# Patient Record
Sex: FEMALE | Race: WHITE | URBAN - METROPOLITAN AREA
[De-identification: names, ages, dates, MRNs, and addresses within clinical notes are randomized per-mention and may not be internally consistent; named-entity substitution may affect disease eponyms.]

---

## 2017-03-06 ENCOUNTER — OUTPATIENT (OUTPATIENT)
Dept: OUTPATIENT SERVICES | Facility: HOSPITAL | Age: 77
LOS: 1 days | Discharge: HOME | End: 2017-03-06

## 2017-03-08 PROBLEM — Z00.00 ENCOUNTER FOR PREVENTIVE HEALTH EXAMINATION: Status: ACTIVE | Noted: 2017-03-08

## 2017-03-15 ENCOUNTER — APPOINTMENT (OUTPATIENT)
Dept: VASCULAR SURGERY | Facility: CLINIC | Age: 77
End: 2017-03-15

## 2017-06-27 DIAGNOSIS — Z47.1 AFTERCARE FOLLOWING JOINT REPLACEMENT SURGERY: ICD-10-CM

## 2017-12-19 ENCOUNTER — OUTPATIENT (OUTPATIENT)
Dept: OUTPATIENT SERVICES | Facility: HOSPITAL | Age: 77
LOS: 1 days | Discharge: HOME | End: 2017-12-19

## 2017-12-19 ENCOUNTER — APPOINTMENT (OUTPATIENT)
Age: 77
End: 2017-12-19

## 2017-12-19 DIAGNOSIS — C50.919 MALIGNANT NEOPLASM OF UNSPECIFIED SITE OF UNSPECIFIED FEMALE BREAST: ICD-10-CM

## 2017-12-19 DIAGNOSIS — T20.27XA BURN OF SECOND DEGREE OF NECK, INITIAL ENCOUNTER: ICD-10-CM

## 2017-12-19 DIAGNOSIS — T20.22XA: ICD-10-CM

## 2017-12-19 DIAGNOSIS — M17.10 UNILATERAL PRIMARY OSTEOARTHRITIS, UNSPECIFIED KNEE: ICD-10-CM

## 2017-12-19 DIAGNOSIS — E66.9 OBESITY, UNSPECIFIED: ICD-10-CM

## 2017-12-19 DIAGNOSIS — E11.9 TYPE 2 DIABETES MELLITUS WITHOUT COMPLICATIONS: ICD-10-CM

## 2017-12-19 DIAGNOSIS — T20.13XA: ICD-10-CM

## 2017-12-19 DIAGNOSIS — I10 ESSENTIAL (PRIMARY) HYPERTENSION: ICD-10-CM

## 2017-12-26 ENCOUNTER — APPOINTMENT (OUTPATIENT)
Age: 77
End: 2017-12-26

## 2017-12-26 ENCOUNTER — OUTPATIENT (OUTPATIENT)
Dept: OUTPATIENT SERVICES | Facility: HOSPITAL | Age: 77
LOS: 1 days | Discharge: HOME | End: 2017-12-26

## 2017-12-26 DIAGNOSIS — M17.10 UNILATERAL PRIMARY OSTEOARTHRITIS, UNSPECIFIED KNEE: ICD-10-CM

## 2017-12-26 DIAGNOSIS — C50.919 MALIGNANT NEOPLASM OF UNSPECIFIED SITE OF UNSPECIFIED FEMALE BREAST: ICD-10-CM

## 2017-12-26 DIAGNOSIS — I10 ESSENTIAL (PRIMARY) HYPERTENSION: ICD-10-CM

## 2017-12-26 DIAGNOSIS — E11.9 TYPE 2 DIABETES MELLITUS WITHOUT COMPLICATIONS: ICD-10-CM

## 2017-12-26 DIAGNOSIS — E66.9 OBESITY, UNSPECIFIED: ICD-10-CM
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
## 2017-12-28 DIAGNOSIS — T20.22XA BURN OF SECOND DEGREE OF LIP(S), INITIAL ENCOUNTER: ICD-10-CM
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
## 2018-01-03 DIAGNOSIS — T20.22XD: ICD-10-CM

## 2018-01-03 DIAGNOSIS — T20.27XD BURN OF SECOND DEGREE OF NECK, SUBSEQUENT ENCOUNTER: ICD-10-CM

## 2018-01-03 DIAGNOSIS — T20.13XD: ICD-10-CM

## 2018-06-25 ENCOUNTER — INPATIENT (INPATIENT)
Facility: HOSPITAL | Age: 78
LOS: 6 days | Discharge: NURSING, REHAB OR LONG-TERM CARE | End: 2018-07-02
Attending: INTERNAL MEDICINE | Admitting: INTERNAL MEDICINE

## 2018-06-25 VITALS
OXYGEN SATURATION: 100 % | SYSTOLIC BLOOD PRESSURE: 168 MMHG | TEMPERATURE: 97 F | DIASTOLIC BLOOD PRESSURE: 88 MMHG | HEART RATE: 84 BPM | RESPIRATION RATE: 20 BRPM

## 2018-06-25 RX ORDER — ONDANSETRON 8 MG/1
4 TABLET, FILM COATED ORAL ONCE
Refills: 0 | Status: COMPLETED | OUTPATIENT
Start: 2018-06-25 | End: 2018-06-25

## 2018-06-25 NOTE — CONSULT NOTE ADULT - SUBJECTIVE AND OBJECTIVE BOX
***STROKE ALERT CONSULT NOTE    Chief Complaint: stroke alert    *Last known normal time: 9:05pm  *Stroke Team Contact Time: 11:35pm    HPI: 79 yo woman with PMhx below presenting with acute onset abdominal pain and nausea and vomitting.  Initially complaint was only diffuse weakness however after attending assesment it was found that she had left sided weakness.  After discussion with son she was normal earlier in evening and he was called by patient at 9:15pm and told him something was wrong at it started around 5 minutes earlier so at 9:10.  She went for CTH and no hemorrhage or stroke was seen and CTA disection study was done due to abdominal pain and vomiting.  Discussed with patient and son about TPA including risks, benefits and alternatives.  They agree with TPA understanding that there is a risk or mortality and increased morbidity.  She is not on any antiplatelet medications or anticoagulation.      PAST MEDICAL & SURGICAL HISTORY:  DM (diabetes mellitus)  Breast cancer?        Social History: (-) x 3    Allergies    latex (Unknown)  penicillins (Unknown)  shellfish (Unknown)    Intolerances        MEDICATIONS  (STANDING):  ondansetron Injectable 4 milliGRAM(s) IV Push once    MEDICATIONS  (PRN):      Vital Signs Last 24 Hrs  T(C): --  T(F): --  HR: --  BP: --176/74  BP(mean): --  RR: --  SpO2: --    Neurologic Examination:  A+Ox3 able to name and repeat; no dysarthria, right facial asymmetry, decreased sensation right V1-V3  Left arm drift  Left leg drift  decreased sensation right extremities  dysmetria left finger to nose  No neglect    *NIH Stroke Scale (required): 8   LOC (0-3); LOC Ques (0-2); LOC Comm (0-2); Gaze (0-2); Vision (0-3); Face (0-3); LUE (0-4); RUE (0-4); LLE (0-4); RLE (0-4); Ataxia (0-2); Sensory (0-2);  Lang (0-3); Dys (0-2); Neglect (0-2)    *Modified [place] Score (required): 4  0 - No symptoms.  1 - No significant disability. Able to carry out all usual activities, despite some symptoms.  2 - Slight disability. Able to look after own affairs without assistance, but unable to carry out all previous activities.  3 - Moderate disability. Requires some help, but able to walk unassisted.  4 - Moderately severe disability. Unable to attend to own bodily needs without assistance, and unable to walk unassisted.  5 - Severe disability. Requires constant nursing care and attention, bedridden, incontinent.  6 - Dead.      Labs: pending        Neuroimaging:  NCHCT: CT Head No Cont:   ******PRELIMINARY REPORT******    ******PRELIMINARY REPORT******          EXAM:  CT BRAIN            PROCEDURE DATE:  06/25/2018          ******PRELIMINARY REPORT******    ******PRELIMINARY REPORT******          INTERPRETATION:  Clinical History / Reason for exam: Stroke code.   Left-sided weakness. History of breast cancer.    TECHNIQUE: Contiguous axial CT images of the head were obtained from the   base of the skull to the vertex without IV contrast. Sagittal and coronal   reformats were obtained.     COMPARISON: None.    FINDINGS:    Motion artifact limits evaluation of fine detail.     The cortical sulci and ventricular system demonstrate parenchymal volume   loss.     There is periventricular and hemispheric white matter hypoattenuation   compatible with chronic microvascular ischemic changes.    No acute intracranial hemorrhage, mass effect, midline shift, or   extra-axial fluid collection.    Gray-white differentiation is maintained. Basal ganglia calcifications.     Beam hardening artifact is noted overlying the brain stem and posterior   fossa which is inherent to CT in this location.    The paranasal sinuses and mastoid air cells are well aerated.   Hyperostosis frontalis.      IMPRESSION:    No CT evidence of acute intracranial pathology.    Chronic microvascular ischemic changes.        Dr. Kimberly Aguila discussed preliminary findings with BARBARA CASTREJON PA on   6/25/2018 11:35 PM with readback.            ******PRELIMINARY REPORT******    ******PRELIMINARY REPORT******         KIMBERLY AGUILA M.D., RESIDENT RADIOLOGIST                   (06-25-18 @ 23:28)        Assessment:  This is a 78y Female with left sided weakness and clumsiness suspicious for stroke.  Her Last known normal time is 9:10pm      Risks and benefits including alternatives to treatment with thrombolysis with IV TPA (alteplase) discussed with patient/family at length including significant morbidity/mortality associated with treatment and acute stroke.  All questions and concerns regarding acute stroke therapy answered and addressed.  Patient/family understands and consents to treatment with thrombolysis with IV TPA within 4.5 hour window.    Stroke alert notification:  Weight (kg): 73.5 (06-25-18 @ 22:38)  IV TPA bolus time: pending Dr. Cervantes  TPA total dose: pending Dr. Cervantes  TPA bolus dose: pending Dr. Cervantes    Plan:   - TPA bolus 10% followed by 90% infusion over one hour  - serial neurochecks Q 15 minutes x 2 hours then Q30 minutes x 6 hours then Q 1 hour 24 hours  - no anticoagulation or antiplatelets  - mechanical DVT prophylaxis  - keep blood pressure <185/105 and > 110/60  - repeat head CT in 12 to 24 hours or if clinical deterioration  - admit to ICU/CCU  - MRI head Nc/MRA head NC/MRA Neck w/ gado   - Echocardiogram  - Dysphagia Screen (No PO Meds until performed)  - PT/OT/speech/swallow  - Call for any changes in neuroexam    06-25-18 @ 23:58

## 2018-06-25 NOTE — ED ADULT NURSE NOTE - PMH
DM (diabetes mellitus) BP (high blood pressure)    Breast CA    Depression    DM (diabetes mellitus)    High cholesterol

## 2018-06-26 DIAGNOSIS — Z96.651 PRESENCE OF RIGHT ARTIFICIAL KNEE JOINT: Chronic | ICD-10-CM

## 2018-06-26 DIAGNOSIS — Z90.49 ACQUIRED ABSENCE OF OTHER SPECIFIED PARTS OF DIGESTIVE TRACT: Chronic | ICD-10-CM

## 2018-06-26 DIAGNOSIS — Z98.890 OTHER SPECIFIED POSTPROCEDURAL STATES: Chronic | ICD-10-CM

## 2018-06-26 LAB
ALBUMIN SERPL ELPH-MCNC: 4.3 G/DL — SIGNIFICANT CHANGE UP (ref 3.5–5.2)
ALP SERPL-CCNC: 125 U/L — HIGH (ref 30–115)
ALT FLD-CCNC: 13 U/L — SIGNIFICANT CHANGE UP (ref 0–41)
ANION GAP SERPL CALC-SCNC: 17 MMOL/L — HIGH (ref 7–14)
APPEARANCE UR: (no result)
APTT BLD: 26.4 SEC — LOW (ref 27–39.2)
AST SERPL-CCNC: 24 U/L — SIGNIFICANT CHANGE UP (ref 0–41)
BACTERIA # UR AUTO: (no result)
BASOPHILS # BLD AUTO: 0.03 K/UL — SIGNIFICANT CHANGE UP (ref 0–0.2)
BASOPHILS NFR BLD AUTO: 0.4 % — SIGNIFICANT CHANGE UP (ref 0–1)
BILIRUB DIRECT SERPL-MCNC: <0.2 MG/DL — SIGNIFICANT CHANGE UP (ref 0–0.2)
BILIRUB INDIRECT FLD-MCNC: >0.2 MG/DL — SIGNIFICANT CHANGE UP (ref 0.2–1.2)
BILIRUB SERPL-MCNC: 0.4 MG/DL — SIGNIFICANT CHANGE UP (ref 0.2–1.2)
BILIRUB UR-MCNC: NEGATIVE — SIGNIFICANT CHANGE UP
BUN SERPL-MCNC: 16 MG/DL — SIGNIFICANT CHANGE UP (ref 10–20)
CALCIUM SERPL-MCNC: 9.6 MG/DL — SIGNIFICANT CHANGE UP (ref 8.5–10.1)
CHLORIDE SERPL-SCNC: 102 MMOL/L — SIGNIFICANT CHANGE UP (ref 98–110)
CHOLEST SERPL-MCNC: 235 MG/DL — HIGH (ref 100–200)
CK MB CFR SERPL CALC: 2.8 NG/ML — SIGNIFICANT CHANGE UP (ref 0.6–6.3)
CK SERPL-CCNC: 78 U/L — SIGNIFICANT CHANGE UP (ref 0–225)
CO2 SERPL-SCNC: 25 MMOL/L — SIGNIFICANT CHANGE UP (ref 17–32)
COD CRY URNS QL: NEGATIVE — SIGNIFICANT CHANGE UP
COLOR SPEC: YELLOW — SIGNIFICANT CHANGE UP
COMMENT - URINE: SIGNIFICANT CHANGE UP
CREAT SERPL-MCNC: 0.9 MG/DL — SIGNIFICANT CHANGE UP (ref 0.7–1.5)
DIFF PNL FLD: NEGATIVE — SIGNIFICANT CHANGE UP
EOSINOPHIL # BLD AUTO: 0.09 K/UL — SIGNIFICANT CHANGE UP (ref 0–0.7)
EOSINOPHIL NFR BLD AUTO: 1.1 % — SIGNIFICANT CHANGE UP (ref 0–8)
EPI CELLS # UR: (no result) /HPF
GLUCOSE SERPL-MCNC: 143 MG/DL — HIGH (ref 70–99)
GLUCOSE UR QL: NEGATIVE — SIGNIFICANT CHANGE UP
GRAN CASTS # UR COMP ASSIST: NEGATIVE — SIGNIFICANT CHANGE UP
HCT VFR BLD CALC: 39.8 % — SIGNIFICANT CHANGE UP (ref 37–47)
HDLC SERPL-MCNC: 62 MG/DL — SIGNIFICANT CHANGE UP (ref 40–125)
HGB BLD-MCNC: 13 G/DL — SIGNIFICANT CHANGE UP (ref 12–16)
HYALINE CASTS # UR AUTO: NEGATIVE — SIGNIFICANT CHANGE UP
IMM GRANULOCYTES NFR BLD AUTO: 0.7 % — HIGH (ref 0.1–0.3)
INR BLD: 1.22 RATIO — SIGNIFICANT CHANGE UP (ref 0.65–1.3)
KETONES UR-MCNC: 15 — SIGNIFICANT CHANGE UP
LEUKOCYTE ESTERASE UR-ACNC: NEGATIVE — SIGNIFICANT CHANGE UP
LIDOCAIN IGE QN: 16 U/L — SIGNIFICANT CHANGE UP (ref 7–60)
LIPID PNL WITH DIRECT LDL SERPL: 167 MG/DL — HIGH (ref 4–129)
LYMPHOCYTES # BLD AUTO: 1 K/UL — LOW (ref 1.2–3.4)
LYMPHOCYTES # BLD AUTO: 12.1 % — LOW (ref 20.5–51.1)
MCHC RBC-ENTMCNC: 29.3 PG — SIGNIFICANT CHANGE UP (ref 27–31)
MCHC RBC-ENTMCNC: 32.7 G/DL — SIGNIFICANT CHANGE UP (ref 32–37)
MCV RBC AUTO: 89.6 FL — SIGNIFICANT CHANGE UP (ref 81–99)
MONOCYTES # BLD AUTO: 0.74 K/UL — HIGH (ref 0.1–0.6)
MONOCYTES NFR BLD AUTO: 8.9 % — SIGNIFICANT CHANGE UP (ref 1.7–9.3)
NEUTROPHILS # BLD AUTO: 6.35 K/UL — SIGNIFICANT CHANGE UP (ref 1.4–6.5)
NEUTROPHILS NFR BLD AUTO: 76.8 % — HIGH (ref 42.2–75.2)
NITRITE UR-MCNC: NEGATIVE — SIGNIFICANT CHANGE UP
NRBC # BLD: 0 /100 WBCS — SIGNIFICANT CHANGE UP (ref 0–0)
PH UR: 7 — SIGNIFICANT CHANGE UP (ref 5–8)
PLATELET # BLD AUTO: 267 K/UL — SIGNIFICANT CHANGE UP (ref 130–400)
POTASSIUM SERPL-MCNC: 4 MMOL/L — SIGNIFICANT CHANGE UP (ref 3.5–5)
POTASSIUM SERPL-SCNC: 4 MMOL/L — SIGNIFICANT CHANGE UP (ref 3.5–5)
PROT SERPL-MCNC: 7.3 G/DL — SIGNIFICANT CHANGE UP (ref 6–8)
PROT UR-MCNC: 30
PROTHROM AB SERPL-ACNC: 13.2 SEC — HIGH (ref 9.95–12.87)
RBC # BLD: 4.44 M/UL — SIGNIFICANT CHANGE UP (ref 4.2–5.4)
RBC # FLD: 13.1 % — SIGNIFICANT CHANGE UP (ref 11.5–14.5)
RBC CASTS # UR COMP ASSIST: (no result) /HPF
SODIUM SERPL-SCNC: 144 MMOL/L — SIGNIFICANT CHANGE UP (ref 135–146)
SP GR SPEC: 1.01 — SIGNIFICANT CHANGE UP (ref 1.01–1.03)
TOTAL CHOLESTEROL/HDL RATIO MEASUREMENT: 3.8 RATIO — LOW (ref 4–5.5)
TRI-PHOS CRY UR QL COMP ASSIST: NEGATIVE — SIGNIFICANT CHANGE UP
TRIGL SERPL-MCNC: 118 MG/DL — SIGNIFICANT CHANGE UP (ref 10–149)
TROPONIN T SERPL-MCNC: <0.01 NG/ML — SIGNIFICANT CHANGE UP
URATE CRY FLD QL MICRO: NEGATIVE — SIGNIFICANT CHANGE UP
UROBILINOGEN FLD QL: 1 (ref 0.2–0.2)
WBC # BLD: 8.27 K/UL — SIGNIFICANT CHANGE UP (ref 4.8–10.8)
WBC # FLD AUTO: 8.27 K/UL — SIGNIFICANT CHANGE UP (ref 4.8–10.8)
WBC UR QL: NEGATIVE — SIGNIFICANT CHANGE UP

## 2018-06-26 RX ORDER — INSULIN GLARGINE 100 [IU]/ML
20 INJECTION, SOLUTION SUBCUTANEOUS AT BEDTIME
Refills: 0 | Status: DISCONTINUED | OUTPATIENT
Start: 2018-06-26 | End: 2018-06-26

## 2018-06-26 RX ORDER — DIPHENHYDRAMINE HCL 50 MG
25 CAPSULE ORAL ONCE
Refills: 0 | Status: COMPLETED | OUTPATIENT
Start: 2018-06-26 | End: 2018-06-26

## 2018-06-26 RX ORDER — GLUCAGON INJECTION, SOLUTION 0.5 MG/.1ML
1 INJECTION, SOLUTION SUBCUTANEOUS ONCE
Refills: 0 | Status: DISCONTINUED | OUTPATIENT
Start: 2018-06-26 | End: 2018-07-02

## 2018-06-26 RX ORDER — SODIUM CHLORIDE 9 MG/ML
1000 INJECTION INTRAMUSCULAR; INTRAVENOUS; SUBCUTANEOUS
Refills: 0 | Status: DISCONTINUED | OUTPATIENT
Start: 2018-06-26 | End: 2018-07-02

## 2018-06-26 RX ORDER — ALTEPLASE 100 MG
60 KIT INTRAVENOUS ONCE
Refills: 0 | Status: COMPLETED | OUTPATIENT
Start: 2018-06-26 | End: 2018-06-26

## 2018-06-26 RX ORDER — DOCUSATE SODIUM 100 MG
100 CAPSULE ORAL THREE TIMES A DAY
Refills: 0 | Status: DISCONTINUED | OUTPATIENT
Start: 2018-06-26 | End: 2018-07-02

## 2018-06-26 RX ORDER — DEXTROSE 50 % IN WATER 50 %
25 SYRINGE (ML) INTRAVENOUS ONCE
Refills: 0 | Status: DISCONTINUED | OUTPATIENT
Start: 2018-06-26 | End: 2018-07-02

## 2018-06-26 RX ORDER — DEXTROSE 50 % IN WATER 50 %
15 SYRINGE (ML) INTRAVENOUS ONCE
Refills: 0 | Status: DISCONTINUED | OUTPATIENT
Start: 2018-06-26 | End: 2018-07-02

## 2018-06-26 RX ORDER — ALTEPLASE 100 MG
7 KIT INTRAVENOUS ONCE
Refills: 0 | Status: COMPLETED | OUTPATIENT
Start: 2018-06-26 | End: 2018-06-26

## 2018-06-26 RX ORDER — NORTRIPTYLINE HYDROCHLORIDE 10 MG/1
25 CAPSULE ORAL AT BEDTIME
Refills: 0 | Status: DISCONTINUED | OUTPATIENT
Start: 2018-06-26 | End: 2018-07-02

## 2018-06-26 RX ORDER — SERTRALINE 25 MG/1
25 TABLET, FILM COATED ORAL DAILY
Refills: 0 | Status: DISCONTINUED | OUTPATIENT
Start: 2018-06-26 | End: 2018-07-02

## 2018-06-26 RX ORDER — SODIUM CHLORIDE 9 MG/ML
1000 INJECTION, SOLUTION INTRAVENOUS
Refills: 0 | Status: DISCONTINUED | OUTPATIENT
Start: 2018-06-26 | End: 2018-07-02

## 2018-06-26 RX ORDER — LISINOPRIL 2.5 MG/1
10 TABLET ORAL DAILY
Refills: 0 | Status: DISCONTINUED | OUTPATIENT
Start: 2018-06-26 | End: 2018-06-26

## 2018-06-26 RX ORDER — DEXTROSE 50 % IN WATER 50 %
12.5 SYRINGE (ML) INTRAVENOUS ONCE
Refills: 0 | Status: DISCONTINUED | OUTPATIENT
Start: 2018-06-26 | End: 2018-07-02

## 2018-06-26 RX ORDER — SODIUM CHLORIDE 0.65 %
1 AEROSOL, SPRAY (ML) NASAL
Refills: 0 | Status: DISCONTINUED | OUTPATIENT
Start: 2018-06-26 | End: 2018-07-02

## 2018-06-26 RX ORDER — SENNA PLUS 8.6 MG/1
2 TABLET ORAL AT BEDTIME
Refills: 0 | Status: DISCONTINUED | OUTPATIENT
Start: 2018-06-26 | End: 2018-07-02

## 2018-06-26 RX ORDER — LISINOPRIL 2.5 MG/1
10 TABLET ORAL DAILY
Refills: 0 | Status: DISCONTINUED | OUTPATIENT
Start: 2018-06-26 | End: 2018-07-02

## 2018-06-26 RX ORDER — PANTOPRAZOLE SODIUM 20 MG/1
40 TABLET, DELAYED RELEASE ORAL AT BEDTIME
Refills: 0 | Status: DISCONTINUED | OUTPATIENT
Start: 2018-06-26 | End: 2018-07-02

## 2018-06-26 RX ORDER — ATORVASTATIN CALCIUM 80 MG/1
80 TABLET, FILM COATED ORAL AT BEDTIME
Refills: 0 | Status: DISCONTINUED | OUTPATIENT
Start: 2018-06-26 | End: 2018-07-02

## 2018-06-26 RX ORDER — ATORVASTATIN CALCIUM 80 MG/1
20 TABLET, FILM COATED ORAL AT BEDTIME
Refills: 0 | Status: DISCONTINUED | OUTPATIENT
Start: 2018-06-26 | End: 2018-06-26

## 2018-06-26 RX ORDER — INSULIN LISPRO 100/ML
VIAL (ML) SUBCUTANEOUS
Refills: 0 | Status: DISCONTINUED | OUTPATIENT
Start: 2018-06-26 | End: 2018-06-26

## 2018-06-26 RX ADMIN — SODIUM CHLORIDE 75 MILLILITER(S): 9 INJECTION INTRAMUSCULAR; INTRAVENOUS; SUBCUTANEOUS at 03:42

## 2018-06-26 RX ADMIN — ATORVASTATIN CALCIUM 80 MILLIGRAM(S): 80 TABLET, FILM COATED ORAL at 22:02

## 2018-06-26 RX ADMIN — SENNA PLUS 2 TABLET(S): 8.6 TABLET ORAL at 22:03

## 2018-06-26 RX ADMIN — SERTRALINE 25 MILLIGRAM(S): 25 TABLET, FILM COATED ORAL at 22:04

## 2018-06-26 RX ADMIN — Medication 100 MILLIGRAM(S): at 22:02

## 2018-06-26 RX ADMIN — PANTOPRAZOLE SODIUM 40 MILLIGRAM(S): 20 TABLET, DELAYED RELEASE ORAL at 22:02

## 2018-06-26 RX ADMIN — ALTEPLASE 60 MILLIGRAM(S): KIT at 00:29

## 2018-06-26 RX ADMIN — Medication 25 MILLIGRAM(S): at 23:28

## 2018-06-26 RX ADMIN — NORTRIPTYLINE HYDROCHLORIDE 25 MILLIGRAM(S): 10 CAPSULE ORAL at 22:02

## 2018-06-26 RX ADMIN — ONDANSETRON 4 MILLIGRAM(S): 8 TABLET, FILM COATED ORAL at 00:03

## 2018-06-26 RX ADMIN — ALTEPLASE 420 MILLIGRAM(S): KIT at 00:28

## 2018-06-26 NOTE — ED PROVIDER NOTE - PHYSICAL EXAMINATION
VITAL SIGNS: I have reviewed nursing notes and confirm.  CONSTITUTIONAL: Well-developed; well-nourished; in no acute distress.  SKIN: Skin exam is warm and dry, no acute rash.  HEAD: Normocephalic; atraumatic.  EYES: PERRL, EOM intact; conjunctiva and sclera clear.  ENT: No nasal discharge; airway clear.   NECK: Supple; non tender.  CARD: S1, S2 normal; no murmurs, gallops, or rubs. Regular rate and rhythm.  RESP: No wheezes, rales or rhonchi. Speaking in full sentences.   ABD: Normal bowel sounds; soft; non-distended; (+) mild lower abdominal TTP.   EXT: No clubbing, cyanosis or edema.  NEURO: Alert, oriented. CN II-XII intact. (+) left UE/LE pronator drift, strength 3/5, sensation intact. Right UE/LE strength 5/5, no drift, sensation intact. Speech clear. No facial asymmetry.

## 2018-06-26 NOTE — H&P ADULT - ASSESSMENT
77 y/o female with PMHx of HTN, DLD, DM II, depression, and breast CA s/p lumpectomy p/w left sided weakness and numbness x 1 day. patient was admitted to ICU for acute CVA s/p TPA administration.    #Acute CVA s/p TPA  neuro check  monitor VS,  BP <180  monitor H & H  Lipid profile and HBa1c  2d ECHO  repeat CTH in 24 tonight  MRI of head, MRA of head and neck  start lipitor 80   start ASA tmw    f/u neuro eval  speech and swallow eval  PT and rehab    #HTN  controlled  c/w lisinpril    #DM   monitor FS  insulin protocol    #others   NPO for now  DVT and GI ppx  from home  full code

## 2018-06-26 NOTE — CONSULT NOTE ADULT - ASSESSMENT
IMPRESSION: Rehab of 79 y/o f rehab  for  r/o  cva  lt side  weakness     PRECAUTIONS: [  ] Cardiac  [  ] Respiratory  [  ] Seizures [  ] Contact Isolation  [  ] Droplet Isolation  [ x Other    Weight Bearing Status:     RECOMMENDATION:    Out of Bed to Chair     DVT/Decubiti Prophylaxis    REHAB PLAN:     [ x  ] Bedside P/T 3-5 times a week   [  x ]   Bedside O/T  2-3 times a week             [   ] No Rehab Therapy Indicated                   [   ]  Speech Therapy   Conditioning/ROM                                    ADL  Bed Mobility                                               Conditioning/ROM  Transfers                                                     Bed Mobility  Sitting /Standing Balance                         Transfers                                        Gait Training                                               Sitting/Standing Balance  Stair Training [   ]Applicable                    Home equipment Eval                                                                        Splinting  [   ] Only      GOALS:   ADL   [  x ]   Independent                    Transfers  [ x  ] Independent                          Ambulation  [  x Independent     [ x  ] With device                            [   ]  CG                                                         [   ]  CG                                                                  [   ] CG                            [    ] Min A                                                   [   ] Min A                                                              [   ] Min  A          DISCHARGE PLAN:   [xx   ]  Good candidate for Intensive Rehabilitation/Hospital based-[facility]A [facility]                                             Will tolerate 3hrs Intensive Rehab Daily                                       [   x ]  Short Term Rehab in Skilled Nursing Facility                                       [    ]  Home with Outpatient or VN services                                         [    ]  Possible Candidate for Intensive Hospital based Rehab
CVA s/p TPA slight improvement      Plan:   Neuro Plan:  1.  No antiplatelets or heparinoids for 24 hours from TPA administration.  2.  Keep SBP < 180.  3.  Head CT at 24 hours out from TPA completion or sooner if acute neuro changes.  4.  Rehab evaluation, PT, OT, and speech evals.  5.  After 24 hours begin aspirin 81 mg/day.  6.  TTE.  7.  MRI brain and MRA head w/o contrast and MRA neck with contrast after patient out of CCU.  8.  Lipid panel and treat hyperlipidemia with statin.    Monitor in the ICU

## 2018-06-26 NOTE — PROVIDER CONTACT NOTE (OTHER) - SITUATION
neuro checks done ap protocol, pt c/o worsening left sided numbness & decreased movement of left arm & left leg. Left leg worse than left arm
on neuro check pt c/o worsening numbness & tingling

## 2018-06-26 NOTE — PATIENT PROFILE ADULT. - ABILITY TO HEAR (WITH HEARING AID OR HEARING APPLIANCE IF NORMALLY USED):
Mildly to Moderately Impaired: difficulty hearing in some environments or speaker may need to increase volume or speak distinctly/hearing aid left at home

## 2018-06-26 NOTE — PATIENT PROFILE ADULT. - PMH
Bilateral hearing loss, unspecified hearing loss type    BP (high blood pressure)    Breast CA    Depression    DM (diabetes mellitus)    High cholesterol

## 2018-06-26 NOTE — PHYSICAL THERAPY INITIAL EVALUATION ADULT - SPECIFY REASON(S)
Chart reviewed. Patient received TPA <24 hours ago.  Hold PT at this time.  Will follow up and complete evaluation as appropriate.

## 2018-06-26 NOTE — H&P ADULT - HISTORY OF PRESENT ILLNESS
79 y/o female with PMHx of HTN, DLD, DM II, depression, and breast CA s/p lumpectomy p/w left sided weakness and numbness x 1 day. patient started to feel left side weakness and numbness in the evening 1 day prior to admission. patient came to the ED where she had nausea and vomiting. CTH was done with no evidence of acute intracranial pathology. TPA was offered to the patient and she agreed. patient was admitted to ICU for acute CVA. patient denies LOC, seizure and headache. patient denies fever, COB, CP, cough, n/v/d, bowel and urinary symptoms.

## 2018-06-26 NOTE — PATIENT PROFILE ADULT. - PSH
H/O total knee replacement, right    History of cholecystectomy  1992  History of lumpectomy of right breast

## 2018-06-26 NOTE — CONSULT NOTE ADULT - SUBJECTIVE AND OBJECTIVE BOX
HPI:77 y/o  f  ptn  admitted to  [facility]  s  b.o  lt  side  weakness  to  r/o  cva  lt  hp ptn  is  stable  and  referred to acute  rehab  for  eval  and tx   PTN  REFERRED TO ACUTE  REHAB  FOR  EVAL AND  TX   PAST MEDICAL & SURGICAL HISTORY:  Bilateral hearing loss, unspecified hearing loss type  High cholesterol  Depression  BP (high blood pressure)  Breast CA  DM (diabetes mellitus)  History of cholecystectomy: 1992  History of lumpectomy of right breast  H/O total knee replacement, right      Hospital Course:    TODAY'S SUBJECTIVE & REVIEW OF SYMPTOMS:     Constitutional WNL   Cardio WNL   Resp WNL   GI WNL  Heme WNL  Endo WNL  Skin WNL  MSK WNL  Neuro WNL  Cognitive WNL  Psych WNL      MEDICATIONS  (STANDING):  atorvastatin 20 milliGRAM(s) Oral at bedtime  pantoprazole    Tablet 40 milliGRAM(s) Oral at bedtime  sodium chloride 0.9%. 1000 milliLiter(s) (75 mL/Hr) IV Continuous <Continuous>    MEDICATIONS  (PRN):      FAMILY HISTORY:      Allergies    latex (Unknown)  penicillins (Unknown)  shellfish (Unknown)    Intolerances        SOCIAL HISTORY:    [  ] Etoh  [  ] Smoking  [  ] Substance abuse     Home Environment:  [ x] Home Alone  [  ] Lives with Family  [  ] Home Health Aid    Dwelling:  [  ] Apartment  [  x] Private House  [  ] Adult Home  [  ] Skilled Nursing Facility      [  ] Short Term  [  ] Long Term  [ x ] Stairs       Elevator [  ]    FUNCTIONAL STATUS PTA: (Check all that apply)  Ambulation: [x   ]Independent    [  ] Dependent     [  ] Non-Ambulatory  Assistive Device: [  x] SA Cane  [  ]  Q Cane  [  x] Walker  [  ]  Wheelchair  ADL : [ x ] Independent  [  ]  Dependent       Vital Signs Last 24 Hrs  T(C): 36.4 (26 Jun 2018 07:06), Max: 36.6 (26 Jun 2018 02:28)  T(F): 97.5 (26 Jun 2018 07:06), Max: 97.9 (26 Jun 2018 02:28)  HR: 91 (26 Jun 2018 08:03) (73 - 95)  BP: 160/77 (26 Jun 2018 08:03) (112/55 - 194/80)  BP(mean): 111 (26 Jun 2018 08:03) (79 - 122)  RR: 22 (26 Jun 2018 07:06) (16 - 30)  SpO2: 99% (26 Jun 2018 08:03) (84% - 100%)      PHYSICAL EXAM: Alert & Oriented X3  GENERAL: NAD, well-groomed, well-developed  HEAD:  Atraumatic, Normocephalic  EYES: EOMI, PERRLA, conjunctiva and sclera clear  NECK: Supple, No JVD, Normal thyroid  CHEST/LUNG: Clear to percussion bilaterally; No rales, rhonchi, wheezing, or rubs  HEART: Regular rate and rhythm; No murmurs, rubs, or gallops  ABDOMEN: Soft, Nontender, Nondistended; Bowel sounds present  EXTREMITIES:  2+ Peripheral Pulses, No clubbing, cyanosis, or edema    NERVOUS SYSTEM:  Cranial Nerves 2-12 intact [ x ] Abnormal  [  ]  ROM: WFL all extremities [  x]  Abnormal [  ]  Motor Strength: WFL all extremities  [  ]  Abnormal [ lt side  3/5   ]  Sensation: intact to light touch [  ] Abnormal [  x]  Reflexes: Symmetric [ x  Abnormal [  ]    FUNCTIONAL STATUS:  Bed Mobility: Independent [  ]  Supervision [  ]  Needs Assistance [x  ]  N/A [  ]  Transfers: Independent [  ]  Supervision [  ]  Needs Assistance [x  ]  N/A [  ]   Ambulation: Independent [  ]  Supervision [  ]  Needs Assistance [ x ]  N/A [  ]  ADL: Independent [  x] Requires Assistance [  ] N/A [  ]      LABS:                        13.0   8.27  )-----------( 267      ( 26 Jun 2018 00:15 )             39.8     06-26    144  |  102  |  16  ----------------------------<  143<H>  4.0   |  25  |  0.9    Ca    9.6      26 Jun 2018 00:15    TPro  7.3  /  Alb  4.3  /  TBili  0.4  /  DBili  <0.2  /  AST  24  /  ALT  13  /  AlkPhos  125<H>  06-26    PT/INR - ( 26 Jun 2018 00:15 )   PT: 13.20 sec;   INR: 1.22 ratio         PTT - ( 26 Jun 2018 00:15 )  PTT:26.4 sec      RADIOLOGY & ADDITIONAL STUDIES:    Assesment:

## 2018-06-26 NOTE — CONSULT NOTE ADULT - SUBJECTIVE AND OBJECTIVE BOX
Patient is a 78y old  Female who presents with a chief complaint of abdominal pain and left sided weakness    HPI:  elderly white female w/ PMH as noted below. pt c/o- abdominal pain , nausea , vomiting. Pt also c/o- left facial and left side weakness    PAST MEDICAL & SURGICAL HISTORY:  High cholesterol  Depression  BP (high blood pressure)  Breast CA  DM (diabetes mellitus)  History of lumpectomy of right breast  H/O total knee replacement, right    Allergies    latex (Unknown)  penicillins (Unknown)  shellfish (Unknown)    Intolerances      FAMILY HISTORY:    Home Medications:  atorvastatin: 20 milligram(s) orally once a day (at bedtime) (26 Jun 2018 01:24)  Ecotrin:  (26 Jun 2018 01:24)  exemestane: 25 milligram(s) orally once a day (26 Jun 2018 01:24)  HumaLOG 100 units/mL subcutaneous solution: 10 unit(s) subcutaneous 3 times a day (before meals) (26 Jun 2018 01:24)  Levemir 100 units/mL subcutaneous solution: 20 unit(s) subcutaneous once a day (at bedtime) (26 Jun 2018 01:24)  nortriptyline: 25 milligram(s) orally once a day (at bedtime) (26 Jun 2018 01:24)  quinapril: 10 milligram(s) orally once a day (26 Jun 2018 01:24)  sertraline: 25 milligram(s) orally once a day (26 Jun 2018 01:24)  Vitamin B6 25 mg oral tablet: 1 tab(s) orally once a day (26 Jun 2018 01:24)  Vitamin C 500 mg oral tablet: 1 tab(s) orally once a day (26 Jun 2018 01:24)    Occupation:  Alochol: Denied  Smoking: Denied  Drug Use: Denied  Marital Status:         ROS: as in HPI; All other systems reviewed are negative    ICU Vital Signs Last 24 Hrs  T(C): 36.3 (25 Jun 2018 23:21), Max: 36.3 (25 Jun 2018 11:30)  T(F): 97.4 (25 Jun 2018 23:21), Max: 97.4 (25 Jun 2018 11:30)  HR: 84 (26 Jun 2018 01:30) (80 - 95)  BP: 164/73 (26 Jun 2018 01:30) (159/76 - 194/80)  BP(mean): --  ABP: --  ABP(mean): --  RR: 20 (26 Jun 2018 01:30) (18 - 20)  SpO2: 95% (26 Jun 2018 01:30) (84% - 100%)        Physical Examination:    General: No acute distress.  Alert, oriented, interactive, nonfocal    HEENT: Pupils equal, reactive to light.  Symmetric.    PULM: Clear to auscultation bilaterally, no significant sputum production    CVS: Regular rate and rhythm, no murmurs, rubs, or gallops    ABD: Soft, nondistended, nontender, normoactive bowel sounds, no masses    EXT: No edema, nontender    SKIN: Warm and well perfused, no rashes noted.              I&O's Detail        LABS:                        13.0   8.27  )-----------( 267      ( 26 Jun 2018 00:15 )             39.8                 CAPILLARY BLOOD GLUCOSE        PT/INR - ( 26 Jun 2018 00:15 )   PT: 13.20 sec;   INR: 1.22 ratio         PTT - ( 26 Jun 2018 00:15 )  PTT:26.4 sec    Culture        MEDICATIONS  (STANDING):  atorvastatin 20 milliGRAM(s) Oral at bedtime  pantoprazole    Tablet 40 milliGRAM(s) Oral at bedtime  sodium chloride 0.9%. 1000 milliLiter(s) (75 mL/Hr) IV Continuous <Continuous>    MEDICATIONS  (PRN):        RADIOLOGY: ***     CXR:  TLC:  OG:  ET tube:          ECHO:      IMPRESSION:        PLAN:    CNS:    HEENT:    PULMONARY:    CARDIOVASCULAR:    GI: GI prophylaxis.  Feeding     RENAL:    INFECTIOUS DISEASE:    HEMATOLOGICAL:  DVT prophylaxis.    ENDOCRINE:  Follow up FS.  Insulin protocol if needed.    MUSCULOSKELETAL:        CRITICAL CARE TIME SPENT: *** Patient is a 78y old  Female who presents with a chief complaint of abdominal pain and left sided weakness    HPI:  elderly white female w/ PMH as noted below. pt c/o- abdominal pain , nausea , vomiting. Pt also c/o- left facial and left side weakness. Pt was evaluated by neurology and was determined that she was a candidate for TpA. pt was explained the risks and benefits of TpA. Pt then agreed for TpA administration    PAST MEDICAL & SURGICAL HISTORY:  High cholesterol  Depression  BP (high blood pressure)  Breast CA  DM (diabetes mellitus)  History of lumpectomy of right breast  H/O total knee replacement, right    Allergies    latex (Unknown)  penicillins (Unknown)  shellfish (Unknown)    Intolerances      FAMILY HISTORY:    Home Medications:  atorvastatin: 20 milligram(s) orally once a day (at bedtime) (26 Jun 2018 01:24)  Ecotrin:  (26 Jun 2018 01:24)  exemestane: 25 milligram(s) orally once a day (26 Jun 2018 01:24)  HumaLOG 100 units/mL subcutaneous solution: 10 unit(s) subcutaneous 3 times a day (before meals) (26 Jun 2018 01:24)  Levemir 100 units/mL subcutaneous solution: 20 unit(s) subcutaneous once a day (at bedtime) (26 Jun 2018 01:24)  nortriptyline: 25 milligram(s) orally once a day (at bedtime) (26 Jun 2018 01:24)  quinapril: 10 milligram(s) orally once a day (26 Jun 2018 01:24)  sertraline: 25 milligram(s) orally once a day (26 Jun 2018 01:24)  Vitamin B6 25 mg oral tablet: 1 tab(s) orally once a day (26 Jun 2018 01:24)  Vitamin C 500 mg oral tablet: 1 tab(s) orally once a day (26 Jun 2018 01:24)    Occupation:  Alochol: Denied  Smoking: Denied  Drug Use: Denied  Marital Status:         ROS: as in HPI; All other systems reviewed are negative    ICU Vital Signs Last 24 Hrs  T(C): 36.3 (25 Jun 2018 23:21), Max: 36.3 (25 Jun 2018 11:30)  T(F): 97.4 (25 Jun 2018 23:21), Max: 97.4 (25 Jun 2018 11:30)  HR: 84 (26 Jun 2018 01:30) (80 - 95)  BP: 164/73 (26 Jun 2018 01:30) (159/76 - 194/80)  BP(mean): --  ABP: --  ABP(mean): --  RR: 20 (26 Jun 2018 01:30) (18 - 20)  SpO2: 95% (26 Jun 2018 01:30) (84% - 100%)        Physical Examination:    General: No acute distress.  Alert, oriented, interactive, nonfocal    HEENT: Pupils equal, reactive to light.  Symmetric.    PULM: Clear to auscultation bilaterally, no significant sputum production    CVS: Regular rate and rhythm, no murmurs, rubs, or gallops    ABD: Soft, nondistended, nontender, normoactive bowel sounds, no masses    EXT: No edema, nontender    SKIN: Warm and well perfused, no rashes noted.  neuro- alert oriented,x 3  mild dysaarthria  left sided muscle  weakness  cr n 1-12 grossly intact              I&O's Detail        LABS:                        13.0   8.27  )-----------( 267      ( 26 Jun 2018 00:15 )             39.8                 CAPILLARY BLOOD GLUCOSE        PT/INR - ( 26 Jun 2018 00:15 )   PT: 13.20 sec;   INR: 1.22 ratio         PTT - ( 26 Jun 2018 00:15 )  PTT:26.4 sec    Culture        MEDICATIONS  (STANDING):  atorvastatin 20 milliGRAM(s) Oral at bedtime  pantoprazole    Tablet 40 milliGRAM(s) Oral at bedtime  sodium chloride 0.9%. 1000 milliLiter(s) (75 mL/Hr) IV Continuous <Continuous>    MEDICATIONS  (PRN):        RADIOLOGY: ***     CXR: cardiomegaly, no acute cardiopulmonary abnormalities  CT head- no acute intracranial pathology  CT chest- no aortic disection           IMPRESSION:  acute CVA        PLAN:  discussed w/ intensivistAnand    neuro checks q 1hr  no anticoag.  mechanical dvt prophilaxis  keep syst bp <185  repeat CT head in 24 hr  MRI/MRA head and neck  pt/ot/speech swallow          CRITICAL CARE TIME SPENT: *** Patient is a 78y old  Female who presents with a chief complaint of abdominal pain and left sided weakness    HPI:  elderly white female w/ PMH as noted below. pt c/o- abdominal pain , nausea , vomiting. Pt also c/o- left facial and left side weakness. Pt was evaluated by neurology and was determined that she was a candidate for TpA. pt was explained the risks and benefits of TpA. Pt then agreed for TpA administration    PAST MEDICAL & SURGICAL HISTORY:  High cholesterol  Depression  BP (high blood pressure)  Breast CA  DM (diabetes mellitus)  History of lumpectomy of right breast  H/O total knee replacement, right    Allergies    latex (Unknown)  penicillins (Unknown)  shellfish (Unknown)    Intolerances      FAMILY HISTORY:    Home Medications:  atorvastatin: 20 milligram(s) orally once a day (at bedtime) (26 Jun 2018 01:24)  Ecotrin:  (26 Jun 2018 01:24)  exemestane: 25 milligram(s) orally once a day (26 Jun 2018 01:24)  HumaLOG 100 units/mL subcutaneous solution: 10 unit(s) subcutaneous 3 times a day (before meals) (26 Jun 2018 01:24)  Levemir 100 units/mL subcutaneous solution: 20 unit(s) subcutaneous once a day (at bedtime) (26 Jun 2018 01:24)  nortriptyline: 25 milligram(s) orally once a day (at bedtime) (26 Jun 2018 01:24)  quinapril: 10 milligram(s) orally once a day (26 Jun 2018 01:24)  sertraline: 25 milligram(s) orally once a day (26 Jun 2018 01:24)  Vitamin B6 25 mg oral tablet: 1 tab(s) orally once a day (26 Jun 2018 01:24)  Vitamin C 500 mg oral tablet: 1 tab(s) orally once a day (26 Jun 2018 01:24)    Occupation:  Alochol: Denied  Smoking: Denied  Drug Use: Denied  Marital Status:         ROS: as in HPI; All other systems reviewed are negative    ICU Vital Signs Last 24 Hrs  T(C): 36.3 (25 Jun 2018 23:21), Max: 36.3 (25 Jun 2018 11:30)  T(F): 97.4 (25 Jun 2018 23:21), Max: 97.4 (25 Jun 2018 11:30)  HR: 84 (26 Jun 2018 01:30) (80 - 95)  BP: 164/73 (26 Jun 2018 01:30) (159/76 - 194/80)  BP(mean): --  ABP: --  ABP(mean): --  RR: 20 (26 Jun 2018 01:30) (18 - 20)  SpO2: 95% (26 Jun 2018 01:30) (84% - 100%)        Physical Examination:    General: No acute distress.  Alert, oriented, interactive, nonfocal    HEENT: Pupils equal, reactive to light.  Symmetric.    PULM: Clear to auscultation bilaterally, no significant sputum production    CVS: Regular rate and rhythm, no murmurs, rubs, or gallops    ABD: Soft, nondistended, nontender, normoactive bowel sounds, no masses    EXT: No edema, nontender    SKIN: Warm and well perfused, no rashes noted.  neuro- alert oriented,x 3  mild dysaarthria  left sided muscle  weakness, post TPA- same as on right side.  cr n 1-12 grossly intact              I&O's Detail        LABS:                        13.0   8.27  )-----------( 267      ( 26 Jun 2018 00:15 )             39.8                 CAPILLARY BLOOD GLUCOSE        PT/INR - ( 26 Jun 2018 00:15 )   PT: 13.20 sec;   INR: 1.22 ratio         PTT - ( 26 Jun 2018 00:15 )  PTT:26.4 sec    Culture        MEDICATIONS  (STANDING):  atorvastatin 20 milliGRAM(s) Oral at bedtime  pantoprazole    Tablet 40 milliGRAM(s) Oral at bedtime  sodium chloride 0.9%. 1000 milliLiter(s) (75 mL/Hr) IV Continuous <Continuous>    MEDICATIONS  (PRN):        RADIOLOGY: ***     CXR: cardiomegaly, no acute cardiopulmonary abnormalities  CT head- no acute intracranial pathology  CT chest- no aortic disection           IMPRESSION:  1. Acute CVA with left extremities weakness on arrival.  2. Post TPA , in ER.  3. DM.  4. HTN , controlled.  5. H/O Right breast Ca with lumpectomy.        PLAN:  discussed w/ intensivistAnand    neuro checks q 1hr  no anticoag.  mechanical dvt prophilaxis  keep syst bp <185  repeat CT head in 24 hr  MRI/MRA head and neck  pt/ot/speech swallow          CRITICAL CARE TIME SPENT: ***

## 2018-06-26 NOTE — H&P ADULT - NSHPLABSRESULTS_GEN_ALL_CORE
13.0   8.27  )-----------( 267      ( 26 Jun 2018 00:15 )             39.8       06-26    144  |  102  |  16  ----------------------------<  143<H>  4.0   |  25  |  0.9    Ca    9.6      26 Jun 2018 00:15    TPro  7.3  /  Alb  4.3  /  TBili  0.4  /  DBili  <0.2  /  AST  24  /  ALT  13  /  AlkPhos  125<H>  06-26            PT/INR - ( 26 Jun 2018 00:15 )   PT: 13.20 sec;   INR: 1.22 ratio         PTT - ( 26 Jun 2018 00:15 )  PTT:26.4 sec        CARDIAC MARKERS ( 26 Jun 2018 00:15 )  x     / <0.01 ng/mL / 78 U/L / x     / 2.8 ng/mL

## 2018-06-26 NOTE — PROGRESS NOTE ADULT - SUBJECTIVE AND OBJECTIVE BOX
Neurology Progress Note  Patient is s/p acute stroke and TPA administered at 00:28 this morning.    Interval History:    Patient did well overnight.  Denies headaches, is moving left side more.  She c/o mild sensory change on left side.    T(F): 97.5 (06-26-18 @ 07:06), Max: 97.9 (06-26-18 @ 02:28)  HR: 92 (06-26-18 @ 08:55) (73 - 96)  BP: 157/73 (06-26-18 @ 08:55) (112/55 - 194/80)  RR: 20 (06-26-18 @ 08:55) (16 - 30)  SpO2: 98% (06-26-18 @ 08:55) (84% - 100%)  Neurological Exam:   Mental status: Awake, alert, oriented x 3, speech is fluent.  Patient follows commands.  Cranial nerves: Pupils equally round and reactive to light.  No visual field loss detected. No nystagmus.  Facial muscles symmetric.  Motor:  5/5 strength proximal and distal right arm and leg.  Left arm strength 4 to 4-/5.  Left lower extremity 4-/5 proximally.  Distally has foot drop.  ataxia is present left arm and leg.  Sensation:  seems to indicate increased sensitivity left to LT and PP left arm and leg.  Endorses a sensory change where left side feels abnormal but doesn't agree that the sensation is less.  Reflexes: 2+ upper and lower extremities, right great toe is downgoing, left great toe is upgoing.    NIH stroke score is 3/42:  1 pt for mild sensory change on left, 1 point for left arm and 1 point for left leg, 1 point for leg drift.    Labs:  CBC Full  -  ( 26 Jun 2018 00:15 )  WBC Count : 8.27 K/uL  Hemoglobin : 13.0 g/dL  Hematocrit : 39.8 %  Platelet Count - Automated : 267 K/uL  Mean Cell Volume : 89.6 fL  Mean Cell Hemoglobin : 29.3 pg  Mean Cell Hemoglobin Concentration : 32.7 g/dL  Auto Neutrophil # : 6.35 K/uL  Auto Lymphocyte # : 1.00 K/uL  Auto Monocyte # : 0.74 K/uL  Auto Eosinophil # : 0.09 K/uL  Auto Basophil # : 0.03 K/uL  Auto Neutrophil % : 76.8 %  Auto Lymphocyte % : 12.1 %  Auto Monocyte % : 8.9 %  Auto Eosinophil % : 1.1 %  Auto Basophil % : 0.4 %    06-26    144  |  102  |  16  ----------------------------<  143<H>  4.0   |  25  |  0.9    Ca    9.6      26 Jun 2018 00:15    TPro  7.3  /  Alb  4.3  /  TBili  0.4  /  DBili  <0.2  /  AST  24  /  ALT  13  /  AlkPhos  125<H>  06-26    LIVER FUNCTIONS - ( 26 Jun 2018 00:15 )  Alb: 4.3 g/dL / Pro: 7.3 g/dL / ALK PHOS: 125 U/L / ALT: 13 U/L / AST: 24 U/L / GGT: x           PT/INR - ( 26 Jun 2018 00:15 )   PT: 13.20 sec;   INR: 1.22 ratio         PTT - ( 26 Jun 2018 00:15 )  PTT:26.4 sec          Assessment:  This is a 78y Female w/ h/o acute stroke last night and s/p TPA administration at 00:28 am.  She has shown some improvement in left sided  strength.  NIH score went from 8 -> 3.    Plan:  1.  No antiplatelets or heparinoids for 24 hours from TPA administration.  2.  Keep SBP < 180.  3.  Head CT at 24 hours out from TPA completion or sooner if acute neuro changes.  4.  Rehab evaluation, PT, OT, and speech evals.  5.  After 24 hours begin aspirin 81 mg/day.  6.  TTE.  7.  MRI brain and MRA head and neck after patient out of CCU.  8.  Lipid panel and treat hyperlipidemia with statin. Neurology Progress Note  Patient is s/p acute stroke and TPA administered at 00:28 this morning.    Interval History:    Patient did well overnight.  Denies headaches, is moving left side more.  She c/o mild sensory change on left side.    T(F): 97.5 (06-26-18 @ 07:06), Max: 97.9 (06-26-18 @ 02:28)  HR: 92 (06-26-18 @ 08:55) (73 - 96)  BP: 157/73 (06-26-18 @ 08:55) (112/55 - 194/80)  RR: 20 (06-26-18 @ 08:55) (16 - 30)  SpO2: 98% (06-26-18 @ 08:55) (84% - 100%)  Neurological Exam:   Mental status: Awake, alert, oriented x 3, speech is fluent.  Patient follows commands.  Cranial nerves: Pupils equally round and reactive to light.  No visual field loss detected. No nystagmus.  Facial muscles symmetric.  Motor:  5/5 strength proximal and distal right arm and leg.  Left arm strength 4 to 4-/5.  Left lower extremity 4-/5 proximally.  Distally has foot drop.  ataxia is present left arm and leg.  Sensation:  seems to indicate increased sensitivity left to LT and PP left arm and leg.  Endorses a sensory change where left side feels abnormal but doesn't agree that the sensation is less.  Reflexes: 2+ upper and lower extremities, right great toe is downgoing, left great toe is upgoing.    NIH stroke score is 3/42:  1 pt for mild sensory change on left, 1 point for left arm and 1 point for left leg, 1 point for leg drift.    Labs:  CBC Full  -  ( 26 Jun 2018 00:15 )  WBC Count : 8.27 K/uL  Hemoglobin : 13.0 g/dL  Hematocrit : 39.8 %  Platelet Count - Automated : 267 K/uL  Mean Cell Volume : 89.6 fL  Mean Cell Hemoglobin : 29.3 pg  Mean Cell Hemoglobin Concentration : 32.7 g/dL  Auto Neutrophil # : 6.35 K/uL  Auto Lymphocyte # : 1.00 K/uL  Auto Monocyte # : 0.74 K/uL  Auto Eosinophil # : 0.09 K/uL  Auto Basophil # : 0.03 K/uL  Auto Neutrophil % : 76.8 %  Auto Lymphocyte % : 12.1 %  Auto Monocyte % : 8.9 %  Auto Eosinophil % : 1.1 %  Auto Basophil % : 0.4 %    06-26    144  |  102  |  16  ----------------------------<  143<H>  4.0   |  25  |  0.9    Ca    9.6      26 Jun 2018 00:15    TPro  7.3  /  Alb  4.3  /  TBili  0.4  /  DBili  <0.2  /  AST  24  /  ALT  13  /  AlkPhos  125<H>  06-26    LIVER FUNCTIONS - ( 26 Jun 2018 00:15 )  Alb: 4.3 g/dL / Pro: 7.3 g/dL / ALK PHOS: 125 U/L / ALT: 13 U/L / AST: 24 U/L / GGT: x           PT/INR - ( 26 Jun 2018 00:15 )   PT: 13.20 sec;   INR: 1.22 ratio         PTT - ( 26 Jun 2018 00:15 )  PTT:26.4 sec          Assessment:  This is a 78y Female w/ h/o acute stroke last night and s/p TPA administration at 00:28 am.  She has shown some improvement in left sided  strength.  NIH score went from 8 -> 3.    Plan:  1.  No antiplatelets or heparinoids for 24 hours from TPA administration.  2.  Keep SBP < 180.  3.  Head CT at 24 hours out from TPA completion or sooner if acute neuro changes.  4.  Rehab evaluation, PT, OT, and speech evals.  5.  After 24 hours begin aspirin 81 mg/day.  6.  TTE.  7.  MRI brain and MRA head w/o contrast and MRA neck with contrast after patient out of CCU.  8.  Lipid panel and treat hyperlipidemia with statin.

## 2018-06-26 NOTE — ED PROVIDER NOTE - OBJECTIVE STATEMENT
77 yo F with PMHx of HTN, breast CA, DM, hypercholesterolemia and depression presents to the ED c/o nausea, NBNB vomiting and abdominal pain. Daughter called EMS for evaluation but states that her son had found pt laying down on the floor. Pt also complains of numbness to her left leg. Pt denies fever, chills, diarrhea, headache, dizziness, weakness, chest pain, SOB, back pain, LOC, trauma, urinary symptoms, cough, calf pain/swelling, recent travel, recent surgery.

## 2018-06-26 NOTE — H&P ADULT - NSHPPHYSICALEXAM_GEN_ALL_CORE
T(C): 36.4 (06-26-18 @ 07:06), Max: 36.6 (06-26-18 @ 02:28)  HR: 92 (06-26-18 @ 10:00) (73 - 96)  BP: 154/85 (06-26-18 @ 10:00) (112/55 - 194/80)  RR: 20 (06-26-18 @ 10:00) (16 - 30)  SpO2: 99% (06-26-18 @ 10:00) (84% - 100%)    PHYSICAL EXAM:  GENERAL: NAD, well-developed  HEAD:  Atraumatic, Normocephalic  EYES: EOMI, PERRLA, conjunctiva and sclera clear  ENT:No nasal obstruction or discharge. No tonsillar exudate, swelling or erythema.  NECK: Supple, No JVD  CHEST/LUNG: Clear to auscultation bilaterally; No wheeze  HEART: Regular rate and rhythm; No murmurs, rubs, or gallops  ABDOMEN: Soft, Nontender, Nondistended; Bowel sounds present  EXTREMITIES:  2+ Peripheral Pulses, No clubbing, cyanosis, or edema  PSYCH: AAOx3  NEUROLOGY: left sided weakness, LLE and LUE 2/5   SKIN: No rashes or lesions

## 2018-06-26 NOTE — CONSULT NOTE ADULT - SUBJECTIVE AND OBJECTIVE BOX
JAYASHREE FAGAN    Female    Patient is a 78y old  Female who presents with a chief complaint of left side weakness, N/V (2018 03:50) and abd pain.  As per pt’s daughter, pt developed nausea and vomiting tonight, was c/o abd pain so called emergency services to bring pt for eval.  Pt’s son adds that pt had fall this evening, found pt laying on ground. In ED pt found with L sided weakness. Pt was given TPA after neuro evaluation.    The has been some improvement in her status.          Allergies    latex (Unknown)  penicillins (Unknown)  shellfish (Unknown)        Daily Height in cm: 152.4 (2018 03:20)    Daily Weight in k.2 (2018 03:20)    I&O's Summary    2018 07:  -  2018 07:00  --------------------------------------------------------  IN: 300 mL / OUT: 0 mL / NET: 300 mL    2018 07:01  -  2018 11:03  --------------------------------------------------------  IN: 150 mL / OUT: 0 mL / NET: 150 mL        FAMILY HISTORY:      HEALTH ISSUES - PROBLEM Dx:          Vital Signs Last 24 Hrs  T(C): 36.4 (2018 07:06), Max: 36.6 (2018 02:28)  T(F): 97.5 (2018 07:06), Max: 97.9 (2018 02:28)  HR: 92 (2018 10:00) (73 - 96)  BP: 154/85 (2018 10:00) (112/55 - 194/80)  BP(mean): 113 (2018 10:00) (79 - 122)  RR: 20 (2018 10:00) (16 - 30)  SpO2: 99% (2018 10:00) (84% - 100%)    REVIEW OF SYSTEMS:  CONSTITUTIONAL: No fever, weight loss, or fatigue  EYES: No eye pain, visual disturbances, or discharge  NECK: No pain or stiffness  RESPIRATORY: No cough, wheezing, chills or hemoptysis; No shortness of breath  CARDIOVASCULAR: No chest pain, palpitations, dizziness, or leg swelling  GASTROINTESTINAL: No abdominal or epigastric pain. No nausea, vomiting, or hematemesis; No diarrhea or constipation. No melena or hematochezia.  GENITOURINARY: No dysuria, frequency, hematuria, or incontinence  NEUROLOGICAL: No headaches, memory loss, loss of strength, numbness, or tremors  MUSCULOSKELETAL: No joint pain or swelling; No muscle, back, or extremity pain        PT/INR - ( 2018 00:15 )   PT: 13.20 sec;   INR: 1.22 ratio         PTT - ( 2018 00:15 )  PTT:26.4 sec                          13.0   8.27  )-----------( 267      ( 2018 00:15 )             39.8           144  |  102  |  16  ----------------------------<  143<H>  4.0   |  25  |  0.9    Ca    9.6      2018 00:15    TPro  7.3  /  Alb  4.3  /  TBili  0.4  /  DBili  <0.2  /  AST  24  /  ALT  13  /  AlkPhos  125<H>        CARDIAC MARKERS ( 2018 00:15 )  x     / <0.01 ng/mL / 78 U/L / x     / 2.8 ng/mL        LIVER FUNCTIONS - ( 2018 00:15 )  Alb: 4.3 g/dL / Pro: 7.3 g/dL / ALK PHOS: 125 U/L / ALT: 13 U/L / AST: 24 U/L / GGT: x                   Radiology: microvascular disease    MEDICATIONS  (STANDING):  atorvastatin 80 milliGRAM(s) Oral at bedtime  lisinopril 10 milliGRAM(s) Oral daily  nortriptyline 25 milliGRAM(s) Oral at bedtime  pantoprazole    Tablet 40 milliGRAM(s) Oral at bedtime  sertraline 25 milliGRAM(s) Oral daily  sodium chloride 0.9%. 1000 milliLiter(s) (75 mL/Hr) IV Continuous <Continuous>        PHYSICAL EXAM:  GENERAL: NAD, well-groomed, well-developed  HEAD:  Atraumatic, Normocephalic  EYES: EOMI, PERRLA, conjunctiva and sclera clear  NERVOUS SYSTEM:  Alert & Oriented X 4, Good concentration; Motor Strength 5/5 R upper and lower extremities 2/5 left.  DTRs 2+ intact and symmetric  CHEST/LUNG: Clear to percussion bilaterally; No rales, rhonchi, wheezing, or rubs  HEART: Regular rate and rhythm; No murmurs, rubs, or gallops  ABDOMEN: Soft, Nontender, Nondistended; Bowel sounds present  EXTREMITIES:  2+ Peripheral Pulses, No clubbing, cyanosis, or edema  SKIN: No rashes or lesions

## 2018-06-26 NOTE — H&P ADULT - ATTENDING COMMENTS
77 yo female with PMH that includes DM, breast ca 2013 in remission per family, HTN, DLD, who presented with left sided weakness. she was diagnosed with suspected acute CVA and treated with tPA, subsequently admitted to the ICU for monitoring. Rounded this am with pulm CCM team and was improved at that time per patient and family. this afternoon RN reported to HS / pulm that she is feeling more numbness on theleft side     Of note she also c/o abd pain while in the ER associated with vomtiing. a CT was done in the ER as a dissection protocol which did not reveal any acute intrabodominal pathology. LFTs nonspec in the ER    PMH / PSX/ meds reviewed / adjusted prn above      on exam now: she is aao. appears nervous but not in distress. her biggest concern is constipation and also reported that left sided weaness and numbness seems increased. NAD AAO. S1 S@ RRR. CTA BL lungs. abd soft NT ND +BS. LE with no calf pain tenrress BLE.   neuro: pupils equal and reactive BL. tongue midline. she is barely able to raise her left leg above the bed. her left arm is weak able to raise about 90 degreees. she has numbness of the left side      PLAN ADDEDNED AS BELOW:    # Suspected acute CVA: s/p TPA. now with change in neuro exam, delineated as weorsening weakness of the left leg, c/o lower neck pain and increased numbness of the left side.   - notified intensivist stat of the situation  - neuro paged will discuss asap  - check stat CT head now in liue of the one that was scheduled for tonight  - cont q1 hr neuro checks  - MRI head / MRA head and neck when able to travel  - f/u TTE  - NPO pendinging SLP for Dysphagia Screen (ordered for the am)  - to d/w neuro, when can exemestane be resumed?  - no anticoagulation  - possible ASA tomorrow if neuro agrees and depending on CT results  - check lipid panel  - cont statin    # will need elective followup for CT scan incidentals    # Abd discomfort: left sided. UA no nitriotes or LE. CT nondiagnositc. exam nontender  - monitor LFTs  - likely contipation by hx and by benign exam. add colace    # cont mechanical dvt prophilaxis

## 2018-06-26 NOTE — ED PROVIDER NOTE - PROGRESS NOTE DETAILS
stroke code called at 23:11 - this ws the time I  first evaluated patient  - pt brought in my ems  for vomiting abdominal pain.   Pt complaining to me  of abdominal pain when I evaluated her. Pt also with  emesis basin with  food colored vomit.  Pt  endorses to me that  while at home  she felt numbness to her left side of face and neck, arm and leg -   in  collaboration with her  son Jamir ,  pt says she called son 5 minutes after the numbness started.  Son checked his phone  at said she called at 21:12 . SOn praveena tto patient house and he tells us that he found her on the floor.  During my exam and hx  stroke traci was armstrong at 23:11 tpa given at 00:28,  /77 stroke code called at 23:11 - this ws the time I  first evaluated patient  - pt brought in my ems  for vomiting abdominal pain.   Pt complaining to me  of abdominal pain when I evaluated her. Pt also with  emesis basin with  food colored vomit.  Pt  endorses to me that  while at home  she felt numbness to her left side of face and neck, arm and leg -   in  collaboration with her  son Jamir ,  pt says she called son 5 minutes after the numbness started.  Son checked his phone  at said she called at 21:12 . SOn praveena tto patient house and he tells us that he found her on the floor.  During my exam and hx  stroke ode was armstrong at 23:11      GIven patient with abdominal pain and neuro findings - dissection study ordered I personally evaluated the patient. I reviewed the Resident’s or Physician Assistant’s note (as assigned above), and agree with the findings and plan except as documented in my note.  78yF here for nausea and vomiting and abd pain.  AS per pt’s daughter, pt developed nausea and vomiting tonight, was c/o abd pain so called emergency services to bring pt for eval.  Pt’s son adds that pt had fall this evening, found pt laying on ground.  Triage note read by me, upon intervieweing pt she c/o numbness to her left leg as well.  No headache.  No LOC.  No visual changes.  ON EXAM:  Pt is awake and alert.  PERRRL.  3/5 strength to LLE.  Sensation intact.    Stroke code activated.

## 2018-06-27 DIAGNOSIS — E11.9 TYPE 2 DIABETES MELLITUS WITHOUT COMPLICATIONS: ICD-10-CM

## 2018-06-27 DIAGNOSIS — F32.9 MAJOR DEPRESSIVE DISORDER, SINGLE EPISODE, UNSPECIFIED: ICD-10-CM

## 2018-06-27 DIAGNOSIS — I10 ESSENTIAL (PRIMARY) HYPERTENSION: ICD-10-CM

## 2018-06-27 DIAGNOSIS — I63.9 CEREBRAL INFARCTION, UNSPECIFIED: ICD-10-CM

## 2018-06-27 LAB
ANION GAP SERPL CALC-SCNC: 14 MMOL/L — SIGNIFICANT CHANGE UP (ref 7–14)
BASOPHILS # BLD AUTO: 0.03 K/UL — SIGNIFICANT CHANGE UP (ref 0–0.2)
BASOPHILS NFR BLD AUTO: 0.4 % — SIGNIFICANT CHANGE UP (ref 0–1)
BUN SERPL-MCNC: 15 MG/DL — SIGNIFICANT CHANGE UP (ref 10–20)
CALCIUM SERPL-MCNC: 8.9 MG/DL — SIGNIFICANT CHANGE UP (ref 8.5–10.1)
CHLORIDE SERPL-SCNC: 101 MMOL/L — SIGNIFICANT CHANGE UP (ref 98–110)
CO2 SERPL-SCNC: 25 MMOL/L — SIGNIFICANT CHANGE UP (ref 17–32)
CREAT SERPL-MCNC: 0.8 MG/DL — SIGNIFICANT CHANGE UP (ref 0.7–1.5)
EOSINOPHIL # BLD AUTO: 0.15 K/UL — SIGNIFICANT CHANGE UP (ref 0–0.7)
EOSINOPHIL NFR BLD AUTO: 2.1 % — SIGNIFICANT CHANGE UP (ref 0–8)
ESTIMATED AVERAGE GLUCOSE: 194 MG/DL — HIGH (ref 68–114)
ESTIMATED AVERAGE GLUCOSE: 197 MG/DL — HIGH (ref 68–114)
GLUCOSE SERPL-MCNC: 276 MG/DL — HIGH (ref 70–99)
HBA1C BLD-MCNC: 8.4 % — HIGH (ref 4–5.6)
HBA1C BLD-MCNC: 8.5 % — HIGH (ref 4–5.6)
HCT VFR BLD CALC: 38.8 % — SIGNIFICANT CHANGE UP (ref 37–47)
HGB BLD-MCNC: 12.8 G/DL — SIGNIFICANT CHANGE UP (ref 12–16)
IMM GRANULOCYTES NFR BLD AUTO: 0.4 % — HIGH (ref 0.1–0.3)
LYMPHOCYTES # BLD AUTO: 0.92 K/UL — LOW (ref 1.2–3.4)
LYMPHOCYTES # BLD AUTO: 13 % — LOW (ref 20.5–51.1)
MAGNESIUM SERPL-MCNC: 1.9 MG/DL — SIGNIFICANT CHANGE UP (ref 1.8–2.4)
MCHC RBC-ENTMCNC: 29.6 PG — SIGNIFICANT CHANGE UP (ref 27–31)
MCHC RBC-ENTMCNC: 33 G/DL — SIGNIFICANT CHANGE UP (ref 32–37)
MCV RBC AUTO: 89.6 FL — SIGNIFICANT CHANGE UP (ref 81–99)
MONOCYTES # BLD AUTO: 0.58 K/UL — SIGNIFICANT CHANGE UP (ref 0.1–0.6)
MONOCYTES NFR BLD AUTO: 8.2 % — SIGNIFICANT CHANGE UP (ref 1.7–9.3)
NEUTROPHILS # BLD AUTO: 5.39 K/UL — SIGNIFICANT CHANGE UP (ref 1.4–6.5)
NEUTROPHILS NFR BLD AUTO: 75.9 % — HIGH (ref 42.2–75.2)
NRBC # BLD: 0 /100 WBCS — SIGNIFICANT CHANGE UP (ref 0–0)
PLATELET # BLD AUTO: 254 K/UL — SIGNIFICANT CHANGE UP (ref 130–400)
POTASSIUM SERPL-MCNC: 4.1 MMOL/L — SIGNIFICANT CHANGE UP (ref 3.5–5)
POTASSIUM SERPL-SCNC: 4.1 MMOL/L — SIGNIFICANT CHANGE UP (ref 3.5–5)
RBC # BLD: 4.33 M/UL — SIGNIFICANT CHANGE UP (ref 4.2–5.4)
RBC # FLD: 13.3 % — SIGNIFICANT CHANGE UP (ref 11.5–14.5)
SODIUM SERPL-SCNC: 140 MMOL/L — SIGNIFICANT CHANGE UP (ref 135–146)
WBC # BLD: 7.1 K/UL — SIGNIFICANT CHANGE UP (ref 4.8–10.8)
WBC # FLD AUTO: 7.1 K/UL — SIGNIFICANT CHANGE UP (ref 4.8–10.8)

## 2018-06-27 RX ORDER — INSULIN LISPRO 100/ML
VIAL (ML) SUBCUTANEOUS
Refills: 0 | Status: DISCONTINUED | OUTPATIENT
Start: 2018-06-27 | End: 2018-07-02

## 2018-06-27 RX ORDER — ONDANSETRON 8 MG/1
4 TABLET, FILM COATED ORAL ONCE
Refills: 0 | Status: COMPLETED | OUTPATIENT
Start: 2018-06-27 | End: 2018-06-27

## 2018-06-27 RX ORDER — ENOXAPARIN SODIUM 100 MG/ML
40 INJECTION SUBCUTANEOUS DAILY
Refills: 0 | Status: DISCONTINUED | OUTPATIENT
Start: 2018-06-27 | End: 2018-07-02

## 2018-06-27 RX ORDER — INSULIN GLARGINE 100 [IU]/ML
21 INJECTION, SOLUTION SUBCUTANEOUS AT BEDTIME
Refills: 0 | Status: DISCONTINUED | OUTPATIENT
Start: 2018-06-27 | End: 2018-07-02

## 2018-06-27 RX ORDER — INSULIN LISPRO 100/ML
7 VIAL (ML) SUBCUTANEOUS
Refills: 0 | Status: DISCONTINUED | OUTPATIENT
Start: 2018-06-27 | End: 2018-07-02

## 2018-06-27 RX ORDER — ASPIRIN/CALCIUM CARB/MAGNESIUM 324 MG
81 TABLET ORAL DAILY
Refills: 0 | Status: DISCONTINUED | OUTPATIENT
Start: 2018-06-27 | End: 2018-06-29

## 2018-06-27 RX ADMIN — INSULIN GLARGINE 21 UNIT(S): 100 INJECTION, SOLUTION SUBCUTANEOUS at 21:44

## 2018-06-27 RX ADMIN — Medication 100 MILLIGRAM(S): at 14:33

## 2018-06-27 RX ADMIN — ENOXAPARIN SODIUM 40 MILLIGRAM(S): 100 INJECTION SUBCUTANEOUS at 12:14

## 2018-06-27 RX ADMIN — Medication 81 MILLIGRAM(S): at 12:16

## 2018-06-27 RX ADMIN — LISINOPRIL 10 MILLIGRAM(S): 2.5 TABLET ORAL at 06:25

## 2018-06-27 RX ADMIN — PANTOPRAZOLE SODIUM 40 MILLIGRAM(S): 20 TABLET, DELAYED RELEASE ORAL at 21:46

## 2018-06-27 RX ADMIN — ATORVASTATIN CALCIUM 80 MILLIGRAM(S): 80 TABLET, FILM COATED ORAL at 21:44

## 2018-06-27 RX ADMIN — Medication 100 MILLIGRAM(S): at 21:44

## 2018-06-27 RX ADMIN — NORTRIPTYLINE HYDROCHLORIDE 25 MILLIGRAM(S): 10 CAPSULE ORAL at 21:46

## 2018-06-27 RX ADMIN — Medication 100 MILLIGRAM(S): at 06:25

## 2018-06-27 RX ADMIN — ONDANSETRON 4 MILLIGRAM(S): 8 TABLET, FILM COATED ORAL at 01:00

## 2018-06-27 RX ADMIN — SERTRALINE 25 MILLIGRAM(S): 25 TABLET, FILM COATED ORAL at 12:16

## 2018-06-27 RX ADMIN — Medication 6: at 12:15

## 2018-06-27 RX ADMIN — Medication 7 UNIT(S): at 12:14

## 2018-06-27 RX ADMIN — SENNA PLUS 2 TABLET(S): 8.6 TABLET ORAL at 21:46

## 2018-06-27 NOTE — PROGRESS NOTE ADULT - SUBJECTIVE AND OBJECTIVE BOX
SUBJECTIVE:    Patient is a 78y old Female who presents with a chief complaint of left sided weakness and numbness x 1 day (2018 10:44)    Currently admitted to medicine with the primary diagnosis of CVA(CEREBRAL VASCULAR ACCIDENT)    Today is hospital day 1d.This morning she  is resting comfortably in bed and reports no new issues or overnight events.     HPI:  77 y/o female with PMHx of HTN, DLD, DM II, depression, and breast CA s/p lumpectomy p/w left sided weakness and numbness x 1 day. patient started to feel left side weakness and numbness in the evening 1 day prior to admission. patient came to the ED where she had nausea and vomiting. CTH was done with no evidence of acute intracranial pathology. TPA was offered to the patient and she agreed. patient was admitted to ICU for acute CVA. patient denies LOC, seizure and headache. patient denies fever, COB, CP, cough, n/v/d, bowel and urinary symptoms. (2018 10:44)      PAST MEDICAL & SURGICAL HISTORY  Bilateral hearing loss, unspecified hearing loss type  High cholesterol  Depression  BP (high blood pressure)  Breast CA  DM (diabetes mellitus)  History of cholecystectomy:   History of lumpectomy of right breast  H/O total knee replacement, right    SOCIAL HISTORY:  Negative for smoking/alcohol/drug use.     ALLERGIES:  latex (Unknown)  penicillins (Unknown)  shellfish (Unknown)    MEDICATIONS:  STANDING MEDICATIONS  aspirin  chewable 81 milliGRAM(s) Oral daily  atorvastatin 80 milliGRAM(s) Oral at bedtime  dextrose 5%. 1000 milliLiter(s) IV Continuous <Continuous>  dextrose 50% Injectable 12.5 Gram(s) IV Push once  dextrose 50% Injectable 25 Gram(s) IV Push once  dextrose 50% Injectable 25 Gram(s) IV Push once  docusate sodium 100 milliGRAM(s) Oral three times a day  enoxaparin Injectable 40 milliGRAM(s) SubCutaneous daily  insulin glargine Injectable (LANTUS) 21 Unit(s) SubCutaneous at bedtime  insulin lispro (HumaLOG) corrective regimen sliding scale   SubCutaneous three times a day before meals  insulin lispro Injectable (HumaLOG) 7 Unit(s) SubCutaneous three times a day before meals  lisinopril 10 milliGRAM(s) Oral daily  nortriptyline 25 milliGRAM(s) Oral at bedtime  pantoprazole    Tablet 40 milliGRAM(s) Oral at bedtime  senna 2 Tablet(s) Oral at bedtime  sertraline 25 milliGRAM(s) Oral daily  sodium chloride 0.9%. 1000 milliLiter(s) IV Continuous <Continuous>    PRN MEDICATIONS  dextrose 40% Gel 15 Gram(s) Oral once PRN  glucagon  Injectable 1 milliGRAM(s) IntraMuscular once PRN  sodium chloride 0.65% Nasal 1 Spray(s) Both Nostrils four times a day PRN    VITALS:   T(F): 97  HR: 66  BP: 142/74  RR: 18  SpO2: 100%      PHYSICAL EXAM:  GEN: NAD, comfortable  LUNGS: CTAB, no w/r/r  HEART: RRR, no m/r/g  ABD: soft, NT/ND, +BS  EXT: no edema, PP b/l  NEURO: AAOx3. LUE and LLE 3/5     LABS:                        13.0   8.27  )-----------( 267      ( 2018 00:15 )             39.8         144  |  102  |  16  ----------------------------<  143<H>  4.0   |  25  |  0.9    Ca    9.6      2018 00:15    TPro  7.3  /  Alb  4.3  /  TBili  0.4  /  DBili  <0.2  /  AST  24  /  ALT  13  /  AlkPhos  125<H>      PT/INR - ( 2018 00:15 )   PT: 13.20 sec;   INR: 1.22 ratio         PTT - ( 2018 00:15 )  PTT:26.4 sec  Urinalysis Basic - ( 2018 15:47 )    Color: Yellow / Appearance: Cloudy / S.015 / pH: x  Gluc: x / Ketone: 15  / Bili: Negative / Urobili: 1.0   Blood: x / Protein: 30 / Nitrite: Negative   Leuk Esterase: Negative / RBC: 2-5 /HPF / WBC Negative   Sq Epi: x / Non Sq Epi: Occasional /HPF / Bacteria: Moderate            CARDIAC MARKERS ( 2018 00:15 )  x     / <0.01 ng/mL / 78 U/L / x     / 2.8 ng/mL          18 @ 07:01  -  18 @ 07:00  --------------------------------------------------------  IN: 1945 mL / OUT: 0 mL / NET: 1945 mL    18 @ 07:01  -  18 @ 10:57  --------------------------------------------------------  IN: 0 mL / OUT: 150 mL / NET: -150 mL        < from: CT Angio Neck w/ IV Cont (18 @ 00:43) >  IMPRESSION:     1.  Short 1.3 cm segmentof severe stenosis at the origin of the left   internal carotid artery, approximately 68-72% stenosis.    2.  Patent bilateral distal internal carotid arteries with medial   deviation in course through the retropharynx at the level of C2-3   "kissingcarotids".    3.  No evidence of aneurysm, major vascular stenosis or occlusion.      < from: CT Head No Cont (18 @ 19:38) >    IMPRESSION:    No CT evidence for acute intracranial pathology.      No significant change since 2018.

## 2018-06-27 NOTE — SWALLOW BEDSIDE ASSESSMENT ADULT - SLP PERTINENT HISTORY OF CURRENT PROBLEM
Pt adm with CVA. TPA given PMhx : Bilateral haring loss, high cholestorol , Depression, BP, Breat CA, DM II Pt adm with CVA. c/o of nausea, vomiting, L numbness, weakness. TPA given PMhx : Bilateral haring loss, high cholestorol , Depression, BP, Breat CA, DM II

## 2018-06-27 NOTE — PHYSICAL THERAPY INITIAL EVALUATION ADULT - LEVEL OF INDEPENDENCE: GAIT, REHAB EVAL
secondary to patient unable to maintain nor balance in supported standing to safely take steps at this time/unable to perform

## 2018-06-27 NOTE — SWALLOW BEDSIDE ASSESSMENT ADULT - PHARYNGEAL PHASE
Throat clear post oral intake/Multiple swallows Delayed pharyngeal swallow/Throat clear post oral intake/Multiple swallows Throat clear post oral intake

## 2018-06-27 NOTE — SWALLOW BEDSIDE ASSESSMENT ADULT - SWALLOW EVAL: RECOMMENDED FEEDING/EATING TECHNIQUES
allow for swallow between intakes/maintain upright posture during/after eating for 30 mins/no straws/small sips/bites

## 2018-06-27 NOTE — PROGRESS NOTE ADULT - SUBJECTIVE AND OBJECTIVE BOX
Neurology Progress Note  Pt s/p right hemisphere stroke and TPA administration.    Interval History:    Pt w/o significant events overnight.  Pt had right hemisphere stroke and received  TPA ~ 36 hours ago.  Her 24 hour CT was negative for acute findings and she has   not yet had MRI.  CTA head and neck showed proximal left ICA stenosis of ~70%.    T(F): 97 (18 @ 07:16), Max: 97.8 (18 @ 11:05)  HR: 66 (18 @ 07:03) (61 - 93)  BP: 142/74 (18 @ 07:03) (115/62 - 172/73)  RR: 18 (18 @ 09:06) (14 - 43)  SpO2: 100% (18 @ 07:03) (97% - 100%)    Neurological Exam:   Mental status: awake, alert, speech is fluent.  Patient is tearful.  Cranial nerves: Facial muscles symmetric and ocular movements full.   Speech is fluent, no significant dysarthria.  Motor:  Full strength right arm and leg.  Left upper extremity 4-/5 worse in hand.  Also Left lower extremity seems  weaker today 2/5.  Sensation:states sides feel equal to LT.        Labs:  CBC Full  -  ( 2018 00:15 )  WBC Count : 8.27 K/uL  Hemoglobin : 13.0 g/dL  Hematocrit : 39.8 %  Platelet Count - Automated : 267 K/uL  Mean Cell Volume : 89.6 fL  Mean Cell Hemoglobin : 29.3 pg  Mean Cell Hemoglobin Concentration : 32.7 g/dL  Auto Neutrophil # : 6.35 K/uL  Auto Lymphocyte # : 1.00 K/uL  Auto Monocyte # : 0.74 K/uL  Auto Eosinophil # : 0.09 K/uL  Auto Basophil # : 0.03 K/uL  Auto Neutrophil % : 76.8 %  Auto Lymphocyte % : 12.1 %  Auto Monocyte % : 8.9 %  Auto Eosinophil % : 1.1 %  Auto Basophil % : 0.4 %        144  |  102  |  16  ----------------------------<  143<H>  4.0   |  25  |  0.9    Ca    9.6      2018 00:15    TPro  7.3  /  Alb  4.3  /  TBili  0.4  /  DBili  <0.2  /  AST  24  /  ALT  13  /  AlkPhos  125<H>  06-26    LIVER FUNCTIONS - ( 2018 00:15 )  Alb: 4.3 g/dL / Pro: 7.3 g/dL / ALK PHOS: 125 U/L / ALT: 13 U/L / AST: 24 U/L / GGT: x           PT/INR - ( 2018 00:15 )   PT: 13.20 sec;   INR: 1.22 ratio       PTT - ( 2018 00:15 )  PTT:26.4 sec  Urinalysis Basic - ( 2018 15:47 )    Color: Yellow / Appearance: Cloudy / S.015 / pH: x  Gluc: x / Ketone: 15  / Bili: Negative / Urobili: 1.0   Blood: x / Protein: 30 / Nitrite: Negative   Leuk Esterase: Negative / RBC: 2-5 /HPF / WBC Negative   Sq Epi: x / Non Sq Epi: Occasional /HPF / Bacteria: Moderate        Assessment:  This is a 78y Female w/ h/o right hemiphere stroke.  Not visible on f/u head CT though left sided weakness seems worse today.  May be secondary to post stroke edema, though not seen on CT.  Alternatively patient tearful and may be secondary to effort.  Left ICA stenosis is on wrong side to explain her stroke and would be considered asymptomatic stenosis.  Surgery is superior to medication for asymptomatic  stenosis > 70%, however would not pursue endarterectomy or stent for asymptomatic disease in acute stroke recovery stage.  Vascular follow up is recommended as  outpatient.    Plan:  1.  MRI brain w/o contrast to assess location of stroke and r/o multifocal infarcts that would implicate cardioembolism.  2.  MRA head and neck no longer needed since CTA results available.  3.  Begin antiplatelet therapy now that she is outside 24 hour of TPA.  4.  Statin treatment for plaque stabilization.  5.  She may be moved to tele floor today.  6.  Rehab as planned.    Plan: Neurology Progress Note  Pt s/p right hemisphere stroke and TPA administration.    Interval History:    Pt w/o significant events overnight.  Pt had right hemisphere stroke and received  TPA ~ 36 hours ago.  Her 24 hour CT was negative for acute findings and she has   not yet had MRI.  CTA head and neck showed proximal left ICA stenosis of ~70%.    T(F): 97 (18 @ 07:16), Max: 97.8 (18 @ 11:05)  HR: 66 (18 @ 07:03) (61 - 93)  BP: 142/74 (18 @ 07:03) (115/62 - 172/73)  RR: 18 (18 @ 09:06) (14 - 43)  SpO2: 100% (18 @ 07:03) (97% - 100%)    Neurological Exam:   Mental status: awake, alert, speech is fluent.  Patient is tearful.  Cranial nerves: Facial muscles symmetric and ocular movements full.   Speech is fluent, no significant dysarthria.  Motor:  Full strength right arm and leg.  Left upper extremity 4-/5 worse in hand.  Also Left lower extremity seems  weaker today 2/5.  Sensation:states sides feel equal to LT.        Labs:  CBC Full  -  ( 2018 00:15 )  WBC Count : 8.27 K/uL  Hemoglobin : 13.0 g/dL  Hematocrit : 39.8 %  Platelet Count - Automated : 267 K/uL  Mean Cell Volume : 89.6 fL  Mean Cell Hemoglobin : 29.3 pg  Mean Cell Hemoglobin Concentration : 32.7 g/dL  Auto Neutrophil # : 6.35 K/uL  Auto Lymphocyte # : 1.00 K/uL  Auto Monocyte # : 0.74 K/uL  Auto Eosinophil # : 0.09 K/uL  Auto Basophil # : 0.03 K/uL  Auto Neutrophil % : 76.8 %  Auto Lymphocyte % : 12.1 %  Auto Monocyte % : 8.9 %  Auto Eosinophil % : 1.1 %  Auto Basophil % : 0.4 %        144  |  102  |  16  ----------------------------<  143<H>  4.0   |  25  |  0.9    Ca    9.6      2018 00:15    TPro  7.3  /  Alb  4.3  /  TBili  0.4  /  DBili  <0.2  /  AST  24  /  ALT  13  /  AlkPhos  125<H>  06-26    LIVER FUNCTIONS - ( 2018 00:15 )  Alb: 4.3 g/dL / Pro: 7.3 g/dL / ALK PHOS: 125 U/L / ALT: 13 U/L / AST: 24 U/L / GGT: x           PT/INR - ( 2018 00:15 )   PT: 13.20 sec;   INR: 1.22 ratio       PTT - ( 2018 00:15 )  PTT:26.4 sec  Urinalysis Basic - ( 2018 15:47 )    Color: Yellow / Appearance: Cloudy / S.015 / pH: x  Gluc: x / Ketone: 15  / Bili: Negative / Urobili: 1.0   Blood: x / Protein: 30 / Nitrite: Negative   Leuk Esterase: Negative / RBC: 2-5 /HPF / WBC Negative   Sq Epi: x / Non Sq Epi: Occasional /HPF / Bacteria: Moderate        Assessment:  This is a 78y Female w/ h/o right hemiphere stroke.  Not visible on f/u head CT though left sided weakness seems worse today.  May be secondary to post stroke edema, though not seen on CT.  Alternatively patient tearful and may be secondary to effort.  Left ICA stenosis is on wrong side to explain her stroke and would be considered asymptomatic stenosis.  Surgery is superior to medication for asymptomatic  stenosis > 70%, however would not pursue endarterectomy or stent for asymptomatic disease in acute stroke recovery stage.  Vascular follow up is recommended as  outpatient.    Plan:  1.  MRI brain w/o contrast to assess location of stroke and r/o multifocal infarcts that would implicate cardioembolism.  2.  MRA head and neck no longer needed since CTA results available.  3.  Begin antiplatelet therapy now that she is outside 24 hour of TPA.  4.  Statin treatment for plaque stabilization.  5.  She may be moved to tele floor today.  6.  Rehab as planned.  7.  Modified Barium swallow given frequent throat clearing.  Pureed and mechanical soft challenges led to frequent swallowing and throat clearing noted by speech.    Plan:

## 2018-06-27 NOTE — SWALLOW BEDSIDE ASSESSMENT ADULT - SWALLOW EVAL: DIAGNOSIS
Pt presents with mild- mod oral dysphagia for regular textures. Pt had timely swallow initiation for thin liquids via cup sip, throat clear x 2.  Pt p/w suspected pharyngeal dysphagia for thin, puree, and mech soft ground textures (+) throat clearing x 3, (+) multiple swallows observed. Rec mech soft ground and thin liquids. Rec VFSS to objectively view swallow function and determine safest diet/ potential swallow strategies to improve function. Pt presents with mild- mod oral dysphagia for regular textures. Pt had timely swallow initiation for thin liquids via cup sip, throat clear x 2.  Pt p/w suspected pharyngeal dysphagia for thin, puree, and mech soft ground textures (+) throat clearing x 3, (+) multiple swallows observed. Rec 1:1  mech soft ground and thin liquids- multiple swallows, upright positioning . Rec VFSS to objectively view swallow function and determine safest diet/ potential swallow strategies to improve function.

## 2018-06-27 NOTE — SWALLOW BEDSIDE ASSESSMENT ADULT - ASR SWALLOW ASPIRATION MONITOR
change of breathing pattern/oral hygiene/cough/gurgly voice/fever/pneumonia/throat clearing/upper respiratory infection

## 2018-06-27 NOTE — OCCUPATIONAL THERAPY INITIAL EVALUATION ADULT - RANGE OF MOTION EXAMINATION, LOWER EXTREMITY
Left LE Passive ROM was WFL (w/i functional limits)/Right LE Active ROM was WNL(within normal limits)/Right LE Passive ROM was WNL (within normal limits)

## 2018-06-27 NOTE — PROGRESS NOTE ADULT - ASSESSMENT
79 y/o female with PMHx of HTN, DLD, DM II, depression, and breast CA s/p lumpectomy p/w left sided weakness and numbness x 1 day. patient was admitted to ICU for acute CVA s/p TPA administration.    #Acute CVA s/p TPA  neuro check  monitor VS,  BP <180  monitor H & H  Lipid profile and HBa1c  2d ECHO  repeat CTH > no changes as above  CT angio >  Short 1.3 cm segmentof severe stenosis at the origin of the left   internal carotid artery, approximately 68-72% stenosis.  MRI of head, MRA of head and neck  start lipitor 80   start ASA 81  f/u neuro eval  PT and rehab    #HTN  controlled  c/w lisinpril    #DM   monitor FS  insulin protocol    #others   Mechanical soft diet  DVT and GI ppx  from home  full code 79 y/o female with PMHx of HTN, DLD, DM II, depression, and breast CA s/p lumpectomy p/w left sided weakness and numbness x 1 day. patient was admitted to ICU for acute CVA s/p TPA administration.    #Acute CVA s/p TPA  patient is downgraded to telemetry from ICU   neuro check  monitor VS,  BP <180  monitor H & H  Lipid profile and HBa1c  2d ECHO  repeat CTH > no changes as above  CT angio >  Short 1.3 cm segmentof severe stenosis at the origin of the left   internal carotid artery, approximately 68-72% stenosis.  MRI of head   start lipitor 80   start ASA 81  f/u neuro eval  PT and rehab    #HTN  controlled  c/w lisinpril    #DM   monitor FS  insulin protocol    #others   Mechanical soft diet  DVT and GI ppx  from home  full code

## 2018-06-27 NOTE — PHYSICAL THERAPY INITIAL EVALUATION ADULT - GENERAL OBSERVATIONS, REHAB EVAL
09:25-09:48 Chart reviewed. Pt encountered sitting in chair,  may be seen by Physical Therapist as confirmed with Nurse. Patient denied pain at rest but "afraid to stand"; +tele; +O2  via NC

## 2018-06-27 NOTE — OCCUPATIONAL THERAPY INITIAL EVALUATION ADULT - GENERAL OBSERVATIONS, REHAB EVAL
7:30am to 8:00am Chart reviewed ok to be seen by OT as per Nurse. Patient received supine in bed +monitor +IV in NAD

## 2018-06-27 NOTE — PHYSICAL THERAPY INITIAL EVALUATION ADULT - IMPAIRED TRANSFERS: SIT/STAND, REHAB EVAL
decreased endurance/impaired balance/impaired motor control/narrow base of support/impaired postural control/decreased strength

## 2018-06-27 NOTE — PROGRESS NOTE ADULT - SUBJECTIVE AND OBJECTIVE BOX
Patient is sitting in chair still has left sided weakness but it is improving, some coughing       T(F): 97 (06-27-18 @ 11:00), Max: 97.8 (06-26-18 @ 23:07)  HR: 66 (06-27-18 @ 07:03)  BP: 142/74 (06-27-18 @ 07:03)  RR: 22 (06-27-18 @ 11:00)  SpO2: 100% (06-27-18 @ 07:03) (97% - 100%)    PHYSICAL EXAM:  GENERAL: NAD  HEAD:  Atraumatic, Normocephalic  NERVOUS SYSTEM:  Alert & Oriented X3, motor KAMINI ext 4/5 LL ext 3/5 right side 5/5 upper and lower  CHEST/LUNG: Clear to percussion bilaterally; No rales, rhonchi, wheezing, or rubs  HEART: Regular rate and rhythm; No murmurs, rubs, or gallops  ABDOMEN: Soft, Nontender, Nondistended; Bowel sounds present  EXTREMITIES:  2+ Peripheral Pulses, No clubbing, cyanosis, or edema  LYMPH: No lymphadenopathy noted  SKIN: No rashes or lesions    LABS  06-27    140  |  101  |  15  ----------------------------<  276<H>  4.1   |  25  |  0.8    Ca    8.9      27 Jun 2018 11:47  Mg     1.9     06-27    TPro  7.3  /  Alb  4.3  /  TBili  0.4  /  DBili  <0.2  /  AST  24  /  ALT  13  /  AlkPhos  125<H>  06-26                          12.8   7.10  )-----------( 254      ( 27 Jun 2018 11:47 )             38.8     PT/INR - ( 26 Jun 2018 00:15 )   PT: 13.20 sec;   INR: 1.22 ratio         PTT - ( 26 Jun 2018 00:15 )  PTT:26.4 sec    CARDIAC ENZYMES  Creatine Kinase, Serum: 78 (06-26 @ 00:15)    CKMB Units: 2.8 (06-26 @ 00:15)    Troponin T, Serum: <0.01 ng/mL (06-26-18 @ 00:15)        RADIOLOGY  < from: CT Angio Neck w/ IV Cont (06.27.18 @ 00:43) >  IMPRESSION:     1.  Short 1.3 cm segmentof severe stenosis at the origin of the left   internal carotid artery, approximately 68-72% stenosis.    2.  Patent bilateral distal internal carotid arteries with medial   deviation in course through the retropharynx at the level of C2-3   "kissingcarotids".    3.  No evidence of aneurysm, major vascular stenosis or occlusion.    < end of copied text >    MEDICATIONS  (STANDING):  aspirin  chewable 81 milliGRAM(s) Oral daily  atorvastatin 80 milliGRAM(s) Oral at bedtime  docusate sodium 100 milliGRAM(s) Oral three times a day  enoxaparin Injectable 40 milliGRAM(s) SubCutaneous daily  insulin glargine Injectable (LANTUS) 21 Unit(s) SubCutaneous at bedtime  insulin lispro (HumaLOG) corrective regimen sliding scale   SubCutaneous three times a day before meals  insulin lispro Injectable (HumaLOG) 7 Unit(s) SubCutaneous three times a day before meals  lisinopril 10 milliGRAM(s) Oral daily  nortriptyline 25 milliGRAM(s) Oral at bedtime  pantoprazole    Tablet 40 milliGRAM(s) Oral at bedtime  senna 2 Tablet(s) Oral at bedtime  sertraline 25 milliGRAM(s) Oral daily    MEDICATIONS  (PRN):  dextrose 40% Gel 15 Gram(s) Oral once PRN Blood Glucose LESS THAN 70 milliGRAM(s)/deciliter  glucagon  Injectable 1 milliGRAM(s) IntraMuscular once PRN Glucose LESS THAN 70 milligrams/deciliter  sodium chloride 0.65% Nasal 1 Spray(s) Both Nostrils four times a day PRN Nasal Congestion

## 2018-06-27 NOTE — CHART NOTE - NSCHARTNOTEFT_GEN_A_CORE
called by radiologist on call . CT head and CT angio of both head and neck did not show any acute occlusion except for left internal carotid stenosis of 70%

## 2018-06-27 NOTE — PROGRESS NOTE ADULT - SUBJECTIVE AND OBJECTIVE BOX
INTERVAL HISTORY:  No overnight events      MEDICATIONS  (STANDING):  aspirin  chewable 81 milliGRAM(s) Oral daily  atorvastatin 80 milliGRAM(s) Oral at bedtime  dextrose 5%. 1000 milliLiter(s) (50 mL/Hr) IV Continuous <Continuous>  dextrose 50% Injectable 12.5 Gram(s) IV Push once  dextrose 50% Injectable 25 Gram(s) IV Push once  dextrose 50% Injectable 25 Gram(s) IV Push once  docusate sodium 100 milliGRAM(s) Oral three times a day  enoxaparin Injectable 40 milliGRAM(s) SubCutaneous daily  insulin glargine Injectable (LANTUS) 21 Unit(s) SubCutaneous at bedtime  insulin lispro (HumaLOG) corrective regimen sliding scale   SubCutaneous three times a day before meals  insulin lispro Injectable (HumaLOG) 7 Unit(s) SubCutaneous three times a day before meals  lisinopril 10 milliGRAM(s) Oral daily  nortriptyline 25 milliGRAM(s) Oral at bedtime  pantoprazole    Tablet 40 milliGRAM(s) Oral at bedtime  senna 2 Tablet(s) Oral at bedtime  sertraline 25 milliGRAM(s) Oral daily  sodium chloride 0.9%. 1000 milliLiter(s) (75 mL/Hr) IV Continuous <Continuous>    MEDICATIONS  (PRN):  dextrose 40% Gel 15 Gram(s) Oral once PRN Blood Glucose LESS THAN 70 milliGRAM(s)/deciliter  glucagon  Injectable 1 milliGRAM(s) IntraMuscular once PRN Glucose LESS THAN 70 milligrams/deciliter  sodium chloride 0.65% Nasal 1 Spray(s) Both Nostrils four times a day PRN Nasal Congestion    Allergies    latex (Unknown)  penicillins (Unknown)  shellfish (Unknown)    Intolerances    ICU Vital Signs Last 24 Hrs  T(C): 36.1 (2018 07:16), Max: 36.6 (2018 11:05)  T(F): 97 (2018 07:16), Max: 97.8 (2018 11:05)  HR: 66 (2018 07:03) (61 - 93)  BP: 142/74 (2018 07:03) (115/62 - 172/73)  BP(mean): 102 (2018 07:03) (74 - 121)    RR: 18 (2018 09:06) (14 - 43)  SpO2: 100% (2018 07:03) (97% - 100%)      Daily     Daily     I&O's Summary    2018 07:01  -  2018 07:00  --------------------------------------------------------  IN: 1945 mL / OUT: 0 mL / NET: 1945 mL    2018 07:01  -  2018 10:44  --------------------------------------------------------  IN: 0 mL / OUT: 150 mL / NET: -150 mL                Lab Results:                        13.0   8.27  )-----------( 267      ( 2018 00:15 )             39.8         144  |  102  |  16  ----------------------------<  143<H>  4.0   |  25  |  0.9    Ca    9.6      2018 00:15    TPro  7.3  /  Alb  4.3  /  TBili  0.4  /  DBili  <0.2  /  AST  24  /  ALT  13  /  AlkPhos  125<H>  -    PT/INR - ( 2018 00:15 )   PT: 13.20 sec;   INR: 1.22 ratio         PTT - ( 2018 00:15 )  PTT:26.4 sec  Urinalysis Basic - ( 2018 15:47 )    Color: Yellow / Appearance: Cloudy / S.015 / pH: x  Gluc: x / Ketone: 15  / Bili: Negative / Urobili: 1.0   Blood: x / Protein: 30 / Nitrite: Negative   Leuk Esterase: Negative / RBC: 2-5 /HPF / WBC Negative   Sq Epi: x / Non Sq Epi: Occasional /HPF / Bacteria: Moderate      LIVER FUNCTIONS - ( 2018 00:15 )  Alb: 4.3 g/dL / Pro: 7.3 g/dL / ALK PHOS: 125 U/L / ALT: 13 U/L / AST: 24 U/L / GGT: x             CARDIAC MARKERS ( 2018 00:15 )  x     / <0.01 ng/mL / 78 U/L / x     / 2.8 ng/mL        IMAGING STUDIES:    PHYSICAL EXAM:   CONSTITUTIONAL: - - -  · Appearance: stable  · Development: well developed  · Distress: NONE  · Manner: appropriate for situation  · Mentation: sedated  · Mood: appropriate  · Nourishment: well  · ENMT: Airway patent, Nasal mucosa clear. Mouth with normal mucosa. Throat has no vesicles, no oropharyngeal exudates and uvula is midline.  · EYES: Clear bilaterally, pupils equal, round and reactive to light.  · CARDIAC: - - -  · Cardiac Rhythm: REGULAR  · Cardiac Rate: NOT TACHYCARDIC  · Heart Sounds: S1-S2  · Murmur: no murmur  · Pedal Edema: NON-PITTING  · Capillary Refill: less than or equal to 2 seconds  · Jugular Vein Distention: non-distended bilaterally  · Cardiac Pulses: normal bilaterally  · RESPIRATORY: - - -  · Respiratory Distress: no  · Breath Sounds: RHONCHI  · Rhonchi: DIFFUSE  · GASTROINTESTINAL: Abdomen soft, non-tender, no guarding.  · MUSCULOSKELETAL: Spine appears normal, range of motion is not limited, no muscle or joint tenderness  · NEUROLOGICAL: Alert and oriented, still weak  +L motor deficits.  · SKIN: Skin normal color for race, warm, dry and intact. No evidence of rash.                  Case discussed with the ICU Team

## 2018-06-27 NOTE — PHYSICAL THERAPY INITIAL EVALUATION ADULT - BED MOBILITY LIMITATIONS, REHAB EVAL
Patient encountered already out of bed in chair, No apparent distress. Per nursing, required assistx2.

## 2018-06-28 LAB
ANION GAP SERPL CALC-SCNC: 14 MMOL/L — SIGNIFICANT CHANGE UP (ref 7–14)
BASOPHILS # BLD AUTO: 0.04 K/UL — SIGNIFICANT CHANGE UP (ref 0–0.2)
BASOPHILS NFR BLD AUTO: 0.7 % — SIGNIFICANT CHANGE UP (ref 0–1)
BUN SERPL-MCNC: 16 MG/DL — SIGNIFICANT CHANGE UP (ref 10–20)
CALCIUM SERPL-MCNC: 9.2 MG/DL — SIGNIFICANT CHANGE UP (ref 8.5–10.1)
CHLORIDE SERPL-SCNC: 105 MMOL/L — SIGNIFICANT CHANGE UP (ref 98–110)
CO2 SERPL-SCNC: 27 MMOL/L — SIGNIFICANT CHANGE UP (ref 17–32)
CREAT SERPL-MCNC: 0.8 MG/DL — SIGNIFICANT CHANGE UP (ref 0.7–1.5)
EOSINOPHIL # BLD AUTO: 0.2 K/UL — SIGNIFICANT CHANGE UP (ref 0–0.7)
EOSINOPHIL NFR BLD AUTO: 3.3 % — SIGNIFICANT CHANGE UP (ref 0–8)
GLUCOSE SERPL-MCNC: 171 MG/DL — HIGH (ref 70–99)
HCT VFR BLD CALC: 38.4 % — SIGNIFICANT CHANGE UP (ref 37–47)
HGB BLD-MCNC: 12.6 G/DL — SIGNIFICANT CHANGE UP (ref 12–16)
IMM GRANULOCYTES NFR BLD AUTO: 0.2 % — SIGNIFICANT CHANGE UP (ref 0.1–0.3)
LYMPHOCYTES # BLD AUTO: 1.25 K/UL — SIGNIFICANT CHANGE UP (ref 1.2–3.4)
LYMPHOCYTES # BLD AUTO: 20.8 % — SIGNIFICANT CHANGE UP (ref 20.5–51.1)
MAGNESIUM SERPL-MCNC: 2 MG/DL — SIGNIFICANT CHANGE UP (ref 1.8–2.4)
MCHC RBC-ENTMCNC: 29.5 PG — SIGNIFICANT CHANGE UP (ref 27–31)
MCHC RBC-ENTMCNC: 32.8 G/DL — SIGNIFICANT CHANGE UP (ref 32–37)
MCV RBC AUTO: 89.9 FL — SIGNIFICANT CHANGE UP (ref 81–99)
MONOCYTES # BLD AUTO: 0.67 K/UL — HIGH (ref 0.1–0.6)
MONOCYTES NFR BLD AUTO: 11.1 % — HIGH (ref 1.7–9.3)
NEUTROPHILS # BLD AUTO: 3.85 K/UL — SIGNIFICANT CHANGE UP (ref 1.4–6.5)
NEUTROPHILS NFR BLD AUTO: 63.9 % — SIGNIFICANT CHANGE UP (ref 42.2–75.2)
NRBC # BLD: 0 /100 WBCS — SIGNIFICANT CHANGE UP (ref 0–0)
PHOSPHATE SERPL-MCNC: 3 MG/DL — SIGNIFICANT CHANGE UP (ref 2.1–4.9)
PLATELET # BLD AUTO: 253 K/UL — SIGNIFICANT CHANGE UP (ref 130–400)
POTASSIUM SERPL-MCNC: 4 MMOL/L — SIGNIFICANT CHANGE UP (ref 3.5–5)
POTASSIUM SERPL-SCNC: 4 MMOL/L — SIGNIFICANT CHANGE UP (ref 3.5–5)
RBC # BLD: 4.27 M/UL — SIGNIFICANT CHANGE UP (ref 4.2–5.4)
RBC # FLD: 13.4 % — SIGNIFICANT CHANGE UP (ref 11.5–14.5)
SODIUM SERPL-SCNC: 146 MMOL/L — SIGNIFICANT CHANGE UP (ref 135–146)
WBC # BLD: 6.02 K/UL — SIGNIFICANT CHANGE UP (ref 4.8–10.8)
WBC # FLD AUTO: 6.02 K/UL — SIGNIFICANT CHANGE UP (ref 4.8–10.8)

## 2018-06-28 RX ORDER — ACETAMINOPHEN 500 MG
650 TABLET ORAL EVERY 6 HOURS
Refills: 0 | Status: DISCONTINUED | OUTPATIENT
Start: 2018-06-28 | End: 2018-07-02

## 2018-06-28 RX ORDER — EXEMESTANE 25 MG/1
25 TABLET, SUGAR COATED ORAL DAILY
Refills: 0 | Status: DISCONTINUED | OUTPATIENT
Start: 2018-06-28 | End: 2018-07-02

## 2018-06-28 RX ORDER — HYDROXYZINE HCL 10 MG
50 TABLET ORAL ONCE
Refills: 0 | Status: COMPLETED | OUTPATIENT
Start: 2018-06-28 | End: 2018-06-28

## 2018-06-28 RX ADMIN — NORTRIPTYLINE HYDROCHLORIDE 25 MILLIGRAM(S): 10 CAPSULE ORAL at 21:15

## 2018-06-28 RX ADMIN — Medication 650 MILLIGRAM(S): at 18:15

## 2018-06-28 RX ADMIN — INSULIN GLARGINE 21 UNIT(S): 100 INJECTION, SOLUTION SUBCUTANEOUS at 21:38

## 2018-06-28 RX ADMIN — Medication 2: at 11:55

## 2018-06-28 RX ADMIN — ENOXAPARIN SODIUM 40 MILLIGRAM(S): 100 INJECTION SUBCUTANEOUS at 11:15

## 2018-06-28 RX ADMIN — PANTOPRAZOLE SODIUM 40 MILLIGRAM(S): 20 TABLET, DELAYED RELEASE ORAL at 21:15

## 2018-06-28 RX ADMIN — Medication 650 MILLIGRAM(S): at 10:04

## 2018-06-28 RX ADMIN — Medication 650 MILLIGRAM(S): at 18:45

## 2018-06-28 RX ADMIN — Medication 81 MILLIGRAM(S): at 11:15

## 2018-06-28 RX ADMIN — LISINOPRIL 10 MILLIGRAM(S): 2.5 TABLET ORAL at 06:14

## 2018-06-28 RX ADMIN — ATORVASTATIN CALCIUM 80 MILLIGRAM(S): 80 TABLET, FILM COATED ORAL at 21:16

## 2018-06-28 RX ADMIN — SENNA PLUS 2 TABLET(S): 8.6 TABLET ORAL at 21:15

## 2018-06-28 RX ADMIN — SERTRALINE 25 MILLIGRAM(S): 25 TABLET, FILM COATED ORAL at 11:15

## 2018-06-28 RX ADMIN — Medication 7 UNIT(S): at 08:09

## 2018-06-28 RX ADMIN — Medication 100 MILLIGRAM(S): at 06:14

## 2018-06-28 RX ADMIN — Medication 7 UNIT(S): at 11:56

## 2018-06-28 RX ADMIN — Medication 50 MILLIGRAM(S): at 00:44

## 2018-06-28 RX ADMIN — Medication 100 MILLIGRAM(S): at 21:15

## 2018-06-28 RX ADMIN — Medication 650 MILLIGRAM(S): at 10:08

## 2018-06-28 NOTE — PROGRESS NOTE ADULT - SUBJECTIVE AND OBJECTIVE BOX
JAYASHREE FAGAN    Chief Complaint: Acute onset left sided weakness.    Right Handed    HPI:  78 year old woman with a past medical history of HTN, DLD, DM II, depression, and breast CA, presented with left sided weakness and numbness. She had associated nausea and vomiting upon arrival. She recieved IV TPA and admitted to ICU. She was sent to the [facility] for MRI brain which shows acute ischemic change in the lower left brainstem preliminarily. Also found to have left ICA stenosis approximately 70%.    Relevant PMH:  [] Prior ischemic stroke/TIA  [] Afib  []CAD  [x]HTN  [x]DLD  [x]DM []PVD [x]Obesity [] Sedintary lifestyle []CHF  []WENDY  []Cancer Hx     Social History: [] Smoking []  Drug Use: []   Alcohol Use:   [] Other:      Possible Location of Stroke:  [place] for official report but suspect left lower brainstem stroke.   Possible Cause of Stroke:  Unknown cause of ischemic stroke at this time.  Relevant Cervicocerebral Imaging:  CT Angio Neck w/ IV Cont:   EXAM:  CT ANGIO NECK (W)AW IC        EXAM:  CT ANGIO BRAIN (W)AW IC          IMPRESSION:     1.  Short 1.3 cm segmentof severe stenosis at the origin of the left   internal carotid artery, approximately 68-72% stenosis.    2.  Patent bilateral distal internal carotid arteries with medial   deviation in course through the retropharynx at the level of C2-3   "kissingcarotids".    3.  No evidence of aneurysm, major vascular stenosis or occlusion.          Relevant blood tests:  Direct LDL: 167 mg/dL <H> [4 - 129] (06-26-18 @ 17:20)  Hemoglobin A1C, Whole Blood: 8.5 % (06-27-18 @ 11:48)    Relevant cardiac rhythm monitoring:  on telemetry monitoring for >24 hours and no reported events.  Relevant Cardiac Structure:(TTE/CAMI +/-):No intracardiac thrombus/vegetation/akynesia/EF:  TTE pending    Home Medications:  atorvastatin: 20 milligram(s) orally once a day (at bedtime) (26 Jun 2018 01:24)  Ecotrin:  (26 Jun 2018 01:24)  exemestane: 25 milligram(s) orally once a day (26 Jun 2018 01:24)  HumaLOG 100 units/mL subcutaneous solution: 10 unit(s) subcutaneous 3 times a day (before meals) (26 Jun 2018 01:24)  Levemir 100 units/mL subcutaneous solution: 20 unit(s) subcutaneous once a day (at bedtime) (26 Jun 2018 01:24)  nortriptyline: 25 milligram(s) orally once a day (at bedtime) (26 Jun 2018 01:24)  quinapril: 10 milligram(s) orally once a day (26 Jun 2018 01:24)  sertraline: 25 milligram(s) orally once a day (26 Jun 2018 01:24)  Vitamin B6 25 mg oral tablet: 1 tab(s) orally once a day (26 Jun 2018 01:24)  Vitamin C 500 mg oral tablet: 1 tab(s) orally once a day (26 Jun 2018 01:24)      MEDICATIONS  (STANDING):  aspirin  chewable 81 milliGRAM(s) Oral daily  atorvastatin 80 milliGRAM(s) Oral at bedtime  dextrose 5%. 1000 milliLiter(s) (50 mL/Hr) IV Continuous <Continuous>  dextrose 50% Injectable 12.5 Gram(s) IV Push once  dextrose 50% Injectable 25 Gram(s) IV Push once  dextrose 50% Injectable 25 Gram(s) IV Push once  docusate sodium 100 milliGRAM(s) Oral three times a day  enoxaparin Injectable 40 milliGRAM(s) SubCutaneous daily  exemestane 25 milliGRAM(s) Oral daily  insulin glargine Injectable (LANTUS) 21 Unit(s) SubCutaneous at bedtime  insulin lispro (HumaLOG) corrective regimen sliding scale   SubCutaneous three times a day before meals  insulin lispro Injectable (HumaLOG) 7 Unit(s) SubCutaneous three times a day before meals  lisinopril 10 milliGRAM(s) Oral daily  nortriptyline 25 milliGRAM(s) Oral at bedtime  pantoprazole    Tablet 40 milliGRAM(s) Oral at bedtime  senna 2 Tablet(s) Oral at bedtime  sertraline 25 milliGRAM(s) Oral daily  sodium chloride 0.9%. 1000 milliLiter(s) (75 mL/Hr) IV Continuous <Continuous>      PT/OT/Speech/Rehab/S&Swr:pending    Exam:    Vital Signs Last 24 Hrs  T(C): 36.2 (28 Jun 2018 07:27), Max: 36.6 (27 Jun 2018 23:07)  T(F): 97.2 (28 Jun 2018 07:27), Max: 97.8 (27 Jun 2018 23:07)  HR: 73 (28 Jun 2018 07:04) (71 - 73)  BP: 153/76 (28 Jun 2018 07:04) (123/67 - 153/76)  BP(mean): 105 (28 Jun 2018 07:04) (89 - 105)  RR: 18 (28 Jun 2018 07:27) (18 - 40)  SpO2: 100% (28 Jun 2018 07:04) (100% - 100%)    NIHSS      LOC:       1a: 0    1b(Questions): 0          1c(Instructions):0             Best Gaze:0  Visual:0  Motor:                 RUE: 0    RLE:  0   LUE: 1   LLE:2     FACE:0     Limb Ataxia:0  Sensory:     1  Language:    0   Dysarthria:     0     Extinction and Inattention:0    NIHSS on admission:        ?        NIHSS today:    4         m-RS:2    Impression:  78 year old woman with a past medical history of HTN, DLD, DM II, depression, and breast CA, presented with left sided weakness and numbness. She had associated nausea and vomiting upon arrival. She received IV TPA and admitted to ICU. She was sent to the [facility] for MRI brain which shows acute ischemic change in the lower left brainstem preliminarily. Also found to have left ICA stenosis approximately 70%. Etiology of suspected stroke unknown at this time, will have a better understanding when stroke workup is complete.    Suggestion:  Routine stroke workup including: TTE with contrast to rule out right to left shunt.  Continue ASA and please make it 162 mg daily.  Continue Lipitor  PT OT Rehab.  Continue GI and DVT prophalaxis.  Normal Saline 75 ml per hour.    Disposition:  Stroke unit(discussed with medical admitting resident).  We spent more than 60 minutes to review the chart, gather necessary information, evaluate the patient and discuss the case with primary team and counseling the family to their satisfaction.  Adelso Goddard NP Neurovascular/Stroke  [phone] JAYASHREE FAGAN    Chief Complaint: Acute onset left sided weakness.    Right Handed    HPI:  78 year old woman with a past medical history of HTN, DLD, DM II, depression, and breast CA, presented with left sided weakness and numbness. She had associated nausea and vomiting upon arrival. She recieved IV TPA and admitted to ICU. She was sent to the [facility] for MRI brain which shows acute ischemic change in the lower left brainstem preliminarily. Also found to have left ICA stenosis approximately 70%.    Relevant PMH:  [] Prior ischemic stroke/TIA  [] Afib  []CAD  [x]HTN  [x]DLD  [x]DM []PVD [x]Obesity [] Sedintary lifestyle []CHF  []WENDY  []Cancer Hx     Social History: [] Smoking []  Drug Use: []   Alcohol Use:   [] Other:      Possible Location of Stroke:  [place] for official report but suspect left lower brainstem stroke.   Possible Cause of Stroke:  Unknown cause of ischemic stroke at this time.  Relevant Cervicocerebral Imaging:  CT Angio Neck w/ IV Cont:   EXAM:  CT ANGIO NECK (W)AW IC        EXAM:  CT ANGIO BRAIN (W)AW IC          IMPRESSION:     1.  Short 1.3 cm segmentof severe stenosis at the origin of the left   internal carotid artery, approximately 68-72% stenosis.    2.  Patent bilateral distal internal carotid arteries with medial   deviation in course through the retropharynx at the level of C2-3   "kissingcarotids".    3.  No evidence of aneurysm, major vascular stenosis or occlusion.          Relevant blood tests:  Direct LDL: 167 mg/dL <H> [4 - 129] (06-26-18 @ 17:20)  Hemoglobin A1C, Whole Blood: 8.5 % (06-27-18 @ 11:48)    Relevant cardiac rhythm monitoring:  on telemetry monitoring for >24 hours and no reported events.  Relevant Cardiac Structure:(TTE/CAMI +/-):No intracardiac thrombus/vegetation/akynesia/EF:  TTE pending    Home Medications:  atorvastatin: 20 milligram(s) orally once a day (at bedtime) (26 Jun 2018 01:24)  Ecotrin:  (26 Jun 2018 01:24)  exemestane: 25 milligram(s) orally once a day (26 Jun 2018 01:24)  HumaLOG 100 units/mL subcutaneous solution: 10 unit(s) subcutaneous 3 times a day (before meals) (26 Jun 2018 01:24)  Levemir 100 units/mL subcutaneous solution: 20 unit(s) subcutaneous once a day (at bedtime) (26 Jun 2018 01:24)  nortriptyline: 25 milligram(s) orally once a day (at bedtime) (26 Jun 2018 01:24)  quinapril: 10 milligram(s) orally once a day (26 Jun 2018 01:24)  sertraline: 25 milligram(s) orally once a day (26 Jun 2018 01:24)  Vitamin B6 25 mg oral tablet: 1 tab(s) orally once a day (26 Jun 2018 01:24)  Vitamin C 500 mg oral tablet: 1 tab(s) orally once a day (26 Jun 2018 01:24)      MEDICATIONS  (STANDING):  aspirin  chewable 81 milliGRAM(s) Oral daily  atorvastatin 80 milliGRAM(s) Oral at bedtime  dextrose 5%. 1000 milliLiter(s) (50 mL/Hr) IV Continuous <Continuous>  dextrose 50% Injectable 12.5 Gram(s) IV Push once  dextrose 50% Injectable 25 Gram(s) IV Push once  dextrose 50% Injectable 25 Gram(s) IV Push once  docusate sodium 100 milliGRAM(s) Oral three times a day  enoxaparin Injectable 40 milliGRAM(s) SubCutaneous daily  exemestane 25 milliGRAM(s) Oral daily  insulin glargine Injectable (LANTUS) 21 Unit(s) SubCutaneous at bedtime  insulin lispro (HumaLOG) corrective regimen sliding scale   SubCutaneous three times a day before meals  insulin lispro Injectable (HumaLOG) 7 Unit(s) SubCutaneous three times a day before meals  lisinopril 10 milliGRAM(s) Oral daily  nortriptyline 25 milliGRAM(s) Oral at bedtime  pantoprazole    Tablet 40 milliGRAM(s) Oral at bedtime  senna 2 Tablet(s) Oral at bedtime  sertraline 25 milliGRAM(s) Oral daily  sodium chloride 0.9%. 1000 milliLiter(s) (75 mL/Hr) IV Continuous <Continuous>      PT/OT/Speech/Rehab/S&Swr:pending    Exam:    Vital Signs Last 24 Hrs  T(C): 36.2 (28 Jun 2018 07:27), Max: 36.6 (27 Jun 2018 23:07)  T(F): 97.2 (28 Jun 2018 07:27), Max: 97.8 (27 Jun 2018 23:07)  HR: 73 (28 Jun 2018 07:04) (71 - 73)  BP: 153/76 (28 Jun 2018 07:04) (123/67 - 153/76)  BP(mean): 105 (28 Jun 2018 07:04) (89 - 105)  RR: 18 (28 Jun 2018 07:27) (18 - 40)  SpO2: 100% (28 Jun 2018 07:04) (100% - 100%)    NIHSS      LOC:       1a: 0    1b(Questions): 0          1c(Instructions):0             Best Gaze:0  Visual:0  Motor:                 RUE: 0    RLE:  0   LUE: 1   LLE:2     FACE:0     Limb Ataxia:0  Sensory:     1  Language:    0   Dysarthria:     0     Extinction and Inattention:0    NIHSS on admission:        ?        NIHSS today:    4         m-RS:2    Impression:  78 year old woman with a past medical history of HTN, DLD, DM II, depression, and breast CA, presented with left sided weakness and numbness. She had associated nausea and vomiting upon arrival. She received IV TPA and admitted to ICU. She was sent to the [facility] for MRI brain which shows acute ischemic change in the lower left brainstem preliminarily. Also found to have left ICA stenosis approximately 70%. Etiology of suspected stroke unknown at this time, will have a better understanding when stroke workup is complete.    Suggestion:  Routine stroke workup including: TTE with contrast to rule out right to left shunt.  Continue ASA and please make it 162 mg daily.  Continue Lipitor  PT OT Rehab.  Continue GI and DVT prophalaxis.  Normal Saline 75 ml per hour.    Disposition:  Stroke unit(discussed with medical admitting resident).      We spent more than 60 minutes to review the chart, gather necessary information, evaluate the patient and discuss the case with primary team and counseling the family to their satisfaction.  Adelso Goddard NP Neurovascular/Stroke  [phone]  I, Izaiah Asher, examined the patient and discussed this case, including plan of management with the Stroke team.

## 2018-06-28 NOTE — PROGRESS NOTE ADULT - PROBLEM SELECTOR PLAN 1
check MR brain  transfer to [facility] as per neurology recommendations   aspirin, Lipitor  rehab / pt...DC planning  speech and swallow  DVT prophylaxis
check MR brain  aspirin, Lipitor  rehab / pt  speech and swallow  DVT prophylaxis

## 2018-06-28 NOTE — PROGRESS NOTE ADULT - SUBJECTIVE AND OBJECTIVE BOX
Patient still with left sided motor weakness, no complaints      T(F): 97.2 (06-28-18 @ 07:27), Max: 97.8 (06-27-18 @ 23:07)  HR: 73 (06-28-18 @ 07:04)  BP: 153/76 (06-28-18 @ 07:04)  RR: 18 (06-28-18 @ 07:27)  SpO2: 100% (06-28-18 @ 07:04) (100% - 100%)    PHYSICAL EXAM:  GENERAL: NAD  HEAD:  Atraumatic, Normocephalic  NERVOUS SYSTEM:  Alert & Oriented X3, motor on right 5/5 upper and lower ext on left 4/5 upper and lower ext   CHEST/LUNG: Clear to percussion bilaterally; No rales, rhonchi, wheezing, or rubs  HEART: Regular rate and rhythm; No murmurs, rubs, or gallops  ABDOMEN: Soft, Nontender, Nondistended; Bowel sounds present  EXTREMITIES:  2+ Peripheral Pulses, No clubbing, cyanosis, or edema      LABS  06-28    146  |  105  |  16  ----------------------------<  171<H>  4.0   |  27  |  0.8    Ca    9.2      28 Jun 2018 05:27  Phos  3.0     06-28  Mg     2.0     06-28                            12.6   6.02  )-----------( 253      ( 28 Jun 2018 05:27 )             38.4     Lipid Profile (06.26.18 @ 17:20)    Total Cholesterol/HDL Ratio Measurement: 3.8 Ratio    Cholesterol, Serum: 235 mg/dL    Triglycerides, Serum: 118 mg/dL    HDL Cholesterol, Serum: 62 mg/dL    Direct LDL: 167: LDL Cholesterol --- Interpretive Comment (for adults 18 and over)  Optimal LDL Level may vary based on clinical situation  Below 70                  Ideal for people at very high risk of heart  disease  Below 100                Ideal for people at risk of heart disease  100 - 129                   [place]  130 - 159                   Borderline high  160 - 189                   High  190 and Above          Very high mg/dL        CARDIAC ENZYMES  Creatine Kinase, Serum: 78 (06-26 @ 00:15)    CKMB Units: 2.8 (06-26 @ 00:15)    Troponin T, Serum: <0.01 ng/mL (06-26-18 @ 00:15)        RADIOLOGY  < from: CT Angio Neck w/ IV Cont (06.27.18 @ 00:43) >  IMPRESSION:     1.  Short 1.3 cm segmentof severe stenosis at the origin of the left   internal carotid artery, approximately 68-72% stenosis.    2.  Patent bilateral distal internal carotid arteries with medial   deviation in course through the retropharynx at the level of C2-3   "kissingcarotids".    3.  No evidence of aneurysm, major vascular stenosis or occlusion.    < end of copied text >    MEDICATIONS  (STANDING):  aspirin  chewable 81 milliGRAM(s) Oral daily  atorvastatin 80 milliGRAM(s) Oral at bedtime  docusate sodium 100 milliGRAM(s) Oral three times a day  enoxaparin Injectable 40 milliGRAM(s) SubCutaneous daily  exemestane 25 milliGRAM(s) Oral daily  insulin glargine Injectable (LANTUS) 21 Unit(s) SubCutaneous at bedtime  insulin lispro (HumaLOG) corrective regimen sliding scale   SubCutaneous three times a day before meals  insulin lispro Injectable (HumaLOG) 7 Unit(s) SubCutaneous three times a day before meals  lisinopril 10 milliGRAM(s) Oral daily  nortriptyline 25 milliGRAM(s) Oral at bedtime  pantoprazole    Tablet 40 milliGRAM(s) Oral at bedtime  senna 2 Tablet(s) Oral at bedtime  sertraline 25 milliGRAM(s) Oral daily      MEDICATIONS  (PRN):  acetaminophen   Tablet. 650 milliGRAM(s) Oral every 6 hours PRN Moderate Pain (4 - 6)  dextrose 40% Gel 15 Gram(s) Oral once PRN Blood Glucose LESS THAN 70 milliGRAM(s)/deciliter  glucagon  Injectable 1 milliGRAM(s) IntraMuscular once PRN Glucose LESS THAN 70 milligrams/deciliter  LORazepam   Injectable 1 milliGRAM(s) IntraMuscular once PRN Agitation  sodium chloride 0.65% Nasal 1 Spray(s) Both Nostrils four times a day PRN Nasal Congestion

## 2018-06-28 NOTE — PROGRESS NOTE ADULT - SUBJECTIVE AND OBJECTIVE BOX
Interval Events:  No overnight events.      REVIEW OF SYSTEMS:  Constitutional: No fevers or chills. No weight loss. No fatigue or generalized malaise.  Eyes: No itching or discharge from the eyes  ENT: No ear pain. No ear discharge. No nasal congestion. No post nasal drip. No epistaxis. No throat pain. No sore throat. No difficulty swallowing.   CV: No chest pain. No palpitations. No lightheadedness or dizziness.   Resp: No dyspnea at rest. No dyspnea on exertion. No orthopnea. No wheezing. No cough. No stridor. No sputum production. No chest pain with respiration.  GI: No nausea. No vomiting. No diarrhea.  MSK: No joint pain or pain in any extremities  Integumentary: No skin lesions. No pedal edema.  Neurological: No gross motor weakness. No sensory changes.      OBJECTIVE:  ICU Vital Signs Last 24 Hrs  T(C): 36.2 (2018 07:27), Max: 36.6 (2018 23:07)  T(F): 97.2 (2018 07:27), Max: 97.8 (2018 23:07)  HR: 73 (2018 07:04) (71 - 73)  BP: 153/76 (2018 07:04) (123/67 - 153/76)  BP(mean): 105 (2018 07:04) (89 - 105)  ABP: --  ABP(mean): --  RR: 18 (2018 07:27) (18 - 40)  SpO2: 100% (2018 07:04) (100% - 100%)         @ 07:01  -   @ 07:00  --------------------------------------------------------  IN: 180 mL / OUT: 150 mL / NET: 30 mL      CAPILLARY BLOOD GLUCOSE  146 (2018 07:44)          PHYSICAL EXAM:  General: Awake, alert, oriented X 3.   HEENT: Atraumatic, normocephalic.                 Mallampatti Grade                 No nasal congestion.                No tonsillar or pharyngeal exudates.  Lymph Nodes: No palpable lymphadenopathy  Neck: No JVD. No carotid bruit.   Respiratory: Normal chest expansion                         Normal percussion                         Normal and equal air entry                         No wheeze, rhonchi or rales.  Cardiovascular: S1 S2 normal. No murmurs, rubs or gallops.   Abdomen: Soft, non-tender, non-distended. No organomegaly.  Extremities: Warm to touch. Peripheral pulse palpable. No pedal edema.   Skin: No rashes or skin lesions, warm dry  Neurological: Motor and sensory examination equal and normal in all four extremities.  Psychiatry: Appropriate mood and affect.    HOSPITAL MEDICATIONS:  MEDICATIONS  (STANDING):  aspirin  chewable 81 milliGRAM(s) Oral daily  atorvastatin 80 milliGRAM(s) Oral at bedtime  dextrose 5%. 1000 milliLiter(s) (50 mL/Hr) IV Continuous <Continuous>  dextrose 50% Injectable 12.5 Gram(s) IV Push once  dextrose 50% Injectable 25 Gram(s) IV Push once  dextrose 50% Injectable 25 Gram(s) IV Push once  docusate sodium 100 milliGRAM(s) Oral three times a day  enoxaparin Injectable 40 milliGRAM(s) SubCutaneous daily  insulin glargine Injectable (LANTUS) 21 Unit(s) SubCutaneous at bedtime  insulin lispro (HumaLOG) corrective regimen sliding scale   SubCutaneous three times a day before meals  insulin lispro Injectable (HumaLOG) 7 Unit(s) SubCutaneous three times a day before meals  lisinopril 10 milliGRAM(s) Oral daily  nortriptyline 25 milliGRAM(s) Oral at bedtime  pantoprazole    Tablet 40 milliGRAM(s) Oral at bedtime  senna 2 Tablet(s) Oral at bedtime  sertraline 25 milliGRAM(s) Oral daily  sodium chloride 0.9%. 1000 milliLiter(s) (75 mL/Hr) IV Continuous <Continuous>    MEDICATIONS  (PRN):  acetaminophen   Tablet. 650 milliGRAM(s) Oral every 6 hours PRN Moderate Pain (4 - 6)  dextrose 40% Gel 15 Gram(s) Oral once PRN Blood Glucose LESS THAN 70 milliGRAM(s)/deciliter  glucagon  Injectable 1 milliGRAM(s) IntraMuscular once PRN Glucose LESS THAN 70 milligrams/deciliter  LORazepam   Injectable 1 milliGRAM(s) IntraMuscular once PRN Agitation  sodium chloride 0.65% Nasal 1 Spray(s) Both Nostrils four times a day PRN Nasal Congestion      LABS:                        12.6   6.02  )-----------( 253      ( 2018 05:27 )             38.4     -    146  |  105  |  16  ----------------------------<  171<H>  4.0   |  27  |  0.8    Ca    9.2      2018 05:27  Phos  3.0       Mg     2.0             Urinalysis Basic - ( 2018 15:47 )    Color: Yellow / Appearance: Cloudy / S.015 / pH: x  Gluc: x / Ketone: 15  / Bili: Negative / Urobili: 1.0   Blood: x / Protein: 30 / Nitrite: Negative   Leuk Esterase: Negative / RBC: 2-5 /HPF / WBC Negative   Sq Epi: x / Non Sq Epi: Occasional /HPF / Bacteria: Moderate

## 2018-06-28 NOTE — PROGRESS NOTE ADULT - SUBJECTIVE AND OBJECTIVE BOX
SUBJECTIVE:    Patient is a 78y old Female who presents with a chief complaint of left sided weakness and numbness x 1 day (2018 10:44)    Currently admitted to medicine with the primary diagnosis of CVA(CEREBRAL VASCULAR ACCIDENT)    Today is hospital day 2d.This morning she  is resting comfortably in bed and reports no new issues. patient left sided weakness got worse overnight.    HPI:  79 y/o female with PMHx of HTN, DLD, DM II, depression, and breast CA s/p lumpectomy p/w left sided weakness and numbness x 1 day. patient started to feel left side weakness and numbness in the evening 1 day prior to admission. patient came to the ED where she had nausea and vomiting. CTH was done with no evidence of acute intracranial pathology. TPA was offered to the patient and she agreed. patient was admitted to ICU for acute CVA. patient denies LOC, seizure and headache. patient denies fever, COB, CP, cough, n/v/d, bowel and urinary symptoms. (2018 10:44)      PAST MEDICAL & SURGICAL HISTORY  Bilateral hearing loss, unspecified hearing loss type  High cholesterol  Depression  BP (high blood pressure)  Breast CA  DM (diabetes mellitus)  History of cholecystectomy:   History of lumpectomy of right breast  H/O total knee replacement, right    SOCIAL HISTORY:  Negative for smoking/alcohol/drug use.     ALLERGIES:  latex (Unknown)  penicillins (Unknown)  shellfish (Unknown)    MEDICATIONS:  STANDING MEDICATIONS  aspirin  chewable 81 milliGRAM(s) Oral daily  atorvastatin 80 milliGRAM(s) Oral at bedtime  dextrose 5%. 1000 milliLiter(s) IV Continuous <Continuous>  dextrose 50% Injectable 12.5 Gram(s) IV Push once  dextrose 50% Injectable 25 Gram(s) IV Push once  dextrose 50% Injectable 25 Gram(s) IV Push once  docusate sodium 100 milliGRAM(s) Oral three times a day  enoxaparin Injectable 40 milliGRAM(s) SubCutaneous daily  insulin glargine Injectable (LANTUS) 21 Unit(s) SubCutaneous at bedtime  insulin lispro (HumaLOG) corrective regimen sliding scale   SubCutaneous three times a day before meals  insulin lispro Injectable (HumaLOG) 7 Unit(s) SubCutaneous three times a day before meals  lisinopril 10 milliGRAM(s) Oral daily  nortriptyline 25 milliGRAM(s) Oral at bedtime  pantoprazole    Tablet 40 milliGRAM(s) Oral at bedtime  senna 2 Tablet(s) Oral at bedtime  sertraline 25 milliGRAM(s) Oral daily  sodium chloride 0.9%. 1000 milliLiter(s) IV Continuous <Continuous>    PRN MEDICATIONS  acetaminophen   Tablet. 650 milliGRAM(s) Oral every 6 hours PRN  dextrose 40% Gel 15 Gram(s) Oral once PRN  glucagon  Injectable 1 milliGRAM(s) IntraMuscular once PRN  LORazepam   Injectable 1 milliGRAM(s) IntraMuscular once PRN  sodium chloride 0.65% Nasal 1 Spray(s) Both Nostrils four times a day PRN    VITALS:   T(F): 97.2  HR: 73  BP: 153/76  RR: 18  SpO2: 100%      PHYSICAL EXAM:  GEN: NAD, comfortable  LUNGS: CTAB, no w/r/r  HEART: RRR, no m/r/g  ABD: soft, NT/ND, +BS  EXT: no edema, PP b/l  NEURO: AAOx3. LUE and LLE 2/5       LABS:                        12.6   6.02  )-----------( 253      ( 2018 05:27 )             38.4         146  |  105  |  16  ----------------------------<  171<H>  4.0   |  27  |  0.8    Ca    9.2      2018 05:27  Phos  3.0       Mg     2.0             Urinalysis Basic - ( 2018 15:47 )    Color: Yellow / Appearance: Cloudy / S.015 / pH: x  Gluc: x / Ketone: 15  / Bili: Negative / Urobili: 1.0   Blood: x / Protein: 30 / Nitrite: Negative   Leuk Esterase: Negative / RBC: 2-5 /HPF / WBC Negative   Sq Epi: x / Non Sq Epi: Occasional /HPF / Bacteria: Moderate                    18 @ 07:01  -  - @ 07:00  --------------------------------------------------------  IN: 180 mL / OUT: 150 mL / NET: 30 mL    18 @ 07:01  -  18 @ 12:35  --------------------------------------------------------  IN: 120 mL / OUT: 0 mL / NET: 120 mL

## 2018-06-28 NOTE — PROGRESS NOTE ADULT - ASSESSMENT
77 y/o female with PMHx of HTN, DLD, DM II, depression, and breast CA s/p lumpectomy p/w left sided weakness and numbness x 1 day. patient was admitted to ICU for acute CVA s/p TPA administration.    #Acute CVA s/p TPA  patient is downgraded to telemetry from ICU   neuro check  monitor VS,  BP <180  monitor H & H  Lipid profile  and HBa1c 8.4  2d ECHO  repeat CTH > no changes as above  CT angio >  Short 1.3 cm segmentof severe stenosis at the origin of the left   internal carotid artery, approximately 68-72% stenosis.  MRI of head pending today   start lipitor 80   start ASA 81, patient was on ASA before, replace with plavix ?  f/u neuro eval  Neuro vascular eval in the [place] by Dr. Sol for the carotid stenosis  PT and rehab    #HTN  controlled  c/w lisinpril    #DM   monitor FS  insulin protocol    #others   Mechanical soft diet  DVT and GI ppx  from home  full code  Transfer to the [place] site for Neuro vascular eval in the [place] by Dr. Sol for the carotid stenosis

## 2018-06-28 NOTE — PROGRESS NOTE ADULT - ASSESSMENT
Impression:  CVA with residual deficit      to go to Rehab today if OK with neuro  to go for MRI head neck

## 2018-06-29 ENCOUNTER — TRANSCRIPTION ENCOUNTER (OUTPATIENT)
Age: 78
End: 2018-06-29

## 2018-06-29 RX ORDER — ATORVASTATIN CALCIUM 80 MG/1
1 TABLET, FILM COATED ORAL
Qty: 0 | Refills: 0 | DISCHARGE
Start: 2018-06-29

## 2018-06-29 RX ORDER — DOCUSATE SODIUM 100 MG
1 CAPSULE ORAL
Qty: 0 | Refills: 0 | DISCHARGE
Start: 2018-06-29

## 2018-06-29 RX ORDER — ACETAMINOPHEN 500 MG
2 TABLET ORAL
Qty: 0 | Refills: 0 | DISCHARGE
Start: 2018-06-29

## 2018-06-29 RX ORDER — POLYETHYLENE GLYCOL 3350 17 G/17G
17 POWDER, FOR SOLUTION ORAL
Qty: 0 | Refills: 0 | DISCHARGE
Start: 2018-06-29

## 2018-06-29 RX ORDER — SENNA PLUS 8.6 MG/1
2 TABLET ORAL
Qty: 0 | Refills: 0 | DISCHARGE
Start: 2018-06-29

## 2018-06-29 RX ORDER — CLOPIDOGREL BISULFATE 75 MG/1
75 TABLET, FILM COATED ORAL DAILY
Refills: 0 | Status: DISCONTINUED | OUTPATIENT
Start: 2018-06-29 | End: 2018-07-02

## 2018-06-29 RX ORDER — POLYETHYLENE GLYCOL 3350 17 G/17G
17 POWDER, FOR SOLUTION ORAL DAILY
Refills: 0 | Status: DISCONTINUED | OUTPATIENT
Start: 2018-06-29 | End: 2018-07-02

## 2018-06-29 RX ORDER — NYSTATIN CREAM 100000 [USP'U]/G
1 CREAM TOPICAL
Refills: 0 | Status: DISCONTINUED | OUTPATIENT
Start: 2018-06-29 | End: 2018-06-29

## 2018-06-29 RX ORDER — ASPIRIN/CALCIUM CARB/MAGNESIUM 324 MG
2 TABLET ORAL
Qty: 0 | Refills: 0 | DISCHARGE
Start: 2018-06-29

## 2018-06-29 RX ORDER — LISINOPRIL 2.5 MG/1
1 TABLET ORAL
Qty: 0 | Refills: 0 | DISCHARGE
Start: 2018-06-29

## 2018-06-29 RX ORDER — INSULIN GLARGINE 100 [IU]/ML
21 INJECTION, SOLUTION SUBCUTANEOUS
Qty: 0 | Refills: 0 | DISCHARGE
Start: 2018-06-29

## 2018-06-29 RX ORDER — SODIUM CHLORIDE 0.65 %
1 AEROSOL, SPRAY (ML) NASAL
Qty: 0 | Refills: 0 | DISCHARGE
Start: 2018-06-29

## 2018-06-29 RX ORDER — ASPIRIN/CALCIUM CARB/MAGNESIUM 324 MG
162 TABLET ORAL DAILY
Refills: 0 | Status: DISCONTINUED | OUTPATIENT
Start: 2018-06-29 | End: 2018-06-29

## 2018-06-29 RX ORDER — ASPIRIN/CALCIUM CARB/MAGNESIUM 324 MG
162 TABLET ORAL DAILY
Refills: 0 | Status: DISCONTINUED | OUTPATIENT
Start: 2018-06-29 | End: 2018-07-02

## 2018-06-29 RX ORDER — PANTOPRAZOLE SODIUM 20 MG/1
1 TABLET, DELAYED RELEASE ORAL
Qty: 0 | Refills: 0 | DISCHARGE
Start: 2018-06-29

## 2018-06-29 RX ORDER — ENOXAPARIN SODIUM 100 MG/ML
40 INJECTION SUBCUTANEOUS
Qty: 0 | Refills: 0 | DISCHARGE
Start: 2018-06-29

## 2018-06-29 RX ORDER — CLOPIDOGREL BISULFATE 75 MG/1
1 TABLET, FILM COATED ORAL
Qty: 0 | Refills: 0 | DISCHARGE
Start: 2018-06-29

## 2018-06-29 RX ADMIN — Medication 650 MILLIGRAM(S): at 17:30

## 2018-06-29 RX ADMIN — Medication 650 MILLIGRAM(S): at 00:07

## 2018-06-29 RX ADMIN — Medication 162 MILLIGRAM(S): at 14:27

## 2018-06-29 RX ADMIN — Medication 81 MILLIGRAM(S): at 11:27

## 2018-06-29 RX ADMIN — Medication 650 MILLIGRAM(S): at 08:00

## 2018-06-29 RX ADMIN — SENNA PLUS 2 TABLET(S): 8.6 TABLET ORAL at 21:13

## 2018-06-29 RX ADMIN — SERTRALINE 25 MILLIGRAM(S): 25 TABLET, FILM COATED ORAL at 11:20

## 2018-06-29 RX ADMIN — CLOPIDOGREL BISULFATE 75 MILLIGRAM(S): 75 TABLET, FILM COATED ORAL at 14:28

## 2018-06-29 RX ADMIN — ENOXAPARIN SODIUM 40 MILLIGRAM(S): 100 INJECTION SUBCUTANEOUS at 11:28

## 2018-06-29 RX ADMIN — Medication 650 MILLIGRAM(S): at 05:42

## 2018-06-29 RX ADMIN — Medication 100 MILLIGRAM(S): at 05:42

## 2018-06-29 RX ADMIN — Medication 650 MILLIGRAM(S): at 07:33

## 2018-06-29 RX ADMIN — ATORVASTATIN CALCIUM 80 MILLIGRAM(S): 80 TABLET, FILM COATED ORAL at 21:13

## 2018-06-29 RX ADMIN — SODIUM CHLORIDE 75 MILLILITER(S): 9 INJECTION INTRAMUSCULAR; INTRAVENOUS; SUBCUTANEOUS at 05:42

## 2018-06-29 RX ADMIN — Medication 100 MILLIGRAM(S): at 21:13

## 2018-06-29 RX ADMIN — Medication 7 UNIT(S): at 17:11

## 2018-06-29 RX ADMIN — SODIUM CHLORIDE 75 MILLILITER(S): 9 INJECTION INTRAMUSCULAR; INTRAVENOUS; SUBCUTANEOUS at 20:00

## 2018-06-29 RX ADMIN — Medication 2: at 12:24

## 2018-06-29 RX ADMIN — POLYETHYLENE GLYCOL 3350 17 GRAM(S): 17 POWDER, FOR SOLUTION ORAL at 14:28

## 2018-06-29 RX ADMIN — Medication 7 UNIT(S): at 07:29

## 2018-06-29 RX ADMIN — PANTOPRAZOLE SODIUM 40 MILLIGRAM(S): 20 TABLET, DELAYED RELEASE ORAL at 21:13

## 2018-06-29 RX ADMIN — Medication 7 UNIT(S): at 12:24

## 2018-06-29 RX ADMIN — INSULIN GLARGINE 21 UNIT(S): 100 INJECTION, SOLUTION SUBCUTANEOUS at 21:34

## 2018-06-29 RX ADMIN — LISINOPRIL 10 MILLIGRAM(S): 2.5 TABLET ORAL at 05:42

## 2018-06-29 RX ADMIN — Medication 100 MILLIGRAM(S): at 14:28

## 2018-06-29 RX ADMIN — NORTRIPTYLINE HYDROCHLORIDE 25 MILLIGRAM(S): 10 CAPSULE ORAL at 21:13

## 2018-06-29 RX ADMIN — Medication 650 MILLIGRAM(S): at 23:48

## 2018-06-29 NOTE — DISCHARGE NOTE ADULT - ABILITY TO HEAR (WITH HEARING AID OR HEARING APPLIANCE IF NORMALLY USED):
hearing aid left at home/Mildly to Moderately Impaired: difficulty hearing in some environments or speaker may need to increase volume or speak distinctly

## 2018-06-29 NOTE — PROGRESS NOTE ADULT - SUBJECTIVE AND OBJECTIVE BOX
JAYASHREE FAGAN    Chief Complaint: Acute onset left sided weakness.    Right Handed    HPI:  78 year old woman with a past medical history of HTN, DLD, DM II, depression, and breast CA, presented with left sided weakness and numbness. She had associated nausea and vomiting upon arrival. She received IV TPA and admitted to ICU. She was sent to the [facility] for MRI brain which shows acute ischemic change in the lower left medulla region preliminarily. Also found to have left ICA stenosis approximately 70%. She is now cared for in the stroke unit and has persistent left sided weakness.     Relevant PMH:  [] Prior ischemic stroke/TIA  [] Afib  []CAD  [x]HTN  [x]DLD  [x]DM []PVD [x]Obesity [] Sedintary lifestyle []CHF  []WENDY  []Cancer Hx     Social History: [] Smoking []  Drug Use: []   Alcohol Use:   [] Other:      Possible Location of Stroke:  [place] for official report but suspect left lower medulla ischemic stroke.   Possible Cause of Stroke:  Unknown cause of ischemic stroke at this time. Likely due to small vessel disease because of vessel territory and elevated A1C and LDL.  Relevant Cervicocerebral Imaging:  CT Angio Neck w/ IV Cont:   EXAM:  CT ANGIO NECK (W)AW IC        EXAM:  CT ANGIO BRAIN (W)AW IC          IMPRESSION:     1.  Short 1.3 cm segmentof severe stenosis at the origin of the left   internal carotid artery, approximately 68-72% stenosis.    2.  Patent bilateral distal internal carotid arteries with medial   deviation in course through the retropharynx at the level of C2-3   "kissingcarotids".    3.  No evidence of aneurysm, major vascular stenosis or occlusion.    Relevant blood tests:  Direct LDL: 167 mg/dL <H> [4 - 129] (06-26-18 @ 17:20)  Hemoglobin A1C, Whole Blood: 8.5 % (06-27-18 @ 11:48)    Relevant cardiac rhythm monitoring:  on telemetry monitoring for >48 hours and no reported events.  Relevant Cardiac Structure:(TTE/CAMI +/-):No intracardiac thrombus/vegetation/akynesia/EF:  TTE pending  Home Medications:  atorvastatin: 20 milligram(s) orally once a day (at bedtime) (26 Jun 2018 01:24)  Ecotrin:  (26 Jun 2018 01:24)  exemestane: 25 milligram(s) orally once a day (26 Jun 2018 01:24)  HumaLOG 100 units/mL subcutaneous solution: 10 unit(s) subcutaneous 3 times a day (before meals) (26 Jun 2018 01:24)  Levemir 100 units/mL subcutaneous solution: 20 unit(s) subcutaneous once a day (at bedtime) (26 Jun 2018 01:24)  nortriptyline: 25 milligram(s) orally once a day (at bedtime) (26 Jun 2018 01:24)  quinapril: 10 milligram(s) orally once a day (26 Jun 2018 01:24)  sertraline: 25 milligram(s) orally once a day (26 Jun 2018 01:24)  Vitamin B6 25 mg oral tablet: 1 tab(s) orally once a day (26 Jun 2018 01:24)  Vitamin C 500 mg oral tablet: 1 tab(s) orally once a day (26 Jun 2018 01:24)      MEDICATIONS  (STANDING):  aspirin  chewable 81 milliGRAM(s) Oral daily  atorvastatin 80 milliGRAM(s) Oral at bedtime  dextrose 5%. 1000 milliLiter(s) (50 mL/Hr) IV Continuous <Continuous>  dextrose 50% Injectable 12.5 Gram(s) IV Push once  dextrose 50% Injectable 25 Gram(s) IV Push once  dextrose 50% Injectable 25 Gram(s) IV Push once  docusate sodium 100 milliGRAM(s) Oral three times a day  enoxaparin Injectable 40 milliGRAM(s) SubCutaneous daily  exemestane 25 milliGRAM(s) Oral daily  insulin glargine Injectable (LANTUS) 21 Unit(s) SubCutaneous at bedtime  insulin lispro (HumaLOG) corrective regimen sliding scale   SubCutaneous three times a day before meals  insulin lispro Injectable (HumaLOG) 7 Unit(s) SubCutaneous three times a day before meals  lisinopril 10 milliGRAM(s) Oral daily  nortriptyline 25 milliGRAM(s) Oral at bedtime  pantoprazole    Tablet 40 milliGRAM(s) Oral at bedtime  senna 2 Tablet(s) Oral at bedtime  sertraline 25 milliGRAM(s) Oral daily  sodium chloride 0.9%. 1000 milliLiter(s) (75 mL/Hr) IV Continuous <Continuous>      PT/OT/Speech/Rehab/S&Swr: pending    Exam:    Vital Signs Last 24 Hrs  T(C): 36.1 (29 Jun 2018 07:04), Max: 36.8 (29 Jun 2018 02:44)  T(F): 97 (29 Jun 2018 07:04), Max: 98.2 (29 Jun 2018 02:44)  HR: 69 (29 Jun 2018 07:04) (64 - 81)  BP: 132/63 (29 Jun 2018 07:04) (132/63 - 149/72)  BP(mean): --  RR: 18 (29 Jun 2018 07:04) (17 - 18)  SpO2: 100% (29 Jun 2018 07:04) (97% - 100%)    NIHSS      LOC:       1a: 0    1b(Questions): 0          1c(Instructions):0             Best Gaze:0  Visual:0  Motor:                 RUE: 0    RLE:  0   LUE: 1   LLE:2    FACE:0     Limb Ataxia:0  Sensory:     1  Language:    0   Dysarthria:     0     Extinction and Inattention:0    NIHSS on admission:        ?        NIHSS today:    4         m-RS:3    Impression:  78 year old woman with a past medical history of HTN, DLD, DM II, depression, and breast CA, presented with left sided weakness and numbness. MRI brain which shows acute ischemic change in the lower left medulla preliminarily. Also found to have left ICA stenosis approximately 70%. Etiology of suspected stroke is likely due to small vessel disease evidenced by small vessel territory and elevated HGBA1C and LDL.  Suggestion:  Routine stroke workup including:   Continue ASA and please make it 162 mg daily.  May add plavix once daily for three months.  Continue Lipitor  PT OT Rehab.  Follow up TTE.  Continue GI and DVT prophalaxis.  Follow up with her oncologist.  Glycemic control  Mediterranean diet.  Maintain hydration, avoid dehydration and hypotension  Disposition:  May go to rehab and follow up with the neurovascular clinic in 2-4 weeks.     Stroke unit(discussed with medical admitting resident).  We spent more than 60 minutes to review the chart, gather necessary information, evaluate the patient and discuss the case with primary team and counseling the family to their satisfaction.  Adelso Goddard NP Neurovascular/Stroke  [phone] JAYASHREE FAGAN    Chief Complaint: Acute onset left sided weakness.    Right Handed    HPI:  78 year old woman with a past medical history of HTN, DLD, DM II, depression, and breast CA, presented with left sided weakness and numbness. She had associated nausea and vomiting upon arrival. She received IV TPA and admitted to ICU. She was sent to the [facility] for MRI brain which shows acute ischemic change in the lower left medulla region preliminarily. Also found to have left ICA stenosis approximately 70%. She is now cared for in the stroke unit and has persistent left sided weakness.     Relevant PMH:  [] Prior ischemic stroke/TIA  [] Afib  []CAD  [x]HTN  [x]DLD  [x]DM []PVD [x]Obesity [] Sedintary lifestyle []CHF  []WENDY  []Cancer Hx     Social History: [] Smoking []  Drug Use: []   Alcohol Use:   [] Other:      Possible Location of Stroke:  [place] for official report but suspect left lower medulla ischemic stroke.   Possible Cause of Stroke:  Unknown cause of ischemic stroke at this time. Likely due to small vessel disease because of vessel territory and elevated A1C and LDL.  Relevant Cervicocerebral Imaging:  CT Angio Neck w/ IV Cont:   EXAM:  CT ANGIO NECK (W)AW IC        EXAM:  CT ANGIO BRAIN (W)AW IC          IMPRESSION:     1.  Short 1.3 cm segmentof severe stenosis at the origin of the left   internal carotid artery, approximately 68-72% stenosis.    2.  Patent bilateral distal internal carotid arteries with medial   deviation in course through the retropharynx at the level of C2-3   "kissingcarotids".    3.  No evidence of aneurysm, major vascular stenosis or occlusion.    Relevant blood tests:  Direct LDL: 167 mg/dL <H> [4 - 129] (06-26-18 @ 17:20)  Hemoglobin A1C, Whole Blood: 8.5 % (06-27-18 @ 11:48)    Relevant cardiac rhythm monitoring:  on telemetry monitoring for >48 hours and no reported events.  Relevant Cardiac Structure:(TTE/CAMI +/-):No intracardiac thrombus/vegetation/akynesia/EF:  TTE pending  Home Medications:  atorvastatin: 20 milligram(s) orally once a day (at bedtime) (26 Jun 2018 01:24)  Ecotrin:  (26 Jun 2018 01:24)  exemestane: 25 milligram(s) orally once a day (26 Jun 2018 01:24)  HumaLOG 100 units/mL subcutaneous solution: 10 unit(s) subcutaneous 3 times a day (before meals) (26 Jun 2018 01:24)  Levemir 100 units/mL subcutaneous solution: 20 unit(s) subcutaneous once a day (at bedtime) (26 Jun 2018 01:24)  nortriptyline: 25 milligram(s) orally once a day (at bedtime) (26 Jun 2018 01:24)  quinapril: 10 milligram(s) orally once a day (26 Jun 2018 01:24)  sertraline: 25 milligram(s) orally once a day (26 Jun 2018 01:24)  Vitamin B6 25 mg oral tablet: 1 tab(s) orally once a day (26 Jun 2018 01:24)  Vitamin C 500 mg oral tablet: 1 tab(s) orally once a day (26 Jun 2018 01:24)      MEDICATIONS  (STANDING):  aspirin  chewable 81 milliGRAM(s) Oral daily  atorvastatin 80 milliGRAM(s) Oral at bedtime  dextrose 5%. 1000 milliLiter(s) (50 mL/Hr) IV Continuous <Continuous>  dextrose 50% Injectable 12.5 Gram(s) IV Push once  dextrose 50% Injectable 25 Gram(s) IV Push once  dextrose 50% Injectable 25 Gram(s) IV Push once  docusate sodium 100 milliGRAM(s) Oral three times a day  enoxaparin Injectable 40 milliGRAM(s) SubCutaneous daily  exemestane 25 milliGRAM(s) Oral daily  insulin glargine Injectable (LANTUS) 21 Unit(s) SubCutaneous at bedtime  insulin lispro (HumaLOG) corrective regimen sliding scale   SubCutaneous three times a day before meals  insulin lispro Injectable (HumaLOG) 7 Unit(s) SubCutaneous three times a day before meals  lisinopril 10 milliGRAM(s) Oral daily  nortriptyline 25 milliGRAM(s) Oral at bedtime  pantoprazole    Tablet 40 milliGRAM(s) Oral at bedtime  senna 2 Tablet(s) Oral at bedtime  sertraline 25 milliGRAM(s) Oral daily  sodium chloride 0.9%. 1000 milliLiter(s) (75 mL/Hr) IV Continuous <Continuous>      PT/OT/Speech/Rehab/S&Swr: pending    Exam:    Vital Signs Last 24 Hrs  T(C): 36.1 (29 Jun 2018 07:04), Max: 36.8 (29 Jun 2018 02:44)  T(F): 97 (29 Jun 2018 07:04), Max: 98.2 (29 Jun 2018 02:44)  HR: 69 (29 Jun 2018 07:04) (64 - 81)  BP: 132/63 (29 Jun 2018 07:04) (132/63 - 149/72)  BP(mean): --  RR: 18 (29 Jun 2018 07:04) (17 - 18)  SpO2: 100% (29 Jun 2018 07:04) (97% - 100%)    NIHSS      LOC:       1a: 0    1b(Questions): 0          1c(Instructions):0             Best Gaze:0  Visual:0  Motor:                 RUE: 0    RLE:  0   LUE: 1   LLE:2    FACE:0     Limb Ataxia:0  Sensory:     1  Language:    0   Dysarthria:     0     Extinction and Inattention:0    NIHSS on admission:        ?        NIHSS today:    4         m-RS:3    Impression:  78 year old woman with a past medical history of HTN, DLD, DM II, depression, and breast CA, presented with left sided weakness and numbness. MRI brain which shows acute ischemic change in the lower left medulla preliminarily. Also found to have left ICA stenosis approximately 70%. Etiology of suspected stroke is likely due to small vessel disease evidenced by small vessel territory and elevated HGBA1C and LDL.    Suggestion:  Routine stroke workup including:   Continue ASA and please make it 162 mg daily.  May add plavix once daily for less than three months.  Continue Lipitor  PT OT Rehab.  Follow up TTE.  Continue GI and DVT prophalaxis.  Follow up with her oncologist.  Glycemic control  Mediterranean diet.  Maintain hydration, avoid dehydration and hypotension  Disposition:  May go to rehab and follow up with the neurovascular clinic in 2-4 weeks.       We spent more than 45 minutes to review the chart, gather necessary information, evaluate the patient and discuss the case with primary team and counseling the family to their satisfaction.  Adelso Goddard NP Neurovascular/Stroke  [phone]  I, Izaiah Asher, examined the patient and discussed this case, including plan of management with the Stroke team.

## 2018-06-29 NOTE — PROGRESS NOTE ADULT - SUBJECTIVE AND OBJECTIVE BOX
Patient seen/ chart reviewed             afebrile              vss        lung: cta        neuro : 2/5 left side       function : standing 10 sec

## 2018-06-29 NOTE — DISCHARGE NOTE ADULT - HOSPITAL COURSE
78 year old woman with a past medical history of HTN, DLD, DM II, depression, and breast CA, presented with left sided weakness and numbness. MRI brain which shows acute ischemic change in the lower left medulla preliminarily. Also found to have left ICA stenosis approximately 70%. Etiology of suspected stroke is likely due to small vessel disease evidenced by small vessel territory and elevated HGBA1C 8.5 and LDL of 167. Pt had NIHSS of 8 on admission and 4 on discharge.  Pt was started on aspirin 162 mg and plavix 75 once daily for 3 months.  Pt on high dose lipitor 80 mg.  Pt had decreased Left upper and lower extremity weakness with decreased sensation.  Pt has been seen by physical therapy and will be discharged to 4A, rehab.

## 2018-06-29 NOTE — DISCHARGE NOTE ADULT - CARE PROVIDERS DIRECT ADDRESSES
[email or web address]

## 2018-06-29 NOTE — PROGRESS NOTE ADULT - ASSESSMENT
78 year old woman with a past medical history of HTN, DLD, DM II, depression, and breast CA, presented with left sided weakness and numbness. Initially presented to [facility], transferred to [place] for Neuro vascular eval in the [place] by Dr. Sol for the carotid stenosis. MRI brain which shows acute ischemic change in the lower left medulla preliminarily. Also found to have left ICA stenosis approximately 70%. Etiology of suspected stroke is likely due to small vessel disease evidenced by small vessel territory and elevated HGBA1C and LDL.     #Acute ischemic stroke s/p TPA  - Started on , plavix 75 (will continue for 3 months), statin 80.   - PT/OT rehab  - c/w gi and dvt ppx  - pt will f/u with her oncologist as outpatient.  - Maintain hydration, avoid dehydration and hypotension  - Mediterranean diet  - Per neuro, may go to rehab and follow up with the neurovascular clinic in 2-4 weeks.     #HTN  - c/w lisinopril    #DM   - monitor FS  - insulin protocol    from home  full code 78 year old woman with a past medical history of HTN, DLD, DM II, depression, and breast CA, presented with left sided weakness and numbness. Initially presented to [facility], transferred to [place] for Neuro vascular eval in the [place] by Dr. Sol for the carotid stenosis. MRI brain which shows acute ischemic change in the lower left medulla preliminarily. Also found to have left ICA stenosis approximately 70%. Etiology of suspected stroke is likely due to small vessel disease evidenced by small vessel territory and elevated HGBA1C and LDL.     #Acute ischemic stroke s/p TPA  - Started on , plavix 75 (will continue for 3 months), statin 80.   - PT/OT rehab  - c/w gi and dvt ppx  - pt will f/u with her oncologist as outpatient.  - Maintain hydration, avoid dehydration and hypotension  - Mediterranean diet  - Per neuro, may go to rehab and follow up with the neurovascular clinic in 2-4 weeks.     Severe Lt carotid stenosis  -f/u with vascular or neurointerventional    #HTN  - c/w lisinopril    #DM   - monitor FS  - insulin protocol    from home  full code

## 2018-06-29 NOTE — DISCHARGE NOTE ADULT - PLAN OF CARE
You had a stroke in one of the small arteries in your brain. It is important that we control your risk factors such as hypertension, diabetes, and dyslipidemia. If you haven't done so already, please quite smoking. Because of stroke, you have to start taking the following medications daily- aspirin 162mg, plavix 75mg, atorvastatin 80mg. Your diabetes is not well controlled- Hemoglobin A1C was 8.5. It is important to have a stricter control of your diabetes to prevent future episode of stroke. Please make sure you check your fingerstick glucose, and take your insulin as prescribed.  Please follow up with neurovascular clinic in 2-4 weeks. Please follow Mediterranean diet, and maintain good hydration and drink plenty of fluids. maintenance/Rehab resolution You had a stroke in one of the small arteries in your brain. It is important that we control your risk factors such as hypertension, diabetes, and dyslipidemia. If you haven't done so already, please quite smoking. Because of stroke, you have to start taking the following medications daily- aspirin 162mg, plavix 75mg, atorvastatin 80mg. Your diabetes is not well controlled- Hemoglobin A1C was 8.5. It is important to have a stricter control of your diabetes to prevent future episode of stroke. Please make sure you check your fingerstick glucose, and take your insulin as prescribed.  Please follow up with neurovascular clinic in 2-4 weeks. Please follow Mediterranean diet, and maintain good hydration and drink plenty of fluids.    Please also follow up with your oncologist and let them know about your stroke.  We recommend your oncologist to do a hypercoagulable workup. You had a stroke in one of the small arteries in your brain. It is important that we control your risk factors such as hypertension, diabetes, and dyslipidemia. If you haven't done so already, please quite smoking. Because of stroke, you have to start taking the following medications daily- aspirin 162mg, plavix 75mg, atorvastatin 80mg. Your diabetes is not well controlled- Hemoglobin A1C was 8.5. It is important to have a stricter control of your diabetes to prevent future episode of stroke. Please make sure you check your fingerstick glucose, and take your insulin as prescribed.  Please follow up with neurovascular clinic in 2-4 weeks. Please follow Mediterranean diet, and maintain good hydration and drink plenty of fluids.    Please also follow up with your oncologist to ensure your cancer is in remission. follow up with Dr. Asher or vascular surgeon for CEA or stenting due to severe stenosis likely hemangioma, D/w PMD need for liver MRI. likely hemangioma, discuss with PMD need for liver MRI.

## 2018-06-29 NOTE — DISCHARGE NOTE ADULT - MEDICATION SUMMARY - MEDICATIONS TO STOP TAKING
I will STOP taking the medications listed below when I get home from the hospital:  None I will STOP taking the medications listed below when I get home from the hospital:    quinapril  -- 10 milligram(s) by mouth once a day

## 2018-06-29 NOTE — DISCHARGE NOTE ADULT - MEDICATION SUMMARY - MEDICATIONS TO TAKE
I will START or STAY ON the medications listed below when I get home from the hospital:    acetaminophen 325 mg oral tablet  -- 2 tab(s) by mouth every 6 hours, As needed, Moderate Pain (4 - 6)  -- Indication: For pain    aspirin 81 mg oral tablet  -- 2 tab(s) by mouth once a day  -- Indication: For Stroke    lisinopril 10 mg oral tablet  -- 1 tab(s) by mouth once a day  -- Indication: For Hypertension    enoxaparin  -- 40 unit(s) subcutaneous once a day (at bedtime)  -- Indication: For DVT ppx during rehab.  Discontinue on discharge to home please    nortriptyline  -- 25 milligram(s) by mouth once a day (at bedtime)  -- Indication: For antidepressant    sertraline  -- 25 milligram(s) by mouth once a day  -- Indication: For antidepressant    HumaLOG 100 units/mL subcutaneous solution  -- 7 unit(s) subcutaneous 3 times a day (before meals)  -- Indication: For Diabetes    insulin glargine  -- 21 unit(s) subcutaneous once a day (at bedtime)  -- Indication: For Diabetes    atorvastatin 80 mg oral tablet  -- 1 tab(s) by mouth once a day (at bedtime)  -- Indication: For Stroke    exemestane  -- 25 milligram(s) by mouth once a day  -- Indication: For Breast CA    clopidogrel 75 mg oral tablet  -- 1 tab(s) by mouth once a day  -- Indication: For Stroke    senna oral tablet  -- 2 tab(s) by mouth once a day (at bedtime)  -- Indication: For Constipation    polyethylene glycol 3350 oral powder for reconstitution  -- 17 gram(s) by mouth once a day  -- Indication: For Constipation    docusate sodium 100 mg oral capsule  -- 1 cap(s) by mouth 3 times a day  -- Indication: For Constipation    sodium chloride 0.65% nasal spray  -- 1 spray(s) into nose 2 times a day, As Needed  -- Indication: For Nasal spray    pantoprazole 40 mg oral delayed release tablet  -- 1 tab(s) by mouth once a day (at bedtime)  -- Indication: For GERD

## 2018-06-29 NOTE — DISCHARGE NOTE ADULT - CARE PROVIDER_API CALL
Urbano Mejias), Neurology; Neuromuscular Medicine  [address] [place] [id number]  Phone: [phone]  Fax: [phone] Osmar Jorgensen), Internal Medicine  [address] [place] [address]  Phone: [phone]  Fax: [phone]    Izaiah Asher), Neurology; Vascular Neurology  [address] [place] [address] [address]  [place], [place] [id number]  Phone: [phone]  Fax: [phone]

## 2018-06-29 NOTE — DISCHARGE NOTE ADULT - MEDICATION SUMMARY - MEDICATIONS TO CHANGE
I will SWITCH the dose or number of times a day I take the medications listed below when I get home from the hospital:    Ecotrin    atorvastatin  -- 20 milligram(s) by mouth once a day (at bedtime) I will SWITCH the dose or number of times a day I take the medications listed below when I get home from the hospital:    Ecotrin    atorvastatin  -- 20 milligram(s) by mouth once a day (at bedtime)    Levemir 100 units/mL subcutaneous solution  -- 20 unit(s) subcutaneous once a day (at bedtime)

## 2018-06-29 NOTE — PROGRESS NOTE ADULT - ATTENDING COMMENTS
Attending Statement: I have personally performed a face to face diagnostic evaluation on this patient. I have written the above note pertaining to the history, exam and plan of care.
Attending Statement: I have personally performed a face to face diagnostic evaluation on this patient. I have written the above note pertaining to the history, exam and plan of care.
Patient seen and examined independently. Case discussed with housestaff, nursing, social work, patient, neuro. I agree with the resident's note, physical exam, and plan except as below. Patient feels better. No new complaints. On my exam: LUE 4/5, LLE 1/5. Started on , Plavix 75. Will need vascular or neurointerventional f/u as outpt for severe Lt carotid stenosis.

## 2018-06-29 NOTE — DISCHARGE NOTE ADULT - CARE PLAN
Principal Discharge DX:	CVA (cerebral vascular accident)  Goal:	maintenance/Rehab resolution  Assessment and plan of treatment:	You had a stroke in one of the small arteries in your brain. It is important that we control your risk factors such as hypertension, diabetes, and dyslipidemia. If you haven't done so already, please quite smoking. Because of stroke, you have to start taking the following medications daily- aspirin 162mg, plavix 75mg, atorvastatin 80mg. Your diabetes is not well controlled- Hemoglobin A1C was 8.5. It is important to have a stricter control of your diabetes to prevent future episode of stroke. Please make sure you check your fingerstick glucose, and take your insulin as prescribed.  Please follow up with neurovascular clinic in 2-4 weeks. Please follow Mediterranean diet, and maintain good hydration and drink plenty of fluids. Principal Discharge DX:	CVA (cerebral vascular accident)  Goal:	maintenance/Rehab resolution  Assessment and plan of treatment:	You had a stroke in one of the small arteries in your brain. It is important that we control your risk factors such as hypertension, diabetes, and dyslipidemia. If you haven't done so already, please quite smoking. Because of stroke, you have to start taking the following medications daily- aspirin 162mg, plavix 75mg, atorvastatin 80mg. Your diabetes is not well controlled- Hemoglobin A1C was 8.5. It is important to have a stricter control of your diabetes to prevent future episode of stroke. Please make sure you check your fingerstick glucose, and take your insulin as prescribed.  Please follow up with neurovascular clinic in 2-4 weeks. Please follow Mediterranean diet, and maintain good hydration and drink plenty of fluids.    Please also follow up with your oncologist and let them know about your stroke.  We recommend your oncologist to do a hypercoagulable workup. Principal Discharge DX:	CVA (cerebral vascular accident)  Goal:	maintenance/Rehab resolution  Assessment and plan of treatment:	You had a stroke in one of the small arteries in your brain. It is important that we control your risk factors such as hypertension, diabetes, and dyslipidemia. If you haven't done so already, please quite smoking. Because of stroke, you have to start taking the following medications daily- aspirin 162mg, plavix 75mg, atorvastatin 80mg. Your diabetes is not well controlled- Hemoglobin A1C was 8.5. It is important to have a stricter control of your diabetes to prevent future episode of stroke. Please make sure you check your fingerstick glucose, and take your insulin as prescribed.  Please follow up with neurovascular clinic in 2-4 weeks. Please follow Mediterranean diet, and maintain good hydration and drink plenty of fluids.    Please also follow up with your oncologist to ensure your cancer is in remission.  Secondary Diagnosis:	Left carotid stenosis  Assessment and plan of treatment:	follow up with Dr. Asher or vascular surgeon for CEA or stenting due to severe stenosis Principal Discharge DX:	CVA (cerebral vascular accident)  Goal:	maintenance/Rehab resolution  Assessment and plan of treatment:	You had a stroke in one of the small arteries in your brain. It is important that we control your risk factors such as hypertension, diabetes, and dyslipidemia. If you haven't done so already, please quite smoking. Because of stroke, you have to start taking the following medications daily- aspirin 162mg, plavix 75mg, atorvastatin 80mg. Your diabetes is not well controlled- Hemoglobin A1C was 8.5. It is important to have a stricter control of your diabetes to prevent future episode of stroke. Please make sure you check your fingerstick glucose, and take your insulin as prescribed.  Please follow up with neurovascular clinic in 2-4 weeks. Please follow Mediterranean diet, and maintain good hydration and drink plenty of fluids.    Please also follow up with your oncologist to ensure your cancer is in remission.  Secondary Diagnosis:	Left carotid stenosis  Assessment and plan of treatment:	follow up with Dr. Asher or vascular surgeon for CEA or stenting due to severe stenosis  Secondary Diagnosis:	Liver lesion  Assessment and plan of treatment:	likely hemangioma, D/w PMD need for liver MRI. Principal Discharge DX:	CVA (cerebral vascular accident)  Goal:	maintenance/Rehab resolution  Assessment and plan of treatment:	You had a stroke in one of the small arteries in your brain. It is important that we control your risk factors such as hypertension, diabetes, and dyslipidemia. If you haven't done so already, please quite smoking. Because of stroke, you have to start taking the following medications daily- aspirin 162mg, plavix 75mg, atorvastatin 80mg. Your diabetes is not well controlled- Hemoglobin A1C was 8.5. It is important to have a stricter control of your diabetes to prevent future episode of stroke. Please make sure you check your fingerstick glucose, and take your insulin as prescribed.  Please follow up with neurovascular clinic in 2-4 weeks. Please follow Mediterranean diet, and maintain good hydration and drink plenty of fluids.    Please also follow up with your oncologist to ensure your cancer is in remission.  Secondary Diagnosis:	Left carotid stenosis  Assessment and plan of treatment:	follow up with Dr. Asher or vascular surgeon for CEA or stenting due to severe stenosis  Secondary Diagnosis:	Liver lesion  Assessment and plan of treatment:	likely hemangioma, discuss with PMD need for liver MRI.

## 2018-06-29 NOTE — DISCHARGE NOTE ADULT - PATIENT PORTAL LINK FT
You can access the [facility] Patient Portal, offered by [facility], by registering with the following website: [email or web address]

## 2018-06-30 LAB
APPEARANCE UR: (no result)
BACTERIA # UR AUTO: (no result) /HPF
BILIRUB UR-MCNC: NEGATIVE — SIGNIFICANT CHANGE UP
COLOR SPEC: YELLOW — SIGNIFICANT CHANGE UP
DIFF PNL FLD: (no result)
GLUCOSE UR QL: NEGATIVE MG/DL — SIGNIFICANT CHANGE UP
KETONES UR-MCNC: NEGATIVE — SIGNIFICANT CHANGE UP
LEUKOCYTE ESTERASE UR-ACNC: NEGATIVE — SIGNIFICANT CHANGE UP
NITRITE UR-MCNC: NEGATIVE — SIGNIFICANT CHANGE UP
PH UR: 8.5 — SIGNIFICANT CHANGE UP (ref 5–8)
PROT UR-MCNC: NEGATIVE MG/DL — SIGNIFICANT CHANGE UP
RBC CASTS # UR COMP ASSIST: SIGNIFICANT CHANGE UP /HPF
SP GR SPEC: 1.01 — SIGNIFICANT CHANGE UP (ref 1.01–1.03)
UROBILINOGEN FLD QL: 1 MG/DL (ref 0.2–0.2)

## 2018-06-30 RX ADMIN — SODIUM CHLORIDE 75 MILLILITER(S): 9 INJECTION INTRAMUSCULAR; INTRAVENOUS; SUBCUTANEOUS at 21:57

## 2018-06-30 RX ADMIN — ENOXAPARIN SODIUM 40 MILLIGRAM(S): 100 INJECTION SUBCUTANEOUS at 11:29

## 2018-06-30 RX ADMIN — POLYETHYLENE GLYCOL 3350 17 GRAM(S): 17 POWDER, FOR SOLUTION ORAL at 11:30

## 2018-06-30 RX ADMIN — Medication 7 UNIT(S): at 17:42

## 2018-06-30 RX ADMIN — Medication 7 UNIT(S): at 12:29

## 2018-06-30 RX ADMIN — SODIUM CHLORIDE 75 MILLILITER(S): 9 INJECTION INTRAMUSCULAR; INTRAVENOUS; SUBCUTANEOUS at 06:55

## 2018-06-30 RX ADMIN — Medication 650 MILLIGRAM(S): at 21:56

## 2018-06-30 RX ADMIN — Medication 100 MILLIGRAM(S): at 15:23

## 2018-06-30 RX ADMIN — Medication 162 MILLIGRAM(S): at 11:27

## 2018-06-30 RX ADMIN — Medication 7 UNIT(S): at 08:19

## 2018-06-30 RX ADMIN — ATORVASTATIN CALCIUM 80 MILLIGRAM(S): 80 TABLET, FILM COATED ORAL at 21:54

## 2018-06-30 RX ADMIN — SENNA PLUS 2 TABLET(S): 8.6 TABLET ORAL at 21:53

## 2018-06-30 RX ADMIN — LISINOPRIL 10 MILLIGRAM(S): 2.5 TABLET ORAL at 05:08

## 2018-06-30 RX ADMIN — Medication 100 MILLIGRAM(S): at 21:54

## 2018-06-30 RX ADMIN — Medication 100 MILLIGRAM(S): at 05:08

## 2018-06-30 RX ADMIN — PANTOPRAZOLE SODIUM 40 MILLIGRAM(S): 20 TABLET, DELAYED RELEASE ORAL at 21:54

## 2018-06-30 RX ADMIN — INSULIN GLARGINE 21 UNIT(S): 100 INJECTION, SOLUTION SUBCUTANEOUS at 21:54

## 2018-06-30 RX ADMIN — NORTRIPTYLINE HYDROCHLORIDE 25 MILLIGRAM(S): 10 CAPSULE ORAL at 21:54

## 2018-06-30 RX ADMIN — SERTRALINE 25 MILLIGRAM(S): 25 TABLET, FILM COATED ORAL at 11:27

## 2018-06-30 RX ADMIN — CLOPIDOGREL BISULFATE 75 MILLIGRAM(S): 75 TABLET, FILM COATED ORAL at 11:27

## 2018-06-30 NOTE — PROGRESS NOTE ADULT - SUBJECTIVE AND OBJECTIVE BOX
SUBJECTIVE:    Patient is a 78y old Female who presents with a chief complaint of left sided weakness and numbness x 1 day (29 Jun 2018 13:35)    Currently admitted to medicine with the primary diagnosis of CVA (cerebral vascular accident)     Today is hospital day 4d.   OVERNIGHT EVENTS  Today, patient is    PAST MEDICAL & SURGICAL HISTORY  Bilateral hearing loss, unspecified hearing loss type  High cholesterol  Depression  BP (high blood pressure)  Breast CA  DM (diabetes mellitus)  History of cholecystectomy: 1992  History of lumpectomy of right breast  H/O total knee replacement, right          ALLERGIES:  latex (Unknown)  penicillins (Unknown)  shellfish (Unknown)    MEDICATIONS:  STANDING MEDICATIONS  aspirin enteric coated 162 milliGRAM(s) Oral daily  atorvastatin 80 milliGRAM(s) Oral at bedtime  clopidogrel Tablet 75 milliGRAM(s) Oral daily  dextrose 5%. 1000 milliLiter(s) IV Continuous <Continuous>  dextrose 50% Injectable 12.5 Gram(s) IV Push once  dextrose 50% Injectable 25 Gram(s) IV Push once  dextrose 50% Injectable 25 Gram(s) IV Push once  docusate sodium 100 milliGRAM(s) Oral three times a day  enoxaparin Injectable 40 milliGRAM(s) SubCutaneous daily  exemestane 25 milliGRAM(s) Oral daily  insulin glargine Injectable (LANTUS) 21 Unit(s) SubCutaneous at bedtime  insulin lispro (HumaLOG) corrective regimen sliding scale   SubCutaneous three times a day before meals  insulin lispro Injectable (HumaLOG) 7 Unit(s) SubCutaneous three times a day before meals  lisinopril 10 milliGRAM(s) Oral daily  nortriptyline 25 milliGRAM(s) Oral at bedtime  pantoprazole    Tablet 40 milliGRAM(s) Oral at bedtime  polyethylene glycol 3350 17 Gram(s) Oral daily  senna 2 Tablet(s) Oral at bedtime  sertraline 25 milliGRAM(s) Oral daily  sodium chloride 0.9%. 1000 milliLiter(s) IV Continuous <Continuous>    PRN MEDICATIONS  acetaminophen   Tablet. 650 milliGRAM(s) Oral every 6 hours PRN  dextrose 40% Gel 15 Gram(s) Oral once PRN  glucagon  Injectable 1 milliGRAM(s) IntraMuscular once PRN  LORazepam   Injectable 1 milliGRAM(s) IntraMuscular once PRN  sodium chloride 0.65% Nasal 1 Spray(s) Both Nostrils four times a day PRN    VITALS:   T(F): 97.8  HR: 75  BP: 121/63  RR: 19  SpO2: 100%          LABS:                        RADIOLOGY:    PHYSICAL EXAM:  GEN:   LUNGS:   HEART:   ABD:   EXT:   NEURO:

## 2018-06-30 NOTE — PROGRESS NOTE ADULT - ASSESSMENT
#Acute ischemic stroke s/p TPA  - Started on , plavix 75 (will continue for 3 months), statin 80.  for 4a today    Severe Lt carotid stenosis  -f/u with vascular or neurointerventional    #HTN  - c/w lisinopril    #DM   - monitor FS  - insulin protocol

## 2018-06-30 NOTE — PROGRESS NOTE ADULT - SUBJECTIVE AND OBJECTIVE BOX
Patient is a 78y old  Female who presents with a chief complaint of left sided weakness and numbness x 1 day     HPI:  77 y/o female with PMHx of HTN, DLD, DM II, depression, and breast CA s/p lumpectomy p/w left sided weakness and numbness x 1 day. patient started to feel left side weakness and numbness in the evening 1 day prior to admission. patient came to the ED where she had nausea and vomiting. CTH was done with no evidence of acute intracranial pathology. TPA was offered to the patient and she agreed. patient was admitted to ICU for acute CVA. patient denies LOC, seizure and headache. patient denies fever, COB, CP, cough, n/v/d, bowel and urinary symptoms. (26 Jun 2018 10:44)    today she is feeling well. no pain, weakness has improved, no new cc.     Vital Signs Last 24 Hrs  T(C): 36.3 (30 Jun 2018 08:07), Max: 36.6 (29 Jun 2018 23:57)  T(F): 97.3 (30 Jun 2018 08:07), Max: 97.8 (29 Jun 2018 23:57)  HR: 82 (30 Jun 2018 08:07) (75 - 82)  BP: 153/81 (30 Jun 2018 08:07) (119/59 - 153/81)  BP(mean): --  RR: 18 (30 Jun 2018 08:07) (18 - 19)  SpO2: 100% (29 Jun 2018 14:42) (100% - 100%)    physical exam is significant of left side weakness.     labs and imaging reviewed.     a/p

## 2018-06-30 NOTE — PROGRESS NOTE ADULT - SUBJECTIVE AND OBJECTIVE BOX
SUBJECTIVE:    Patient is a 78y old Female who presents with a chief complaint of left sided weakness and numbness x 1 day (29 Jun 2018 13:35)    Currently admitted to medicine with the primary diagnosis of CVA (cerebral vascular accident)     Today is hospital day 4d.   OVERNIGHT EVENTS  Today, patient is lying comfortably in bed. Denies CP, SOB, n/v. Complains of constipation.     PAST MEDICAL & SURGICAL HISTORY  Bilateral hearing loss, unspecified hearing loss type  High cholesterol  Depression  BP (high blood pressure)  Breast CA  DM (diabetes mellitus)  History of cholecystectomy: 1992  History of lumpectomy of right breast  H/O total knee replacement, right          ALLERGIES:  latex (Unknown)  penicillins (Unknown)  shellfish (Unknown)    MEDICATIONS:  STANDING MEDICATIONS  aspirin enteric coated 162 milliGRAM(s) Oral daily  atorvastatin 80 milliGRAM(s) Oral at bedtime  clopidogrel Tablet 75 milliGRAM(s) Oral daily  dextrose 5%. 1000 milliLiter(s) IV Continuous <Continuous>  dextrose 50% Injectable 12.5 Gram(s) IV Push once  dextrose 50% Injectable 25 Gram(s) IV Push once  dextrose 50% Injectable 25 Gram(s) IV Push once  docusate sodium 100 milliGRAM(s) Oral three times a day  enoxaparin Injectable 40 milliGRAM(s) SubCutaneous daily  exemestane 25 milliGRAM(s) Oral daily  insulin glargine Injectable (LANTUS) 21 Unit(s) SubCutaneous at bedtime  insulin lispro (HumaLOG) corrective regimen sliding scale   SubCutaneous three times a day before meals  insulin lispro Injectable (HumaLOG) 7 Unit(s) SubCutaneous three times a day before meals  lisinopril 10 milliGRAM(s) Oral daily  nortriptyline 25 milliGRAM(s) Oral at bedtime  pantoprazole    Tablet 40 milliGRAM(s) Oral at bedtime  polyethylene glycol 3350 17 Gram(s) Oral daily  senna 2 Tablet(s) Oral at bedtime  sertraline 25 milliGRAM(s) Oral daily  sodium chloride 0.9%. 1000 milliLiter(s) IV Continuous <Continuous>    PRN MEDICATIONS  acetaminophen   Tablet. 650 milliGRAM(s) Oral every 6 hours PRN  bisacodyl Suppository 10 milliGRAM(s) Rectal once PRN  dextrose 40% Gel 15 Gram(s) Oral once PRN  glucagon  Injectable 1 milliGRAM(s) IntraMuscular once PRN  LORazepam   Injectable 1 milliGRAM(s) IntraMuscular once PRN  sodium chloride 0.65% Nasal 1 Spray(s) Both Nostrils four times a day PRN    VITALS:   T(F): 97.3  HR: 82  BP: 153/81  RR: 18  SpO2: 100%          PHYSICAL EXAM:  GEN: NAD  LUNGS: CTAB  HEART: RRR s1s2 present  ABD: soft nontender nondistended  EXT: no edema   NEURO: aao x3

## 2018-06-30 NOTE — PROGRESS NOTE ADULT - ASSESSMENT
78 year old woman with a past medical history of HTN, DLD, DM II, depression, and breast CA, presented with left sided weakness and numbness. Initially presented to [facility], transferred to [place] for Neuro vascular eval in the [place] by Dr. Sol for the carotid stenosis. MRI brain which shows acute ischemic change in the lower left medulla preliminarily. Also found to have left ICA stenosis approximately 70%. Etiology of suspected stroke is likely due to small vessel disease evidenced by small vessel territory and elevated HGBA1C and LDL.     #Acute ischemic stroke s/p TPA  - c/w , plavix 75 (will continue for 3 months), statin 80.   - PT/OT rehab  - c/w gi and dvt ppx  - pt will f/u with her oncologist as outpatient.  - Maintain hydration, avoid dehydration and hypotension  - Mediterranean diet  - Rehab in 4A and follow up with the neurovascular clinic in 2-4 weeks.     #Severe Lt carotid stenosis  -f/u with vascular or neurointerventional    #HTN  - c/w lisinopril    #DM   - monitor FS  - insulin protocol    #constipation  - dulcolax 10mg supp PRN     from home  full code

## 2018-07-01 RX ADMIN — SENNA PLUS 2 TABLET(S): 8.6 TABLET ORAL at 21:03

## 2018-07-01 RX ADMIN — Medication 100 MILLIGRAM(S): at 06:18

## 2018-07-01 RX ADMIN — ATORVASTATIN CALCIUM 80 MILLIGRAM(S): 80 TABLET, FILM COATED ORAL at 21:04

## 2018-07-01 RX ADMIN — Medication 650 MILLIGRAM(S): at 23:37

## 2018-07-01 RX ADMIN — Medication 100 MILLIGRAM(S): at 21:04

## 2018-07-01 RX ADMIN — Medication 100 MILLIGRAM(S): at 14:49

## 2018-07-01 RX ADMIN — PANTOPRAZOLE SODIUM 40 MILLIGRAM(S): 20 TABLET, DELAYED RELEASE ORAL at 21:04

## 2018-07-01 RX ADMIN — Medication 7 UNIT(S): at 12:29

## 2018-07-01 RX ADMIN — INSULIN GLARGINE 21 UNIT(S): 100 INJECTION, SOLUTION SUBCUTANEOUS at 21:04

## 2018-07-01 RX ADMIN — Medication 7 UNIT(S): at 09:05

## 2018-07-01 RX ADMIN — Medication 7 UNIT(S): at 12:31

## 2018-07-01 RX ADMIN — Medication 650 MILLIGRAM(S): at 21:03

## 2018-07-01 RX ADMIN — NORTRIPTYLINE HYDROCHLORIDE 25 MILLIGRAM(S): 10 CAPSULE ORAL at 21:04

## 2018-07-01 RX ADMIN — SERTRALINE 25 MILLIGRAM(S): 25 TABLET, FILM COATED ORAL at 12:04

## 2018-07-01 RX ADMIN — ENOXAPARIN SODIUM 40 MILLIGRAM(S): 100 INJECTION SUBCUTANEOUS at 12:15

## 2018-07-01 RX ADMIN — CLOPIDOGREL BISULFATE 75 MILLIGRAM(S): 75 TABLET, FILM COATED ORAL at 12:05

## 2018-07-01 RX ADMIN — LISINOPRIL 10 MILLIGRAM(S): 2.5 TABLET ORAL at 06:18

## 2018-07-01 RX ADMIN — Medication 650 MILLIGRAM(S): at 21:33

## 2018-07-01 RX ADMIN — Medication 162 MILLIGRAM(S): at 12:04

## 2018-07-01 RX ADMIN — POLYETHYLENE GLYCOL 3350 17 GRAM(S): 17 POWDER, FOR SOLUTION ORAL at 14:49

## 2018-07-01 NOTE — PROGRESS NOTE ADULT - ASSESSMENT
78 year old woman with a past medical history of HTN, DLD, DM II, depression, and breast CA, presented with left sided weakness and numbness. Initially presented to [facility], transferred to [place] for Neuro vascular eval in the [place] by Dr. Sol for the carotid stenosis. MRI brain which shows acute ischemic change in the lower left medulla preliminarily. Also found to have left ICA stenosis approximately 70%.     1-Acute ischemic stroke s/p TPA  - c/w , plavix 75 (will continue for 3 months), statin 80.   - PT/OT rehab    2-Severe Lt carotid stenosis  -f/u with vascular or neurointerventional    3-HTN- c/w lisinopril    4-DM - monitor FS, insulin protocol    5-constipation:  dulcolax 10mg supp PRN  6-DVT, GI Prophyalxis  Dipsosition: STR Vs 4A    Pager No. [phone]

## 2018-07-01 NOTE — PROGRESS NOTE ADULT - SUBJECTIVE AND OBJECTIVE BOX
Chief Complaint:  Patient is a 78y old  Female who presents with a chief complaint of left sided weakness and numbness x 1 day (2018 13:35)      Interval Events:     Allergies:  latex (Unknown)  penicillins (Unknown)  shellfish (Unknown)      Home Medications:    Hospital Medications:  acetaminophen   Tablet. 650 milliGRAM(s) Oral every 6 hours PRN  aspirin enteric coated 162 milliGRAM(s) Oral daily  atorvastatin 80 milliGRAM(s) Oral at bedtime  bisacodyl Suppository 10 milliGRAM(s) Rectal once PRN  clopidogrel Tablet 75 milliGRAM(s) Oral daily  dextrose 40% Gel 15 Gram(s) Oral once PRN  dextrose 5%. 1000 milliLiter(s) IV Continuous <Continuous>  dextrose 50% Injectable 12.5 Gram(s) IV Push once  dextrose 50% Injectable 25 Gram(s) IV Push once  dextrose 50% Injectable 25 Gram(s) IV Push once  docusate sodium 100 milliGRAM(s) Oral three times a day  enoxaparin Injectable 40 milliGRAM(s) SubCutaneous daily  exemestane 25 milliGRAM(s) Oral daily  glucagon  Injectable 1 milliGRAM(s) IntraMuscular once PRN  insulin glargine Injectable (LANTUS) 21 Unit(s) SubCutaneous at bedtime  insulin lispro (HumaLOG) corrective regimen sliding scale   SubCutaneous three times a day before meals  insulin lispro Injectable (HumaLOG) 7 Unit(s) SubCutaneous three times a day before meals  lisinopril 10 milliGRAM(s) Oral daily  LORazepam   Injectable 1 milliGRAM(s) IntraMuscular once PRN  nortriptyline 25 milliGRAM(s) Oral at bedtime  pantoprazole    Tablet 40 milliGRAM(s) Oral at bedtime  polyethylene glycol 3350 17 Gram(s) Oral daily  senna 2 Tablet(s) Oral at bedtime  sertraline 25 milliGRAM(s) Oral daily  sodium chloride 0.65% Nasal 1 Spray(s) Both Nostrils four times a day PRN  sodium chloride 0.9%. 1000 milliLiter(s) IV Continuous <Continuous>      PMHX/PSHX:  Bilateral hearing loss, unspecified hearing loss type  High cholesterol  Depression  BP (high blood pressure)  Breast CA  DM (diabetes mellitus)  History of cholecystectomy  History of lumpectomy of right breast  H/O total knee replacement, right      Family history:      ROS:   As mentioned below      PHYSICAL EXAM:   Vital Signs:  Vital Signs Last 24 Hrs  T(C): 36.8 (2018 16:28), Max: 36.8 (2018 23:58)  T(F): 98.2 (2018 16:28), Max: 98.2 (2018 23:58)  HR: 90 (2018 16:28) (63 - 93)  BP: 151/74 (2018 16:28) (89/51 - 168/77)  BP(mean): --  RR: 20 (2018 16:28) (18 - 20)  SpO2: 94% (2018 16:28) (94% - 94%)  Daily     Daily     GENERAL:  NAD  HEENT:  NC/AT,  No Thyromegaly  CHEST:  CTA B/L  HEART:  S1, S2- No M, R, G  ABDOMEN:  Soft, NT, ND  EXTEREMITIES:  no cyanosis,clubbing or edema  SKIN:  NAD  NEURO:  Grossly Nr.    LABS:              Urinalysis Basic - ( 2018 20:50 )    Color: Yellow / Appearance: Cloudy / S.010 / pH: x  Gluc: x / Ketone: Negative  / Bili: Negative / Urobili: 1.0 mg/dL   Blood: x / Protein: Negative mg/dL / Nitrite: Negative   Leuk Esterase: Negative / RBC: 1-2 /HPF / WBC x   Sq Epi: x / Non Sq Epi: x / Bacteria: Many /HPF          Imaging:

## 2018-07-02 ENCOUNTER — INPATIENT (INPATIENT)
Facility: HOSPITAL | Age: 78
LOS: 23 days | Discharge: NURSING, REHAB OR LONG-TERM CARE | End: 2018-07-26
Attending: PHYSICAL MEDICINE & REHABILITATION | Admitting: PHYSICAL MEDICINE & REHABILITATION
Payer: MEDICARE

## 2018-07-02 VITALS
RESPIRATION RATE: 17 BRPM | HEART RATE: 84 BPM | DIASTOLIC BLOOD PRESSURE: 83 MMHG | SYSTOLIC BLOOD PRESSURE: 147 MMHG | TEMPERATURE: 98 F

## 2018-07-02 DIAGNOSIS — Z90.49 ACQUIRED ABSENCE OF OTHER SPECIFIED PARTS OF DIGESTIVE TRACT: Chronic | ICD-10-CM

## 2018-07-02 DIAGNOSIS — Z96.651 PRESENCE OF RIGHT ARTIFICIAL KNEE JOINT: Chronic | ICD-10-CM

## 2018-07-02 DIAGNOSIS — Z98.890 OTHER SPECIFIED POSTPROCEDURAL STATES: Chronic | ICD-10-CM

## 2018-07-02 LAB
CULTURE RESULTS: SIGNIFICANT CHANGE UP
SPECIMEN SOURCE: SIGNIFICANT CHANGE UP

## 2018-07-02 RX ORDER — SERTRALINE 25 MG/1
25 TABLET, FILM COATED ORAL DAILY
Refills: 0 | Status: DISCONTINUED | OUTPATIENT
Start: 2018-07-02 | End: 2018-07-06

## 2018-07-02 RX ORDER — INSULIN GLARGINE 100 [IU]/ML
21 INJECTION, SOLUTION SUBCUTANEOUS AT BEDTIME
Refills: 0 | Status: DISCONTINUED | OUTPATIENT
Start: 2018-07-02 | End: 2018-07-14

## 2018-07-02 RX ORDER — DEXTROSE 50 % IN WATER 50 %
25 SYRINGE (ML) INTRAVENOUS ONCE
Refills: 0 | Status: DISCONTINUED | OUTPATIENT
Start: 2018-07-02 | End: 2018-07-26

## 2018-07-02 RX ORDER — INSULIN LISPRO 100/ML
7 VIAL (ML) SUBCUTANEOUS
Refills: 0 | Status: DISCONTINUED | OUTPATIENT
Start: 2018-07-02 | End: 2018-07-26

## 2018-07-02 RX ORDER — SODIUM CHLORIDE 0.65 %
1 AEROSOL, SPRAY (ML) NASAL
Refills: 0 | Status: DISCONTINUED | OUTPATIENT
Start: 2018-07-02 | End: 2018-07-26

## 2018-07-02 RX ORDER — SENNA PLUS 8.6 MG/1
2 TABLET ORAL AT BEDTIME
Refills: 0 | Status: DISCONTINUED | OUTPATIENT
Start: 2018-07-02 | End: 2018-07-26

## 2018-07-02 RX ORDER — INSULIN LISPRO 100/ML
VIAL (ML) SUBCUTANEOUS
Refills: 0 | Status: DISCONTINUED | OUTPATIENT
Start: 2018-07-02 | End: 2018-07-26

## 2018-07-02 RX ORDER — PANTOPRAZOLE SODIUM 20 MG/1
40 TABLET, DELAYED RELEASE ORAL
Refills: 0 | Status: DISCONTINUED | OUTPATIENT
Start: 2018-07-02 | End: 2018-07-26

## 2018-07-02 RX ORDER — SODIUM CHLORIDE 9 MG/ML
1000 INJECTION, SOLUTION INTRAVENOUS
Refills: 0 | Status: DISCONTINUED | OUTPATIENT
Start: 2018-07-02 | End: 2018-07-15

## 2018-07-02 RX ORDER — ASPIRIN/CALCIUM CARB/MAGNESIUM 324 MG
162 TABLET ORAL DAILY
Refills: 0 | Status: DISCONTINUED | OUTPATIENT
Start: 2018-07-02 | End: 2018-07-26

## 2018-07-02 RX ORDER — POLYETHYLENE GLYCOL 3350 17 G/17G
17 POWDER, FOR SOLUTION ORAL DAILY
Refills: 0 | Status: DISCONTINUED | OUTPATIENT
Start: 2018-07-02 | End: 2018-07-26

## 2018-07-02 RX ORDER — ACETAMINOPHEN 500 MG
650 TABLET ORAL EVERY 4 HOURS
Refills: 0 | Status: DISCONTINUED | OUTPATIENT
Start: 2018-07-02 | End: 2018-07-05

## 2018-07-02 RX ORDER — MAGNESIUM HYDROXIDE 400 MG/1
30 TABLET, CHEWABLE ORAL DAILY
Refills: 0 | Status: DISCONTINUED | OUTPATIENT
Start: 2018-07-02 | End: 2018-07-26

## 2018-07-02 RX ORDER — ENOXAPARIN SODIUM 100 MG/ML
40 INJECTION SUBCUTANEOUS DAILY
Refills: 0 | Status: DISCONTINUED | OUTPATIENT
Start: 2018-07-02 | End: 2018-07-26

## 2018-07-02 RX ORDER — NORTRIPTYLINE HYDROCHLORIDE 10 MG/1
25 CAPSULE ORAL AT BEDTIME
Refills: 0 | Status: DISCONTINUED | OUTPATIENT
Start: 2018-07-02 | End: 2018-07-26

## 2018-07-02 RX ORDER — DEXTROSE 50 % IN WATER 50 %
15 SYRINGE (ML) INTRAVENOUS ONCE
Refills: 0 | Status: DISCONTINUED | OUTPATIENT
Start: 2018-07-02 | End: 2018-07-26

## 2018-07-02 RX ORDER — CLOPIDOGREL BISULFATE 75 MG/1
75 TABLET, FILM COATED ORAL DAILY
Refills: 0 | Status: DISCONTINUED | OUTPATIENT
Start: 2018-07-02 | End: 2018-07-26

## 2018-07-02 RX ORDER — EXEMESTANE 25 MG/1
25 TABLET, SUGAR COATED ORAL DAILY
Refills: 0 | Status: DISCONTINUED | OUTPATIENT
Start: 2018-07-02 | End: 2018-07-26

## 2018-07-02 RX ORDER — ATORVASTATIN CALCIUM 80 MG/1
80 TABLET, FILM COATED ORAL AT BEDTIME
Refills: 0 | Status: DISCONTINUED | OUTPATIENT
Start: 2018-07-02 | End: 2018-07-26

## 2018-07-02 RX ORDER — DEXTROSE 50 % IN WATER 50 %
12.5 SYRINGE (ML) INTRAVENOUS ONCE
Refills: 0 | Status: DISCONTINUED | OUTPATIENT
Start: 2018-07-02 | End: 2018-07-26

## 2018-07-02 RX ORDER — GLUCAGON INJECTION, SOLUTION 0.5 MG/.1ML
1 INJECTION, SOLUTION SUBCUTANEOUS ONCE
Refills: 0 | Status: DISCONTINUED | OUTPATIENT
Start: 2018-07-02 | End: 2018-07-26

## 2018-07-02 RX ORDER — DOCUSATE SODIUM 100 MG
100 CAPSULE ORAL THREE TIMES A DAY
Refills: 0 | Status: DISCONTINUED | OUTPATIENT
Start: 2018-07-02 | End: 2018-07-10

## 2018-07-02 RX ORDER — LISINOPRIL 2.5 MG/1
10 TABLET ORAL DAILY
Refills: 0 | Status: DISCONTINUED | OUTPATIENT
Start: 2018-07-02 | End: 2018-07-26

## 2018-07-02 RX ADMIN — POLYETHYLENE GLYCOL 3350 17 GRAM(S): 17 POWDER, FOR SOLUTION ORAL at 12:40

## 2018-07-02 RX ADMIN — SENNA PLUS 2 TABLET(S): 8.6 TABLET ORAL at 21:10

## 2018-07-02 RX ADMIN — CLOPIDOGREL BISULFATE 75 MILLIGRAM(S): 75 TABLET, FILM COATED ORAL at 12:39

## 2018-07-02 RX ADMIN — Medication 650 MILLIGRAM(S): at 22:41

## 2018-07-02 RX ADMIN — Medication 100 MILLIGRAM(S): at 12:41

## 2018-07-02 RX ADMIN — SERTRALINE 25 MILLIGRAM(S): 25 TABLET, FILM COATED ORAL at 12:39

## 2018-07-02 RX ADMIN — ATORVASTATIN CALCIUM 80 MILLIGRAM(S): 80 TABLET, FILM COATED ORAL at 21:10

## 2018-07-02 RX ADMIN — LISINOPRIL 10 MILLIGRAM(S): 2.5 TABLET ORAL at 05:40

## 2018-07-02 RX ADMIN — Medication 1 SPRAY(S): at 18:42

## 2018-07-02 RX ADMIN — ENOXAPARIN SODIUM 40 MILLIGRAM(S): 100 INJECTION SUBCUTANEOUS at 12:39

## 2018-07-02 RX ADMIN — Medication 2: at 12:38

## 2018-07-02 RX ADMIN — Medication 7 UNIT(S): at 12:38

## 2018-07-02 RX ADMIN — Medication 162 MILLIGRAM(S): at 17:59

## 2018-07-02 RX ADMIN — INSULIN GLARGINE 21 UNIT(S): 100 INJECTION, SOLUTION SUBCUTANEOUS at 22:27

## 2018-07-02 RX ADMIN — Medication 100 MILLIGRAM(S): at 05:40

## 2018-07-02 RX ADMIN — Medication 100 MILLIGRAM(S): at 21:10

## 2018-07-02 RX ADMIN — NORTRIPTYLINE HYDROCHLORIDE 25 MILLIGRAM(S): 10 CAPSULE ORAL at 21:10

## 2018-07-02 NOTE — PROGRESS NOTE ADULT - PROVIDER SPECIALTY LIST ADULT
Internal Medicine
Internal Medicine
Neurology
Pulmonology
Hospitalist
Internal Medicine
Neurology
Physiatry
Hospitalist
Internal Medicine
Pulmonology
Hospitalist
Hospitalist

## 2018-07-02 NOTE — PROGRESS NOTE ADULT - SUBJECTIVE AND OBJECTIVE BOX
Pt seen and examined independently. No new complaints. Feeling better.    Pt verbalized understanding of the instructions.    Time spent in completing discharge process and coordinating care 35 minutes.

## 2018-07-03 LAB
ALBUMIN SERPL ELPH-MCNC: 3.8 G/DL — SIGNIFICANT CHANGE UP (ref 3.5–5.2)
ALP SERPL-CCNC: 111 U/L — SIGNIFICANT CHANGE UP (ref 30–115)
ALT FLD-CCNC: 35 U/L — SIGNIFICANT CHANGE UP (ref 0–41)
ANION GAP SERPL CALC-SCNC: 18 MMOL/L — HIGH (ref 7–14)
AST SERPL-CCNC: 48 U/L — HIGH (ref 0–41)
BASOPHILS # BLD AUTO: 0.04 K/UL — SIGNIFICANT CHANGE UP (ref 0–0.2)
BASOPHILS NFR BLD AUTO: 0.6 % — SIGNIFICANT CHANGE UP (ref 0–1)
BILIRUB SERPL-MCNC: 0.8 MG/DL — SIGNIFICANT CHANGE UP (ref 0.2–1.2)
BUN SERPL-MCNC: 14 MG/DL — SIGNIFICANT CHANGE UP (ref 10–20)
CALCIUM SERPL-MCNC: 8.8 MG/DL — SIGNIFICANT CHANGE UP (ref 8.5–10.1)
CHLORIDE SERPL-SCNC: 103 MMOL/L — SIGNIFICANT CHANGE UP (ref 98–110)
CO2 SERPL-SCNC: 19 MMOL/L — SIGNIFICANT CHANGE UP (ref 17–32)
CREAT SERPL-MCNC: 0.7 MG/DL — SIGNIFICANT CHANGE UP (ref 0.7–1.5)
EOSINOPHIL # BLD AUTO: 0.15 K/UL — SIGNIFICANT CHANGE UP (ref 0–0.7)
EOSINOPHIL NFR BLD AUTO: 2.4 % — SIGNIFICANT CHANGE UP (ref 0–8)
GLUCOSE SERPL-MCNC: 153 MG/DL — HIGH (ref 70–99)
HCT VFR BLD CALC: 36.1 % — LOW (ref 37–47)
HGB BLD-MCNC: 11.8 G/DL — LOW (ref 12–16)
IMM GRANULOCYTES NFR BLD AUTO: 0.5 % — HIGH (ref 0.1–0.3)
LYMPHOCYTES # BLD AUTO: 0.91 K/UL — LOW (ref 1.2–3.4)
LYMPHOCYTES # BLD AUTO: 14.4 % — LOW (ref 20.5–51.1)
MAGNESIUM SERPL-MCNC: 1.9 MG/DL — SIGNIFICANT CHANGE UP (ref 1.8–2.4)
MCHC RBC-ENTMCNC: 28.8 PG — SIGNIFICANT CHANGE UP (ref 27–31)
MCHC RBC-ENTMCNC: 32.7 G/DL — SIGNIFICANT CHANGE UP (ref 32–37)
MCV RBC AUTO: 88 FL — SIGNIFICANT CHANGE UP (ref 81–99)
MONOCYTES # BLD AUTO: 0.68 K/UL — HIGH (ref 0.1–0.6)
MONOCYTES NFR BLD AUTO: 10.8 % — HIGH (ref 1.7–9.3)
NEUTROPHILS # BLD AUTO: 4.49 K/UL — SIGNIFICANT CHANGE UP (ref 1.4–6.5)
NEUTROPHILS NFR BLD AUTO: 71.3 % — SIGNIFICANT CHANGE UP (ref 42.2–75.2)
NRBC # BLD: 0 /100 WBCS — SIGNIFICANT CHANGE UP (ref 0–0)
PLATELET # BLD AUTO: 240 K/UL — SIGNIFICANT CHANGE UP (ref 130–400)
POTASSIUM SERPL-MCNC: 4.6 MMOL/L — SIGNIFICANT CHANGE UP (ref 3.5–5)
POTASSIUM SERPL-SCNC: 4.6 MMOL/L — SIGNIFICANT CHANGE UP (ref 3.5–5)
PROT SERPL-MCNC: 6.2 G/DL — SIGNIFICANT CHANGE UP (ref 6–8)
RBC # BLD: 4.1 M/UL — LOW (ref 4.2–5.4)
RBC # FLD: 13.4 % — SIGNIFICANT CHANGE UP (ref 11.5–14.5)
SODIUM SERPL-SCNC: 140 MMOL/L — SIGNIFICANT CHANGE UP (ref 135–146)
WBC # BLD: 6.3 K/UL — SIGNIFICANT CHANGE UP (ref 4.8–10.8)
WBC # FLD AUTO: 6.3 K/UL — SIGNIFICANT CHANGE UP (ref 4.8–10.8)

## 2018-07-03 RX ADMIN — Medication 100 MILLIGRAM(S): at 05:06

## 2018-07-03 RX ADMIN — Medication 100 MILLIGRAM(S): at 17:15

## 2018-07-03 RX ADMIN — CLOPIDOGREL BISULFATE 75 MILLIGRAM(S): 75 TABLET, FILM COATED ORAL at 12:12

## 2018-07-03 RX ADMIN — EXEMESTANE 25 MILLIGRAM(S): 25 TABLET, SUGAR COATED ORAL at 12:14

## 2018-07-03 RX ADMIN — POLYETHYLENE GLYCOL 3350 17 GRAM(S): 17 POWDER, FOR SOLUTION ORAL at 12:13

## 2018-07-03 RX ADMIN — NORTRIPTYLINE HYDROCHLORIDE 25 MILLIGRAM(S): 10 CAPSULE ORAL at 21:17

## 2018-07-03 RX ADMIN — Medication 650 MILLIGRAM(S): at 04:21

## 2018-07-03 RX ADMIN — Medication 650 MILLIGRAM(S): at 03:28

## 2018-07-03 RX ADMIN — Medication 650 MILLIGRAM(S): at 00:42

## 2018-07-03 RX ADMIN — LISINOPRIL 10 MILLIGRAM(S): 2.5 TABLET ORAL at 05:07

## 2018-07-03 RX ADMIN — Medication 7 UNIT(S): at 17:16

## 2018-07-03 RX ADMIN — SENNA PLUS 2 TABLET(S): 8.6 TABLET ORAL at 21:16

## 2018-07-03 RX ADMIN — Medication 1 SPRAY(S): at 05:07

## 2018-07-03 RX ADMIN — ATORVASTATIN CALCIUM 80 MILLIGRAM(S): 80 TABLET, FILM COATED ORAL at 21:16

## 2018-07-03 RX ADMIN — Medication 1 SPRAY(S): at 17:17

## 2018-07-03 RX ADMIN — ENOXAPARIN SODIUM 40 MILLIGRAM(S): 100 INJECTION SUBCUTANEOUS at 12:12

## 2018-07-03 RX ADMIN — Medication 7 UNIT(S): at 12:21

## 2018-07-03 RX ADMIN — Medication 100 MILLIGRAM(S): at 21:16

## 2018-07-03 RX ADMIN — PANTOPRAZOLE SODIUM 40 MILLIGRAM(S): 20 TABLET, DELAYED RELEASE ORAL at 05:07

## 2018-07-03 RX ADMIN — Medication 1: at 12:22

## 2018-07-03 RX ADMIN — Medication 162 MILLIGRAM(S): at 12:12

## 2018-07-03 RX ADMIN — Medication 30 MILLILITER(S): at 21:20

## 2018-07-03 RX ADMIN — SERTRALINE 25 MILLIGRAM(S): 25 TABLET, FILM COATED ORAL at 12:12

## 2018-07-03 RX ADMIN — INSULIN GLARGINE 21 UNIT(S): 100 INJECTION, SOLUTION SUBCUTANEOUS at 23:28

## 2018-07-04 RX ORDER — HALOPERIDOL DECANOATE 100 MG/ML
2 INJECTION INTRAMUSCULAR ONCE
Refills: 0 | Status: COMPLETED | OUTPATIENT
Start: 2018-07-04 | End: 2018-07-04

## 2018-07-04 RX ADMIN — Medication 1: at 11:58

## 2018-07-04 RX ADMIN — Medication 1 SPRAY(S): at 06:49

## 2018-07-04 RX ADMIN — ENOXAPARIN SODIUM 40 MILLIGRAM(S): 100 INJECTION SUBCUTANEOUS at 11:56

## 2018-07-04 RX ADMIN — CLOPIDOGREL BISULFATE 75 MILLIGRAM(S): 75 TABLET, FILM COATED ORAL at 11:56

## 2018-07-04 RX ADMIN — Medication 650 MILLIGRAM(S): at 04:23

## 2018-07-04 RX ADMIN — Medication 100 MILLIGRAM(S): at 06:49

## 2018-07-04 RX ADMIN — SENNA PLUS 2 TABLET(S): 8.6 TABLET ORAL at 21:17

## 2018-07-04 RX ADMIN — Medication 7 UNIT(S): at 11:57

## 2018-07-04 RX ADMIN — PANTOPRAZOLE SODIUM 40 MILLIGRAM(S): 20 TABLET, DELAYED RELEASE ORAL at 06:49

## 2018-07-04 RX ADMIN — NORTRIPTYLINE HYDROCHLORIDE 25 MILLIGRAM(S): 10 CAPSULE ORAL at 21:17

## 2018-07-04 RX ADMIN — LISINOPRIL 10 MILLIGRAM(S): 2.5 TABLET ORAL at 06:49

## 2018-07-04 RX ADMIN — ATORVASTATIN CALCIUM 80 MILLIGRAM(S): 80 TABLET, FILM COATED ORAL at 21:17

## 2018-07-04 RX ADMIN — Medication 1 SPRAY(S): at 17:35

## 2018-07-04 RX ADMIN — INSULIN GLARGINE 21 UNIT(S): 100 INJECTION, SOLUTION SUBCUTANEOUS at 23:20

## 2018-07-04 RX ADMIN — HALOPERIDOL DECANOATE 2 MILLIGRAM(S): 100 INJECTION INTRAMUSCULAR at 23:59

## 2018-07-04 RX ADMIN — Medication 162 MILLIGRAM(S): at 11:56

## 2018-07-04 RX ADMIN — EXEMESTANE 25 MILLIGRAM(S): 25 TABLET, SUGAR COATED ORAL at 11:57

## 2018-07-04 RX ADMIN — Medication 7 UNIT(S): at 17:34

## 2018-07-04 RX ADMIN — Medication 100 MILLIGRAM(S): at 11:57

## 2018-07-04 RX ADMIN — Medication 7 UNIT(S): at 08:02

## 2018-07-04 RX ADMIN — Medication 100 MILLIGRAM(S): at 21:17

## 2018-07-04 RX ADMIN — SERTRALINE 25 MILLIGRAM(S): 25 TABLET, FILM COATED ORAL at 11:56

## 2018-07-05 RX ORDER — LACTULOSE 10 G/15ML
20 SOLUTION ORAL
Refills: 0 | Status: COMPLETED | OUTPATIENT
Start: 2018-07-05 | End: 2018-07-07

## 2018-07-05 RX ORDER — LANOLIN ALCOHOL/MO/W.PET/CERES
3 CREAM (GRAM) TOPICAL AT BEDTIME
Refills: 0 | Status: DISCONTINUED | OUTPATIENT
Start: 2018-07-05 | End: 2018-07-05

## 2018-07-05 RX ORDER — ACETAMINOPHEN 500 MG
975 TABLET ORAL EVERY 8 HOURS
Refills: 0 | Status: DISCONTINUED | OUTPATIENT
Start: 2018-07-05 | End: 2018-07-26

## 2018-07-05 RX ORDER — LANOLIN ALCOHOL/MO/W.PET/CERES
5 CREAM (GRAM) TOPICAL AT BEDTIME
Refills: 0 | Status: DISCONTINUED | OUTPATIENT
Start: 2018-07-05 | End: 2018-07-12

## 2018-07-05 RX ADMIN — SERTRALINE 25 MILLIGRAM(S): 25 TABLET, FILM COATED ORAL at 11:54

## 2018-07-05 RX ADMIN — CLOPIDOGREL BISULFATE 75 MILLIGRAM(S): 75 TABLET, FILM COATED ORAL at 11:53

## 2018-07-05 RX ADMIN — Medication 100 MILLIGRAM(S): at 11:54

## 2018-07-05 RX ADMIN — Medication 1 SPRAY(S): at 17:24

## 2018-07-05 RX ADMIN — ENOXAPARIN SODIUM 40 MILLIGRAM(S): 100 INJECTION SUBCUTANEOUS at 13:43

## 2018-07-05 RX ADMIN — Medication 1 SPRAY(S): at 05:38

## 2018-07-05 RX ADMIN — Medication 7 UNIT(S): at 07:55

## 2018-07-05 RX ADMIN — NORTRIPTYLINE HYDROCHLORIDE 25 MILLIGRAM(S): 10 CAPSULE ORAL at 21:21

## 2018-07-05 RX ADMIN — Medication 7 UNIT(S): at 16:58

## 2018-07-05 RX ADMIN — SENNA PLUS 2 TABLET(S): 8.6 TABLET ORAL at 21:21

## 2018-07-05 RX ADMIN — Medication 7 UNIT(S): at 11:57

## 2018-07-05 RX ADMIN — LACTULOSE 20 GRAM(S): 10 SOLUTION ORAL at 17:25

## 2018-07-05 RX ADMIN — Medication 100 MILLIGRAM(S): at 05:33

## 2018-07-05 RX ADMIN — Medication 975 MILLIGRAM(S): at 21:21

## 2018-07-05 RX ADMIN — LISINOPRIL 10 MILLIGRAM(S): 2.5 TABLET ORAL at 05:33

## 2018-07-05 RX ADMIN — Medication 5 MILLIGRAM(S): at 21:22

## 2018-07-05 RX ADMIN — PANTOPRAZOLE SODIUM 40 MILLIGRAM(S): 20 TABLET, DELAYED RELEASE ORAL at 11:54

## 2018-07-05 RX ADMIN — POLYETHYLENE GLYCOL 3350 17 GRAM(S): 17 POWDER, FOR SOLUTION ORAL at 13:43

## 2018-07-05 RX ADMIN — ATORVASTATIN CALCIUM 80 MILLIGRAM(S): 80 TABLET, FILM COATED ORAL at 21:20

## 2018-07-05 RX ADMIN — EXEMESTANE 25 MILLIGRAM(S): 25 TABLET, SUGAR COATED ORAL at 16:39

## 2018-07-05 RX ADMIN — INSULIN GLARGINE 21 UNIT(S): 100 INJECTION, SOLUTION SUBCUTANEOUS at 21:22

## 2018-07-05 RX ADMIN — Medication 100 MILLIGRAM(S): at 21:21

## 2018-07-05 RX ADMIN — Medication 162 MILLIGRAM(S): at 11:52

## 2018-07-05 RX ADMIN — Medication 650 MILLIGRAM(S): at 00:00

## 2018-07-06 RX ORDER — LACTULOSE 10 G/15ML
20 SOLUTION ORAL
Refills: 0 | Status: COMPLETED | OUTPATIENT
Start: 2018-07-07 | End: 2018-07-09

## 2018-07-06 RX ORDER — HYDROXYZINE HCL 10 MG
25 TABLET ORAL AT BEDTIME
Refills: 0 | Status: DISCONTINUED | OUTPATIENT
Start: 2018-07-06 | End: 2018-07-12

## 2018-07-06 RX ORDER — SERTRALINE 25 MG/1
100 TABLET, FILM COATED ORAL DAILY
Refills: 0 | Status: DISCONTINUED | OUTPATIENT
Start: 2018-07-06 | End: 2018-07-26

## 2018-07-06 RX ADMIN — Medication 7 UNIT(S): at 18:30

## 2018-07-06 RX ADMIN — Medication 1 SPRAY(S): at 18:32

## 2018-07-06 RX ADMIN — INSULIN GLARGINE 21 UNIT(S): 100 INJECTION, SOLUTION SUBCUTANEOUS at 21:20

## 2018-07-06 RX ADMIN — ENOXAPARIN SODIUM 40 MILLIGRAM(S): 100 INJECTION SUBCUTANEOUS at 12:21

## 2018-07-06 RX ADMIN — Medication 162 MILLIGRAM(S): at 12:25

## 2018-07-06 RX ADMIN — Medication 100 MILLIGRAM(S): at 21:20

## 2018-07-06 RX ADMIN — NORTRIPTYLINE HYDROCHLORIDE 25 MILLIGRAM(S): 10 CAPSULE ORAL at 21:21

## 2018-07-06 RX ADMIN — SERTRALINE 25 MILLIGRAM(S): 25 TABLET, FILM COATED ORAL at 12:22

## 2018-07-06 RX ADMIN — Medication 30 MILLILITER(S): at 01:13

## 2018-07-06 RX ADMIN — EXEMESTANE 25 MILLIGRAM(S): 25 TABLET, SUGAR COATED ORAL at 13:52

## 2018-07-06 RX ADMIN — LACTULOSE 20 GRAM(S): 10 SOLUTION ORAL at 05:44

## 2018-07-06 RX ADMIN — SERTRALINE 100 MILLIGRAM(S): 25 TABLET, FILM COATED ORAL at 21:18

## 2018-07-06 RX ADMIN — POLYETHYLENE GLYCOL 3350 17 GRAM(S): 17 POWDER, FOR SOLUTION ORAL at 12:20

## 2018-07-06 RX ADMIN — Medication 975 MILLIGRAM(S): at 12:26

## 2018-07-06 RX ADMIN — Medication 1 SPRAY(S): at 05:44

## 2018-07-06 RX ADMIN — PANTOPRAZOLE SODIUM 40 MILLIGRAM(S): 20 TABLET, DELAYED RELEASE ORAL at 06:24

## 2018-07-06 RX ADMIN — Medication 975 MILLIGRAM(S): at 21:17

## 2018-07-06 RX ADMIN — Medication 7 UNIT(S): at 08:14

## 2018-07-06 RX ADMIN — Medication 975 MILLIGRAM(S): at 05:43

## 2018-07-06 RX ADMIN — CLOPIDOGREL BISULFATE 75 MILLIGRAM(S): 75 TABLET, FILM COATED ORAL at 12:22

## 2018-07-06 RX ADMIN — Medication 5 MILLIGRAM(S): at 21:20

## 2018-07-06 RX ADMIN — Medication 100 MILLIGRAM(S): at 05:44

## 2018-07-06 RX ADMIN — SENNA PLUS 2 TABLET(S): 8.6 TABLET ORAL at 21:21

## 2018-07-06 RX ADMIN — LISINOPRIL 10 MILLIGRAM(S): 2.5 TABLET ORAL at 05:44

## 2018-07-06 RX ADMIN — Medication 7 UNIT(S): at 12:19

## 2018-07-06 RX ADMIN — ATORVASTATIN CALCIUM 80 MILLIGRAM(S): 80 TABLET, FILM COATED ORAL at 21:20

## 2018-07-07 RX ADMIN — Medication 100 MILLIGRAM(S): at 21:54

## 2018-07-07 RX ADMIN — SENNA PLUS 2 TABLET(S): 8.6 TABLET ORAL at 22:06

## 2018-07-07 RX ADMIN — NORTRIPTYLINE HYDROCHLORIDE 25 MILLIGRAM(S): 10 CAPSULE ORAL at 21:55

## 2018-07-07 RX ADMIN — Medication 7 UNIT(S): at 08:38

## 2018-07-07 RX ADMIN — Medication 975 MILLIGRAM(S): at 12:39

## 2018-07-07 RX ADMIN — Medication 5 MILLIGRAM(S): at 21:55

## 2018-07-07 RX ADMIN — LISINOPRIL 10 MILLIGRAM(S): 2.5 TABLET ORAL at 05:58

## 2018-07-07 RX ADMIN — Medication 1 SPRAY(S): at 17:48

## 2018-07-07 RX ADMIN — Medication 975 MILLIGRAM(S): at 21:54

## 2018-07-07 RX ADMIN — Medication 100 MILLIGRAM(S): at 12:39

## 2018-07-07 RX ADMIN — LACTULOSE 20 GRAM(S): 10 SOLUTION ORAL at 05:59

## 2018-07-07 RX ADMIN — CLOPIDOGREL BISULFATE 75 MILLIGRAM(S): 75 TABLET, FILM COATED ORAL at 12:40

## 2018-07-07 RX ADMIN — Medication 7 UNIT(S): at 12:38

## 2018-07-07 RX ADMIN — EXEMESTANE 25 MILLIGRAM(S): 25 TABLET, SUGAR COATED ORAL at 12:48

## 2018-07-07 RX ADMIN — Medication 162 MILLIGRAM(S): at 12:40

## 2018-07-07 RX ADMIN — INSULIN GLARGINE 21 UNIT(S): 100 INJECTION, SOLUTION SUBCUTANEOUS at 21:54

## 2018-07-07 RX ADMIN — Medication 975 MILLIGRAM(S): at 05:58

## 2018-07-07 RX ADMIN — Medication 100 MILLIGRAM(S): at 05:58

## 2018-07-07 RX ADMIN — POLYETHYLENE GLYCOL 3350 17 GRAM(S): 17 POWDER, FOR SOLUTION ORAL at 12:41

## 2018-07-07 RX ADMIN — LACTULOSE 20 GRAM(S): 10 SOLUTION ORAL at 17:49

## 2018-07-07 RX ADMIN — ENOXAPARIN SODIUM 40 MILLIGRAM(S): 100 INJECTION SUBCUTANEOUS at 12:41

## 2018-07-07 RX ADMIN — ATORVASTATIN CALCIUM 80 MILLIGRAM(S): 80 TABLET, FILM COATED ORAL at 21:54

## 2018-07-07 RX ADMIN — Medication 7 UNIT(S): at 17:46

## 2018-07-07 RX ADMIN — SERTRALINE 100 MILLIGRAM(S): 25 TABLET, FILM COATED ORAL at 12:47

## 2018-07-07 RX ADMIN — PANTOPRAZOLE SODIUM 40 MILLIGRAM(S): 20 TABLET, DELAYED RELEASE ORAL at 06:01

## 2018-07-08 RX ADMIN — INSULIN GLARGINE 21 UNIT(S): 100 INJECTION, SOLUTION SUBCUTANEOUS at 22:45

## 2018-07-08 RX ADMIN — SENNA PLUS 2 TABLET(S): 8.6 TABLET ORAL at 22:44

## 2018-07-08 RX ADMIN — Medication 1 SPRAY(S): at 05:56

## 2018-07-08 RX ADMIN — Medication 975 MILLIGRAM(S): at 22:44

## 2018-07-08 RX ADMIN — PANTOPRAZOLE SODIUM 40 MILLIGRAM(S): 20 TABLET, DELAYED RELEASE ORAL at 06:09

## 2018-07-08 RX ADMIN — Medication 1 SPRAY(S): at 17:27

## 2018-07-08 RX ADMIN — Medication 100 MILLIGRAM(S): at 05:55

## 2018-07-08 RX ADMIN — Medication 7 UNIT(S): at 11:34

## 2018-07-08 RX ADMIN — Medication 162 MILLIGRAM(S): at 11:37

## 2018-07-08 RX ADMIN — Medication 100 MILLIGRAM(S): at 22:44

## 2018-07-08 RX ADMIN — Medication 2: at 16:52

## 2018-07-08 RX ADMIN — ENOXAPARIN SODIUM 40 MILLIGRAM(S): 100 INJECTION SUBCUTANEOUS at 11:37

## 2018-07-08 RX ADMIN — Medication 1: at 11:33

## 2018-07-08 RX ADMIN — SERTRALINE 100 MILLIGRAM(S): 25 TABLET, FILM COATED ORAL at 14:43

## 2018-07-08 RX ADMIN — Medication 975 MILLIGRAM(S): at 05:55

## 2018-07-08 RX ADMIN — LACTULOSE 20 GRAM(S): 10 SOLUTION ORAL at 17:26

## 2018-07-08 RX ADMIN — Medication 7 UNIT(S): at 16:53

## 2018-07-08 RX ADMIN — LISINOPRIL 10 MILLIGRAM(S): 2.5 TABLET ORAL at 05:55

## 2018-07-08 RX ADMIN — CLOPIDOGREL BISULFATE 75 MILLIGRAM(S): 75 TABLET, FILM COATED ORAL at 11:37

## 2018-07-08 RX ADMIN — NORTRIPTYLINE HYDROCHLORIDE 25 MILLIGRAM(S): 10 CAPSULE ORAL at 22:44

## 2018-07-08 RX ADMIN — Medication 5 MILLIGRAM(S): at 22:44

## 2018-07-08 RX ADMIN — Medication 975 MILLIGRAM(S): at 16:16

## 2018-07-08 RX ADMIN — LACTULOSE 20 GRAM(S): 10 SOLUTION ORAL at 05:55

## 2018-07-08 RX ADMIN — EXEMESTANE 25 MILLIGRAM(S): 25 TABLET, SUGAR COATED ORAL at 11:40

## 2018-07-08 RX ADMIN — POLYETHYLENE GLYCOL 3350 17 GRAM(S): 17 POWDER, FOR SOLUTION ORAL at 11:38

## 2018-07-08 RX ADMIN — ATORVASTATIN CALCIUM 80 MILLIGRAM(S): 80 TABLET, FILM COATED ORAL at 22:44

## 2018-07-09 DIAGNOSIS — I67.89 OTHER CEREBROVASCULAR DISEASE: ICD-10-CM

## 2018-07-09 DIAGNOSIS — H91.93 UNSPECIFIED HEARING LOSS, BILATERAL: ICD-10-CM

## 2018-07-09 DIAGNOSIS — I63.9 CEREBRAL INFARCTION, UNSPECIFIED: ICD-10-CM

## 2018-07-09 DIAGNOSIS — Z85.3 PERSONAL HISTORY OF MALIGNANT NEOPLASM OF BREAST: ICD-10-CM

## 2018-07-09 DIAGNOSIS — G81.92 HEMIPLEGIA, UNSPECIFIED AFFECTING LEFT DOMINANT SIDE: ICD-10-CM

## 2018-07-09 DIAGNOSIS — F32.9 MAJOR DEPRESSIVE DISORDER, SINGLE EPISODE, UNSPECIFIED: ICD-10-CM

## 2018-07-09 DIAGNOSIS — Z96.651 PRESENCE OF RIGHT ARTIFICIAL KNEE JOINT: ICD-10-CM

## 2018-07-09 DIAGNOSIS — I63.8 OTHER CEREBRAL INFARCTION: ICD-10-CM

## 2018-07-09 DIAGNOSIS — R29.708 NIHSS SCORE 8: ICD-10-CM

## 2018-07-09 DIAGNOSIS — E11.9 TYPE 2 DIABETES MELLITUS WITHOUT COMPLICATIONS: ICD-10-CM

## 2018-07-09 DIAGNOSIS — I10 ESSENTIAL (PRIMARY) HYPERTENSION: ICD-10-CM

## 2018-07-09 DIAGNOSIS — E78.5 HYPERLIPIDEMIA, UNSPECIFIED: ICD-10-CM

## 2018-07-09 DIAGNOSIS — K59.00 CONSTIPATION, UNSPECIFIED: ICD-10-CM

## 2018-07-09 DIAGNOSIS — I65.22 OCCLUSION AND STENOSIS OF LEFT CAROTID ARTERY: ICD-10-CM

## 2018-07-09 DIAGNOSIS — Z79.4 LONG TERM (CURRENT) USE OF INSULIN: ICD-10-CM

## 2018-07-09 LAB
ANION GAP SERPL CALC-SCNC: 17 MMOL/L — HIGH (ref 7–14)
BASOPHILS # BLD AUTO: 0.04 K/UL — SIGNIFICANT CHANGE UP (ref 0–0.2)
BASOPHILS NFR BLD AUTO: 0.7 % — SIGNIFICANT CHANGE UP (ref 0–1)
BUN SERPL-MCNC: 15 MG/DL — SIGNIFICANT CHANGE UP (ref 10–20)
CALCIUM SERPL-MCNC: 9.1 MG/DL — SIGNIFICANT CHANGE UP (ref 8.5–10.1)
CHLORIDE SERPL-SCNC: 102 MMOL/L — SIGNIFICANT CHANGE UP (ref 98–110)
CO2 SERPL-SCNC: 26 MMOL/L — SIGNIFICANT CHANGE UP (ref 17–32)
CREAT SERPL-MCNC: 0.7 MG/DL — SIGNIFICANT CHANGE UP (ref 0.7–1.5)
EOSINOPHIL # BLD AUTO: 0.23 K/UL — SIGNIFICANT CHANGE UP (ref 0–0.7)
EOSINOPHIL NFR BLD AUTO: 4.2 % — SIGNIFICANT CHANGE UP (ref 0–8)
GLUCOSE SERPL-MCNC: 151 MG/DL — HIGH (ref 70–99)
HCT VFR BLD CALC: 36.1 % — LOW (ref 37–47)
HGB BLD-MCNC: 11.6 G/DL — LOW (ref 12–16)
IMM GRANULOCYTES NFR BLD AUTO: 0.5 % — HIGH (ref 0.1–0.3)
LYMPHOCYTES # BLD AUTO: 1.1 K/UL — LOW (ref 1.2–3.4)
LYMPHOCYTES # BLD AUTO: 20 % — LOW (ref 20.5–51.1)
MCHC RBC-ENTMCNC: 28.9 PG — SIGNIFICANT CHANGE UP (ref 27–31)
MCHC RBC-ENTMCNC: 32.1 G/DL — SIGNIFICANT CHANGE UP (ref 32–37)
MCV RBC AUTO: 89.8 FL — SIGNIFICANT CHANGE UP (ref 81–99)
MONOCYTES # BLD AUTO: 0.62 K/UL — HIGH (ref 0.1–0.6)
MONOCYTES NFR BLD AUTO: 11.3 % — HIGH (ref 1.7–9.3)
NEUTROPHILS # BLD AUTO: 3.48 K/UL — SIGNIFICANT CHANGE UP (ref 1.4–6.5)
NEUTROPHILS NFR BLD AUTO: 63.3 % — SIGNIFICANT CHANGE UP (ref 42.2–75.2)
NRBC # BLD: 0 /100 WBCS — SIGNIFICANT CHANGE UP (ref 0–0)
PLATELET # BLD AUTO: 297 K/UL — SIGNIFICANT CHANGE UP (ref 130–400)
POTASSIUM SERPL-MCNC: 4.1 MMOL/L — SIGNIFICANT CHANGE UP (ref 3.5–5)
POTASSIUM SERPL-SCNC: 4.1 MMOL/L — SIGNIFICANT CHANGE UP (ref 3.5–5)
RBC # BLD: 4.02 M/UL — LOW (ref 4.2–5.4)
RBC # FLD: 13.5 % — SIGNIFICANT CHANGE UP (ref 11.5–14.5)
SODIUM SERPL-SCNC: 145 MMOL/L — SIGNIFICANT CHANGE UP (ref 135–146)
WBC # BLD: 5.5 K/UL — SIGNIFICANT CHANGE UP (ref 4.8–10.8)
WBC # FLD AUTO: 5.5 K/UL — SIGNIFICANT CHANGE UP (ref 4.8–10.8)

## 2018-07-09 RX ADMIN — Medication 100 MILLIGRAM(S): at 06:53

## 2018-07-09 RX ADMIN — LISINOPRIL 10 MILLIGRAM(S): 2.5 TABLET ORAL at 06:53

## 2018-07-09 RX ADMIN — NORTRIPTYLINE HYDROCHLORIDE 25 MILLIGRAM(S): 10 CAPSULE ORAL at 21:39

## 2018-07-09 RX ADMIN — Medication 5 MILLIGRAM(S): at 21:39

## 2018-07-09 RX ADMIN — SENNA PLUS 2 TABLET(S): 8.6 TABLET ORAL at 21:39

## 2018-07-09 RX ADMIN — Medication 162 MILLIGRAM(S): at 08:29

## 2018-07-09 RX ADMIN — SERTRALINE 100 MILLIGRAM(S): 25 TABLET, FILM COATED ORAL at 08:29

## 2018-07-09 RX ADMIN — Medication 975 MILLIGRAM(S): at 13:04

## 2018-07-09 RX ADMIN — Medication 7 UNIT(S): at 16:54

## 2018-07-09 RX ADMIN — EXEMESTANE 25 MILLIGRAM(S): 25 TABLET, SUGAR COATED ORAL at 13:03

## 2018-07-09 RX ADMIN — Medication 975 MILLIGRAM(S): at 21:39

## 2018-07-09 RX ADMIN — INSULIN GLARGINE 21 UNIT(S): 100 INJECTION, SOLUTION SUBCUTANEOUS at 21:40

## 2018-07-09 RX ADMIN — CLOPIDOGREL BISULFATE 75 MILLIGRAM(S): 75 TABLET, FILM COATED ORAL at 08:29

## 2018-07-09 RX ADMIN — LACTULOSE 20 GRAM(S): 10 SOLUTION ORAL at 06:53

## 2018-07-09 RX ADMIN — ENOXAPARIN SODIUM 40 MILLIGRAM(S): 100 INJECTION SUBCUTANEOUS at 13:02

## 2018-07-09 RX ADMIN — Medication 100 MILLIGRAM(S): at 21:39

## 2018-07-09 RX ADMIN — Medication 1: at 16:53

## 2018-07-09 RX ADMIN — Medication 7 UNIT(S): at 08:25

## 2018-07-09 RX ADMIN — PANTOPRAZOLE SODIUM 40 MILLIGRAM(S): 20 TABLET, DELAYED RELEASE ORAL at 06:53

## 2018-07-09 RX ADMIN — Medication 975 MILLIGRAM(S): at 06:55

## 2018-07-09 RX ADMIN — ATORVASTATIN CALCIUM 80 MILLIGRAM(S): 80 TABLET, FILM COATED ORAL at 21:39

## 2018-07-09 RX ADMIN — Medication 1 SPRAY(S): at 17:23

## 2018-07-10 LAB
-  AMIKACIN: SIGNIFICANT CHANGE UP
-  AMIKACIN: SIGNIFICANT CHANGE UP
-  AMOXICILLIN/CLAVULANIC ACID: SIGNIFICANT CHANGE UP
-  AMOXICILLIN/CLAVULANIC ACID: SIGNIFICANT CHANGE UP
-  AMPICILLIN/SULBACTAM: SIGNIFICANT CHANGE UP
-  AMPICILLIN/SULBACTAM: SIGNIFICANT CHANGE UP
-  AMPICILLIN: SIGNIFICANT CHANGE UP
-  AMPICILLIN: SIGNIFICANT CHANGE UP
-  AZTREONAM: SIGNIFICANT CHANGE UP
-  AZTREONAM: SIGNIFICANT CHANGE UP
-  CEFAZOLIN: SIGNIFICANT CHANGE UP
-  CEFAZOLIN: SIGNIFICANT CHANGE UP
-  CEFEPIME: SIGNIFICANT CHANGE UP
-  CEFEPIME: SIGNIFICANT CHANGE UP
-  CEFOXITIN: SIGNIFICANT CHANGE UP
-  CEFOXITIN: SIGNIFICANT CHANGE UP
-  CEFTRIAXONE: SIGNIFICANT CHANGE UP
-  CEFTRIAXONE: SIGNIFICANT CHANGE UP
-  CIPROFLOXACIN: SIGNIFICANT CHANGE UP
-  CIPROFLOXACIN: SIGNIFICANT CHANGE UP
-  ERTAPENEM: SIGNIFICANT CHANGE UP
-  ERTAPENEM: SIGNIFICANT CHANGE UP
-  GENTAMICIN: SIGNIFICANT CHANGE UP
-  GENTAMICIN: SIGNIFICANT CHANGE UP
-  IMIPENEM: SIGNIFICANT CHANGE UP
-  LEVOFLOXACIN: SIGNIFICANT CHANGE UP
-  LEVOFLOXACIN: SIGNIFICANT CHANGE UP
-  MEROPENEM: SIGNIFICANT CHANGE UP
-  MEROPENEM: SIGNIFICANT CHANGE UP
-  NITROFURANTOIN: SIGNIFICANT CHANGE UP
-  NITROFURANTOIN: SIGNIFICANT CHANGE UP
-  PIPERACILLIN/TAZOBACTAM: SIGNIFICANT CHANGE UP
-  PIPERACILLIN/TAZOBACTAM: SIGNIFICANT CHANGE UP
-  TIGECYCLINE: SIGNIFICANT CHANGE UP
-  TOBRAMYCIN: SIGNIFICANT CHANGE UP
-  TOBRAMYCIN: SIGNIFICANT CHANGE UP
-  TRIMETHOPRIM/SULFAMETHOXAZOLE: SIGNIFICANT CHANGE UP
-  TRIMETHOPRIM/SULFAMETHOXAZOLE: SIGNIFICANT CHANGE UP
CULTURE RESULTS: SIGNIFICANT CHANGE UP
METHOD TYPE: SIGNIFICANT CHANGE UP
METHOD TYPE: SIGNIFICANT CHANGE UP
ORGANISM # SPEC MICROSCOPIC CNT: SIGNIFICANT CHANGE UP
SPECIMEN SOURCE: SIGNIFICANT CHANGE UP

## 2018-07-10 RX ORDER — SENNA PLUS 8.6 MG/1
1 TABLET ORAL AT BEDTIME
Refills: 0 | Status: CANCELLED | OUTPATIENT
Start: 2019-06-01 | End: 2018-07-26

## 2018-07-10 RX ORDER — DOCUSATE SODIUM 100 MG
100 CAPSULE ORAL DAILY
Refills: 0 | Status: DISCONTINUED | OUTPATIENT
Start: 2018-07-10 | End: 2018-07-26

## 2018-07-10 RX ADMIN — Medication 975 MILLIGRAM(S): at 21:49

## 2018-07-10 RX ADMIN — ATORVASTATIN CALCIUM 80 MILLIGRAM(S): 80 TABLET, FILM COATED ORAL at 21:47

## 2018-07-10 RX ADMIN — CLOPIDOGREL BISULFATE 75 MILLIGRAM(S): 75 TABLET, FILM COATED ORAL at 12:29

## 2018-07-10 RX ADMIN — Medication 1 SPRAY(S): at 17:41

## 2018-07-10 RX ADMIN — ENOXAPARIN SODIUM 40 MILLIGRAM(S): 100 INJECTION SUBCUTANEOUS at 12:31

## 2018-07-10 RX ADMIN — Medication 975 MILLIGRAM(S): at 05:52

## 2018-07-10 RX ADMIN — Medication 1: at 12:31

## 2018-07-10 RX ADMIN — Medication 1 SPRAY(S): at 05:52

## 2018-07-10 RX ADMIN — Medication 100 MILLIGRAM(S): at 05:52

## 2018-07-10 RX ADMIN — LISINOPRIL 10 MILLIGRAM(S): 2.5 TABLET ORAL at 05:52

## 2018-07-10 RX ADMIN — Medication 7 UNIT(S): at 12:31

## 2018-07-10 RX ADMIN — Medication 7 UNIT(S): at 17:00

## 2018-07-10 RX ADMIN — NORTRIPTYLINE HYDROCHLORIDE 25 MILLIGRAM(S): 10 CAPSULE ORAL at 21:47

## 2018-07-10 RX ADMIN — INSULIN GLARGINE 21 UNIT(S): 100 INJECTION, SOLUTION SUBCUTANEOUS at 21:47

## 2018-07-10 RX ADMIN — EXEMESTANE 25 MILLIGRAM(S): 25 TABLET, SUGAR COATED ORAL at 17:41

## 2018-07-10 RX ADMIN — Medication 7 UNIT(S): at 08:08

## 2018-07-10 RX ADMIN — PANTOPRAZOLE SODIUM 40 MILLIGRAM(S): 20 TABLET, DELAYED RELEASE ORAL at 05:52

## 2018-07-10 RX ADMIN — Medication 975 MILLIGRAM(S): at 12:30

## 2018-07-10 RX ADMIN — Medication 162 MILLIGRAM(S): at 12:29

## 2018-07-10 RX ADMIN — Medication 5 MILLIGRAM(S): at 21:47

## 2018-07-10 RX ADMIN — SERTRALINE 100 MILLIGRAM(S): 25 TABLET, FILM COATED ORAL at 12:30

## 2018-07-11 RX ADMIN — INSULIN GLARGINE 21 UNIT(S): 100 INJECTION, SOLUTION SUBCUTANEOUS at 21:37

## 2018-07-11 RX ADMIN — Medication 1: at 12:06

## 2018-07-11 RX ADMIN — NORTRIPTYLINE HYDROCHLORIDE 25 MILLIGRAM(S): 10 CAPSULE ORAL at 21:37

## 2018-07-11 RX ADMIN — PANTOPRAZOLE SODIUM 40 MILLIGRAM(S): 20 TABLET, DELAYED RELEASE ORAL at 08:00

## 2018-07-11 RX ADMIN — Medication 975 MILLIGRAM(S): at 12:04

## 2018-07-11 RX ADMIN — CLOPIDOGREL BISULFATE 75 MILLIGRAM(S): 75 TABLET, FILM COATED ORAL at 12:03

## 2018-07-11 RX ADMIN — Medication 975 MILLIGRAM(S): at 21:32

## 2018-07-11 RX ADMIN — ATORVASTATIN CALCIUM 80 MILLIGRAM(S): 80 TABLET, FILM COATED ORAL at 21:33

## 2018-07-11 RX ADMIN — Medication 7 UNIT(S): at 12:06

## 2018-07-11 RX ADMIN — SENNA PLUS 2 TABLET(S): 8.6 TABLET ORAL at 21:34

## 2018-07-11 RX ADMIN — EXEMESTANE 25 MILLIGRAM(S): 25 TABLET, SUGAR COATED ORAL at 13:57

## 2018-07-11 RX ADMIN — Medication 30 MILLILITER(S): at 19:38

## 2018-07-11 RX ADMIN — Medication 1 SPRAY(S): at 05:25

## 2018-07-11 RX ADMIN — SERTRALINE 100 MILLIGRAM(S): 25 TABLET, FILM COATED ORAL at 12:03

## 2018-07-11 RX ADMIN — Medication 162 MILLIGRAM(S): at 12:03

## 2018-07-11 RX ADMIN — ENOXAPARIN SODIUM 40 MILLIGRAM(S): 100 INJECTION SUBCUTANEOUS at 12:04

## 2018-07-11 RX ADMIN — LISINOPRIL 10 MILLIGRAM(S): 2.5 TABLET ORAL at 05:24

## 2018-07-11 RX ADMIN — Medication 5 MILLIGRAM(S): at 21:33

## 2018-07-11 RX ADMIN — Medication 7 UNIT(S): at 07:58

## 2018-07-11 RX ADMIN — Medication 30 MILLILITER(S): at 10:54

## 2018-07-12 PROCEDURE — 93970 EXTREMITY STUDY: CPT | Mod: 26

## 2018-07-12 RX ORDER — CIPROFLOXACIN LACTATE 400MG/40ML
500 VIAL (ML) INTRAVENOUS
Refills: 0 | Status: DISCONTINUED | OUTPATIENT
Start: 2018-07-13 | End: 2018-07-20

## 2018-07-12 RX ORDER — CIPROFLOXACIN LACTATE 400MG/40ML
500 VIAL (ML) INTRAVENOUS ONCE
Refills: 0 | Status: COMPLETED | OUTPATIENT
Start: 2018-07-12 | End: 2018-07-12

## 2018-07-12 RX ORDER — CIPROFLOXACIN LACTATE 400MG/40ML
VIAL (ML) INTRAVENOUS
Refills: 0 | Status: DISCONTINUED | OUTPATIENT
Start: 2018-07-12 | End: 2018-07-20

## 2018-07-12 RX ORDER — HYDROXYZINE HCL 10 MG
25 TABLET ORAL AT BEDTIME
Refills: 0 | Status: DISCONTINUED | OUTPATIENT
Start: 2018-07-12 | End: 2018-07-18

## 2018-07-12 RX ADMIN — EXEMESTANE 25 MILLIGRAM(S): 25 TABLET, SUGAR COATED ORAL at 14:35

## 2018-07-12 RX ADMIN — Medication 100 MILLIGRAM(S): at 14:33

## 2018-07-12 RX ADMIN — CLOPIDOGREL BISULFATE 75 MILLIGRAM(S): 75 TABLET, FILM COATED ORAL at 14:33

## 2018-07-12 RX ADMIN — Medication 975 MILLIGRAM(S): at 05:58

## 2018-07-12 RX ADMIN — Medication 7 UNIT(S): at 14:32

## 2018-07-12 RX ADMIN — SERTRALINE 100 MILLIGRAM(S): 25 TABLET, FILM COATED ORAL at 14:34

## 2018-07-12 RX ADMIN — PANTOPRAZOLE SODIUM 40 MILLIGRAM(S): 20 TABLET, DELAYED RELEASE ORAL at 08:38

## 2018-07-12 RX ADMIN — Medication 1 SPRAY(S): at 06:03

## 2018-07-12 RX ADMIN — LISINOPRIL 10 MILLIGRAM(S): 2.5 TABLET ORAL at 05:58

## 2018-07-12 RX ADMIN — Medication 2: at 14:33

## 2018-07-12 RX ADMIN — Medication 500 MILLIGRAM(S): at 16:58

## 2018-07-12 RX ADMIN — Medication 25 MILLIGRAM(S): at 01:06

## 2018-07-12 RX ADMIN — Medication 162 MILLIGRAM(S): at 14:33

## 2018-07-12 RX ADMIN — Medication 7 UNIT(S): at 16:58

## 2018-07-12 RX ADMIN — POLYETHYLENE GLYCOL 3350 17 GRAM(S): 17 POWDER, FOR SOLUTION ORAL at 14:34

## 2018-07-12 RX ADMIN — INSULIN GLARGINE 21 UNIT(S): 100 INJECTION, SOLUTION SUBCUTANEOUS at 21:23

## 2018-07-12 RX ADMIN — ENOXAPARIN SODIUM 40 MILLIGRAM(S): 100 INJECTION SUBCUTANEOUS at 14:35

## 2018-07-12 RX ADMIN — SENNA PLUS 2 TABLET(S): 8.6 TABLET ORAL at 21:24

## 2018-07-12 RX ADMIN — Medication 25 MILLIGRAM(S): at 21:23

## 2018-07-12 RX ADMIN — Medication 1 SPRAY(S): at 16:59

## 2018-07-12 RX ADMIN — Medication 975 MILLIGRAM(S): at 21:23

## 2018-07-12 RX ADMIN — ATORVASTATIN CALCIUM 80 MILLIGRAM(S): 80 TABLET, FILM COATED ORAL at 21:23

## 2018-07-12 RX ADMIN — NORTRIPTYLINE HYDROCHLORIDE 25 MILLIGRAM(S): 10 CAPSULE ORAL at 21:23

## 2018-07-13 LAB
ANION GAP SERPL CALC-SCNC: 12 MMOL/L — SIGNIFICANT CHANGE UP (ref 7–14)
BUN SERPL-MCNC: 13 MG/DL — SIGNIFICANT CHANGE UP (ref 10–20)
CALCIUM SERPL-MCNC: 9.5 MG/DL — SIGNIFICANT CHANGE UP (ref 8.5–10.1)
CHLORIDE SERPL-SCNC: 104 MMOL/L — SIGNIFICANT CHANGE UP (ref 98–110)
CO2 SERPL-SCNC: 27 MMOL/L — SIGNIFICANT CHANGE UP (ref 17–32)
CREAT SERPL-MCNC: 0.7 MG/DL — SIGNIFICANT CHANGE UP (ref 0.7–1.5)
GLUCOSE SERPL-MCNC: 113 MG/DL — HIGH (ref 70–99)
HCT VFR BLD CALC: 36.2 % — LOW (ref 37–47)
HGB BLD-MCNC: 11.9 G/DL — LOW (ref 12–16)
MAGNESIUM SERPL-MCNC: 2 MG/DL — SIGNIFICANT CHANGE UP (ref 1.8–2.4)
MCHC RBC-ENTMCNC: 29.7 PG — SIGNIFICANT CHANGE UP (ref 27–31)
MCHC RBC-ENTMCNC: 32.9 G/DL — SIGNIFICANT CHANGE UP (ref 32–37)
MCV RBC AUTO: 90.3 FL — SIGNIFICANT CHANGE UP (ref 81–99)
NRBC # BLD: 0 /100 WBCS — SIGNIFICANT CHANGE UP (ref 0–0)
PLATELET # BLD AUTO: 278 K/UL — SIGNIFICANT CHANGE UP (ref 130–400)
POTASSIUM SERPL-MCNC: 4.4 MMOL/L — SIGNIFICANT CHANGE UP (ref 3.5–5)
POTASSIUM SERPL-SCNC: 4.4 MMOL/L — SIGNIFICANT CHANGE UP (ref 3.5–5)
RBC # BLD: 4.01 M/UL — LOW (ref 4.2–5.4)
RBC # FLD: 13.8 % — SIGNIFICANT CHANGE UP (ref 11.5–14.5)
SODIUM SERPL-SCNC: 143 MMOL/L — SIGNIFICANT CHANGE UP (ref 135–146)
WBC # BLD: 5.4 K/UL — SIGNIFICANT CHANGE UP (ref 4.8–10.8)
WBC # FLD AUTO: 5.4 K/UL — SIGNIFICANT CHANGE UP (ref 4.8–10.8)

## 2018-07-13 RX ADMIN — INSULIN GLARGINE 21 UNIT(S): 100 INJECTION, SOLUTION SUBCUTANEOUS at 21:18

## 2018-07-13 RX ADMIN — Medication 975 MILLIGRAM(S): at 14:05

## 2018-07-13 RX ADMIN — ENOXAPARIN SODIUM 40 MILLIGRAM(S): 100 INJECTION SUBCUTANEOUS at 14:04

## 2018-07-13 RX ADMIN — Medication 975 MILLIGRAM(S): at 21:18

## 2018-07-13 RX ADMIN — Medication 7 UNIT(S): at 13:15

## 2018-07-13 RX ADMIN — Medication 1 SPRAY(S): at 17:14

## 2018-07-13 RX ADMIN — EXEMESTANE 25 MILLIGRAM(S): 25 TABLET, SUGAR COATED ORAL at 14:05

## 2018-07-13 RX ADMIN — Medication 7 UNIT(S): at 08:29

## 2018-07-13 RX ADMIN — PANTOPRAZOLE SODIUM 40 MILLIGRAM(S): 20 TABLET, DELAYED RELEASE ORAL at 08:30

## 2018-07-13 RX ADMIN — NORTRIPTYLINE HYDROCHLORIDE 25 MILLIGRAM(S): 10 CAPSULE ORAL at 21:18

## 2018-07-13 RX ADMIN — LISINOPRIL 10 MILLIGRAM(S): 2.5 TABLET ORAL at 06:08

## 2018-07-13 RX ADMIN — Medication 7 UNIT(S): at 17:15

## 2018-07-13 RX ADMIN — Medication 162 MILLIGRAM(S): at 08:30

## 2018-07-13 RX ADMIN — SERTRALINE 100 MILLIGRAM(S): 25 TABLET, FILM COATED ORAL at 08:31

## 2018-07-13 RX ADMIN — Medication 500 MILLIGRAM(S): at 17:16

## 2018-07-13 RX ADMIN — SENNA PLUS 2 TABLET(S): 8.6 TABLET ORAL at 21:18

## 2018-07-13 RX ADMIN — POLYETHYLENE GLYCOL 3350 17 GRAM(S): 17 POWDER, FOR SOLUTION ORAL at 13:15

## 2018-07-13 RX ADMIN — Medication 100 MILLIGRAM(S): at 08:31

## 2018-07-13 RX ADMIN — Medication 500 MILLIGRAM(S): at 06:09

## 2018-07-13 RX ADMIN — Medication 1 SPRAY(S): at 06:09

## 2018-07-13 RX ADMIN — Medication 25 MILLIGRAM(S): at 21:18

## 2018-07-13 RX ADMIN — CLOPIDOGREL BISULFATE 75 MILLIGRAM(S): 75 TABLET, FILM COATED ORAL at 08:31

## 2018-07-13 RX ADMIN — Medication 975 MILLIGRAM(S): at 06:08

## 2018-07-13 RX ADMIN — ATORVASTATIN CALCIUM 80 MILLIGRAM(S): 80 TABLET, FILM COATED ORAL at 21:18

## 2018-07-14 RX ORDER — INSULIN GLARGINE 100 [IU]/ML
18 INJECTION, SOLUTION SUBCUTANEOUS AT BEDTIME
Refills: 0 | Status: DISCONTINUED | OUTPATIENT
Start: 2018-07-14 | End: 2018-07-26

## 2018-07-14 RX ADMIN — Medication 100 MILLIGRAM(S): at 11:33

## 2018-07-14 RX ADMIN — NORTRIPTYLINE HYDROCHLORIDE 25 MILLIGRAM(S): 10 CAPSULE ORAL at 22:15

## 2018-07-14 RX ADMIN — Medication 7 UNIT(S): at 11:36

## 2018-07-14 RX ADMIN — Medication 162 MILLIGRAM(S): at 11:33

## 2018-07-14 RX ADMIN — PANTOPRAZOLE SODIUM 40 MILLIGRAM(S): 20 TABLET, DELAYED RELEASE ORAL at 06:39

## 2018-07-14 RX ADMIN — ENOXAPARIN SODIUM 40 MILLIGRAM(S): 100 INJECTION SUBCUTANEOUS at 11:33

## 2018-07-14 RX ADMIN — Medication 25 MILLIGRAM(S): at 22:15

## 2018-07-14 RX ADMIN — ATORVASTATIN CALCIUM 80 MILLIGRAM(S): 80 TABLET, FILM COATED ORAL at 22:15

## 2018-07-14 RX ADMIN — SERTRALINE 100 MILLIGRAM(S): 25 TABLET, FILM COATED ORAL at 11:33

## 2018-07-14 RX ADMIN — Medication 7 UNIT(S): at 18:02

## 2018-07-14 RX ADMIN — POLYETHYLENE GLYCOL 3350 17 GRAM(S): 17 POWDER, FOR SOLUTION ORAL at 11:33

## 2018-07-14 RX ADMIN — CLOPIDOGREL BISULFATE 75 MILLIGRAM(S): 75 TABLET, FILM COATED ORAL at 11:33

## 2018-07-14 RX ADMIN — INSULIN GLARGINE 18 UNIT(S): 100 INJECTION, SOLUTION SUBCUTANEOUS at 22:16

## 2018-07-14 RX ADMIN — Medication 1 SPRAY(S): at 18:07

## 2018-07-14 RX ADMIN — Medication 975 MILLIGRAM(S): at 05:58

## 2018-07-14 RX ADMIN — Medication 1 SPRAY(S): at 05:58

## 2018-07-14 RX ADMIN — EXEMESTANE 25 MILLIGRAM(S): 25 TABLET, SUGAR COATED ORAL at 11:36

## 2018-07-14 RX ADMIN — Medication 975 MILLIGRAM(S): at 22:14

## 2018-07-14 RX ADMIN — Medication 500 MILLIGRAM(S): at 18:03

## 2018-07-14 RX ADMIN — Medication 500 MILLIGRAM(S): at 05:58

## 2018-07-14 RX ADMIN — Medication 30 MILLILITER(S): at 00:46

## 2018-07-14 RX ADMIN — Medication 975 MILLIGRAM(S): at 13:26

## 2018-07-14 RX ADMIN — SENNA PLUS 2 TABLET(S): 8.6 TABLET ORAL at 22:14

## 2018-07-14 RX ADMIN — LISINOPRIL 10 MILLIGRAM(S): 2.5 TABLET ORAL at 05:58

## 2018-07-15 RX ADMIN — PANTOPRAZOLE SODIUM 40 MILLIGRAM(S): 20 TABLET, DELAYED RELEASE ORAL at 06:14

## 2018-07-15 RX ADMIN — NORTRIPTYLINE HYDROCHLORIDE 25 MILLIGRAM(S): 10 CAPSULE ORAL at 22:11

## 2018-07-15 RX ADMIN — Medication 1 SPRAY(S): at 06:14

## 2018-07-15 RX ADMIN — Medication 1 SPRAY(S): at 18:03

## 2018-07-15 RX ADMIN — CLOPIDOGREL BISULFATE 75 MILLIGRAM(S): 75 TABLET, FILM COATED ORAL at 11:35

## 2018-07-15 RX ADMIN — Medication 100 MILLIGRAM(S): at 11:36

## 2018-07-15 RX ADMIN — Medication 25 MILLIGRAM(S): at 22:11

## 2018-07-15 RX ADMIN — ATORVASTATIN CALCIUM 80 MILLIGRAM(S): 80 TABLET, FILM COATED ORAL at 22:11

## 2018-07-15 RX ADMIN — Medication 975 MILLIGRAM(S): at 06:13

## 2018-07-15 RX ADMIN — EXEMESTANE 25 MILLIGRAM(S): 25 TABLET, SUGAR COATED ORAL at 12:49

## 2018-07-15 RX ADMIN — Medication 975 MILLIGRAM(S): at 14:32

## 2018-07-15 RX ADMIN — Medication 500 MILLIGRAM(S): at 18:03

## 2018-07-15 RX ADMIN — Medication 7 UNIT(S): at 11:34

## 2018-07-15 RX ADMIN — Medication 7 UNIT(S): at 18:02

## 2018-07-15 RX ADMIN — Medication 500 MILLIGRAM(S): at 06:13

## 2018-07-15 RX ADMIN — INSULIN GLARGINE 18 UNIT(S): 100 INJECTION, SOLUTION SUBCUTANEOUS at 22:11

## 2018-07-15 RX ADMIN — Medication 975 MILLIGRAM(S): at 22:11

## 2018-07-15 RX ADMIN — Medication 162 MILLIGRAM(S): at 11:35

## 2018-07-15 RX ADMIN — Medication 2: at 11:34

## 2018-07-15 RX ADMIN — LISINOPRIL 10 MILLIGRAM(S): 2.5 TABLET ORAL at 06:13

## 2018-07-15 RX ADMIN — ENOXAPARIN SODIUM 40 MILLIGRAM(S): 100 INJECTION SUBCUTANEOUS at 11:35

## 2018-07-15 RX ADMIN — SERTRALINE 100 MILLIGRAM(S): 25 TABLET, FILM COATED ORAL at 11:36

## 2018-07-16 LAB
ANION GAP SERPL CALC-SCNC: 13 MMOL/L — SIGNIFICANT CHANGE UP (ref 7–14)
BASOPHILS # BLD AUTO: 0.04 K/UL — SIGNIFICANT CHANGE UP (ref 0–0.2)
BASOPHILS NFR BLD AUTO: 0.9 % — SIGNIFICANT CHANGE UP (ref 0–1)
BUN SERPL-MCNC: 17 MG/DL — SIGNIFICANT CHANGE UP (ref 10–20)
CALCIUM SERPL-MCNC: 9.3 MG/DL — SIGNIFICANT CHANGE UP (ref 8.5–10.1)
CHLORIDE SERPL-SCNC: 103 MMOL/L — SIGNIFICANT CHANGE UP (ref 98–110)
CO2 SERPL-SCNC: 27 MMOL/L — SIGNIFICANT CHANGE UP (ref 17–32)
CREAT SERPL-MCNC: 0.8 MG/DL — SIGNIFICANT CHANGE UP (ref 0.7–1.5)
EOSINOPHIL # BLD AUTO: 0.24 K/UL — SIGNIFICANT CHANGE UP (ref 0–0.7)
EOSINOPHIL NFR BLD AUTO: 5.2 % — SIGNIFICANT CHANGE UP (ref 0–8)
GLUCOSE SERPL-MCNC: 128 MG/DL — HIGH (ref 70–99)
HCT VFR BLD CALC: 35.4 % — LOW (ref 37–47)
HGB BLD-MCNC: 11.6 G/DL — LOW (ref 12–16)
IMM GRANULOCYTES NFR BLD AUTO: 0.2 % — SIGNIFICANT CHANGE UP (ref 0.1–0.3)
LYMPHOCYTES # BLD AUTO: 1.12 K/UL — LOW (ref 1.2–3.4)
LYMPHOCYTES # BLD AUTO: 24.1 % — SIGNIFICANT CHANGE UP (ref 20.5–51.1)
MCHC RBC-ENTMCNC: 30 PG — SIGNIFICANT CHANGE UP (ref 27–31)
MCHC RBC-ENTMCNC: 32.8 G/DL — SIGNIFICANT CHANGE UP (ref 32–37)
MCV RBC AUTO: 91.5 FL — SIGNIFICANT CHANGE UP (ref 81–99)
MONOCYTES # BLD AUTO: 0.5 K/UL — SIGNIFICANT CHANGE UP (ref 0.1–0.6)
MONOCYTES NFR BLD AUTO: 10.8 % — HIGH (ref 1.7–9.3)
NEUTROPHILS # BLD AUTO: 2.74 K/UL — SIGNIFICANT CHANGE UP (ref 1.4–6.5)
NEUTROPHILS NFR BLD AUTO: 58.8 % — SIGNIFICANT CHANGE UP (ref 42.2–75.2)
NRBC # BLD: 0 /100 WBCS — SIGNIFICANT CHANGE UP (ref 0–0)
PLATELET # BLD AUTO: 265 K/UL — SIGNIFICANT CHANGE UP (ref 130–400)
POTASSIUM SERPL-MCNC: 3.9 MMOL/L — SIGNIFICANT CHANGE UP (ref 3.5–5)
POTASSIUM SERPL-SCNC: 3.9 MMOL/L — SIGNIFICANT CHANGE UP (ref 3.5–5)
RBC # BLD: 3.87 M/UL — LOW (ref 4.2–5.4)
RBC # FLD: 14 % — SIGNIFICANT CHANGE UP (ref 11.5–14.5)
SODIUM SERPL-SCNC: 143 MMOL/L — SIGNIFICANT CHANGE UP (ref 135–146)
WBC # BLD: 4.65 K/UL — LOW (ref 4.8–10.8)
WBC # FLD AUTO: 4.65 K/UL — LOW (ref 4.8–10.8)

## 2018-07-16 RX ADMIN — Medication 1 SPRAY(S): at 17:09

## 2018-07-16 RX ADMIN — POLYETHYLENE GLYCOL 3350 17 GRAM(S): 17 POWDER, FOR SOLUTION ORAL at 12:30

## 2018-07-16 RX ADMIN — Medication 7 UNIT(S): at 12:31

## 2018-07-16 RX ADMIN — Medication 7 UNIT(S): at 07:35

## 2018-07-16 RX ADMIN — Medication 100 MILLIGRAM(S): at 12:30

## 2018-07-16 RX ADMIN — SENNA PLUS 2 TABLET(S): 8.6 TABLET ORAL at 22:31

## 2018-07-16 RX ADMIN — CLOPIDOGREL BISULFATE 75 MILLIGRAM(S): 75 TABLET, FILM COATED ORAL at 12:30

## 2018-07-16 RX ADMIN — Medication 30 MILLILITER(S): at 19:03

## 2018-07-16 RX ADMIN — ENOXAPARIN SODIUM 40 MILLIGRAM(S): 100 INJECTION SUBCUTANEOUS at 12:29

## 2018-07-16 RX ADMIN — NORTRIPTYLINE HYDROCHLORIDE 25 MILLIGRAM(S): 10 CAPSULE ORAL at 22:31

## 2018-07-16 RX ADMIN — Medication 25 MILLIGRAM(S): at 22:31

## 2018-07-16 RX ADMIN — PANTOPRAZOLE SODIUM 40 MILLIGRAM(S): 20 TABLET, DELAYED RELEASE ORAL at 07:35

## 2018-07-16 RX ADMIN — Medication 1 SPRAY(S): at 05:03

## 2018-07-16 RX ADMIN — Medication 1: at 17:06

## 2018-07-16 RX ADMIN — Medication 975 MILLIGRAM(S): at 05:06

## 2018-07-16 RX ADMIN — EXEMESTANE 25 MILLIGRAM(S): 25 TABLET, SUGAR COATED ORAL at 12:32

## 2018-07-16 RX ADMIN — ATORVASTATIN CALCIUM 80 MILLIGRAM(S): 80 TABLET, FILM COATED ORAL at 22:31

## 2018-07-16 RX ADMIN — Medication 162 MILLIGRAM(S): at 12:30

## 2018-07-16 RX ADMIN — INSULIN GLARGINE 18 UNIT(S): 100 INJECTION, SOLUTION SUBCUTANEOUS at 22:33

## 2018-07-16 RX ADMIN — Medication 500 MILLIGRAM(S): at 17:08

## 2018-07-16 RX ADMIN — Medication 7 UNIT(S): at 17:05

## 2018-07-16 RX ADMIN — SERTRALINE 100 MILLIGRAM(S): 25 TABLET, FILM COATED ORAL at 12:30

## 2018-07-16 RX ADMIN — Medication 975 MILLIGRAM(S): at 12:30

## 2018-07-16 RX ADMIN — Medication 500 MILLIGRAM(S): at 05:06

## 2018-07-16 RX ADMIN — Medication 975 MILLIGRAM(S): at 22:31

## 2018-07-16 RX ADMIN — LISINOPRIL 10 MILLIGRAM(S): 2.5 TABLET ORAL at 05:06

## 2018-07-17 RX ADMIN — ENOXAPARIN SODIUM 40 MILLIGRAM(S): 100 INJECTION SUBCUTANEOUS at 12:46

## 2018-07-17 RX ADMIN — CLOPIDOGREL BISULFATE 75 MILLIGRAM(S): 75 TABLET, FILM COATED ORAL at 12:44

## 2018-07-17 RX ADMIN — Medication 500 MILLIGRAM(S): at 17:39

## 2018-07-17 RX ADMIN — Medication 7 UNIT(S): at 12:41

## 2018-07-17 RX ADMIN — Medication 2: at 12:42

## 2018-07-17 RX ADMIN — Medication 500 MILLIGRAM(S): at 06:27

## 2018-07-17 RX ADMIN — SENNA PLUS 2 TABLET(S): 8.6 TABLET ORAL at 21:47

## 2018-07-17 RX ADMIN — Medication 7 UNIT(S): at 08:30

## 2018-07-17 RX ADMIN — Medication 975 MILLIGRAM(S): at 21:47

## 2018-07-17 RX ADMIN — Medication 1: at 08:31

## 2018-07-17 RX ADMIN — NORTRIPTYLINE HYDROCHLORIDE 25 MILLIGRAM(S): 10 CAPSULE ORAL at 21:48

## 2018-07-17 RX ADMIN — Medication 1 SPRAY(S): at 06:34

## 2018-07-17 RX ADMIN — Medication 162 MILLIGRAM(S): at 12:44

## 2018-07-17 RX ADMIN — Medication 975 MILLIGRAM(S): at 06:27

## 2018-07-17 RX ADMIN — PANTOPRAZOLE SODIUM 40 MILLIGRAM(S): 20 TABLET, DELAYED RELEASE ORAL at 06:27

## 2018-07-17 RX ADMIN — LISINOPRIL 10 MILLIGRAM(S): 2.5 TABLET ORAL at 06:27

## 2018-07-17 RX ADMIN — SERTRALINE 100 MILLIGRAM(S): 25 TABLET, FILM COATED ORAL at 12:45

## 2018-07-17 RX ADMIN — POLYETHYLENE GLYCOL 3350 17 GRAM(S): 17 POWDER, FOR SOLUTION ORAL at 12:46

## 2018-07-17 RX ADMIN — Medication 100 MILLIGRAM(S): at 12:50

## 2018-07-17 RX ADMIN — INSULIN GLARGINE 18 UNIT(S): 100 INJECTION, SOLUTION SUBCUTANEOUS at 21:47

## 2018-07-17 RX ADMIN — Medication 1 SPRAY(S): at 17:36

## 2018-07-17 RX ADMIN — Medication 975 MILLIGRAM(S): at 12:46

## 2018-07-17 RX ADMIN — Medication 25 MILLIGRAM(S): at 21:47

## 2018-07-17 RX ADMIN — ATORVASTATIN CALCIUM 80 MILLIGRAM(S): 80 TABLET, FILM COATED ORAL at 21:47

## 2018-07-17 RX ADMIN — EXEMESTANE 25 MILLIGRAM(S): 25 TABLET, SUGAR COATED ORAL at 18:14

## 2018-07-18 RX ORDER — HYDROXYZINE HCL 10 MG
25 TABLET ORAL EVERY 8 HOURS
Refills: 0 | Status: DISCONTINUED | OUTPATIENT
Start: 2018-07-18 | End: 2018-07-26

## 2018-07-18 RX ORDER — HYDROXYZINE HCL 10 MG
50 TABLET ORAL AT BEDTIME
Refills: 0 | Status: DISCONTINUED | OUTPATIENT
Start: 2018-07-18 | End: 2018-07-26

## 2018-07-18 RX ADMIN — Medication 1 SPRAY(S): at 18:16

## 2018-07-18 RX ADMIN — Medication 162 MILLIGRAM(S): at 12:59

## 2018-07-18 RX ADMIN — Medication 500 MILLIGRAM(S): at 06:30

## 2018-07-18 RX ADMIN — INSULIN GLARGINE 18 UNIT(S): 100 INJECTION, SOLUTION SUBCUTANEOUS at 21:49

## 2018-07-18 RX ADMIN — LISINOPRIL 10 MILLIGRAM(S): 2.5 TABLET ORAL at 06:30

## 2018-07-18 RX ADMIN — NORTRIPTYLINE HYDROCHLORIDE 25 MILLIGRAM(S): 10 CAPSULE ORAL at 21:48

## 2018-07-18 RX ADMIN — Medication 975 MILLIGRAM(S): at 21:48

## 2018-07-18 RX ADMIN — Medication 7 UNIT(S): at 12:58

## 2018-07-18 RX ADMIN — Medication 50 MILLIGRAM(S): at 21:48

## 2018-07-18 RX ADMIN — Medication 975 MILLIGRAM(S): at 13:03

## 2018-07-18 RX ADMIN — Medication 1 SPRAY(S): at 06:31

## 2018-07-18 RX ADMIN — ENOXAPARIN SODIUM 40 MILLIGRAM(S): 100 INJECTION SUBCUTANEOUS at 13:00

## 2018-07-18 RX ADMIN — Medication 7 UNIT(S): at 09:07

## 2018-07-18 RX ADMIN — PANTOPRAZOLE SODIUM 40 MILLIGRAM(S): 20 TABLET, DELAYED RELEASE ORAL at 06:30

## 2018-07-18 RX ADMIN — SERTRALINE 100 MILLIGRAM(S): 25 TABLET, FILM COATED ORAL at 12:59

## 2018-07-18 RX ADMIN — SENNA PLUS 2 TABLET(S): 8.6 TABLET ORAL at 21:48

## 2018-07-18 RX ADMIN — Medication 100 MILLIGRAM(S): at 12:59

## 2018-07-18 RX ADMIN — ATORVASTATIN CALCIUM 80 MILLIGRAM(S): 80 TABLET, FILM COATED ORAL at 21:48

## 2018-07-18 RX ADMIN — POLYETHYLENE GLYCOL 3350 17 GRAM(S): 17 POWDER, FOR SOLUTION ORAL at 12:59

## 2018-07-18 RX ADMIN — Medication 975 MILLIGRAM(S): at 06:29

## 2018-07-18 RX ADMIN — EXEMESTANE 25 MILLIGRAM(S): 25 TABLET, SUGAR COATED ORAL at 13:00

## 2018-07-18 RX ADMIN — CLOPIDOGREL BISULFATE 75 MILLIGRAM(S): 75 TABLET, FILM COATED ORAL at 12:59

## 2018-07-18 RX ADMIN — Medication 7 UNIT(S): at 16:52

## 2018-07-18 RX ADMIN — Medication 500 MILLIGRAM(S): at 18:01

## 2018-07-19 RX ORDER — CLONAZEPAM 1 MG
0.5 TABLET ORAL
Refills: 0 | Status: COMPLETED | OUTPATIENT
Start: 2018-07-19 | End: 2018-07-26

## 2018-07-19 RX ADMIN — Medication 500 MILLIGRAM(S): at 09:02

## 2018-07-19 RX ADMIN — Medication 975 MILLIGRAM(S): at 13:02

## 2018-07-19 RX ADMIN — Medication 1 SPRAY(S): at 17:26

## 2018-07-19 RX ADMIN — Medication 975 MILLIGRAM(S): at 21:01

## 2018-07-19 RX ADMIN — SENNA PLUS 2 TABLET(S): 8.6 TABLET ORAL at 21:04

## 2018-07-19 RX ADMIN — Medication 500 MILLIGRAM(S): at 17:25

## 2018-07-19 RX ADMIN — Medication 975 MILLIGRAM(S): at 09:01

## 2018-07-19 RX ADMIN — INSULIN GLARGINE 18 UNIT(S): 100 INJECTION, SOLUTION SUBCUTANEOUS at 21:06

## 2018-07-19 RX ADMIN — POLYETHYLENE GLYCOL 3350 17 GRAM(S): 17 POWDER, FOR SOLUTION ORAL at 12:23

## 2018-07-19 RX ADMIN — ENOXAPARIN SODIUM 40 MILLIGRAM(S): 100 INJECTION SUBCUTANEOUS at 12:22

## 2018-07-19 RX ADMIN — PANTOPRAZOLE SODIUM 40 MILLIGRAM(S): 20 TABLET, DELAYED RELEASE ORAL at 07:06

## 2018-07-19 RX ADMIN — EXEMESTANE 25 MILLIGRAM(S): 25 TABLET, SUGAR COATED ORAL at 13:02

## 2018-07-19 RX ADMIN — ATORVASTATIN CALCIUM 80 MILLIGRAM(S): 80 TABLET, FILM COATED ORAL at 21:04

## 2018-07-19 RX ADMIN — Medication 7 UNIT(S): at 17:23

## 2018-07-19 RX ADMIN — CLOPIDOGREL BISULFATE 75 MILLIGRAM(S): 75 TABLET, FILM COATED ORAL at 12:20

## 2018-07-19 RX ADMIN — Medication 100 MILLIGRAM(S): at 12:20

## 2018-07-19 RX ADMIN — Medication 1: at 17:23

## 2018-07-19 RX ADMIN — Medication 162 MILLIGRAM(S): at 12:20

## 2018-07-19 RX ADMIN — Medication 50 MILLIGRAM(S): at 21:09

## 2018-07-19 RX ADMIN — NORTRIPTYLINE HYDROCHLORIDE 25 MILLIGRAM(S): 10 CAPSULE ORAL at 21:04

## 2018-07-19 RX ADMIN — Medication 7 UNIT(S): at 12:18

## 2018-07-19 RX ADMIN — LISINOPRIL 10 MILLIGRAM(S): 2.5 TABLET ORAL at 09:03

## 2018-07-19 RX ADMIN — Medication 0.5 MILLIGRAM(S): at 20:58

## 2018-07-19 RX ADMIN — Medication 7 UNIT(S): at 08:32

## 2018-07-19 RX ADMIN — SERTRALINE 100 MILLIGRAM(S): 25 TABLET, FILM COATED ORAL at 12:20

## 2018-07-19 RX ADMIN — Medication 1: at 12:19

## 2018-07-20 RX ADMIN — Medication 500 MILLIGRAM(S): at 05:26

## 2018-07-20 RX ADMIN — CLOPIDOGREL BISULFATE 75 MILLIGRAM(S): 75 TABLET, FILM COATED ORAL at 12:02

## 2018-07-20 RX ADMIN — ATORVASTATIN CALCIUM 80 MILLIGRAM(S): 80 TABLET, FILM COATED ORAL at 21:48

## 2018-07-20 RX ADMIN — INSULIN GLARGINE 18 UNIT(S): 100 INJECTION, SOLUTION SUBCUTANEOUS at 21:49

## 2018-07-20 RX ADMIN — Medication 0.5 MILLIGRAM(S): at 21:46

## 2018-07-20 RX ADMIN — LISINOPRIL 10 MILLIGRAM(S): 2.5 TABLET ORAL at 05:26

## 2018-07-20 RX ADMIN — NORTRIPTYLINE HYDROCHLORIDE 25 MILLIGRAM(S): 10 CAPSULE ORAL at 21:50

## 2018-07-20 RX ADMIN — Medication 100 MILLIGRAM(S): at 12:02

## 2018-07-20 RX ADMIN — Medication 162 MILLIGRAM(S): at 12:02

## 2018-07-20 RX ADMIN — Medication 975 MILLIGRAM(S): at 21:46

## 2018-07-20 RX ADMIN — Medication 975 MILLIGRAM(S): at 05:25

## 2018-07-20 RX ADMIN — Medication 50 MILLIGRAM(S): at 21:48

## 2018-07-20 RX ADMIN — Medication 1 SPRAY(S): at 05:33

## 2018-07-20 RX ADMIN — Medication 1 SPRAY(S): at 17:13

## 2018-07-20 RX ADMIN — SERTRALINE 100 MILLIGRAM(S): 25 TABLET, FILM COATED ORAL at 12:02

## 2018-07-20 RX ADMIN — POLYETHYLENE GLYCOL 3350 17 GRAM(S): 17 POWDER, FOR SOLUTION ORAL at 12:03

## 2018-07-20 RX ADMIN — Medication 7 UNIT(S): at 12:01

## 2018-07-20 RX ADMIN — PANTOPRAZOLE SODIUM 40 MILLIGRAM(S): 20 TABLET, DELAYED RELEASE ORAL at 06:52

## 2018-07-20 RX ADMIN — SENNA PLUS 2 TABLET(S): 8.6 TABLET ORAL at 21:51

## 2018-07-20 RX ADMIN — EXEMESTANE 25 MILLIGRAM(S): 25 TABLET, SUGAR COATED ORAL at 12:02

## 2018-07-20 RX ADMIN — Medication 7 UNIT(S): at 08:00

## 2018-07-20 RX ADMIN — Medication 975 MILLIGRAM(S): at 15:14

## 2018-07-20 RX ADMIN — Medication 7 UNIT(S): at 17:12

## 2018-07-20 RX ADMIN — ENOXAPARIN SODIUM 40 MILLIGRAM(S): 100 INJECTION SUBCUTANEOUS at 12:02

## 2018-07-20 RX ADMIN — Medication 1: at 17:12

## 2018-07-20 RX ADMIN — Medication 1: at 12:01

## 2018-07-21 RX ADMIN — ATORVASTATIN CALCIUM 80 MILLIGRAM(S): 80 TABLET, FILM COATED ORAL at 21:58

## 2018-07-21 RX ADMIN — Medication 162 MILLIGRAM(S): at 12:24

## 2018-07-21 RX ADMIN — PANTOPRAZOLE SODIUM 40 MILLIGRAM(S): 20 TABLET, DELAYED RELEASE ORAL at 06:28

## 2018-07-21 RX ADMIN — EXEMESTANE 25 MILLIGRAM(S): 25 TABLET, SUGAR COATED ORAL at 12:28

## 2018-07-21 RX ADMIN — Medication 100 MILLIGRAM(S): at 12:24

## 2018-07-21 RX ADMIN — LISINOPRIL 10 MILLIGRAM(S): 2.5 TABLET ORAL at 06:27

## 2018-07-21 RX ADMIN — Medication 7 UNIT(S): at 08:26

## 2018-07-21 RX ADMIN — Medication 975 MILLIGRAM(S): at 06:27

## 2018-07-21 RX ADMIN — CLOPIDOGREL BISULFATE 75 MILLIGRAM(S): 75 TABLET, FILM COATED ORAL at 12:24

## 2018-07-21 RX ADMIN — Medication 975 MILLIGRAM(S): at 21:57

## 2018-07-21 RX ADMIN — SENNA PLUS 2 TABLET(S): 8.6 TABLET ORAL at 21:59

## 2018-07-21 RX ADMIN — Medication 50 MILLIGRAM(S): at 21:58

## 2018-07-21 RX ADMIN — INSULIN GLARGINE 18 UNIT(S): 100 INJECTION, SOLUTION SUBCUTANEOUS at 21:59

## 2018-07-21 RX ADMIN — POLYETHYLENE GLYCOL 3350 17 GRAM(S): 17 POWDER, FOR SOLUTION ORAL at 12:24

## 2018-07-21 RX ADMIN — Medication 1 SPRAY(S): at 06:28

## 2018-07-21 RX ADMIN — Medication 0.5 MILLIGRAM(S): at 20:41

## 2018-07-21 RX ADMIN — SERTRALINE 100 MILLIGRAM(S): 25 TABLET, FILM COATED ORAL at 12:23

## 2018-07-21 RX ADMIN — ENOXAPARIN SODIUM 40 MILLIGRAM(S): 100 INJECTION SUBCUTANEOUS at 12:24

## 2018-07-21 RX ADMIN — NORTRIPTYLINE HYDROCHLORIDE 25 MILLIGRAM(S): 10 CAPSULE ORAL at 21:59

## 2018-07-22 ENCOUNTER — TRANSCRIPTION ENCOUNTER (OUTPATIENT)
Age: 78
End: 2018-07-22

## 2018-07-22 RX ADMIN — Medication 975 MILLIGRAM(S): at 13:49

## 2018-07-22 RX ADMIN — Medication 1 SPRAY(S): at 06:57

## 2018-07-22 RX ADMIN — EXEMESTANE 25 MILLIGRAM(S): 25 TABLET, SUGAR COATED ORAL at 11:59

## 2018-07-22 RX ADMIN — NORTRIPTYLINE HYDROCHLORIDE 25 MILLIGRAM(S): 10 CAPSULE ORAL at 22:39

## 2018-07-22 RX ADMIN — ENOXAPARIN SODIUM 40 MILLIGRAM(S): 100 INJECTION SUBCUTANEOUS at 11:58

## 2018-07-22 RX ADMIN — Medication 30 MILLILITER(S): at 18:39

## 2018-07-22 RX ADMIN — POLYETHYLENE GLYCOL 3350 17 GRAM(S): 17 POWDER, FOR SOLUTION ORAL at 11:59

## 2018-07-22 RX ADMIN — Medication 7 UNIT(S): at 08:16

## 2018-07-22 RX ADMIN — LISINOPRIL 10 MILLIGRAM(S): 2.5 TABLET ORAL at 06:55

## 2018-07-22 RX ADMIN — Medication 50 MILLIGRAM(S): at 22:39

## 2018-07-22 RX ADMIN — PANTOPRAZOLE SODIUM 40 MILLIGRAM(S): 20 TABLET, DELAYED RELEASE ORAL at 06:56

## 2018-07-22 RX ADMIN — INSULIN GLARGINE 18 UNIT(S): 100 INJECTION, SOLUTION SUBCUTANEOUS at 22:37

## 2018-07-22 RX ADMIN — SERTRALINE 100 MILLIGRAM(S): 25 TABLET, FILM COATED ORAL at 11:58

## 2018-07-22 RX ADMIN — Medication 975 MILLIGRAM(S): at 22:37

## 2018-07-22 RX ADMIN — Medication 0.5 MILLIGRAM(S): at 19:57

## 2018-07-22 RX ADMIN — CLOPIDOGREL BISULFATE 75 MILLIGRAM(S): 75 TABLET, FILM COATED ORAL at 11:58

## 2018-07-22 RX ADMIN — Medication 975 MILLIGRAM(S): at 06:56

## 2018-07-22 RX ADMIN — Medication 7 UNIT(S): at 12:09

## 2018-07-22 RX ADMIN — ATORVASTATIN CALCIUM 80 MILLIGRAM(S): 80 TABLET, FILM COATED ORAL at 22:38

## 2018-07-22 RX ADMIN — SENNA PLUS 2 TABLET(S): 8.6 TABLET ORAL at 22:38

## 2018-07-22 RX ADMIN — Medication 1 SPRAY(S): at 18:39

## 2018-07-22 RX ADMIN — Medication 100 MILLIGRAM(S): at 11:58

## 2018-07-22 RX ADMIN — Medication 162 MILLIGRAM(S): at 11:58

## 2018-07-22 NOTE — DISCHARGE NOTE ADULT - MEDICATION SUMMARY - MEDICATIONS TO CHANGE
I will SWITCH the dose or number of times a day I take the medications listed below when I get home from the hospital:    sertraline  -- 25 milligram(s) by mouth once a day    insulin glargine  -- 21 unit(s) subcutaneous once a day (at bedtime)

## 2018-07-22 NOTE — DISCHARGE NOTE ADULT - CARE PLAN
Goal:	CVA in Medulla  Assessment and plan of treatment:	- Stroke as confirmed by MRI  - Following with Dr. Asher for Neurology  - Please continue to take Aspirin and Atorvastatin for stroke prophylaxis  Goal:	Insomnia, Depression, Emotional Lability  Assessment and plan of treatment:	- Consulted Psychiatry for medication adjustments  - Please continue to take Nortriptyline 25mg at bedtime, Sertraline 100mg Daily, Hydroxyzine hydrochloride 50mg at bedtime, and Clonazepam 0.5mg at 8PM  Goal:	Urinary Tract Infection  Assessment and plan of treatment:	- Urinalysis positive on 7/12  - Completed full course of antibiotics with Ciprofloxacin on 7/19.

## 2018-07-22 NOTE — DISCHARGE NOTE ADULT - PLAN OF CARE
Urinary Tract Infection - Consulted Psychiatry for medication adjustments  - Please continue to take Nortriptyline 25mg at bedtime, Sertraline 100mg Daily, Hydroxyzine hydrochloride 50mg at bedtime, and Clonazepam 0.5mg at 8PM - Stroke as confirmed by MRI  - Following with Dr. Asher for Neurology  - Please continue to take Aspirin and Atorvastatin for stroke prophylaxis CVA in Medulla Insomnia, Depression, Emotional Lability - Urinalysis positive on 7/12  - Completed full course of antibiotics with Ciprofloxacin on 7/19.

## 2018-07-22 NOTE — DISCHARGE NOTE ADULT - PATIENT PORTAL LINK FT
You can access the [facility] Patient [facility], offered by [facility], by registering with the following website: [email or web address]

## 2018-07-22 NOTE — DISCHARGE NOTE ADULT - MEDICATION SUMMARY - MEDICATIONS TO TAKE
I will START or STAY ON the medications listed below when I get home from the hospital:    acetaminophen 325 mg oral tablet  -- 3 tab(s) by mouth every 8 hours  -- Indication: For pain    aspirin 81 mg oral tablet, chewable  -- 2 tab(s) by mouth once a day  -- Indication: For blood thinner    lisinopril 10 mg oral tablet  -- 1 tab(s) by mouth once a day  -- Indication: For blood pressure    aluminum hydroxide-magnesium hydroxide 200 mg-200 mg/5 mL oral suspension  -- 30 milliliter(s) by mouth every 6 hours, As needed, Dyspepsia  -- Indication: For upset stomach    clonazePAM 0.5 mg oral tablet  -- 1 tab(s) by mouth once a day (at bedtime)  -- Indication: For anxiety    nortriptyline 25 mg oral capsule  -- 1 cap(s) by mouth once a day (at bedtime)  -- Indication: For psych    sertraline 100 mg oral tablet  -- 1 tab(s) by mouth once a day  -- Indication: For psych    HumaLOG 100 units/mL subcutaneous solution  -- 7 unit(s) subcutaneous 3 times a day (before meals)  -- Indication: For diabetes    atorvastatin 80 mg oral tablet  -- 1 tab(s) by mouth once a day (at bedtime)  -- Indication: For Cholesterol    exemestane 25 mg oral tablet  -- 1 tab(s) by mouth once a day  -- Indication: For breast ca    clopidogrel 75 mg oral tablet  -- 1 tab(s) by mouth once a day  -- Indication: For blood thinner    hydrOXYzine hydrochloride 50 mg oral tablet  -- 1 tab(s) by mouth once a day (at bedtime)  -- Indication: For anxiety    hydrOXYzine hydrochloride 25 mg oral tablet  -- 1 tab(s) by mouth every 8 hours, As needed, Anxiety  -- Indication: For anxiety    docusate sodium 100 mg oral capsule  -- 1 cap(s) by mouth once a day  -- Indication: For stool softener    polyethylene glycol 3350 oral powder for reconstitution  -- 17 gram(s) by mouth once a day  -- Indication: For laxative    senna oral tablet  -- 2 tab(s) by mouth once a day (at bedtime)  -- Indication: For laxative    pantoprazole 40 mg oral delayed release tablet  -- 1 tab(s) by mouth once a day (before a meal)  -- Indication: For stomach acid I will START or STAY ON the medications listed below when I get home from the hospital:    aspirin 81 mg oral tablet, chewable  -- 2 tab(s) by mouth once a day  -- Indication: For blood thinner    lisinopril 10 mg oral tablet  -- 1 tab(s) by mouth once a day  -- Indication: For blood pressure    aluminum hydroxide-magnesium hydroxide 200 mg-200 mg/5 mL oral suspension  -- 30 milliliter(s) by mouth every 6 hours, As needed, Dyspepsia  -- Indication: For upset stomach    enoxaparin  -- 40 milligram(s) subcutaneous once a day  -- Indication: For blood thinner    clonazePAM 0.25 mg oral tablet  -- 1 tab(s) by mouth once a day as needed for anxiety  -- Indication: For anxiety    clonazePAM 0.25 mg oral tablet  -- 1 tab(s) by mouth once a day (at bedtime)  -- Indication: For anxiety    nortriptyline 25 mg oral capsule  -- 1 cap(s) by mouth once a day (at bedtime)  -- Indication: For psych    sertraline 100 mg oral tablet  -- 1 tab(s) by mouth once a day  -- Indication: For psych    insulin glargine 100 units/mL subcutaneous solution  -- 18 unit(s) subcutaneous once a day (at bedtime)  -- Indication: For diabetes    HumaLOG 100 units/mL subcutaneous solution  -- 7 unit(s) subcutaneous 3 times a day (before meals)  -- Indication: For diabetes    atorvastatin 80 mg oral tablet  -- 1 tab(s) by mouth once a day (at bedtime)  -- Indication: For Cholesterol    exemestane 25 mg oral tablet  -- 1 tab(s) by mouth once a day  -- Indication: For breast ca    clopidogrel 75 mg oral tablet  -- 1 tab(s) by mouth once a day  -- Indication: For blood thinner    hydrOXYzine hydrochloride 25 mg oral tablet  -- 1 tab(s) by mouth every 8 hours, As needed, Anxiety  -- Indication: For anxiety    docusate sodium 100 mg oral capsule  -- 1 cap(s) by mouth once a day  -- Indication: For stool softener    polyethylene glycol 3350 oral powder for reconstitution  -- 17 gram(s) by mouth once a day  -- Indication: For laxative    senna oral tablet  -- 2 tab(s) by mouth once a day (at bedtime)  -- Indication: For laxative    pantoprazole 40 mg oral delayed release tablet  -- 1 tab(s) by mouth once a day (before a meal)  -- Indication: For stomach acid

## 2018-07-22 NOTE — DISCHARGE NOTE ADULT - HOSPITAL COURSE
HPI:   78 y.o. female with a PMHx of HTN, HLD, DM2, Depression, and Breast CA presented for left sided weakness and nubmness. MRI demonstrated acute ischemic change in lower left medulla preliminarilly. Noted to have severe left carotid stenosis. Possible hyperintense lesion in upper cervical spinal cord noted on MRI.     Prior functional status: Ambulatory, ADL independent.    Admission Physical Exam:  Vital signs stable. Afebrile  Gen: alert, NAD  Cardio: RRR  Lungs: clear  Abd: soft, NT  Extremities: without edema. PROM wnl.  Neuro: Alert and oriented to time, place, and person.  Cranial nerves: II-XII grossly intact   Motor Power: LUE/LLE 2-/5, RUE/RLE WNL  Sensation: decreased in LUE/LLE    Hospital Course: The patient was admitted to the acute inpatient rehab unit presenting with a decline in functional status. The patient participated in three hours of multidisciplinary therapy 5-6 days per week. The patient was continued on all home medications or equivalent alternatives as deemed appropriate. The patient received prophylactic anticoagulation medication and was monitored closely with no complications. During the stay on the inpatient unit, the patient tolerated therapy but had several episodes of emotional lability. Psychiatry was consulted and medications were adjusted to good effect. The patient developed a urinary tract infection for which she completed a full 7 day course of Ciprofloxacin. Lantus was also decreased for better blood sugar control and the patient tolerated this well. The patient was stable and cleared for discharge by a multidisciplinary team.    Discharge functional status:    Discharge disposition: Short term rehabilitation. HPI:   78 y.o. female with a PMHx of HTN, HLD, DM2, Depression, and Breast CA presented for left sided weakness and nubmness. MRI demonstrated acute ischemic change in lower left medulla preliminarilly. Noted to have severe left carotid stenosis. Possible hyperintense lesion in upper cervical spinal cord noted on MRI.     Prior functional status: Ambulatory, ADL independent.    Admission Physical Exam:  Vital signs stable. Afebrile  Gen: alert, NAD  Cardio: RRR  Lungs: clear  Abd: soft, NT  Extremities: without edema. PROM wnl.  Neuro: Alert and oriented to time, place, and person.  Cranial nerves: II-XII grossly intact   Motor Power: LUE/LLE 2-/5, RUE/RLE WNL  Sensation: decreased in LUE/LLE    Hospital Course: The patient was admitted to the acute inpatient rehab unit presenting with a decline in functional status. The patient participated in three hours of multidisciplinary therapy 5-6 days per week. The patient was continued on all home medications or equivalent alternatives as deemed appropriate. The patient received prophylactic anticoagulation medication and was monitored closely with no complications. During the stay on the inpatient unit, the patient tolerated therapy but had several episodes of emotional lability. Psychiatry was consulted and medications were adjusted to good effect. The patient developed a urinary tract infection for which she completed a full 7 day course of Ciprofloxacin. Lantus was also decreased for better blood sugar control and the patient tolerated this well. The patient was stable and cleared for discharge by a multidisciplinary team.    Discharge functional status:     Discharge disposition: Short term rehabilitation. HPI:   78 y.o. female with a PMHx of HTN, HLD, DM2, Depression, and Breast CA presented for left sided weakness and nubmness. MRI demonstrated acute ischemic change in lower left medulla preliminarilly. Noted to have severe left carotid stenosis. Possible hyperintense lesion in upper cervical spinal cord noted on MRI.     Prior functional status: Ambulatory, ADL independent.    Admission Physical Exam:  Vital signs stable. Afebrile  Gen: alert, NAD  Cardio: RRR  Lungs: clear  Abd: soft, NT  Extremities: without edema. PROM wnl.  Neuro: Alert and oriented to time, place, and person.  Cranial nerves: II-XII grossly intact   Motor Power: LUE/LLE 2-/5, RUE/RLE WNL  Sensation: decreased in LUE/LLE    Hospital Course: The patient was admitted to the acute inpatient rehab unit presenting with a decline in functional status. The patient participated in three hours of multidisciplinary therapy 5-6 days per week. The patient was continued on all home medications or equivalent alternatives as deemed appropriate. The patient received prophylactic anticoagulation medication and was monitored closely with no complications. During the stay on the inpatient unit, the patient tolerated therapy but had several episodes of emotional lability. Psychiatry was consulted and medications were adjusted to good effect. The patient developed a urinary tract infection for which she completed a full 7 day course of Ciprofloxacin. Lantus was also decreased for better blood sugar control and the patient tolerated this well. The patient was stable and cleared for discharge by a multidisciplinary team.    Discharge functional status: total assist bed mobility & xfers. amb 2feet x1 w/ parallell bars w/ max assist, PT blocking Lt knee for stance phase and stabilizing hips/trunk, assisted w/ Lt hand grasp and positioning.    Discharge disposition: Short term rehabilitation.

## 2018-07-22 NOTE — DISCHARGE NOTE ADULT - ADDITIONAL INSTRUCTIONS
Please follow up with your Primary Care Provider, Dr. Jorgensen, in 1-2 weeks.  Please follow up with your Neurologist, Dr. Asher, in 2-4 weeks.

## 2018-07-22 NOTE — DISCHARGE NOTE ADULT - MEDICATION SUMMARY - MEDICATIONS TO STOP TAKING
I will STOP taking the medications listed below when I get home from the hospital:    sodium chloride 0.65% nasal spray  -- 1 spray(s) into nose 2 times a day, As Needed    enoxaparin  -- 40 unit(s) subcutaneous once a day (at bedtime)

## 2018-07-22 NOTE — DISCHARGE NOTE ADULT - CARE PROVIDER_API CALL
Izaiah Asher), Neurology; Vascular Neurology  [address] [facility]  [address]  [place], [place] [id number]  Phone: [phone]  Fax: [phone]    Osmar Jorgensen), Internal Medicine  [address] [place] [address]  Phone: [phone]  Fax: [phone]

## 2018-07-23 LAB
ANION GAP SERPL CALC-SCNC: 12 MMOL/L — SIGNIFICANT CHANGE UP (ref 7–14)
BASOPHILS # BLD AUTO: 0.03 K/UL — SIGNIFICANT CHANGE UP (ref 0–0.2)
BASOPHILS NFR BLD AUTO: 0.6 % — SIGNIFICANT CHANGE UP (ref 0–1)
BUN SERPL-MCNC: 19 MG/DL — SIGNIFICANT CHANGE UP (ref 10–20)
CALCIUM SERPL-MCNC: 9.3 MG/DL — SIGNIFICANT CHANGE UP (ref 8.5–10.1)
CHLORIDE SERPL-SCNC: 103 MMOL/L — SIGNIFICANT CHANGE UP (ref 98–110)
CO2 SERPL-SCNC: 29 MMOL/L — SIGNIFICANT CHANGE UP (ref 17–32)
CREAT SERPL-MCNC: 0.7 MG/DL — SIGNIFICANT CHANGE UP (ref 0.7–1.5)
EOSINOPHIL # BLD AUTO: 0.26 K/UL — SIGNIFICANT CHANGE UP (ref 0–0.7)
EOSINOPHIL NFR BLD AUTO: 4.9 % — SIGNIFICANT CHANGE UP (ref 0–8)
GLUCOSE SERPL-MCNC: 190 MG/DL — HIGH (ref 70–99)
HCT VFR BLD CALC: 34.4 % — LOW (ref 37–47)
HGB BLD-MCNC: 11.1 G/DL — LOW (ref 12–16)
IMM GRANULOCYTES NFR BLD AUTO: 0.2 % — SIGNIFICANT CHANGE UP (ref 0.1–0.3)
LYMPHOCYTES # BLD AUTO: 1.05 K/UL — LOW (ref 1.2–3.4)
LYMPHOCYTES # BLD AUTO: 19.8 % — LOW (ref 20.5–51.1)
MCHC RBC-ENTMCNC: 29.2 PG — SIGNIFICANT CHANGE UP (ref 27–31)
MCHC RBC-ENTMCNC: 32.3 G/DL — SIGNIFICANT CHANGE UP (ref 32–37)
MCV RBC AUTO: 90.5 FL — SIGNIFICANT CHANGE UP (ref 81–99)
MONOCYTES # BLD AUTO: 0.56 K/UL — SIGNIFICANT CHANGE UP (ref 0.1–0.6)
MONOCYTES NFR BLD AUTO: 10.6 % — HIGH (ref 1.7–9.3)
NEUTROPHILS # BLD AUTO: 3.38 K/UL — SIGNIFICANT CHANGE UP (ref 1.4–6.5)
NEUTROPHILS NFR BLD AUTO: 63.9 % — SIGNIFICANT CHANGE UP (ref 42.2–75.2)
NRBC # BLD: 0 /100 WBCS — SIGNIFICANT CHANGE UP (ref 0–0)
PLATELET # BLD AUTO: 229 K/UL — SIGNIFICANT CHANGE UP (ref 130–400)
POTASSIUM SERPL-MCNC: 4.2 MMOL/L — SIGNIFICANT CHANGE UP (ref 3.5–5)
POTASSIUM SERPL-SCNC: 4.2 MMOL/L — SIGNIFICANT CHANGE UP (ref 3.5–5)
RBC # BLD: 3.8 M/UL — LOW (ref 4.2–5.4)
RBC # FLD: 14.3 % — SIGNIFICANT CHANGE UP (ref 11.5–14.5)
SODIUM SERPL-SCNC: 144 MMOL/L — SIGNIFICANT CHANGE UP (ref 135–146)
WBC # BLD: 5.29 K/UL — SIGNIFICANT CHANGE UP (ref 4.8–10.8)
WBC # FLD AUTO: 5.29 K/UL — SIGNIFICANT CHANGE UP (ref 4.8–10.8)

## 2018-07-23 RX ADMIN — Medication 7 UNIT(S): at 17:47

## 2018-07-23 RX ADMIN — CLOPIDOGREL BISULFATE 75 MILLIGRAM(S): 75 TABLET, FILM COATED ORAL at 11:22

## 2018-07-23 RX ADMIN — PANTOPRAZOLE SODIUM 40 MILLIGRAM(S): 20 TABLET, DELAYED RELEASE ORAL at 06:44

## 2018-07-23 RX ADMIN — Medication 1 SPRAY(S): at 17:48

## 2018-07-23 RX ADMIN — Medication 100 MILLIGRAM(S): at 11:22

## 2018-07-23 RX ADMIN — Medication 2: at 12:23

## 2018-07-23 RX ADMIN — Medication 7 UNIT(S): at 08:20

## 2018-07-23 RX ADMIN — SERTRALINE 100 MILLIGRAM(S): 25 TABLET, FILM COATED ORAL at 11:23

## 2018-07-23 RX ADMIN — Medication 1 SPRAY(S): at 06:45

## 2018-07-23 RX ADMIN — LISINOPRIL 10 MILLIGRAM(S): 2.5 TABLET ORAL at 06:42

## 2018-07-23 RX ADMIN — SENNA PLUS 2 TABLET(S): 8.6 TABLET ORAL at 21:16

## 2018-07-23 RX ADMIN — Medication 7 UNIT(S): at 12:21

## 2018-07-23 RX ADMIN — NORTRIPTYLINE HYDROCHLORIDE 25 MILLIGRAM(S): 10 CAPSULE ORAL at 21:16

## 2018-07-23 RX ADMIN — INSULIN GLARGINE 18 UNIT(S): 100 INJECTION, SOLUTION SUBCUTANEOUS at 21:17

## 2018-07-23 RX ADMIN — POLYETHYLENE GLYCOL 3350 17 GRAM(S): 17 POWDER, FOR SOLUTION ORAL at 12:34

## 2018-07-23 RX ADMIN — Medication 1: at 17:47

## 2018-07-23 RX ADMIN — EXEMESTANE 25 MILLIGRAM(S): 25 TABLET, SUGAR COATED ORAL at 12:33

## 2018-07-23 RX ADMIN — ENOXAPARIN SODIUM 40 MILLIGRAM(S): 100 INJECTION SUBCUTANEOUS at 11:22

## 2018-07-23 RX ADMIN — Medication 0.5 MILLIGRAM(S): at 21:15

## 2018-07-23 RX ADMIN — ATORVASTATIN CALCIUM 80 MILLIGRAM(S): 80 TABLET, FILM COATED ORAL at 21:16

## 2018-07-23 RX ADMIN — Medication 975 MILLIGRAM(S): at 21:15

## 2018-07-23 RX ADMIN — Medication 162 MILLIGRAM(S): at 11:22

## 2018-07-23 RX ADMIN — Medication 975 MILLIGRAM(S): at 12:37

## 2018-07-23 RX ADMIN — Medication 50 MILLIGRAM(S): at 21:16

## 2018-07-23 RX ADMIN — Medication 975 MILLIGRAM(S): at 06:41

## 2018-07-24 RX ORDER — CLONAZEPAM 1 MG
0.5 TABLET ORAL
Refills: 0 | Status: DISCONTINUED | OUTPATIENT
Start: 2018-07-27 | End: 2018-07-26

## 2018-07-24 RX ADMIN — Medication 1: at 12:08

## 2018-07-24 RX ADMIN — Medication 1 SPRAY(S): at 18:07

## 2018-07-24 RX ADMIN — Medication 162 MILLIGRAM(S): at 11:49

## 2018-07-24 RX ADMIN — CLOPIDOGREL BISULFATE 75 MILLIGRAM(S): 75 TABLET, FILM COATED ORAL at 11:49

## 2018-07-24 RX ADMIN — Medication 50 MILLIGRAM(S): at 21:42

## 2018-07-24 RX ADMIN — SERTRALINE 100 MILLIGRAM(S): 25 TABLET, FILM COATED ORAL at 11:52

## 2018-07-24 RX ADMIN — Medication 975 MILLIGRAM(S): at 06:22

## 2018-07-24 RX ADMIN — Medication 7 UNIT(S): at 07:51

## 2018-07-24 RX ADMIN — LISINOPRIL 10 MILLIGRAM(S): 2.5 TABLET ORAL at 06:22

## 2018-07-24 RX ADMIN — SENNA PLUS 2 TABLET(S): 8.6 TABLET ORAL at 21:42

## 2018-07-24 RX ADMIN — Medication 1 SPRAY(S): at 06:22

## 2018-07-24 RX ADMIN — POLYETHYLENE GLYCOL 3350 17 GRAM(S): 17 POWDER, FOR SOLUTION ORAL at 11:51

## 2018-07-24 RX ADMIN — ATORVASTATIN CALCIUM 80 MILLIGRAM(S): 80 TABLET, FILM COATED ORAL at 21:42

## 2018-07-24 RX ADMIN — NORTRIPTYLINE HYDROCHLORIDE 25 MILLIGRAM(S): 10 CAPSULE ORAL at 21:42

## 2018-07-24 RX ADMIN — EXEMESTANE 25 MILLIGRAM(S): 25 TABLET, SUGAR COATED ORAL at 11:50

## 2018-07-24 RX ADMIN — Medication 0.5 MILLIGRAM(S): at 21:41

## 2018-07-24 RX ADMIN — INSULIN GLARGINE 18 UNIT(S): 100 INJECTION, SOLUTION SUBCUTANEOUS at 21:41

## 2018-07-24 RX ADMIN — PANTOPRAZOLE SODIUM 40 MILLIGRAM(S): 20 TABLET, DELAYED RELEASE ORAL at 07:53

## 2018-07-24 RX ADMIN — Medication 7 UNIT(S): at 18:06

## 2018-07-24 RX ADMIN — Medication 7 UNIT(S): at 12:08

## 2018-07-24 RX ADMIN — Medication 975 MILLIGRAM(S): at 21:42

## 2018-07-24 RX ADMIN — Medication 100 MILLIGRAM(S): at 11:52

## 2018-07-24 RX ADMIN — ENOXAPARIN SODIUM 40 MILLIGRAM(S): 100 INJECTION SUBCUTANEOUS at 11:50

## 2018-07-25 RX ORDER — ONDANSETRON 8 MG/1
4 TABLET, FILM COATED ORAL ONCE
Refills: 0 | Status: COMPLETED | OUTPATIENT
Start: 2018-07-25 | End: 2018-07-25

## 2018-07-25 RX ADMIN — Medication 162 MILLIGRAM(S): at 12:08

## 2018-07-25 RX ADMIN — SERTRALINE 100 MILLIGRAM(S): 25 TABLET, FILM COATED ORAL at 12:08

## 2018-07-25 RX ADMIN — Medication 1: at 16:52

## 2018-07-25 RX ADMIN — Medication 2: at 12:09

## 2018-07-25 RX ADMIN — Medication 50 MILLIGRAM(S): at 21:45

## 2018-07-25 RX ADMIN — Medication 1 SPRAY(S): at 18:07

## 2018-07-25 RX ADMIN — Medication 7 UNIT(S): at 16:52

## 2018-07-25 RX ADMIN — ENOXAPARIN SODIUM 40 MILLIGRAM(S): 100 INJECTION SUBCUTANEOUS at 12:10

## 2018-07-25 RX ADMIN — ATORVASTATIN CALCIUM 80 MILLIGRAM(S): 80 TABLET, FILM COATED ORAL at 21:44

## 2018-07-25 RX ADMIN — LISINOPRIL 10 MILLIGRAM(S): 2.5 TABLET ORAL at 06:33

## 2018-07-25 RX ADMIN — Medication 7 UNIT(S): at 08:04

## 2018-07-25 RX ADMIN — ONDANSETRON 4 MILLIGRAM(S): 8 TABLET, FILM COATED ORAL at 18:07

## 2018-07-25 RX ADMIN — NORTRIPTYLINE HYDROCHLORIDE 25 MILLIGRAM(S): 10 CAPSULE ORAL at 21:44

## 2018-07-25 RX ADMIN — Medication 975 MILLIGRAM(S): at 21:45

## 2018-07-25 RX ADMIN — EXEMESTANE 25 MILLIGRAM(S): 25 TABLET, SUGAR COATED ORAL at 12:10

## 2018-07-25 RX ADMIN — Medication 7 UNIT(S): at 12:09

## 2018-07-25 RX ADMIN — Medication 0.5 MILLIGRAM(S): at 20:38

## 2018-07-25 RX ADMIN — INSULIN GLARGINE 18 UNIT(S): 100 INJECTION, SOLUTION SUBCUTANEOUS at 21:43

## 2018-07-25 RX ADMIN — POLYETHYLENE GLYCOL 3350 17 GRAM(S): 17 POWDER, FOR SOLUTION ORAL at 12:10

## 2018-07-25 RX ADMIN — Medication 1 SPRAY(S): at 06:33

## 2018-07-25 RX ADMIN — Medication 975 MILLIGRAM(S): at 06:33

## 2018-07-25 RX ADMIN — SENNA PLUS 2 TABLET(S): 8.6 TABLET ORAL at 21:44

## 2018-07-25 RX ADMIN — CLOPIDOGREL BISULFATE 75 MILLIGRAM(S): 75 TABLET, FILM COATED ORAL at 12:08

## 2018-07-25 RX ADMIN — PANTOPRAZOLE SODIUM 40 MILLIGRAM(S): 20 TABLET, DELAYED RELEASE ORAL at 08:05

## 2018-07-25 RX ADMIN — Medication 100 MILLIGRAM(S): at 12:08

## 2018-07-25 RX ADMIN — Medication 975 MILLIGRAM(S): at 15:37

## 2018-07-26 RX ORDER — EXEMESTANE 25 MG/1
1 TABLET, SUGAR COATED ORAL
Qty: 0 | Refills: 0 | DISCHARGE
Start: 2018-07-26

## 2018-07-26 RX ORDER — DOCUSATE SODIUM 100 MG
1 CAPSULE ORAL
Qty: 0 | Refills: 0 | DISCHARGE
Start: 2018-07-26

## 2018-07-26 RX ORDER — SENNA PLUS 8.6 MG/1
2 TABLET ORAL
Qty: 0 | Refills: 0 | DISCHARGE
Start: 2018-07-26

## 2018-07-26 RX ORDER — ASPIRIN/CALCIUM CARB/MAGNESIUM 324 MG
2 TABLET ORAL
Qty: 0 | Refills: 0 | DISCHARGE
Start: 2018-07-26

## 2018-07-26 RX ORDER — NORTRIPTYLINE HYDROCHLORIDE 10 MG/1
1 CAPSULE ORAL
Qty: 0 | Refills: 0 | DISCHARGE
Start: 2018-07-26

## 2018-07-26 RX ORDER — HYDROXYZINE HCL 10 MG
1 TABLET ORAL
Qty: 0 | Refills: 0 | DISCHARGE
Start: 2018-07-26

## 2018-07-26 RX ORDER — CLOPIDOGREL BISULFATE 75 MG/1
1 TABLET, FILM COATED ORAL
Qty: 0 | Refills: 0 | DISCHARGE
Start: 2018-07-26

## 2018-07-26 RX ORDER — LISINOPRIL 2.5 MG/1
1 TABLET ORAL
Qty: 0 | Refills: 0 | DISCHARGE
Start: 2018-07-26

## 2018-07-26 RX ORDER — CLONAZEPAM 1 MG
1 TABLET ORAL
Qty: 0 | Refills: 0 | DISCHARGE
Start: 2018-07-26

## 2018-07-26 RX ORDER — INSULIN GLARGINE 100 [IU]/ML
18 INJECTION, SOLUTION SUBCUTANEOUS
Qty: 0 | Refills: 0 | DISCHARGE
Start: 2018-07-26

## 2018-07-26 RX ORDER — POLYETHYLENE GLYCOL 3350 17 G/17G
17 POWDER, FOR SOLUTION ORAL
Qty: 0 | Refills: 0 | DISCHARGE
Start: 2018-07-26

## 2018-07-26 RX ORDER — SERTRALINE 25 MG/1
1 TABLET, FILM COATED ORAL
Qty: 0 | Refills: 0 | DISCHARGE
Start: 2018-07-26

## 2018-07-26 RX ORDER — PANTOPRAZOLE SODIUM 20 MG/1
1 TABLET, DELAYED RELEASE ORAL
Qty: 0 | Refills: 0 | DISCHARGE
Start: 2018-07-26

## 2018-07-26 RX ORDER — INSULIN GLARGINE 100 [IU]/ML
30 INJECTION, SOLUTION SUBCUTANEOUS
Qty: 0 | Refills: 0 | DISCHARGE
Start: 2018-07-26

## 2018-07-26 RX ORDER — ACETAMINOPHEN 500 MG
3 TABLET ORAL
Qty: 0 | Refills: 0 | DISCHARGE
Start: 2018-07-26

## 2018-07-26 RX ORDER — ENOXAPARIN SODIUM 100 MG/ML
40 INJECTION SUBCUTANEOUS
Qty: 0 | Refills: 0 | DISCHARGE
Start: 2018-07-26

## 2018-07-26 RX ORDER — ATORVASTATIN CALCIUM 80 MG/1
1 TABLET, FILM COATED ORAL
Qty: 0 | Refills: 0 | DISCHARGE
Start: 2018-07-26

## 2018-07-26 RX ADMIN — Medication 7 UNIT(S): at 07:56

## 2018-07-26 RX ADMIN — EXEMESTANE 25 MILLIGRAM(S): 25 TABLET, SUGAR COATED ORAL at 11:30

## 2018-07-26 RX ADMIN — Medication 975 MILLIGRAM(S): at 06:38

## 2018-07-26 RX ADMIN — Medication 100 MILLIGRAM(S): at 11:29

## 2018-07-26 RX ADMIN — LISINOPRIL 10 MILLIGRAM(S): 2.5 TABLET ORAL at 06:38

## 2018-07-26 RX ADMIN — PANTOPRAZOLE SODIUM 40 MILLIGRAM(S): 20 TABLET, DELAYED RELEASE ORAL at 06:38

## 2018-07-26 RX ADMIN — POLYETHYLENE GLYCOL 3350 17 GRAM(S): 17 POWDER, FOR SOLUTION ORAL at 11:30

## 2018-07-26 RX ADMIN — Medication 2: at 11:32

## 2018-07-26 RX ADMIN — Medication 7 UNIT(S): at 11:32

## 2018-07-26 RX ADMIN — Medication 1 SPRAY(S): at 06:39

## 2018-07-26 RX ADMIN — ENOXAPARIN SODIUM 40 MILLIGRAM(S): 100 INJECTION SUBCUTANEOUS at 11:29

## 2018-07-26 RX ADMIN — SERTRALINE 100 MILLIGRAM(S): 25 TABLET, FILM COATED ORAL at 11:29

## 2018-07-26 RX ADMIN — CLOPIDOGREL BISULFATE 75 MILLIGRAM(S): 75 TABLET, FILM COATED ORAL at 11:29

## 2018-07-26 RX ADMIN — Medication 162 MILLIGRAM(S): at 11:29

## 2018-07-26 RX ADMIN — Medication 975 MILLIGRAM(S): at 13:03

## 2018-07-28 ENCOUNTER — OUTPATIENT (OUTPATIENT)
Dept: OUTPATIENT SERVICES | Facility: HOSPITAL | Age: 78
LOS: 1 days | Discharge: HOME | End: 2018-07-28

## 2018-07-28 DIAGNOSIS — Z98.890 OTHER SPECIFIED POSTPROCEDURAL STATES: Chronic | ICD-10-CM

## 2018-07-28 DIAGNOSIS — I10 ESSENTIAL (PRIMARY) HYPERTENSION: ICD-10-CM

## 2018-07-28 DIAGNOSIS — Z96.651 PRESENCE OF RIGHT ARTIFICIAL KNEE JOINT: Chronic | ICD-10-CM

## 2018-07-28 DIAGNOSIS — E11.9 TYPE 2 DIABETES MELLITUS WITHOUT COMPLICATIONS: ICD-10-CM

## 2018-07-28 DIAGNOSIS — Z90.49 ACQUIRED ABSENCE OF OTHER SPECIFIED PARTS OF DIGESTIVE TRACT: Chronic | ICD-10-CM

## 2018-08-06 DIAGNOSIS — Z88.0 ALLERGY STATUS TO PENICILLIN: ICD-10-CM

## 2018-08-06 DIAGNOSIS — Z88.2 ALLERGY STATUS TO SULFONAMIDES: ICD-10-CM

## 2018-08-06 DIAGNOSIS — R60.0 LOCALIZED EDEMA: ICD-10-CM

## 2018-08-06 DIAGNOSIS — I65.29 OCCLUSION AND STENOSIS OF UNSPECIFIED CAROTID ARTERY: ICD-10-CM

## 2018-08-06 DIAGNOSIS — G95.11 ACUTE INFARCTION OF SPINAL CORD (EMBOLIC) (NONEMBOLIC): ICD-10-CM

## 2018-08-06 DIAGNOSIS — F41.9 ANXIETY DISORDER, UNSPECIFIED: ICD-10-CM

## 2018-08-06 DIAGNOSIS — R63.8 OTHER SYMPTOMS AND SIGNS CONCERNING FOOD AND FLUID INTAKE: ICD-10-CM

## 2018-08-06 DIAGNOSIS — H91.90 UNSPECIFIED HEARING LOSS, UNSPECIFIED EAR: ICD-10-CM

## 2018-08-06 DIAGNOSIS — E78.5 HYPERLIPIDEMIA, UNSPECIFIED: ICD-10-CM

## 2018-08-06 DIAGNOSIS — Z91.040 LATEX ALLERGY STATUS: ICD-10-CM

## 2018-08-06 DIAGNOSIS — G81.94 HEMIPLEGIA, UNSPECIFIED AFFECTING LEFT NONDOMINANT SIDE: ICD-10-CM

## 2018-08-06 DIAGNOSIS — I25.2 OLD MYOCARDIAL INFARCTION: ICD-10-CM

## 2018-08-06 DIAGNOSIS — E11.9 TYPE 2 DIABETES MELLITUS WITHOUT COMPLICATIONS: ICD-10-CM

## 2018-08-06 DIAGNOSIS — G47.00 INSOMNIA, UNSPECIFIED: ICD-10-CM

## 2018-08-06 DIAGNOSIS — I25.10 ATHEROSCLEROTIC HEART DISEASE OF NATIVE CORONARY ARTERY WITHOUT ANGINA PECTORIS: ICD-10-CM

## 2018-08-06 DIAGNOSIS — Z85.3 PERSONAL HISTORY OF MALIGNANT NEOPLASM OF BREAST: ICD-10-CM

## 2018-08-06 DIAGNOSIS — R41.81 AGE-RELATED COGNITIVE DECLINE: ICD-10-CM

## 2018-08-06 DIAGNOSIS — F32.9 MAJOR DEPRESSIVE DISORDER, SINGLE EPISODE, UNSPECIFIED: ICD-10-CM

## 2018-08-06 DIAGNOSIS — I10 ESSENTIAL (PRIMARY) HYPERTENSION: ICD-10-CM

## 2018-08-13 ENCOUNTER — OUTPATIENT (OUTPATIENT)
Dept: OUTPATIENT SERVICES | Facility: HOSPITAL | Age: 78
LOS: 1 days | Discharge: HOME | End: 2018-08-13

## 2018-08-13 DIAGNOSIS — Z79.899 OTHER LONG TERM (CURRENT) DRUG THERAPY: ICD-10-CM

## 2018-08-13 DIAGNOSIS — Z98.890 OTHER SPECIFIED POSTPROCEDURAL STATES: Chronic | ICD-10-CM

## 2018-08-13 DIAGNOSIS — Z90.49 ACQUIRED ABSENCE OF OTHER SPECIFIED PARTS OF DIGESTIVE TRACT: Chronic | ICD-10-CM

## 2018-08-13 DIAGNOSIS — Z96.651 PRESENCE OF RIGHT ARTIFICIAL KNEE JOINT: Chronic | ICD-10-CM

## 2018-08-13 DIAGNOSIS — Z79.01 LONG TERM (CURRENT) USE OF ANTICOAGULANTS: ICD-10-CM

## 2018-08-29 ENCOUNTER — OUTPATIENT (OUTPATIENT)
Dept: OUTPATIENT SERVICES | Facility: HOSPITAL | Age: 78
LOS: 1 days | Discharge: HOME | End: 2018-08-29

## 2018-08-29 DIAGNOSIS — Z96.651 PRESENCE OF RIGHT ARTIFICIAL KNEE JOINT: Chronic | ICD-10-CM

## 2018-08-29 DIAGNOSIS — Z90.49 ACQUIRED ABSENCE OF OTHER SPECIFIED PARTS OF DIGESTIVE TRACT: Chronic | ICD-10-CM

## 2018-08-29 DIAGNOSIS — Z98.890 OTHER SPECIFIED POSTPROCEDURAL STATES: Chronic | ICD-10-CM

## 2018-08-30 DIAGNOSIS — N39.0 URINARY TRACT INFECTION, SITE NOT SPECIFIED: ICD-10-CM

## 2018-09-03 ENCOUNTER — OUTPATIENT (OUTPATIENT)
Dept: OUTPATIENT SERVICES | Facility: HOSPITAL | Age: 78
LOS: 1 days | Discharge: HOME | End: 2018-09-03

## 2018-09-03 DIAGNOSIS — Z79.01 LONG TERM (CURRENT) USE OF ANTICOAGULANTS: ICD-10-CM

## 2018-09-03 DIAGNOSIS — Z90.49 ACQUIRED ABSENCE OF OTHER SPECIFIED PARTS OF DIGESTIVE TRACT: Chronic | ICD-10-CM

## 2018-09-03 DIAGNOSIS — Z98.890 OTHER SPECIFIED POSTPROCEDURAL STATES: Chronic | ICD-10-CM

## 2018-09-03 DIAGNOSIS — Z96.651 PRESENCE OF RIGHT ARTIFICIAL KNEE JOINT: Chronic | ICD-10-CM

## 2018-09-05 ENCOUNTER — OUTPATIENT (OUTPATIENT)
Dept: OUTPATIENT SERVICES | Facility: HOSPITAL | Age: 78
LOS: 1 days | Discharge: HOME | End: 2018-09-05

## 2018-09-05 DIAGNOSIS — N39.0 URINARY TRACT INFECTION, SITE NOT SPECIFIED: ICD-10-CM

## 2018-09-05 DIAGNOSIS — Z90.49 ACQUIRED ABSENCE OF OTHER SPECIFIED PARTS OF DIGESTIVE TRACT: Chronic | ICD-10-CM

## 2018-09-05 DIAGNOSIS — Z98.890 OTHER SPECIFIED POSTPROCEDURAL STATES: Chronic | ICD-10-CM

## 2018-09-05 DIAGNOSIS — Z96.651 PRESENCE OF RIGHT ARTIFICIAL KNEE JOINT: Chronic | ICD-10-CM

## 2018-09-10 ENCOUNTER — OUTPATIENT (OUTPATIENT)
Dept: OUTPATIENT SERVICES | Facility: HOSPITAL | Age: 78
LOS: 1 days | Discharge: HOME | End: 2018-09-10

## 2018-09-10 DIAGNOSIS — Z98.890 OTHER SPECIFIED POSTPROCEDURAL STATES: Chronic | ICD-10-CM

## 2018-09-10 DIAGNOSIS — Z90.49 ACQUIRED ABSENCE OF OTHER SPECIFIED PARTS OF DIGESTIVE TRACT: Chronic | ICD-10-CM

## 2018-09-10 DIAGNOSIS — Z96.651 PRESENCE OF RIGHT ARTIFICIAL KNEE JOINT: Chronic | ICD-10-CM

## 2018-09-10 DIAGNOSIS — Z79.899 OTHER LONG TERM (CURRENT) DRUG THERAPY: ICD-10-CM

## 2018-09-17 ENCOUNTER — OUTPATIENT (OUTPATIENT)
Dept: OUTPATIENT SERVICES | Facility: HOSPITAL | Age: 78
LOS: 1 days | Discharge: HOME | End: 2018-09-17

## 2018-09-17 DIAGNOSIS — Z96.651 PRESENCE OF RIGHT ARTIFICIAL KNEE JOINT: Chronic | ICD-10-CM

## 2018-09-17 DIAGNOSIS — Z90.49 ACQUIRED ABSENCE OF OTHER SPECIFIED PARTS OF DIGESTIVE TRACT: Chronic | ICD-10-CM

## 2018-09-17 DIAGNOSIS — Z98.890 OTHER SPECIFIED POSTPROCEDURAL STATES: Chronic | ICD-10-CM

## 2018-09-17 DIAGNOSIS — Z79.01 LONG TERM (CURRENT) USE OF ANTICOAGULANTS: ICD-10-CM

## 2018-11-15 ENCOUNTER — EMERGENCY (EMERGENCY)
Facility: HOSPITAL | Age: 78
LOS: 0 days | Discharge: HOME | End: 2018-11-16
Attending: EMERGENCY MEDICINE | Admitting: EMERGENCY MEDICINE

## 2018-11-15 VITALS
SYSTOLIC BLOOD PRESSURE: 130 MMHG | TEMPERATURE: 97 F | DIASTOLIC BLOOD PRESSURE: 80 MMHG | HEIGHT: 60 IN | OXYGEN SATURATION: 99 % | WEIGHT: 160.06 LBS | RESPIRATION RATE: 20 BRPM | HEART RATE: 56 BPM

## 2018-11-15 DIAGNOSIS — Z88.0 ALLERGY STATUS TO PENICILLIN: ICD-10-CM

## 2018-11-15 DIAGNOSIS — Z88.2 ALLERGY STATUS TO SULFONAMIDES: ICD-10-CM

## 2018-11-15 DIAGNOSIS — E11.9 TYPE 2 DIABETES MELLITUS WITHOUT COMPLICATIONS: ICD-10-CM
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
## 2018-11-15 DIAGNOSIS — S09.90XA UNSPECIFIED INJURY OF HEAD, INITIAL ENCOUNTER: ICD-10-CM

## 2018-11-15 DIAGNOSIS — Z90.49 ACQUIRED ABSENCE OF OTHER SPECIFIED PARTS OF DIGESTIVE TRACT: ICD-10-CM
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
## 2018-11-15 DIAGNOSIS — I10 ESSENTIAL (PRIMARY) HYPERTENSION: ICD-10-CM

## 2018-11-15 DIAGNOSIS — Z98.890 OTHER SPECIFIED POSTPROCEDURAL STATES: Chronic | ICD-10-CM

## 2018-11-15 DIAGNOSIS — Z96.651 PRESENCE OF RIGHT ARTIFICIAL KNEE JOINT: Chronic | ICD-10-CM

## 2018-11-15 DIAGNOSIS — Z91.013 ALLERGY TO SEAFOOD: ICD-10-CM

## 2018-11-15 DIAGNOSIS — Z90.49 ACQUIRED ABSENCE OF OTHER SPECIFIED PARTS OF DIGESTIVE TRACT: Chronic | ICD-10-CM

## 2018-11-15 DIAGNOSIS — Z91.040 LATEX ALLERGY STATUS: ICD-10-CM
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
## 2018-11-15 RX ORDER — SODIUM CHLORIDE 9 MG/ML
3 INJECTION INTRAMUSCULAR; INTRAVENOUS; SUBCUTANEOUS ONCE
Refills: 0 | Status: COMPLETED | OUTPATIENT
Start: 2018-11-15 | End: 2018-11-15

## 2018-11-15 RX ORDER — ONDANSETRON 8 MG/1
4 TABLET, FILM COATED ORAL ONCE
Refills: 0 | Status: COMPLETED | OUTPATIENT
Start: 2018-11-15 | End: 2018-11-16

## 2018-11-15 RX ADMIN — SODIUM CHLORIDE 3 MILLILITER(S): 9 INJECTION INTRAMUSCULAR; INTRAVENOUS; SUBCUTANEOUS at 23:47

## 2018-11-15 NOTE — ED ADULT NURSE NOTE - NSIMPLEMENTINTERV_GEN_ALL_ED
Implemented All Universal Safety Interventions:  [place] to call system. Call bell, personal items and telephone within reach. Instruct patient to call for assistance. Room bathroom lighting operational. Non-slip footwear when patient is off stretcher. Physically safe environment: no spills, clutter or unnecessary equipment. Stretcher in lowest position, wheels locked, appropriate side rails in place.

## 2018-11-15 NOTE — ED ADULT TRIAGE NOTE - CHIEF COMPLAINT QUOTE
I went to go to bathroom at home and stood up and felt weak so I fell down.  Pt has history of weakness from stroke and has full time HHA.

## 2018-11-16 VITALS
SYSTOLIC BLOOD PRESSURE: 118 MMHG | OXYGEN SATURATION: 100 % | TEMPERATURE: 98 F | RESPIRATION RATE: 20 BRPM | HEART RATE: 94 BPM | DIASTOLIC BLOOD PRESSURE: 58 MMHG

## 2018-11-16 LAB
ALBUMIN SERPL ELPH-MCNC: 4.3 G/DL — SIGNIFICANT CHANGE UP (ref 3.5–5.2)
ALP SERPL-CCNC: 130 U/L — HIGH (ref 30–115)
ALT FLD-CCNC: 27 U/L — SIGNIFICANT CHANGE UP (ref 0–41)
ANION GAP SERPL CALC-SCNC: 25 MMOL/L — HIGH (ref 7–14)
AST SERPL-CCNC: 27 U/L — SIGNIFICANT CHANGE UP (ref 0–41)
BASE EXCESS BLDV CALC-SCNC: 2.3 MMOL/L — HIGH (ref -2–2)
BASOPHILS # BLD AUTO: 0.04 K/UL — SIGNIFICANT CHANGE UP (ref 0–0.2)
BASOPHILS NFR BLD AUTO: 0.3 % — SIGNIFICANT CHANGE UP (ref 0–1)
BILIRUB SERPL-MCNC: 0.4 MG/DL — SIGNIFICANT CHANGE UP (ref 0.2–1.2)
BUN SERPL-MCNC: 36 MG/DL — HIGH (ref 10–20)
CA-I SERPL-SCNC: 1.25 MMOL/L — SIGNIFICANT CHANGE UP (ref 1.12–1.3)
CALCIUM SERPL-MCNC: 10.2 MG/DL — HIGH (ref 8.5–10.1)
CHLORIDE SERPL-SCNC: 99 MMOL/L — SIGNIFICANT CHANGE UP (ref 98–110)
CO2 SERPL-SCNC: 21 MMOL/L — SIGNIFICANT CHANGE UP (ref 17–32)
CREAT SERPL-MCNC: 1.4 MG/DL — SIGNIFICANT CHANGE UP (ref 0.7–1.5)
EOSINOPHIL # BLD AUTO: 0.12 K/UL — SIGNIFICANT CHANGE UP (ref 0–0.7)
EOSINOPHIL NFR BLD AUTO: 1 % — SIGNIFICANT CHANGE UP (ref 0–8)
GAS PNL BLDV: 144 MMOL/L — SIGNIFICANT CHANGE UP (ref 136–145)
GAS PNL BLDV: SIGNIFICANT CHANGE UP
GLUCOSE SERPL-MCNC: 235 MG/DL — HIGH (ref 70–99)
HCO3 BLDV-SCNC: 26 MMOL/L — SIGNIFICANT CHANGE UP (ref 22–29)
HCT VFR BLD CALC: 33.1 % — LOW (ref 37–47)
HCT VFR BLDA CALC: 33.9 % — LOW (ref 34–44)
HGB BLD CALC-MCNC: 11 G/DL — LOW (ref 14–18)
HGB BLD-MCNC: 11.3 G/DL — LOW (ref 12–16)
HOROWITZ INDEX BLDV+IHG-RTO: 21 — SIGNIFICANT CHANGE UP
IMM GRANULOCYTES NFR BLD AUTO: 0.4 % — HIGH (ref 0.1–0.3)
LACTATE BLDV-MCNC: 1.3 MMOL/L — SIGNIFICANT CHANGE UP (ref 0.5–1.6)
LACTATE SERPL-SCNC: 2 MMOL/L — SIGNIFICANT CHANGE UP (ref 0.5–2.2)
LIDOCAIN IGE QN: 23 U/L — SIGNIFICANT CHANGE UP (ref 7–60)
LYMPHOCYTES # BLD AUTO: 0.89 K/UL — LOW (ref 1.2–3.4)
LYMPHOCYTES # BLD AUTO: 7.8 % — LOW (ref 20.5–51.1)
MCHC RBC-ENTMCNC: 29.5 PG — SIGNIFICANT CHANGE UP (ref 27–31)
MCHC RBC-ENTMCNC: 34.1 G/DL — SIGNIFICANT CHANGE UP (ref 32–37)
MCV RBC AUTO: 86.4 FL — SIGNIFICANT CHANGE UP (ref 81–99)
MONOCYTES # BLD AUTO: 0.76 K/UL — HIGH (ref 0.1–0.6)
MONOCYTES NFR BLD AUTO: 6.6 % — SIGNIFICANT CHANGE UP (ref 1.7–9.3)
NEUTROPHILS # BLD AUTO: 9.57 K/UL — HIGH (ref 1.4–6.5)
NEUTROPHILS NFR BLD AUTO: 83.9 % — HIGH (ref 42.2–75.2)
NRBC # BLD: 0 /100 WBCS — SIGNIFICANT CHANGE UP (ref 0–0)
PCO2 BLDV: 39 MMHG — LOW (ref 41–51)
PH BLDV: 7.44 — HIGH (ref 7.26–7.43)
PLATELET # BLD AUTO: 285 K/UL — SIGNIFICANT CHANGE UP (ref 130–400)
PO2 BLDV: 60 MMHG — HIGH (ref 20–40)
POTASSIUM BLDV-SCNC: 3.4 MMOL/L — SIGNIFICANT CHANGE UP (ref 3.3–5.6)
POTASSIUM SERPL-MCNC: 3.6 MMOL/L — SIGNIFICANT CHANGE UP (ref 3.5–5)
POTASSIUM SERPL-SCNC: 3.6 MMOL/L — SIGNIFICANT CHANGE UP (ref 3.5–5)
PROT SERPL-MCNC: 7.2 G/DL — SIGNIFICANT CHANGE UP (ref 6–8)
RBC # BLD: 3.83 M/UL — LOW (ref 4.2–5.4)
RBC # FLD: 13.3 % — SIGNIFICANT CHANGE UP (ref 11.5–14.5)
SAO2 % BLDV: 90 % — SIGNIFICANT CHANGE UP
SODIUM SERPL-SCNC: 145 MMOL/L — SIGNIFICANT CHANGE UP (ref 135–146)
WBC # BLD: 11.43 K/UL — HIGH (ref 4.8–10.8)
WBC # FLD AUTO: 11.43 K/UL — HIGH (ref 4.8–10.8)

## 2018-11-16 RX ADMIN — ONDANSETRON 4 MILLIGRAM(S): 8 TABLET, FILM COATED ORAL at 00:21

## 2018-11-16 NOTE — ED PROVIDER NOTE - PHYSICAL EXAMINATION
CONST: Well appearing in NAD  HEAD: NC/AT   EYES: PERRL, EOMI, Sclera and conjunctiva clear.  ENT: No nasal discharge. Oropharynx normal appearing, no erythema or exudates. Uvula midline.  NECK: Non-tender, no midline C/T/L tenderness, supple  CARD: Normal S1 S2; Normal rate and rhythm  RESP: Equal BS B/L, No wheezes, rhonchi or rales. No distress  GI: Soft, non-tender, non-distended.  MS: Normal ROM in all extremities, no TTP of extremities, No edema of lower extremities, no calf pain, radial pulses 2+ bilaterally  SKIN: Warm, dry, no acute rashes. Good turgor  NEURO: A&Ox3, No focal deficits. Strength 5/5 with no sensory deficits. Steady gait

## 2018-11-16 NOTE — ED PROVIDER NOTE - PROGRESS NOTE DETAILS
Discussed results with pt.  All questions were answered and return precautions discussed.  Pt is asx and comfortable at this time.  Unremarkable re-exam.  No further concerns at this time from pt.  Will follow up with PMD.  Pt understands and agrees with tx plan.

## 2018-11-16 NOTE — ED PROVIDER NOTE - NS ED ROS FT
Constitutional: See HPI.  HEAD: + head injury   Eyes: No visual changes, eye pain or discharge.   ENMT: No hearing changes, pain, discharge or infections. No neck pain or stiffness. No limited ROM  Cardiac: No SOB or edema. No chest pain with exertion.  Respiratory: No cough or respiratory distress.   GI: No nausea, vomiting, diarrhea or abdominal pain.  : No dysuria, frequency or burning. No Discharge  MS: No myalgia, muscle weakness, joint pain or back pain.  Neuro: No headache or weakness. No LOC.  Skin: No skin rash.  Except as documented in the HPI, all other systems are negative.

## 2018-11-16 NOTE — ED PROVIDER NOTE - ATTENDING CONTRIBUTION TO CARE
78 y.o female with hx of HTN, HLD, depression, DM presents to the ED for evaluation of mechanical trip and fall.  Pt was trying to get out of bed, tripped and hit head on corner of end table. Denies LOC, no n/v. Pt is at baseline mentally as per aid. No other complains. Agree with PAs exam. WIll do CT and reevaluate.

## 2018-11-16 NOTE — ED PROVIDER NOTE - MEDICAL DECISION MAKING DETAILS
78 y.o female with hx of HTN, HLD, depression, DM presents to the ED for evaluation of mechanical trip and fall.  Pt was trying to get out of bed, tripped and hit head on corner of end table. Denies LOC, no n/v. Pt is at baseline mentally as per aid. No other complains. Agree with PAs exam. CT done- no traumatic injury. WIll discharge back to NH.

## 2018-11-23 ENCOUNTER — EMERGENCY (EMERGENCY)
Facility: HOSPITAL | Age: 78
LOS: 0 days | Discharge: HOME | End: 2018-11-24
Attending: EMERGENCY MEDICINE | Admitting: EMERGENCY MEDICINE

## 2018-11-23 VITALS
HEIGHT: 63 IN | DIASTOLIC BLOOD PRESSURE: 90 MMHG | SYSTOLIC BLOOD PRESSURE: 145 MMHG | RESPIRATION RATE: 19 BRPM | WEIGHT: 160.06 LBS | OXYGEN SATURATION: 100 % | TEMPERATURE: 98 F | HEART RATE: 95 BPM

## 2018-11-23 DIAGNOSIS — Z96.651 PRESENCE OF RIGHT ARTIFICIAL KNEE JOINT: Chronic | ICD-10-CM

## 2018-11-23 DIAGNOSIS — R10.9 UNSPECIFIED ABDOMINAL PAIN: ICD-10-CM

## 2018-11-23 DIAGNOSIS — E11.9 TYPE 2 DIABETES MELLITUS WITHOUT COMPLICATIONS: ICD-10-CM

## 2018-11-23 DIAGNOSIS — Z98.890 OTHER SPECIFIED POSTPROCEDURAL STATES: Chronic | ICD-10-CM

## 2018-11-23 DIAGNOSIS — Z91.018 ALLERGY TO OTHER FOODS: ICD-10-CM

## 2018-11-23 DIAGNOSIS — Z91.040 LATEX ALLERGY STATUS: ICD-10-CM

## 2018-11-23 DIAGNOSIS — Z96.641 PRESENCE OF RIGHT ARTIFICIAL HIP JOINT: ICD-10-CM

## 2018-11-23 DIAGNOSIS — Z90.49 ACQUIRED ABSENCE OF OTHER SPECIFIED PARTS OF DIGESTIVE TRACT: ICD-10-CM

## 2018-11-23 DIAGNOSIS — Z88.0 ALLERGY STATUS TO PENICILLIN: ICD-10-CM

## 2018-11-23 DIAGNOSIS — K59.00 CONSTIPATION, UNSPECIFIED: ICD-10-CM

## 2018-11-23 DIAGNOSIS — Z90.49 ACQUIRED ABSENCE OF OTHER SPECIFIED PARTS OF DIGESTIVE TRACT: Chronic | ICD-10-CM

## 2018-11-23 DIAGNOSIS — R11.0 NAUSEA: ICD-10-CM

## 2018-11-23 DIAGNOSIS — I10 ESSENTIAL (PRIMARY) HYPERTENSION: ICD-10-CM

## 2018-11-23 DIAGNOSIS — E78.00 PURE HYPERCHOLESTEROLEMIA, UNSPECIFIED: ICD-10-CM

## 2018-11-23 DIAGNOSIS — Z91.013 ALLERGY TO SEAFOOD: ICD-10-CM

## 2018-11-23 DIAGNOSIS — Z88.8 ALLERGY STATUS TO OTHER DRUGS, MEDICAMENTS AND BIOLOGICAL SUBSTANCES: ICD-10-CM

## 2018-11-23 LAB
ALBUMIN SERPL ELPH-MCNC: 4.2 G/DL — SIGNIFICANT CHANGE UP (ref 3.5–5.2)
ALP SERPL-CCNC: 198 U/L — HIGH (ref 30–115)
ALT FLD-CCNC: 24 U/L — SIGNIFICANT CHANGE UP (ref 0–41)
ANION GAP SERPL CALC-SCNC: 21 MMOL/L — HIGH (ref 7–14)
AST SERPL-CCNC: 20 U/L — SIGNIFICANT CHANGE UP (ref 0–41)
BASOPHILS # BLD AUTO: 0.06 K/UL — SIGNIFICANT CHANGE UP (ref 0–0.2)
BASOPHILS NFR BLD AUTO: 0.6 % — SIGNIFICANT CHANGE UP (ref 0–1)
BILIRUB DIRECT SERPL-MCNC: <0.2 MG/DL — SIGNIFICANT CHANGE UP (ref 0–0.2)
BILIRUB INDIRECT FLD-MCNC: >0.2 MG/DL — SIGNIFICANT CHANGE UP (ref 0.2–1.2)
BILIRUB SERPL-MCNC: 0.4 MG/DL — SIGNIFICANT CHANGE UP (ref 0.2–1.2)
BUN SERPL-MCNC: 38 MG/DL — HIGH (ref 10–20)
CALCIUM SERPL-MCNC: 9.5 MG/DL — SIGNIFICANT CHANGE UP (ref 8.5–10.1)
CHLORIDE SERPL-SCNC: 100 MMOL/L — SIGNIFICANT CHANGE UP (ref 98–110)
CO2 SERPL-SCNC: 19 MMOL/L — SIGNIFICANT CHANGE UP (ref 17–32)
CREAT SERPL-MCNC: 1.3 MG/DL — SIGNIFICANT CHANGE UP (ref 0.7–1.5)
EOSINOPHIL # BLD AUTO: 0.25 K/UL — SIGNIFICANT CHANGE UP (ref 0–0.7)
EOSINOPHIL NFR BLD AUTO: 2.4 % — SIGNIFICANT CHANGE UP (ref 0–8)
GLUCOSE SERPL-MCNC: 226 MG/DL — HIGH (ref 70–99)
HCT VFR BLD CALC: 31.8 % — LOW (ref 37–47)
HGB BLD-MCNC: 10.8 G/DL — LOW (ref 12–16)
IMM GRANULOCYTES NFR BLD AUTO: 0.8 % — HIGH (ref 0.1–0.3)
LACTATE SERPL-SCNC: 2.3 MMOL/L — HIGH (ref 0.5–2.2)
LIDOCAIN IGE QN: 30 U/L — SIGNIFICANT CHANGE UP (ref 7–60)
LYMPHOCYTES # BLD AUTO: 1.66 K/UL — SIGNIFICANT CHANGE UP (ref 1.2–3.4)
LYMPHOCYTES # BLD AUTO: 15.6 % — LOW (ref 20.5–51.1)
MCHC RBC-ENTMCNC: 29.8 PG — SIGNIFICANT CHANGE UP (ref 27–31)
MCHC RBC-ENTMCNC: 34 G/DL — SIGNIFICANT CHANGE UP (ref 32–37)
MCV RBC AUTO: 87.8 FL — SIGNIFICANT CHANGE UP (ref 81–99)
MONOCYTES # BLD AUTO: 0.98 K/UL — HIGH (ref 0.1–0.6)
MONOCYTES NFR BLD AUTO: 9.2 % — SIGNIFICANT CHANGE UP (ref 1.7–9.3)
NEUTROPHILS # BLD AUTO: 7.58 K/UL — HIGH (ref 1.4–6.5)
NEUTROPHILS NFR BLD AUTO: 71.4 % — SIGNIFICANT CHANGE UP (ref 42.2–75.2)
NRBC # BLD: 0 /100 WBCS — SIGNIFICANT CHANGE UP (ref 0–0)
PLATELET # BLD AUTO: 320 K/UL — SIGNIFICANT CHANGE UP (ref 130–400)
POTASSIUM SERPL-MCNC: 3.4 MMOL/L — LOW (ref 3.5–5)
POTASSIUM SERPL-SCNC: 3.4 MMOL/L — LOW (ref 3.5–5)
PROT SERPL-MCNC: 6.9 G/DL — SIGNIFICANT CHANGE UP (ref 6–8)
RBC # BLD: 3.62 M/UL — LOW (ref 4.2–5.4)
RBC # FLD: 13.8 % — SIGNIFICANT CHANGE UP (ref 11.5–14.5)
SODIUM SERPL-SCNC: 140 MMOL/L — SIGNIFICANT CHANGE UP (ref 135–146)
WBC # BLD: 10.61 K/UL — SIGNIFICANT CHANGE UP (ref 4.8–10.8)
WBC # FLD AUTO: 10.61 K/UL — SIGNIFICANT CHANGE UP (ref 4.8–10.8)

## 2018-11-23 RX ORDER — SODIUM CHLORIDE 9 MG/ML
1000 INJECTION INTRAMUSCULAR; INTRAVENOUS; SUBCUTANEOUS
Refills: 0 | Status: DISCONTINUED | OUTPATIENT
Start: 2018-11-23 | End: 2018-11-24

## 2018-11-23 RX ORDER — ONDANSETRON 8 MG/1
4 TABLET, FILM COATED ORAL ONCE
Refills: 0 | Status: COMPLETED | OUTPATIENT
Start: 2018-11-23 | End: 2018-11-23

## 2018-11-23 RX ADMIN — SODIUM CHLORIDE 250 MILLILITER(S): 9 INJECTION INTRAMUSCULAR; INTRAVENOUS; SUBCUTANEOUS at 22:47

## 2018-11-23 RX ADMIN — ONDANSETRON 4 MILLIGRAM(S): 8 TABLET, FILM COATED ORAL at 22:47

## 2018-11-24 VITALS
DIASTOLIC BLOOD PRESSURE: 75 MMHG | TEMPERATURE: 97 F | HEART RATE: 81 BPM | SYSTOLIC BLOOD PRESSURE: 147 MMHG | OXYGEN SATURATION: 100 % | RESPIRATION RATE: 18 BRPM

## 2018-11-24 LAB
APPEARANCE UR: ABNORMAL
BACTERIA # UR AUTO: ABNORMAL
BILIRUB UR-MCNC: NEGATIVE — SIGNIFICANT CHANGE UP
COD CRY URNS QL: NEGATIVE — SIGNIFICANT CHANGE UP
COLOR SPEC: YELLOW — SIGNIFICANT CHANGE UP
DIFF PNL FLD: NEGATIVE — SIGNIFICANT CHANGE UP
EPI CELLS # UR: ABNORMAL /HPF
GLUCOSE UR QL: NEGATIVE MG/DL — SIGNIFICANT CHANGE UP
GRAN CASTS # UR COMP ASSIST: NEGATIVE — SIGNIFICANT CHANGE UP
HYALINE CASTS # UR AUTO: NEGATIVE — SIGNIFICANT CHANGE UP
KETONES UR-MCNC: NEGATIVE — SIGNIFICANT CHANGE UP
LEUKOCYTE ESTERASE UR-ACNC: SIGNIFICANT CHANGE UP
NITRITE UR-MCNC: NEGATIVE — SIGNIFICANT CHANGE UP
PH UR: 6 — SIGNIFICANT CHANGE UP (ref 5–8)
PROT UR-MCNC: NEGATIVE MG/DL — SIGNIFICANT CHANGE UP
RBC CASTS # UR COMP ASSIST: SIGNIFICANT CHANGE UP /HPF
SP GR SPEC: 1.01 — SIGNIFICANT CHANGE UP (ref 1.01–1.03)
TRI-PHOS CRY UR QL COMP ASSIST: NEGATIVE — SIGNIFICANT CHANGE UP
URATE CRY FLD QL MICRO: NEGATIVE — SIGNIFICANT CHANGE UP
UROBILINOGEN FLD QL: 0.2 MG/DL — SIGNIFICANT CHANGE UP (ref 0.2–0.2)
WBC UR QL: ABNORMAL /HPF

## 2018-11-24 RX ORDER — DOCUSATE SODIUM 100 MG
1 CAPSULE ORAL
Qty: 14 | Refills: 0
Start: 2018-11-24 | End: 2018-12-07

## 2018-11-24 RX ORDER — ONDANSETRON 8 MG/1
4 TABLET, FILM COATED ORAL ONCE
Refills: 0 | Status: COMPLETED | OUTPATIENT
Start: 2018-11-24 | End: 2018-11-24

## 2018-11-24 RX ADMIN — ONDANSETRON 4 MILLIGRAM(S): 8 TABLET, FILM COATED ORAL at 00:33

## 2018-11-24 NOTE — ED PROVIDER NOTE - PHYSICAL EXAMINATION
CONSTITUTIONAL: Well-appearing; well-nourished; in no apparent distress.   NECK: Supple; non-tender; no cervical lymphadenopathy. No JVD.   CARDIOVASCULAR: Normal S1, S2; no murmurs, rubs, or gallops.   RESPIRATORY: Normal chest excursion with respiration; breath sounds clear and equal bilaterally; no wheezes, rhonchi, or rales.  GI/: Normal bowel sounds; non-distended; + ttp over LUQ with guarding. No CVA ttp  MS: No evidence of trauma or deformity. Normal ROM in all four extremities; non-tender to palpation  SKIN: Normal for age and race; warm; dry; good turgor; no apparent lesions or exudate.   NEURO/PSYCH: A & O x 4; grossly unremarkable. mood and manner are appropriate. Grooming and personal hygiene are appropriate.

## 2018-11-24 NOTE — ED PROVIDER NOTE - PROGRESS NOTE DETAILS
Labs and imaging reviewed. Patient's symptoms likely 2/2 constipation. UA neg for acute infection. CT does show large stool burden. Will DC with return precautions.

## 2018-11-24 NOTE — ED PROVIDER NOTE - OBJECTIVE STATEMENT
78 year old female with pmhx of HTN, HLD, CVA, DM, breast ca, cholecystectomy c/o epigastric abdominal pain and nausea. As per aid pt is often c/o abdominal pain at home. Pt typically has bowel movements 1-2x week. No vomiting, diarrhea, rectal bleeding, fever/chills, dysuria, hematuria, chest pain, sob.

## 2018-11-24 NOTE — ED PROVIDER NOTE - ATTENDING CONTRIBUTION TO CARE
I personally evaluated the patient. I reviewed the Resident’s or Physician Assistant’s note (as assigned above), and agree with the findings and plan except as documented in my note.    78 year old female with pmhx of HTN, HLD, CVA, DM, breast ca, cholecystectomy c/o LUQ and lower abd pain for the past month. Patient complaining of being constipated. Having BMs only once a week. No flank pain. No bloody stools. No fevers, chills.     CONSTITUTIONAL: Well-developed; well-nourished; in no acute distress. Sitting up and providing appropriate history and physical examination  SKIN: skin exam is warm and dry, no acute rash.  HEAD: Normocephalic; atraumatic.  EYES: PERRL, 3 mm bilateral, no nystagmus, EOM intact; conjunctiva and sclera clear.  ENT: No nasal discharge; airway clear.  NECK: Supple; non tender.+ full passive ROM in all directions. No JVD  CARD: S1, S2 normal; no murmurs, gallops, or rubs. Regular rate and rhythm. + Symmetric Strong Pulses  RESP: No wheezes, rales or rhonchi. Good air movement bilaterally  ABD: soft; non-distended; non-tender. No Rebound, No Gaurding, No signs of peritnitis, No CVA tenderness  EXT: Normal ROM. No clubbing, cyanosis or edema. Dp and Pt Pulses intact. Cap refill less than 3 seconds  NEURO: CN 2-12 intact, normal finger to nose, normal romberg, stable gait, no sensory or motor deficits, Alert, oriented, grossly unremarkable. No Focal deficits. GCS 15. NIH 0  PSYCH: Cooperative, appropriate.      Plan: labs, CT abd/pelvis, UA, reassess

## 2018-11-24 NOTE — ED PROVIDER NOTE - NS ED ROS FT
Constitutional: no fever, chills, no recent weight loss, change in appetite or malaise  Cardiac: No chest pain, SOB or edema.  Respiratory: No cough or respiratory distress  GI: + epigastric abdominal pain and nausea. No vomiting, diarrhea  : No dysuria, frequency, urgency or hematuria  MS: no pain to back or extremities, no loss of ROM, no weakness  Neuro: No headache or weakness. No LOC.  Skin: No skin rash.  Endocrine: + hx of DM  Except as documented in the HPI, all other systems are negative.

## 2018-11-24 NOTE — ED PROVIDER NOTE - MEDICAL DECISION MAKING DETAILS
Labs and imaging reviewed. Patient's symptoms likely 2/2 constipation. UA neg for acute infection. CT does show large stool burden. Will DC with return precautions. I have full discussed the medical management and delivery of care with the patient. Patient confirms understanding and has been given detailed return precautions. Patient instructed to return to the ED should symptoms persist or worsen. Patient is well appearing upon discharge.

## 2018-12-27 ENCOUNTER — OUTPATIENT (OUTPATIENT)
Dept: OUTPATIENT SERVICES | Facility: HOSPITAL | Age: 78
LOS: 1 days | Discharge: HOME | End: 2018-12-27
Payer: MEDICARE

## 2018-12-27 DIAGNOSIS — Z98.890 OTHER SPECIFIED POSTPROCEDURAL STATES: Chronic | ICD-10-CM

## 2018-12-27 DIAGNOSIS — Z90.49 ACQUIRED ABSENCE OF OTHER SPECIFIED PARTS OF DIGESTIVE TRACT: Chronic | ICD-10-CM

## 2018-12-27 DIAGNOSIS — I69.911 MEMORY DEFICIT FOLLOWING UNSPECIFIED CEREBROVASCULAR DISEASE: ICD-10-CM

## 2018-12-27 DIAGNOSIS — Z96.651 PRESENCE OF RIGHT ARTIFICIAL KNEE JOINT: Chronic | ICD-10-CM

## 2018-12-27 PROCEDURE — 93880 EXTRACRANIAL BILAT STUDY: CPT | Mod: 26

## 2018-12-30 ENCOUNTER — EMERGENCY (EMERGENCY)
Facility: HOSPITAL | Age: 78
LOS: 0 days | Discharge: HOME | End: 2018-12-31
Attending: EMERGENCY MEDICINE | Admitting: EMERGENCY MEDICINE

## 2018-12-30 VITALS
OXYGEN SATURATION: 97 % | TEMPERATURE: 98 F | HEIGHT: 62 IN | HEART RATE: 89 BPM | RESPIRATION RATE: 18 BRPM | DIASTOLIC BLOOD PRESSURE: 67 MMHG | WEIGHT: 160.06 LBS | SYSTOLIC BLOOD PRESSURE: 131 MMHG

## 2018-12-30 DIAGNOSIS — Z79.82 LONG TERM (CURRENT) USE OF ASPIRIN: ICD-10-CM

## 2018-12-30 DIAGNOSIS — Z79.4 LONG TERM (CURRENT) USE OF INSULIN: ICD-10-CM

## 2018-12-30 DIAGNOSIS — R10.31 RIGHT LOWER QUADRANT PAIN: ICD-10-CM

## 2018-12-30 DIAGNOSIS — Z98.890 OTHER SPECIFIED POSTPROCEDURAL STATES: Chronic | ICD-10-CM

## 2018-12-30 DIAGNOSIS — Z90.49 ACQUIRED ABSENCE OF OTHER SPECIFIED PARTS OF DIGESTIVE TRACT: ICD-10-CM

## 2018-12-30 DIAGNOSIS — Z96.651 PRESENCE OF RIGHT ARTIFICIAL KNEE JOINT: Chronic | ICD-10-CM

## 2018-12-30 DIAGNOSIS — E11.9 TYPE 2 DIABETES MELLITUS WITHOUT COMPLICATIONS: ICD-10-CM

## 2018-12-30 DIAGNOSIS — F32.9 MAJOR DEPRESSIVE DISORDER, SINGLE EPISODE, UNSPECIFIED: ICD-10-CM

## 2018-12-30 DIAGNOSIS — R10.32 LEFT LOWER QUADRANT PAIN: ICD-10-CM

## 2018-12-30 DIAGNOSIS — I10 ESSENTIAL (PRIMARY) HYPERTENSION: ICD-10-CM

## 2018-12-30 DIAGNOSIS — Z90.49 ACQUIRED ABSENCE OF OTHER SPECIFIED PARTS OF DIGESTIVE TRACT: Chronic | ICD-10-CM

## 2018-12-30 DIAGNOSIS — R06.02 SHORTNESS OF BREATH: ICD-10-CM

## 2018-12-30 DIAGNOSIS — Z96.651 PRESENCE OF RIGHT ARTIFICIAL KNEE JOINT: ICD-10-CM

## 2018-12-30 DIAGNOSIS — E78.00 PURE HYPERCHOLESTEROLEMIA, UNSPECIFIED: ICD-10-CM

## 2018-12-30 RX ORDER — FAMOTIDINE 10 MG/ML
20 INJECTION INTRAVENOUS ONCE
Refills: 0 | Status: COMPLETED | OUTPATIENT
Start: 2018-12-30 | End: 2018-12-30

## 2018-12-30 RX ORDER — SODIUM CHLORIDE 9 MG/ML
1000 INJECTION, SOLUTION INTRAVENOUS ONCE
Refills: 0 | Status: COMPLETED | OUTPATIENT
Start: 2018-12-30 | End: 2018-12-30

## 2018-12-30 RX ORDER — ONDANSETRON 8 MG/1
4 TABLET, FILM COATED ORAL ONCE
Refills: 0 | Status: COMPLETED | OUTPATIENT
Start: 2018-12-30 | End: 2018-12-30

## 2018-12-30 NOTE — ED ADULT NURSE NOTE - NSIMPLEMENTINTERV_GEN_ALL_ED
Implemented All Fall Risk Interventions:  [place] to call system. Call bell, personal items and telephone within reach. Instruct patient to call for assistance. Room bathroom lighting operational. Non-slip footwear when patient is off stretcher. Physically safe environment: no spills, clutter or unnecessary equipment. Stretcher in lowest position, wheels locked, appropriate side rails in place. Provide visual cue, wrist band, yellow gown, etc. Monitor gait and stability. Monitor for mental status changes and reorient to person, place, and time. Review medications for side effects contributing to fall risk. Reinforce activity limits and safety measures with patient and family.

## 2018-12-31 VITALS
SYSTOLIC BLOOD PRESSURE: 135 MMHG | TEMPERATURE: 98 F | DIASTOLIC BLOOD PRESSURE: 85 MMHG | HEART RATE: 90 BPM | RESPIRATION RATE: 18 BRPM | OXYGEN SATURATION: 98 %

## 2018-12-31 LAB
ANION GAP SERPL CALC-SCNC: 20 MMOL/L — HIGH (ref 7–14)
APTT BLD: 27.2 SEC — SIGNIFICANT CHANGE UP (ref 27–39.2)
BASE EXCESS BLDV CALC-SCNC: 5.5 MMOL/L — HIGH (ref -2–2)
BASOPHILS # BLD AUTO: 0.04 K/UL — SIGNIFICANT CHANGE UP (ref 0–0.2)
BASOPHILS NFR BLD AUTO: 0.5 % — SIGNIFICANT CHANGE UP (ref 0–1)
BUN SERPL-MCNC: 33 MG/DL — HIGH (ref 10–20)
CA-I SERPL-SCNC: 1.14 MMOL/L — SIGNIFICANT CHANGE UP (ref 1.12–1.3)
CALCIUM SERPL-MCNC: 9.7 MG/DL — SIGNIFICANT CHANGE UP (ref 8.5–10.1)
CHLORIDE SERPL-SCNC: 99 MMOL/L — SIGNIFICANT CHANGE UP (ref 98–110)
CO2 SERPL-SCNC: 25 MMOL/L — SIGNIFICANT CHANGE UP (ref 17–32)
CREAT SERPL-MCNC: 1.4 MG/DL — SIGNIFICANT CHANGE UP (ref 0.7–1.5)
D DIMER BLD IA.RAPID-MCNC: 499 NG/ML DDU — HIGH (ref 0–230)
EOSINOPHIL # BLD AUTO: 0.22 K/UL — SIGNIFICANT CHANGE UP (ref 0–0.7)
EOSINOPHIL NFR BLD AUTO: 2.8 % — SIGNIFICANT CHANGE UP (ref 0–8)
GAS PNL BLDV: 145 MMOL/L — SIGNIFICANT CHANGE UP (ref 136–145)
GAS PNL BLDV: SIGNIFICANT CHANGE UP
GLUCOSE SERPL-MCNC: 178 MG/DL — HIGH (ref 70–99)
HCO3 BLDV-SCNC: 28 MMOL/L — SIGNIFICANT CHANGE UP (ref 22–29)
HCT VFR BLD CALC: 30 % — LOW (ref 37–47)
HCT VFR BLDA CALC: 26.1 % — LOW (ref 34–44)
HGB BLD CALC-MCNC: 8.5 G/DL — LOW (ref 14–18)
HGB BLD-MCNC: 10.1 G/DL — LOW (ref 12–16)
HOROWITZ INDEX BLDV+IHG-RTO: 21 — SIGNIFICANT CHANGE UP
IMM GRANULOCYTES NFR BLD AUTO: 0.5 % — HIGH (ref 0.1–0.3)
INR BLD: 1.18 RATIO — SIGNIFICANT CHANGE UP (ref 0.65–1.3)
LACTATE BLDV-MCNC: 3 MMOL/L — HIGH (ref 0.5–1.6)
LIDOCAIN IGE QN: 20 U/L — SIGNIFICANT CHANGE UP (ref 7–60)
LYMPHOCYTES # BLD AUTO: 0.95 K/UL — LOW (ref 1.2–3.4)
LYMPHOCYTES # BLD AUTO: 11.9 % — LOW (ref 20.5–51.1)
MCHC RBC-ENTMCNC: 30.6 PG — SIGNIFICANT CHANGE UP (ref 27–31)
MCHC RBC-ENTMCNC: 33.7 G/DL — SIGNIFICANT CHANGE UP (ref 32–37)
MCV RBC AUTO: 90.9 FL — SIGNIFICANT CHANGE UP (ref 81–99)
MONOCYTES # BLD AUTO: 0.81 K/UL — HIGH (ref 0.1–0.6)
MONOCYTES NFR BLD AUTO: 10.2 % — HIGH (ref 1.7–9.3)
NEUTROPHILS # BLD AUTO: 5.89 K/UL — SIGNIFICANT CHANGE UP (ref 1.4–6.5)
NEUTROPHILS NFR BLD AUTO: 74.1 % — SIGNIFICANT CHANGE UP (ref 42.2–75.2)
NRBC # BLD: 0 /100 WBCS — SIGNIFICANT CHANGE UP (ref 0–0)
PCO2 BLDV: 34 MMHG — LOW (ref 41–51)
PH BLDV: 7.54 — HIGH (ref 7.26–7.43)
PLATELET # BLD AUTO: 246 K/UL — SIGNIFICANT CHANGE UP (ref 130–400)
PO2 BLDV: 71 MMHG — HIGH (ref 20–40)
POTASSIUM BLDV-SCNC: 3.2 MMOL/L — LOW (ref 3.3–5.6)
POTASSIUM SERPL-MCNC: 3.3 MMOL/L — LOW (ref 3.5–5)
POTASSIUM SERPL-SCNC: 3.3 MMOL/L — LOW (ref 3.5–5)
PROTHROM AB SERPL-ACNC: 12.8 SEC — SIGNIFICANT CHANGE UP (ref 9.95–12.87)
RBC # BLD: 3.3 M/UL — LOW (ref 4.2–5.4)
RBC # FLD: 13.7 % — SIGNIFICANT CHANGE UP (ref 11.5–14.5)
SAO2 % BLDV: 96 % — SIGNIFICANT CHANGE UP
SODIUM SERPL-SCNC: 144 MMOL/L — SIGNIFICANT CHANGE UP (ref 135–146)
TROPONIN T SERPL-MCNC: <0.01 NG/ML — SIGNIFICANT CHANGE UP
WBC # BLD: 7.95 K/UL — SIGNIFICANT CHANGE UP (ref 4.8–10.8)
WBC # FLD AUTO: 7.95 K/UL — SIGNIFICANT CHANGE UP (ref 4.8–10.8)

## 2018-12-31 RX ORDER — MIDAZOLAM HYDROCHLORIDE 1 MG/ML
2 INJECTION, SOLUTION INTRAMUSCULAR; INTRAVENOUS ONCE
Refills: 0 | Status: DISCONTINUED | OUTPATIENT
Start: 2018-12-31 | End: 2018-12-31

## 2018-12-31 RX ADMIN — FAMOTIDINE 20 MILLIGRAM(S): 10 INJECTION INTRAVENOUS at 00:14

## 2018-12-31 RX ADMIN — MIDAZOLAM HYDROCHLORIDE 2 MILLIGRAM(S): 1 INJECTION, SOLUTION INTRAMUSCULAR; INTRAVENOUS at 02:39

## 2018-12-31 RX ADMIN — ONDANSETRON 4 MILLIGRAM(S): 8 TABLET, FILM COATED ORAL at 00:13

## 2018-12-31 RX ADMIN — SODIUM CHLORIDE 1000 MILLILITER(S): 9 INJECTION, SOLUTION INTRAVENOUS at 00:13

## 2018-12-31 NOTE — ED PROVIDER NOTE - NSFOLLOWUPINSTRUCTIONS_ED_ALL_ED_FT
-Follow up with Dr. Malone in 1-3 days  -Return to ED for any worsening symptoms or concers    Shortness of breath    Shortness of breath (dyspnea) means you have trouble breathing and could indicate a medical problem. Causes include lung disease, heart disease, low amount of red blood cells (anemia), poor physical fitness, being overweight, smoking, etc. Your health care provider today may not be able to find a cause for your shortness of breath after your exam. In this case, it is important to have a follow-up exam with your primary care physician as instructed. If medicines were prescribed, take them as directed for the full length of time directed. Refrain from tobacco products.    SEEK IMMEDIATE MEDICAL CARE IF YOU HAVE ANY OF THE FOLLOWING SYMPTOMS: worsening shortness of breath, chest pain, back pain, abdominal pain, fever, coughing up blood, lightheadedness/dizziness.    Nausea / Vomiting    Nausea is the feeling that you have to vomit. As nausea gets worse, it can lead to vomiting. Vomiting puts you at an increased risk for dehydration. Older adults and people with other diseases or a weak immune system are at higher risk for dehydration. Drink clear fluids in small but frequent amounts as tolerated. Eat bland, easy-to-digest foods in small amounts as tolerated.    SEEK IMMEDIATE MEDICAL CARE IF YOU HAVE ANY OF THE FOLLOWING SYMPTOMS: fever, inability to keep sufficient fluids down, black or bloody vomitus, black or bloody stools, lightheadedness/dizziness, chest pain, severe headache, rash, shortness of breath, cold or clammy skin, confusion, pain with urination, or any signs of dehydration.

## 2018-12-31 NOTE — ED PROVIDER NOTE - CARE PROVIDERS DIRECT ADDRESSES
Nephrology
Pulmonology
Rheumatology
[email or web address]

## 2018-12-31 NOTE — ED PROVIDER NOTE - OBJECTIVE STATEMENT
78 year old female hx CVA 6 months ago, HLD, HTN, breast cancer, DM presenting with shortness of breath, abdominal pain x 1 day. Patient states she feels like she is going to vomit but has not. Denies diarrhea, chest pain, fevers, chills, headache, dizziness. Patient is unable to recall further details in her history. Aide is with patient but has not worked with this patient before and does not know baseline. No blood in stool. 78 year old female hx CVA 6 months ago, HLD, HTN, breast cancer, DM presenting with shortness of breath, abdominal pain x 1 day. Patient states she feels like she is going to vomit but has not. Denies diarrhea, chest pain, fevers, chills, headache, dizziness. Patient is unable to recall further details in her history. Aide is with patient but has not worked with this patient before and does not know baseline. No blood in stool. Patient is bedridden d/t CVA 2 months ago and has baseline numbness in her legs as a result.

## 2018-12-31 NOTE — ED PROVIDER NOTE - ATTENDING CONTRIBUTION TO CARE
I was present for and supervised the key and critical aspects of the procedures performed during the care of the patient. Patient presents for evaluation of shortness of breath, she has a new aide today and the patient was brought in for further evaluation. she denies any fevers or chills she denies any vomiting. She denies any chest pain.  patient is actually asymptomatic at this time she is screaming in the dept.  On physical exam the patient is nc/at perrla eomi oropharynx clear cta b/l, rrr s1s2 noted abd-soft nt ndb s+ ext from with no focal deficits.   Patient is stable here in the ED patient asymptomatic now. patient discharged at this time

## 2018-12-31 NOTE — ED PROVIDER NOTE - PHYSICAL EXAMINATION
Physical Exam    Vital Signs: I have reviewed the initial vital signs  Constitutional: well-nourished, appears stated age, no  HEENT: Conjunctiva pink, Sclera clear, PERRLA, EOMI. Mucous membranes moist, no exudates or lesions noted, uvula midline.  Cardiovascular: S1 and S2 present, regular rate, regular rhythm. radial pulses equal and 2+ No peripheral edema  Respiratory: unlabored respiratory effort, clear to auscultation bilaterally no wheezing, rales and rhonchi. Speaking in full sentences  Gastrointestinal: abdomen TTP in LLQ and slightly TTP in RLQ, no pulsatile mass, active bowel sounds in all 4 quadrants. No guarding or rebound tenderness  Musculoskeletal: supple nontender neck, no midline tenderness, no joint pain  Integumentary: warm, dry, no rash  Neurologic: A & O x 3, patient  Psychiatric: histrionic. Patient yelling repeatedly stating she cannot breath in full sentences and then seen in NAD Physical Exam    Vital Signs: I have reviewed the initial vital signs  Constitutional: well-nourished, appears stated age, no  HEENT: Conjunctiva pink, Sclera clear, PERRLA, EOMI. Mucous membranes moist, no exudates or lesions noted, uvula midline.  Cardiovascular: S1 and S2 present, regular rate, regular rhythm. radial pulses equal and 2+ No peripheral edema  Respiratory: unlabored respiratory effort, clear to auscultation bilaterally no wheezing, rales and rhonchi. Speaking in full sentences  Gastrointestinal: abdomen TTP in LLQ and slightly TTP in RLQ, no pulsatile mass, active bowel sounds in all 4 quadrants. No guarding or rebound tenderness  Musculoskeletal: supple nontender neck, no midline tenderness, no joint pain  Integumentary: warm, dry, no rash  Neurologic: A & O x 3, patient unabale to follow commands d/t agitation  Psychiatric: histrionic. Patient yelling repeatedly stating she cannot breath in full sentences and then seen in NAD

## 2018-12-31 NOTE — ED PROVIDER NOTE - NS ED ROS FT
Review of Systems         Constitutional: (-) fever (-) chills (-) weakness       Head: (-) trauma       EENT: (-) sore throat       Cardiovascular: (-) chest pain (-) syncope       Respiratory: (-) cough, (+) shortness of breath       Gastrointestinal: (+) abdominal pain (-) vomiting (-) diarrhea (-) nausea       Genitourinary: (-) dysuria       Musculoskeletal: (-) neck pain (-) back pain (-) joint pain       Integumentary: (-) rash       Neurological: (-) headache (-) altered mental status (-) dizziness (-) paresthesias       Psych: (-) psych history

## 2018-12-31 NOTE — ED ADULT NURSE REASSESSMENT NOTE - NS ED NURSE REASSESS COMMENT FT1
pt d/c with primary care ambulance. Aide at bedside. Safety and comfort maintained. No acute distress noted at this charles.

## 2018-12-31 NOTE — ED PROVIDER NOTE - CARE PROVIDER_API CALL
Dell Malone), Internal Medicine  [address] [place] [address]  Phone: [phone]  Fax: [phone]

## 2019-01-02 DIAGNOSIS — R09.89 OTHER SPECIFIED SYMPTOMS AND SIGNS INVOLVING THE CIRCULATORY AND RESPIRATORY SYSTEMS: ICD-10-CM

## 2019-01-30 ENCOUNTER — APPOINTMENT (OUTPATIENT)
Dept: VASCULAR SURGERY | Facility: CLINIC | Age: 79
End: 2019-01-30
Payer: MEDICARE

## 2019-01-30 VITALS — HEIGHT: 60 IN | WEIGHT: 150 LBS | BODY MASS INDEX: 29.45 KG/M2

## 2019-01-30 VITALS — SYSTOLIC BLOOD PRESSURE: 126 MMHG | DIASTOLIC BLOOD PRESSURE: 60 MMHG

## 2019-01-30 DIAGNOSIS — H91.90 UNSPECIFIED HEARING LOSS, UNSPECIFIED EAR: ICD-10-CM

## 2019-01-30 DIAGNOSIS — F32.9 MAJOR DEPRESSIVE DISORDER, SINGLE EPISODE, UNSPECIFIED: ICD-10-CM

## 2019-01-30 DIAGNOSIS — Z85.3 PERSONAL HISTORY OF MALIGNANT NEOPLASM OF BREAST: ICD-10-CM

## 2019-01-30 PROCEDURE — 99203 OFFICE O/P NEW LOW 30 MIN: CPT

## 2019-01-30 NOTE — CONSULT LETTER
[Dear  ___] : Dear  [unfilled], [Consult Letter:] : I had the pleasure of evaluating your patient, [unfilled]. [Please see my note below.] : Please see my note below. [FreeTextEntry2] : Dear Dr. Asher,

## 2019-02-04 ENCOUNTER — OUTPATIENT (OUTPATIENT)
Dept: OUTPATIENT SERVICES | Facility: HOSPITAL | Age: 79
LOS: 1 days | Discharge: HOME | End: 2019-02-04

## 2019-02-04 DIAGNOSIS — Z90.49 ACQUIRED ABSENCE OF OTHER SPECIFIED PARTS OF DIGESTIVE TRACT: Chronic | ICD-10-CM

## 2019-02-04 DIAGNOSIS — I69.00 UNSPECIFIED SEQUELAE OF NONTRAUMATIC SUBARACHNOID HEMORRHAGE: ICD-10-CM

## 2019-02-04 DIAGNOSIS — G81.90 HEMIPLEGIA, UNSPECIFIED AFFECTING UNSPECIFIED SIDE: ICD-10-CM

## 2019-02-04 DIAGNOSIS — Z96.651 PRESENCE OF RIGHT ARTIFICIAL KNEE JOINT: Chronic | ICD-10-CM

## 2019-02-04 DIAGNOSIS — Z98.890 OTHER SPECIFIED POSTPROCEDURAL STATES: Chronic | ICD-10-CM

## 2019-03-27 ENCOUNTER — OUTPATIENT (OUTPATIENT)
Dept: OUTPATIENT SERVICES | Facility: HOSPITAL | Age: 79
LOS: 1 days | Discharge: HOME | End: 2019-03-27

## 2019-03-27 VITALS
RESPIRATION RATE: 18 BRPM | TEMPERATURE: 96 F | DIASTOLIC BLOOD PRESSURE: 77 MMHG | HEART RATE: 70 BPM | OXYGEN SATURATION: 99 % | HEIGHT: 61 IN | SYSTOLIC BLOOD PRESSURE: 132 MMHG | WEIGHT: 164.91 LBS

## 2019-03-27 DIAGNOSIS — Z01.818 ENCOUNTER FOR OTHER PREPROCEDURAL EXAMINATION: ICD-10-CM

## 2019-03-27 DIAGNOSIS — I65.23 OCCLUSION AND STENOSIS OF BILATERAL CAROTID ARTERIES: ICD-10-CM

## 2019-03-27 DIAGNOSIS — I63.9 CEREBRAL INFARCTION, UNSPECIFIED: ICD-10-CM

## 2019-03-27 DIAGNOSIS — I25.10 ATHEROSCLEROTIC HEART DISEASE OF NATIVE CORONARY ARTERY WITHOUT ANGINA PECTORIS: ICD-10-CM

## 2019-03-27 DIAGNOSIS — Z98.890 OTHER SPECIFIED POSTPROCEDURAL STATES: Chronic | ICD-10-CM

## 2019-03-27 DIAGNOSIS — Z96.651 PRESENCE OF RIGHT ARTIFICIAL KNEE JOINT: Chronic | ICD-10-CM

## 2019-03-27 DIAGNOSIS — Z90.49 ACQUIRED ABSENCE OF OTHER SPECIFIED PARTS OF DIGESTIVE TRACT: Chronic | ICD-10-CM

## 2019-03-27 DIAGNOSIS — J44.9 CHRONIC OBSTRUCTIVE PULMONARY DISEASE, UNSPECIFIED: ICD-10-CM

## 2019-03-27 LAB
ALBUMIN SERPL ELPH-MCNC: 4.2 G/DL — SIGNIFICANT CHANGE UP (ref 3.5–5.2)
ALP SERPL-CCNC: 175 U/L — HIGH (ref 30–115)
ALT FLD-CCNC: 25 U/L — SIGNIFICANT CHANGE UP (ref 0–41)
ANION GAP SERPL CALC-SCNC: 16 MMOL/L — HIGH (ref 7–14)
APTT BLD: 31.2 SEC — SIGNIFICANT CHANGE UP (ref 27–39.2)
AST SERPL-CCNC: 20 U/L — SIGNIFICANT CHANGE UP (ref 0–41)
BASOPHILS # BLD AUTO: 0.04 K/UL — SIGNIFICANT CHANGE UP (ref 0–0.2)
BASOPHILS NFR BLD AUTO: 0.6 % — SIGNIFICANT CHANGE UP (ref 0–1)
BILIRUB SERPL-MCNC: 0.3 MG/DL — SIGNIFICANT CHANGE UP (ref 0.2–1.2)
BLD GP AB SCN SERPL QL: SIGNIFICANT CHANGE UP
BUN SERPL-MCNC: 29 MG/DL — HIGH (ref 10–20)
CALCIUM SERPL-MCNC: 9.2 MG/DL — SIGNIFICANT CHANGE UP (ref 8.5–10.1)
CHLORIDE SERPL-SCNC: 102 MMOL/L — SIGNIFICANT CHANGE UP (ref 98–110)
CO2 SERPL-SCNC: 30 MMOL/L — SIGNIFICANT CHANGE UP (ref 17–32)
CREAT SERPL-MCNC: 1.4 MG/DL — SIGNIFICANT CHANGE UP (ref 0.7–1.5)
EOSINOPHIL # BLD AUTO: 0.21 K/UL — SIGNIFICANT CHANGE UP (ref 0–0.7)
EOSINOPHIL NFR BLD AUTO: 3.2 % — SIGNIFICANT CHANGE UP (ref 0–8)
ESTIMATED AVERAGE GLUCOSE: 160 MG/DL — HIGH (ref 68–114)
GLUCOSE SERPL-MCNC: 185 MG/DL — HIGH (ref 70–99)
HBA1C BLD-MCNC: 7.2 % — HIGH (ref 4–5.6)
HCT VFR BLD CALC: 30.1 % — LOW (ref 37–47)
HGB BLD-MCNC: 9.5 G/DL — LOW (ref 12–16)
IMM GRANULOCYTES NFR BLD AUTO: 0.6 % — HIGH (ref 0.1–0.3)
INR BLD: 1.18 RATIO — SIGNIFICANT CHANGE UP (ref 0.65–1.3)
LYMPHOCYTES # BLD AUTO: 1.1 K/UL — LOW (ref 1.2–3.4)
LYMPHOCYTES # BLD AUTO: 16.6 % — LOW (ref 20.5–51.1)
MCHC RBC-ENTMCNC: 30.7 PG — SIGNIFICANT CHANGE UP (ref 27–31)
MCHC RBC-ENTMCNC: 31.6 G/DL — LOW (ref 32–37)
MCV RBC AUTO: 97.4 FL — SIGNIFICANT CHANGE UP (ref 81–99)
MONOCYTES # BLD AUTO: 0.79 K/UL — HIGH (ref 0.1–0.6)
MONOCYTES NFR BLD AUTO: 11.9 % — HIGH (ref 1.7–9.3)
NEUTROPHILS # BLD AUTO: 4.46 K/UL — SIGNIFICANT CHANGE UP (ref 1.4–6.5)
NEUTROPHILS NFR BLD AUTO: 67.1 % — SIGNIFICANT CHANGE UP (ref 42.2–75.2)
NRBC # BLD: 0 /100 WBCS — SIGNIFICANT CHANGE UP (ref 0–0)
PLATELET # BLD AUTO: 277 K/UL — SIGNIFICANT CHANGE UP (ref 130–400)
POTASSIUM SERPL-MCNC: 3.8 MMOL/L — SIGNIFICANT CHANGE UP (ref 3.5–5)
POTASSIUM SERPL-SCNC: 3.8 MMOL/L — SIGNIFICANT CHANGE UP (ref 3.5–5)
PROT SERPL-MCNC: 6.9 G/DL — SIGNIFICANT CHANGE UP (ref 6–8)
PROTHROM AB SERPL-ACNC: 13.5 SEC — HIGH (ref 9.95–12.87)
RBC # BLD: 3.09 M/UL — LOW (ref 4.2–5.4)
RBC # FLD: 13.2 % — SIGNIFICANT CHANGE UP (ref 11.5–14.5)
SODIUM SERPL-SCNC: 148 MMOL/L — HIGH (ref 135–146)
TYPE + AB SCN PNL BLD: SIGNIFICANT CHANGE UP
WBC # BLD: 6.64 K/UL — SIGNIFICANT CHANGE UP (ref 4.8–10.8)
WBC # FLD AUTO: 6.64 K/UL — SIGNIFICANT CHANGE UP (ref 4.8–10.8)

## 2019-03-27 NOTE — H&P PST ADULT - ADDITIONAL PE
no [place] no loose teeth tmd > 3 fbd neck from [facility] 31 lt cva rt sided weakness w/c bound needs hoya lift transer no [place] no loose teeth tmd > 3 fbd neck from [place] dentures

## 2019-03-27 NOTE — H&P PST ADULT - NSANTHOSAYNRD_GEN_A_CORE
No. WENDY screening performed.  STOP BANG Legend: 0-2 = LOW Risk; 3-4 = INTERMEDIATE Risk; 5-8 = HIGH Risk

## 2019-03-27 NOTE — H&P PST ADULT - NSICDXPASTSURGICALHX_GEN_ALL_CORE_FT
PAST SURGICAL HISTORY:  H/O total knee replacement, right     History of cholecystectomy 1992    History of lumpectomy of right breast

## 2019-03-27 NOTE — H&P PST ADULT - NSICDXPASTMEDICALHX_GEN_ALL_CORE_FT
PAST MEDICAL HISTORY:  Bilateral hearing loss, unspecified hearing loss type     BP (high blood pressure)     Breast CA     Depression     DM (diabetes mellitus)     High cholesterol PAST MEDICAL HISTORY:  Bilateral hearing loss, unspecified hearing loss type     BP (high blood pressure) 20 yr    Breast CA 2014 s/p rt    CVA (cerebral vascular accident) 6/18 rt weakness    Depression     DM (diabetes mellitus) 24 yr    High cholesterol     Obese

## 2019-03-27 NOTE — H&P PST ADULT - REASON FOR ADMISSION
79 yr old female to past for transcarotid artery revascularization due to carotid stenosis NO fever no uti uri cp palp sob pain at this time no [place] screen revd exercise tolerance 79 yr old female to past for transcarotid artery revascularization due to carotid stenosis NO fever no uti uri cp palp sob pain at this time no [place] screen revd exercise tolerance not assessed pt is w/c bound s/p lt cva 6/18 uses hoya lift at home 79 yr old female to past for transcarotid artery revascularization left side due to carotid stenosis NO fever no uti uri cp palp sob pain at this time no [place] screen revd exercise tolerance not assessed pt is w/c bound s/p lt cva 6/18 uses hoya lift at home

## 2019-04-02 ENCOUNTER — INPATIENT (INPATIENT)
Facility: HOSPITAL | Age: 79
LOS: 1 days | Discharge: HOME | End: 2019-04-04
Attending: INTERNAL MEDICINE | Admitting: INTERNAL MEDICINE
Payer: MEDICARE

## 2019-04-02 VITALS
HEART RATE: 97 BPM | RESPIRATION RATE: 20 BRPM | TEMPERATURE: 98 F | WEIGHT: 145.06 LBS | HEIGHT: 64 IN | DIASTOLIC BLOOD PRESSURE: 102 MMHG | OXYGEN SATURATION: 98 % | SYSTOLIC BLOOD PRESSURE: 180 MMHG

## 2019-04-02 DIAGNOSIS — E78.5 HYPERLIPIDEMIA, UNSPECIFIED: ICD-10-CM

## 2019-04-02 DIAGNOSIS — H91.93 UNSPECIFIED HEARING LOSS, BILATERAL: ICD-10-CM

## 2019-04-02 DIAGNOSIS — K52.9 NONINFECTIVE GASTROENTERITIS AND COLITIS, UNSPECIFIED: ICD-10-CM

## 2019-04-02 DIAGNOSIS — Z88.0 ALLERGY STATUS TO PENICILLIN: ICD-10-CM

## 2019-04-02 DIAGNOSIS — E11.9 TYPE 2 DIABETES MELLITUS WITHOUT COMPLICATIONS: ICD-10-CM

## 2019-04-02 DIAGNOSIS — I10 ESSENTIAL (PRIMARY) HYPERTENSION: ICD-10-CM

## 2019-04-02 DIAGNOSIS — I73.9 PERIPHERAL VASCULAR DISEASE, UNSPECIFIED: ICD-10-CM

## 2019-04-02 DIAGNOSIS — Z88.2 ALLERGY STATUS TO SULFONAMIDES: ICD-10-CM

## 2019-04-02 DIAGNOSIS — Z86.73 PERSONAL HISTORY OF TRANSIENT ISCHEMIC ATTACK (TIA), AND CEREBRAL INFARCTION WITHOUT RESIDUAL DEFICITS: ICD-10-CM

## 2019-04-02 DIAGNOSIS — Z90.49 ACQUIRED ABSENCE OF OTHER SPECIFIED PARTS OF DIGESTIVE TRACT: Chronic | ICD-10-CM

## 2019-04-02 DIAGNOSIS — Z96.651 PRESENCE OF RIGHT ARTIFICIAL KNEE JOINT: Chronic | ICD-10-CM

## 2019-04-02 DIAGNOSIS — Z85.3 PERSONAL HISTORY OF MALIGNANT NEOPLASM OF BREAST: ICD-10-CM

## 2019-04-02 DIAGNOSIS — Z98.890 OTHER SPECIFIED POSTPROCEDURAL STATES: Chronic | ICD-10-CM

## 2019-04-02 PROCEDURE — 99285 EMERGENCY DEPT VISIT HI MDM: CPT

## 2019-04-02 RX ORDER — SODIUM CHLORIDE 9 MG/ML
3 INJECTION INTRAMUSCULAR; INTRAVENOUS; SUBCUTANEOUS ONCE
Refills: 0 | Status: COMPLETED | OUTPATIENT
Start: 2019-04-02 | End: 2019-04-02

## 2019-04-02 RX ORDER — SODIUM CHLORIDE 9 MG/ML
1000 INJECTION INTRAMUSCULAR; INTRAVENOUS; SUBCUTANEOUS
Refills: 0 | Status: DISCONTINUED | OUTPATIENT
Start: 2019-04-02 | End: 2019-04-04

## 2019-04-02 RX ORDER — ONDANSETRON 8 MG/1
4 TABLET, FILM COATED ORAL ONCE
Refills: 0 | Status: COMPLETED | OUTPATIENT
Start: 2019-04-02 | End: 2019-04-03

## 2019-04-02 RX ORDER — FAMOTIDINE 10 MG/ML
20 INJECTION INTRAVENOUS ONCE
Refills: 0 | Status: COMPLETED | OUTPATIENT
Start: 2019-04-02 | End: 2019-04-02

## 2019-04-02 RX ADMIN — FAMOTIDINE 20 MILLIGRAM(S): 10 INJECTION INTRAVENOUS at 23:52

## 2019-04-02 RX ADMIN — SODIUM CHLORIDE 125 MILLILITER(S): 9 INJECTION INTRAMUSCULAR; INTRAVENOUS; SUBCUTANEOUS at 23:51

## 2019-04-02 RX ADMIN — SODIUM CHLORIDE 3 MILLILITER(S): 9 INJECTION INTRAMUSCULAR; INTRAVENOUS; SUBCUTANEOUS at 23:47

## 2019-04-02 NOTE — ED ADULT NURSE NOTE - CHPI ED NUR SYMPTOMS NEG
no abdominal distension/no blood in stool/no burning urination/no chills/no diarrhea/no dysuria/no fever/no hematuria

## 2019-04-02 NOTE — ED ADULT NURSE NOTE - OBJECTIVE STATEMENT
abdominal pain with nausea and vomiting. Patient received in spot 9 with complaints of vomiting, abdominal pain after eating hamburger today. She is alert and oriented x 4, respirations are even and unlabored, S1, S2 regular, abdomen is soft and nontender, 18 gauge saline lock placed on left wrist, blood drawn . Daughter and caregiver are at the bedside. Safety measures maintained. Will follow up.

## 2019-04-02 NOTE — ED ADULT NURSE NOTE - PMH
Bilateral hearing loss, unspecified hearing loss type    BP (high blood pressure)  20 yr  Breast CA  2014 s/p rt  CVA (cerebral vascular accident)  6/18 rt weakness  Depression    DM (diabetes mellitus)  24 yr  High cholesterol    Obese

## 2019-04-03 DIAGNOSIS — R11.2 NAUSEA WITH VOMITING, UNSPECIFIED: ICD-10-CM

## 2019-04-03 DIAGNOSIS — H91.93 UNSPECIFIED HEARING LOSS, BILATERAL: ICD-10-CM

## 2019-04-03 DIAGNOSIS — I63.9 CEREBRAL INFARCTION, UNSPECIFIED: ICD-10-CM

## 2019-04-03 DIAGNOSIS — R10.84 GENERALIZED ABDOMINAL PAIN: ICD-10-CM

## 2019-04-03 DIAGNOSIS — R10.9 UNSPECIFIED ABDOMINAL PAIN: ICD-10-CM

## 2019-04-03 LAB
ALBUMIN SERPL ELPH-MCNC: 4.4 G/DL — SIGNIFICANT CHANGE UP (ref 3.5–5.2)
ALP SERPL-CCNC: 155 U/L — HIGH (ref 30–115)
ALT FLD-CCNC: 24 U/L — SIGNIFICANT CHANGE UP (ref 0–41)
ANION GAP SERPL CALC-SCNC: 16 MMOL/L — HIGH (ref 7–14)
APPEARANCE UR: ABNORMAL
APTT BLD: 32.6 SEC — SIGNIFICANT CHANGE UP (ref 27–39.2)
AST SERPL-CCNC: 28 U/L — SIGNIFICANT CHANGE UP (ref 0–41)
BACTERIA # UR AUTO: ABNORMAL
BASOPHILS # BLD AUTO: 0.05 K/UL — SIGNIFICANT CHANGE UP (ref 0–0.2)
BASOPHILS NFR BLD AUTO: 0.7 % — SIGNIFICANT CHANGE UP (ref 0–1)
BILIRUB DIRECT SERPL-MCNC: <0.2 MG/DL — SIGNIFICANT CHANGE UP (ref 0–0.2)
BILIRUB INDIRECT FLD-MCNC: >0.1 MG/DL — LOW (ref 0.2–1.2)
BILIRUB SERPL-MCNC: 0.3 MG/DL — SIGNIFICANT CHANGE UP (ref 0.2–1.2)
BILIRUB UR-MCNC: NEGATIVE — SIGNIFICANT CHANGE UP
BUN SERPL-MCNC: 26 MG/DL — HIGH (ref 10–20)
CALCIUM SERPL-MCNC: 9.4 MG/DL — SIGNIFICANT CHANGE UP (ref 8.5–10.1)
CHLORIDE SERPL-SCNC: 94 MMOL/L — LOW (ref 98–110)
CO2 SERPL-SCNC: 32 MMOL/L — SIGNIFICANT CHANGE UP (ref 17–32)
COLOR SPEC: YELLOW — SIGNIFICANT CHANGE UP
CREAT SERPL-MCNC: 1.3 MG/DL — SIGNIFICANT CHANGE UP (ref 0.7–1.5)
DIFF PNL FLD: ABNORMAL
EOSINOPHIL # BLD AUTO: 0.13 K/UL — SIGNIFICANT CHANGE UP (ref 0–0.7)
EOSINOPHIL NFR BLD AUTO: 1.8 % — SIGNIFICANT CHANGE UP (ref 0–8)
EPI CELLS # UR: ABNORMAL /HPF
GLUCOSE BLDC GLUCOMTR-MCNC: 225 MG/DL — HIGH (ref 70–99)
GLUCOSE BLDC GLUCOMTR-MCNC: 236 MG/DL — HIGH (ref 70–99)
GLUCOSE BLDC GLUCOMTR-MCNC: 287 MG/DL — HIGH (ref 70–99)
GLUCOSE BLDC GLUCOMTR-MCNC: 83 MG/DL — SIGNIFICANT CHANGE UP (ref 70–99)
GLUCOSE SERPL-MCNC: 188 MG/DL — HIGH (ref 70–99)
GLUCOSE UR QL: NEGATIVE MG/DL — SIGNIFICANT CHANGE UP
HCT VFR BLD CALC: 31.8 % — LOW (ref 37–47)
HGB BLD-MCNC: 10.4 G/DL — LOW (ref 12–16)
IMM GRANULOCYTES NFR BLD AUTO: 0.7 % — HIGH (ref 0.1–0.3)
INR BLD: 1.17 RATIO — SIGNIFICANT CHANGE UP (ref 0.65–1.3)
KETONES UR-MCNC: NEGATIVE — SIGNIFICANT CHANGE UP
LACTATE SERPL-SCNC: 1.8 MMOL/L — SIGNIFICANT CHANGE UP (ref 0.5–2.2)
LEUKOCYTE ESTERASE UR-ACNC: SIGNIFICANT CHANGE UP
LIDOCAIN IGE QN: 55 U/L — SIGNIFICANT CHANGE UP (ref 7–60)
LYMPHOCYTES # BLD AUTO: 1.28 K/UL — SIGNIFICANT CHANGE UP (ref 1.2–3.4)
LYMPHOCYTES # BLD AUTO: 17.8 % — LOW (ref 20.5–51.1)
MCHC RBC-ENTMCNC: 30.6 PG — SIGNIFICANT CHANGE UP (ref 27–31)
MCHC RBC-ENTMCNC: 32.7 G/DL — SIGNIFICANT CHANGE UP (ref 32–37)
MCV RBC AUTO: 93.5 FL — SIGNIFICANT CHANGE UP (ref 81–99)
MONOCYTES # BLD AUTO: 0.78 K/UL — HIGH (ref 0.1–0.6)
MONOCYTES NFR BLD AUTO: 10.8 % — HIGH (ref 1.7–9.3)
NEUTROPHILS # BLD AUTO: 4.91 K/UL — SIGNIFICANT CHANGE UP (ref 1.4–6.5)
NEUTROPHILS NFR BLD AUTO: 68.2 % — SIGNIFICANT CHANGE UP (ref 42.2–75.2)
NITRITE UR-MCNC: NEGATIVE — SIGNIFICANT CHANGE UP
NRBC # BLD: 0 /100 WBCS — SIGNIFICANT CHANGE UP (ref 0–0)
PH UR: 7 — SIGNIFICANT CHANGE UP (ref 5–8)
PLATELET # BLD AUTO: 310 K/UL — SIGNIFICANT CHANGE UP (ref 130–400)
POTASSIUM SERPL-MCNC: 4.3 MMOL/L — SIGNIFICANT CHANGE UP (ref 3.5–5)
POTASSIUM SERPL-SCNC: 4.3 MMOL/L — SIGNIFICANT CHANGE UP (ref 3.5–5)
PROT SERPL-MCNC: 7.3 G/DL — SIGNIFICANT CHANGE UP (ref 6–8)
PROT UR-MCNC: 30 MG/DL
PROTHROM AB SERPL-ACNC: 13.4 SEC — HIGH (ref 9.95–12.87)
RBC # BLD: 3.4 M/UL — LOW (ref 4.2–5.4)
RBC # FLD: 12.6 % — SIGNIFICANT CHANGE UP (ref 11.5–14.5)
RBC CASTS # UR COMP ASSIST: ABNORMAL /HPF
SODIUM SERPL-SCNC: 142 MMOL/L — SIGNIFICANT CHANGE UP (ref 135–146)
SP GR SPEC: 1.01 — SIGNIFICANT CHANGE UP (ref 1.01–1.03)
TROPONIN T SERPL-MCNC: <0.01 NG/ML — SIGNIFICANT CHANGE UP
UROBILINOGEN FLD QL: 0.2 MG/DL — SIGNIFICANT CHANGE UP (ref 0.2–0.2)
WBC # BLD: 7.2 K/UL — SIGNIFICANT CHANGE UP (ref 4.8–10.8)
WBC # FLD AUTO: 7.2 K/UL — SIGNIFICANT CHANGE UP (ref 4.8–10.8)
WBC UR QL: >50 /HPF

## 2019-04-03 PROCEDURE — 74177 CT ABD & PELVIS W/CONTRAST: CPT | Mod: 26

## 2019-04-03 PROCEDURE — 71045 X-RAY EXAM CHEST 1 VIEW: CPT | Mod: 26

## 2019-04-03 RX ORDER — CHLORHEXIDINE GLUCONATE 213 G/1000ML
1 SOLUTION TOPICAL
Refills: 0 | Status: DISCONTINUED | OUTPATIENT
Start: 2019-04-03 | End: 2019-04-04

## 2019-04-03 RX ORDER — ATORVASTATIN CALCIUM 80 MG/1
80 TABLET, FILM COATED ORAL AT BEDTIME
Refills: 0 | Status: DISCONTINUED | OUTPATIENT
Start: 2019-04-03 | End: 2019-04-04

## 2019-04-03 RX ORDER — CEFTRIAXONE 500 MG/1
1 INJECTION, POWDER, FOR SOLUTION INTRAMUSCULAR; INTRAVENOUS EVERY 24 HOURS
Refills: 0 | Status: DISCONTINUED | OUTPATIENT
Start: 2019-04-03 | End: 2019-04-03

## 2019-04-03 RX ORDER — SERTRALINE 25 MG/1
100 TABLET, FILM COATED ORAL DAILY
Refills: 0 | Status: DISCONTINUED | OUTPATIENT
Start: 2019-04-03 | End: 2019-04-04

## 2019-04-03 RX ORDER — ACETAMINOPHEN 500 MG
650 TABLET ORAL EVERY 6 HOURS
Refills: 0 | Status: DISCONTINUED | OUTPATIENT
Start: 2019-04-03 | End: 2019-04-04

## 2019-04-03 RX ORDER — CEFTRIAXONE 500 MG/1
1 INJECTION, POWDER, FOR SOLUTION INTRAMUSCULAR; INTRAVENOUS ONCE
Refills: 0 | Status: COMPLETED | OUTPATIENT
Start: 2019-04-03 | End: 2019-04-03

## 2019-04-03 RX ORDER — ASPIRIN/CALCIUM CARB/MAGNESIUM 324 MG
162 TABLET ORAL DAILY
Refills: 0 | Status: DISCONTINUED | OUTPATIENT
Start: 2019-04-03 | End: 2019-04-04

## 2019-04-03 RX ORDER — CLOPIDOGREL BISULFATE 75 MG/1
75 TABLET, FILM COATED ORAL DAILY
Refills: 0 | Status: DISCONTINUED | OUTPATIENT
Start: 2019-04-03 | End: 2019-04-04

## 2019-04-03 RX ORDER — ONDANSETRON 8 MG/1
4 TABLET, FILM COATED ORAL EVERY 6 HOURS
Refills: 0 | Status: DISCONTINUED | OUTPATIENT
Start: 2019-04-03 | End: 2019-04-04

## 2019-04-03 RX ORDER — INSULIN GLARGINE 100 [IU]/ML
18 INJECTION, SOLUTION SUBCUTANEOUS AT BEDTIME
Refills: 0 | Status: DISCONTINUED | OUTPATIENT
Start: 2019-04-03 | End: 2019-04-04

## 2019-04-03 RX ORDER — DOCUSATE SODIUM 100 MG
100 CAPSULE ORAL DAILY
Refills: 0 | Status: DISCONTINUED | OUTPATIENT
Start: 2019-04-03 | End: 2019-04-04

## 2019-04-03 RX ORDER — NORTRIPTYLINE HYDROCHLORIDE 10 MG/1
25 CAPSULE ORAL AT BEDTIME
Refills: 0 | Status: DISCONTINUED | OUTPATIENT
Start: 2019-04-03 | End: 2019-04-04

## 2019-04-03 RX ORDER — METFORMIN HYDROCHLORIDE 850 MG/1
500 TABLET ORAL
Refills: 0 | Status: DISCONTINUED | OUTPATIENT
Start: 2019-04-03 | End: 2019-04-04

## 2019-04-03 RX ORDER — LISINOPRIL 2.5 MG/1
10 TABLET ORAL DAILY
Refills: 0 | Status: DISCONTINUED | OUTPATIENT
Start: 2019-04-03 | End: 2019-04-04

## 2019-04-03 RX ORDER — CLONAZEPAM 1 MG
0.25 TABLET ORAL AT BEDTIME
Refills: 0 | Status: DISCONTINUED | OUTPATIENT
Start: 2019-04-03 | End: 2019-04-04

## 2019-04-03 RX ORDER — PANTOPRAZOLE SODIUM 20 MG/1
40 TABLET, DELAYED RELEASE ORAL
Refills: 0 | Status: DISCONTINUED | OUTPATIENT
Start: 2019-04-03 | End: 2019-04-04

## 2019-04-03 RX ORDER — CEFTRIAXONE 500 MG/1
1 INJECTION, POWDER, FOR SOLUTION INTRAMUSCULAR; INTRAVENOUS EVERY 24 HOURS
Refills: 0 | Status: DISCONTINUED | OUTPATIENT
Start: 2019-04-04 | End: 2019-04-04

## 2019-04-03 RX ORDER — INSULIN LISPRO 100/ML
7 VIAL (ML) SUBCUTANEOUS
Refills: 0 | Status: DISCONTINUED | OUTPATIENT
Start: 2019-04-03 | End: 2019-04-04

## 2019-04-03 RX ORDER — HYDROXYZINE HCL 10 MG
25 TABLET ORAL EVERY 8 HOURS
Refills: 0 | Status: DISCONTINUED | OUTPATIENT
Start: 2019-04-03 | End: 2019-04-04

## 2019-04-03 RX ORDER — SENNA PLUS 8.6 MG/1
2 TABLET ORAL AT BEDTIME
Refills: 0 | Status: DISCONTINUED | OUTPATIENT
Start: 2019-04-03 | End: 2019-04-04

## 2019-04-03 RX ADMIN — PANTOPRAZOLE SODIUM 40 MILLIGRAM(S): 20 TABLET, DELAYED RELEASE ORAL at 09:37

## 2019-04-03 RX ADMIN — INSULIN GLARGINE 18 UNIT(S): 100 INJECTION, SOLUTION SUBCUTANEOUS at 22:18

## 2019-04-03 RX ADMIN — Medication 100 MILLIGRAM(S): at 11:54

## 2019-04-03 RX ADMIN — Medication 650 MILLIGRAM(S): at 15:43

## 2019-04-03 RX ADMIN — CLOPIDOGREL BISULFATE 75 MILLIGRAM(S): 75 TABLET, FILM COATED ORAL at 11:53

## 2019-04-03 RX ADMIN — LISINOPRIL 10 MILLIGRAM(S): 2.5 TABLET ORAL at 06:55

## 2019-04-03 RX ADMIN — ATORVASTATIN CALCIUM 80 MILLIGRAM(S): 80 TABLET, FILM COATED ORAL at 22:18

## 2019-04-03 RX ADMIN — CHLORHEXIDINE GLUCONATE 1 APPLICATION(S): 213 SOLUTION TOPICAL at 09:38

## 2019-04-03 RX ADMIN — METFORMIN HYDROCHLORIDE 500 MILLIGRAM(S): 850 TABLET ORAL at 06:55

## 2019-04-03 RX ADMIN — Medication 650 MILLIGRAM(S): at 17:47

## 2019-04-03 RX ADMIN — CEFTRIAXONE 100 GRAM(S): 500 INJECTION, POWDER, FOR SOLUTION INTRAMUSCULAR; INTRAVENOUS at 02:39

## 2019-04-03 RX ADMIN — Medication 7 UNIT(S): at 09:37

## 2019-04-03 RX ADMIN — Medication 162 MILLIGRAM(S): at 11:54

## 2019-04-03 RX ADMIN — SODIUM CHLORIDE 125 MILLILITER(S): 9 INJECTION INTRAMUSCULAR; INTRAVENOUS; SUBCUTANEOUS at 17:45

## 2019-04-03 RX ADMIN — Medication 7 UNIT(S): at 11:53

## 2019-04-03 RX ADMIN — METFORMIN HYDROCHLORIDE 500 MILLIGRAM(S): 850 TABLET ORAL at 17:45

## 2019-04-03 RX ADMIN — ONDANSETRON 4 MILLIGRAM(S): 8 TABLET, FILM COATED ORAL at 15:45

## 2019-04-03 RX ADMIN — Medication 0.25 MILLIGRAM(S): at 22:17

## 2019-04-03 RX ADMIN — SERTRALINE 100 MILLIGRAM(S): 25 TABLET, FILM COATED ORAL at 11:54

## 2019-04-03 RX ADMIN — SENNA PLUS 2 TABLET(S): 8.6 TABLET ORAL at 22:18

## 2019-04-03 RX ADMIN — ONDANSETRON 4 MILLIGRAM(S): 8 TABLET, FILM COATED ORAL at 00:06

## 2019-04-03 RX ADMIN — NORTRIPTYLINE HYDROCHLORIDE 25 MILLIGRAM(S): 10 CAPSULE ORAL at 22:18

## 2019-04-03 RX ADMIN — ONDANSETRON 4 MILLIGRAM(S): 8 TABLET, FILM COATED ORAL at 22:19

## 2019-04-03 NOTE — H&P ADULT - NSHPLABSRESULTS_GEN_ALL_CORE
10.4   7.20  )-----------( 310      ( 03 Apr 2019 00:11 )             31.8     04-03    142  |  94<L>  |  26<H>  ----------------------------<  188<H>  4.3   |  32  |  1.3    Ca    9.4      03 Apr 2019 00:11    TPro  7.3  /  Alb  4.4  /  TBili  0.3  /  DBili  <0.2  /  AST  28  /  ALT  24  /  AlkPhos  155<H>  04-03    UA +        CT abd pelvis: no acute pathology

## 2019-04-03 NOTE — ED PROVIDER NOTE - PHYSICAL EXAMINATION
Physical Exam    Vital Signs: I have reviewed the initial vital signs.  Constitutional: elderly and frail, no acute distress  Eyes: Conjunctiva pink, Sclera clear, PERRLA, EOMI.  Cardiovascular: S1 and S2, regular rate, regular rhythm, well-perfused extremities, radial pulses equal and 2+  Respiratory: unlabored respiratory effort, clear to auscultation bilaterally no wheezing, rales and rhonchi  Gastrointestinal: soft, +mild generalized tenderness, no pulsatile mass, normal bowl sounds  Musculoskeletal: supple neck, no lower extremity edema, no midline tenderness  Integumentary: warm, dry, no rash  Neurologic: awake, alert, cranial nerves II-XII grossly intact,+left sided weakness chronic as per patient and family and unchanged  Psychiatric: appropriate mood, appropriate affect

## 2019-04-03 NOTE — H&P ADULT - HISTORY OF PRESENT ILLNESS
pt is an 80 yo F h/o HTN DM HLD PVD p/w abdominal pain, nausea and  vomiting. Pt states she has had 4 episodes of nonbloody nonbilious vomiting. +flatus, last BM yesterday. Denies fever, chills, CP SOB.

## 2019-04-03 NOTE — H&P ADULT - NSHPPHYSICALEXAM_GEN_ALL_CORE
Vital Signs: 	  Constitutional: elderly and frail, no acute distress  	Eyes: Conjunctiva pink, Sclera clear, PERRLA, EOMI.  	Cardiovascular: S1 and S2, regular rate, regular rhythm, well-perfused extremities, radial pulses equal and 2+  	Respiratory: unlabored respiratory effort, clear to auscultation bilaterally no wheezing, rales and rhonchi  	Gastrointestinal: soft, +mild generalized tenderness, no pulsatile mass, normal bowl sounds  	Musculoskeletal: supple neck, no lower extremity edema, no midline tenderness  	Integumentary: warm, dry, no rash  	Neurologic: awake, alert, cranial nerves II-XII grossly intact,+left sided weakness chronic as per patient and family and unchanged  Psychiatric: appropriate mood, appropriate affect

## 2019-04-03 NOTE — ED PROVIDER NOTE - CLINICAL SUMMARY MEDICAL DECISION MAKING FREE TEXT BOX
pt pw  abdominal pain vomiting,  labs with renal insufficiency , trop negative wbc wnl , + uti -  CT a/p  iv contrast -  no acute pathology

## 2019-04-03 NOTE — ED PROVIDER NOTE - ATTENDING CONTRIBUTION TO CARE
I personally evaluated the patient. I reviewed the Resident’s or Physician Assistant’s note (as assigned above), and agree with the findings and plan except as documented in my note.  79yF pmhx HTN IDDM   PVD, CEA p/w  abdomianl pain and vomiting x 4 nonbloody 0 unknown last BM  + flatus. no fever  no chest pain sob   abdominal surgies ,  cholecystectomy, and  c/s. pe Alert nontoxic,  basin of vomit  at bedside - perrl eomi, no pallor  no jaundice  CVS RRR, Resp CTA no tachypnea no hyperpnea, Abd soft,  generlized ttp  not rigidt no guarding,  , No cva tenderness, No le edema, no skin lesions plan labs ivf  antiemetic  CT a/p

## 2019-04-03 NOTE — ED PROVIDER NOTE - OBJECTIVE STATEMENT
79 year old female past medical history of Diabetes, HLD comes in for upper abdominal pain distention and nausea and vomiting and now feels weak. denies fever and chills. patient is passing gas

## 2019-04-03 NOTE — H&P ADULT - ASSESSMENT
A/p: 78 yo F h/o HTN DM HLD PVD p/w abdominal pain and vomiting.  -	CT negative for acute pathology, s/p cholecystectomy and normal bilis   -	Admit to medicine, likely gastroenteritis   -	+ UA - IV ceftriaxone   -	IVF   -	Advance diet  -	Dvt ppx   -	Continue home meds

## 2019-04-04 ENCOUNTER — APPOINTMENT (OUTPATIENT)
Dept: VASCULAR SURGERY | Facility: HOSPITAL | Age: 79
End: 2019-04-04

## 2019-04-04 ENCOUNTER — TRANSCRIPTION ENCOUNTER (OUTPATIENT)
Age: 79
End: 2019-04-04

## 2019-04-04 VITALS
SYSTOLIC BLOOD PRESSURE: 142 MMHG | TEMPERATURE: 97 F | HEART RATE: 88 BPM | RESPIRATION RATE: 16 BRPM | DIASTOLIC BLOOD PRESSURE: 67 MMHG

## 2019-04-04 LAB
GLUCOSE BLDC GLUCOMTR-MCNC: 109 MG/DL — HIGH (ref 70–99)
GLUCOSE BLDC GLUCOMTR-MCNC: 117 MG/DL — HIGH (ref 70–99)
GLUCOSE BLDC GLUCOMTR-MCNC: 127 MG/DL — HIGH (ref 70–99)
GLUCOSE BLDC GLUCOMTR-MCNC: 132 MG/DL — HIGH (ref 70–99)
GLUCOSE BLDC GLUCOMTR-MCNC: 133 MG/DL — HIGH (ref 70–99)
GLUCOSE BLDC GLUCOMTR-MCNC: 171 MG/DL — HIGH (ref 70–99)
GLUCOSE BLDC GLUCOMTR-MCNC: 65 MG/DL — LOW (ref 70–99)
GLUCOSE BLDC GLUCOMTR-MCNC: 77 MG/DL — SIGNIFICANT CHANGE UP (ref 70–99)

## 2019-04-04 RX ADMIN — CEFTRIAXONE 100 GRAM(S): 500 INJECTION, POWDER, FOR SOLUTION INTRAMUSCULAR; INTRAVENOUS at 06:02

## 2019-04-04 RX ADMIN — SERTRALINE 100 MILLIGRAM(S): 25 TABLET, FILM COATED ORAL at 11:09

## 2019-04-04 RX ADMIN — Medication 100 MILLIGRAM(S): at 11:09

## 2019-04-04 RX ADMIN — Medication 162 MILLIGRAM(S): at 11:09

## 2019-04-04 RX ADMIN — Medication 7 UNIT(S): at 12:33

## 2019-04-04 RX ADMIN — PANTOPRAZOLE SODIUM 40 MILLIGRAM(S): 20 TABLET, DELAYED RELEASE ORAL at 06:02

## 2019-04-04 RX ADMIN — LISINOPRIL 10 MILLIGRAM(S): 2.5 TABLET ORAL at 06:02

## 2019-04-04 RX ADMIN — METFORMIN HYDROCHLORIDE 500 MILLIGRAM(S): 850 TABLET ORAL at 06:02

## 2019-04-04 RX ADMIN — CLOPIDOGREL BISULFATE 75 MILLIGRAM(S): 75 TABLET, FILM COATED ORAL at 11:09

## 2019-04-04 NOTE — DISCHARGE NOTE PROVIDER - CARE PROVIDERS DIRECT ADDRESSES
[email or web address], [place] [email or web address]

## 2019-04-04 NOTE — DISCHARGE NOTE PROVIDER - CARE PROVIDER_API CALL
Dell Malone)  Internal Medicine  [address] [place] [address]  Phone: [phone]  Fax: [phone]  Follow Up Time:

## 2019-04-04 NOTE — DISCHARGE NOTE PROVIDER - NSDCCPCAREPLAN_GEN_ALL_CORE_FT
PRINCIPAL DISCHARGE DIAGNOSIS  Diagnosis: Abdominal pain  Assessment and Plan of Treatment: continue with oral fluids.  Outpatient fu with PCP 1 week after discharge.  contnue with current home medications      SECONDARY DISCHARGE DIAGNOSES  Diagnosis: Vomiting  Assessment and Plan of Treatment: resolved

## 2019-04-04 NOTE — PROGRESS NOTE ADULT - SUBJECTIVE AND OBJECTIVE BOX
SUBJECTIVE:    Patient is a 79y old Female who presents with a chief complaint of abdominal pain (2019 09:24)    Currently admitted to medicine with the primary diagnosis of Abdominal pain     Today is hospital day 1d. This morning she is resting comfortably in bed and reports no new issues or overnight events.   feeling well  PAST MEDICAL & SURGICAL HISTORY  Obese  CVA (cerebral vascular accident):  rt weakness  Bilateral hearing loss, unspecified hearing loss type  High cholesterol  Depression  BP (high blood pressure): 20 yr  Breast CA:  s/p rt  DM (diabetes mellitus): 24 yr  History of cholecystectomy:   History of lumpectomy of right breast  H/O total knee replacement, right    SOCIAL HISTORY:  Negative for smoking/alcohol/drug use.     ALLERGIES:  honeydew (Unknown)  latex (Unknown)  penicillins (Unknown)  pickles (Unknown)  shellfish (Unknown)  Sulfacetamide Sodium-Sulfur (Rash)    MEDICATIONS:  STANDING MEDICATIONS  aspirin  chewable 162 milliGRAM(s) Oral daily  atorvastatin 80 milliGRAM(s) Oral at bedtime  cefTRIAXone   IVPB 1 Gram(s) IV Intermittent every 24 hours  chlorhexidine 4% Liquid 1 Application(s) Topical <User Schedule>  clonazePAM Tablet 0.25 milliGRAM(s) Oral at bedtime  clopidogrel Tablet 75 milliGRAM(s) Oral daily  docusate sodium 100 milliGRAM(s) Oral daily  insulin glargine Injectable (LANTUS) 18 Unit(s) SubCutaneous at bedtime  insulin lispro Injectable (HumaLOG) 7 Unit(s) SubCutaneous three times a day before meals  lisinopril 10 milliGRAM(s) Oral daily  metFORMIN 500 milliGRAM(s) Oral two times a day  nortriptyline 25 milliGRAM(s) Oral at bedtime  pantoprazole    Tablet 40 milliGRAM(s) Oral before breakfast  senna 2 Tablet(s) Oral at bedtime  sertraline 100 milliGRAM(s) Oral daily  sodium chloride 0.9%. 1000 milliLiter(s) IV Continuous <Continuous>    PRN MEDICATIONS  acetaminophen   Tablet .. 650 milliGRAM(s) Oral every 6 hours PRN  hydrOXYzine hydrochloride 25 milliGRAM(s) Oral every 8 hours PRN  ondansetron   Solution 4 milliGRAM(s) Oral every 6 hours PRN    VITALS:   T(F): 97.3  HR: 76  BP: 136/69  RR: 16  SpO2: 97%    LABS:                        10.4   7.20  )-----------( 310      ( 2019 00:11 )             31.8         142  |  94<L>  |  26<H>  ----------------------------<  188<H>  4.3   |  32  |  1.3    Ca    9.4      2019 00:11    TPro  7.3  /  Alb  4.4  /  TBili  0.3  /  DBili  <0.2  /  AST  28  /  ALT  24  /  AlkPhos  155<H>      PT/INR - ( 2019 00:11 )   PT: 13.40 sec;   INR: 1.17 ratio         PTT - ( 2019 00:11 )  PTT:32.6 sec  Urinalysis Basic - ( 2019 00:40 )    Color: Yellow / Appearance: Slightly Cloudy / S.015 / pH: x  Gluc: x / Ketone: Negative  / Bili: Negative / Urobili: 0.2 mg/dL   Blood: x / Protein: 30 mg/dL / Nitrite: Negative   Leuk Esterase: Large / RBC: 3-5 /HPF / WBC >50 /HPF   Sq Epi: x / Non Sq Epi: Occasional /HPF / Bacteria: Many            CARDIAC MARKERS ( 2019 00:11 )  x     / <0.01 ng/mL / x     / x     / x          RADIOLOGY:    PHYSICAL EXAM:  GEN: No acute distress  LUNGS: Clear to auscultation bilaterally   HEART: Regular  ABD: Soft, non-tender, non-distended.  EXT: NC/NC/NE/2+PP/MORALES/Skin Intact.   NEURO: AAOX3    Intravenous access:   NG tube:   Pierce Catheter:
Patient is a 79y old  Female who presents with a chief complaint of abdominal pain (2019 04:48)      HPI:  pt is an 80 yo F h/o HTN DM HLD PVD p/w abdominal pain, nausea and  vomiting. Pt states she has had 4 episodes of nonbloody nonbilious vomiting. +flatus, last BM yesterday. Denies fever, chills, CP SOB. (2019 04:48)      INTERVAL HPI/OVERNIGHT EVENTS:  T(C): 36.4 (19 @ 02:23), Max: 36.6 (19 @ 22:37)  HR: 86 (19 @ 06:30) (85 - 97)  BP: 157/89 (19 @ 06:30) (157/82 - 190/83)  RR: 18 (19 @ 06:30) (18 - 20)  SpO2: 97% (19 @ 06:30) (95% - 98%)  Wt(kg): --  I&O's Summary      REVIEW OF SYSTEMS:  CONSTITUTIONAL: No fever, weight loss, or fatigue  EYES: No eye pain, visual disturbances, or discharge  ENMT:  No difficulty hearing, tinnitus, vertigo; No sinus or throat pain  NECK: No pain or stiffness  BREASTS: No pain, masses, or nipple discharge  RESPIRATORY: No cough, wheezing, chills or hemoptysis; No shortness of breath  CARDIOVASCULAR: No chest pain, palpitations, dizziness, or leg swelling  GASTROINTESTINAL vomiting and diarrhea  GENITOURINARY: No dysuria, frequency, hematuria, or incontinence  NEUROLOGICAL: No headaches, memory loss, loss of strength, numbness, or tremors  SKIN: No itching, burning, rashes, or lesions   LYMPH NODES: No enlarged glands  ENDOCRINE: No heat or cold intolerance; No hair loss  MUSCULOSKELETAL: No joint pain or swelling; No muscle, back, or extremity pain  PSYCHIATRIC: No depression, anxiety, mood swings, or difficulty sleeping  HEME/LYMPH: No easy bruising, or bleeding gums  ALLERY AND IMMUNOLOGIC: No hives or eczema    PHYSICAL EXAM:  GENERAL: NAD, well-groomed, well-developed  HEAD:  Atraumatic, Normocephalic  EYES: EOMI, PERRLA, conjunctiva and sclera clear  ENMT: No tonsillar erythema, exudates, or enlargement; Moist mucous membranes, Good dentition, No lesions  NECK: Supple, No JVD, Normal thyroid  NERVOUS SYSTEM:  Alert & Oriented X3, Good concentration; Motor Strength 5/5 B/L upper and lower extremities; DTRs 2+ intact and symmetric  CHEST/LUNG: Clear to percussion bilaterally; No rales, rhonchi, wheezing, or rubs  HEART: Regular rate and rhythm; No murmurs, rubs, or gallops  ABDOMEN: Soft, Nontender, Nondistended; Bowel sounds present  EXTREMITIES:  2+ Peripheral Pulses, No clubbing, cyanosis, or edema  LYMPH: No lymphadenopathy noted  SKIN: No rashes or lesions    MEDICATIONS  (STANDING):  aspirin  chewable 162 milliGRAM(s) Oral daily  atorvastatin 80 milliGRAM(s) Oral at bedtime  chlorhexidine 4% Liquid 1 Application(s) Topical <User Schedule>  clonazePAM Tablet 0.25 milliGRAM(s) Oral at bedtime  clopidogrel Tablet 75 milliGRAM(s) Oral daily  docusate sodium 100 milliGRAM(s) Oral daily  insulin glargine Injectable (LANTUS) 18 Unit(s) SubCutaneous at bedtime  insulin lispro Injectable (HumaLOG) 7 Unit(s) SubCutaneous three times a day before meals  lisinopril 10 milliGRAM(s) Oral daily  metFORMIN 500 milliGRAM(s) Oral two times a day  nortriptyline 25 milliGRAM(s) Oral at bedtime  pantoprazole    Tablet 40 milliGRAM(s) Oral before breakfast  senna 2 Tablet(s) Oral at bedtime  sertraline 100 milliGRAM(s) Oral daily  sodium chloride 0.9%. 1000 milliLiter(s) (125 mL/Hr) IV Continuous <Continuous>    MEDICATIONS  (PRN):  acetaminophen   Tablet .. 650 milliGRAM(s) Oral every 6 hours PRN Mild Pain (1 - 3)  hydrOXYzine hydrochloride 25 milliGRAM(s) Oral every 8 hours PRN Anxiety  ondansetron   Solution 4 milliGRAM(s) Oral every 6 hours PRN Nausea      LABS:                        10.4   7.20  )-----------( 310      ( 2019 00:11 )             31.8     04-03    142  |  94<L>  |  26<H>  ----------------------------<  188<H>  4.3   |  32  |  1.3    Ca    9.4      2019 00:11    TPro  7.3  /  Alb  4.4  /  TBili  0.3  /  DBili  <0.2  /  AST  28  /  ALT  24  /  AlkPhos  155<H>      PT/INR - ( 2019 00:11 )   PT: 13.40 sec;   INR: 1.17 ratio         PTT - ( 2019 00:11 )  PTT:32.6 sec  Urinalysis Basic - ( 2019 00:40 )    Color: Yellow / Appearance: Slightly Cloudy / S.015 / pH: x  Gluc: x / Ketone: Negative  / Bili: Negative / Urobili: 0.2 mg/dL   Blood: x / Protein: 30 mg/dL / Nitrite: Negative   Leuk Esterase: Large / RBC: 3-5 /HPF / WBC >50 /HPF   Sq Epi: x / Non Sq Epi: Occasional /HPF / Bacteria: Many      CAPILLARY BLOOD GLUCOSE  287 (2019 08:04)      POCT Blood Glucose.: 287 mg/dL (2019 07:34)  POCT Blood Glucose.: 214 mg/dL (2019 23:45)              RADIOLOGY & ADDITIONAL TESTS:    Imaging Personally Reviewed:  [ ] YES  [ ] NO    Consultant(s) Notes Reviewed:  [ ] YES  [ ] NO    Palliative Care:    Assessment & Plan:   HEALTH ISSUES - PROBLEM Dx:  Abdominal pain: Abdominal pain          DVT ppx     Care Discussed with Consultants/Other Providers [ ] YES  [ ] NO

## 2019-04-04 NOTE — PROGRESS NOTE ADULT - ASSESSMENT
Abdominal pain--enteritis    plan    advance diet and if tolerated discharge today Abdominal pain--enteritis    plan    advance diet and if tolerated discharge today  d/w daughter in law discuss all result and plan

## 2019-04-04 NOTE — DISCHARGE NOTE NURSING/CASE MANAGEMENT/SOCIAL WORK - NSDCDPATPORTLINK_GEN_ALL_CORE
You can access the [facility] Portal, offered by [facility], by registering with the following website: [email or web address]

## 2019-04-04 NOTE — CHART NOTE - NSCHARTNOTEFT_GEN_A_CORE
Patient seen and examined at bedside after lunch.  Doing well, tolerated without nausea/pain/vomiting.  d/w medicine attending and stable for discharge.  continue home medications.  Outpatient FU with PMD in 1-2 week.s  Medication rec completed with attending.

## 2019-06-06 ENCOUNTER — EMERGENCY (EMERGENCY)
Facility: HOSPITAL | Age: 79
LOS: 0 days | Discharge: HOME | End: 2019-06-06
Attending: EMERGENCY MEDICINE | Admitting: EMERGENCY MEDICINE
Payer: MEDICARE

## 2019-06-06 VITALS
OXYGEN SATURATION: 100 % | RESPIRATION RATE: 17 BRPM | TEMPERATURE: 99 F | HEART RATE: 94 BPM | DIASTOLIC BLOOD PRESSURE: 84 MMHG | SYSTOLIC BLOOD PRESSURE: 178 MMHG

## 2019-06-06 VITALS
SYSTOLIC BLOOD PRESSURE: 173 MMHG | TEMPERATURE: 98 F | HEART RATE: 87 BPM | RESPIRATION RATE: 19 BRPM | DIASTOLIC BLOOD PRESSURE: 96 MMHG | OXYGEN SATURATION: 97 %

## 2019-06-06 DIAGNOSIS — Z79.4 LONG TERM (CURRENT) USE OF INSULIN: ICD-10-CM

## 2019-06-06 DIAGNOSIS — Z79.02 LONG TERM (CURRENT) USE OF ANTITHROMBOTICS/ANTIPLATELETS: ICD-10-CM

## 2019-06-06 DIAGNOSIS — E11.9 TYPE 2 DIABETES MELLITUS WITHOUT COMPLICATIONS: ICD-10-CM

## 2019-06-06 DIAGNOSIS — Z79.82 LONG TERM (CURRENT) USE OF ASPIRIN: ICD-10-CM

## 2019-06-06 DIAGNOSIS — R10.13 EPIGASTRIC PAIN: ICD-10-CM

## 2019-06-06 DIAGNOSIS — Z98.890 OTHER SPECIFIED POSTPROCEDURAL STATES: Chronic | ICD-10-CM

## 2019-06-06 DIAGNOSIS — E78.5 HYPERLIPIDEMIA, UNSPECIFIED: ICD-10-CM

## 2019-06-06 DIAGNOSIS — R10.9 UNSPECIFIED ABDOMINAL PAIN: ICD-10-CM

## 2019-06-06 DIAGNOSIS — R11.2 NAUSEA WITH VOMITING, UNSPECIFIED: ICD-10-CM

## 2019-06-06 DIAGNOSIS — Z96.651 PRESENCE OF RIGHT ARTIFICIAL KNEE JOINT: Chronic | ICD-10-CM

## 2019-06-06 DIAGNOSIS — I10 ESSENTIAL (PRIMARY) HYPERTENSION: ICD-10-CM

## 2019-06-06 DIAGNOSIS — E78.00 PURE HYPERCHOLESTEROLEMIA, UNSPECIFIED: ICD-10-CM

## 2019-06-06 DIAGNOSIS — Z90.49 ACQUIRED ABSENCE OF OTHER SPECIFIED PARTS OF DIGESTIVE TRACT: Chronic | ICD-10-CM

## 2019-06-06 DIAGNOSIS — F32.9 MAJOR DEPRESSIVE DISORDER, SINGLE EPISODE, UNSPECIFIED: ICD-10-CM

## 2019-06-06 DIAGNOSIS — Z86.73 PERSONAL HISTORY OF TRANSIENT ISCHEMIC ATTACK (TIA), AND CEREBRAL INFARCTION WITHOUT RESIDUAL DEFICITS: ICD-10-CM

## 2019-06-06 DIAGNOSIS — Z79.899 OTHER LONG TERM (CURRENT) DRUG THERAPY: ICD-10-CM

## 2019-06-06 LAB
ALBUMIN SERPL ELPH-MCNC: 4.4 G/DL — SIGNIFICANT CHANGE UP (ref 3.5–5.2)
ALP SERPL-CCNC: 121 U/L — HIGH (ref 30–115)
ALT FLD-CCNC: 22 U/L — SIGNIFICANT CHANGE UP (ref 0–41)
ANION GAP SERPL CALC-SCNC: 15 MMOL/L — HIGH (ref 7–14)
AST SERPL-CCNC: 27 U/L — SIGNIFICANT CHANGE UP (ref 0–41)
BASOPHILS # BLD AUTO: 0.05 K/UL — SIGNIFICANT CHANGE UP (ref 0–0.2)
BASOPHILS NFR BLD AUTO: 0.6 % — SIGNIFICANT CHANGE UP (ref 0–1)
BILIRUB SERPL-MCNC: 0.4 MG/DL — SIGNIFICANT CHANGE UP (ref 0.2–1.2)
BUN SERPL-MCNC: 26 MG/DL — HIGH (ref 10–20)
CALCIUM SERPL-MCNC: 9.8 MG/DL — SIGNIFICANT CHANGE UP (ref 8.5–10.1)
CHLORIDE SERPL-SCNC: 95 MMOL/L — LOW (ref 98–110)
CO2 SERPL-SCNC: 31 MMOL/L — SIGNIFICANT CHANGE UP (ref 17–32)
CREAT SERPL-MCNC: 1.4 MG/DL — SIGNIFICANT CHANGE UP (ref 0.7–1.5)
EOSINOPHIL # BLD AUTO: 0.1 K/UL — SIGNIFICANT CHANGE UP (ref 0–0.7)
EOSINOPHIL NFR BLD AUTO: 1.2 % — SIGNIFICANT CHANGE UP (ref 0–8)
GLUCOSE SERPL-MCNC: 146 MG/DL — HIGH (ref 70–99)
HCT VFR BLD CALC: 31.7 % — LOW (ref 37–47)
HGB BLD-MCNC: 10.5 G/DL — LOW (ref 12–16)
IMM GRANULOCYTES NFR BLD AUTO: 0.6 % — HIGH (ref 0.1–0.3)
LACTATE SERPL-SCNC: 3.9 MMOL/L — HIGH (ref 0.5–2.2)
LIDOCAIN IGE QN: 25 U/L — SIGNIFICANT CHANGE UP (ref 7–60)
LYMPHOCYTES # BLD AUTO: 1.01 K/UL — LOW (ref 1.2–3.4)
LYMPHOCYTES # BLD AUTO: 12.2 % — LOW (ref 20.5–51.1)
MCHC RBC-ENTMCNC: 30.9 PG — SIGNIFICANT CHANGE UP (ref 27–31)
MCHC RBC-ENTMCNC: 33.1 G/DL — SIGNIFICANT CHANGE UP (ref 32–37)
MCV RBC AUTO: 93.2 FL — SIGNIFICANT CHANGE UP (ref 81–99)
MONOCYTES # BLD AUTO: 0.9 K/UL — HIGH (ref 0.1–0.6)
MONOCYTES NFR BLD AUTO: 10.9 % — HIGH (ref 1.7–9.3)
NEUTROPHILS # BLD AUTO: 6.17 K/UL — SIGNIFICANT CHANGE UP (ref 1.4–6.5)
NEUTROPHILS NFR BLD AUTO: 74.5 % — SIGNIFICANT CHANGE UP (ref 42.2–75.2)
NRBC # BLD: 0 /100 WBCS — SIGNIFICANT CHANGE UP (ref 0–0)
PLATELET # BLD AUTO: 260 K/UL — SIGNIFICANT CHANGE UP (ref 130–400)
POTASSIUM SERPL-MCNC: 3.9 MMOL/L — SIGNIFICANT CHANGE UP (ref 3.5–5)
POTASSIUM SERPL-SCNC: 3.9 MMOL/L — SIGNIFICANT CHANGE UP (ref 3.5–5)
PROT SERPL-MCNC: 7.1 G/DL — SIGNIFICANT CHANGE UP (ref 6–8)
RBC # BLD: 3.4 M/UL — LOW (ref 4.2–5.4)
RBC # FLD: 13.7 % — SIGNIFICANT CHANGE UP (ref 11.5–14.5)
SODIUM SERPL-SCNC: 141 MMOL/L — SIGNIFICANT CHANGE UP (ref 135–146)
WBC # BLD: 8.28 K/UL — SIGNIFICANT CHANGE UP (ref 4.8–10.8)
WBC # FLD AUTO: 8.28 K/UL — SIGNIFICANT CHANGE UP (ref 4.8–10.8)

## 2019-06-06 PROCEDURE — 74177 CT ABD & PELVIS W/CONTRAST: CPT | Mod: 26

## 2019-06-06 PROCEDURE — 99284 EMERGENCY DEPT VISIT MOD MDM: CPT | Mod: GC

## 2019-06-06 RX ORDER — FAMOTIDINE 10 MG/ML
20 INJECTION INTRAVENOUS ONCE
Refills: 0 | Status: COMPLETED | OUTPATIENT
Start: 2019-06-06 | End: 2019-06-06

## 2019-06-06 RX ORDER — SODIUM CHLORIDE 9 MG/ML
1000 INJECTION, SOLUTION INTRAVENOUS ONCE
Refills: 0 | Status: COMPLETED | OUTPATIENT
Start: 2019-06-06 | End: 2019-06-06

## 2019-06-06 RX ORDER — ONDANSETRON 8 MG/1
4 TABLET, FILM COATED ORAL ONCE
Refills: 0 | Status: COMPLETED | OUTPATIENT
Start: 2019-06-06 | End: 2019-06-06

## 2019-06-06 RX ADMIN — FAMOTIDINE 100 MILLIGRAM(S): 10 INJECTION INTRAVENOUS at 16:51

## 2019-06-06 RX ADMIN — ONDANSETRON 4 MILLIGRAM(S): 8 TABLET, FILM COATED ORAL at 16:51

## 2019-06-06 RX ADMIN — Medication 30 MILLILITER(S): at 17:01

## 2019-06-06 RX ADMIN — SODIUM CHLORIDE 2000 MILLILITER(S): 9 INJECTION, SOLUTION INTRAVENOUS at 17:56

## 2019-06-06 NOTE — ED PROVIDER NOTE - CARE PROVIDERS DIRECT ADDRESSES
,[email or web address]

## 2019-06-06 NOTE — ED ADULT NURSE NOTE - NSIMPLEMENTINTERV_GEN_ALL_ED
Implemented All Fall with Harm Risk Interventions:  [place] to call system. Call bell, personal items and telephone within reach. Instruct patient to call for assistance. Room bathroom lighting operational. Non-slip footwear when patient is off stretcher. Physically safe environment: no spills, clutter or unnecessary equipment. Stretcher in lowest position, wheels locked, appropriate side rails in place. Provide visual cue, wrist band, yellow gown, etc. Monitor gait and stability. Monitor for mental status changes and reorient to person, place, and time. Review medications for side effects contributing to fall risk. Reinforce activity limits and safety measures with patient and family. Provide visual clues: red socks.

## 2019-06-06 NOTE — ED PROVIDER NOTE - CARE PROVIDER_API CALL
Ariel Macias (MD)  Internal Medicine  [address] [place] [address]  Phone: [phone]  Fax: [phone]  Follow Up Time: 1-3 Days    Herlinda Lyons)  Internal Medicine  [address] [place] [address]  Phone: [phone]  Fax: [phone]  Follow Up Time: 1-3 Days

## 2019-06-06 NOTE — ED PROVIDER NOTE - OBJECTIVE STATEMENT
pt is an 78 yo F h/o HTN DM HLD PVD presenting to ED for epigastric pain, n/v for 2 days. nbnb emesis and nausea. no fevers, no cp or sob, no hx of NSAID use for chronic pain. no bloody stools or hematemesis.

## 2019-06-06 NOTE — ED PROVIDER NOTE - ATTENDING CONTRIBUTION TO CARE
Pt is a 78yo female who comes in for nausea and vomiting for 2d.  No pain or fever. N o other compalints.    Exam: soft NT abdomen, cap refill <2s, NAD, no CVA tenderness, no rash  Imp: vomiting  Plan: labs, ct, ivf, po challenge

## 2019-06-06 NOTE — ED PROVIDER NOTE - PROVIDER TOKENS
PROVIDER:[TOKEN:[[id number]],FOLLOWUP:[1-3 Days]],PROVIDER:[TOKEN:[[id number]],FOLLOWUP:[1-3 Days]]

## 2019-06-06 NOTE — ED PROVIDER NOTE - NS ED ROS FT
Constitutional: See HPI.  Eyes: No visual changes, eye pain or discharge. No Photophobia  ENMT: No hearing changes, pain, discharge or infections. No neck pain or stiffness. No limited ROM  Cardiac: No SOB or edema. No chest pain with exertion.  Respiratory: No cough or respiratory distress. No hemoptysis. No history of asthma  GI:+ nausea, vomiting, abdominal pain.  : No dysuria, frequency or burning. No Discharge  MS: No myalgia, muscle weakness, joint pain or back pain.  Neuro: No headache or weakness. No LOC.  Skin: No skin rash.  Except as documented in the HPI, all other systems are negative.

## 2019-06-06 NOTE — ED PROVIDER NOTE - PHYSICAL EXAMINATION
Well appearing NAD non toxic. NCAT EOMI conjunctiva nml. No nasal discharge. MMM. Neck supple, non tender, full ROM. RRR no MRG +S1S2. CTA b/l. Abd s epigastric ttp ND +BS. Ext WWP x4, moving all extremities, no edema. 2+ equal pulses throughout. Cooperative, appropriate.

## 2019-07-15 ENCOUNTER — INPATIENT (INPATIENT)
Facility: HOSPITAL | Age: 79
LOS: 1 days | Discharge: HOME | End: 2019-07-17
Attending: INTERNAL MEDICINE | Admitting: INTERNAL MEDICINE
Payer: MEDICARE

## 2019-07-15 VITALS
HEIGHT: 60 IN | SYSTOLIC BLOOD PRESSURE: 128 MMHG | WEIGHT: 160.06 LBS | OXYGEN SATURATION: 99 % | DIASTOLIC BLOOD PRESSURE: 75 MMHG | HEART RATE: 74 BPM | RESPIRATION RATE: 18 BRPM | TEMPERATURE: 98 F

## 2019-07-15 DIAGNOSIS — R10.9 UNSPECIFIED ABDOMINAL PAIN: ICD-10-CM

## 2019-07-15 DIAGNOSIS — Z90.49 ACQUIRED ABSENCE OF OTHER SPECIFIED PARTS OF DIGESTIVE TRACT: Chronic | ICD-10-CM

## 2019-07-15 DIAGNOSIS — E11.9 TYPE 2 DIABETES MELLITUS WITHOUT COMPLICATIONS: ICD-10-CM

## 2019-07-15 DIAGNOSIS — Z98.890 OTHER SPECIFIED POSTPROCEDURAL STATES: Chronic | ICD-10-CM

## 2019-07-15 DIAGNOSIS — E78.00 PURE HYPERCHOLESTEROLEMIA, UNSPECIFIED: ICD-10-CM

## 2019-07-15 DIAGNOSIS — Z96.651 PRESENCE OF RIGHT ARTIFICIAL KNEE JOINT: Chronic | ICD-10-CM

## 2019-07-15 DIAGNOSIS — R11.10 VOMITING, UNSPECIFIED: ICD-10-CM

## 2019-07-15 DIAGNOSIS — F32.9 MAJOR DEPRESSIVE DISORDER, SINGLE EPISODE, UNSPECIFIED: ICD-10-CM

## 2019-07-15 LAB
ALBUMIN SERPL ELPH-MCNC: 4.5 G/DL — SIGNIFICANT CHANGE UP (ref 3.5–5.2)
ALP SERPL-CCNC: 133 U/L — HIGH (ref 30–115)
ALT FLD-CCNC: 19 U/L — SIGNIFICANT CHANGE UP (ref 0–41)
ANION GAP SERPL CALC-SCNC: 19 MMOL/L — HIGH (ref 7–14)
APPEARANCE UR: CLEAR — SIGNIFICANT CHANGE UP
APTT BLD: 32.6 SEC — SIGNIFICANT CHANGE UP (ref 27–39.2)
AST SERPL-CCNC: 25 U/L — SIGNIFICANT CHANGE UP (ref 0–41)
BASOPHILS # BLD AUTO: 0.02 K/UL — SIGNIFICANT CHANGE UP (ref 0–0.2)
BASOPHILS NFR BLD AUTO: 0.3 % — SIGNIFICANT CHANGE UP (ref 0–1)
BILIRUB SERPL-MCNC: 0.4 MG/DL — SIGNIFICANT CHANGE UP (ref 0.2–1.2)
BILIRUB UR-MCNC: NEGATIVE — SIGNIFICANT CHANGE UP
BUN SERPL-MCNC: 35 MG/DL — HIGH (ref 10–20)
CALCIUM SERPL-MCNC: 9.4 MG/DL — SIGNIFICANT CHANGE UP (ref 8.5–10.1)
CHLORIDE SERPL-SCNC: 98 MMOL/L — SIGNIFICANT CHANGE UP (ref 98–110)
CO2 SERPL-SCNC: 28 MMOL/L — SIGNIFICANT CHANGE UP (ref 17–32)
COLOR SPEC: YELLOW — SIGNIFICANT CHANGE UP
CREAT SERPL-MCNC: 1.5 MG/DL — SIGNIFICANT CHANGE UP (ref 0.7–1.5)
DIFF PNL FLD: ABNORMAL
EOSINOPHIL # BLD AUTO: 0.27 K/UL — SIGNIFICANT CHANGE UP (ref 0–0.7)
EOSINOPHIL NFR BLD AUTO: 4.4 % — SIGNIFICANT CHANGE UP (ref 0–8)
EPI CELLS # UR: ABNORMAL /HPF
GLUCOSE BLDC GLUCOMTR-MCNC: 208 MG/DL — HIGH (ref 70–99)
GLUCOSE SERPL-MCNC: 148 MG/DL — HIGH (ref 70–99)
GLUCOSE UR QL: NEGATIVE MG/DL — SIGNIFICANT CHANGE UP
HCT VFR BLD CALC: 30.5 % — LOW (ref 37–47)
HGB BLD-MCNC: 10 G/DL — LOW (ref 12–16)
IMM GRANULOCYTES NFR BLD AUTO: 0.5 % — HIGH (ref 0.1–0.3)
INR BLD: 1.18 RATIO — SIGNIFICANT CHANGE UP (ref 0.65–1.3)
KETONES UR-MCNC: NEGATIVE — SIGNIFICANT CHANGE UP
LACTATE SERPL-SCNC: 2.6 MMOL/L — HIGH (ref 0.5–2.2)
LACTATE SERPL-SCNC: 3.4 MMOL/L — HIGH (ref 0.5–2.2)
LEUKOCYTE ESTERASE UR-ACNC: ABNORMAL
LIDOCAIN IGE QN: 17 U/L — SIGNIFICANT CHANGE UP (ref 7–60)
LYMPHOCYTES # BLD AUTO: 1.15 K/UL — LOW (ref 1.2–3.4)
LYMPHOCYTES # BLD AUTO: 18.9 % — LOW (ref 20.5–51.1)
MCHC RBC-ENTMCNC: 30.2 PG — SIGNIFICANT CHANGE UP (ref 27–31)
MCHC RBC-ENTMCNC: 32.8 G/DL — SIGNIFICANT CHANGE UP (ref 32–37)
MCV RBC AUTO: 92.1 FL — SIGNIFICANT CHANGE UP (ref 81–99)
MONOCYTES # BLD AUTO: 0.59 K/UL — SIGNIFICANT CHANGE UP (ref 0.1–0.6)
MONOCYTES NFR BLD AUTO: 9.7 % — HIGH (ref 1.7–9.3)
NEUTROPHILS # BLD AUTO: 4.01 K/UL — SIGNIFICANT CHANGE UP (ref 1.4–6.5)
NEUTROPHILS NFR BLD AUTO: 66.2 % — SIGNIFICANT CHANGE UP (ref 42.2–75.2)
NITRITE UR-MCNC: NEGATIVE — SIGNIFICANT CHANGE UP
NRBC # BLD: 0 /100 WBCS — SIGNIFICANT CHANGE UP (ref 0–0)
PH UR: 7.5 — SIGNIFICANT CHANGE UP (ref 5–8)
PLATELET # BLD AUTO: 239 K/UL — SIGNIFICANT CHANGE UP (ref 130–400)
POTASSIUM SERPL-MCNC: 3.3 MMOL/L — LOW (ref 3.5–5)
POTASSIUM SERPL-SCNC: 3.3 MMOL/L — LOW (ref 3.5–5)
PROT SERPL-MCNC: 7 G/DL — SIGNIFICANT CHANGE UP (ref 6–8)
PROT UR-MCNC: 30 MG/DL
PROTHROM AB SERPL-ACNC: 13.5 SEC — HIGH (ref 9.95–12.87)
RBC # BLD: 3.31 M/UL — LOW (ref 4.2–5.4)
RBC # FLD: 13.5 % — SIGNIFICANT CHANGE UP (ref 11.5–14.5)
RBC CASTS # UR COMP ASSIST: SIGNIFICANT CHANGE UP /HPF
SODIUM SERPL-SCNC: 145 MMOL/L — SIGNIFICANT CHANGE UP (ref 135–146)
SP GR SPEC: 1.02 — SIGNIFICANT CHANGE UP (ref 1.01–1.03)
TROPONIN T SERPL-MCNC: 0.02 NG/ML — HIGH
TROPONIN T SERPL-MCNC: 0.02 NG/ML — HIGH
UROBILINOGEN FLD QL: 0.2 MG/DL — SIGNIFICANT CHANGE UP (ref 0.2–0.2)
WBC # BLD: 6.07 K/UL — SIGNIFICANT CHANGE UP (ref 4.8–10.8)
WBC # FLD AUTO: 6.07 K/UL — SIGNIFICANT CHANGE UP (ref 4.8–10.8)
WBC UR QL: SIGNIFICANT CHANGE UP /HPF

## 2019-07-15 PROCEDURE — 99285 EMERGENCY DEPT VISIT HI MDM: CPT

## 2019-07-15 PROCEDURE — 71045 X-RAY EXAM CHEST 1 VIEW: CPT | Mod: 26

## 2019-07-15 PROCEDURE — 99222 1ST HOSP IP/OBS MODERATE 55: CPT | Mod: AI

## 2019-07-15 PROCEDURE — 74177 CT ABD & PELVIS W/CONTRAST: CPT | Mod: 26

## 2019-07-15 RX ORDER — CLOPIDOGREL BISULFATE 75 MG/1
75 TABLET, FILM COATED ORAL DAILY
Refills: 0 | Status: DISCONTINUED | OUTPATIENT
Start: 2019-07-15 | End: 2019-07-17

## 2019-07-15 RX ORDER — METOCLOPRAMIDE HCL 10 MG
10 TABLET ORAL ONCE
Refills: 0 | Status: COMPLETED | OUTPATIENT
Start: 2019-07-15 | End: 2019-07-15

## 2019-07-15 RX ORDER — MORPHINE SULFATE 50 MG/1
4 CAPSULE, EXTENDED RELEASE ORAL EVERY 4 HOURS
Refills: 0 | Status: DISCONTINUED | OUTPATIENT
Start: 2019-07-15 | End: 2019-07-17

## 2019-07-15 RX ORDER — METFORMIN HYDROCHLORIDE 850 MG/1
500 TABLET ORAL
Refills: 0 | Status: DISCONTINUED | OUTPATIENT
Start: 2019-07-15 | End: 2019-07-17

## 2019-07-15 RX ORDER — SODIUM CHLORIDE 9 MG/ML
1000 INJECTION INTRAMUSCULAR; INTRAVENOUS; SUBCUTANEOUS ONCE
Refills: 0 | Status: COMPLETED | OUTPATIENT
Start: 2019-07-15 | End: 2019-07-15

## 2019-07-15 RX ORDER — PANTOPRAZOLE SODIUM 20 MG/1
40 TABLET, DELAYED RELEASE ORAL
Refills: 0 | Status: DISCONTINUED | OUTPATIENT
Start: 2019-07-15 | End: 2019-07-17

## 2019-07-15 RX ORDER — ONDANSETRON 8 MG/1
4 TABLET, FILM COATED ORAL ONCE
Refills: 0 | Status: COMPLETED | OUTPATIENT
Start: 2019-07-15 | End: 2019-07-15

## 2019-07-15 RX ORDER — SERTRALINE 25 MG/1
100 TABLET, FILM COATED ORAL DAILY
Refills: 0 | Status: DISCONTINUED | OUTPATIENT
Start: 2019-07-15 | End: 2019-07-15

## 2019-07-15 RX ORDER — LISINOPRIL 2.5 MG/1
10 TABLET ORAL DAILY
Refills: 0 | Status: DISCONTINUED | OUTPATIENT
Start: 2019-07-15 | End: 2019-07-17

## 2019-07-15 RX ORDER — POTASSIUM CHLORIDE 20 MEQ
20 PACKET (EA) ORAL ONCE
Refills: 0 | Status: DISCONTINUED | OUTPATIENT
Start: 2019-07-15 | End: 2019-07-15

## 2019-07-15 RX ORDER — SODIUM CHLORIDE 9 MG/ML
500 INJECTION INTRAMUSCULAR; INTRAVENOUS; SUBCUTANEOUS ONCE
Refills: 0 | Status: COMPLETED | OUTPATIENT
Start: 2019-07-15 | End: 2019-07-15

## 2019-07-15 RX ORDER — POTASSIUM CHLORIDE 20 MEQ
40 PACKET (EA) ORAL ONCE
Refills: 0 | Status: COMPLETED | OUTPATIENT
Start: 2019-07-15 | End: 2019-07-15

## 2019-07-15 RX ORDER — HYDROXYZINE HCL 10 MG
25 TABLET ORAL THREE TIMES A DAY
Refills: 0 | Status: DISCONTINUED | OUTPATIENT
Start: 2019-07-15 | End: 2019-07-17

## 2019-07-15 RX ORDER — INSULIN GLARGINE 100 [IU]/ML
18 INJECTION, SOLUTION SUBCUTANEOUS AT BEDTIME
Refills: 0 | Status: DISCONTINUED | OUTPATIENT
Start: 2019-07-15 | End: 2019-07-17

## 2019-07-15 RX ORDER — ONDANSETRON 8 MG/1
4 TABLET, FILM COATED ORAL EVERY 6 HOURS
Refills: 0 | Status: DISCONTINUED | OUTPATIENT
Start: 2019-07-15 | End: 2019-07-17

## 2019-07-15 RX ORDER — NORTRIPTYLINE HYDROCHLORIDE 10 MG/1
25 CAPSULE ORAL AT BEDTIME
Refills: 0 | Status: DISCONTINUED | OUTPATIENT
Start: 2019-07-15 | End: 2019-07-17

## 2019-07-15 RX ORDER — ATORVASTATIN CALCIUM 80 MG/1
80 TABLET, FILM COATED ORAL AT BEDTIME
Refills: 0 | Status: DISCONTINUED | OUTPATIENT
Start: 2019-07-15 | End: 2019-07-17

## 2019-07-15 RX ORDER — SERTRALINE 25 MG/1
100 TABLET, FILM COATED ORAL DAILY
Refills: 0 | Status: DISCONTINUED | OUTPATIENT
Start: 2019-07-15 | End: 2019-07-17

## 2019-07-15 RX ORDER — CLONAZEPAM 1 MG
0.5 TABLET ORAL DAILY
Refills: 0 | Status: DISCONTINUED | OUTPATIENT
Start: 2019-07-15 | End: 2019-07-17

## 2019-07-15 RX ADMIN — Medication 10 MILLIGRAM(S): at 17:15

## 2019-07-15 RX ADMIN — METFORMIN HYDROCHLORIDE 500 MILLIGRAM(S): 850 TABLET ORAL at 22:54

## 2019-07-15 RX ADMIN — LISINOPRIL 10 MILLIGRAM(S): 2.5 TABLET ORAL at 22:54

## 2019-07-15 RX ADMIN — Medication 10 MILLIGRAM(S): at 19:50

## 2019-07-15 RX ADMIN — Medication 0.5 MILLIGRAM(S): at 22:54

## 2019-07-15 RX ADMIN — MORPHINE SULFATE 4 MILLIGRAM(S): 50 CAPSULE, EXTENDED RELEASE ORAL at 22:06

## 2019-07-15 RX ADMIN — CLOPIDOGREL BISULFATE 75 MILLIGRAM(S): 75 TABLET, FILM COATED ORAL at 22:54

## 2019-07-15 RX ADMIN — ONDANSETRON 4 MILLIGRAM(S): 8 TABLET, FILM COATED ORAL at 19:49

## 2019-07-15 RX ADMIN — Medication 40 MILLIEQUIVALENT(S): at 18:25

## 2019-07-15 RX ADMIN — Medication 25 MILLIGRAM(S): at 22:54

## 2019-07-15 RX ADMIN — SODIUM CHLORIDE 500 MILLILITER(S): 9 INJECTION INTRAMUSCULAR; INTRAVENOUS; SUBCUTANEOUS at 17:15

## 2019-07-15 RX ADMIN — SERTRALINE 100 MILLIGRAM(S): 25 TABLET, FILM COATED ORAL at 22:54

## 2019-07-15 RX ADMIN — ONDANSETRON 4 MILLIGRAM(S): 8 TABLET, FILM COATED ORAL at 22:05

## 2019-07-15 NOTE — ED PROVIDER NOTE - OBJECTIVE STATEMENT
Patient BIBA for vomiting and abdominal pain, Patient seen in ED for same c/o in past with neg workup. Has appt with GI in 3 weeks, No fever, No chest pain, Patient unable to tolerate and PO today.

## 2019-07-15 NOTE — ED ADULT NURSE NOTE - NSIMPLEMENTINTERV_GEN_ALL_ED
Implemented All Fall with Harm Risk Interventions:  [place] to call system. Call bell, personal items and telephone within reach. Instruct patient to call for assistance. Room bathroom lighting operational. Non-slip footwear when patient is off stretcher. Physically safe environment: no spills, clutter or unnecessary equipment. Stretcher in lowest position, wheels locked, appropriate side rails in place. Provide visual cue, wrist band, yellow gown, etc. Monitor gait and stability. Monitor for mental status changes and reorient to person, place, and time. Review medications for side effects contributing to fall risk. Reinforce activity limits and safety measures with patient and family. Provide visual clues: red socks.

## 2019-07-15 NOTE — ED PROVIDER NOTE - PROGRESS NOTE DETAILS
pt still c/o nausea, vomiting- abdominal pain improved- labs reviewed, dw pt and daughter, will admit Jose accepts,

## 2019-07-15 NOTE — H&P ADULT - ASSESSMENT
Patient is a 79y old  Female who presents with a chief complaint of    nausea /  vomittng> 24 hrs                                                                                                                                                                                                                                                                                     HEALTH ISSUES - PROBLEM Dx:GastritisDm/ Patient is a 79y old  Female who presents with a chief complaint of    nausea /  vomittng> 24 hrs                                                                                                                                                                                                                                                                                     HEALTH ISSUES - PROBLEM Dx:Gastritis      hcvd   Dm/

## 2019-07-15 NOTE — H&P ADULT - NSHPREVIEWOFSYSTEMS_GEN_ALL_CORE
· CONSTITUTIONAL: no fever and no chills.  · EYES: no discharge, no irritation, no pain, no redness, and no visual changes.  · ENMT: Ears: no ear pain and no hearing problems.Nose: no nasal congestion and no nasal drainage.Mouth/Throat: no dysphagia, no hoarseness and no throat pain.Neck: no lumps, no pain, no stiffness and no swollen glands.  · CARDIOVASCULAR: no chest pain and no edema.  · RESPIRATORY: no chest pain, no cough, and no shortness of breath.  · GASTROINTESTINAL: - - -   · Gastrointestinal [+]: ABDOMINAL PAIN, NAUSEA, VOMITING  · GENITOURINARY: no dysuria, no frequency, and no hematuria.  · MUSCULOSKELETAL: no back pain, no gout, no musculoskeletal pain, no neck pain, and no weakness.  · SKIN: no abrasions, no jaundice, no lesions, no pruritis, and no rashes.  · NEURO: no loss of consciousness, no gait abnormality, no headache, no sensory deficits, and no weakness.

## 2019-07-15 NOTE — H&P ADULT - NEUROLOGICAL DETAILS
alert and oriented x 3/sensation intact/cranial nerves intact/normal strength alert and oriented x 3/sensation intact

## 2019-07-15 NOTE — H&P ADULT - NSICDXPASTMEDICALHX_GEN_ALL_CORE_FT
PAST MEDICAL HISTORY:  Bilateral hearing loss, unspecified hearing loss type     BP (high blood pressure) 20 yr    Breast CA 2014 s/p rt    CVA (cerebral vascular accident) 6/18 rt weakness    Depression     DM (diabetes mellitus) 24 yr    High cholesterol     Obese

## 2019-07-15 NOTE — ED PROVIDER NOTE - ATTENDING CONTRIBUTION TO CARE
78yo F history of HTN DL DM PVD CVA with residual weakness depression cholecystectomy presenting with nausea/vomiting abdominal pain since 2pm- took a bite of a sandwich then felt sx develop- abdominal pain diffuse, cramping, bloating, non radiating, vomitus non-bloody, non-bilious, no diarrhea, no fevers/chills, no chest pain, shortness of breath, dysuria/hematuria, back pain, numbness/focal weakness beyond baseline. Constitutional: Well appearing. No acute distress. Non toxic.   Eyes: PERRLA. Extraocular movements intact, no entrapment. Conjunctiva normal.   ENT: No nasal discharge. dry mucus membranes.  Neck: Supple, non tender, full range of motion.  CV: RRR no murmurs, rubs, or gallops. +S1S2.   Pulm: Clear to auscultation bilaterally. Normal work of breathing.  Abd: soft NT ND +BS.   Ext: Warm and well perfused x4,  no edema.   Psy: Cooperative, appropriate.   Skin: Warm, dry, no rash  Neuro: CN2-12 grossly intact no sensory or motor deficits throughout, no drift     a/p- nausea/vomiting abdominal pain- labs, ivf, antiemetics, imaging, reassess

## 2019-07-15 NOTE — H&P ADULT - NSHPLABSRESULTS_GEN_ALL_CORE
10.0   6.07  )-----------( 239      ( 15 Jul 2019 16:50 )             30.5     07-15    145  |  98  |  35<H>  ----------------------------<  148<H>  3.3<L>   |  28  |  1.5    Ca    9.4      15 Jul 2019 16:50    TPro  7.0  /  Alb  4.5  /  TBili  0.4  /  DBili  x   /  AST  25  /  ALT  19  /  AlkPhos  133<H>  07-15          Urinalysis Basic - ( 15 Jul 2019 17:16 )    Color: Yellow / Appearance: Clear / S.020 / pH: x  Gluc: x / Ketone: Negative  / Bili: Negative / Urobili: 0.2 mg/dL   Blood: x / Protein: 30 mg/dL / Nitrite: Negative   Leuk Esterase: Trace / RBC: 1-2 /HPF / WBC 1-2 /HPF   Sq Epi: x / Non Sq Epi: Occasional /HPF / Bacteria: x      PT/INR - ( 15 Jul 2019 16:50 )   PT: 13.50 sec;   INR: 1.18 ratio         PTT - ( 15 Jul 2019 16:50 )  PTT:32.6 sec  Lactate Trend  07-15 @ 19:30 Lactate:3.4   07-15 @ 16:50 Lactate:2.6     CARDIAC MARKERS ( 15 Jul 2019 19:30 )  x     / 0.02 ng/mL / x     / x     / x      CARDIAC MARKERS ( 15 Jul 2019 16:50 )  x     / 0.02 ng/mL / x     / x     / x          CAPILLARY BLOOD GLUCOSE

## 2019-07-15 NOTE — H&P ADULT - HISTORY OF PRESENT ILLNESS
· Chief Complaint: The patient is a 79y Female complaining of abdominal pain.	  · HPI Objective Statement: Patient BIBA for vomiting and abdominal pain, Patient seen in ED for same c/o in past with neg workup. Has appt with GI in 3 weeks, No fever, No chest pain, Patient unable to tolerate and PO today.

## 2019-07-16 ENCOUNTER — TRANSCRIPTION ENCOUNTER (OUTPATIENT)
Age: 79
End: 2019-07-16

## 2019-07-16 LAB
ANION GAP SERPL CALC-SCNC: 15 MMOL/L — HIGH (ref 7–14)
BUN SERPL-MCNC: 34 MG/DL — HIGH (ref 10–20)
CALCIUM SERPL-MCNC: 8.2 MG/DL — LOW (ref 8.5–10.1)
CHLORIDE SERPL-SCNC: 101 MMOL/L — SIGNIFICANT CHANGE UP (ref 98–110)
CO2 SERPL-SCNC: 27 MMOL/L — SIGNIFICANT CHANGE UP (ref 17–32)
CREAT SERPL-MCNC: 1.4 MG/DL — SIGNIFICANT CHANGE UP (ref 0.7–1.5)
GLUCOSE BLDC GLUCOMTR-MCNC: 131 MG/DL — HIGH (ref 70–99)
GLUCOSE BLDC GLUCOMTR-MCNC: 193 MG/DL — HIGH (ref 70–99)
GLUCOSE BLDC GLUCOMTR-MCNC: 200 MG/DL — HIGH (ref 70–99)
GLUCOSE BLDC GLUCOMTR-MCNC: 207 MG/DL — HIGH (ref 70–99)
GLUCOSE SERPL-MCNC: 123 MG/DL — HIGH (ref 70–99)
HCT VFR BLD CALC: 32.4 % — LOW (ref 37–47)
HGB BLD-MCNC: 10.3 G/DL — LOW (ref 12–16)
MCHC RBC-ENTMCNC: 30.6 PG — SIGNIFICANT CHANGE UP (ref 27–31)
MCHC RBC-ENTMCNC: 31.8 G/DL — LOW (ref 32–37)
MCV RBC AUTO: 96.1 FL — SIGNIFICANT CHANGE UP (ref 81–99)
NRBC # BLD: 0 /100 WBCS — SIGNIFICANT CHANGE UP (ref 0–0)
PLATELET # BLD AUTO: 231 K/UL — SIGNIFICANT CHANGE UP (ref 130–400)
POTASSIUM SERPL-MCNC: 4.8 MMOL/L — SIGNIFICANT CHANGE UP (ref 3.5–5)
POTASSIUM SERPL-SCNC: 4.8 MMOL/L — SIGNIFICANT CHANGE UP (ref 3.5–5)
RBC # BLD: 3.37 M/UL — LOW (ref 4.2–5.4)
RBC # FLD: 14 % — SIGNIFICANT CHANGE UP (ref 11.5–14.5)
SODIUM SERPL-SCNC: 143 MMOL/L — SIGNIFICANT CHANGE UP (ref 135–146)
WBC # BLD: 9.11 K/UL — SIGNIFICANT CHANGE UP (ref 4.8–10.8)
WBC # FLD AUTO: 9.11 K/UL — SIGNIFICANT CHANGE UP (ref 4.8–10.8)

## 2019-07-16 PROCEDURE — 99223 1ST HOSP IP/OBS HIGH 75: CPT

## 2019-07-16 PROCEDURE — 99233 SBSQ HOSP IP/OBS HIGH 50: CPT

## 2019-07-16 RX ORDER — DOCUSATE SODIUM 100 MG
100 CAPSULE ORAL
Refills: 0 | Status: DISCONTINUED | OUTPATIENT
Start: 2019-07-16 | End: 2019-07-17

## 2019-07-16 RX ORDER — PANTOPRAZOLE SODIUM 20 MG/1
40 TABLET, DELAYED RELEASE ORAL
Refills: 0 | Status: DISCONTINUED | OUTPATIENT
Start: 2019-07-16 | End: 2019-07-17

## 2019-07-16 RX ORDER — SENNA PLUS 8.6 MG/1
2 TABLET ORAL AT BEDTIME
Refills: 0 | Status: DISCONTINUED | OUTPATIENT
Start: 2019-07-16 | End: 2019-07-17

## 2019-07-16 RX ADMIN — METFORMIN HYDROCHLORIDE 500 MILLIGRAM(S): 850 TABLET ORAL at 17:59

## 2019-07-16 RX ADMIN — PANTOPRAZOLE SODIUM 40 MILLIGRAM(S): 20 TABLET, DELAYED RELEASE ORAL at 18:00

## 2019-07-16 RX ADMIN — NORTRIPTYLINE HYDROCHLORIDE 25 MILLIGRAM(S): 10 CAPSULE ORAL at 00:14

## 2019-07-16 RX ADMIN — CLOPIDOGREL BISULFATE 75 MILLIGRAM(S): 75 TABLET, FILM COATED ORAL at 14:54

## 2019-07-16 RX ADMIN — INSULIN GLARGINE 18 UNIT(S): 100 INJECTION, SOLUTION SUBCUTANEOUS at 00:13

## 2019-07-16 RX ADMIN — Medication 100 MILLIGRAM(S): at 18:00

## 2019-07-16 RX ADMIN — Medication 0.5 MILLIGRAM(S): at 12:56

## 2019-07-16 RX ADMIN — SERTRALINE 100 MILLIGRAM(S): 25 TABLET, FILM COATED ORAL at 14:55

## 2019-07-16 RX ADMIN — SENNA PLUS 2 TABLET(S): 8.6 TABLET ORAL at 21:40

## 2019-07-16 RX ADMIN — PANTOPRAZOLE SODIUM 40 MILLIGRAM(S): 20 TABLET, DELAYED RELEASE ORAL at 06:52

## 2019-07-16 RX ADMIN — ATORVASTATIN CALCIUM 80 MILLIGRAM(S): 80 TABLET, FILM COATED ORAL at 21:40

## 2019-07-16 RX ADMIN — METFORMIN HYDROCHLORIDE 500 MILLIGRAM(S): 850 TABLET ORAL at 06:52

## 2019-07-16 RX ADMIN — NORTRIPTYLINE HYDROCHLORIDE 25 MILLIGRAM(S): 10 CAPSULE ORAL at 21:40

## 2019-07-16 RX ADMIN — INSULIN GLARGINE 18 UNIT(S): 100 INJECTION, SOLUTION SUBCUTANEOUS at 21:39

## 2019-07-16 RX ADMIN — ATORVASTATIN CALCIUM 80 MILLIGRAM(S): 80 TABLET, FILM COATED ORAL at 00:14

## 2019-07-16 RX ADMIN — PANTOPRAZOLE SODIUM 40 MILLIGRAM(S): 20 TABLET, DELAYED RELEASE ORAL at 00:13

## 2019-07-16 RX ADMIN — LISINOPRIL 10 MILLIGRAM(S): 2.5 TABLET ORAL at 06:52

## 2019-07-16 NOTE — CONSULT NOTE ADULT - SUBJECTIVE AND OBJECTIVE BOX
Chief complaint/Reason for consult:    80 yo female [facility] breast cancer s/p radiation 2014 in remission, CVA 6/18 on just Plavix  presents with inability to tolerate PO after eating cheese on Sunday. Case discussed with patient's son Jamir who states patient vomits and develops abdominal pain after eating cheese. Patient's son reports he has told patient's aides multiple times not to give patient cheese but they forget and this situation recurs. Patient has not vomited since admission and currently Patient denies nausea, vomiting, hematemesis, melena, blood in stool, diarrhea, constipation, abdominal pain. Patient is scheduled to have an appoinment         PAST MEDICAL & SURGICAL HISTORY:      Family history:  FAMILY HISTORY:  No pertinent family history in first degree relatives    No GI cancers in first or second degree relatives    Social History: No smoking. No alcohol. No illegal drug use.    Allergies:        MEDICATIONS:        MEDICATIONS  (STANDING):  atorvastatin 80 milliGRAM(s) Oral at bedtime  clonazePAM  Tablet 0.5 milliGRAM(s) Oral daily  clopidogrel Tablet 75 milliGRAM(s) Oral daily  insulin glargine Injectable (LANTUS) 18 Unit(s) SubCutaneous at bedtime  lisinopril 10 milliGRAM(s) Oral daily  metFORMIN 500 milliGRAM(s) Oral two times a day  nortriptyline 25 milliGRAM(s) Oral at bedtime  pantoprazole  Injectable 40 milliGRAM(s) IV Push two times a day  sertraline 100 milliGRAM(s) Oral daily    MEDICATIONS  (PRN):  hydrOXYzine hydrochloride 25 milliGRAM(s) Oral three times a day PRN Anxiety  morphine  - Injectable 4 milliGRAM(s) IV Push every 4 hours PRN Severe Pain (7 - 10)  ondansetron Injectable 4 milliGRAM(s) IV Push every 6 hours PRN Nausea and/or Vomiting        REVIEW OF SYSTEMS  General:  No weight loss, fevers, or chills.  Eyes:  No reported pain or visual changes  ENT:  No sore throat or runny nose.  NECK: No stiffness or lymphadenopathy  CV:  No chest pain or palpitations.  Resp:  No shortness of breath, cough, wheezing or hemoptysis  GI:  No abdominal pain, nausea, vomiting, dysphagia, diarrhea or constipation. No rectal bleeding, melena, or hematemesis.  Muscle:  No aches or weakness  Neuro:  No tingling, numbness       VITALS:   T(F): 96.6 (07-16-19 @ 05:11), Max: 99.1 (07-15-19 @ 20:27)  HR: 66 (07-16-19 @ 05:11) (66 - 89)  BP: 149/66 (07-16-19 @ 05:11) (128/75 - 149/66)  RR: 16 (07-16-19 @ 05:11) (16 - 18)  SpO2: 100% (07-15-19 @ 20:27) (99% - 100%)    PHYSICAL EXAM:  GENERAL: AAOx3, no acute distress.  HEAD:  Atraumatic, Normocephalic  EYES: conjunctiva and sclera clear  NECK: Supple, No thyromegaly   CHEST/LUNG: Clear to auscultation bilaterally; No wheeze, rhonchi, or rales  HEART: Regular rate and rhythm; normal S1, S2, No murmurs.  ABDOMEN: Soft, nontender, nondistended; Bowel sounds present, no abdominal bruit, masses, or hepatosplenomegaly  EXTREMITIES:  2+ Peripheral Pulses. No clubbing, cyanosis, or edema, warm   NEUROLOGY: No asterixis or tremor  SKIN: Intact, no jaundice          LABS:  07-16    143  |  101  |  34<H>  ----------------------------<  123<H>  4.8   |  27  |  1.4    Ca    8.2<L>      16 Jul 2019 07:26    TPro  7.0  /  Alb  4.5  /  TBili  0.4  /  DBili  x   /  AST  25  /  ALT  19  /  AlkPhos  133<H>  07-15                          10.3   9.11  )-----------( 231      ( 16 Jul 2019 07:26 )             32.4     LIVER FUNCTIONS - ( 15 Jul 2019 16:50 )  Alb: 4.5 g/dL / Pro: 7.0 g/dL / ALK PHOS: 133 U/L / ALT: 19 U/L / AST: 25 U/L / GGT: x           PT/INR - ( 15 Jul 2019 16:50 )   PT: 13.50 sec;   INR: 1.18 ratio         PTT - ( 15 Jul 2019 16:50 )  PTT:32.6 sec    IMAGING: Chief complaint/Reason for consult: Abdominal pain and vomiting     HPI: 78 yo female [facility] breast cancer s/p radiation 2014 in remission, CVA 6/18 on just Plavix  presents with inability to tolerate PO after eating cheese on Sunday. Case discussed with patient's son Jamir who states patient vomits and develops abdominal pain after eating cheese. Patient's son reports he has told patient's aides multiple times not to give patient cheese but they forget and this situation recurs. Patient has not vomited since admission and currently Patient denies nausea, vomiting, hematemesis, melena, blood in stool, diarrhea, constipation, abdominal pain. Patient is scheduled to have an appointment. Patient scheduled for GI appointment with Dr. Wallace in two weeks. As per patient's son patient has had a colonoscopy in the past reportedly normal.      PAST MEDICAL & SURGICAL HISTORY:  Obese  CVA (cerebral vascular accident): 6/18 rt weakness  Bilateral hearing loss, unspecified hearing loss type  High cholesterol  Depression  BP (high blood pressure): 20 yr  Breast CA: 2014 s/p rt  DM (diabetes mellitus): 24 yr  History of cholecystectomy: 1992  History of lumpectomy of right breast  H/O total knee replacement, right        Family history:  FAMILY HISTORY:  No pertinent family history in first degree relatives    No GI cancers in first or second degree relatives    Social History: No smoking. No alcohol. No illegal drug use.    Allergies:     honeydew (Unknown)  latex (Unknown)  penicillins (Unknown)  pickles (Unknown)  shellfish (Unknown)  Sulfacetamide Sodium-Sulfur (Rash)              MEDICATIONS:  Home Medications:  aspirin 81 mg oral tablet, chewable: 2 tab(s) orally once a day (03 Apr 2019 04:50)  atorvastatin 80 mg oral tablet: 1 tab(s) orally once a day (at bedtime) (27 Mar 2019 11:41)  clonazePAM 0.25 mg oral tablet: 1 tab(s) orally once a day as needed for anxiety (27 Mar 2019 11:41)  clopidogrel 75 mg oral tablet: 1 tab(s) orally once a day (27 Mar 2019 11:41)  docusate sodium 100 mg oral capsule: 1 cap(s) orally once a day (27 Mar 2019 11:41)  exemestane 25 mg oral tablet: 1 tab(s) orally once a day (27 Mar 2019 11:41)  HumaLOG 100 units/mL subcutaneous solution: 7 unit(s) subcutaneous 3 times a day (before meals) (27 Mar 2019 11:41)  hydrOXYzine hydrochloride 25 mg oral tablet: 1 tab(s) orally every 8 hours, As needed, Anxiety (03 Apr 2019 04:54)  insulin glargine 100 units/mL subcutaneous solution: 18 unit(s) subcutaneous once a day (at bedtime) (03 Apr 2019 04:52)  lisinopril 10 mg oral tablet: 1 tab(s) orally once a day (27 Mar 2019 11:41)  metFORMIN 500 mg oral tablet: 1 tab(s) orally 2 times a day (27 Mar 2019 11:41)  nortriptyline 25 mg oral capsule: 1 cap(s) orally once a day (at bedtime) (27 Mar 2019 11:41)  pantoprazole 40 mg oral delayed release tablet: 1 tab(s) orally once a day (before a meal) (27 Mar 2019 11:41)  senna oral tablet: 2 tab(s) orally once a day (at bedtime) (27 Mar 2019 11:41)  sertraline 100 mg oral tablet: 1 tab(s) orally once a day (27 Mar 2019 11:41)      MEDICATIONS  (STANDING):  atorvastatin 80 milliGRAM(s) Oral at bedtime  clonazePAM  Tablet 0.5 milliGRAM(s) Oral daily  clopidogrel Tablet 75 milliGRAM(s) Oral daily  insulin glargine Injectable (LANTUS) 18 Unit(s) SubCutaneous at bedtime  lisinopril 10 milliGRAM(s) Oral daily  metFORMIN 500 milliGRAM(s) Oral two times a day  nortriptyline 25 milliGRAM(s) Oral at bedtime  pantoprazole  Injectable 40 milliGRAM(s) IV Push two times a day  sertraline 100 milliGRAM(s) Oral daily    MEDICATIONS  (PRN):  hydrOXYzine hydrochloride 25 milliGRAM(s) Oral three times a day PRN Anxiety  morphine  - Injectable 4 milliGRAM(s) IV Push every 4 hours PRN Severe Pain (7 - 10)  ondansetron Injectable 4 milliGRAM(s) IV Push every 6 hours PRN Nausea and/or Vomiting        REVIEW OF SYSTEMS  General:  No weight loss, fevers, or chills.  Eyes:  No reported pain or visual changes  ENT:  No sore throat or runny nose.  NECK: No stiffness or lymphadenopathy  CV:  No chest pain or palpitations.  Resp:  No shortness of breath, cough, wheezing or hemoptysis  GI:  No abdominal pain, nausea, vomiting, dysphagia, diarrhea or constipation. No rectal bleeding, melena, or hematemesis.  Neuro:  No tingling, numbness       VITALS:   T(F): 96.6 (07-16-19 @ 05:11), Max: 99.1 (07-15-19 @ 20:27)  HR: 66 (07-16-19 @ 05:11) (66 - 89)  BP: 149/66 (07-16-19 @ 05:11) (128/75 - 149/66)  RR: 16 (07-16-19 @ 05:11) (16 - 18)  SpO2: 100% (07-15-19 @ 20:27) (99% - 100%)    PHYSICAL EXAM:  GENERAL: AAOx3, no acute distress.  HEAD:  Atraumatic, Normocephalic  EYES: conjunctiva and sclera clear  NECK: Supple, No thyromegaly   CHEST/LUNG: Clear to auscultation bilaterally; No wheeze, rhonchi, or rales  HEART: Regular rate and rhythm; normal S1, S2, No murmurs.  ABDOMEN: Soft, nontender, nondistended; Bowel sounds present, no abdominal bruit, masses, or hepatosplenomegaly  NEUROLOGY: No asterixis or tremor  SKIN: Intact, no jaundice          LABS:  07-16    143  |  101  |  34<H>  ----------------------------<  123<H>  4.8   |  27  |  1.4    Ca    8.2<L>      16 Jul 2019 07:26    TPro  7.0  /  Alb  4.5  /  TBili  0.4  /  DBili  x   /  AST  25  /  ALT  19  /  AlkPhos  133<H>  07-15                          10.3   9.11  )-----------( 231      ( 16 Jul 2019 07:26 )             32.4     LIVER FUNCTIONS - ( 15 Jul 2019 16:50 )  Alb: 4.5 g/dL / Pro: 7.0 g/dL / ALK PHOS: 133 U/L / ALT: 19 U/L / AST: 25 U/L / GGT: x           PT/INR - ( 15 Jul 2019 16:50 )   PT: 13.50 sec;   INR: 1.18 ratio         PTT - ( 15 Jul 2019 16:50 )  PTT:32.6 sec    IMAGING:    < from: CT Abdomen and Pelvis w/ IV Cont (07.15.19 @ 18:07) >  EXAM:  CT ABDOMEN AND PELVIS IC            PROCEDURE DATE:  07/15/2019            INTERPRETATION:  CLINICAL STATEMENT: Nausea and vomiting.      TECHNIQUE: Contiguous axial CT images were obtained from the lower chest   to the pubic symphysis following administration of 100cc Optiray 320   intravenous contrast.  Oral contrast was not administered.  Reformatted   images in the coronal and sagittal planes were acquired.    COMPARISON CT: CT abdomen and pelvis performed 6/6/2019.     OTHER STUDIESUSED FOR CORRELATION: None.       FINDINGS:    LOWER CHEST: Bibasilar subsegmental atelectasis.    HEPATOBILIARY: Post cholecystomy. Stable 2.4 cm hyperattenuating lesion   within the right hepatic lobe representing a hemangioma. Additional flash   filling hemangioma in the right hepatic dome measuring 0.6 cm.    SPLEEN: Unchanged indeterminate 1.6 cm splenic hypodensity. Additional   multiple subcentimeter hypodensities are too small to further   characterize, stable since prior exam.    PANCREAS: Stable calcifications within the pancreas likely reflect   sequela from chronic pancreatitis.    ADRENAL GLANDS: Unremarkable.    KIDNEYS: Symmetric renal enhancement. No hydronephrosis. Bilateral   subcentimeter hypodensities are too small to further characterize.    ABDOMINOPELVIC NODES: Unremarkable.    PELVIC ORGANS: Unremarkable.    PERITONEUM/MESENTERY/BOWEL: Small hiatal hernia. No bowel obstruction. No   pneumoperitoneum or ascites. Previously noted hyperdensity within the   stomach is no longer visualized and likely represented ingested material.    BONES/SOFT TISSUES: Osteopenia. Degenerative changes of the thoracolumbar   spine. Stable compression deformity of the L2 vertebral body.     OTHER: Atherosclerotic disease of aorta.      IMPRESSION:     No evidence of acute intra-abdominal pathology.    Stable findings as described above.              ARNOLDO WILLSON M.D., RESIDENT RADIOLOGIST  This document has been electronically signed.  MARVA PEDERSON M.D., ATTENDING RADIOLOGIST  This document has been electronically signed. Jul 15 2019  6:54PM              < end of copied text >

## 2019-07-16 NOTE — PROGRESS NOTE ADULT - SUBJECTIVE AND OBJECTIVE BOX
Patient reports she ate without any pain, wants to go home      T(F): 96.6 (07-16-19 @ 14:25), Max: 99.1 (07-15-19 @ 20:27)  HR: 72 (07-16-19 @ 14:25)  BP: 112/56 (07-16-19 @ 14:25)  RR: 16 (07-16-19 @ 14:25)  SpO2: 100% (07-15-19 @ 20:27) (99% - 100%)    PHYSICAL EXAM:  GENERAL: NAD  HEAD:  Atraumatic, Normocephalic  NERVOUS SYSTEM:  no focal deficits  CHEST/LUNG: Clear to percussion bilaterally; No rales, rhonchi, wheezing, or rubs  HEART: Regular rate and rhythm; No murmurs, rubs, or gallops  ABDOMEN: Soft, Nontender, Nondistended; Bowel sounds present  EXTREMITIES:  2+ Peripheral Pulses, No clubbing, cyanosis, or edema  LYMPH: No lymphadenopathy noted  SKIN: No rashes or lesions    LABS  07-16    143  |  101  |  34<H>  ----------------------------<  123<H>  4.8   |  27  |  1.4    Ca    8.2<L>      16 Jul 2019 07:26    TPro  7.0  /  Alb  4.5  /  TBili  0.4  /  DBili  x   /  AST  25  /  ALT  19  /  AlkPhos  133<H>  07-15                          10.3   9.11  )-----------( 231      ( 16 Jul 2019 07:26 )             32.4     PT/INR - ( 15 Jul 2019 16:50 )   PT: 13.50 sec;   INR: 1.18 ratio         PTT - ( 15 Jul 2019 16:50 )  PTT:32.6 sec    CARDIAC ENZYMES      Troponin T, Serum: 0.02 ng/mL (07-15-19 @ 19:30)  Troponin T, Serum: 0.02 ng/mL (07-15-19 @ 16:50)        RADIOLOGY  < from: CT Abdomen and Pelvis w/ IV Cont (07.15.19 @ 18:07) >  IMPRESSION:     No evidence of acute intra-abdominal pathology.    < end of copied text >    MEDICATIONS  (STANDING):  atorvastatin 80 milliGRAM(s) Oral at bedtime  clonazePAM  Tablet 0.5 milliGRAM(s) Oral daily  clopidogrel Tablet 75 milliGRAM(s) Oral daily  docusate sodium 100 milliGRAM(s) Oral two times a day  insulin glargine Injectable (LANTUS) 18 Unit(s) SubCutaneous at bedtime  lisinopril 10 milliGRAM(s) Oral daily  metFORMIN 500 milliGRAM(s) Oral two times a day  nortriptyline 25 milliGRAM(s) Oral at bedtime  pantoprazole    Tablet 40 milliGRAM(s) Oral before breakfast  pantoprazole  Injectable 40 milliGRAM(s) IV Push two times a day  senna 2 Tablet(s) Oral at bedtime  sertraline 100 milliGRAM(s) Oral daily    MEDICATIONS  (PRN):  hydrOXYzine hydrochloride 25 milliGRAM(s) Oral three times a day PRN Anxiety  morphine  - Injectable 4 milliGRAM(s) IV Push every 4 hours PRN Severe Pain (7 - 10)  ondansetron Injectable 4 milliGRAM(s) IV Push every 6 hours PRN Nausea and/or Vomiting

## 2019-07-16 NOTE — DISCHARGE NOTE PROVIDER - CARE PROVIDER_API CALL
Dell Malone (MD)  Internal Medicine  [address] [place] [address]  Phone: [phone]  Fax: [phone]  Follow Up Time:     Garry Wallace (DO)  Gastroenterology; Internal Medicine  [facility] [place] [address]  Phone: [phone]  Fax: [phone]  Follow Up Time:

## 2019-07-16 NOTE — DISCHARGE NOTE PROVIDER - CARE PROVIDERS DIRECT ADDRESSES
[email or web address]

## 2019-07-16 NOTE — DISCHARGE NOTE PROVIDER - HOSPITAL COURSE
80 yo female [facility] breast cancer s/p radiation 2014 in remission, CVA 6/18 on Plavix  presents with inability to tolerate PO after eating cheese on Sunday. Case discussed with patient's son Jamir who states patient vomits and develops abdominal pain after eating cheese. Patient's son reports he has told patient's aides multiple times not to give patient cheese but they forget and this situation recurs. Patient has not vomited since admission and currently the Patient is tolerating an oral diet and denies nausea, vomiting, hematemesis, melena, blood in stool, diarrhea, constipation, abdominal pain. Patient scheduled for GI appointment with Dr. Wallace in two weeks. As per patient's son patient has had a colonoscopy in the past reportedly normal.

## 2019-07-16 NOTE — CONSULT NOTE ADULT - ASSESSMENT
78 yo female [facility] breast cancer s/p radiation 2014 in remission, CVA 6/18 on just Plavix  presents with inability to tolerate PO after eating cheese on Sunday. Case discussed with patient's son Jamir who states patient vomits and develops abdominal pain after eating cheese. Patient's son reports he has told patient's aides multiple times not to give patient cheese but they forget and this situation recurs.    Problem 1-Vomiting s/p known intolerance   Rec  -To confirm with Dr. Lyons  -Avoid cheese products  -Advance diet to soft and advance diet as tolerted  -Follow up with Dr. Wallace in two weeks 80 yo female [facility] breast cancer s/p radiation 2014 in remission, CVA 6/18 on just Plavix  presents with inability to tolerate PO after eating cheese on Sunday. Case discussed with patient's son Jamir who states patient vomits and develops abdominal pain after eating cheese. Patient's son reports he has told patient's aides multiple times not to give patient cheese but they forget and this situation recurs.    Problem 1-Vomiting s/p known intolerance   Rec  -Avoid cheese products  -Advance diet to soft and advance diet as tolerated   -Follow up with Dr. Wallace in two weeks for EGD evaluation of questionable CT scan findings from prior admission 78 yo female [facility] breast cancer s/p radiation 2014 in remission, CVA 6/18 on just Plavix  presents with inability to tolerate PO after eating cheese on Sunday. Case discussed with patient's son Jamir who states patient vomits and develops abdominal pain after eating cheese. Patient's son reports he has told patient's aides multiple times not to give patient cheese but they forget and this situation recurs.    Problem 1-Vomiting s/p known intolerance   Rec  -Avoid cheese products  -Advance diet to soft and advance diet as tolerated   -Follow up with Dr. Wallace in two weeks for EGD evaluation of questionable CT scan findings from prior admission   (pt aware and apt scheduled)

## 2019-07-17 ENCOUNTER — TRANSCRIPTION ENCOUNTER (OUTPATIENT)
Age: 79
End: 2019-07-17

## 2019-07-17 VITALS
SYSTOLIC BLOOD PRESSURE: 100 MMHG | HEART RATE: 86 BPM | DIASTOLIC BLOOD PRESSURE: 53 MMHG | TEMPERATURE: 98 F | RESPIRATION RATE: 16 BRPM

## 2019-07-17 LAB
GLUCOSE BLDC GLUCOMTR-MCNC: 101 MG/DL — HIGH (ref 70–99)
GLUCOSE BLDC GLUCOMTR-MCNC: 74 MG/DL — SIGNIFICANT CHANGE UP (ref 70–99)
GLUCOSE BLDC GLUCOMTR-MCNC: 79 MG/DL — SIGNIFICANT CHANGE UP (ref 70–99)
GLUCOSE BLDC GLUCOMTR-MCNC: 86 MG/DL — SIGNIFICANT CHANGE UP (ref 70–99)
GLUCOSE BLDC GLUCOMTR-MCNC: 93 MG/DL — SIGNIFICANT CHANGE UP (ref 70–99)

## 2019-07-17 PROCEDURE — 99239 HOSP IP/OBS DSCHRG MGMT >30: CPT

## 2019-07-17 RX ADMIN — METFORMIN HYDROCHLORIDE 500 MILLIGRAM(S): 850 TABLET ORAL at 05:44

## 2019-07-17 RX ADMIN — Medication 100 MILLIGRAM(S): at 05:44

## 2019-07-17 RX ADMIN — SERTRALINE 100 MILLIGRAM(S): 25 TABLET, FILM COATED ORAL at 12:07

## 2019-07-17 RX ADMIN — LISINOPRIL 10 MILLIGRAM(S): 2.5 TABLET ORAL at 05:44

## 2019-07-17 RX ADMIN — PANTOPRAZOLE SODIUM 40 MILLIGRAM(S): 20 TABLET, DELAYED RELEASE ORAL at 05:44

## 2019-07-17 RX ADMIN — Medication 0.5 MILLIGRAM(S): at 12:07

## 2019-07-17 RX ADMIN — CLOPIDOGREL BISULFATE 75 MILLIGRAM(S): 75 TABLET, FILM COATED ORAL at 12:07

## 2019-07-17 NOTE — PROGRESS NOTE ADULT - SUBJECTIVE AND OBJECTIVE BOX
Patient is comfortable in bed tolerating diet, no pain      T(F): 98.2 (07-17-19 @ 05:38), Max: 98.2 (07-17-19 @ 05:38)  HR: 75 (07-17-19 @ 06:29)  BP: 113/56 (07-17-19 @ 06:29)  RR: 16 (07-17-19 @ 05:38)  SpO2: 99% (07-17-19 @ 06:29) (99% - 99%)    PHYSICAL EXAM:  GENERAL: NAD  HEAD:  Atraumatic, Normocephalic  EYES: EOMI, PERRLA, conjunctiva and sclera clear  NERVOUS SYSTEM:  Alert & Oriented X3, no focal deficits  CHEST/LUNG: Clear to percussion bilaterally; No rales, rhonchi, wheezing, or rubs  HEART: Regular rate and rhythm; No murmurs, rubs, or gallops  ABDOMEN: Soft, Nontender, Nondistended; Bowel sounds present  EXTREMITIES:  2+ Peripheral Pulses, No clubbing, cyanosis, or edema  LYMPH: No lymphadenopathy noted  SKIN: No rashes or lesions    LABS  07-16    143  |  101  |  34<H>  ----------------------------<  123<H>  4.8   |  27  |  1.4    Ca    8.2<L>      16 Jul 2019 07:26    TPro  7.0  /  Alb  4.5  /  TBili  0.4  /  DBili  x   /  AST  25  /  ALT  19  /  AlkPhos  133<H>  07-15                          10.3   9.11  )-----------( 231      ( 16 Jul 2019 07:26 )             32.4     PT/INR - ( 15 Jul 2019 16:50 )   PT: 13.50 sec;   INR: 1.18 ratio         PTT - ( 15 Jul 2019 16:50 )  PTT:32.6 sec    CARDIAC ENZYMES      Troponin T, Serum: 0.02 ng/mL (07-15-19 @ 19:30)  Troponin T, Serum: 0.02 ng/mL (07-15-19 @ 16:50)    RADIOLOGY  < from: CT Abdomen and Pelvis w/ IV Cont (07.15.19 @ 18:07) >  IMPRESSION:     No evidence of acute intra-abdominal pathology.    < end of copied text >    MEDICATIONS  (STANDING):  atorvastatin 80 milliGRAM(s) Oral at bedtime  clonazePAM  Tablet 0.5 milliGRAM(s) Oral daily  clopidogrel Tablet 75 milliGRAM(s) Oral daily  docusate sodium 100 milliGRAM(s) Oral two times a day  insulin glargine Injectable (LANTUS) 18 Unit(s) SubCutaneous at bedtime  lisinopril 10 milliGRAM(s) Oral daily  metFORMIN 500 milliGRAM(s) Oral two times a day  nortriptyline 25 milliGRAM(s) Oral at bedtime  pantoprazole    Tablet 40 milliGRAM(s) Oral before breakfast  pantoprazole  Injectable 40 milliGRAM(s) IV Push two times a day  senna 2 Tablet(s) Oral at bedtime  sertraline 100 milliGRAM(s) Oral daily    MEDICATIONS  (PRN):  hydrOXYzine hydrochloride 25 milliGRAM(s) Oral three times a day PRN Anxiety  morphine  - Injectable 4 milliGRAM(s) IV Push every 4 hours PRN Severe Pain (7 - 10)  ondansetron Injectable 4 milliGRAM(s) IV Push every 6 hours PRN Nausea and/or Vomiting

## 2019-07-17 NOTE — PROGRESS NOTE ADULT - ASSESSMENT
80 yo female [facility] breast cancer s/p radiation 2014 in remission, CVA 6/18 on just Plavix  presents with inability to tolerate PO after eating cheese on Sunday. Case discussed with patient's son Jamir who states patient vomits and develops abdominal pain after eating cheese. Patient's son reports he has told patient's aides multiple times not to give patient cheese but they forget and this situation recurs.    Problem 1-abdominal pain -resolved  Rec  -DC home today  -Avoid cheese products  -33min spent on dc  -Follow up with Dr. Wallace in two weeks  -continue all home meds
80 yo female [facility] breast cancer s/p radiation 2014 in remission, CVA 6/18 on just Plavix  presents with inability to tolerate PO after eating cheese on Sunday. Case discussed with patient's son Jamir who states patient vomits and develops abdominal pain after eating cheese. Patient's son reports he has told patient's aides multiple times not to give patient cheese but they forget and this situation recurs.    Problem 1-abdominal pain -resolved  Rec  -DC home today  -Avoid cheese products  -33min spent on dc  -Follow up with Dr. Wallace in two weeks  -continue all home meds

## 2019-07-17 NOTE — DISCHARGE NOTE NURSING/CASE MANAGEMENT/SOCIAL WORK - NSDCDPATPORTLINK_GEN_ALL_CORE
You can access the [email or web address], offered by [facility], by registering with the following website: [email or web address]

## 2019-07-22 ENCOUNTER — INPATIENT (INPATIENT)
Facility: HOSPITAL | Age: 79
LOS: 7 days | Discharge: HOME HEALTH | End: 2019-07-30
Attending: INTERNAL MEDICINE | Admitting: INTERNAL MEDICINE
Payer: MEDICARE

## 2019-07-22 VITALS
RESPIRATION RATE: 18 BRPM | TEMPERATURE: 97 F | HEART RATE: 50 BPM | DIASTOLIC BLOOD PRESSURE: 65 MMHG | SYSTOLIC BLOOD PRESSURE: 141 MMHG | OXYGEN SATURATION: 96 %

## 2019-07-22 DIAGNOSIS — Z98.890 OTHER SPECIFIED POSTPROCEDURAL STATES: Chronic | ICD-10-CM

## 2019-07-22 DIAGNOSIS — Z96.651 PRESENCE OF RIGHT ARTIFICIAL KNEE JOINT: Chronic | ICD-10-CM

## 2019-07-22 DIAGNOSIS — Z90.49 ACQUIRED ABSENCE OF OTHER SPECIFIED PARTS OF DIGESTIVE TRACT: Chronic | ICD-10-CM

## 2019-07-22 LAB
ALBUMIN SERPL ELPH-MCNC: 4.1 G/DL — SIGNIFICANT CHANGE UP (ref 3.5–5.2)
ALP SERPL-CCNC: 102 U/L — SIGNIFICANT CHANGE UP (ref 30–115)
ALT FLD-CCNC: 21 U/L — SIGNIFICANT CHANGE UP (ref 0–41)
ANION GAP SERPL CALC-SCNC: 25 MMOL/L — HIGH (ref 7–14)
AST SERPL-CCNC: 27 U/L — SIGNIFICANT CHANGE UP (ref 0–41)
BASOPHILS # BLD AUTO: 0.05 K/UL — SIGNIFICANT CHANGE UP (ref 0–0.2)
BASOPHILS NFR BLD AUTO: 0.6 % — SIGNIFICANT CHANGE UP (ref 0–1)
BILIRUB SERPL-MCNC: 0.4 MG/DL — SIGNIFICANT CHANGE UP (ref 0.2–1.2)
BUN SERPL-MCNC: 59 MG/DL — HIGH (ref 10–20)
CALCIUM SERPL-MCNC: 10.1 MG/DL — SIGNIFICANT CHANGE UP (ref 8.5–10.1)
CHLORIDE SERPL-SCNC: 96 MMOL/L — LOW (ref 98–110)
CO2 SERPL-SCNC: 21 MMOL/L — SIGNIFICANT CHANGE UP (ref 17–32)
CREAT SERPL-MCNC: 4.3 MG/DL — CRITICAL HIGH (ref 0.7–1.5)
EOSINOPHIL # BLD AUTO: 0.09 K/UL — SIGNIFICANT CHANGE UP (ref 0–0.7)
EOSINOPHIL NFR BLD AUTO: 1.1 % — SIGNIFICANT CHANGE UP (ref 0–8)
GLUCOSE SERPL-MCNC: 177 MG/DL — HIGH (ref 70–99)
HCT VFR BLD CALC: 30.2 % — LOW (ref 37–47)
HGB BLD-MCNC: 9.8 G/DL — LOW (ref 12–16)
IMM GRANULOCYTES NFR BLD AUTO: 0.7 % — HIGH (ref 0.1–0.3)
LIDOCAIN IGE QN: 16 U/L — SIGNIFICANT CHANGE UP (ref 7–60)
LYMPHOCYTES # BLD AUTO: 0.91 K/UL — LOW (ref 1.2–3.4)
LYMPHOCYTES # BLD AUTO: 11.1 % — LOW (ref 20.5–51.1)
MCHC RBC-ENTMCNC: 31.2 PG — HIGH (ref 27–31)
MCHC RBC-ENTMCNC: 32.5 G/DL — SIGNIFICANT CHANGE UP (ref 32–37)
MCV RBC AUTO: 96.2 FL — SIGNIFICANT CHANGE UP (ref 81–99)
MONOCYTES # BLD AUTO: 0.54 K/UL — SIGNIFICANT CHANGE UP (ref 0.1–0.6)
MONOCYTES NFR BLD AUTO: 6.6 % — SIGNIFICANT CHANGE UP (ref 1.7–9.3)
NEUTROPHILS # BLD AUTO: 6.57 K/UL — HIGH (ref 1.4–6.5)
NEUTROPHILS NFR BLD AUTO: 79.9 % — HIGH (ref 42.2–75.2)
NRBC # BLD: 0 /100 WBCS — SIGNIFICANT CHANGE UP (ref 0–0)
PLATELET # BLD AUTO: 313 K/UL — SIGNIFICANT CHANGE UP (ref 130–400)
POTASSIUM SERPL-MCNC: 5.4 MMOL/L — HIGH (ref 3.5–5)
POTASSIUM SERPL-SCNC: 5.4 MMOL/L — HIGH (ref 3.5–5)
PROT SERPL-MCNC: 7.5 G/DL — SIGNIFICANT CHANGE UP (ref 6–8)
RBC # BLD: 3.14 M/UL — LOW (ref 4.2–5.4)
RBC # FLD: 13.9 % — SIGNIFICANT CHANGE UP (ref 11.5–14.5)
SODIUM SERPL-SCNC: 142 MMOL/L — SIGNIFICANT CHANGE UP (ref 135–146)
TROPONIN T SERPL-MCNC: 0.04 NG/ML — CRITICAL HIGH
WBC # BLD: 8.22 K/UL — SIGNIFICANT CHANGE UP (ref 4.8–10.8)
WBC # FLD AUTO: 8.22 K/UL — SIGNIFICANT CHANGE UP (ref 4.8–10.8)

## 2019-07-22 PROCEDURE — 74176 CT ABD & PELVIS W/O CONTRAST: CPT | Mod: 26

## 2019-07-22 PROCEDURE — 93010 ELECTROCARDIOGRAM REPORT: CPT

## 2019-07-22 PROCEDURE — 99285 EMERGENCY DEPT VISIT HI MDM: CPT

## 2019-07-22 RX ORDER — ONDANSETRON 8 MG/1
4 TABLET, FILM COATED ORAL ONCE
Refills: 0 | Status: COMPLETED | OUTPATIENT
Start: 2019-07-22 | End: 2019-07-22

## 2019-07-22 RX ORDER — SODIUM CHLORIDE 9 MG/ML
1000 INJECTION, SOLUTION INTRAVENOUS ONCE
Refills: 0 | Status: COMPLETED | OUTPATIENT
Start: 2019-07-22 | End: 2019-07-22

## 2019-07-22 RX ORDER — SODIUM POLYSTYRENE SULFONATE 4.1 MEQ/G
15 POWDER, FOR SUSPENSION ORAL ONCE
Refills: 0 | Status: COMPLETED | OUTPATIENT
Start: 2019-07-22 | End: 2019-07-22

## 2019-07-22 RX ORDER — KETOROLAC TROMETHAMINE 30 MG/ML
15 SYRINGE (ML) INJECTION ONCE
Refills: 0 | Status: DISCONTINUED | OUTPATIENT
Start: 2019-07-22 | End: 2019-07-22

## 2019-07-22 RX ORDER — MORPHINE SULFATE 50 MG/1
4 CAPSULE, EXTENDED RELEASE ORAL ONCE
Refills: 0 | Status: DISCONTINUED | OUTPATIENT
Start: 2019-07-22 | End: 2019-07-22

## 2019-07-22 RX ORDER — METOCLOPRAMIDE HCL 10 MG
10 TABLET ORAL ONCE
Refills: 0 | Status: COMPLETED | OUTPATIENT
Start: 2019-07-22 | End: 2019-07-22

## 2019-07-22 RX ADMIN — MORPHINE SULFATE 4 MILLIGRAM(S): 50 CAPSULE, EXTENDED RELEASE ORAL at 22:27

## 2019-07-22 RX ADMIN — SODIUM POLYSTYRENE SULFONATE 15 GRAM(S): 4.1 POWDER, FOR SUSPENSION ORAL at 23:27

## 2019-07-22 RX ADMIN — Medication 15 MILLIGRAM(S): at 20:09

## 2019-07-22 RX ADMIN — SODIUM CHLORIDE 1000 MILLILITER(S): 9 INJECTION, SOLUTION INTRAVENOUS at 22:27

## 2019-07-22 RX ADMIN — ONDANSETRON 4 MILLIGRAM(S): 8 TABLET, FILM COATED ORAL at 20:10

## 2019-07-22 RX ADMIN — Medication 10 MILLIGRAM(S): at 22:27

## 2019-07-22 NOTE — ED ADULT NURSE NOTE - NSIMPLEMENTINTERV_GEN_ALL_ED
Implemented All Fall with Harm Risk Interventions:  [place] to call system. Call bell, personal items and telephone within reach. Instruct patient to call for assistance. Room bathroom lighting operational. Non-slip footwear when patient is off stretcher. Physically safe environment: no spills, clutter or unnecessary equipment. Stretcher in lowest position, wheels locked, appropriate side rails in place. Provide visual cue, wrist band, yellow gown, etc. Monitor gait and stability. Monitor for mental status changes and reorient to person, place, and time. Review medications for side effects contributing to fall risk. Reinforce activity limits and safety measures with patient and family. Provide visual clues: red socks.

## 2019-07-22 NOTE — ED PROVIDER NOTE - CLINICAL SUMMARY MEDICAL DECISION MAKING FREE TEXT BOX
78 yo female with PMH DM, HTN, HLD, CVA presents to ER for epigastric pain but found to have diffuse tenderness on exam. Pt endorsed to me by Dr Sánchez, to follow up with CT , labs and dispo. Pt found to be in acute renal failure, CT no acute pathology, ekg as documented, and elevated K. Placed on Tele for further inpatient workup

## 2019-07-22 NOTE — ED PROVIDER NOTE - OBJECTIVE STATEMENT
80 yo female with a pmh of DM, HTN, HLD, CVA, hearing loss, and breast CA presents with epigastric abdominal pain that started this morning. pt states that this is sharp pain with no radiation and has been constant since onset. she has experience nausea with no vomiting and denies any bowel changes. she mention to have had the same symptoms in May with a negative workup. she denies any other symptoms including fever, chills, urinary/bowel changes, recent illness/travel, chest pain, or SOB.

## 2019-07-22 NOTE — ED PROVIDER NOTE - CARE PLAN
Principal Discharge DX:	Generalized abdominal pain Principal Discharge DX:	Generalized abdominal pain  Secondary Diagnosis:	Acute renal failure, unspecified acute renal failure type  Secondary Diagnosis:	Elevated troponin  Secondary Diagnosis:	Hyperkalemia

## 2019-07-22 NOTE — ED PROVIDER NOTE - NS ED ROS FT
Constitutional: (-) fever  Eyes/ENT: (-) blurry vision, (-) epistaxis, (-) visual changes   Cardiovascular: (-) chest pain, (-) syncope  Respiratory: (-) cough, (-) shortness of breath  Gastrointestinal: nausea, abdominal pain  Genitourinary: (-) dysuria, (-) hesitancy, (-) frequency   Musculoskeletal: (-) neck pain, (-) back pain, (-) joint pain  Integumentary: (-) rash, (-) edema  Neurological: (-) headache, (-) altered mental status  Allergic/Immunologic: (-) pruritus

## 2019-07-22 NOTE — ED PROVIDER NOTE - PROGRESS NOTE DETAILS
ATTENDING NOTE:   78 y/o F with extensive PMH as above p/w N/V and abdominal pain that is epigastric radiating to general abdomen. Onset is gradual, began earlier today.   Mild TTP to epigastrium, no rebound or guarding. Actively vomiting in ED. Exam otherwise as above.  Plan for supportive care, labs, CT-abd/pelvis, reassess. pt endorsed to me by Dr Steen pt endorsed to me (Basil) by Dr Steen discussed pt with cardiology fellow who will see pt while admitted

## 2019-07-22 NOTE — ED ADULT NURSE NOTE - OBJECTIVE STATEMENT
Pt C/O abdominal pain, N/V x3days, diarrhea x2 today, and still vomiting on and off. No fever, pain on left arm, pt is on right hand precaution, from previous Lymph node removal.

## 2019-07-22 NOTE — ED PROVIDER NOTE - PHYSICAL EXAMINATION
Gen: NAD  Head: NCAT  HEENT: oral mucosa moist, normal conjunctiva, oropharynx clear without exudate or erythema  Lung: CTAB, no respiratory distress, no wheezing, rales, rhonchi  CV: normal s1/s2, rrr, Normal perfusion, pulses 2+ throughout  Abd: soft, diffuse tenderness, no CVA tenderness  Genitourinary: no pelvic tenderness  MSK: No edema, no visible deformities, full range of motion in all 4 extremities  Neuro: AOx3  Skin: No rash   Psych: normal affect

## 2019-07-23 LAB
ALBUMIN SERPL ELPH-MCNC: 3.8 G/DL — SIGNIFICANT CHANGE UP (ref 3.5–5.2)
ALP SERPL-CCNC: 118 U/L — HIGH (ref 30–115)
ALT FLD-CCNC: 29 U/L — SIGNIFICANT CHANGE UP (ref 0–41)
ANION GAP SERPL CALC-SCNC: 14 MMOL/L — SIGNIFICANT CHANGE UP (ref 7–14)
APPEARANCE UR: ABNORMAL
APPEARANCE UR: CLEAR — SIGNIFICANT CHANGE UP
AST SERPL-CCNC: 35 U/L — SIGNIFICANT CHANGE UP (ref 0–41)
BACTERIA # UR AUTO: ABNORMAL /HPF
BILIRUB SERPL-MCNC: 0.3 MG/DL — SIGNIFICANT CHANGE UP (ref 0.2–1.2)
BILIRUB UR-MCNC: NEGATIVE — SIGNIFICANT CHANGE UP
BILIRUB UR-MCNC: NEGATIVE — SIGNIFICANT CHANGE UP
BUN SERPL-MCNC: 53 MG/DL — HIGH (ref 10–20)
CALCIUM SERPL-MCNC: 8.9 MG/DL — SIGNIFICANT CHANGE UP (ref 8.5–10.1)
CHLORIDE SERPL-SCNC: 100 MMOL/L — SIGNIFICANT CHANGE UP (ref 98–110)
CHLORIDE UR-SCNC: 50 — SIGNIFICANT CHANGE UP
CK MB CFR SERPL CALC: 5 NG/ML — SIGNIFICANT CHANGE UP (ref 0.6–6.3)
CK SERPL-CCNC: 310 U/L — HIGH (ref 0–225)
CK SERPL-CCNC: 317 U/L — HIGH (ref 0–225)
CO2 SERPL-SCNC: 28 MMOL/L — SIGNIFICANT CHANGE UP (ref 17–32)
COLOR SPEC: SIGNIFICANT CHANGE UP
COLOR SPEC: YELLOW — SIGNIFICANT CHANGE UP
COMMENT - URINE: SIGNIFICANT CHANGE UP
CREAT ?TM UR-MCNC: 56 MG/DL — SIGNIFICANT CHANGE UP
CREAT SERPL-MCNC: 3.9 MG/DL — HIGH (ref 0.7–1.5)
DIFF PNL FLD: ABNORMAL
DIFF PNL FLD: ABNORMAL
GLUCOSE BLDC GLUCOMTR-MCNC: 153 MG/DL — HIGH (ref 70–99)
GLUCOSE BLDC GLUCOMTR-MCNC: 188 MG/DL — HIGH (ref 70–99)
GLUCOSE BLDC GLUCOMTR-MCNC: 193 MG/DL — HIGH (ref 70–99)
GLUCOSE BLDC GLUCOMTR-MCNC: 196 MG/DL — HIGH (ref 70–99)
GLUCOSE BLDC GLUCOMTR-MCNC: 198 MG/DL — HIGH (ref 70–99)
GLUCOSE BLDC GLUCOMTR-MCNC: 201 MG/DL — HIGH (ref 70–99)
GLUCOSE BLDC GLUCOMTR-MCNC: 206 MG/DL — HIGH (ref 70–99)
GLUCOSE SERPL-MCNC: 134 MG/DL — HIGH (ref 70–99)
GLUCOSE UR QL: NEGATIVE MG/DL — SIGNIFICANT CHANGE UP
GLUCOSE UR QL: NEGATIVE — SIGNIFICANT CHANGE UP
KETONES UR-MCNC: 15
KETONES UR-MCNC: ABNORMAL
LEUKOCYTE ESTERASE UR-ACNC: ABNORMAL
LEUKOCYTE ESTERASE UR-ACNC: ABNORMAL
LIDOCAIN IGE QN: 62 U/L — HIGH (ref 7–60)
MAGNESIUM SERPL-MCNC: 1.7 MG/DL — LOW (ref 1.8–2.4)
NITRITE UR-MCNC: NEGATIVE — SIGNIFICANT CHANGE UP
NITRITE UR-MCNC: NEGATIVE — SIGNIFICANT CHANGE UP
PH UR: 6 — SIGNIFICANT CHANGE UP (ref 5–8)
PH UR: 6.5 — SIGNIFICANT CHANGE UP (ref 5–8)
PHOSPHATE SERPL-MCNC: 4.2 MG/DL — SIGNIFICANT CHANGE UP (ref 2.1–4.9)
POTASSIUM SERPL-MCNC: 3.8 MMOL/L — SIGNIFICANT CHANGE UP (ref 3.5–5)
POTASSIUM SERPL-SCNC: 3.8 MMOL/L — SIGNIFICANT CHANGE UP (ref 3.5–5)
PROT SERPL-MCNC: 6.5 G/DL — SIGNIFICANT CHANGE UP (ref 6–8)
PROT UR-MCNC: 100 MG/DL
PROT UR-MCNC: ABNORMAL
RBC CASTS # UR COMP ASSIST: ABNORMAL /HPF
SODIUM SERPL-SCNC: 142 MMOL/L — SIGNIFICANT CHANGE UP (ref 135–146)
SODIUM UR-SCNC: 68 MMOL/L — SIGNIFICANT CHANGE UP
SODIUM UR-SCNC: 68 MMOL/L — SIGNIFICANT CHANGE UP
SP GR SPEC: 1.01 — SIGNIFICANT CHANGE UP (ref 1.01–1.02)
SP GR SPEC: 1.01 — SIGNIFICANT CHANGE UP (ref 1.01–1.03)
TROPONIN T SERPL-MCNC: 0.03 NG/ML — CRITICAL HIGH
TROPONIN T SERPL-MCNC: 0.03 NG/ML — CRITICAL HIGH
UROBILINOGEN FLD QL: 0.2 MG/DL — SIGNIFICANT CHANGE UP (ref 0.2–0.2)
UROBILINOGEN FLD QL: SIGNIFICANT CHANGE UP
WBC UR QL: ABNORMAL /HPF

## 2019-07-23 PROCEDURE — 99223 1ST HOSP IP/OBS HIGH 75: CPT | Mod: AI

## 2019-07-23 PROCEDURE — 71045 X-RAY EXAM CHEST 1 VIEW: CPT | Mod: 26

## 2019-07-23 PROCEDURE — 93010 ELECTROCARDIOGRAM REPORT: CPT

## 2019-07-23 PROCEDURE — 76700 US EXAM ABDOM COMPLETE: CPT | Mod: 26

## 2019-07-23 PROCEDURE — 93306 TTE W/DOPPLER COMPLETE: CPT | Mod: 26

## 2019-07-23 RX ORDER — NORTRIPTYLINE HYDROCHLORIDE 10 MG/1
25 CAPSULE ORAL AT BEDTIME
Refills: 0 | Status: DISCONTINUED | OUTPATIENT
Start: 2019-07-23 | End: 2019-07-29

## 2019-07-23 RX ORDER — MAGNESIUM SULFATE 500 MG/ML
1 VIAL (ML) INJECTION ONCE
Refills: 0 | Status: COMPLETED | OUTPATIENT
Start: 2019-07-23 | End: 2019-07-23

## 2019-07-23 RX ORDER — CLONAZEPAM 1 MG
0.25 TABLET ORAL DAILY
Refills: 0 | Status: DISCONTINUED | OUTPATIENT
Start: 2019-07-23 | End: 2019-07-23

## 2019-07-23 RX ORDER — ONDANSETRON 8 MG/1
4 TABLET, FILM COATED ORAL EVERY 8 HOURS
Refills: 0 | Status: DISCONTINUED | OUTPATIENT
Start: 2019-07-23 | End: 2019-07-25

## 2019-07-23 RX ORDER — DEXTROSE 50 % IN WATER 50 %
50 SYRINGE (ML) INTRAVENOUS ONCE
Refills: 0 | Status: COMPLETED | OUTPATIENT
Start: 2019-07-23 | End: 2019-07-23

## 2019-07-23 RX ORDER — SODIUM CHLORIDE 9 MG/ML
500 INJECTION, SOLUTION INTRAVENOUS
Refills: 0 | Status: DISCONTINUED | OUTPATIENT
Start: 2019-07-23 | End: 2019-07-23

## 2019-07-23 RX ORDER — AMLODIPINE BESYLATE 2.5 MG/1
5 TABLET ORAL ONCE
Refills: 0 | Status: COMPLETED | OUTPATIENT
Start: 2019-07-23 | End: 2019-07-23

## 2019-07-23 RX ORDER — AMLODIPINE BESYLATE 2.5 MG/1
10 TABLET ORAL DAILY
Refills: 0 | Status: DISCONTINUED | OUTPATIENT
Start: 2019-07-23 | End: 2019-07-29

## 2019-07-23 RX ORDER — INSULIN LISPRO 100/ML
7 VIAL (ML) SUBCUTANEOUS
Refills: 0 | Status: DISCONTINUED | OUTPATIENT
Start: 2019-07-23 | End: 2019-07-24

## 2019-07-23 RX ORDER — DOCUSATE SODIUM 100 MG
100 CAPSULE ORAL DAILY
Refills: 0 | Status: DISCONTINUED | OUTPATIENT
Start: 2019-07-23 | End: 2019-07-29

## 2019-07-23 RX ORDER — ASPIRIN/CALCIUM CARB/MAGNESIUM 324 MG
81 TABLET ORAL DAILY
Refills: 0 | Status: DISCONTINUED | OUTPATIENT
Start: 2019-07-23 | End: 2019-07-24

## 2019-07-23 RX ORDER — INSULIN HUMAN 100 [IU]/ML
10 INJECTION, SOLUTION SUBCUTANEOUS ONCE
Refills: 0 | Status: COMPLETED | OUTPATIENT
Start: 2019-07-23 | End: 2019-07-23

## 2019-07-23 RX ORDER — CLOPIDOGREL BISULFATE 75 MG/1
75 TABLET, FILM COATED ORAL DAILY
Refills: 0 | Status: DISCONTINUED | OUTPATIENT
Start: 2019-07-23 | End: 2019-07-24

## 2019-07-23 RX ORDER — AMLODIPINE BESYLATE 2.5 MG/1
5 TABLET ORAL DAILY
Refills: 0 | Status: DISCONTINUED | OUTPATIENT
Start: 2019-07-23 | End: 2019-07-23

## 2019-07-23 RX ORDER — ATORVASTATIN CALCIUM 80 MG/1
80 TABLET, FILM COATED ORAL AT BEDTIME
Refills: 0 | Status: DISCONTINUED | OUTPATIENT
Start: 2019-07-23 | End: 2019-07-29

## 2019-07-23 RX ORDER — HEPARIN SODIUM 5000 [USP'U]/ML
5000 INJECTION INTRAVENOUS; SUBCUTANEOUS EVERY 8 HOURS
Refills: 0 | Status: DISCONTINUED | OUTPATIENT
Start: 2019-07-23 | End: 2019-07-29

## 2019-07-23 RX ORDER — SODIUM CHLORIDE 9 MG/ML
500 INJECTION, SOLUTION INTRAVENOUS
Refills: 0 | Status: DISCONTINUED | OUTPATIENT
Start: 2019-07-23 | End: 2019-07-24

## 2019-07-23 RX ORDER — SERTRALINE 25 MG/1
100 TABLET, FILM COATED ORAL DAILY
Refills: 0 | Status: DISCONTINUED | OUTPATIENT
Start: 2019-07-23 | End: 2019-07-29

## 2019-07-23 RX ORDER — PANTOPRAZOLE SODIUM 20 MG/1
40 TABLET, DELAYED RELEASE ORAL
Refills: 0 | Status: DISCONTINUED | OUTPATIENT
Start: 2019-07-23 | End: 2019-07-29

## 2019-07-23 RX ORDER — SENNA PLUS 8.6 MG/1
2 TABLET ORAL AT BEDTIME
Refills: 0 | Status: DISCONTINUED | OUTPATIENT
Start: 2019-07-23 | End: 2019-07-29

## 2019-07-23 RX ORDER — INSULIN GLARGINE 100 [IU]/ML
18 INJECTION, SOLUTION SUBCUTANEOUS AT BEDTIME
Refills: 0 | Status: DISCONTINUED | OUTPATIENT
Start: 2019-07-23 | End: 2019-07-24

## 2019-07-23 RX ORDER — CHLORHEXIDINE GLUCONATE 213 G/1000ML
1 SOLUTION TOPICAL
Refills: 0 | Status: DISCONTINUED | OUTPATIENT
Start: 2019-07-23 | End: 2019-07-29

## 2019-07-23 RX ORDER — HYDROXYZINE HCL 10 MG
25 TABLET ORAL EVERY 8 HOURS
Refills: 0 | Status: DISCONTINUED | OUTPATIENT
Start: 2019-07-23 | End: 2019-07-29

## 2019-07-23 RX ORDER — CLONAZEPAM 1 MG
0.25 TABLET ORAL DAILY
Refills: 0 | Status: DISCONTINUED | OUTPATIENT
Start: 2019-07-23 | End: 2019-07-29

## 2019-07-23 RX ADMIN — Medication 7 UNIT(S): at 08:39

## 2019-07-23 RX ADMIN — Medication 7 UNIT(S): at 13:16

## 2019-07-23 RX ADMIN — HEPARIN SODIUM 5000 UNIT(S): 5000 INJECTION INTRAVENOUS; SUBCUTANEOUS at 13:21

## 2019-07-23 RX ADMIN — NORTRIPTYLINE HYDROCHLORIDE 25 MILLIGRAM(S): 10 CAPSULE ORAL at 22:46

## 2019-07-23 RX ADMIN — INSULIN HUMAN 10 UNIT(S): 100 INJECTION, SOLUTION SUBCUTANEOUS at 01:59

## 2019-07-23 RX ADMIN — SERTRALINE 100 MILLIGRAM(S): 25 TABLET, FILM COATED ORAL at 11:29

## 2019-07-23 RX ADMIN — PANTOPRAZOLE SODIUM 40 MILLIGRAM(S): 20 TABLET, DELAYED RELEASE ORAL at 08:39

## 2019-07-23 RX ADMIN — SODIUM CHLORIDE 75 MILLILITER(S): 9 INJECTION, SOLUTION INTRAVENOUS at 06:11

## 2019-07-23 RX ADMIN — Medication 81 MILLIGRAM(S): at 11:29

## 2019-07-23 RX ADMIN — Medication 50 GRAM(S): at 22:44

## 2019-07-23 RX ADMIN — SODIUM CHLORIDE 75 MILLILITER(S): 9 INJECTION, SOLUTION INTRAVENOUS at 22:43

## 2019-07-23 RX ADMIN — HEPARIN SODIUM 5000 UNIT(S): 5000 INJECTION INTRAVENOUS; SUBCUTANEOUS at 22:45

## 2019-07-23 RX ADMIN — ONDANSETRON 4 MILLIGRAM(S): 8 TABLET, FILM COATED ORAL at 22:45

## 2019-07-23 RX ADMIN — SENNA PLUS 2 TABLET(S): 8.6 TABLET ORAL at 22:46

## 2019-07-23 RX ADMIN — Medication 50 MILLILITER(S): at 01:59

## 2019-07-23 RX ADMIN — CLOPIDOGREL BISULFATE 75 MILLIGRAM(S): 75 TABLET, FILM COATED ORAL at 11:29

## 2019-07-23 RX ADMIN — INSULIN GLARGINE 18 UNIT(S): 100 INJECTION, SOLUTION SUBCUTANEOUS at 22:46

## 2019-07-23 RX ADMIN — AMLODIPINE BESYLATE 5 MILLIGRAM(S): 2.5 TABLET ORAL at 06:07

## 2019-07-23 RX ADMIN — ATORVASTATIN CALCIUM 80 MILLIGRAM(S): 80 TABLET, FILM COATED ORAL at 22:46

## 2019-07-23 RX ADMIN — Medication 0.25 MILLIGRAM(S): at 13:48

## 2019-07-23 RX ADMIN — HEPARIN SODIUM 5000 UNIT(S): 5000 INJECTION INTRAVENOUS; SUBCUTANEOUS at 06:07

## 2019-07-23 NOTE — H&P ADULT - ASSESSMENT
Epigastric pain    Mobitz II heart block    JABARI    HTN    DM # Epigastric pain with Nausea:  gastritis Vs biliary colic Vs Messenteric ischemia Vs CAD  mild elevation in troponin  repeat labs    Mobitz II heart block  2:1 heart block with bradycardia 40s  HR now improved to 60s  no chest pain or palpitation  cardiology consult  telemetry monitoring    JABARI  new onset JABARI  4.3 yesterday, 1.4 on 7/16  prerenal Vs renal  follow up with Ultrasound abdomen complete  follow up with urine studies  brannon was placed in the ED for I and Os  nephrology consult          HTN    DM # Epigastric pain with Nausea:  gastritis Vs biliary colic Vs Messenteric ischemia Vs CAD  mild elevation in troponin  repeat labs    Mobitz II heart block  2:1 heart block with bradycardia 40s  HR now improved to 60s  no chest pain or palpitation  cardiology consult  telemetry monitoring    JABARI  new onset JABARI  4.3 yesterday, 1.4 on 7/16  prerenal Vs renal  follow up with Ultrasound abdomen complete  follow up with urine studies  brannon was placed in the ED for I and Os  nephrology consult      HTN:   lisinopril held d/t JABARI  not on BB   will start on CCB    DM  - continue with home dose of lispro and lantus  - FS routinely    DIET: CC/DASH    Activity: AAT    Dispo: pending

## 2019-07-23 NOTE — CONSULT NOTE ADULT - SUBJECTIVE AND OBJECTIVE BOX
NEPHROLOGY CONSULTATION NOTE    Patient is a 79y female who presented to the hospital with a chief complaint of weakness. Patient has a past medical history of HTN, DM, HLD, and CVA. Patient had been feeling weak for one day so she called her doctor and was told to come in. Patient was feeling weak x1 day, called her doctor, and was told to come in. Upon arrival patient's serology showed a creatinine of 4.3, up from 1.4 on . Patient denies recent medication changes, illness, or NSAID use. Patient was last admitted on 7/15 for weakness as well to rule out a CVA. Patient denies dizziness, chest pain, shortness of breath, or blood in the urine or stool. Patient denies past history of kidney problems and has never followed with a nephrologist.     PAST MEDICAL & SURGICAL HISTORY:  CVA (cerebral vascular accident):  rt weakness  Bilateral hearing loss, unspecified hearing loss type  High cholesterol  Depression  BP (high blood pressure): 20 yr  Breast CA:  s/p rt  DM (diabetes mellitus): 24 yr  History of cholecystectomy:   History of lumpectomy of right breast  H/O total knee replacement, right    Allergies:  honeydew (Unknown)  latex (Unknown)  penicillins (Unknown)  pickles (Unknown)  shellfish (Unknown)  Sulfacetamide Sodium-Sulfur (Rash)    Home Medications:  aspirin 81 mg oral tablet, chewable: 2 tab(s) orally once a day (2019 04:50)  atorvastatin 80 mg oral tablet: 1 tab(s) orally once a day (at bedtime) (27 Mar 2019 11:41)  clonazePAM 0.25 mg oral tablet: 1 tab(s) orally once a day as needed for anxiety (27 Mar 2019 11:41)  clopidogrel 75 mg oral tablet: 1 tab(s) orally once a day (27 Mar 2019 11:41)  docusate sodium 100 mg oral capsule: 1 cap(s) orally once a day (27 Mar 2019 11:41)  exemestane 25 mg oral tablet: 1 tab(s) orally once a day (27 Mar 2019 11:41)  HumaLOG 100 units/mL subcutaneous solution: 7 unit(s) subcutaneous 3 times a day (before meals) (27 Mar 2019 11:41)  hydrOXYzine hydrochloride 25 mg oral tablet: 1 tab(s) orally every 8 hours, As needed, Anxiety (2019 04:54)  insulin glargine 100 units/mL subcutaneous solution: 18 unit(s) subcutaneous once a day (at bedtime) (2019 04:52)  lisinopril 10 mg oral tablet: 1 tab(s) orally once a day (27 Mar 2019 11:41)  metFORMIN 500 mg oral tablet: 1 tab(s) orally 2 times a day (27 Mar 2019 11:41)  nortriptyline 25 mg oral capsule: 1 cap(s) orally once a day (at bedtime) (27 Mar 2019 11:41)  pantoprazole 40 mg oral delayed release tablet: 1 tab(s) orally once a day (before a meal) (27 Mar 2019 11:41)  senna oral tablet: 2 tab(s) orally once a day (at bedtime) (27 Mar 2019 11:41)  sertraline 100 mg oral tablet: 1 tab(s) orally once a day (27 Mar 2019 11:41)    Hospital Medications:   MEDICATIONS  (STANDING):  amLODIPine   Tablet 5 milliGRAM(s) Oral daily  aspirin  Oral Chewable Tab - Peds 81 milliGRAM(s) Chew daily  atorvastatin 80 milliGRAM(s) Oral at bedtime  chlorhexidine 4% Liquid 1 Application(s) Topical <User Schedule>  clopidogrel Tablet 75 milliGRAM(s) Oral daily  docusate sodium 100 milliGRAM(s) Oral daily  heparin  Injectable 5000 Unit(s) SubCutaneous every 8 hours  insulin glargine SubCutaneous Injection (LANTUS) - Peds 18 Unit(s) SubCutaneous at bedtime  insulin lispro Injectable (HumaLOG) 7 Unit(s) SubCutaneous three times a day before meals  lactated ringers. 500 milliLiter(s) (75 mL/Hr) IV Continuous <Continuous>  nortriptyline 25 milliGRAM(s) Oral at bedtime  pantoprazole    Tablet 40 milliGRAM(s) Oral before breakfast  senna 2 Tablet(s) Oral at bedtime  sertraline 100 milliGRAM(s) Oral daily      SOCIAL HISTORY:  Denies ETOH,Smoking,     FAMILY HISTORY:        REVIEW OF SYSTEMS:  CONSTITUTIONAL: Admits weakness, no fever or chills  NECK: No pain or stiffness  RESPIRATORY: No cough; No shortness of breath  CARDIOVASCULAR: No chest pain or palpitations.  GASTROINTESTINAL: Admits to occasional epigastric pain, is not experiencing right now. No nausea, vomiting, or hematemesis; No diarrhea or constipation. No melena or hematochezia.  GENITOURINARY: No dysuria, frequency, foamy urine, urinary urgency, incontinence or hematuria  SKIN: No rashes.  VASCULAR: No bilateral lower extremity edema.   All other review of systems is negative unless indicated above.    VITALS:  T(F): 96.5 (19 @ 07:56), Max: 97.2 (19 @ 18:21)  HR: 84 (19 @ 07:56)  BP: 149/83 (19 @ 07:56)  RR: 18 (19 @ 07:56)  SpO2: 100% (19 @ 07:56)        I&O's Detail    Creatine Kinase, Serum: 317 U/L (19 @ 05:00)      PHYSICAL EXAM:  Constitutional: NAD  HEENT: anicteric sclera, oropharynx clear, MMM  Neck: No JVD  Respiratory: CTAB, no wheezes, rales or rhonchi  Cardiovascular: S1, S2, RRR  Gastrointestinal: BS+, soft, NT/ND  Extremities: No cyanosis or clubbing. No peripheral edema  Psychiatric: Normal mood, normal affect  : No CVA tenderness. Pierce present, 180 ccs of urine.    Skin: No rashes  Vascular Access:    LABS:      142  |  96<L>  |  59<H>  ----------------------------<  177<H>  5.4<H>   |  21  |  4.3<HH>    Ca    10.1      2019 19:21    TPro  7.5  /  Alb  4.1  /  TBili  0.4  /  DBili      /  AST  27  /  ALT  21  /  AlkPhos  102  07-    Creatinine Trend: 4.3 <--, 1.4 <--, 1.5 <--                        9.8    8.22  )-----------( 313      ( 2019 19:21 )             30.2     Urine Studies:  Urinalysis Basic - ( 2019 00:45 )    Color: Light Yellow / Appearance: Clear / S.015 / pH:   Gluc:  / Ketone: Small  / Bili: Negative / Urobili: <2 mg/dL   Blood:  / Protein: 100 mg/dL / Nitrite: Negative   Leuk Esterase: Small / RBC: 2 /HPF / WBC 9 /HPF   Sq Epi:  / Non Sq Epi: 4 /HPF / Bacteria: Many                RADIOLOGY & ADDITIONAL STUDIES:    < from: CT Abdomen and Pelvis No Cont (19 @ 22:44) >  FINDINGS:    LOWER CHEST:Unremarkable.    HEPATOBILIARY: Unremarkable postcholecystectomy appearance. Recently   described hyperattenuating lesions not well delineated on this   noncontrast examination.    SPLEEN: Splenic hypodensities better evaluated on prior examination    PANCREAS: Redemonstrated scattered punctate calcifications within the   pancreas likely reflecting chronic prior hepatitis    ADRENAL GLANDS: Unchanged    KIDNEYS: Unchanged    ABDOMINOPELVIC NODES: Unchanged    PELVIC ORGANS: Unchanged.    PERITONEUM/MESENTERY/BOWEL: Again seen is hypodensities within the   dependent portion of the stomach likely reflecting ingested material. No   evidence of bowel obstruction. No free intraperitoneal air or ascites.    BONES/SOFT TISSUES: Stable degenerative changes of the thoracolumbar   spine and both hips. No acute osseous abnormality. Bilateral small   fat-containing inguinal hernias.      IMPRESSION:      Since one week prior, overall unchanged examination, with no CT evidence   of acute intra-abdominal pathology.    Multiple incidental findings as mentioned above are better seen on   reference contrast-enhanced examination      < end of copied text > NEPHROLOGY CONSULTATION NOTE    Patient is a 79y female who presented to the hospital with a chief complaint of weakness, abdominal pain, and one episode of vomiting. Patient has a past medical history of HTN, DM, HLD, and CVA. Patient had been feeling weak for one day so she called her doctor and was told to come in. Patient was feeling weak x1 day, called her doctor, and was told to come in. Upon arrival patient's serology showed a creatinine of 4.3, up from 1.4 on . Patient was in the hospital then for abdominal pain and received a CT with contrast. Patient denies recent medication changes, illness, or NSAID use. Patient was last admitted on 7/15 for weakness as well to rule out a CVA. Patient denies dizziness, chest pain, shortness of breath, or blood in the urine or stool. Patient denies past history of kidney problems and has never followed with a nephrologist.     PAST MEDICAL & SURGICAL HISTORY:  CVA (cerebral vascular accident):  rt weakness  Bilateral hearing loss, unspecified hearing loss type  High cholesterol  Depression  BP (high blood pressure): 20 yr  Breast CA:  s/p rt  DM (diabetes mellitus): 24 yr  History of cholecystectomy:   History of lumpectomy of right breast  H/O total knee replacement, right    Allergies:  honeydew (Unknown)  latex (Unknown)  penicillins (Unknown)  pickles (Unknown)  shellfish (Unknown)  Sulfacetamide Sodium-Sulfur (Rash)    Home Medications:  aspirin 81 mg oral tablet, chewable: 2 tab(s) orally once a day (2019 04:50)  atorvastatin 80 mg oral tablet: 1 tab(s) orally once a day (at bedtime) (27 Mar 2019 11:41)  clonazePAM 0.25 mg oral tablet: 1 tab(s) orally once a day as needed for anxiety (27 Mar 2019 11:41)  clopidogrel 75 mg oral tablet: 1 tab(s) orally once a day (27 Mar 2019 11:41)  docusate sodium 100 mg oral capsule: 1 cap(s) orally once a day (27 Mar 2019 11:41)  exemestane 25 mg oral tablet: 1 tab(s) orally once a day (27 Mar 2019 11:41)  HumaLOG 100 units/mL subcutaneous solution: 7 unit(s) subcutaneous 3 times a day (before meals) (27 Mar 2019 11:41)  hydrOXYzine hydrochloride 25 mg oral tablet: 1 tab(s) orally every 8 hours, As needed, Anxiety (2019 04:54)  insulin glargine 100 units/mL subcutaneous solution: 18 unit(s) subcutaneous once a day (at bedtime) (2019 04:52)  lisinopril 10 mg oral tablet: 1 tab(s) orally once a day (27 Mar 2019 11:41)  metFORMIN 500 mg oral tablet: 1 tab(s) orally 2 times a day (27 Mar 2019 11:41)  nortriptyline 25 mg oral capsule: 1 cap(s) orally once a day (at bedtime) (27 Mar 2019 11:41)  pantoprazole 40 mg oral delayed release tablet: 1 tab(s) orally once a day (before a meal) (27 Mar 2019 11:41)  senna oral tablet: 2 tab(s) orally once a day (at bedtime) (27 Mar 2019 11:41)  sertraline 100 mg oral tablet: 1 tab(s) orally once a day (27 Mar 2019 11:41)    Hospital Medications:   MEDICATIONS  (STANDING):  amLODIPine   Tablet 5 milliGRAM(s) Oral daily  aspirin  Oral Chewable Tab - Peds 81 milliGRAM(s) Chew daily  atorvastatin 80 milliGRAM(s) Oral at bedtime  chlorhexidine 4% Liquid 1 Application(s) Topical <User Schedule>  clopidogrel Tablet 75 milliGRAM(s) Oral daily  docusate sodium 100 milliGRAM(s) Oral daily  heparin  Injectable 5000 Unit(s) SubCutaneous every 8 hours  insulin glargine SubCutaneous Injection (LANTUS) - Peds 18 Unit(s) SubCutaneous at bedtime  insulin lispro Injectable (HumaLOG) 7 Unit(s) SubCutaneous three times a day before meals  lactated ringers. 500 milliLiter(s) (75 mL/Hr) IV Continuous <Continuous>  nortriptyline 25 milliGRAM(s) Oral at bedtime  pantoprazole    Tablet 40 milliGRAM(s) Oral before breakfast  senna 2 Tablet(s) Oral at bedtime  sertraline 100 milliGRAM(s) Oral daily      SOCIAL HISTORY:  Denies ETOH,Smoking,     FAMILY HISTORY:        REVIEW OF SYSTEMS:  CONSTITUTIONAL: Admits weakness, no fever or chills  NECK: No pain or stiffness  RESPIRATORY: No cough; No shortness of breath  CARDIOVASCULAR: No chest pain or palpitations.  GASTROINTESTINAL: Admits to epigastric pain, is not experiencing right now. Admits to 1 episode of vomiting; No diarrhea or constipation. No melena or hematochezia.  GENITOURINARY: No dysuria, frequency, foamy urine, urinary urgency, incontinence or hematuria  SKIN: No rashes.  VASCULAR: No bilateral lower extremity edema.   All other review of systems is negative unless indicated above.    VITALS:  T(F): 96.5 (19 @ 07:56), Max: 97.2 (19 @ 18:21)  HR: 84 (19 @ 07:56)  BP: 149/83 (19 @ 07:56)  RR: 18 (19 @ 07:56)  SpO2: 100% (19 @ 07:56)        I&O's Detail    Creatine Kinase, Serum: 317 U/L (19 @ 05:00)      PHYSICAL EXAM:  Constitutional: NAD  HEENT: anicteric sclera, oropharynx clear, MMM  Neck: No JVD  Respiratory: CTAB, no wheezes, rales or rhonchi  Cardiovascular: S1, S2, RRR  Gastrointestinal: BS+, soft, NT/ND  Extremities: No cyanosis or clubbing. No peripheral edema  Psychiatric: Normal mood, normal affect  : No CVA tenderness. Pierce present, 180 ccs of urine.    Skin: No rashes  Vascular Access:    LABS:      142  |  96<L>  |  59<H>  ----------------------------<  177<H>  5.4<H>   |  21  |  4.3<HH>    Ca    10.1      2019 19:21    TPro  7.5  /  Alb  4.1  /  TBili  0.4  /  DBili      /  AST  27  /  ALT  21  /  AlkPhos  102      Creatinine Trend: 4.3 <--, 1.4 <--, 1.5 <--                        9.8    8.22  )-----------( 313      ( 2019 19:21 )             30.2     Urine Studies:  Urinalysis Basic - ( 2019 00:45 )    Color: Light Yellow / Appearance: Clear / S.015 / pH:   Gluc:  / Ketone: Small  / Bili: Negative / Urobili: <2 mg/dL   Blood:  / Protein: 100 mg/dL / Nitrite: Negative   Leuk Esterase: Small / RBC: 2 /HPF / WBC 9 /HPF   Sq Epi:  / Non Sq Epi: 4 /HPF / Bacteria: Many                RADIOLOGY & ADDITIONAL STUDIES:    < from: CT Abdomen and Pelvis No Cont (19 @ 22:44) >  FINDINGS:    LOWER CHEST:Unremarkable.    HEPATOBILIARY: Unremarkable postcholecystectomy appearance. Recently   described hyperattenuating lesions not well delineated on this   noncontrast examination.    SPLEEN: Splenic hypodensities better evaluated on prior examination    PANCREAS: Redemonstrated scattered punctate calcifications within the   pancreas likely reflecting chronic prior hepatitis    ADRENAL GLANDS: Unchanged    KIDNEYS: Unchanged    ABDOMINOPELVIC NODES: Unchanged    PELVIC ORGANS: Unchanged.    PERITONEUM/MESENTERY/BOWEL: Again seen is hypodensities within the   dependent portion of the stomach likely reflecting ingested material. No   evidence of bowel obstruction. No free intraperitoneal air or ascites.    BONES/SOFT TISSUES: Stable degenerative changes of the thoracolumbar   spine and both hips. No acute osseous abnormality. Bilateral small   fat-containing inguinal hernias.      IMPRESSION:      Since one week prior, overall unchanged examination, with no CT evidence   of acute intra-abdominal pathology.    Multiple incidental findings as mentioned above are better seen on   reference contrast-enhanced examination      < end of copied text >

## 2019-07-23 NOTE — H&P ADULT - HISTORY OF PRESENT ILLNESS
78 yo female with a PMH of DM, HTN, HLD, CVA, hearing loss, and breast CA presents with epigastric abdominal pain that started this morning.   Sharp pain with no radiation, constant since onset. She has experienced nausea with no vomiting and denies any bowel changes.   She mention to have had the same symptoms in May with a negative workup.   She denies any other symptoms including fever, chills, urinary/bowel changes, recent illness/travel, chest pain, or SOB. 80 yo female with a PMH of DM, HTN, HLD, CVA, hearing loss, and breast CA presents with epigastric abdominal pain that started this morning.   Sharp pain with no radiation, constant since onset.   She has experienced persistent nausea with no vomiting and denies any bowel changes.   She mention to have had the same symptoms in May; had work up done which came as negative.  She denies any other symptoms including fever, chills, urinary/bowel changes, recent illness/travel, chest pain, or SOB. 80 yo female with a PMH of DM, HTN, HLD, CVA, hearing loss, and breast CA presents with epigastric abdominal pain that started this morning.   Sharp pain with no radiation, constant since onset.   She has experienced persistent nausea with no vomiting and denies any bowel changes.   She mention to have had the same symptoms in May; had work up done which came as negative.  She denies any other symptoms including fever, chills, urinary/bowel changes, recent illness/travel, chest pain, or SOB.    In the ED, 141/65, HR 50  EKG 2:1 av block , potassium 5.4  she recieved 1 ltr LR  dextrose and insulin given

## 2019-07-23 NOTE — H&P ADULT - NSHPPHYSICALEXAM_GEN_ALL_CORE
GEN: not in distress  CVS: s1s2m0, missed beats, regular   chest: clear  Abdomen: mildly tender in the epigastrium, BS+  Neuro    extremities GEN: not in distress  CVS: s1s2m0, missed beats, regular   Chest: clear  Abdomen: mildly tender in the epigastrium, BS+  Neuro: weakness on the left side of body 3/5, sensation intact, alert and oriented, Cn intact  vascular: palpable pulses.

## 2019-07-23 NOTE — CONSULT NOTE ADULT - SUBJECTIVE AND OBJECTIVE BOX
HPI:  78 yo female with a PMH of DM, HTN, HLD, CVA, hearing loss, and breast CA presents with epigastric abdominal pain that started this morning.   Sharp pain with no radiation, constant since onset. She has experienced nausea with no vomiting and denies any bowel changes.   She mention to have had the same symptoms in May with a negative workup.   She denies any other symptoms including fever, chills, urinary/bowel changes, recent illness/travel, chest pain, or SOB. (23 Jul 2019 02:47)    Pt. was found to be in 2nd degree AV block in the ER. Denies any chest pain, sob, palpitations, orthopnea/pnd, syncope/LOC.      PAST MEDICAL & SURGICAL HISTORY  CVA (cerebral vascular accident): 6/18 rt weakness  Bilateral hearing loss, unspecified hearing loss type  High cholesterol  Depression  BP (high blood pressure): 20 yr  Breast CA: 2014 s/p rt  DM (diabetes mellitus): 24 yr  History of cholecystectomy: 1992  History of lumpectomy of right breast  H/O total knee replacement, right      FAMILY HISTORY:  FAMILY HISTORY:  No pertinent family history of premature CAD    SOCIAL HISTORY:  []smoker  []Alcohol  []Drug    ALLERGIES:  honeydew (Unknown)  latex (Unknown)  penicillins (Unknown)  pickles (Unknown)  shellfish (Unknown)  Sulfacetamide Sodium-Sulfur (Rash)      MEDICATIONS:  MEDICATIONS  (STANDING):  aspirin  Oral Chewable Tab - Peds 81 milliGRAM(s) Chew daily  atorvastatin 80 milliGRAM(s) Oral at bedtime  clopidogrel Tablet 75 milliGRAM(s) Oral daily  docusate sodium 100 milliGRAM(s) Oral daily  heparin  Injectable 5000 Unit(s) SubCutaneous every 8 hours  insulin glargine SubCutaneous Injection (LANTUS) - Peds 18 Unit(s) SubCutaneous at bedtime  insulin lispro Injectable (HumaLOG) 7 Unit(s) SubCutaneous three times a day before meals  nortriptyline 25 milliGRAM(s) Oral at bedtime  pantoprazole    Tablet 40 milliGRAM(s) Oral before breakfast  senna 2 Tablet(s) Oral at bedtime  sertraline 100 milliGRAM(s) Oral daily    MEDICATIONS  (PRN):  clonazePAM Oral Disintegrating Tablet 0.25 milliGRAM(s) Oral daily PRN anxiety  hydrOXYzine  Oral Tab/Cap - Peds 25 milliGRAM(s) Oral every 8 hours PRN Anxiety      HOME MEDICATIONS:  Home Medications:  aspirin 81 mg oral tablet, chewable: 2 tab(s) orally once a day (03 Apr 2019 04:50)  atorvastatin 80 mg oral tablet: 1 tab(s) orally once a day (at bedtime) (27 Mar 2019 11:41)  clonazePAM 0.25 mg oral tablet: 1 tab(s) orally once a day as needed for anxiety (27 Mar 2019 11:41)  clopidogrel 75 mg oral tablet: 1 tab(s) orally once a day (27 Mar 2019 11:41)  docusate sodium 100 mg oral capsule: 1 cap(s) orally once a day (27 Mar 2019 11:41)  exemestane 25 mg oral tablet: 1 tab(s) orally once a day (27 Mar 2019 11:41)  HumaLOG 100 units/mL subcutaneous solution: 7 unit(s) subcutaneous 3 times a day (before meals) (27 Mar 2019 11:41)  hydrOXYzine hydrochloride 25 mg oral tablet: 1 tab(s) orally every 8 hours, As needed, Anxiety (03 Apr 2019 04:54)  insulin glargine 100 units/mL subcutaneous solution: 18 unit(s) subcutaneous once a day (at bedtime) (03 Apr 2019 04:52)  lisinopril 10 mg oral tablet: 1 tab(s) orally once a day (27 Mar 2019 11:41)  metFORMIN 500 mg oral tablet: 1 tab(s) orally 2 times a day (27 Mar 2019 11:41)  nortriptyline 25 mg oral capsule: 1 cap(s) orally once a day (at bedtime) (27 Mar 2019 11:41)  pantoprazole 40 mg oral delayed release tablet: 1 tab(s) orally once a day (before a meal) (27 Mar 2019 11:41)  senna oral tablet: 2 tab(s) orally once a day (at bedtime) (27 Mar 2019 11:41)  sertraline 100 mg oral tablet: 1 tab(s) orally once a day (27 Mar 2019 11:41)      VITALS:   T(F): 97 (07-23 @ 02:15), Max: 97.2 (07-22 @ 18:21)  HR: 82 (07-23 @ 02:50) (50 - 82)  BP: 137/55 (07-23 @ 02:15) (137/55 - 141/65)  BP(mean): --  RR: 17 (07-23 @ 02:50) (17 - 18)  SpO2: 100% (07-23 @ 02:50) (96% - 100%)    I&O's Summary      REVIEW OF SYSTEMS:  CONSTITUTIONAL: No weakness, fevers or chills  EYES/ENT: No visual changes;  No vertigo or throat pain   NECK: No pain or stiffness  RESPIRATORY: No cough, wheezing, hemoptysis; No shortness of breath  CARDIOVASCULAR: No chest pain or palpitations  GASTROINTESTINAL: No abdominal or epigastric pain. No hematemesis; No diarrhea or constipation. No melena or hematochezia.  GENITOURINARY: No dysuria, frequency or hematuria  NEUROLOGICAL: No numbness or weakness  SKIN: No itching, no rashes    PHYSICAL EXAM:  NEURO: patient is awake , alert and oriented  GEN: Not in acute distress  NECK: no thyroid enlargement, no JVD  LUNGS: Clear to auscultation bilaterally   CARDIOVASCULAR: S1/S2 present, RRR , no murmurs or rubs, no carotid bruits,  + PP bilaterally  ABD: Soft, non-tender, non-distended, +BS  EXT: No NIKKIE  SKIN: Intact    LABS:                        9.8    8.22  )-----------( 313      ( 22 Jul 2019 19:21 )             30.2     07-22    142  |  96<L>  |  59<H>  ----------------------------<  177<H>  5.4<H>   |  21  |  4.3<HH>    Ca    10.1      22 Jul 2019 19:21    TPro  7.5  /  Alb  4.1  /  TBili  0.4  /  DBili  x   /  AST  27  /  ALT  21  /  AlkPhos  102  07-22      Troponin T, Serum: 0.04 ng/mL <HH> (07-22-19 @ 21:35)    CARDIAC MARKERS ( 22 Jul 2019 21:35 )  x     / 0.04 ng/mL / x     / x     / x            RADIOLOGY:  -CXR: 7/15  No radiographic evidence of acute cardiopulmonary disease.     -TTE: Pending    ECG:  NSR @ 46bpm, 2nd degree AV block 2:1 conduction, LAFB    TELEMETRY EVENTS:  intermittent 2:1 2nd degree AV block

## 2019-07-23 NOTE — CONSULT NOTE ADULT - ASSESSMENT
80 yo female with a PMH of DM, HTN, HLD, CVA, hearing loss, and breast CA presents with nausea, and epigastric abdominal pain that started this morning, found to have JABARI, hyperkalemia, and 2nd degree 2:1 AV block.    HTN  2nd degree AV block 2:1    - EKG reviewed and shows 2nd degree AV block 2:1  - pt. asymptomatic and currently only has intermittent AV block  - likely secondary to underlying hyperkalemia and renal dysfunction (JABARI)  - treat the underlying cause (hyperkalemia, JABARI), work up for JABARI  - avoid AV blocking drugs, monitor electrolytes K, Mg  - elevated troponins likely secondary to renal dysfunction  - check 2d echo, serial cardiac enzymes  - will follow 78 yo female with a PMH of DM, HTN, HLD, CVA, hearing loss, and breast CA presents with nausea, and epigastric abdominal pain that started this morning, found to have JABARI, hyperkalemia, and 2nd degree 2:1 AV block.    HTN  2nd degree AV block 2:1    - EKG reviewed and shows 2nd degree AV block 2:1  - pt. asymptomatic and currently only has intermittent AV block  - likely secondary to underlying hyperkalemia and renal dysfunction (JABARI)  - treat the underlying cause (hyperkalemia, JABARI), work up for JABARI  - avoid AV blocking drugs, monitor electrolytes K, Mg  - elevated troponins likely secondary to renal dysfunction  - check 2d echo, serial cardiac enzymes  - unclear why patient is on both ASA, and plavix for CVA. Should be only on antiplatelet medication. c/w lipitor  - will follow

## 2019-07-23 NOTE — CONSULT NOTE ADULT - ATTENDING COMMENTS
I was Physically Present for the key portions of the evaluation   I agree with the above History  , Physical examination Assessment and plan   I have Reviewed , Modified or appended where appropriate.  Please check A and P as above   1- Rickie rule out ATN vs CONNIE sp CT scan on 7/15   agree with IVF  check urine lytes urine Protein / creat   follow BMP  if no improvement may work up for GN

## 2019-07-23 NOTE — H&P ADULT - NSHPLABSRESULTS_GEN_ALL_CORE
9.8    8.22  )-----------( 313      ( 2019 19:21 )             30.2                 142  |  96<L>  |  59<H>  ----------------------------<  177<H>  5.4<H>   |  21  |  4.3<HH>    Ca    10.1      2019 19:21    TPro  7.5  /  Alb  4.1  /  TBili  0.4  /  DBili  x   /  AST  27  /  ALT  21  /  AlkPhos  102      LIVER FUNCTIONS - ( 2019 19:21 )  Alb: 4.1 g/dL / Pro: 7.5 g/dL / ALK PHOS: 102 U/L / ALT: 21 U/L / AST: 27 U/L / GGT: x                   CARDIAC MARKERS ( 2019 21:35 )  x     / 0.04 ng/mL / x     / x     / x            Urinalysis Basic - ( 2019 00:45 )    Color: Light Yellow / Appearance: Clear / S.015 / pH: x  Gluc: x / Ketone: Small  / Bili: Negative / Urobili: <2 mg/dL   Blood: x / Protein: 100 mg/dL / Nitrite: Negative   Leuk Esterase: Small / RBC: 2 /HPF / WBC 9 /HPF   Sq Epi: x / Non Sq Epi: 4 /HPF / Bacteria: Many      12 Lead ECG:   Ventricular Rate 46 BPM    Atrial Rate 92 BPM    P-R Interval 152 ms    QRS Duration 98 ms    Q-T Interval 496 ms    QTC Calculation(Bezet) 434 ms    P Axis 50 degrees    R Axis -53 degrees    T Axis 15 degrees    Diagnosis Line Sinus rhythm with 2nd degree A-V block with 2:1 A-V conduction  Low voltage QRS  Left anterior fascicular block  Cannot rule out Anterior infarct , age undetermined  Abnormal ECG    Confirmed by JOSE ARMANDO SKINNER MD ([id number]) on 2019 11:42:52 PM (19 @ 21:22)      < from: CT Abdomen and Pelvis No Cont (19 @ 22:44) >    Since one week prior, overall unchanged examination, with no CT evidence   of acute intra-abdominal pathology.    Multiple incidental findings as mentioned above are better seen on   reference contrast-enhanced examination    < end of copied text >

## 2019-07-23 NOTE — ED ADULT NURSE REASSESSMENT NOTE - NS ED NURSE REASSESS COMMENT FT1
pt is being treated for hyperkalemia, Acute Renal Failure, Protocol of Insulin 10unit, and 50%Dextrose was given IV PUSH. pt lead 11 HR changed from Bradycardiac of 45-52 to 68-82 heart rate. Pt is goint o Telemetry floor. Pierce cathter placed, draining clear yellow urine. pt is still awake. No respiratory distress noted.

## 2019-07-23 NOTE — CONSULT NOTE ADULT - ASSESSMENT
Patient is a 79y female who presented to the hospital with a chief complaint of weakness. Patient has a past medical history of HTN, DM, HLD, and CVA.    #JABARI   -prerenal vs. intrarenal  -CT shows no change in kidneys, no obstruction  -U/A shows WBC, leukocyte esterase, mild proteinuria, bacteria  -creatinine 4.3 up from baseline of 1.4  -urine electrolytes including urine creatinine and Na, urine osm, urine eosinophils  -monitor Is and Os  -Follow BMP, trend creatinine   -keep holding lisinopril  -consider GN workup if likely intrarenal  -consider renal biopsy     #Hyperkalemia  -s/p dextrose and insulin  -monitor potassium closely      This case will be discussed with attending Patient is a 79y female who presented to the hospital with a chief complaint of weakness. Patient has a past medical history of HTN, DM, HLD, and CVA.    #JABARI on CKD stage 4b  -likely contrast induced after CT with contrast on 7/15  -noncontrast CT shows no change in kidneys, no obstruction  -U/A shows WBC, leukocyte esterase, mild proteinuria, bacteria  -creatinine 4.3 up from baseline of 1.4  -urine electrolytes including urine creatinine and Na, U osm  -continue fluids  -monitor Is and Os  -Follow BMP, trend creatinine   -keep holding lisinopril  -consider GN workup if no improvement    #Hyperkalemia  -s/p dextrose and insulin  -monitor potassium closely      This case will be discussed with attending Patient is a 79y female who presented to the hospital with a chief complaint of weakness. Patient has a past medical history of HTN, DM, HLD, and CVA.    #JABARI on CKD stage 4b  -likely contrast induced after CT with contrast on 7/15  -noncontrast CT shows no change in kidneys, no obstruction  -U/A shows WBC, leukocyte esterase, mild proteinuria, bacteria  -creatinine 4.3 up from baseline of 1.4  -urine electrolytes including urine creatinine and Na, U osm  -continue fluids  -monitor Is and Os  -Follow BMP, trend creatinine   -keep holding lisinopril  -consider GN workup if no improvement    #Hyperkalemia  -s/p dextrose and insulin  -monitor potassium closely

## 2019-07-23 NOTE — H&P ADULT - ATTENDING COMMENTS
Patient seen and examined independently. Agree with resident note/ history / physical exam and plan of care with following exceptions/additions/updates. Case discussed with house-staff, nursing and patient.  pt came in for abd pain, but now pain is controlled. ( has chronic pancreatitis) , meanwhile she was found to have JABARI. is not oliguric , no nausea, feesl ok.   Vital Signs Last 24 Hrs  T(C): 35.8 (23 Jul 2019 07:56), Max: 36.2 (22 Jul 2019 18:21)  T(F): 96.5 (23 Jul 2019 07:56), Max: 97.2 (22 Jul 2019 18:21)  HR: 84 (23 Jul 2019 07:56) (50 - 84)  BP: 149/83 (23 Jul 2019 07:56) (137/55 - 149/83)  RR: 18 (23 Jul 2019 07:56) (17 - 18)  SpO2: 100% (23 Jul 2019 07:56) (96% - 100%)  Physical exam:   constitutional NAD, AAOX3, Respiratory  lungs CTA, CVS heart RRR, GI: abdomen Soft NT, ND, BS+, skin: intact  neuro exam non focal. poor dentation.                         9.8    8.22  )-----------( 313      ( 22 Jul 2019 19:21 )             30.2   07-22    142  |  96<L>  |  59<H>  ----------------------------<  177<H>  5.4<H>   |  21  |  4.3<HH>    Ca    10.1      22 Jul 2019 19:21    TPro  7.5  /  Alb  4.1  /  TBili  0.4  /  DBili  x   /  AST  27  /  ALT  21  /  AlkPhos  102  07-22  today's blood work is not done yet. posted for 11am.   bmp was 1.4 on July 16, 2019 ( less than a wk ago)     a/p  1- JABARI, on ckd 3with hyperkalemia and hypercalcemia. probably due to dehydration vs contrast nephropathy. fu nephro, cont IVF, consider dc brannon if ok with nephro. check kidney and bladder us, and repeat labs. consider ipth   2- chronic pancreatitis cont pain meds, consider adding creon if has frequent bm or signs of malabsorption. alb is normal now. no diarrhea.   3- DM, cont meds  4- hx of CVA6/18 rt weakness, stable   5- High cholesterol, controlled, cont meds  6- Depression cont meds  7-HTN, chronic, cont meds.   8- hx of Breast CA: 2014 s/p lumpectomy and rt  9- anemia, chronic, possibly due to ckd but will need to check iron studies, b12. folate level     #Progress Note Handoff  Pending (specify):  Consults: nephro attending. today's labs( bmp)   Family discussion: mark pt , full code.    Disposition: From Home. needs rehab eval    pt is private of Dr Macias, transfer care to Dr Mayur weiner. Their group has accepted the pt. Patient seen and examined independently. Agree with resident note/ history / physical exam and plan of care with following exceptions/additions/updates. Case discussed with house-staff, nursing and patient.  pt came in for abd pain, but now pain is controlled. ( has chronic pancreatitis) , meanwhile she was found to have JABARI. is not oliguric , no nausea, feels ok.   Vital Signs Last 24 Hrs  T(C): 35.8 (23 Jul 2019 07:56), Max: 36.2 (22 Jul 2019 18:21)  T(F): 96.5 (23 Jul 2019 07:56), Max: 97.2 (22 Jul 2019 18:21)  HR: 84 (23 Jul 2019 07:56) (50 - 84)  BP: 149/83 (23 Jul 2019 07:56) (137/55 - 149/83)  RR: 18 (23 Jul 2019 07:56) (17 - 18)  SpO2: 100% (23 Jul 2019 07:56) (96% - 100%)  Physical exam:   constitutional NAD, AAOX3, Respiratory  lungs CTA, CVS heart RRR, GI: abdomen Soft NT, ND, BS+, skin: intact  neuro exam non focal. poor dentation.                         9.8    8.22  )-----------( 313      ( 22 Jul 2019 19:21 )             30.2   07-22    142  |  96<L>  |  59<H>  ----------------------------<  177<H>  5.4<H>   |  21  |  4.3<HH>    Ca    10.1      22 Jul 2019 19:21    TPro  7.5  /  Alb  4.1  /  TBili  0.4  /  DBili  x   /  AST  27  /  ALT  21  /  AlkPhos  102  07-22  today's blood work is not done yet. posted for 11am.   bmp was 1.4 on July 16, 2019 ( less than a wk ago)     a/p  1- JABARI, on ckd 3with hyperkalemia and hypercalcemia. probably due to dehydration vs contrast nephropathy. fu nephro, cont IVF, consider dc brannon if ok with nephro. check kidney and bladder us, and repeat labs. consider ipth   2- chronic pancreatitis cont pain meds, consider adding creon if has frequent bm or signs of malabsorption. alb is normal now. no diarrhea.   3- DM, uncontrolled, use insulin per protocol, no po meds while in jabari.   4- hx of CVA6/18 rt weakness, stable   5- High cholesterol, controlled, cont meds  6- Depression cont meds  7-HTN, chronic, cont meds.   8- hx of Breast CA: 2014 s/p lumpectomy and rt  9- anemia, chronic, possibly due to ckd but will need to check iron studies, b12. folate level     #Progress Note Handoff  Pending (specify):  Consults: nephro attending. today's labs( bmp)   Family discussion: mark pt , full code.    Disposition: From Home. needs rehab eval    pt is private of Dr Macias, transfer care to Dr Mayur weiner. Their group has accepted the pt.

## 2019-07-23 NOTE — H&P ADULT - NSICDXPASTMEDICALHX_GEN_ALL_CORE_FT
PAST MEDICAL HISTORY:  Bilateral hearing loss, unspecified hearing loss type     BP (high blood pressure) 20 yr    Breast CA 2014 s/p rt    CVA (cerebral vascular accident) 6/18 rt weakness    Depression     DM (diabetes mellitus) 24 yr    High cholesterol

## 2019-07-24 DIAGNOSIS — Z79.899 OTHER LONG TERM (CURRENT) DRUG THERAPY: ICD-10-CM

## 2019-07-24 DIAGNOSIS — E11.9 TYPE 2 DIABETES MELLITUS WITHOUT COMPLICATIONS: ICD-10-CM

## 2019-07-24 DIAGNOSIS — R10.9 UNSPECIFIED ABDOMINAL PAIN: ICD-10-CM

## 2019-07-24 DIAGNOSIS — Z91.013 ALLERGY TO SEAFOOD: ICD-10-CM

## 2019-07-24 DIAGNOSIS — Z85.3 PERSONAL HISTORY OF MALIGNANT NEOPLASM OF BREAST: ICD-10-CM

## 2019-07-24 DIAGNOSIS — Z79.82 LONG TERM (CURRENT) USE OF ASPIRIN: ICD-10-CM

## 2019-07-24 DIAGNOSIS — Z88.2 ALLERGY STATUS TO SULFONAMIDES: ICD-10-CM

## 2019-07-24 DIAGNOSIS — I10 ESSENTIAL (PRIMARY) HYPERTENSION: ICD-10-CM

## 2019-07-24 DIAGNOSIS — Z88.0 ALLERGY STATUS TO PENICILLIN: ICD-10-CM

## 2019-07-24 DIAGNOSIS — Z91.040 LATEX ALLERGY STATUS: ICD-10-CM

## 2019-07-24 DIAGNOSIS — Z91.018 ALLERGY TO OTHER FOODS: ICD-10-CM

## 2019-07-24 DIAGNOSIS — I69.351 HEMIPLEGIA AND HEMIPARESIS FOLLOWING CEREBRAL INFARCTION AFFECTING RIGHT DOMINANT SIDE: ICD-10-CM

## 2019-07-24 DIAGNOSIS — Z79.4 LONG TERM (CURRENT) USE OF INSULIN: ICD-10-CM

## 2019-07-24 DIAGNOSIS — H91.90 UNSPECIFIED HEARING LOSS, UNSPECIFIED EAR: ICD-10-CM

## 2019-07-24 DIAGNOSIS — Z96.651 PRESENCE OF RIGHT ARTIFICIAL KNEE JOINT: ICD-10-CM

## 2019-07-24 DIAGNOSIS — K29.70 GASTRITIS, UNSPECIFIED, WITHOUT BLEEDING: ICD-10-CM

## 2019-07-24 DIAGNOSIS — Z79.02 LONG TERM (CURRENT) USE OF ANTITHROMBOTICS/ANTIPLATELETS: ICD-10-CM

## 2019-07-24 DIAGNOSIS — R11.2 NAUSEA WITH VOMITING, UNSPECIFIED: ICD-10-CM

## 2019-07-24 DIAGNOSIS — Z92.3 PERSONAL HISTORY OF IRRADIATION: ICD-10-CM

## 2019-07-24 DIAGNOSIS — F32.9 MAJOR DEPRESSIVE DISORDER, SINGLE EPISODE, UNSPECIFIED: ICD-10-CM

## 2019-07-24 DIAGNOSIS — E78.5 HYPERLIPIDEMIA, UNSPECIFIED: ICD-10-CM

## 2019-07-24 LAB
ALBUMIN SERPL ELPH-MCNC: 3.5 G/DL — SIGNIFICANT CHANGE UP (ref 3.5–5.2)
ALP SERPL-CCNC: 99 U/L — SIGNIFICANT CHANGE UP (ref 30–115)
ALT FLD-CCNC: 23 U/L — SIGNIFICANT CHANGE UP (ref 0–41)
ANION GAP SERPL CALC-SCNC: 15 MMOL/L — HIGH (ref 7–14)
AST SERPL-CCNC: 26 U/L — SIGNIFICANT CHANGE UP (ref 0–41)
BASOPHILS # BLD AUTO: 0.04 K/UL — SIGNIFICANT CHANGE UP (ref 0–0.2)
BASOPHILS NFR BLD AUTO: 0.9 % — SIGNIFICANT CHANGE UP (ref 0–1)
BILIRUB SERPL-MCNC: 0.3 MG/DL — SIGNIFICANT CHANGE UP (ref 0.2–1.2)
BUN SERPL-MCNC: 46 MG/DL — HIGH (ref 10–20)
CALCIUM SERPL-MCNC: 8.9 MG/DL — SIGNIFICANT CHANGE UP (ref 8.4–10.5)
CALCIUM SERPL-MCNC: 9.2 MG/DL — SIGNIFICANT CHANGE UP (ref 8.5–10.1)
CHLORIDE SERPL-SCNC: 102 MMOL/L — SIGNIFICANT CHANGE UP (ref 98–110)
CK MB CFR SERPL CALC: 2.7 NG/ML — SIGNIFICANT CHANGE UP (ref 0.6–6.3)
CK SERPL-CCNC: 192 U/L — SIGNIFICANT CHANGE UP (ref 0–225)
CO2 SERPL-SCNC: 28 MMOL/L — SIGNIFICANT CHANGE UP (ref 17–32)
CREAT ?TM UR-MCNC: 56 MG/DL — SIGNIFICANT CHANGE UP
CREAT SERPL-MCNC: 3.2 MG/DL — HIGH (ref 0.7–1.5)
EOSINOPHIL # BLD AUTO: 0.28 K/UL — SIGNIFICANT CHANGE UP (ref 0–0.7)
EOSINOPHIL NFR BLD AUTO: 6 % — SIGNIFICANT CHANGE UP (ref 0–8)
FERRITIN SERPL-MCNC: 356 NG/ML — HIGH (ref 15–150)
FOLATE SERPL-MCNC: 7.7 NG/ML — SIGNIFICANT CHANGE UP
GLUCOSE BLDC GLUCOMTR-MCNC: 117 MG/DL — HIGH (ref 70–99)
GLUCOSE BLDC GLUCOMTR-MCNC: 133 MG/DL — HIGH (ref 70–99)
GLUCOSE BLDC GLUCOMTR-MCNC: 238 MG/DL — HIGH (ref 70–99)
GLUCOSE BLDC GLUCOMTR-MCNC: 335 MG/DL — HIGH (ref 70–99)
GLUCOSE BLDC GLUCOMTR-MCNC: 62 MG/DL — LOW (ref 70–99)
GLUCOSE BLDC GLUCOMTR-MCNC: 89 MG/DL — SIGNIFICANT CHANGE UP (ref 70–99)
GLUCOSE SERPL-MCNC: 77 MG/DL — SIGNIFICANT CHANGE UP (ref 70–99)
HCT VFR BLD CALC: 26.2 % — LOW (ref 37–47)
HGB BLD-MCNC: 8.5 G/DL — LOW (ref 12–16)
IMM GRANULOCYTES NFR BLD AUTO: 0.4 % — HIGH (ref 0.1–0.3)
IRON SATN MFR SERPL: 52 % — HIGH (ref 15–50)
IRON SATN MFR SERPL: 83 UG/DL — SIGNIFICANT CHANGE UP (ref 35–150)
LIDOCAIN IGE QN: 56 U/L — SIGNIFICANT CHANGE UP (ref 7–60)
LYMPHOCYTES # BLD AUTO: 1.24 K/UL — SIGNIFICANT CHANGE UP (ref 1.2–3.4)
LYMPHOCYTES # BLD AUTO: 26.8 % — SIGNIFICANT CHANGE UP (ref 20.5–51.1)
MAGNESIUM SERPL-MCNC: 1.9 MG/DL — SIGNIFICANT CHANGE UP (ref 1.8–2.4)
MCHC RBC-ENTMCNC: 30.7 PG — SIGNIFICANT CHANGE UP (ref 27–31)
MCHC RBC-ENTMCNC: 32.4 G/DL — SIGNIFICANT CHANGE UP (ref 32–37)
MCV RBC AUTO: 94.6 FL — SIGNIFICANT CHANGE UP (ref 81–99)
MONOCYTES # BLD AUTO: 0.6 K/UL — SIGNIFICANT CHANGE UP (ref 0.1–0.6)
MONOCYTES NFR BLD AUTO: 13 % — HIGH (ref 1.7–9.3)
NEUTROPHILS # BLD AUTO: 2.45 K/UL — SIGNIFICANT CHANGE UP (ref 1.4–6.5)
NEUTROPHILS NFR BLD AUTO: 52.9 % — SIGNIFICANT CHANGE UP (ref 42.2–75.2)
NRBC # BLD: 0 /100 WBCS — SIGNIFICANT CHANGE UP (ref 0–0)
PHOSPHATE SERPL-MCNC: 4.6 MG/DL — SIGNIFICANT CHANGE UP (ref 2.1–4.9)
PLATELET # BLD AUTO: 246 K/UL — SIGNIFICANT CHANGE UP (ref 130–400)
POTASSIUM SERPL-MCNC: 3.7 MMOL/L — SIGNIFICANT CHANGE UP (ref 3.5–5)
POTASSIUM SERPL-SCNC: 3.7 MMOL/L — SIGNIFICANT CHANGE UP (ref 3.5–5)
PROT ?TM UR-MCNC: 91 MG/DLG/24H — SIGNIFICANT CHANGE UP
PROT SERPL-MCNC: 6.1 G/DL — SIGNIFICANT CHANGE UP (ref 6–8)
PROT/CREAT UR-RTO: 1.6 RATIO — HIGH (ref 0–0.2)
PTH-INTACT FLD-MCNC: 46 PG/ML — SIGNIFICANT CHANGE UP (ref 15–65)
RBC # BLD: 2.77 M/UL — LOW (ref 4.2–5.4)
RBC # FLD: 13.9 % — SIGNIFICANT CHANGE UP (ref 11.5–14.5)
SODIUM SERPL-SCNC: 145 MMOL/L — SIGNIFICANT CHANGE UP (ref 135–146)
TIBC SERPL-MCNC: 159 UG/DL — LOW (ref 220–430)
TROPONIN T SERPL-MCNC: 0.02 NG/ML — HIGH
UIBC SERPL-MCNC: 76 UG/DL — LOW (ref 110–370)
UUN UR-MCNC: 446 MG/DL — SIGNIFICANT CHANGE UP
VIT B12 SERPL-MCNC: 535 PG/ML — SIGNIFICANT CHANGE UP (ref 232–1245)
WBC # BLD: 4.63 K/UL — LOW (ref 4.8–10.8)
WBC # FLD AUTO: 4.63 K/UL — LOW (ref 4.8–10.8)

## 2019-07-24 RX ORDER — INSULIN LISPRO 100/ML
VIAL (ML) SUBCUTANEOUS
Refills: 0 | Status: DISCONTINUED | OUTPATIENT
Start: 2019-07-24 | End: 2019-07-29

## 2019-07-24 RX ORDER — SODIUM CHLORIDE 9 MG/ML
1000 INJECTION, SOLUTION INTRAVENOUS
Refills: 0 | Status: DISCONTINUED | OUTPATIENT
Start: 2019-07-24 | End: 2019-07-29

## 2019-07-24 RX ORDER — SODIUM CHLORIDE 9 MG/ML
1000 INJECTION, SOLUTION INTRAVENOUS
Refills: 0 | Status: DISCONTINUED | OUTPATIENT
Start: 2019-07-24 | End: 2019-07-26

## 2019-07-24 RX ORDER — INSULIN LISPRO 100/ML
4 VIAL (ML) SUBCUTANEOUS
Refills: 0 | Status: DISCONTINUED | OUTPATIENT
Start: 2019-07-24 | End: 2019-07-29

## 2019-07-24 RX ORDER — DEXTROSE 50 % IN WATER 50 %
12.5 SYRINGE (ML) INTRAVENOUS ONCE
Refills: 0 | Status: DISCONTINUED | OUTPATIENT
Start: 2019-07-24 | End: 2019-07-29

## 2019-07-24 RX ORDER — ACETAMINOPHEN 500 MG
650 TABLET ORAL EVERY 6 HOURS
Refills: 0 | Status: DISCONTINUED | OUTPATIENT
Start: 2019-07-24 | End: 2019-07-29

## 2019-07-24 RX ORDER — DEXTROSE 50 % IN WATER 50 %
25 SYRINGE (ML) INTRAVENOUS ONCE
Refills: 0 | Status: DISCONTINUED | OUTPATIENT
Start: 2019-07-24 | End: 2019-07-29

## 2019-07-24 RX ORDER — ASPIRIN/CALCIUM CARB/MAGNESIUM 324 MG
162 TABLET ORAL DAILY
Refills: 0 | Status: DISCONTINUED | OUTPATIENT
Start: 2019-07-24 | End: 2019-07-29

## 2019-07-24 RX ORDER — GLUCAGON INJECTION, SOLUTION 0.5 MG/.1ML
1 INJECTION, SOLUTION SUBCUTANEOUS ONCE
Refills: 0 | Status: DISCONTINUED | OUTPATIENT
Start: 2019-07-24 | End: 2019-07-29

## 2019-07-24 RX ORDER — INSULIN GLARGINE 100 [IU]/ML
15 INJECTION, SOLUTION SUBCUTANEOUS AT BEDTIME
Refills: 0 | Status: DISCONTINUED | OUTPATIENT
Start: 2019-07-24 | End: 2019-07-24

## 2019-07-24 RX ORDER — DEXTROSE 50 % IN WATER 50 %
15 SYRINGE (ML) INTRAVENOUS ONCE
Refills: 0 | Status: DISCONTINUED | OUTPATIENT
Start: 2019-07-24 | End: 2019-07-29

## 2019-07-24 RX ORDER — INSULIN GLARGINE 100 [IU]/ML
8 INJECTION, SOLUTION SUBCUTANEOUS AT BEDTIME
Refills: 0 | Status: DISCONTINUED | OUTPATIENT
Start: 2019-07-24 | End: 2019-07-29

## 2019-07-24 RX ORDER — INSULIN GLARGINE 100 [IU]/ML
10 INJECTION, SOLUTION SUBCUTANEOUS AT BEDTIME
Refills: 0 | Status: DISCONTINUED | OUTPATIENT
Start: 2019-07-24 | End: 2019-07-24

## 2019-07-24 RX ADMIN — Medication 0.25 MILLIGRAM(S): at 14:05

## 2019-07-24 RX ADMIN — NORTRIPTYLINE HYDROCHLORIDE 25 MILLIGRAM(S): 10 CAPSULE ORAL at 22:51

## 2019-07-24 RX ADMIN — Medication 25 MILLIGRAM(S): at 00:09

## 2019-07-24 RX ADMIN — Medication 4 UNIT(S): at 12:31

## 2019-07-24 RX ADMIN — Medication 4: at 12:31

## 2019-07-24 RX ADMIN — SENNA PLUS 2 TABLET(S): 8.6 TABLET ORAL at 22:51

## 2019-07-24 RX ADMIN — HEPARIN SODIUM 5000 UNIT(S): 5000 INJECTION INTRAVENOUS; SUBCUTANEOUS at 18:26

## 2019-07-24 RX ADMIN — Medication 25 MILLIGRAM(S): at 22:50

## 2019-07-24 RX ADMIN — ATORVASTATIN CALCIUM 80 MILLIGRAM(S): 80 TABLET, FILM COATED ORAL at 22:50

## 2019-07-24 RX ADMIN — CHLORHEXIDINE GLUCONATE 1 APPLICATION(S): 213 SOLUTION TOPICAL at 06:38

## 2019-07-24 RX ADMIN — SERTRALINE 100 MILLIGRAM(S): 25 TABLET, FILM COATED ORAL at 12:31

## 2019-07-24 RX ADMIN — HEPARIN SODIUM 5000 UNIT(S): 5000 INJECTION INTRAVENOUS; SUBCUTANEOUS at 06:37

## 2019-07-24 RX ADMIN — AMLODIPINE BESYLATE 10 MILLIGRAM(S): 2.5 TABLET ORAL at 06:38

## 2019-07-24 RX ADMIN — CLOPIDOGREL BISULFATE 75 MILLIGRAM(S): 75 TABLET, FILM COATED ORAL at 12:30

## 2019-07-24 RX ADMIN — Medication 81 MILLIGRAM(S): at 12:31

## 2019-07-24 RX ADMIN — Medication 650 MILLIGRAM(S): at 14:04

## 2019-07-24 RX ADMIN — AMLODIPINE BESYLATE 5 MILLIGRAM(S): 2.5 TABLET ORAL at 00:09

## 2019-07-24 RX ADMIN — PANTOPRAZOLE SODIUM 40 MILLIGRAM(S): 20 TABLET, DELAYED RELEASE ORAL at 06:38

## 2019-07-24 RX ADMIN — Medication 4 UNIT(S): at 18:28

## 2019-07-24 RX ADMIN — SODIUM CHLORIDE 50 MILLILITER(S): 9 INJECTION, SOLUTION INTRAVENOUS at 00:10

## 2019-07-24 RX ADMIN — Medication 100 MILLIGRAM(S): at 12:31

## 2019-07-24 RX ADMIN — ONDANSETRON 4 MILLIGRAM(S): 8 TABLET, FILM COATED ORAL at 20:47

## 2019-07-24 RX ADMIN — HEPARIN SODIUM 5000 UNIT(S): 5000 INJECTION INTRAVENOUS; SUBCUTANEOUS at 22:52

## 2019-07-24 RX ADMIN — Medication 25 MILLIGRAM(S): at 11:33

## 2019-07-24 NOTE — PROGRESS NOTE ADULT - SUBJECTIVE AND OBJECTIVE BOX
Nephrology progress note    Patient is seen and examined, events over the last 24 h noted .  On LR, no SOB seen for JABARI  Allergies:  honeydew (Unknown)  latex (Unknown)  penicillins (Unknown)  pickles (Unknown)  shellfish (Unknown)  Sulfacetamide Sodium-Sulfur (Rash)    Hospital Medications:   MEDICATIONS  (STANDING):  amLODIPine   Tablet 10 milliGRAM(s) Oral daily  aspirin  Oral Chewable Tab - Peds 81 milliGRAM(s) Chew daily  atorvastatin 80 milliGRAM(s) Oral at bedtime  chlorhexidine 4% Liquid 1 Application(s) Topical <User Schedule>  clopidogrel Tablet 75 milliGRAM(s) Oral daily  dextrose 5%. 1000 milliLiter(s) (50 mL/Hr) IV Continuous <Continuous>  dextrose 50% Injectable 12.5 Gram(s) IV Push once  dextrose 50% Injectable 25 Gram(s) IV Push once  dextrose 50% Injectable 25 Gram(s) IV Push once  docusate sodium 100 milliGRAM(s) Oral daily  heparin  Injectable 5000 Unit(s) SubCutaneous every 8 hours  insulin glargine Injectable (LANTUS) 15 Unit(s) SubCutaneous at bedtime  insulin lispro (HumaLOG) corrective regimen sliding scale   SubCutaneous three times a day before meals  insulin lispro Injectable (HumaLOG) 4 Unit(s) SubCutaneous three times a day before meals  lactated ringers. 1000 milliLiter(s) (50 mL/Hr) IV Continuous <Continuous>  nortriptyline 25 milliGRAM(s) Oral at bedtime  pantoprazole    Tablet 40 milliGRAM(s) Oral before breakfast  senna 2 Tablet(s) Oral at bedtime  sertraline 100 milliGRAM(s) Oral daily        VITALS:  T(F): 96.9 (19 @ 05:31), Max: 98.5 (19 @ 16:32)  HR: 83 (19 @ 11:31)  BP: 127/63 (19 @ 11:31)  RR: 18 (19 @ 11:31)  SpO2: 98% (19 @ 11:31)  Wt(kg): --     @ 07:01  -   @ 07:00  --------------------------------------------------------  IN: 675 mL / OUT: 1325 mL / NET: -650 mL     @ 07:01  -   @ 12:15  --------------------------------------------------------  IN: 480 mL / OUT: 0 mL / NET: 480 mL          PHYSICAL EXAM:  Constitutional: NAD  HEENT: anicteric sclera, oropharynx clear, MMM  Neck: No JVD  Respiratory: CTAB  Cardiovascular: S1, S2, RRR  Gastrointestinal: BS+, soft, NT/ND  Extremities: mild peripheral edema  Neurological: Awake alert  : No CVA tenderness. No brannon.   Skin: No rashes  Vascular Access:    LABS:      145  |  102  |  46<H>  ----------------------------<  77  3.7   |  28  |  3.2<H>  Creatinine Trend: 3.2<--, 3.9<--, 4.3<--, 1.4<--, 1.5<--    Ca    9.2      2019 07:19  Phos  4.6       Mg     1.9         TPro  6.1  /  Alb  3.5  /  TBili  0.3  /  DBili      /  AST  26  /  ALT  23  /  AlkPhos  99                            8.5    4.63  )-----------( 246      ( 2019 07:19 )             26.2       Urine Studies:  Urinalysis Basic - ( 2019 16:00 )    Color: Yellow / Appearance: Cloudy / S.015 / pH:   Gluc:  / Ketone: 15  / Bili: Negative / Urobili: 0.2 mg/dL   Blood:  / Protein: 100 mg/dL / Nitrite: Negative   Leuk Esterase: Moderate / RBC: 6-10 /HPF / WBC 6-10 /HPF   Sq Epi:  / Non Sq Epi:  / Bacteria: Moderate /HPF      Creatinine, Random Urine: 56 mg/dL ( @ 16:00)  Protein/Creatinine Ratio Calculation: 1.6 Ratio ( @ 16:00)  Chloride, Random Urine: 50 ( @ 16:00)  Sodium, Random Urine: 68.0 mmoL/L ( @ 16:00)  Sodium, Random Urine: 68.0 mmoL/L ( @ 16:00)  Creatinine, Random Urine: 56 mg/dL ( @ 16:00)    RADIOLOGY & ADDITIONAL STUDIES:  < from: Xray Chest 1 View- PORTABLE-Routine (19 @ 06:42) >    No radiographic evidence of acute cardiopulmonary disease.    < end of copied text >

## 2019-07-24 NOTE — CONSULT NOTE ADULT - ASSESSMENT
IMPRESSION: Rehab of left hemiparesis    PRECAUTIONS: [  ] Cardiac  [  ] Respiratory  [  ] Seizures [  ] Contact Isolation  [  ] Droplet Isolation  [  ] Other    Weight Bearing Status:     RECOMMENDATION:    Out of Bed to Chair     DVT/Decubiti Prophylaxis    REHAB PLAN:     [ x  ] Bedside P/T 3-5 times a week   [   ]   Bedside O/T  2-3 times a week             [   ] No Rehab Therapy Indicated                   [   ]  Speech Therapy   Conditioning/ROM                                    ADL  Bed Mobility                                               Conditioning/ROM  Transfers                                                     Bed Mobility  Sitting /Standing Balance                         Transfers                                        Gait Training                                               Sitting/Standing Balance  Stair Training [   ]Applicable                    Home equipment Eval                                                                        Splinting  [   ] Only      GOALS:   ADL   [   ]   Independent                    Transfers  [   ] Independent                          Ambulation  [   ] Independent     [x    ] With device                            [   ]  CG                                                         [ x  ]  CG                                                                  [x   ] CG                            [    ] Min A                                                   [   ] Min A                                                              [   ] Min  A          DISCHARGE PLAN:   [   ]  Good candidate for Intensive Rehabilitation/Hospital based                                             Will tolerate 3hrs Intensive Rehab Daily                                       [  x  ]  Short Term Rehab in Skilled Nursing Facility                         vs              [ x   ]  Home with Outpatient or  services / 24hr care                                         [    ]  Possible Candidate for Intensive Hospital based Rehab

## 2019-07-24 NOTE — PROGRESS NOTE ADULT - SUBJECTIVE AND OBJECTIVE BOX
Patient was seen and examined in F9-10. Spoke with RN. Chart reviewed.  No events overnight.  In good spirits- no complaints at present  Vital Signs Last 24 Hrs  T(F): 96.9 (2019 05:31), Max: 98.5 (2019 16:32)  HR: 71 (2019 05:31) (71 - 92)  BP: 129/65 (2019 05:31) (112/62 - 185/81)  SpO2: 98% (2019 20:05) (98% - 98%)  MEDICATIONS  (STANDING):  amLODIPine   Tablet 10 milliGRAM(s) Oral daily  aspirin  Oral Chewable Tab - Peds 81 milliGRAM(s) Chew daily  atorvastatin 80 milliGRAM(s) Oral at bedtime  chlorhexidine 4% Liquid 1 Application(s) Topical <User Schedule>  clopidogrel Tablet 75 milliGRAM(s) Oral daily  docusate sodium 100 milliGRAM(s) Oral daily  heparin  Injectable 5000 Unit(s) SubCutaneous every 8 hours  insulin glargine SubCutaneous Injection (LANTUS) - Peds 18 Unit(s) SubCutaneous at bedtime  insulin lispro Injectable (HumaLOG) 7 Unit(s) SubCutaneous three times a day before meals  lactated ringers. 1000 milliLiter(s) (50 mL/Hr) IV Continuous <Continuous>  nortriptyline 25 milliGRAM(s) Oral at bedtime  pantoprazole    Tablet 40 milliGRAM(s) Oral before breakfast  senna 2 Tablet(s) Oral at bedtime  sertraline 100 milliGRAM(s) Oral daily    MEDICATIONS  (PRN):  clonazePAM  Tablet 0.25 milliGRAM(s) Oral daily PRN agitation  hydrOXYzine  Oral Tab/Cap - Peds 25 milliGRAM(s) Oral every 8 hours PRN Anxiety  ondansetron Injectable 4 milliGRAM(s) IV Push every 8 hours PRN Nausea and/or Vomiting    Labs:                        8.5    4.63  )-----------( 246      ( 2019 07:19 )             26.2                         9.8    8.22  )-----------( 313      ( 2019 19:21 )             30.2     2019 07:19    145    |  102    |  46     ----------------------------<  77     3.7     |  28     |  3.2    2019 16:00    142    |  100    |  53     ----------------------------<  134    3.8     |  28     |  3.9      Ca    9.2        2019 07:19  Ca    8.9        2019 16:00  Phos  4.6       2019 07:19  Phos  4.2       2019 16:00  Mg     1.9       2019 07:19  Mg     1.7       2019 16:00    TPro  6.1    /  Alb  3.5    /  TBili  0.3    /  DBili  x      /  AST  26     /  ALT  23     /  AlkPhos  99     2019 07:19  TPro  6.5    /  Alb  3.8    /  TBili  0.3    /  DBili  x      /  AST  35     /  ALT  29     /  AlkPhos  118    2019 16:00      Urinalysis Basic - ( 2019 16:00 )    Color: Yellow / Appearance: Cloudy / S.015 / pH: x  Gluc: x / Ketone: 15  / Bili: Negative / Urobili: 0.2 mg/dL   Blood: x / Protein: 100 mg/dL / Nitrite: Negative   Leuk Esterase: Moderate / RBC: 6-10 /HPF / WBC 6-10 /HPF   Sq Epi: x / Non Sq Epi: x / Bacteria: Moderate /HPF        General: comfortable, NAD  Neurology: A&Ox3, nonfocal  Head:  Normocephalic, atraumatic  ENT:  Mucosa moist, no ulcerations  Neck:  Supple, no JVD,   Skin: no breakdowns (as per RN)  Resp: CTA B/L  CV: RRR, S1S2,   GI: Soft, NT, bowel sounds  MS: No edema, + peripheral pulses, FROM all 4 extremity      A/P:  78 yo female with a PMH of DM, HTN, HLD, CVA, hearing loss, and breast CA presents with epigastric abdominal pain- had multiple normal CT scans, and negative workup; noted with JABRAI- jessicalley due to contrast on recent CT    [place], and again admitted here to hospitalist    trend Cr    IV fluids as per renal    Cr tredning down    OOB    ambulate    outpt GI f/u    DC when cleared by renal  DVT prophylaxis  Decubitus prevention- all measures as per RN protocol  Please call or text me with any questions or updates

## 2019-07-24 NOTE — PROGRESS NOTE ADULT - SUBJECTIVE AND OBJECTIVE BOX
SUBJECTIVE:    Patient is a 79y old Female who presents with a chief complaint of abdominal pain (2019 08:27)    Currently admitted to medicine with the primary diagnosis of Generalized abdominal pain     Today is hospital day 1d.   Patient is resting in bed, noted slept well without complaints.   Denies abdominal pain today, good appetite, no nausea, no vomiting.   Noted irritation around brannon catheter.   Noted numbness in her left neck that is present since she had her CVA.    PAST MEDICAL & SURGICAL HISTORY  CVA (cerebral vascular accident):  rt weakness  Bilateral hearing loss, unspecified hearing loss type  High cholesterol  Depression  BP (high blood pressure): 20 yr  Breast CA:  s/p rt  DM (diabetes mellitus): 24 yr  History of cholecystectomy:   History of lumpectomy of right breast  H/O total knee replacement, right    SOCIAL HISTORY:  Negative for smoking/alcohol/drug use.     ALLERGIES:  honeydew (Unknown)  latex (Unknown)  penicillins (Unknown)  pickles (Unknown)  shellfish (Unknown)  Sulfacetamide Sodium-Sulfur (Rash)    MEDICATIONS:  STANDING MEDICATIONS  amLODIPine   Tablet 10 milliGRAM(s) Oral daily  aspirin  Oral Chewable Tab - Peds 81 milliGRAM(s) Chew daily  atorvastatin 80 milliGRAM(s) Oral at bedtime  chlorhexidine 4% Liquid 1 Application(s) Topical <User Schedule>  clopidogrel Tablet 75 milliGRAM(s) Oral daily  dextrose 5%. 1000 milliLiter(s) IV Continuous <Continuous>  dextrose 50% Injectable 12.5 Gram(s) IV Push once  dextrose 50% Injectable 25 Gram(s) IV Push once  dextrose 50% Injectable 25 Gram(s) IV Push once  docusate sodium 100 milliGRAM(s) Oral daily  heparin  Injectable 5000 Unit(s) SubCutaneous every 8 hours  insulin glargine Injectable (LANTUS) 15 Unit(s) SubCutaneous at bedtime  insulin lispro (HumaLOG) corrective regimen sliding scale   SubCutaneous three times a day before meals  insulin lispro Injectable (HumaLOG) 4 Unit(s) SubCutaneous three times a day before meals  lactated ringers. 1000 milliLiter(s) IV Continuous <Continuous>  nortriptyline 25 milliGRAM(s) Oral at bedtime  pantoprazole    Tablet 40 milliGRAM(s) Oral before breakfast  senna 2 Tablet(s) Oral at bedtime  sertraline 100 milliGRAM(s) Oral daily    PRN MEDICATIONS  clonazePAM  Tablet 0.25 milliGRAM(s) Oral daily PRN  dextrose 40% Gel 15 Gram(s) Oral once PRN  glucagon  Injectable 1 milliGRAM(s) IntraMuscular once PRN  hydrOXYzine  Oral Tab/Cap - Peds 25 milliGRAM(s) Oral every 8 hours PRN  ondansetron Injectable 4 milliGRAM(s) IV Push every 8 hours PRN    VITALS:   T(F): 96.9  HR: 71  BP: 129/65  RR: 18  SpO2: 98%    LABS:                        8.5    4.63  )-----------( 246      ( 2019 07:19 )             26.2         145  |  102  |  46<H>  ----------------------------<  77  3.7   |  28  |  3.2<H>    Ca    9.2      2019 07:19  Phos  4.6       Mg     1.9         TPro  6.1  /  Alb  3.5  /  TBili  0.3  /  DBili  x   /  AST  26  /  ALT  23  /  AlkPhos  99        Urinalysis Basic - ( 2019 16:00 )    Color: Yellow / Appearance: Cloudy / S.015 / pH: x  Gluc: x / Ketone: 15  / Bili: Negative / Urobili: 0.2 mg/dL   Blood: x / Protein: 100 mg/dL / Nitrite: Negative   Leuk Esterase: Moderate / RBC: 6-10 /HPF / WBC 6-10 /HPF   Sq Epi: x / Non Sq Epi: x / Bacteria: Moderate /HPF        Troponin T, Serum: 0.02 ng/mL <H> (19 @ 07:19)  Creatine Kinase, Serum: 192 U/L (19 @ 07:19)  Troponin T, Serum: 0.03 ng/mL <HH> (19 @ 16:00)  Creatine Kinase, Serum: 310 U/L <H> (19 @ 16:00)      CARDIAC MARKERS ( 2019 07:19 )  x     / 0.02 ng/mL / 192 U/L / x     / 2.7 ng/mL  CARDIAC MARKERS ( 2019 16:00 )  x     / 0.03 ng/mL / 310 U/L / x     / x      CARDIAC MARKERS ( 2019 05:00 )  x     / 0.03 ng/mL / 317 U/L / x     / 5.0 ng/mL  CARDIAC MARKERS ( 2019 21:35 )  x     / 0.04 ng/mL / x     / x     / x          RADIOLOGY:    CT Abdomen and Pelvis No Cont (19 @ 22:44)   IMPRESSION:      Since one week prior, overall unchanged examination, with no CT evidence   of acute intra-abdominal pathology.    Multiple incidental findings as mentioned above are better seen on   reference contrast-enhanced examination      US Abdomen Complete (19 @ 15:19)   FINDINGS:    LIVER:  The liver is normal in contour and echogenicity measuring 15.4 cm   in length. No suspicious masses. Exam is limited due to poor patient   cooperation.    GALLBLADDER/BILIARY TREE: Status post cholecystectomy. Common bile duct   measuring 4 mm.    PANCREAS:  Visualized head and body are unremarkable. Remainder obscured   by overlying bowel gas.    SPLEEN:  Unremarkable.     KIDNEYS:  Right kidney measures 9.5 cm and left kidney measures 8.8 cm in   length.  No hydronephrosis, stone or solid mass.      PHYSICAL EXAM:  GEN: No acute distress, on room air.  LUNGS: Clear to auscultation bilaterally, no crackles  HEART: S1/S2 present. RRR.   ABD: Soft, non-tender, non-distended. Bowel sounds present  EXT: Skin Intact, no edema   NEURO: AAOX3, forgetful right sided weakness.

## 2019-07-24 NOTE — CONSULT NOTE ADULT - SUBJECTIVE AND OBJECTIVE BOX
HPI:  78 yo female with a PMH of DM, HTN, HLD, CVA, hearing loss, and breast CA presents with epigastric abdominal pain that started this morning.   Sharp pain with no radiation, constant since onset.   She has experienced persistent nausea with no vomiting and denies any bowel changes.   She mention to have had the same symptoms in May; had work up done which came as negative.  She denies any other symptoms including fever, chills, urinary/bowel changes, recent illness/travel, chest pain, or SOB.    In the ED, 141/65, HR 50  EKG 2:1 av block , potassium 5.4  she recieved 1 ltr LR  dextrose and insulin given (2019 02:47)      PAST MEDICAL & SURGICAL HISTORY:  CVA (cerebral vascular accident):  rt weakness  Bilateral hearing loss, unspecified hearing loss type  High cholesterol  Depression  BP (high blood pressure): 20 yr  Breast CA:  s/p rt  DM (diabetes mellitus): 24 yr  History of cholecystectomy:   History of lumpectomy of right breast  H/O total knee replacement, right      Hospital Course:    TODAY'S SUBJECTIVE & REVIEW OF SYMPTOMS:     Constitutional WNL   Cardio WNL   Resp WNL   GI WNL  Heme WNL  Endo WNL  Skin WNL  MSK WNL  Neuro left hemiparesis  Cognitive WNL  Psych WNL      MEDICATIONS  (STANDING):  amLODIPine   Tablet 10 milliGRAM(s) Oral daily  aspirin  Oral Chewable Tab - Peds 81 milliGRAM(s) Chew daily  atorvastatin 80 milliGRAM(s) Oral at bedtime  chlorhexidine 4% Liquid 1 Application(s) Topical <User Schedule>  clopidogrel Tablet 75 milliGRAM(s) Oral daily  dextrose 5%. 1000 milliLiter(s) (50 mL/Hr) IV Continuous <Continuous>  dextrose 50% Injectable 12.5 Gram(s) IV Push once  dextrose 50% Injectable 25 Gram(s) IV Push once  dextrose 50% Injectable 25 Gram(s) IV Push once  docusate sodium 100 milliGRAM(s) Oral daily  heparin  Injectable 5000 Unit(s) SubCutaneous every 8 hours  insulin glargine Injectable (LANTUS) 15 Unit(s) SubCutaneous at bedtime  insulin lispro (HumaLOG) corrective regimen sliding scale   SubCutaneous three times a day before meals  insulin lispro Injectable (HumaLOG) 4 Unit(s) SubCutaneous three times a day before meals  lactated ringers. 1000 milliLiter(s) (50 mL/Hr) IV Continuous <Continuous>  nortriptyline 25 milliGRAM(s) Oral at bedtime  pantoprazole    Tablet 40 milliGRAM(s) Oral before breakfast  senna 2 Tablet(s) Oral at bedtime  sertraline 100 milliGRAM(s) Oral daily    MEDICATIONS  (PRN):  clonazePAM  Tablet 0.25 milliGRAM(s) Oral daily PRN agitation  dextrose 40% Gel 15 Gram(s) Oral once PRN Blood Glucose LESS THAN 70 milliGRAM(s)/deciliter  glucagon  Injectable 1 milliGRAM(s) IntraMuscular once PRN Glucose LESS THAN 70 milligrams/deciliter  hydrOXYzine  Oral Tab/Cap - Peds 25 milliGRAM(s) Oral every 8 hours PRN Anxiety  ondansetron Injectable 4 milliGRAM(s) IV Push every 8 hours PRN Nausea and/or Vomiting      FAMILY HISTORY:      Allergies    honeydew (Unknown)  latex (Unknown)  penicillins (Unknown)  pickles (Unknown)  shellfish (Unknown)  Sulfacetamide Sodium-Sulfur (Rash)    Intolerances        SOCIAL HISTORY:    [  ] Etoh  [  ] Smoking  [  ] Substance abuse     Home Environment:  [  ] Home Alone  [  ] Lives with Family  [ x ] Home Health Aid 24hr/day    Dwelling:  [  ] Apartment  [  ] Private House  [  ] Adult Home  [  ] Skilled Nursing Facility      [  ] Short Term  [  ] Long Term  [  ] Stairs       Elevator [  ]    FUNCTIONAL STATUS PTA: (Check all that apply)  Ambulation: [   ]Independent    [  ] Dependent     [  ] Non-Ambulatory  Assistive Device: [  ] SA Cane  [  ]  Q Cane  [  ] Walker  [  ]  Wheelchair  ADL : [  ] Independent  [  ]  Dependent       Vital Signs Last 24 Hrs  T(C): 36.1 (2019 05:31), Max: 36.9 (2019 16:32)  T(F): 96.9 (2019 05:31), Max: 98.5 (2019 16:32)  HR: 71 (2019 05:31) (71 - 92)  BP: 129/65 (2019 05:31) (112/62 - 185/81)  BP(mean): --  RR: 18 (2019 05:31) (18 - 18)  SpO2: 98% (2019 20:05) (98% - 98%)      PHYSICAL EXAM: Alert & Oriented X3  GENERAL: NAD, well-groomed, well-developed  HEAD:  Atraumatic, Normocephalic  CHEST/LUNG: Clear   HEART: S1S2+  ABDOMEN: Soft, Nontender  EXTREMITIES:  no calf tenderness    NERVOUS SYSTEM:  Cranial Nerves 2-12 intact [  ] Abnormal  [  ]  ROM: WFL all extremities [  ]  Abnormal [x  ]limited left side  Motor Strength: WFL all extremities  [  ]  Abnormal [x  ]limited left side  Sensation: intact to light touch [  ] Abnormal [  ]  Reflexes: Symmetric [  ]  Abnormal [  ]    FUNCTIONAL STATUS:  Bed Mobility: Independent [  ]  Supervision [  ]  Needs Assistance [x  ]  N/A [  ]  Transfers: Independent [  ]  Supervision [  ]  Needs Assistance [ x ]  N/A [  ]   Ambulation: Independent [  ]  Supervision [  ]  Needs Assistance [  ]  N/A [  ]  ADL: Independent [  ] Requires Assistance [  ] N/A [  ]      LABS:                        8.5    4.63  )-----------( 246      ( 2019 07:19 )             26.2     07-24    145  |  102  |  46<H>  ----------------------------<  77  3.7   |  28  |  3.2<H>    Ca    9.2      2019 07:19  Phos  4.6     07-  Mg     1.9     -24    TPro  6.1  /  Alb  3.5  /  TBili  0.3  /  DBili  x   /  AST  26  /  ALT  23  /  AlkPhos  99  07-24      Urinalysis Basic - ( 2019 16:00 )    Color: Yellow / Appearance: Cloudy / S.015 / pH: x  Gluc: x / Ketone: 15  / Bili: Negative / Urobili: 0.2 mg/dL   Blood: x / Protein: 100 mg/dL / Nitrite: Negative   Leuk Esterase: Moderate / RBC: 6-10 /HPF / WBC 6-10 /HPF   Sq Epi: x / Non Sq Epi: x / Bacteria: Moderate /HPF        RADIOLOGY & ADDITIONAL STUDIES:    Assesment:

## 2019-07-24 NOTE — PROGRESS NOTE ADULT - ASSESSMENT
Patient is a 79y female who presented to the hospital with a chief complaint of weakness. Patient has a past medical history of HTN, DM, HLD, and CVA.    #JABARI on CKD stage 3b baseline creat 1.4 mg%  -likely prerenal vs  contrast induced after CT with contrast on 7/15  -noncontrast CT shows no change in kidneys, no obstruction  -U/A shows WBC,   proteinuria 1.6 g/g creat (diabetic), bacteria  -creatinine 4.3 up from baseline of 1.4  -continue fluids- LR 50 cc/hr, creat is improving  -monitor Is and Os  -Follow BMP, trend creatinine   -keep holding lisinopril    #Hyperkalemia - resolved    # ANEMIA _ please obtain iron studies    will follow

## 2019-07-24 NOTE — PROGRESS NOTE ADULT - ASSESSMENT
# Epigastric pain with Nausea:  gastritis Vs biliary colic Vs Messenteric ischemia Vs CAD  mild elevation in troponin  repeat labs    Mobitz II heart block  2:1 heart block with bradycardia 40s  HR now improved to 60s  no chest pain or palpitation  cardiology consult  telemetry monitoring    JABARI  new onset JABARI  4.3 yesterday, 1.4 on 7/16  prerenal Vs renal  follow up with Ultrasound abdomen complete  follow up with urine studies  brannon was placed in the ED for I and Os  nephrology consult      HTN:   lisinopril held d/t JABARI  not on BB   will start on CCB    DM  - continue with home dose of lispro and lantus  - FS routinely    DIET: CC/DASH    Activity: AAT Ms. 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    # Epigastric pain with Nausea, resolved  - multiple admission in april and July for the same complaint.  - normal LFTs and lipase  - US of abdomen: s/p cholecystectomy no CBD dilatation  - CT of abdomen without contrast: Splenic hypodensities better evaluated on prior examination + redemonstrated scattered punctate calcifications within the pancreas likely reflecting chronic prior hepatitis  - Patient with known intolerance to cheese, and was planned to undergo EGD with Dr. Wallace but patient was refusing and feeling anxious about it.     # JABARI on top of CKD stage 3  - serum creatinine 4.3 on admission, baseline around 1.4 on 7/16  - Kidney US no obstruction or hydronephrosis, small kidneys.   - non-oliguric, improving  - FENa: 3.3% consistent with ATN  - Possibly CONNIE after exposure to contrast on 7/15    # History of Ischemic CVA in June 2018  - MRI brain which shows acute ischemic change in the lower left medulla, s/p IV TPA  - Discharged on aspirin 162 mg daily and plavix 75 for 3 months only.  - CTA of the neck in June 2018 that showed 60-70% left ICA stenosis and carotid duplex in December 2018 that showed >70% left CHINYERE. Evaluated by Dr. Burch who recommended  TCAR (transcarotid artery revascularization), however patient was also reluctant to have it.   - Will contact Dr. Macias to ask why patient is still on plavix.   - wheelchair bound, will request an evaluation by PT/rehab    # Mobitz II heart block on admission  - 2:1 heart block with bradycardia 40s on admission  - HR now improved, sinus rhythm, no block  - no chest pain or palpitation, three sets of troponin were negative  - evaluated by Dr. Reid, likely secondary to underlying hyperkalemia and renal dysfunction, recommend to treat underlying cause  - TTE: Left ventricular ejection fraction, by visual estimation, is 50 to 55%. Normal global left ventricular systolic function.    # HTN:   lisinopril held d/t JABARI  not on BB   started on amlodipine 10 mg daily    # DM  - on lispro and lantus    # Normocytic anemia  - likely due to chronic kidney disease  - iron sat and TIBC not consistent with iron deficiency, ferritin, vit B12 and folic acid are pending.    DIET: CC/DASH Ms. Young is an 80 yo female with a PMH of DM, HTN, HLD, CVA (6/2018) with residual left sided weakness, hearing loss, and breast CA presents with epigastric abdominal pain.    # Epigastric pain with Nausea, resolved  - multiple admission in april and July for the same complaint.  - normal LFTs and lipase  - US of abdomen: s/p cholecystectomy no CBD dilatation  - CT of abdomen without contrast: Splenic hypodensities better evaluated on prior examination + redemonstrated scattered punctate calcifications within the pancreas likely reflecting chronic prior hepatitis  - Patient with known intolerance to cheese, and was planned to undergo EGD with Dr. Wallace but patient was refusing and feeling anxious about it.     # JABARI on top of CKD stage 3  - serum creatinine 4.3 on admission, baseline around 1.4 on 7/16  - Kidney US no obstruction or hydronephrosis, small kidneys.   - non-oliguric, improving  - FENa: 3.3% consistent with ATN  - Possibly CONNIE after exposure to contrast on 7/15    # History of Ischemic CVA in June 2018  - MRI brain which shows acute ischemic change in the lower left medulla, s/p IV TPA  - Discharged on aspirin 162 mg daily and plavix 75 for 3 months only.  - CTA of the neck in June 2018 that showed 60-70% left ICA stenosis and carotid duplex in December 2018 that showed >70% left CHINYERE. Evaluated by Dr. Burch who recommended  TCAR (transcarotid artery revascularization), however patient was also reluctant to have it.   - Will contact Dr. Macias to ask why patient is still on plavix.   - wheelchair bound, will request an evaluation by PT/rehab    # Mobitz II heart block on admission  - 2:1 heart block with bradycardia 40s on admission  - HR now improved, sinus rhythm, no block  - no chest pain or palpitation, three sets of troponin were negative  - evaluated by Dr. Reid, likely secondary to underlying hyperkalemia and renal dysfunction, recommend to treat underlying cause  - TTE: Left ventricular ejection fraction, by visual estimation, is 50 to 55%. Normal global left ventricular systolic function.    # HTN:   lisinopril held d/t JABARI  not on BB   started on amlodipine 10 mg daily    # DM  - on lispro and lantus    # Normocytic anemia  - likely due to chronic kidney disease  - iron sat and TIBC not consistent with iron deficiency, ferritin, vit B12 and folic acid are pending.    DIET: CC/DASH Ms. Young is an 80 yo female with a PMH of DM, HTN, HLD, CVA (6/2018) with residual left sided weakness, hearing loss, and breast CA presents with epigastric abdominal pain.    # Epigastric pain with Nausea, resolved  - multiple admission in april and July for the same complaint.  - normal LFTs and lipase  - US of abdomen: s/p cholecystectomy no CBD dilatation  - CT of abdomen without contrast: Splenic hypodensities better evaluated on prior examination + redemonstrated scattered punctate calcifications within the pancreas likely reflecting chronic prior hepatitis  - Patient with known intolerance to cheese, and was planned to undergo EGD with Dr. Wallace but patient was refusing and feeling anxious about it.     # JABARI on top of CKD stage 3b  - serum creatinine 4.3 on admission, baseline around 1.4 on 7/16  - Kidney US no obstruction or hydronephrosis, small kidneys.   - non-oliguric, improving, proteinuria 1.6 g/g crea  - FENa: 3.3% consistent with ATN  - Possibly CONNIE after exposure to contrast on 7/15    # History of Ischemic CVA in June 2018  - MRI brain which shows acute ischemic change in the lower left medulla, s/p IV TPA  - Discharged on aspirin 162 mg daily and plavix 75 for 3 months only.  - CTA of the neck in June 2018 that showed 60-70% left ICA stenosis and carotid duplex in December 2018 that showed >70% left CHINYERE. Evaluated by Dr. Burch who recommended  TCAR (transcarotid artery revascularization), however patient was also reluctant to have it.   - Contacted Dr. Macias, no indication to be on Plavix will stop Plavix and resume the dose of aspirin recommended by neurology 162 mg daily  - wheelchair bound, will request an evaluation by PT/rehab    # Mobitz II heart block on admission  - 2:1 heart block with bradycardia 40s on admission  - HR now improved, sinus rhythm, no block  - no chest pain or palpitation, three sets of troponin were negative  - evaluated by Dr. Reid, likely secondary to underlying hyperkalemia and renal dysfunction, recommend to treat underlying cause  - TTE: Left ventricular ejection fraction, by visual estimation, is 50 to 55%. Normal global left ventricular systolic function.    # HTN:   lisinopril held d/t JABARI  not on BB   started on amlodipine 10 mg daily    # DM  - on lispro and lantus    # Normocytic anemia  - likely due to chronic kidney disease  - iron sat and TIBC not consistent with iron deficiency, ferritin, vit B12 and folic acid are pending.    DIET: CC/DASH

## 2019-07-25 LAB
-  AMPICILLIN: SIGNIFICANT CHANGE UP
-  CIPROFLOXACIN: SIGNIFICANT CHANGE UP
-  LEVOFLOXACIN: SIGNIFICANT CHANGE UP
-  NITROFURANTOIN: SIGNIFICANT CHANGE UP
-  TETRACYCLINE: SIGNIFICANT CHANGE UP
-  VANCOMYCIN: SIGNIFICANT CHANGE UP
ALBUMIN SERPL ELPH-MCNC: 3.4 G/DL — LOW (ref 3.5–5.2)
ALP SERPL-CCNC: 100 U/L — SIGNIFICANT CHANGE UP (ref 30–115)
ALT FLD-CCNC: 18 U/L — SIGNIFICANT CHANGE UP (ref 0–41)
ANION GAP SERPL CALC-SCNC: 14 MMOL/L — SIGNIFICANT CHANGE UP (ref 7–14)
AST SERPL-CCNC: 19 U/L — SIGNIFICANT CHANGE UP (ref 0–41)
BASOPHILS # BLD AUTO: 0.03 K/UL — SIGNIFICANT CHANGE UP (ref 0–0.2)
BASOPHILS NFR BLD AUTO: 0.4 % — SIGNIFICANT CHANGE UP (ref 0–1)
BILIRUB SERPL-MCNC: 0.3 MG/DL — SIGNIFICANT CHANGE UP (ref 0.2–1.2)
BUN SERPL-MCNC: 36 MG/DL — HIGH (ref 10–20)
CALCIUM SERPL-MCNC: 8.9 MG/DL — SIGNIFICANT CHANGE UP (ref 8.5–10.1)
CHLORIDE SERPL-SCNC: 104 MMOL/L — SIGNIFICANT CHANGE UP (ref 98–110)
CO2 SERPL-SCNC: 28 MMOL/L — SIGNIFICANT CHANGE UP (ref 17–32)
CREAT SERPL-MCNC: 2.7 MG/DL — HIGH (ref 0.7–1.5)
CULTURE RESULTS: SIGNIFICANT CHANGE UP
EOSINOPHIL # BLD AUTO: 0.26 K/UL — SIGNIFICANT CHANGE UP (ref 0–0.7)
EOSINOPHIL NFR BLD AUTO: 3.7 % — SIGNIFICANT CHANGE UP (ref 0–8)
ESTIMATED AVERAGE GLUCOSE: 146 MG/DL — HIGH (ref 68–114)
GLUCOSE BLDC GLUCOMTR-MCNC: 123 MG/DL — HIGH (ref 70–99)
GLUCOSE BLDC GLUCOMTR-MCNC: 131 MG/DL — HIGH (ref 70–99)
GLUCOSE BLDC GLUCOMTR-MCNC: 135 MG/DL — HIGH (ref 70–99)
GLUCOSE BLDC GLUCOMTR-MCNC: 168 MG/DL — HIGH (ref 70–99)
GLUCOSE BLDC GLUCOMTR-MCNC: 212 MG/DL — HIGH (ref 70–99)
GLUCOSE BLDC GLUCOMTR-MCNC: 91 MG/DL — SIGNIFICANT CHANGE UP (ref 70–99)
GLUCOSE SERPL-MCNC: 128 MG/DL — HIGH (ref 70–99)
HBA1C BLD-MCNC: 6.7 % — HIGH (ref 4–5.6)
HCT VFR BLD CALC: 26 % — LOW (ref 37–47)
HGB BLD-MCNC: 8.6 G/DL — LOW (ref 12–16)
IMM GRANULOCYTES NFR BLD AUTO: 0.3 % — SIGNIFICANT CHANGE UP (ref 0.1–0.3)
LYMPHOCYTES # BLD AUTO: 0.96 K/UL — LOW (ref 1.2–3.4)
LYMPHOCYTES # BLD AUTO: 13.6 % — LOW (ref 20.5–51.1)
MAGNESIUM SERPL-MCNC: 1.8 MG/DL — SIGNIFICANT CHANGE UP (ref 1.8–2.4)
MCHC RBC-ENTMCNC: 31 PG — SIGNIFICANT CHANGE UP (ref 27–31)
MCHC RBC-ENTMCNC: 33.1 G/DL — SIGNIFICANT CHANGE UP (ref 32–37)
MCV RBC AUTO: 93.9 FL — SIGNIFICANT CHANGE UP (ref 81–99)
METHOD TYPE: SIGNIFICANT CHANGE UP
MONOCYTES # BLD AUTO: 0.79 K/UL — HIGH (ref 0.1–0.6)
MONOCYTES NFR BLD AUTO: 11.2 % — HIGH (ref 1.7–9.3)
NEUTROPHILS # BLD AUTO: 4.99 K/UL — SIGNIFICANT CHANGE UP (ref 1.4–6.5)
NEUTROPHILS NFR BLD AUTO: 70.8 % — SIGNIFICANT CHANGE UP (ref 42.2–75.2)
NRBC # BLD: 0 /100 WBCS — SIGNIFICANT CHANGE UP (ref 0–0)
ORGANISM # SPEC MICROSCOPIC CNT: SIGNIFICANT CHANGE UP
ORGANISM # SPEC MICROSCOPIC CNT: SIGNIFICANT CHANGE UP
PHOSPHATE SERPL-MCNC: 4.8 MG/DL — SIGNIFICANT CHANGE UP (ref 2.1–4.9)
PLATELET # BLD AUTO: 241 K/UL — SIGNIFICANT CHANGE UP (ref 130–400)
POTASSIUM SERPL-MCNC: 3.8 MMOL/L — SIGNIFICANT CHANGE UP (ref 3.5–5)
POTASSIUM SERPL-SCNC: 3.8 MMOL/L — SIGNIFICANT CHANGE UP (ref 3.5–5)
PROT SERPL-MCNC: 5.9 G/DL — LOW (ref 6–8)
RBC # BLD: 2.77 M/UL — LOW (ref 4.2–5.4)
RBC # FLD: 13.7 % — SIGNIFICANT CHANGE UP (ref 11.5–14.5)
SODIUM SERPL-SCNC: 146 MMOL/L — SIGNIFICANT CHANGE UP (ref 135–146)
SPECIMEN SOURCE: SIGNIFICANT CHANGE UP
WBC # BLD: 7.05 K/UL — SIGNIFICANT CHANGE UP (ref 4.8–10.8)
WBC # FLD AUTO: 7.05 K/UL — SIGNIFICANT CHANGE UP (ref 4.8–10.8)

## 2019-07-25 PROCEDURE — 99222 1ST HOSP IP/OBS MODERATE 55: CPT

## 2019-07-25 RX ORDER — LANOLIN ALCOHOL/MO/W.PET/CERES
3 CREAM (GRAM) TOPICAL AT BEDTIME
Refills: 0 | Status: DISCONTINUED | OUTPATIENT
Start: 2019-07-25 | End: 2019-07-29

## 2019-07-25 RX ORDER — INSULIN GLARGINE 100 [IU]/ML
8 INJECTION, SOLUTION SUBCUTANEOUS ONCE
Refills: 0 | Status: COMPLETED | OUTPATIENT
Start: 2019-07-25 | End: 2019-07-25

## 2019-07-25 RX ORDER — CLONAZEPAM 1 MG
0.25 TABLET ORAL ONCE
Refills: 0 | Status: DISCONTINUED | OUTPATIENT
Start: 2019-07-25 | End: 2019-07-25

## 2019-07-25 RX ADMIN — Medication 0.25 MILLIGRAM(S): at 02:14

## 2019-07-25 RX ADMIN — PANTOPRAZOLE SODIUM 40 MILLIGRAM(S): 20 TABLET, DELAYED RELEASE ORAL at 06:53

## 2019-07-25 RX ADMIN — Medication 0.25 MILLIGRAM(S): at 01:53

## 2019-07-25 RX ADMIN — Medication 3 MILLIGRAM(S): at 01:53

## 2019-07-25 RX ADMIN — Medication 4 UNIT(S): at 08:07

## 2019-07-25 RX ADMIN — NORTRIPTYLINE HYDROCHLORIDE 25 MILLIGRAM(S): 10 CAPSULE ORAL at 21:50

## 2019-07-25 RX ADMIN — HEPARIN SODIUM 5000 UNIT(S): 5000 INJECTION INTRAVENOUS; SUBCUTANEOUS at 21:51

## 2019-07-25 RX ADMIN — Medication 2: at 12:17

## 2019-07-25 RX ADMIN — AMLODIPINE BESYLATE 10 MILLIGRAM(S): 2.5 TABLET ORAL at 06:53

## 2019-07-25 RX ADMIN — HEPARIN SODIUM 5000 UNIT(S): 5000 INJECTION INTRAVENOUS; SUBCUTANEOUS at 06:53

## 2019-07-25 RX ADMIN — Medication 25 MILLIGRAM(S): at 21:50

## 2019-07-25 RX ADMIN — INSULIN GLARGINE 8 UNIT(S): 100 INJECTION, SOLUTION SUBCUTANEOUS at 21:51

## 2019-07-25 RX ADMIN — SODIUM CHLORIDE 50 MILLILITER(S): 9 INJECTION, SOLUTION INTRAVENOUS at 20:02

## 2019-07-25 RX ADMIN — Medication 1: at 18:03

## 2019-07-25 RX ADMIN — SODIUM CHLORIDE 50 MILLILITER(S): 9 INJECTION, SOLUTION INTRAVENOUS at 02:15

## 2019-07-25 RX ADMIN — Medication 4 UNIT(S): at 18:04

## 2019-07-25 RX ADMIN — ATORVASTATIN CALCIUM 80 MILLIGRAM(S): 80 TABLET, FILM COATED ORAL at 21:50

## 2019-07-25 RX ADMIN — Medication 162 MILLIGRAM(S): at 12:16

## 2019-07-25 RX ADMIN — SENNA PLUS 2 TABLET(S): 8.6 TABLET ORAL at 21:50

## 2019-07-25 RX ADMIN — INSULIN GLARGINE 8 UNIT(S): 100 INJECTION, SOLUTION SUBCUTANEOUS at 01:52

## 2019-07-25 RX ADMIN — SERTRALINE 100 MILLIGRAM(S): 25 TABLET, FILM COATED ORAL at 12:17

## 2019-07-25 RX ADMIN — Medication 3 MILLIGRAM(S): at 21:50

## 2019-07-25 RX ADMIN — CHLORHEXIDINE GLUCONATE 1 APPLICATION(S): 213 SOLUTION TOPICAL at 06:53

## 2019-07-25 RX ADMIN — Medication 4 UNIT(S): at 12:18

## 2019-07-25 RX ADMIN — Medication 100 MILLIGRAM(S): at 12:17

## 2019-07-25 RX ADMIN — HEPARIN SODIUM 5000 UNIT(S): 5000 INJECTION INTRAVENOUS; SUBCUTANEOUS at 18:04

## 2019-07-25 NOTE — CONSULT NOTE ADULT - SUBJECTIVE AND OBJECTIVE BOX
Patient is a 79y old  Female who presents with a chief complaint of abdominal pain (2019 16:10)    HPI:  80 yo female with a PMH of DM, HTN, HLD, CVA, hearing loss, and breast CA presents with epigastric abdominal pain that started this morning.   Sharp pain with no radiation, constant since onset. She has experienced persistent nausea with no vomiting and denies any bowel changes. She mention to have had the same symptoms in May; had work up done which came as negative. She denies any other symptoms including fever, chills, urinary/bowel changes, recent illness/travel, chest pain, or SOB. On initial w/u In the ED, 141/65, HR 50  EKG 2:1 av block , potassium 5.4. EP consulted for further recommendations      REVIEW OF SYSTEMS  A ten-point review of systems was otherwise negative except as noted.    PAST MEDICAL & SURGICAL HISTORY:  CVA (cerebral vascular accident):  rt weakness  Bilateral hearing loss, unspecified hearing loss type  High cholesterol  Depression  BP (high blood pressure): 20 yr  Breast CA:  s/p rt  DM (diabetes mellitus): 24 yr  History of cholecystectomy:   History of lumpectomy of right breast  H/O total knee replacement, right      Home Medications:  aspirin 81 mg oral tablet, chewable: 2 tab(s) orally once a day (2019 04:50)  atorvastatin 80 mg oral tablet: 1 tab(s) orally once a day (at bedtime) (27 Mar 2019 11:41)  clonazePAM 0.25 mg oral tablet: 1 tab(s) orally once a day as needed for anxiety (27 Mar 2019 11:41)  clopidogrel 75 mg oral tablet: 1 tab(s) orally once a day (27 Mar 2019 11:41)  docusate sodium 100 mg oral capsule: 1 cap(s) orally once a day (27 Mar 2019 11:41)  exemestane 25 mg oral tablet: 1 tab(s) orally once a day (27 Mar 2019 11:41)  HumaLOG 100 units/mL subcutaneous solution: 7 unit(s) subcutaneous 3 times a day (before meals) (27 Mar 2019 11:41)  hydrOXYzine hydrochloride 25 mg oral tablet: 1 tab(s) orally every 8 hours, As needed, Anxiety (2019 04:54)  insulin glargine 100 units/mL subcutaneous solution: 18 unit(s) subcutaneous once a day (at bedtime) (2019 04:52)  lisinopril 10 mg oral tablet: 1 tab(s) orally once a day (27 Mar 2019 11:41)  metFORMIN 500 mg oral tablet: 1 tab(s) orally 2 times a day (27 Mar 2019 11:41)  nortriptyline 25 mg oral capsule: 1 cap(s) orally once a day (at bedtime) (27 Mar 2019 11:41)  pantoprazole 40 mg oral delayed release tablet: 1 tab(s) orally once a day (before a meal) (27 Mar 2019 11:41)  senna oral tablet: 2 tab(s) orally once a day (at bedtime) (27 Mar 2019 11:41)  sertraline 100 mg oral tablet: 1 tab(s) orally once a day (27 Mar 2019 11:41)      Allergies:    honeydew (Unknown)  latex (Unknown)  penicillins (Unknown)  pickles (Unknown)  shellfish (Unknown)  Sulfacetamide Sodium-Sulfur (Rash)      PREVIOUS DIAGNOSTIC TESTING:      ECHO  FINDINGS:    STRESS  FINDINGS:    CATHETERIZATION  FINDINGS:    ELECTROPHYSIOLOGY STUDY  FINDINGS:    < from: Transthoracic Echocardiogram (19 @ 11:58) >  Summary:   1. Left ventricular ejection fraction, by visual estimation, is 50 to   55%.   2. Normal global left ventricular systolic function.   3. Mild mitral annular calcification.   4. Sclerotic aortic valve with normal opening.    PHYSICIAN INTERPRETATION:  Left Ventricle: The left ventricular internal cavity size is normal. Left   ventricular wall thickness is normal. Global LV systolic function was   normal. Left ventricular ejection fraction, by visual estimation,is 50   to 55%. Normal segmental left ventricular systolic function.  Right Ventricle: Normal right ventricular size and function.  Left Atrium: Normal left atrial size.  Right Atrium: Normal right atrial size.  Pericardium: There is no evidence of pericardial effusion.  Mitral Valve: Structurally normal mitral valve, with normal leaflet   excursion. There is mild mitral annular calcification. Mild mitral valve   regurgitation is seen.  Tricuspid Valve: The tricuspid valve is not well seen. Mild tricuspid   regurgitation is visualized.  Aortic Valve: The aortic valve is trileaflet. Sclerotic aortic valve with   normal opening. No evidence of aortic valve regurgitation is seen.  Pulmonic Valve: Mild pulmonic valve regurgitation.  Aorta: The aortic root is normal in size and structure.    < end of copied text >  CAROTID ULTRASOUND:  FINDINGS    VENOUS DUPLEX SCAN:  FINDINGS:    CHEST CT PULMONARY ANGIO with IV Contrast:  FINDINGS:    FAMILY HISTORY:  non-contributory    SOCIAL HISTORY:    CIGARETTES: denies    ALCOHOL: denies      MEDICATIONS  (STANDING):  amLODIPine   Tablet 10 milliGRAM(s) Oral daily  aspirin enteric coated 162 milliGRAM(s) Oral daily  atorvastatin 80 milliGRAM(s) Oral at bedtime  chlorhexidine 4% Liquid 1 Application(s) Topical <User Schedule>  dextrose 5%. 1000 milliLiter(s) (50 mL/Hr) IV Continuous <Continuous>  dextrose 50% Injectable 12.5 Gram(s) IV Push once  dextrose 50% Injectable 25 Gram(s) IV Push once  dextrose 50% Injectable 25 Gram(s) IV Push once  docusate sodium 100 milliGRAM(s) Oral daily  heparin  Injectable 5000 Unit(s) SubCutaneous every 8 hours  insulin glargine Injectable (LANTUS) 8 Unit(s) SubCutaneous at bedtime  insulin lispro (HumaLOG) corrective regimen sliding scale   SubCutaneous three times a day before meals  insulin lispro Injectable (HumaLOG) 4 Unit(s) SubCutaneous three times a day before meals  lactated ringers. 1000 milliLiter(s) (50 mL/Hr) IV Continuous <Continuous>  melatonin 3 milliGRAM(s) Oral at bedtime  nortriptyline 25 milliGRAM(s) Oral at bedtime  pantoprazole    Tablet 40 milliGRAM(s) Oral before breakfast  senna 2 Tablet(s) Oral at bedtime  sertraline 100 milliGRAM(s) Oral daily    MEDICATIONS  (PRN):  acetaminophen   Tablet .. 650 milliGRAM(s) Oral every 6 hours PRN Mild Pain (1 - 3), Moderate Pain (4 - 6)  clonazePAM  Tablet 0.25 milliGRAM(s) Oral daily PRN agitation  dextrose 40% Gel 15 Gram(s) Oral once PRN Blood Glucose LESS THAN 70 milliGRAM(s)/deciliter  glucagon  Injectable 1 milliGRAM(s) IntraMuscular once PRN Glucose LESS THAN 70 milligrams/deciliter  hydrOXYzine  Oral Tab/Cap - Peds 25 milliGRAM(s) Oral every 8 hours PRN Anxiety      Vital Signs Last 24 Hrs  T(C): 36.9 (2019 14:13), Max: 37 (2019 05:41)  T(F): 98.4 (2019 14:13), Max: 98.6 (2019 05:41)  HR: 68 (2019 14:13) (68 - 82)  BP: 111/54 (2019 14:13) (111/54 - 155/70)  BP(mean): --  RR: 187 (2019 14:13) (18 - 187)  SpO2: --    PHYSICAL EXAM:    GENERAL: In no apparent distress, well nourished, and hydrated.  HEART: Regular rate and rhythm; No murmurs, rubs, or gallops.  PULMONARY: Clear to auscultation and perfusion.  No rales, wheezing, or rhonchi bilaterally.  ABDOMEN: Soft, Nontender, Nondistended; Bowel sounds present  EXTREMITIES:  2+ Peripheral Pulses, No clubbing, cyanosis, or edema  NEUROLOGICAL: AO x 4 , speech clear      INTERPRETATION OF TELEMETRY:  SR 66    ECG:  < from: 12 Lead ECG (19 @ 10:07) >  Ventricular Rate 87 BPM    Atrial Rate 87 BPM    P-R Interval 142 ms    QRS Duration 112 ms    Q-T Interval 426 ms    QTC Calculation(Bezet) 512 ms    P Axis 47 degrees    R Axis -45 degrees    T Axis 57 degrees    Diagnosis Line Normal sinus rhythm  Left anterior fascicular block  Prolonged QT  Abnormal ECG    < end of copied text >      < from: 12 Lead ECG (19 @ 21:22) >  Ventricular Rate 46 BPM    Atrial Rate 92 BPM    P-R Interval 152 ms    QRS Duration 98 ms    Q-T Interval 496 ms    QTC Calculation(Bezet) 434 ms    P Axis 50 degrees    R Axis -53 degrees    T Axis 15 degrees    Diagnosis Line Sinus rhythm with 2nd degree A-V block with 2:1 A-V conduction  Low voltage QRS  Left anterior fascicular block  Cannot rule out Anterior infarct , age undetermined  Abnormal ECG    < end of copied text >    I&O's Detail    2019 07:01  -  2019 07:00  --------------------------------------------------------  IN:    lactated ringers.: 550 mL    Oral Fluid: 720 mL  Total IN: 1270 mL    OUT:    Indwelling Catheter - Urethral: 600 mL    Voided: 750 mL  Total OUT: 1350 mL    Total NET: -80 mL    LABS:                        8.6    7.05  )-----------( 241      ( 2019 07:24 )             26.0         146  |  104  |  36<H>  ----------------------------<  128<H>  3.8   |  28  |  2.7<H>    Ca    8.9      2019 07:24  Phos  4.8       Mg     1.8         TPro  5.9<L>  /  Alb  3.4<L>  /  TBili  0.3  /  DBili  x   /  AST  19  /  ALT  18  /  AlkPhos  100      CARDIAC MARKERS ( 2019 07:19 )  x     / 0.02 ng/mL / 192 U/L / x     / 2.7 ng/mL      I&O's Detail    2019 07:01  -  2019 07:00  --------------------------------------------------------  IN:    lactated ringers.: 550 mL    Oral Fluid: 720 mL  Total IN: 1270 mL    OUT:    Indwelling Catheter - Urethral: 600 mL    Voided: 750 mL  Total OUT: 1350 mL    Total NET: -80 mL    Daily Weight in k.5 (2019 05:41)    RADIOLOGY & ADDITIONAL STUDIES:  < from: Xray Chest 1 View- PORTABLE-Routine (19 @ 06:42) >    INTERPRETATION:  CLINICAL INDICATION:  Shortness of breath    COMPARISON: Chest radiograph dated 7/15/2019    TECHNIQUE: Frontal radiograph the chest. Patient's chin obscures   evaluation of the upper lung fields.     FINDINGS:    Support devices: None.    Cardiac/mediastinum/hilum: Stable.    Lung parenchyma/Pleura: No focal consolidation. There is no pleural   effusion. There is no pneumothorax.    Skeleton/soft tissues: Stable.    IMPRESSION:     No radiographic evidence of acute cardiopulmonary disease.    < end of copied text >

## 2019-07-25 NOTE — CONSULT NOTE ADULT - ASSESSMENT
Assessment:    2:1 AVB on admission. QRS normal duration (98) and sinus rate of 84 BPM.  Site of AVB cannnot be determined with certainty. Supra Hisian vs Intra Hisian.  Mild exercise was performed. Sinus rate increased from baseline of 80 BPM to maximum of 103 BPM, during exercise patient developed Mobitz II block.   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 QRS normal duration (98) and sinus rate of 84 BPM.  Site of AVB cannnot be determined with certainty. Supra Hisian vs Intra Hisian.  Mild exercise was performed. Sinus rate increased from baseline of 80 BPM to maximum of 103 BPM, during exercise patient developed Mobitz II block.   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

## 2019-07-25 NOTE — PROGRESS NOTE ADULT - ASSESSMENT
79y female who presented to the hospital with a chief complaint of weakness. Patient has a past medical history of HTN, DM, HLD, and CVA.    #JABARI on CKD stage 3b baseline creat 1.4 mg  -likely prerenal vs  contrast induced after CT with contrast on 7/15  -noncontrast CT shows no change in kidneys, no obstruction  -U/A shows WBC,   proteinuria 1.6 g/g creat (diabetic), bacteria  -creatinine 4.3 up from baseline of 1.4  -continue fluids- LR 50 cc/hr, creat is improving  -monitor Is and Os  -Follow BMP, trend creatinine   -keep holding lisinopril    #Hyperkalemia - resolved    # ANEMIA _ adequate iron stores     will follow

## 2019-07-25 NOTE — PROGRESS NOTE ADULT - ASSESSMENT
Ms. 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    # Epigastric pain with Nausea, resolved  - multiple admission in april and July for the same complaint.  - normal LFTs and lipase  - US of abdomen: s/p cholecystectomy no CBD dilatation  - CT of abdomen without contrast: Splenic hypodensities better evaluated on prior examination + redemonstrated scattered punctate calcifications within the pancreas likely reflecting chronic prior hepatitis  - Patient with known intolerance to cheese, and was planned to undergo EGD with Dr. Wallace but patient was refusing and feeling anxious about it. She has an appointment for EGD on 8/2.    # JABARI on top of CKD stage 3b  - serum creatinine 4.3 on admission, baseline around 1.4 on 7/16, trending down 2.7 today  - Kidney US no obstruction or hydronephrosis, small kidneys.   - non-oliguric, improving, proteinuria 1.6 g/g   - FENa: 3.3% consistent with ATN  - Possibly CONNIE after exposure to contrast on 7/15    # History of Ischemic CVA in June 2018  - MRI brain which showed acute ischemic change in the lower left medulla, s/p IV TPA  - Discharged on aspirin 162 mg daily and plavix 75 mg for 3 months only.  - CTA of the neck in June 2018 that showed 60-70% left ICA stenosis and carotid duplex in December 2018 that showed >70% left CHINYERE. Evaluated by Dr. Burch who recommended  TCAR (transcarotid artery revascularization), however patient was also reluctant to have it.   - Contacted Dr. Macias, no indication to be on Plavix will stop Plavix and resume the dose of aspirin recommended by neurology 162 mg daily  - Evaluated by PT/rehab, patient will benefit from short term rehab.     # Mobitz II heart block on admission  - 2:1 heart block with bradycardia 40s on admission  - HR now improved, sinus rhythm, no block  - no chest pain or palpitation, three sets of troponin were negative  - evaluated by Dr. Reid, likely secondary to underlying hyperkalemia and renal dysfunction, recommend to treat underlying cause  - TTE: Left ventricular ejection fraction, by visual estimation, is 50 to 55%. Normal global left ventricular systolic function.  - Will obtain an evaluation by EP.     # HTN:   - lisinopril held d/t JABARI  - Avoiding aditya blocking agents given AV block on presentation  - started on amlodipine 10 mg daily    # DM  - on lispro and lantus.    # Normocytic anemia  - likely due to chronic kidney disease  - iron sat and TIBC, ferritin are not consistent with iron deficiency anemia.   - Vit B12, and folic acid levels are within normal range.     Dipso: short term rehab

## 2019-07-25 NOTE — PROGRESS NOTE ADULT - SUBJECTIVE AND OBJECTIVE BOX
Patient was seen and examined. Spoke with RN. Chart reviewed.    No events overnight.  Vital Signs Last 24 Hrs  T(F): 98.4 (25 Jul 2019 14:13), Max: 98.6 (25 Jul 2019 05:41)  HR: 68 (25 Jul 2019 14:13) (68 - 82)  BP: 111/54 (25 Jul 2019 14:13) (111/54 - 155/70)  SpO2: --  MEDICATIONS  (STANDING):  amLODIPine   Tablet 10 milliGRAM(s) Oral daily  aspirin enteric coated 162 milliGRAM(s) Oral daily  atorvastatin 80 milliGRAM(s) Oral at bedtime  chlorhexidine 4% Liquid 1 Application(s) Topical <User Schedule>  dextrose 5%. 1000 milliLiter(s) (50 mL/Hr) IV Continuous <Continuous>  dextrose 50% Injectable 12.5 Gram(s) IV Push once  dextrose 50% Injectable 25 Gram(s) IV Push once  dextrose 50% Injectable 25 Gram(s) IV Push once  docusate sodium 100 milliGRAM(s) Oral daily  heparin  Injectable 5000 Unit(s) SubCutaneous every 8 hours  insulin glargine Injectable (LANTUS) 8 Unit(s) SubCutaneous at bedtime  insulin lispro (HumaLOG) corrective regimen sliding scale   SubCutaneous three times a day before meals  insulin lispro Injectable (HumaLOG) 4 Unit(s) SubCutaneous three times a day before meals  lactated ringers. 1000 milliLiter(s) (50 mL/Hr) IV Continuous <Continuous>  melatonin 3 milliGRAM(s) Oral at bedtime  nortriptyline 25 milliGRAM(s) Oral at bedtime  pantoprazole    Tablet 40 milliGRAM(s) Oral before breakfast  senna 2 Tablet(s) Oral at bedtime  sertraline 100 milliGRAM(s) Oral daily    MEDICATIONS  (PRN):  acetaminophen   Tablet .. 650 milliGRAM(s) Oral every 6 hours PRN Mild Pain (1 - 3), Moderate Pain (4 - 6)  clonazePAM  Tablet 0.25 milliGRAM(s) Oral daily PRN agitation  dextrose 40% Gel 15 Gram(s) Oral once PRN Blood Glucose LESS THAN 70 milliGRAM(s)/deciliter  glucagon  Injectable 1 milliGRAM(s) IntraMuscular once PRN Glucose LESS THAN 70 milligrams/deciliter  hydrOXYzine  Oral Tab/Cap - Peds 25 milliGRAM(s) Oral every 8 hours PRN Anxiety    Labs:                        8.6    7.05  )-----------( 241      ( 25 Jul 2019 07:24 )             26.0                         8.5    4.63  )-----------( 246      ( 24 Jul 2019 07:19 )             26.2     25 Jul 2019 07:24    146    |  104    |  36     ----------------------------<  128    3.8     |  28     |  2.7    24 Jul 2019 07:19    145    |  102    |  46     ----------------------------<  77     3.7     |  28     |  3.2      Ca    8.9        25 Jul 2019 07:24  Ca    9.2        24 Jul 2019 07:19  Phos  4.8       25 Jul 2019 07:24  Phos  4.6       24 Jul 2019 07:19  Mg     1.8       25 Jul 2019 07:24  Mg     1.9       24 Jul 2019 07:19    TPro  5.9    /  Alb  3.4    /  TBili  0.3    /  DBili  x      /  AST  19     /  ALT  18     /  AlkPhos  100    25 Jul 2019 07:24  TPro  6.1    /  Alb  3.5    /  TBili  0.3    /  DBili  x      /  AST  26     /  ALT  23     /  AlkPhos  99     24 Jul 2019 07:19          Culture - Urine (collected 23 Jul 2019 00:45)  Source: .Urine Clean Catch (Midstream)  Preliminary Report (24 Jul 2019 18:47):    >100,000 CFU/ml Gram positive organisms        Radiology:    General: comfortable, NAD  Neurology: A&Ox3, nonfocal  Head:  Normocephalic, atraumatic  ENT:  Mucosa moist, no ulcerations  Neck:  Supple, no JVD,   Skin: no breakdowns (as per RN)  Resp: CTA B/L  CV: RRR, S1S2,   GI: Soft, NT, bowel sounds  MS: No edema, + peripheral pulses, FROM all 4 extremity

## 2019-07-25 NOTE — PROGRESS NOTE ADULT - SUBJECTIVE AND OBJECTIVE BOX
Nephrology progress note    Patient was seen and examined, events over the last 24 h noted .  Cr improved    Allergies:  honeydew (Unknown)  latex (Unknown)  penicillins (Unknown)  pickles (Unknown)  shellfish (Unknown)  Sulfacetamide Sodium-Sulfur (Rash)    Hospital Medications:   MEDICATIONS  (STANDING):  amLODIPine   Tablet 10 milliGRAM(s) Oral daily  aspirin enteric coated 162 milliGRAM(s) Oral daily  atorvastatin 80 milliGRAM(s) Oral at bedtime  chlorhexidine 4% Liquid 1 Application(s) Topical <User Schedule>  dextrose 5%. 1000 milliLiter(s) (50 mL/Hr) IV Continuous <Continuous>  dextrose 50% Injectable 12.5 Gram(s) IV Push once  dextrose 50% Injectable 25 Gram(s) IV Push once  dextrose 50% Injectable 25 Gram(s) IV Push once  docusate sodium 100 milliGRAM(s) Oral daily  heparin  Injectable 5000 Unit(s) SubCutaneous every 8 hours  insulin glargine Injectable (LANTUS) 8 Unit(s) SubCutaneous at bedtime  insulin lispro (HumaLOG) corrective regimen sliding scale   SubCutaneous three times a day before meals  insulin lispro Injectable (HumaLOG) 4 Unit(s) SubCutaneous three times a day before meals  lactated ringers. 1000 milliLiter(s) (50 mL/Hr) IV Continuous <Continuous>  melatonin 3 milliGRAM(s) Oral at bedtime  nortriptyline 25 milliGRAM(s) Oral at bedtime  pantoprazole    Tablet 40 milliGRAM(s) Oral before breakfast  senna 2 Tablet(s) Oral at bedtime  sertraline 100 milliGRAM(s) Oral daily        VITALS:  T(F): 98.4 (19 @ 14:13), Max: 98.6 (19 @ 05:41)  HR: 68 (19 @ 14:13)  BP: 111/54 (19 @ 14:13)  RR: 187 (19 @ 14:13)  SpO2: --  Wt(kg): --     @ 07:01  -   @ 07:00  --------------------------------------------------------  IN: 675 mL / OUT: 1325 mL / NET: -650 mL     @ 07:01  -   @ 07:00  --------------------------------------------------------  IN: 1270 mL / OUT: 1350 mL / NET: -80 mL          PHYSICAL EXAM:  Constitutional: NAD  HEENT: anicteric sclera, oropharynx clear, MMM  Neck: No JVD  Respiratory: CTAB, no wheezes, rales or rhonchi  Cardiovascular: S1, S2, RRR  Gastrointestinal: BS+, soft, NT/ND  Extremities: No cyanosis or clubbing. No peripheral edema  :  No brannon.   Skin: No rashes    LABS:      146  |  104  |  36<H>  ----------------------------<  128<H>  3.8   |  28  |  2.7<H>    Ca    8.9      2019 07:24  Phos  4.8       Mg     1.8         TPro  5.9<L>  /  Alb  3.4<L>  /  TBili  0.3  /  DBili      /  AST  19  /  ALT  18  /  AlkPhos  100                            8.6    7.05  )-----------( 241      ( 2019 07:24 )             26.0       Urine Studies:  Urinalysis Basic - ( 2019 16:00 )    Color: Yellow / Appearance: Cloudy / S.015 / pH:   Gluc:  / Ketone: 15  / Bili: Negative / Urobili: 0.2 mg/dL   Blood:  / Protein: 100 mg/dL / Nitrite: Negative   Leuk Esterase: Moderate / RBC: 6-10 /HPF / WBC 6-10 /HPF   Sq Epi:  / Non Sq Epi:  / Bacteria: Moderate /HPF      Creatinine, Random Urine: 56 mg/dL ( @ 16:00)  Protein/Creatinine Ratio Calculation: 1.6 Ratio ( @ 16:00)  Chloride, Random Urine: 50 ( @ 16:00)  Sodium, Random Urine: 68.0 mmoL/L ( @ 16:00)  Sodium, Random Urine: 68.0 mmoL/L ( @ 16:00)  Creatinine, Random Urine: 56 mg/dL ( @ 16:00)    RADIOLOGY & ADDITIONAL STUDIES:

## 2019-07-26 LAB
ANION GAP SERPL CALC-SCNC: 13 MMOL/L — SIGNIFICANT CHANGE UP (ref 7–14)
BASOPHILS # BLD AUTO: 0.04 K/UL — SIGNIFICANT CHANGE UP (ref 0–0.2)
BASOPHILS NFR BLD AUTO: 0.7 % — SIGNIFICANT CHANGE UP (ref 0–1)
BUN SERPL-MCNC: 34 MG/DL — HIGH (ref 10–20)
CALCIUM SERPL-MCNC: 8.8 MG/DL — SIGNIFICANT CHANGE UP (ref 8.5–10.1)
CHLORIDE SERPL-SCNC: 106 MMOL/L — SIGNIFICANT CHANGE UP (ref 98–110)
CO2 SERPL-SCNC: 26 MMOL/L — SIGNIFICANT CHANGE UP (ref 17–32)
CREAT SERPL-MCNC: 2.2 MG/DL — HIGH (ref 0.7–1.5)
EOSINOPHIL # BLD AUTO: 0.26 K/UL — SIGNIFICANT CHANGE UP (ref 0–0.7)
EOSINOPHIL NFR BLD AUTO: 4.3 % — SIGNIFICANT CHANGE UP (ref 0–8)
GLUCOSE BLDC GLUCOMTR-MCNC: 116 MG/DL — HIGH (ref 70–99)
GLUCOSE BLDC GLUCOMTR-MCNC: 152 MG/DL — HIGH (ref 70–99)
GLUCOSE BLDC GLUCOMTR-MCNC: 227 MG/DL — HIGH (ref 70–99)
GLUCOSE BLDC GLUCOMTR-MCNC: 233 MG/DL — HIGH (ref 70–99)
GLUCOSE SERPL-MCNC: 104 MG/DL — HIGH (ref 70–99)
HCT VFR BLD CALC: 26 % — LOW (ref 37–47)
HGB BLD-MCNC: 8.5 G/DL — LOW (ref 12–16)
IMM GRANULOCYTES NFR BLD AUTO: 0.5 % — HIGH (ref 0.1–0.3)
LYMPHOCYTES # BLD AUTO: 1.03 K/UL — LOW (ref 1.2–3.4)
LYMPHOCYTES # BLD AUTO: 17.2 % — LOW (ref 20.5–51.1)
MAGNESIUM SERPL-MCNC: 1.8 MG/DL — SIGNIFICANT CHANGE UP (ref 1.8–2.4)
MCHC RBC-ENTMCNC: 30.7 PG — SIGNIFICANT CHANGE UP (ref 27–31)
MCHC RBC-ENTMCNC: 32.7 G/DL — SIGNIFICANT CHANGE UP (ref 32–37)
MCV RBC AUTO: 93.9 FL — SIGNIFICANT CHANGE UP (ref 81–99)
MONOCYTES # BLD AUTO: 0.6 K/UL — SIGNIFICANT CHANGE UP (ref 0.1–0.6)
MONOCYTES NFR BLD AUTO: 10 % — HIGH (ref 1.7–9.3)
NEUTROPHILS # BLD AUTO: 4.04 K/UL — SIGNIFICANT CHANGE UP (ref 1.4–6.5)
NEUTROPHILS NFR BLD AUTO: 67.3 % — SIGNIFICANT CHANGE UP (ref 42.2–75.2)
NRBC # BLD: 0 /100 WBCS — SIGNIFICANT CHANGE UP (ref 0–0)
PLATELET # BLD AUTO: 248 K/UL — SIGNIFICANT CHANGE UP (ref 130–400)
POTASSIUM SERPL-MCNC: 4.2 MMOL/L — SIGNIFICANT CHANGE UP (ref 3.5–5)
POTASSIUM SERPL-SCNC: 4.2 MMOL/L — SIGNIFICANT CHANGE UP (ref 3.5–5)
RBC # BLD: 2.77 M/UL — LOW (ref 4.2–5.4)
RBC # FLD: 13.8 % — SIGNIFICANT CHANGE UP (ref 11.5–14.5)
SODIUM SERPL-SCNC: 145 MMOL/L — SIGNIFICANT CHANGE UP (ref 135–146)
WBC # BLD: 6 K/UL — SIGNIFICANT CHANGE UP (ref 4.8–10.8)
WBC # FLD AUTO: 6 K/UL — SIGNIFICANT CHANGE UP (ref 4.8–10.8)

## 2019-07-26 PROCEDURE — 99232 SBSQ HOSP IP/OBS MODERATE 35: CPT

## 2019-07-26 RX ADMIN — HEPARIN SODIUM 5000 UNIT(S): 5000 INJECTION INTRAVENOUS; SUBCUTANEOUS at 06:14

## 2019-07-26 RX ADMIN — Medication 2: at 12:16

## 2019-07-26 RX ADMIN — HEPARIN SODIUM 5000 UNIT(S): 5000 INJECTION INTRAVENOUS; SUBCUTANEOUS at 14:37

## 2019-07-26 RX ADMIN — INSULIN GLARGINE 8 UNIT(S): 100 INJECTION, SOLUTION SUBCUTANEOUS at 21:56

## 2019-07-26 RX ADMIN — NORTRIPTYLINE HYDROCHLORIDE 25 MILLIGRAM(S): 10 CAPSULE ORAL at 21:11

## 2019-07-26 RX ADMIN — ATORVASTATIN CALCIUM 80 MILLIGRAM(S): 80 TABLET, FILM COATED ORAL at 21:11

## 2019-07-26 RX ADMIN — Medication 650 MILLIGRAM(S): at 20:45

## 2019-07-26 RX ADMIN — Medication 3 MILLIGRAM(S): at 21:11

## 2019-07-26 RX ADMIN — Medication 4 UNIT(S): at 12:15

## 2019-07-26 RX ADMIN — SERTRALINE 100 MILLIGRAM(S): 25 TABLET, FILM COATED ORAL at 12:18

## 2019-07-26 RX ADMIN — PANTOPRAZOLE SODIUM 40 MILLIGRAM(S): 20 TABLET, DELAYED RELEASE ORAL at 06:14

## 2019-07-26 RX ADMIN — Medication 4 UNIT(S): at 17:18

## 2019-07-26 RX ADMIN — Medication 1: at 17:19

## 2019-07-26 RX ADMIN — AMLODIPINE BESYLATE 10 MILLIGRAM(S): 2.5 TABLET ORAL at 06:14

## 2019-07-26 RX ADMIN — Medication 4 UNIT(S): at 08:45

## 2019-07-26 RX ADMIN — CHLORHEXIDINE GLUCONATE 1 APPLICATION(S): 213 SOLUTION TOPICAL at 06:13

## 2019-07-26 RX ADMIN — HEPARIN SODIUM 5000 UNIT(S): 5000 INJECTION INTRAVENOUS; SUBCUTANEOUS at 21:12

## 2019-07-26 RX ADMIN — Medication 162 MILLIGRAM(S): at 12:18

## 2019-07-26 RX ADMIN — SENNA PLUS 2 TABLET(S): 8.6 TABLET ORAL at 21:11

## 2019-07-26 RX ADMIN — Medication 0.25 MILLIGRAM(S): at 20:17

## 2019-07-26 RX ADMIN — Medication 100 MILLIGRAM(S): at 12:18

## 2019-07-26 RX ADMIN — Medication 650 MILLIGRAM(S): at 20:15

## 2019-07-26 NOTE — PROGRESS NOTE ADULT - ASSESSMENT
Ms. 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    # Epigastric pain with nausea, resolved  - multiple admission in April and July for the same complaint.  - normal LFTs and lipase  - US of abdomen: s/p cholecystectomy no CBD dilatation  - CT of abdomen without contrast: Splenic hypodensities better evaluated on prior examination + redemonstrated scattered punctate calcifications within the pancreas likely reflecting chronic prior hepatitis  - Patient with known intolerance to cheese, and was planned to undergo EGD with Dr. Wallace but patient was refusing and feeling anxious about it. She has an appointment for EGD on 8/2.    # JABARI on top of CKD stage 3b  - serum creatinine 4.3 on admission, baseline around 1.4 on 7/16, trending down 2.2 today  - Kidney US no obstruction or hydronephrosis, small kidneys.   - non-oliguric, improving, proteinuria 1.6 g/g   - FENa: 3.3% consistent with ATN  - Possibly CONNIE after exposure to contrast on 7/15    # History of Ischemic CVA in June 2018  - MRI brain which showed acute ischemic change in the lower left medulla, s/p IV TPA  - Discharged on aspirin 162 mg daily and plavix 75 mg for 3 months only.  - CTA of the neck in June 2018 that showed 60-70% left ICA stenosis and carotid duplex in December 2018 that showed >70% left CHINYERE. Evaluated by Dr. Burch who recommended  TCAR (transcarotid artery revascularization), however patient was also reluctant to have it.   - Contacted Dr. Macias, no indication to be on Plavix will stop Plavix and resume the dose of aspirin recommended by neurology 162 mg daily  - Evaluated by PT/rehab, patient will benefit from short term rehab.     # Mobitz II heart block on admission  - 2:1 heart block with bradycardia 40s on admission  - HR now improved, sinus rhythm, no block  - no chest pain or palpitation, three sets of troponin were negative  - evaluated by Dr. Reid, likely secondary to underlying hyperkalemia and renal dysfunction, recommend to treat underlying cause  - TTE: Left ventricular ejection fraction, by visual estimation, is 50 to 55%. Normal global left ventricular systolic function.  - Evaluated by Dr. Cheatham, second degree AV block recurred on exercise suggestive of intra-Hisian block, thus patient benefits from a pacemaker. Patient would like to discuss with her family first.     # Asymptomatic bacteruria  - Patient afebrile, no leukocytosis.   - Asymptomatic at this time.  - UA was positive, had brannon catheter removed, urine culture with E. Fecalis susceptible to ampicillin (patient is penicillin allergic).    # HTN:   - lisinopril held d/t JABARI  - Avoiding aditya blocking agents given AV block on presentation  - started on amlodipine 10 mg daily    # DM  - on lispro and lantus.    # Normocytic anemia  - likely due to chronic kidney disease  - iron sat and TIBC, ferritin are not consistent with iron deficiency anemia.   - Vit B12, and folic acid levels are within normal range.     Dipso: short term rehab, pending family decision regarding pacemaker placement

## 2019-07-26 NOTE — OCCUPATIONAL THERAPY INITIAL EVALUATION ADULT - PLANNED THERAPY INTERVENTIONS, OT EVAL
ADL retraining/IADL retraining/balance training/bed mobility training/parent/caregiver training.../strengthening/transfer training

## 2019-07-26 NOTE — PROGRESS NOTE ADULT - SUBJECTIVE AND OBJECTIVE BOX
SUBJECTIVE:    Patient is a 79y old Female who presents with a chief complaint of abdominal pain (26 Jul 2019 08:06)    Currently admitted to medicine with the primary diagnosis of Generalized abdominal pain     Today is hospital day 3d.   This morning she is resting comfortably in bed and reports no new issues or overnight events.   She slept well last night.   Denies chest pain, SOB, abdominal pain or nausea.   No urinary complaints.     PAST MEDICAL & SURGICAL HISTORY  CVA (cerebral vascular accident): 6/18 rt weakness  Bilateral hearing loss, unspecified hearing loss type  High cholesterol  Depression  BP (high blood pressure): 20 yr  Breast CA: 2014 s/p rt  DM (diabetes mellitus): 24 yr  History of cholecystectomy: 1992  History of lumpectomy of right breast  H/O total knee replacement, right    SOCIAL HISTORY:  Negative for smoking/alcohol/drug use.     ALLERGIES:  honeydew (Unknown)  latex (Unknown)  penicillins (Unknown)  pickles (Unknown)  shellfish (Unknown)  Sulfacetamide Sodium-Sulfur (Rash)    MEDICATIONS:  STANDING MEDICATIONS  amLODIPine   Tablet 10 milliGRAM(s) Oral daily  aspirin enteric coated 162 milliGRAM(s) Oral daily  atorvastatin 80 milliGRAM(s) Oral at bedtime  chlorhexidine 4% Liquid 1 Application(s) Topical <User Schedule>  dextrose 5%. 1000 milliLiter(s) IV Continuous <Continuous>  dextrose 50% Injectable 12.5 Gram(s) IV Push once  dextrose 50% Injectable 25 Gram(s) IV Push once  dextrose 50% Injectable 25 Gram(s) IV Push once  docusate sodium 100 milliGRAM(s) Oral daily  heparin  Injectable 5000 Unit(s) SubCutaneous every 8 hours  insulin glargine Injectable (LANTUS) 8 Unit(s) SubCutaneous at bedtime  insulin lispro (HumaLOG) corrective regimen sliding scale   SubCutaneous three times a day before meals  insulin lispro Injectable (HumaLOG) 4 Unit(s) SubCutaneous three times a day before meals  lactated ringers. 1000 milliLiter(s) IV Continuous <Continuous>  melatonin 3 milliGRAM(s) Oral at bedtime  nortriptyline 25 milliGRAM(s) Oral at bedtime  pantoprazole    Tablet 40 milliGRAM(s) Oral before breakfast  senna 2 Tablet(s) Oral at bedtime  sertraline 100 milliGRAM(s) Oral daily    PRN MEDICATIONS  acetaminophen   Tablet .. 650 milliGRAM(s) Oral every 6 hours PRN  clonazePAM  Tablet 0.25 milliGRAM(s) Oral daily PRN  dextrose 40% Gel 15 Gram(s) Oral once PRN  glucagon  Injectable 1 milliGRAM(s) IntraMuscular once PRN  hydrOXYzine  Oral Tab/Cap - Peds 25 milliGRAM(s) Oral every 8 hours PRN    VITALS:   T(F): 97.3  HR: 66  BP: 99/60  RR: 18  SpO2: --    LABS:                        8.5    6.00  )-----------( 248      ( 26 Jul 2019 06:58 )             26.0     07-26    145  |  106  |  34<H>  ----------------------------<  104<H>  4.2   |  26  |  2.2<H>    Ca    8.8      26 Jul 2019 06:58  Phos  4.8     07-25  Mg     1.8     07-26    TPro  5.9<L>  /  Alb  3.4<L>  /  TBili  0.3  /  DBili  x   /  AST  19  /  ALT  18  /  AlkPhos  100  07-25      RADIOLOGY:    CT Abdomen and Pelvis No Cont (07.22.19 @ 22:44)     IMPRESSION:      Since one week prior, overall unchanged examination, with no CT evidence   of acute intra-abdominal pathology.    Multiple incidental findings as mentioned above are better seen on   reference contrast-enhanced examination      US Abdomen Complete (07.23.19 @ 15:19)   FINDINGS:    LIVER:  The liver is normal in contour and echogenicity measuring 15.4 cm   in length. No suspicious masses. Exam is limited due to poor patient   cooperation.    GALLBLADDER/BILIARY TREE: Status post cholecystectomy. Common bile duct   measuring 4 mm.    PANCREAS:  Visualized head and body are unremarkable. Remainder obscured   by overlying bowel gas.    SPLEEN:  Unremarkable.     KIDNEYS:  Right kidney measures 9.5 cm and left kidney measures 8.8 cm in   length.  No hydronephrosis, stone or solid mass.      PHYSICAL EXAM:    GEN: No acute distress, on room air.  LUNGS: Clear to auscultation bilaterally, no crackles  HEART: S1/S2 present. RRR.   ABD: Soft, non-tender, non-distended. Bowel sounds present  EXT: Skin Intact, no edema   NEURO: AAOX3, forgetful, left sided weakness.

## 2019-07-26 NOTE — PROGRESS NOTE ADULT - ASSESSMENT
79y female who presented to the hospital with a chief complaint of weakness. Patient has a past medical history of HTN, DM, HLD, and CVA.    # JABARI on CKD stage 3b baseline creat 1.4 mg  -serum creatinine progressively improving since admission  -noncontrast CT shows no change in kidneys, no obstruction  -U/A shows WBC,   proteinuria 1.6 g/g creat (diabetic), bacteria  -now off fluids, encourage oral intake. IV hydration if can not tolerate oral fluids.  -monitor Is and Os  -Follow BMP, trend creatinine   -keep holding lisinopril for now. ok to resume after serum creatinine near baseline.    # Hyperkalemia - resolved    # ANEMIA _ adequate iron stores     will sign off, please recall PRN.

## 2019-07-26 NOTE — PROGRESS NOTE ADULT - SUBJECTIVE AND OBJECTIVE BOX
HPI:  80 yo female with a PMH of DM, HTN, HLD, CVA, hearing loss, and breast CA presents with epigastric abdominal pain that started this morning.   Sharp pain with no radiation, constant since onset.   She has experienced persistent nausea with no vomiting and denies any bowel changes.   She mention to have had the same symptoms in May; had work up done which came as negative.  She denies any other symptoms including fever, chills, urinary/bowel changes, recent illness/travel, chest pain, or SOB.    In the ED, 141/65, HR 50  EKG 2:1 av block , potassium 5.4  she recieved 1 ltr LR  dextrose and insulin given (23 Jul 2019 02:47)    Overnight/Interval event: No acute event overnight. Pt is sitting comfortably in chair.                        8.5    6.00  )-----------( 248      ( 26 Jul 2019 06:58 )             26.0    07-26    145  |  106  |  34<H>  ----------------------------<  104<H>  4.2   |  26  |  2.2<H>    Ca    8.8      26 Jul 2019 06:58  Phos  4.8     07-25  Mg     1.8     07-26    TPro  5.9<L>  /  Alb  3.4<L>  /  TBili  0.3  /  DBili  x   /  AST  19  /  ALT  18  /  AlkPhos  100  07-25    RADIOLOGY    MEDICATIONS  (STANDING):  amLODIPine   Tablet 10 milliGRAM(s) Oral daily  aspirin enteric coated 162 milliGRAM(s) Oral daily  atorvastatin 80 milliGRAM(s) Oral at bedtime  chlorhexidine 4% Liquid 1 Application(s) Topical <User Schedule>  dextrose 5%. 1000 milliLiter(s) (50 mL/Hr) IV Continuous <Continuous>  dextrose 50% Injectable 12.5 Gram(s) IV Push once  dextrose 50% Injectable 25 Gram(s) IV Push once  dextrose 50% Injectable 25 Gram(s) IV Push once  docusate sodium 100 milliGRAM(s) Oral daily  heparin  Injectable 5000 Unit(s) SubCutaneous every 8 hours  insulin glargine Injectable (LANTUS) 8 Unit(s) SubCutaneous at bedtime  insulin lispro (HumaLOG) corrective regimen sliding scale   SubCutaneous three times a day before meals  insulin lispro Injectable (HumaLOG) 4 Unit(s) SubCutaneous three times a day before meals  lactated ringers. 1000 milliLiter(s) (50 mL/Hr) IV Continuous <Continuous>  melatonin 3 milliGRAM(s) Oral at bedtime  nortriptyline 25 milliGRAM(s) Oral at bedtime  pantoprazole    Tablet 40 milliGRAM(s) Oral before breakfast  senna 2 Tablet(s) Oral at bedtime  sertraline 100 milliGRAM(s) Oral daily    MEDICATIONS  (PRN):  acetaminophen   Tablet .. 650 milliGRAM(s) Oral every 6 hours PRN Mild Pain (1 - 3), Moderate Pain (4 - 6)  clonazePAM  Tablet 0.25 milliGRAM(s) Oral daily PRN agitation  dextrose 40% Gel 15 Gram(s) Oral once PRN Blood Glucose LESS THAN 70 milliGRAM(s)/deciliter  glucagon  Injectable 1 milliGRAM(s) IntraMuscular once PRN Glucose LESS THAN 70 milligrams/deciliter  hydrOXYzine  Oral Tab/Cap - Peds 25 milliGRAM(s) Oral every 8 hours PRN Anxiety      Vital Signs Last 24 Hrs  T(C): 36.3 (26 Jul 2019 05:00), Max: 36.9 (25 Jul 2019 14:13)  T(F): 97.3 (26 Jul 2019 05:00), Max: 98.4 (25 Jul 2019 14:13)  HR: 66 (26 Jul 2019 05:00) (66 - 79)  BP: 99/60 (26 Jul 2019 05:00) (99/60 - 152/75)  BP(mean): --  RR: 18 (26 Jul 2019 05:00) (18 - 187)  SpO2: --    PHYSICAL EXAM:  CONSTITUTIONAL: NAD, well-developed.  HEAD:  Atraumatic, Normocephalic.  EYES: EOMI, conjunctiva and sclera clear.  NERVOUS SYSTEM:  Alert & Oriented X3, Good concentration; No limb weakness or numbness.  RESPIRATORY: Bilateral air entry.  CARDIOVASCULAR: Regular rate and rhythm;   EXTREMITIES: No clubbing, cyanosis, or edema. HPI:  80 yo female with a PMH of DM, HTN, HLD, CVA, hearing loss, and breast CA presents with epigastric abdominal pain that started this morning.   Sharp pain with no radiation, constant since onset.   She has experienced persistent nausea with no vomiting and denies any bowel changes.   She mention to have had the same symptoms in May; had work up done which came as negative.  She denies any other symptoms including fever, chills, urinary/bowel changes, recent illness/travel, chest pain, or SOB.    In the ED, 141/65, HR 50  EKG 2:1 av block , potassium 5.4  she recieved 1 ltr LR  dextrose and insulin given (23 Jul 2019 02:47)    Overnight/Interval event: No acute event overnight. Pt is sitting comfortably in chair.                        8.5    6.00  )-----------( 248      ( 26 Jul 2019 06:58 )             26.0    07-26    145  |  106  |  34<H>  ----------------------------<  104<H>  4.2   |  26  |  2.2<H>    Ca    8.8      26 Jul 2019 06:58  Phos  4.8     07-25  Mg     1.8     07-26    TPro  5.9<L>  /  Alb  3.4<L>  /  TBili  0.3  /  DBili  x   /  AST  19  /  ALT  18  /  AlkPhos  100  07-25    RADIOLOGY    MEDICATIONS  (STANDING):  amLODIPine   Tablet 10 milliGRAM(s) Oral daily  aspirin enteric coated 162 milliGRAM(s) Oral daily  atorvastatin 80 milliGRAM(s) Oral at bedtime  chlorhexidine 4% Liquid 1 Application(s) Topical <User Schedule>  dextrose 5%. 1000 milliLiter(s) (50 mL/Hr) IV Continuous <Continuous>  dextrose 50% Injectable 12.5 Gram(s) IV Push once  dextrose 50% Injectable 25 Gram(s) IV Push once  dextrose 50% Injectable 25 Gram(s) IV Push once  docusate sodium 100 milliGRAM(s) Oral daily  heparin  Injectable 5000 Unit(s) SubCutaneous every 8 hours  insulin glargine Injectable (LANTUS) 8 Unit(s) SubCutaneous at bedtime  insulin lispro (HumaLOG) corrective regimen sliding scale   SubCutaneous three times a day before meals  insulin lispro Injectable (HumaLOG) 4 Unit(s) SubCutaneous three times a day before meals  lactated ringers. 1000 milliLiter(s) (50 mL/Hr) IV Continuous <Continuous>  melatonin 3 milliGRAM(s) Oral at bedtime  nortriptyline 25 milliGRAM(s) Oral at bedtime  pantoprazole    Tablet 40 milliGRAM(s) Oral before breakfast  senna 2 Tablet(s) Oral at bedtime  sertraline 100 milliGRAM(s) Oral daily    MEDICATIONS  (PRN):  acetaminophen   Tablet .. 650 milliGRAM(s) Oral every 6 hours PRN Mild Pain (1 - 3), Moderate Pain (4 - 6)  clonazePAM  Tablet 0.25 milliGRAM(s) Oral daily PRN agitation  dextrose 40% Gel 15 Gram(s) Oral once PRN Blood Glucose LESS THAN 70 milliGRAM(s)/deciliter  glucagon  Injectable 1 milliGRAM(s) IntraMuscular once PRN Glucose LESS THAN 70 milligrams/deciliter  hydrOXYzine  Oral Tab/Cap - Peds 25 milliGRAM(s) Oral every 8 hours PRN Anxiety      Vital Signs Last 24 Hrs  T(C): 36.3 (26 Jul 2019 05:00), Max: 36.9 (25 Jul 2019 14:13)  T(F): 97.3 (26 Jul 2019 05:00), Max: 98.4 (25 Jul 2019 14:13)  HR: 66 (26 Jul 2019 05:00) (66 - 79)  BP: 99/60 (26 Jul 2019 05:00) (99/60 - 152/75)  BP(mean): --  RR: 18 (26 Jul 2019 05:00) (18 - 187)  SpO2: --    PHYSICAL EXAM:  CONSTITUTIONAL: NAD, well-developed.  HEAD:  Atraumatic, Normocephalic.  EYES: EOMI, conjunctiva and sclera clear.  NERVOUS SYSTEM:  Alert & Oriented X3, Good concentration; No limb weakness or numbness.  RESPIRATORY: Bilateral air entry. No wheezes or rales.  CARDIOVASCULAR: Regular rate and rhythm;   EXTREMITIES: No clubbing, cyanosis, or edema.

## 2019-07-26 NOTE — PROGRESS NOTE ADULT - ATTENDING COMMENTS
Pt seen and examined  cr imporved  stable near baseline  above note reviwed  discussed w/ fellow     Family deciding regarding pacemaker    noelle lsign off please recall w/ any questions/concerns

## 2019-07-26 NOTE — PROGRESS NOTE ADULT - ASSESSMENT
Assessment:  2:1 AVB on admission. QRS normal duration (98) and sinus rate of 81 BPM.  Site of AVB cannnot be determined with certainty. Supra Hisian vs Intra Hisian.  Mild exercise was performed. Sinus rate increased from baseline of 80 BPM to maximum of 103 BPM, during exercise patient developed Mobitz II block.   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 QRS normal duration (98) and sinus rate of 81 BPM.  Site of AVB cannnot be determined with certainty. Supra Hisian vs Intra Hisian.  Mild exercise was performed. Sinus rate increased from baseline of 80 BPM to maximum of 103 BPM, during exercise patient developed Mobitz II block.   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 Norowod Assessment:  2:1 AVB on admission. QRS normal duration (98) and sinus rate of 81 BPM.  Site of AVB cannnot be determined with certainty. Supra Hisian vs Intra Hisian.  Mild exercise was performed. Sinus rate increased from baseline of 80 BPM to maximum of 103 BPM, during exercise patient developed Mobitz II block.   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 Norwood

## 2019-07-26 NOTE — PROGRESS NOTE ADULT - SUBJECTIVE AND OBJECTIVE BOX
Nephrology progress note    Patient is seen and examined, events over the last 24 h noted .  no new complaints.    Allergies:  honeydew (Unknown)  latex (Unknown)  penicillins (Unknown)  pickles (Unknown)  shellfish (Unknown)  Sulfacetamide Sodium-Sulfur (Rash)    Hospital Medications:   MEDICATIONS  (STANDING):  amLODIPine   Tablet 10 milliGRAM(s) Oral daily  aspirin enteric coated 162 milliGRAM(s) Oral daily  atorvastatin 80 milliGRAM(s) Oral at bedtime  chlorhexidine 4% Liquid 1 Application(s) Topical <User Schedule>  docusate sodium 100 milliGRAM(s) Oral daily  heparin  Injectable 5000 Unit(s) SubCutaneous every 8 hours  insulin glargine Injectable (LANTUS) 8 Unit(s) SubCutaneous at bedtime  insulin lispro (HumaLOG) corrective regimen sliding scale   SubCutaneous three times a day before meals  insulin lispro Injectable (HumaLOG) 4 Unit(s) SubCutaneous three times a day before meals  melatonin 3 milliGRAM(s) Oral at bedtime  nortriptyline 25 milliGRAM(s) Oral at bedtime  pantoprazole    Tablet 40 milliGRAM(s) Oral before breakfast  senna 2 Tablet(s) Oral at bedtime  sertraline 100 milliGRAM(s) Oral daily        VITALS:  T(F): 97.3 (19 @ 05:00), Max: 98.4 (19 @ 14:13)  HR: 66 (19 @ 05:00)  BP: 99/60 (19 @ 05:00)  RR: 18 (19 @ 05:00)       @ :  -   @ 07:00  --------------------------------------------------------  IN: 1270 mL / OUT: 1350 mL / NET: -80 mL     @ 07:  -   @ 07:00  --------------------------------------------------------  IN: 1000 mL / OUT: 0 mL / NET: 1000 mL     @ 07:01  -   @ 13:19  --------------------------------------------------------  IN: 210 mL / OUT: 0 mL / NET: 210 mL          PHYSICAL EXAM:  Constitutional: NAD  Respiratory: CTAB, no wheezes, rales or rhonchi  Cardiovascular: S1, S2, RRR  Gastrointestinal: BS+, soft, NT/ND  Extremities: No peripheral edema  :  No brannon.       LABS:      145  |  106  |  34<H>  ----------------------------<  104<H>  4.2   |  26  |  2.2<H>    Creatinine Trend: 2.2<--, 2.7<--, 3.2<--, 3.9<--, 4.3<--, 1.4<--    Ca    8.8      2019 06:58  Phos  4.8       Mg     1.8         TPro  5.9<L>  /  Alb  3.4<L>  /  TBili  0.3  /  DBili      /  AST  19  /  ALT  18  /  AlkPhos  100                            8.5    6.00  )-----------( 248      ( 2019 06:58 )             26.0       Urine Studies:  Urinalysis Basic - ( 2019 16:00 )    Color: Yellow / Appearance: Cloudy / S.015 / pH:   Gluc:  / Ketone: 15  / Bili: Negative / Urobili: 0.2 mg/dL   Blood:  / Protein: 100 mg/dL / Nitrite: Negative   Leuk Esterase: Moderate / RBC: 6-10 /HPF / WBC 6-10 /HPF   Sq Epi:  / Non Sq Epi:  / Bacteria: Moderate /HPF      Creatinine, Random Urine: 56 mg/dL ( @ 16:00)  Protein/Creatinine Ratio Calculation: 1.6 Ratio ( @ 16:00)  Chloride, Random Urine: 50 ( @ 16:00)  Sodium, Random Urine: 68.0 mmoL/L ( @ 16:00)  Sodium, Random Urine: 68.0 mmoL/L ( @ 16:00)  Creatinine, Random Urine: 56 mg/dL ( @ 16:00)    RADIOLOGY & ADDITIONAL STUDIES:

## 2019-07-26 NOTE — PROGRESS NOTE ADULT - SUBJECTIVE AND OBJECTIVE BOX
Patient was seen and examined. Spoke with RN. Chart reviewed.  No events overnight.  Vital Signs Last 24 Hrs  T(F): 97.3 (26 Jul 2019 05:00), Max: 98.4 (25 Jul 2019 14:13)  HR: 66 (26 Jul 2019 05:00) (66 - 79)  BP: 99/60 (26 Jul 2019 05:00) (99/60 - 152/75)  SpO2: --  MEDICATIONS  (STANDING):  amLODIPine   Tablet 10 milliGRAM(s) Oral daily  aspirin enteric coated 162 milliGRAM(s) Oral daily  atorvastatin 80 milliGRAM(s) Oral at bedtime  chlorhexidine 4% Liquid 1 Application(s) Topical <User Schedule>  dextrose 5%. 1000 milliLiter(s) (50 mL/Hr) IV Continuous <Continuous>  dextrose 50% Injectable 12.5 Gram(s) IV Push once  dextrose 50% Injectable 25 Gram(s) IV Push once  dextrose 50% Injectable 25 Gram(s) IV Push once  docusate sodium 100 milliGRAM(s) Oral daily  heparin  Injectable 5000 Unit(s) SubCutaneous every 8 hours  insulin glargine Injectable (LANTUS) 8 Unit(s) SubCutaneous at bedtime  insulin lispro (HumaLOG) corrective regimen sliding scale   SubCutaneous three times a day before meals  insulin lispro Injectable (HumaLOG) 4 Unit(s) SubCutaneous three times a day before meals  lactated ringers. 1000 milliLiter(s) (50 mL/Hr) IV Continuous <Continuous>  melatonin 3 milliGRAM(s) Oral at bedtime  nortriptyline 25 milliGRAM(s) Oral at bedtime  pantoprazole    Tablet 40 milliGRAM(s) Oral before breakfast  senna 2 Tablet(s) Oral at bedtime  sertraline 100 milliGRAM(s) Oral daily    MEDICATIONS  (PRN):  acetaminophen   Tablet .. 650 milliGRAM(s) Oral every 6 hours PRN Mild Pain (1 - 3), Moderate Pain (4 - 6)  clonazePAM  Tablet 0.25 milliGRAM(s) Oral daily PRN agitation  dextrose 40% Gel 15 Gram(s) Oral once PRN Blood Glucose LESS THAN 70 milliGRAM(s)/deciliter  glucagon  Injectable 1 milliGRAM(s) IntraMuscular once PRN Glucose LESS THAN 70 milligrams/deciliter  hydrOXYzine  Oral Tab/Cap - Peds 25 milliGRAM(s) Oral every 8 hours PRN Anxiety    Labs:                        8.5    6.00  )-----------( 248      ( 26 Jul 2019 06:58 )             26.0                         8.6    7.05  )-----------( 241      ( 25 Jul 2019 07:24 )             26.0     26 Jul 2019 06:58    145    |  106    |  34     ----------------------------<  104    4.2     |  26     |  2.2    25 Jul 2019 07:24    146    |  104    |  36     ----------------------------<  128    3.8     |  28     |  2.7      Ca    8.8        26 Jul 2019 06:58  Ca    8.9        25 Jul 2019 07:24  Phos  4.8       25 Jul 2019 07:24  Mg     1.8       26 Jul 2019 06:58  Mg     1.8       25 Jul 2019 07:24    TPro  5.9    /  Alb  3.4    /  TBili  0.3    /  DBili  x      /  AST  19     /  ALT  18     /  AlkPhos  100    25 Jul 2019 07:24          General: comfortable, NAD  Neurology: A&Ox3, nonfocal  Head:  Normocephalic, atraumatic  ENT:  Mucosa moist, no ulcerations  Neck:  Supple, no JVD,   Skin: no breakdowns (as per RN)  Resp: CTA B/L  CV: RRR, S1S2,   GI: Soft, NT, bowel sounds  MS: No edema, + peripheral pulses, FROM all 4 extremity      A/P:  78 yo female with a PMH of DM, HTN, HLD, CVA, hearing loss, and breast CA presents with epigastric abdominal pain- had multiple normal CT scans, and negative workup; noted with JABARI- liklley due to contrast on recent CT    [place], and again admitted here to hospitalist    IV fluids as per renal    Cr trending down    OOB    ambulate    outpt GI f/u    DC when cleared by renal- hopefully today    DVT prophylaxis  Decubitus prevention- all measures as per RN protocol  Please call or text me with any questions or updates Patient was seen and examined. Spoke with RN and housestaff. Chart reviewed.  No events overnight.  Vital Signs Last 24 Hrs  T(F): 97.3 (26 Jul 2019 05:00), Max: 98.4 (25 Jul 2019 14:13)  HR: 66 (26 Jul 2019 05:00) (66 - 79)  BP: 99/60 (26 Jul 2019 05:00) (99/60 - 152/75)  SpO2: --  MEDICATIONS  (STANDING):  amLODIPine   Tablet 10 milliGRAM(s) Oral daily  aspirin enteric coated 162 milliGRAM(s) Oral daily  atorvastatin 80 milliGRAM(s) Oral at bedtime  chlorhexidine 4% Liquid 1 Application(s) Topical <User Schedule>  dextrose 5%. 1000 milliLiter(s) (50 mL/Hr) IV Continuous <Continuous>  dextrose 50% Injectable 12.5 Gram(s) IV Push once  dextrose 50% Injectable 25 Gram(s) IV Push once  dextrose 50% Injectable 25 Gram(s) IV Push once  docusate sodium 100 milliGRAM(s) Oral daily  heparin  Injectable 5000 Unit(s) SubCutaneous every 8 hours  insulin glargine Injectable (LANTUS) 8 Unit(s) SubCutaneous at bedtime  insulin lispro (HumaLOG) corrective regimen sliding scale   SubCutaneous three times a day before meals  insulin lispro Injectable (HumaLOG) 4 Unit(s) SubCutaneous three times a day before meals  lactated ringers. 1000 milliLiter(s) (50 mL/Hr) IV Continuous <Continuous>  melatonin 3 milliGRAM(s) Oral at bedtime  nortriptyline 25 milliGRAM(s) Oral at bedtime  pantoprazole    Tablet 40 milliGRAM(s) Oral before breakfast  senna 2 Tablet(s) Oral at bedtime  sertraline 100 milliGRAM(s) Oral daily    MEDICATIONS  (PRN):  acetaminophen   Tablet .. 650 milliGRAM(s) Oral every 6 hours PRN Mild Pain (1 - 3), Moderate Pain (4 - 6)  clonazePAM  Tablet 0.25 milliGRAM(s) Oral daily PRN agitation  dextrose 40% Gel 15 Gram(s) Oral once PRN Blood Glucose LESS THAN 70 milliGRAM(s)/deciliter  glucagon  Injectable 1 milliGRAM(s) IntraMuscular once PRN Glucose LESS THAN 70 milligrams/deciliter  hydrOXYzine  Oral Tab/Cap - Peds 25 milliGRAM(s) Oral every 8 hours PRN Anxiety    Labs:                        8.5    6.00  )-----------( 248      ( 26 Jul 2019 06:58 )             26.0                         8.6    7.05  )-----------( 241      ( 25 Jul 2019 07:24 )             26.0     26 Jul 2019 06:58    145    |  106    |  34     ----------------------------<  104    4.2     |  26     |  2.2    25 Jul 2019 07:24    146    |  104    |  36     ----------------------------<  128    3.8     |  28     |  2.7      Ca    8.8        26 Jul 2019 06:58  Ca    8.9        25 Jul 2019 07:24  Phos  4.8       25 Jul 2019 07:24  Mg     1.8       26 Jul 2019 06:58  Mg     1.8       25 Jul 2019 07:24    TPro  5.9    /  Alb  3.4    /  TBili  0.3    /  DBili  x      /  AST  19     /  ALT  18     /  AlkPhos  100    25 Jul 2019 07:24          General: comfortable, NAD  Neurology: A&Ox3, nonfocal  Head:  Normocephalic, atraumatic  ENT:  Mucosa moist, no ulcerations  Neck:  Supple, no JVD,   Skin: no breakdowns (as per RN)  Resp: CTA B/L  CV: RRR, S1S2,   GI: Soft, NT, bowel sounds  MS: No edema, + peripheral pulses, FROM all 4 extremity      A/P:  80 yo female with a PMH of DM, HTN, HLD, CVA, hearing loss, and breast CA presents with epigastric abdominal pain- had multiple normal CT scans, and negative workup; noted with JABARI- liklley due to contrast on recent CT; noted with arrythmia on tele    [place], and again admitted here to hospitalist    IV fluids as per renal    Cr trending down    OOB    ambulate    outpt GI f/u    tele    EP f/u ?need for PPM    DC when cleared by EP- hopefully today    DVT prophylaxis  Decubitus prevention- all measures as per RN protocol  Please call or text me with any questions or updates

## 2019-07-27 LAB
GLUCOSE BLDC GLUCOMTR-MCNC: 127 MG/DL — HIGH (ref 70–99)
GLUCOSE BLDC GLUCOMTR-MCNC: 167 MG/DL — HIGH (ref 70–99)
GLUCOSE BLDC GLUCOMTR-MCNC: 273 MG/DL — HIGH (ref 70–99)
GLUCOSE BLDC GLUCOMTR-MCNC: 92 MG/DL — SIGNIFICANT CHANGE UP (ref 70–99)

## 2019-07-27 RX ADMIN — SERTRALINE 100 MILLIGRAM(S): 25 TABLET, FILM COATED ORAL at 12:33

## 2019-07-27 RX ADMIN — Medication 162 MILLIGRAM(S): at 12:33

## 2019-07-27 RX ADMIN — CHLORHEXIDINE GLUCONATE 1 APPLICATION(S): 213 SOLUTION TOPICAL at 05:23

## 2019-07-27 RX ADMIN — NORTRIPTYLINE HYDROCHLORIDE 25 MILLIGRAM(S): 10 CAPSULE ORAL at 22:02

## 2019-07-27 RX ADMIN — SENNA PLUS 2 TABLET(S): 8.6 TABLET ORAL at 22:02

## 2019-07-27 RX ADMIN — ATORVASTATIN CALCIUM 80 MILLIGRAM(S): 80 TABLET, FILM COATED ORAL at 22:02

## 2019-07-27 RX ADMIN — AMLODIPINE BESYLATE 10 MILLIGRAM(S): 2.5 TABLET ORAL at 05:23

## 2019-07-27 RX ADMIN — Medication 100 MILLIGRAM(S): at 12:33

## 2019-07-27 RX ADMIN — HEPARIN SODIUM 5000 UNIT(S): 5000 INJECTION INTRAVENOUS; SUBCUTANEOUS at 22:03

## 2019-07-27 RX ADMIN — Medication 4 UNIT(S): at 17:13

## 2019-07-27 RX ADMIN — Medication 4 UNIT(S): at 12:32

## 2019-07-27 RX ADMIN — INSULIN GLARGINE 8 UNIT(S): 100 INJECTION, SOLUTION SUBCUTANEOUS at 22:03

## 2019-07-27 RX ADMIN — Medication 1: at 17:13

## 2019-07-27 RX ADMIN — PANTOPRAZOLE SODIUM 40 MILLIGRAM(S): 20 TABLET, DELAYED RELEASE ORAL at 07:06

## 2019-07-27 RX ADMIN — HEPARIN SODIUM 5000 UNIT(S): 5000 INJECTION INTRAVENOUS; SUBCUTANEOUS at 13:30

## 2019-07-27 RX ADMIN — HEPARIN SODIUM 5000 UNIT(S): 5000 INJECTION INTRAVENOUS; SUBCUTANEOUS at 05:23

## 2019-07-27 NOTE — PROGRESS NOTE ADULT - SUBJECTIVE AND OBJECTIVE BOX
Patient was seen and examined. Spoke with RN. Chart reviewed.    No events overnight.  Vital Signs Last 24 Hrs  T(F): 97.8 (27 Jul 2019 05:08), Max: 98.5 (26 Jul 2019 14:00)  HR: 71 (27 Jul 2019 05:08) (71 - 94)  BP: 148/71 (27 Jul 2019 05:08) (136/62 - 148/71)  SpO2: 99% (26 Jul 2019 18:37) (99% - 99%)  MEDICATIONS  (STANDING):  amLODIPine   Tablet 10 milliGRAM(s) Oral daily  aspirin enteric coated 162 milliGRAM(s) Oral daily  atorvastatin 80 milliGRAM(s) Oral at bedtime  chlorhexidine 4% Liquid 1 Application(s) Topical <User Schedule>  dextrose 5%. 1000 milliLiter(s) (50 mL/Hr) IV Continuous <Continuous>  dextrose 50% Injectable 12.5 Gram(s) IV Push once  dextrose 50% Injectable 25 Gram(s) IV Push once  dextrose 50% Injectable 25 Gram(s) IV Push once  docusate sodium 100 milliGRAM(s) Oral daily  heparin  Injectable 5000 Unit(s) SubCutaneous every 8 hours  insulin glargine Injectable (LANTUS) 8 Unit(s) SubCutaneous at bedtime  insulin lispro (HumaLOG) corrective regimen sliding scale   SubCutaneous three times a day before meals  insulin lispro Injectable (HumaLOG) 4 Unit(s) SubCutaneous three times a day before meals  melatonin 3 milliGRAM(s) Oral at bedtime  nortriptyline 25 milliGRAM(s) Oral at bedtime  pantoprazole    Tablet 40 milliGRAM(s) Oral before breakfast  senna 2 Tablet(s) Oral at bedtime  sertraline 100 milliGRAM(s) Oral daily    MEDICATIONS  (PRN):  acetaminophen   Tablet .. 650 milliGRAM(s) Oral every 6 hours PRN Mild Pain (1 - 3), Moderate Pain (4 - 6)  clonazePAM  Tablet 0.25 milliGRAM(s) Oral daily PRN agitation  dextrose 40% Gel 15 Gram(s) Oral once PRN Blood Glucose LESS THAN 70 milliGRAM(s)/deciliter  glucagon  Injectable 1 milliGRAM(s) IntraMuscular once PRN Glucose LESS THAN 70 milligrams/deciliter  hydrOXYzine  Oral Tab/Cap - Peds 25 milliGRAM(s) Oral every 8 hours PRN Anxiety    Labs:                        8.5    6.00  )-----------( 248      ( 26 Jul 2019 06:58 )             26.0     26 Jul 2019 06:58    145    |  106    |  34     ----------------------------<  104    4.2     |  26     |  2.2      Ca    8.8        26 Jul 2019 06:58  Mg     1.8       26 Jul 2019 06:58              Radiology:    General: comfortable, NAD  Neurology: A&Ox3, nonfocal  Head:  Normocephalic, atraumatic  ENT:  Mucosa moist, no ulcerations  Neck:  Supple, no JVD,   Skin: no breakdown  Resp: CTA B/L  CV: RRR, S1S2,   GI: Soft, NT, bowel sounds  MS: No edema, + peripheral pulses, FROM all 4 extremity

## 2019-07-27 NOTE — PROGRESS NOTE ADULT - SUBJECTIVE AND OBJECTIVE BOX
JAYASHREE FAGAN  [facility]      Patient is a 79y old  Female who presents with a chief complaint of abdominal pain (26 Jul 2019 13:19)      HPI:  78 yo female with a PMH of DM, HTN, HLD, CVA, hearing loss, and breast CA presents with epigastric abdominal pain that started this morning.   Sharp pain with no radiation, constant since onset.   She has experienced persistent nausea with no vomiting and denies any bowel changes.   She mention to have had the same symptoms in May; had work up done which came as negative.  She denies any other symptoms including fever, chills, urinary/bowel changes, recent illness/travel, chest pain, or SOB.    In the ED, 141/65, HR 50  EKG 2:1 av block , potassium 5.4  she recieved 1 ltr LR  dextrose and insulin given (23 Jul 2019 02:47)    Interval Events:  Patient seen and examined at bedside. No acute events overnight. Patient currently has no complaints. Denies any CP/SOB/palpitations/n/v/d/fever/chills.    -PMHx: Obese [Inactive]  CVA (cerebral vascular accident) [Active]  Bilateral hearing loss, unspecified hearing loss type [Active]  High cholesterol [Active]  Depression [Active]  BP (high blood pressure) [Active]  Breast CA [Active]  DM (diabetes mellitus) [Active]    -PSHx:History of cholecystectomy  History of lumpectomy of right breast  H/O total knee replacement, right    -FAMILY HISTORY:    REVIEW OF SYSTEMS:  CONSTITUTIONAL: No fever, weight loss, or fatigue  RESPIRATORY: No cough, wheezing, chills or hemoptysis; No shortness of breath  CARDIOVASCULAR: No chest pain, palpitations, dizziness, or leg swelling  GASTROINTESTINAL: No abdominal or epigastric pain. No nausea, vomiting, or hematemesis; No diarrhea or constipation. No melena or hematochezia.  NEUROLOGICAL: No headaches  LYMPH NODES: No enlarged glands  MUSCULOSKELETAL: No joint pain or swelling; No muscle, back, or extremity pain      T(C): , Max: 36.9 (07-26-19 @ 14:00)  HR: 71 (07-27-19 @ 05:08)  BP: 148/71 (07-27-19 @ 05:08)  RR: 17 (07-27-19 @ 05:08)  SpO2: 99% (07-26-19 @ 18:37)  CAPILLARY BLOOD GLUCOSE      POCT Blood Glucose.: 273 mg/dL (27 Jul 2019 11:26)  POCT Blood Glucose.: 92 mg/dL (27 Jul 2019 07:48)  POCT Blood Glucose.: 233 mg/dL (26 Jul 2019 21:43)  POCT Blood Glucose.: 152 mg/dL (26 Jul 2019 16:51)      GEN: No acute distress, on room air.  LUNGS: Clear to auscultation bilaterally, no crackles  HEART: S1/S2 present. RRR.   ABD: Soft, non-tender, non-distended. Bowel sounds present  EXT: Skin Intact, no edema   NEURO: AAOX3, forgetful, left sided weakness.     LABS:              Microbiology:    Culture - Urine (collected 07-23-19 @ 00:45)  Source: .Urine Clean Catch (Midstream)  Final Report (07-25-19 @ 23:14):    >100,000 CFU/ml Enterococcus faecalis  Organism: Enterococcus faecalis (07-25-19 @ 23:14)  Organism: Enterococcus faecalis (07-25-19 @ 23:14)      -  Ampicillin: S <=2 Predicts results to ampicillin/sulbactam, amoxacillin-clavulanate and  piperacillin-tazobactam.      -  Ciprofloxacin: R >2      -  Levofloxacin: R >4      -  Nitrofurantoin: S <=32 Should not be used to treat pyelonephritis.      -  Tetra/Doxy: R >8      -  Vancomycin: S 2      Method Type: FARHAD        RADIOLOGY & ADDITIONAL TESTS:        Medications:  acetaminophen   Tablet .. 650 milliGRAM(s) Oral every 6 hours PRN  amLODIPine   Tablet 10 milliGRAM(s) Oral daily  aspirin enteric coated 162 milliGRAM(s) Oral daily  atorvastatin 80 milliGRAM(s) Oral at bedtime  chlorhexidine 4% Liquid 1 Application(s) Topical <User Schedule>  clonazePAM  Tablet 0.25 milliGRAM(s) Oral daily PRN  dextrose 40% Gel 15 Gram(s) Oral once PRN  dextrose 5%. 1000 milliLiter(s) IV Continuous <Continuous>  dextrose 50% Injectable 12.5 Gram(s) IV Push once  dextrose 50% Injectable 25 Gram(s) IV Push once  dextrose 50% Injectable 25 Gram(s) IV Push once  docusate sodium 100 milliGRAM(s) Oral daily  glucagon  Injectable 1 milliGRAM(s) IntraMuscular once PRN  heparin  Injectable 5000 Unit(s) SubCutaneous every 8 hours  hydrOXYzine  Oral Tab/Cap - Peds 25 milliGRAM(s) Oral every 8 hours PRN  insulin glargine Injectable (LANTUS) 8 Unit(s) SubCutaneous at bedtime  insulin lispro (HumaLOG) corrective regimen sliding scale   SubCutaneous three times a day before meals  insulin lispro Injectable (HumaLOG) 4 Unit(s) SubCutaneous three times a day before meals  melatonin 3 milliGRAM(s) Oral at bedtime  nortriptyline 25 milliGRAM(s) Oral at bedtime  pantoprazole    Tablet 40 milliGRAM(s) Oral before breakfast  senna 2 Tablet(s) Oral at bedtime  sertraline 100 milliGRAM(s) Oral daily

## 2019-07-27 NOTE — PROGRESS NOTE ADULT - ASSESSMENT
80 yo female with a PMH of DM, HTN, HLD, CVA (6/2018) with residual left sided weakness, hearing loss, and breast CA presents on 7/23 with epigastric abdominal pain.    # Epigastric pain with nausea, resolved  - multiple admission in April and July for the same complaint.  - normal LFTs and lipase  - US of abdomen: s/p cholecystectomy no CBD dilatation  - CT of abdomen without contrast: Splenic hypodensities better evaluated on prior examination + redemonstrated scattered punctate calcifications within the pancreas likely reflecting chronic prior hepatitis  - Patient with known intolerance to cheese, and was planned to undergo EGD with Dr. Wallace but patient was refusing and feeling anxious about it. She has an appointment for EGD on 8/2.    # JABARI on top of CKD stage 3b  - serum creatinine 4.3 on admission, baseline around 1.4 on 7/16, trending down 2.2 yesterday  - Kidney US no obstruction or hydronephrosis, small kidneys.   - non-oliguric, improving, proteinuria 1.6 g/g   - FENa: 3.3% consistent with ATN  - Possibly CONNIE after exposure to contrast on 7/15    # History of Ischemic CVA in June 2018  - MRI brain which showed acute ischemic change in the lower left medulla, s/p IV TPA  - Discharged on aspirin 162 mg daily and plavix 75 mg for 3 months only.  - CTA of the neck in June 2018 that showed 60-70% left ICA stenosis and carotid duplex in December 2018 that showed >70% left CHINYERE. Evaluated by Dr. Burch who recommended  TCAR (transcarotid artery revascularization), however patient was also reluctant to have it.   - Contacted Dr. Macias, no indication to be on Plavix will stop Plavix and resume the dose of aspirin recommended by neurology 162 mg daily  - Evaluated by PT/rehab, patient will benefit from short term rehab.     # Mobitz II heart block on admission  - 2:1 heart block with bradycardia 40s on admission  - HR now improved, sinus rhythm, no block  - no chest pain or palpitation, three sets of troponin were negative  - evaluated by Dr. Reid, likely secondary to underlying hyperkalemia and renal dysfunction, recommend to treat underlying cause  - TTE: Left ventricular ejection fraction, by visual estimation, is 50 to 55%. Normal global left ventricular systolic function.  - Evaluated by Dr. Bekhit, second degree AV block recurred on exercise suggestive of intra-Hisian block, thus patient benefits from a pacemaker. Patient would like to discuss with her family first.     # Asymptomatic bacteruria  - Patient afebrile, no leukocytosis.   - Asymptomatic at this time.  - UA was positive, had brannon catheter removed, urine culture with E. Fecalis susceptible to ampicillin (patient is penicillin allergic).    # HTN:   - lisinopril held d/t JABARI  - Avoiding aditya blocking agents given AV block on presentation  - c/w amlodipine 10    # DM  - on lispro and lantus.    # Normocytic anemia  - likely due to chronic kidney disease  - iron sat and TIBC, ferritin are not consistent with iron deficiency anemia.   - Vit B12, and folic acid levels are within normal range.     Dipso: short term rehab, pending family decision regarding pacemaker placement

## 2019-07-28 LAB
APPEARANCE UR: ABNORMAL
BACTERIA # UR AUTO: ABNORMAL
BILIRUB UR-MCNC: NEGATIVE — SIGNIFICANT CHANGE UP
COLOR SPEC: YELLOW — SIGNIFICANT CHANGE UP
DIFF PNL FLD: ABNORMAL
GLUCOSE BLDC GLUCOMTR-MCNC: 111 MG/DL — HIGH (ref 70–99)
GLUCOSE BLDC GLUCOMTR-MCNC: 160 MG/DL — HIGH (ref 70–99)
GLUCOSE BLDC GLUCOMTR-MCNC: 181 MG/DL — HIGH (ref 70–99)
GLUCOSE BLDC GLUCOMTR-MCNC: 200 MG/DL — HIGH (ref 70–99)
GLUCOSE UR QL: NEGATIVE — SIGNIFICANT CHANGE UP
KETONES UR-MCNC: NEGATIVE — SIGNIFICANT CHANGE UP
LEUKOCYTE ESTERASE UR-ACNC: ABNORMAL
NITRITE UR-MCNC: NEGATIVE — SIGNIFICANT CHANGE UP
PH UR: 7 — SIGNIFICANT CHANGE UP (ref 5–8)
PROT UR-MCNC: ABNORMAL
RBC CASTS # UR COMP ASSIST: SIGNIFICANT CHANGE UP /HPF (ref 0–4)
SP GR SPEC: 1.01 — SIGNIFICANT CHANGE UP (ref 1.01–1.02)
UROBILINOGEN FLD QL: SIGNIFICANT CHANGE UP
WBC UR QL: >50 /HPF — HIGH (ref 0–5)

## 2019-07-28 PROCEDURE — 99222 1ST HOSP IP/OBS MODERATE 55: CPT | Mod: 57

## 2019-07-28 PROCEDURE — 99222 1ST HOSP IP/OBS MODERATE 55: CPT

## 2019-07-28 RX ORDER — CIPROFLOXACIN LACTATE 400MG/40ML
500 VIAL (ML) INTRAVENOUS DAILY
Refills: 0 | Status: DISCONTINUED | OUTPATIENT
Start: 2019-07-28 | End: 2019-07-29

## 2019-07-28 RX ORDER — SODIUM CHLORIDE 0.65 %
1 AEROSOL, SPRAY (ML) NASAL ONCE
Refills: 0 | Status: COMPLETED | OUTPATIENT
Start: 2019-07-28 | End: 2019-07-29

## 2019-07-28 RX ORDER — CHLORHEXIDINE GLUCONATE 213 G/1000ML
15 SOLUTION TOPICAL ONCE
Refills: 0 | Status: COMPLETED | OUTPATIENT
Start: 2019-07-28 | End: 2019-07-29

## 2019-07-28 RX ADMIN — HEPARIN SODIUM 5000 UNIT(S): 5000 INJECTION INTRAVENOUS; SUBCUTANEOUS at 14:38

## 2019-07-28 RX ADMIN — NORTRIPTYLINE HYDROCHLORIDE 25 MILLIGRAM(S): 10 CAPSULE ORAL at 22:22

## 2019-07-28 RX ADMIN — Medication 500 MILLIGRAM(S): at 23:49

## 2019-07-28 RX ADMIN — Medication 100 MILLIGRAM(S): at 12:21

## 2019-07-28 RX ADMIN — SERTRALINE 100 MILLIGRAM(S): 25 TABLET, FILM COATED ORAL at 12:21

## 2019-07-28 RX ADMIN — ATORVASTATIN CALCIUM 80 MILLIGRAM(S): 80 TABLET, FILM COATED ORAL at 22:22

## 2019-07-28 RX ADMIN — Medication 1: at 17:23

## 2019-07-28 RX ADMIN — HEPARIN SODIUM 5000 UNIT(S): 5000 INJECTION INTRAVENOUS; SUBCUTANEOUS at 22:22

## 2019-07-28 RX ADMIN — Medication 30 MILLILITER(S): at 23:50

## 2019-07-28 RX ADMIN — Medication 650 MILLIGRAM(S): at 21:00

## 2019-07-28 RX ADMIN — AMLODIPINE BESYLATE 10 MILLIGRAM(S): 2.5 TABLET ORAL at 07:04

## 2019-07-28 RX ADMIN — Medication 162 MILLIGRAM(S): at 12:21

## 2019-07-28 RX ADMIN — CHLORHEXIDINE GLUCONATE 1 APPLICATION(S): 213 SOLUTION TOPICAL at 07:04

## 2019-07-28 RX ADMIN — Medication 650 MILLIGRAM(S): at 20:07

## 2019-07-28 RX ADMIN — Medication 3 MILLIGRAM(S): at 22:21

## 2019-07-28 RX ADMIN — Medication 4 UNIT(S): at 12:18

## 2019-07-28 RX ADMIN — INSULIN GLARGINE 8 UNIT(S): 100 INJECTION, SOLUTION SUBCUTANEOUS at 22:21

## 2019-07-28 RX ADMIN — Medication 1: at 12:18

## 2019-07-28 RX ADMIN — PANTOPRAZOLE SODIUM 40 MILLIGRAM(S): 20 TABLET, DELAYED RELEASE ORAL at 07:04

## 2019-07-28 RX ADMIN — Medication 4 UNIT(S): at 17:22

## 2019-07-28 RX ADMIN — Medication 4 UNIT(S): at 08:03

## 2019-07-28 RX ADMIN — SENNA PLUS 2 TABLET(S): 8.6 TABLET ORAL at 22:22

## 2019-07-28 RX ADMIN — HEPARIN SODIUM 5000 UNIT(S): 5000 INJECTION INTRAVENOUS; SUBCUTANEOUS at 07:04

## 2019-07-28 RX ADMIN — Medication 0.25 MILLIGRAM(S): at 22:24

## 2019-07-28 NOTE — PROGRESS NOTE ADULT - SUBJECTIVE AND OBJECTIVE BOX
Patient was seen and examined. Spoke with RN. Chart reviewed.  No events overnight.  Vital Signs Last 24 Hrs  T(F): 98 (28 Jul 2019 05:14), Max: 98 (27 Jul 2019 21:16)  HR: 75 (28 Jul 2019 05:14) (75 - 93)  BP: 143/63 (28 Jul 2019 05:14) (143/63 - 148/72)  SpO2: 98% (27 Jul 2019 15:11) (98% - 98%)  MEDICATIONS  (STANDING):  amLODIPine   Tablet 10 milliGRAM(s) Oral daily  aspirin enteric coated 162 milliGRAM(s) Oral daily  atorvastatin 80 milliGRAM(s) Oral at bedtime  chlorhexidine 4% Liquid 1 Application(s) Topical <User Schedule>  dextrose 5%. 1000 milliLiter(s) (50 mL/Hr) IV Continuous <Continuous>  dextrose 50% Injectable 12.5 Gram(s) IV Push once  dextrose 50% Injectable 25 Gram(s) IV Push once  dextrose 50% Injectable 25 Gram(s) IV Push once  docusate sodium 100 milliGRAM(s) Oral daily  heparin  Injectable 5000 Unit(s) SubCutaneous every 8 hours  insulin glargine Injectable (LANTUS) 8 Unit(s) SubCutaneous at bedtime  insulin lispro (HumaLOG) corrective regimen sliding scale   SubCutaneous three times a day before meals  insulin lispro Injectable (HumaLOG) 4 Unit(s) SubCutaneous three times a day before meals  melatonin 3 milliGRAM(s) Oral at bedtime  nortriptyline 25 milliGRAM(s) Oral at bedtime  pantoprazole    Tablet 40 milliGRAM(s) Oral before breakfast  senna 2 Tablet(s) Oral at bedtime  sertraline 100 milliGRAM(s) Oral daily    MEDICATIONS  (PRN):  acetaminophen   Tablet .. 650 milliGRAM(s) Oral every 6 hours PRN Mild Pain (1 - 3), Moderate Pain (4 - 6)  clonazePAM  Tablet 0.25 milliGRAM(s) Oral daily PRN agitation  dextrose 40% Gel 15 Gram(s) Oral once PRN Blood Glucose LESS THAN 70 milliGRAM(s)/deciliter  glucagon  Injectable 1 milliGRAM(s) IntraMuscular once PRN Glucose LESS THAN 70 milligrams/deciliter  hydrOXYzine  Oral Tab/Cap - Peds 25 milliGRAM(s) Oral every 8 hours PRN Anxiety    General: comfortable, NAD  Neurology: A&Ox3, nonfocal  Head:  Normocephalic, atraumatic  ENT:  Mucosa moist, no ulcerations  Neck:  Supple, no JVD,   Skin: no breakdowns (as per RN)  Resp: CTA B/L  CV: RRR, S1S2,   GI: Soft, NT, bowel sounds  MS: No edema, + peripheral pulses, FROM all 4 extremity      A/P:  79y female who presented to the hospital with a chief complaint of weakness. Patient has a past medical history of HTN, DM, HLD, and CVA- with resolved abd pain, and resolved JABARI    decision to be made on PPM    EP f/u    if no PPM- DC planning      DVT prophylaxis  Decubitus prevention- all measures as per RN protocol  Please call or text me with any questions or updates

## 2019-07-28 NOTE — PROGRESS NOTE ADULT - SUBJECTIVE AND OBJECTIVE BOX
INTERVAL HPI/OVERNIGHT EVENTS:    Dicussed with patient the decision for PPM. Pt agrees to proceed    MEDICATIONS  (STANDING):  amLODIPine   Tablet 10 milliGRAM(s) Oral daily  aspirin enteric coated 162 milliGRAM(s) Oral daily  atorvastatin 80 milliGRAM(s) Oral at bedtime  chlorhexidine 0.12% Liquid 15 milliLiter(s) Swish and Spit once  chlorhexidine 4% Liquid 1 Application(s) Topical <User Schedule>  dextrose 5%. 1000 milliLiter(s) (50 mL/Hr) IV Continuous <Continuous>  dextrose 50% Injectable 12.5 Gram(s) IV Push once  dextrose 50% Injectable 25 Gram(s) IV Push once  dextrose 50% Injectable 25 Gram(s) IV Push once  docusate sodium 100 milliGRAM(s) Oral daily  heparin  Injectable 5000 Unit(s) SubCutaneous every 8 hours  insulin glargine Injectable (LANTUS) 8 Unit(s) SubCutaneous at bedtime  insulin lispro (HumaLOG) corrective regimen sliding scale   SubCutaneous three times a day before meals  insulin lispro Injectable (HumaLOG) 4 Unit(s) SubCutaneous three times a day before meals  melatonin 3 milliGRAM(s) Oral at bedtime  nortriptyline 25 milliGRAM(s) Oral at bedtime  pantoprazole    Tablet 40 milliGRAM(s) Oral before breakfast  senna 2 Tablet(s) Oral at bedtime  sertraline 100 milliGRAM(s) Oral daily    MEDICATIONS  (PRN):  acetaminophen   Tablet .. 650 milliGRAM(s) Oral every 6 hours PRN Mild Pain (1 - 3), Moderate Pain (4 - 6)  clonazePAM  Tablet 0.25 milliGRAM(s) Oral daily PRN agitation  dextrose 40% Gel 15 Gram(s) Oral once PRN Blood Glucose LESS THAN 70 milliGRAM(s)/deciliter  glucagon  Injectable 1 milliGRAM(s) IntraMuscular once PRN Glucose LESS THAN 70 milligrams/deciliter  hydrOXYzine  Oral Tab/Cap - Peds 25 milliGRAM(s) Oral every 8 hours PRN Anxiety  sodium chloride 0.65% Nasal 1 Spray(s) Both Nostrils once PRN Nasal Congestion      Allergies    honeydew (Unknown)  latex (Unknown)  penicillins (Unknown)  pickles (Unknown)  shellfish (Unknown)  Sulfacetamide Sodium-Sulfur (Rash)    Intolerances        Vital Signs Last 24 Hrs  T(C): 36.2 (28 Jul 2019 15:02), Max: 36.7 (27 Jul 2019 21:16)  T(F): 97.2 (28 Jul 2019 15:02), Max: 98 (27 Jul 2019 21:16)  HR: 820 (28 Jul 2019 15:02) (75 - 820)  BP: 119/71 (28 Jul 2019 15:02) (119/71 - 148/72)  BP(mean): --  RR: 18 (28 Jul 2019 15:02) (18 - 18)  SpO2: --    07-27-19 @ 07:01  -  07-28-19 @ 07:00  --------------------------------------------------------  IN: 680 mL / OUT: 1000 mL / NET: -320 mL    07-28-19 @ 07:01  -  07-28-19 @ 17:10  --------------------------------------------------------  IN: 220 mL / OUT: 300 mL / NET: -80 mL        Physical Exam    GENERAL: In no apparent distress, well nourished, and hydrated.  HEAD:  Atraumatic, Normocephalic  HEART: Regular rate and rhythm; No murmurs, rubs, or gallops.  PULMONARY: Clear to auscultation and perfusion.  No rales, wheezing, or rhonchi bilaterally.  ABDOMEN: Soft, Nontender, Nondistended; Bowel sounds present  EXTREMITIES:  2+ Peripheral Pulses, No clubbing, cyanosis, or edema      LABS:                RADIOLOGY & ADDITIONAL TESTS:

## 2019-07-28 NOTE — CONSULT NOTE ADULT - SUBJECTIVE AND OBJECTIVE BOX
Surgeon: Dr. Ludwig    Consult requesting by: Darian    HISTORY OF PRESENT ILLNESS:  80 yo female with a PMH of DM, HTN, HLD, CVA, hearing loss, and breast CA presented admitted with epigastric abdominal pain. EKG on admission found to have 2:1 block.  EP 2:1 AVB on admission. QRS normal duration (98) and sinus rate of 81 BPM. Site of AVB cannnot be determined with certainty. Supra Hisian vs Intra Hisian. Mild exercise was performed. Sinus rate increased from baseline of 80 BPM to maximum of 103 BPM, during exercise patient developed Mobitz II block. 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); DDD recommended CTS called for procedure    PAST MEDICAL & SURGICAL HISTORY:  CVA (cerebral vascular accident): 6/18 rt weakness  Bilateral hearing loss, unspecified hearing loss type  High cholesterol  Depression  BP (high blood pressure): 20 yr  Breast CA: 2014 s/p rt  DM (diabetes mellitus): 24 yr  History of cholecystectomy: 1992  History of lumpectomy of right breast  H/O total knee replacement, right      MEDICATIONS  (STANDING):  amLODIPine   Tablet 10 milliGRAM(s) Oral daily  aspirin enteric coated 162 milliGRAM(s) Oral daily  atorvastatin 80 milliGRAM(s) Oral at bedtime  chlorhexidine 4% Liquid 1 Application(s) Topical <User Schedule>  dextrose 5%. 1000 milliLiter(s) (50 mL/Hr) IV Continuous <Continuous>  dextrose 50% Injectable 12.5 Gram(s) IV Push once  dextrose 50% Injectable 25 Gram(s) IV Push once  dextrose 50% Injectable 25 Gram(s) IV Push once  docusate sodium 100 milliGRAM(s) Oral daily  heparin  Injectable 5000 Unit(s) SubCutaneous every 8 hours  insulin glargine Injectable (LANTUS) 8 Unit(s) SubCutaneous at bedtime  insulin lispro (HumaLOG) corrective regimen sliding scale   SubCutaneous three times a day before meals  insulin lispro Injectable (HumaLOG) 4 Unit(s) SubCutaneous three times a day before meals  melatonin 3 milliGRAM(s) Oral at bedtime  nortriptyline 25 milliGRAM(s) Oral at bedtime  pantoprazole    Tablet 40 milliGRAM(s) Oral before breakfast  senna 2 Tablet(s) Oral at bedtime  sertraline 100 milliGRAM(s) Oral daily    MEDICATIONS  (PRN):  acetaminophen   Tablet .. 650 milliGRAM(s) Oral every 6 hours PRN Mild Pain (1 - 3), Moderate Pain (4 - 6)  clonazePAM  Tablet 0.25 milliGRAM(s) Oral daily PRN agitation  dextrose 40% Gel 15 Gram(s) Oral once PRN Blood Glucose LESS THAN 70 milliGRAM(s)/deciliter  glucagon  Injectable 1 milliGRAM(s) IntraMuscular once PRN Glucose LESS THAN 70 milligrams/deciliter  hydrOXYzine  Oral Tab/Cap - Peds 25 milliGRAM(s) Oral every 8 hours PRN Anxiety    Antiplatelet therapy:                           Last dose/amt:    Allergies    honeydew (Unknown)  latex (Unknown)  penicillins (Unknown)  pickles (Unknown)  shellfish (Unknown)  Sulfacetamide Sodium-Sulfur (Rash)    Intolerances        SOCIAL HISTORY:  no etoh, tabacco or drug abuse  Occupation: retired    FAMILY HISTORY: non contributory        Review of Systems  as reviewed as per HPI and PMH                                                                                                                                                                                               PHYSICAL EXAM  Vital Signs Last 24 Hrs  T(C): 36.7 (28 Jul 2019 05:14), Max: 36.7 (27 Jul 2019 21:16)  T(F): 98 (28 Jul 2019 05:14), Max: 98 (27 Jul 2019 21:16)  HR: 75 (28 Jul 2019 05:14) (75 - 93)  BP: 143/63 (28 Jul 2019 05:14) (143/63 - 148/72)  RR: 18 (28 Jul 2019 05:14) (18 - 18)  SpO2: 98% (27 Jul 2019 15:11) (98% - 98%)  Right arm bp: Left arm bp;    CONSTITUTIONAL:    well nourished, well developed, overweight female in NAD                                                                       Neuro: fairly oriented to person/place & time with no focal motor or sensory  deficits     Eyes: PERRLA, EOMI, no nystagmus, sclera anicteric  ENT:  nasal/oral mucosa with absence of cyanosis, fair dentition  Neck: no jugular vein distention, trachea midline, no goiter   Chest: bilateral breath sounds with good air movement absence of wheezes, rales, or rhonchi                                                                           CV:  RSR, S1S2, no significant murmur appreciated  Carotids: No Bruits  GI:  soft, non-tender non-distended, + bowel sounds                                                                                                          Extremities:  no evidence of cyanosis or deformity, no pedal edema Edema                                                          LABS:  Magnesium, Serum (07.26.19 @ 06:58)    Magnesium, Serum: 1.8 mg/dL    Complete Blood Count + Automated Diff (07.26.19 @ 06:58)    WBC Count: 6.00 K/uL    RBC Count: 2.77 M/uL    Hemoglobin: 8.5 g/dL    Hematocrit: 26.0 %    Mean Cell Volume: 93.9 fL    Mean Cell Hemoglobin: 30.7 pg    Mean Cell Hemoglobin Conc: 32.7 g/dL    Red Cell Distrib Width: 13.8 %    Platelet Count - Automated: 248 K/uL      Vitamin B12, Serum (07.24.19 @ 07:19)    Vitamin B12, Serum: 535 pg/mL    Basic Metabolic Panel (07.26.19 @ 06:58)    Sodium, Serum: 145 mmol/L    Potassium, Serum: 4.2 mmol/L    Chloride, Serum: 106 mmol/L    Carbon Dioxide, Serum: 26 mmol/L    Anion Gap, Serum: 13 mmol/L    Blood Urea Nitrogen, Serum: 34 mg/dL    Creatinine, Serum: 2.2 mg/dL    Glucose, Serum: 104 mg/dL    Calcium, Total Serum: 8.8 mg/dL     < from: Transthoracic Echocardiogram (07.23.19 @ 11:58) >   1. Left ventricular ejection fraction, by visual estimation, is 50 to   55%.   2. Normal global left ventricular systolic function.   3. Mild mitral annular calcification.   4. Sclerotic aortic valve with normal opening.      Recommendation: (Procedures/Evaluations)  pre-op for PPM insertion    Impression:  Mobitz II block.   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 Ludwig    Consult requesting by: Darian    HISTORY OF PRESENT ILLNESS:  80 yo female with a PMH of DM, HTN, HLD, CVA, hearing loss, and breast CA presented admitted with epigastric abdominal pain. EKG on admission found to have 2:1 block.  EP 2:1 AVB on admission. QRS normal duration (98) and sinus rate of 81 BPM. Site of AVB cannnot be determined with certainty. Supra Hisian vs Intra Hisian. Mild exercise was performed. Sinus rate increased from baseline of 80 BPM to maximum of 103 BPM, during exercise patient developed Mobitz II block. 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); DDD recommended CTS called for procedure    PAST MEDICAL & SURGICAL HISTORY:  CVA (cerebral vascular accident): 6/18 rt weakness  Bilateral hearing loss, unspecified hearing loss type  High cholesterol  Depression  BP (high blood pressure): 20 yr  Breast CA: 2014 s/p rt  DM (diabetes mellitus): 24 yr  History of cholecystectomy: 1992  History of lumpectomy of right breast  H/O total knee replacement, right      MEDICATIONS  (STANDING):  amLODIPine   Tablet 10 milliGRAM(s) Oral daily  aspirin enteric coated 162 milliGRAM(s) Oral daily  atorvastatin 80 milliGRAM(s) Oral at bedtime  chlorhexidine 4% Liquid 1 Application(s) Topical <User Schedule>  dextrose 5%. 1000 milliLiter(s) (50 mL/Hr) IV Continuous <Continuous>  dextrose 50% Injectable 12.5 Gram(s) IV Push once  dextrose 50% Injectable 25 Gram(s) IV Push once  dextrose 50% Injectable 25 Gram(s) IV Push once  docusate sodium 100 milliGRAM(s) Oral daily  heparin  Injectable 5000 Unit(s) SubCutaneous every 8 hours  insulin glargine Injectable (LANTUS) 8 Unit(s) SubCutaneous at bedtime  insulin lispro (HumaLOG) corrective regimen sliding scale   SubCutaneous three times a day before meals  insulin lispro Injectable (HumaLOG) 4 Unit(s) SubCutaneous three times a day before meals  melatonin 3 milliGRAM(s) Oral at bedtime  nortriptyline 25 milliGRAM(s) Oral at bedtime  pantoprazole    Tablet 40 milliGRAM(s) Oral before breakfast  senna 2 Tablet(s) Oral at bedtime  sertraline 100 milliGRAM(s) Oral daily    MEDICATIONS  (PRN):  acetaminophen   Tablet .. 650 milliGRAM(s) Oral every 6 hours PRN Mild Pain (1 - 3), Moderate Pain (4 - 6)  clonazePAM  Tablet 0.25 milliGRAM(s) Oral daily PRN agitation  dextrose 40% Gel 15 Gram(s) Oral once PRN Blood Glucose LESS THAN 70 milliGRAM(s)/deciliter  glucagon  Injectable 1 milliGRAM(s) IntraMuscular once PRN Glucose LESS THAN 70 milligrams/deciliter  hydrOXYzine  Oral Tab/Cap - Peds 25 milliGRAM(s) Oral every 8 hours PRN Anxiety    Antiplatelet therapy:                           Last dose/amt:    Allergies    honeydew (Unknown)  latex (Unknown)  penicillins (Unknown)  pickles (Unknown)  shellfish (Unknown)  Sulfacetamide Sodium-Sulfur (Rash)    Intolerances        SOCIAL HISTORY:  no etoh, tabacco or drug abuse  Occupation: retired    FAMILY HISTORY: non contributory        Review of Systems  as reviewed as per HPI and PMH                                                                                                                                                                                               PHYSICAL EXAM  Vital Signs Last 24 Hrs  T(C): 36.7 (28 Jul 2019 05:14), Max: 36.7 (27 Jul 2019 21:16)  T(F): 98 (28 Jul 2019 05:14), Max: 98 (27 Jul 2019 21:16)  HR: 75 (28 Jul 2019 05:14) (75 - 93)  BP: 143/63 (28 Jul 2019 05:14) (143/63 - 148/72)  RR: 18 (28 Jul 2019 05:14) (18 - 18)  SpO2: 98% (27 Jul 2019 15:11) (98% - 98%)  Right arm bp: Left arm bp;    CONSTITUTIONAL:    well nourished, well developed, overweight female in NAD                                                                       Neuro: fairly oriented to person/place & time with no focal motor or sensory  deficits, slight weakness on left     Eyes: PERRLA, EOMI, no nystagmus, sclera anicteric  ENT:  nasal/oral mucosa with absence of cyanosis, fair dentition  Neck: no jugular vein distention, trachea midline, no goiter   Chest: bilateral breath sounds with good air movement absence of wheezes, rales, or rhonchi                                                                           CV:  RSR, S1S2, no significant murmur appreciated  Carotids: No Bruits  GI:  soft, slight tenderness non-distended, + bowel sounds                                                                                                          Extremities:  no evidence of cyanosis or deformity, no pedal edema Edema                                                          LABS:  Magnesium, Serum (07.26.19 @ 06:58)    Magnesium, Serum: 1.8 mg/dL    Complete Blood Count + Automated Diff (07.26.19 @ 06:58)    WBC Count: 6.00 K/uL    RBC Count: 2.77 M/uL    Hemoglobin: 8.5 g/dL    Hematocrit: 26.0 %    Mean Cell Volume: 93.9 fL    Mean Cell Hemoglobin: 30.7 pg    Mean Cell Hemoglobin Conc: 32.7 g/dL    Red Cell Distrib Width: 13.8 %    Platelet Count - Automated: 248 K/uL      Vitamin B12, Serum (07.24.19 @ 07:19)    Vitamin B12, Serum: 535 pg/mL    Basic Metabolic Panel (07.26.19 @ 06:58)    Sodium, Serum: 145 mmol/L    Potassium, Serum: 4.2 mmol/L    Chloride, Serum: 106 mmol/L    Carbon Dioxide, Serum: 26 mmol/L    Anion Gap, Serum: 13 mmol/L    Blood Urea Nitrogen, Serum: 34 mg/dL    Creatinine, Serum: 2.2 mg/dL    Glucose, Serum: 104 mg/dL    Calcium, Total Serum: 8.8 mg/dL     < from: Transthoracic Echocardiogram (07.23.19 @ 11:58) >   1. Left ventricular ejection fraction, by visual estimation, is 50 to   55%.   2. Normal global left ventricular systolic function.   3. Mild mitral annular calcification.   4. Sclerotic aortic valve with normal opening.      Recommendation: (Procedures/Evaluations)  pre-op for PPM insertion    Impression:  Mobitz II block.   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

## 2019-07-29 LAB
ALBUMIN SERPL ELPH-MCNC: 3.2 G/DL — LOW (ref 3.5–5.2)
ALP SERPL-CCNC: 107 U/L — SIGNIFICANT CHANGE UP (ref 30–115)
ALT FLD-CCNC: 26 U/L — SIGNIFICANT CHANGE UP (ref 0–41)
ANION GAP SERPL CALC-SCNC: 11 MMOL/L — SIGNIFICANT CHANGE UP (ref 7–14)
APTT BLD: 37.4 SEC — SIGNIFICANT CHANGE UP (ref 27–39.2)
AST SERPL-CCNC: 25 U/L — SIGNIFICANT CHANGE UP (ref 0–41)
BASOPHILS # BLD AUTO: 0.03 K/UL — SIGNIFICANT CHANGE UP (ref 0–0.2)
BASOPHILS NFR BLD AUTO: 0.7 % — SIGNIFICANT CHANGE UP (ref 0–1)
BILIRUB SERPL-MCNC: 0.3 MG/DL — SIGNIFICANT CHANGE UP (ref 0.2–1.2)
BUN SERPL-MCNC: 25 MG/DL — HIGH (ref 10–20)
CALCIUM SERPL-MCNC: 8.9 MG/DL — SIGNIFICANT CHANGE UP (ref 8.5–10.1)
CHLORIDE SERPL-SCNC: 106 MMOL/L — SIGNIFICANT CHANGE UP (ref 98–110)
CO2 SERPL-SCNC: 27 MMOL/L — SIGNIFICANT CHANGE UP (ref 17–32)
CREAT SERPL-MCNC: 1.7 MG/DL — HIGH (ref 0.7–1.5)
EOSINOPHIL # BLD AUTO: 0.21 K/UL — SIGNIFICANT CHANGE UP (ref 0–0.7)
EOSINOPHIL NFR BLD AUTO: 4.7 % — SIGNIFICANT CHANGE UP (ref 0–8)
GLUCOSE BLDC GLUCOMTR-MCNC: 153 MG/DL — HIGH (ref 70–99)
GLUCOSE BLDC GLUCOMTR-MCNC: 156 MG/DL — HIGH (ref 70–99)
GLUCOSE BLDC GLUCOMTR-MCNC: 158 MG/DL — HIGH (ref 70–99)
GLUCOSE BLDC GLUCOMTR-MCNC: 265 MG/DL — HIGH (ref 70–99)
GLUCOSE SERPL-MCNC: 174 MG/DL — HIGH (ref 70–99)
HCT VFR BLD CALC: 23.8 % — LOW (ref 37–47)
HGB BLD-MCNC: 7.7 G/DL — LOW (ref 12–16)
IMM GRANULOCYTES NFR BLD AUTO: 1.6 % — HIGH (ref 0.1–0.3)
INR BLD: 1.16 RATIO — SIGNIFICANT CHANGE UP (ref 0.65–1.3)
LYMPHOCYTES # BLD AUTO: 0.93 K/UL — LOW (ref 1.2–3.4)
LYMPHOCYTES # BLD AUTO: 20.9 % — SIGNIFICANT CHANGE UP (ref 20.5–51.1)
MCHC RBC-ENTMCNC: 30.3 PG — SIGNIFICANT CHANGE UP (ref 27–31)
MCHC RBC-ENTMCNC: 32.4 G/DL — SIGNIFICANT CHANGE UP (ref 32–37)
MCV RBC AUTO: 93.7 FL — SIGNIFICANT CHANGE UP (ref 81–99)
MONOCYTES # BLD AUTO: 0.49 K/UL — SIGNIFICANT CHANGE UP (ref 0.1–0.6)
MONOCYTES NFR BLD AUTO: 11 % — HIGH (ref 1.7–9.3)
NEUTROPHILS # BLD AUTO: 2.71 K/UL — SIGNIFICANT CHANGE UP (ref 1.4–6.5)
NEUTROPHILS NFR BLD AUTO: 61.1 % — SIGNIFICANT CHANGE UP (ref 42.2–75.2)
NRBC # BLD: 0 /100 WBCS — SIGNIFICANT CHANGE UP (ref 0–0)
PLATELET # BLD AUTO: 219 K/UL — SIGNIFICANT CHANGE UP (ref 130–400)
POTASSIUM SERPL-MCNC: 3.9 MMOL/L — SIGNIFICANT CHANGE UP (ref 3.5–5)
POTASSIUM SERPL-SCNC: 3.9 MMOL/L — SIGNIFICANT CHANGE UP (ref 3.5–5)
PROT SERPL-MCNC: 5.9 G/DL — LOW (ref 6–8)
PROTHROM AB SERPL-ACNC: 13.3 SEC — HIGH (ref 9.95–12.87)
RBC # BLD: 2.54 M/UL — LOW (ref 4.2–5.4)
RBC # FLD: 13.9 % — SIGNIFICANT CHANGE UP (ref 11.5–14.5)
SODIUM SERPL-SCNC: 144 MMOL/L — SIGNIFICANT CHANGE UP (ref 135–146)
WBC # BLD: 4.44 K/UL — LOW (ref 4.8–10.8)
WBC # FLD AUTO: 4.44 K/UL — LOW (ref 4.8–10.8)

## 2019-07-29 PROCEDURE — 33208 INSRT HEART PM ATRIAL & VENT: CPT | Mod: KX

## 2019-07-29 PROCEDURE — 71045 X-RAY EXAM CHEST 1 VIEW: CPT | Mod: 26

## 2019-07-29 PROCEDURE — 93286 PERI-PX EVAL PM/LDLS PM IP: CPT | Mod: 26

## 2019-07-29 RX ORDER — ATORVASTATIN CALCIUM 80 MG/1
80 TABLET, FILM COATED ORAL AT BEDTIME
Refills: 0 | Status: DISCONTINUED | OUTPATIENT
Start: 2019-07-29 | End: 2019-07-30

## 2019-07-29 RX ORDER — SODIUM CHLORIDE 9 MG/ML
1000 INJECTION, SOLUTION INTRAVENOUS
Refills: 0 | Status: DISCONTINUED | OUTPATIENT
Start: 2019-07-29 | End: 2019-07-30

## 2019-07-29 RX ORDER — SENNA PLUS 8.6 MG/1
2 TABLET ORAL AT BEDTIME
Refills: 0 | Status: DISCONTINUED | OUTPATIENT
Start: 2019-07-29 | End: 2019-07-30

## 2019-07-29 RX ORDER — DEXTROSE 50 % IN WATER 50 %
25 SYRINGE (ML) INTRAVENOUS ONCE
Refills: 0 | Status: DISCONTINUED | OUTPATIENT
Start: 2019-07-29 | End: 2019-07-30

## 2019-07-29 RX ORDER — ASPIRIN/CALCIUM CARB/MAGNESIUM 324 MG
162 TABLET ORAL DAILY
Refills: 0 | Status: DISCONTINUED | OUTPATIENT
Start: 2019-07-29 | End: 2019-07-30

## 2019-07-29 RX ORDER — NORTRIPTYLINE HYDROCHLORIDE 10 MG/1
25 CAPSULE ORAL AT BEDTIME
Refills: 0 | Status: DISCONTINUED | OUTPATIENT
Start: 2019-07-29 | End: 2019-07-30

## 2019-07-29 RX ORDER — CIPROFLOXACIN LACTATE 400MG/40ML
500 VIAL (ML) INTRAVENOUS DAILY
Refills: 0 | Status: DISCONTINUED | OUTPATIENT
Start: 2019-07-29 | End: 2019-07-30

## 2019-07-29 RX ORDER — DEXTROSE 50 % IN WATER 50 %
15 SYRINGE (ML) INTRAVENOUS ONCE
Refills: 0 | Status: DISCONTINUED | OUTPATIENT
Start: 2019-07-29 | End: 2019-07-30

## 2019-07-29 RX ORDER — ACETAMINOPHEN 500 MG
650 TABLET ORAL EVERY 6 HOURS
Refills: 0 | Status: DISCONTINUED | OUTPATIENT
Start: 2019-07-29 | End: 2019-07-30

## 2019-07-29 RX ORDER — ONDANSETRON 8 MG/1
4 TABLET, FILM COATED ORAL EVERY 4 HOURS
Refills: 0 | Status: DISCONTINUED | OUTPATIENT
Start: 2019-07-29 | End: 2019-07-29

## 2019-07-29 RX ORDER — SERTRALINE 25 MG/1
100 TABLET, FILM COATED ORAL DAILY
Refills: 0 | Status: DISCONTINUED | OUTPATIENT
Start: 2019-07-29 | End: 2019-07-30

## 2019-07-29 RX ORDER — CLONAZEPAM 1 MG
0.25 TABLET ORAL DAILY
Refills: 0 | Status: DISCONTINUED | OUTPATIENT
Start: 2019-07-29 | End: 2019-07-30

## 2019-07-29 RX ORDER — HYDROMORPHONE HYDROCHLORIDE 2 MG/ML
0.1 INJECTION INTRAMUSCULAR; INTRAVENOUS; SUBCUTANEOUS
Refills: 0 | Status: DISCONTINUED | OUTPATIENT
Start: 2019-07-29 | End: 2019-07-29

## 2019-07-29 RX ORDER — INSULIN LISPRO 100/ML
4 VIAL (ML) SUBCUTANEOUS
Refills: 0 | Status: DISCONTINUED | OUTPATIENT
Start: 2019-07-29 | End: 2019-07-30

## 2019-07-29 RX ORDER — HEPARIN SODIUM 5000 [USP'U]/ML
5000 INJECTION INTRAVENOUS; SUBCUTANEOUS EVERY 8 HOURS
Refills: 0 | Status: DISCONTINUED | OUTPATIENT
Start: 2019-07-29 | End: 2019-07-30

## 2019-07-29 RX ORDER — INSULIN LISPRO 100/ML
VIAL (ML) SUBCUTANEOUS
Refills: 0 | Status: DISCONTINUED | OUTPATIENT
Start: 2019-07-29 | End: 2019-07-30

## 2019-07-29 RX ORDER — HYDROXYZINE HCL 10 MG
25 TABLET ORAL EVERY 8 HOURS
Refills: 0 | Status: DISCONTINUED | OUTPATIENT
Start: 2019-07-29 | End: 2019-07-30

## 2019-07-29 RX ORDER — PANTOPRAZOLE SODIUM 20 MG/1
40 TABLET, DELAYED RELEASE ORAL
Refills: 0 | Status: DISCONTINUED | OUTPATIENT
Start: 2019-07-29 | End: 2019-07-30

## 2019-07-29 RX ORDER — AMLODIPINE BESYLATE 2.5 MG/1
10 TABLET ORAL DAILY
Refills: 0 | Status: DISCONTINUED | OUTPATIENT
Start: 2019-07-29 | End: 2019-07-30

## 2019-07-29 RX ORDER — VANCOMYCIN HCL 1 G
1000 VIAL (EA) INTRAVENOUS EVERY 12 HOURS
Refills: 0 | Status: COMPLETED | OUTPATIENT
Start: 2019-07-29 | End: 2019-07-30

## 2019-07-29 RX ORDER — LANOLIN ALCOHOL/MO/W.PET/CERES
3 CREAM (GRAM) TOPICAL AT BEDTIME
Refills: 0 | Status: DISCONTINUED | OUTPATIENT
Start: 2019-07-29 | End: 2019-07-30

## 2019-07-29 RX ORDER — GLUCAGON INJECTION, SOLUTION 0.5 MG/.1ML
1 INJECTION, SOLUTION SUBCUTANEOUS ONCE
Refills: 0 | Status: DISCONTINUED | OUTPATIENT
Start: 2019-07-29 | End: 2019-07-30

## 2019-07-29 RX ORDER — DEXTROSE 50 % IN WATER 50 %
12.5 SYRINGE (ML) INTRAVENOUS ONCE
Refills: 0 | Status: DISCONTINUED | OUTPATIENT
Start: 2019-07-29 | End: 2019-07-30

## 2019-07-29 RX ORDER — DOCUSATE SODIUM 100 MG
100 CAPSULE ORAL DAILY
Refills: 0 | Status: DISCONTINUED | OUTPATIENT
Start: 2019-07-29 | End: 2019-07-30

## 2019-07-29 RX ORDER — SODIUM CHLORIDE 0.65 %
1 AEROSOL, SPRAY (ML) NASAL ONCE
Refills: 0 | Status: DISCONTINUED | OUTPATIENT
Start: 2019-07-29 | End: 2019-07-30

## 2019-07-29 RX ORDER — INSULIN GLARGINE 100 [IU]/ML
8 INJECTION, SOLUTION SUBCUTANEOUS AT BEDTIME
Refills: 0 | Status: DISCONTINUED | OUTPATIENT
Start: 2019-07-29 | End: 2019-07-30

## 2019-07-29 RX ORDER — ACETAMINOPHEN 500 MG
650 TABLET ORAL EVERY 6 HOURS
Refills: 0 | Status: DISCONTINUED | OUTPATIENT
Start: 2019-07-29 | End: 2019-07-29

## 2019-07-29 RX ADMIN — Medication 500 MILLIGRAM(S): at 12:10

## 2019-07-29 RX ADMIN — Medication 3 MILLIGRAM(S): at 23:02

## 2019-07-29 RX ADMIN — NORTRIPTYLINE HYDROCHLORIDE 25 MILLIGRAM(S): 10 CAPSULE ORAL at 23:09

## 2019-07-29 RX ADMIN — AMLODIPINE BESYLATE 10 MILLIGRAM(S): 2.5 TABLET ORAL at 06:40

## 2019-07-29 RX ADMIN — HEPARIN SODIUM 5000 UNIT(S): 5000 INJECTION INTRAVENOUS; SUBCUTANEOUS at 23:02

## 2019-07-29 RX ADMIN — SERTRALINE 100 MILLIGRAM(S): 25 TABLET, FILM COATED ORAL at 12:09

## 2019-07-29 RX ADMIN — ATORVASTATIN CALCIUM 80 MILLIGRAM(S): 80 TABLET, FILM COATED ORAL at 23:02

## 2019-07-29 RX ADMIN — Medication 250 MILLIGRAM(S): at 23:10

## 2019-07-29 RX ADMIN — SENNA PLUS 2 TABLET(S): 8.6 TABLET ORAL at 23:09

## 2019-07-29 RX ADMIN — CHLORHEXIDINE GLUCONATE 1 APPLICATION(S): 213 SOLUTION TOPICAL at 06:40

## 2019-07-29 RX ADMIN — Medication 4 UNIT(S): at 18:24

## 2019-07-29 RX ADMIN — Medication 30 MILLILITER(S): at 18:31

## 2019-07-29 RX ADMIN — Medication 100 MILLIGRAM(S): at 12:09

## 2019-07-29 RX ADMIN — INSULIN GLARGINE 8 UNIT(S): 100 INJECTION, SOLUTION SUBCUTANEOUS at 23:03

## 2019-07-29 RX ADMIN — Medication 1 SPRAY(S): at 06:44

## 2019-07-29 RX ADMIN — Medication 1: at 18:25

## 2019-07-29 RX ADMIN — CHLORHEXIDINE GLUCONATE 15 MILLILITER(S): 213 SOLUTION TOPICAL at 12:09

## 2019-07-29 RX ADMIN — HEPARIN SODIUM 5000 UNIT(S): 5000 INJECTION INTRAVENOUS; SUBCUTANEOUS at 18:25

## 2019-07-29 RX ADMIN — Medication 162 MILLIGRAM(S): at 12:09

## 2019-07-29 NOTE — BRIEF OPERATIVE NOTE - NSICDXBRIEFPREOP_GEN_ALL_CORE_FT
PRE-OP DIAGNOSIS:  Second degree heart block 29-Jul-2019 14:51:16  Dell Ludwig  Bradycardia 29-Jul-2019 14:50:37  Dell Ludwig

## 2019-07-29 NOTE — PROGRESS NOTE ADULT - SUBJECTIVE AND OBJECTIVE BOX
LENGTH OF HOSPITAL STAY: 6d      CHIEF COMPLAINT:   Patient is a 79y old  Female who presents with a chief complaint of abdominal pain (2019 17:10)      OVER Past 24hrs:  The patient was seen and examined at bedside there were no acute vents during the night.  She denies fever chill chest pain or palpitations.       PAST MEDICAL & SURGICAL HISTORY  PAST MEDICAL & SURGICAL HISTORY:  CVA (cerebral vascular accident):  rt weakness  Bilateral hearing loss, unspecified hearing loss type  High cholesterol  Depression  BP (high blood pressure): 20 yr  Breast CA:  s/p rt  DM (diabetes mellitus): 24 yr  History of cholecystectomy:   History of lumpectomy of right breast  H/O total knee replacement, right      REVIEW OF SYSTEMS  CONSTITUTIONAL: No fever, weight loss, or fatigue  RESPIRATORY: No cough, wheezing, chills or hemoptysis; No shortness of breath  CARDIOVASCULAR: No chest pain, palpitations, dizziness, or leg swelling  GASTROINTESTINAL: No abdominal or epigastric pain.   GENITOURINARY: No dysuria, frequency, hematuria, or incontinence    ALLERGIES:  honeydew (Unknown)  latex (Unknown)  penicillins (Unknown)  pickles (Unknown)  shellfish (Unknown)  Sulfacetamide Sodium-Sulfur (Rash)    MEDICATIONS:  STANDING MEDICATIONS    PRN MEDICATIONS    VITALS:   T(F): 98  HR: 70  BP: 149/66  RR: 18  SpO2: 96%    PHYSICAL EXAM:  General: No acute distress.  Alert, oriented, interactive, nonfocal. Elderly      HEENT: Pupils equal, reactive to light symmetrically.    PULM: Clear to auscultation bilaterally, no significant sputum production.    CVS: Regular rate and rhythm, no murmurs, rubs, or gallops.    ABD: Soft, nondistended, nontender, no masses.    EXT: No edema, nontender.    SKIN: Warm and well perfused, no rashes noted.    LABS:                        7.7    4.44  )-----------( 219      ( 2019 05:29 )             23.8     -    144  |  106  |  25<H>  ----------------------------<  174<H>  3.9   |  27  |  1.7<H>    Ca    8.9      2019 05:29    TPro  5.9<L>  /  Alb  3.2<L>  /  TBili  0.3  /  DBili  x   /  AST  25  /  ALT  26  /  AlkPhos  107  07-29    PT/INR - ( 2019 05:29 )   PT: 13.30 sec;   INR: 1.16 ratio         PTT - ( 2019 05:29 )  PTT:37.4 sec  Urinalysis Basic - ( 2019 20:10 )    Color: Yellow / Appearance: Turbid / S.013 / pH: x  Gluc: x / Ketone: Negative  / Bili: Negative / Urobili: <2 mg/dL   Blood: x / Protein: 30 mg/dL / Nitrite: Negative   Leuk Esterase: Large / RBC: 6-10 /HPF / WBC >50 /HPF   Sq Epi: x / Non Sq Epi: x / Bacteria: Many                RADIOLOGY:  < from: Xray Chest 1 View- PORTABLE-Routine (19 @ 06:42) >    IMPRESSION:     No radiographic evidence of acute cardiopulmonary disease.    < end of copied text >    < from: 12 Lead ECG (19 @ 10:07) >    Ventricular Rate 87 BPM    Atrial Rate 87 BPM    P-R Interval 142 ms    QRS Duration 112 ms    Q-T Interval 426 ms    QTC Calculation(Bezet) 512 ms    P Axis 47 degrees    R Axis -45 degrees    T Axis 57 degrees    Diagnosis Line Normal sinus rhythm  Left anterior fascicular block  Prolonged QT  Abnormal ECG    < end of copied text >    < from: CT Abdomen and Pelvis No Cont (19 @ 22:44) >    FINDINGS:    LOWER CHEST:Unremarkable.    HEPATOBILIARY: Unremarkable postcholecystectomy appearance. Recently   described hyperattenuating lesions not well delineated on this   noncontrast examination.    SPLEEN: Splenic hypodensities better evaluated on prior examination    PANCREAS: Redemonstrated scattered punctate calcifications within the   pancreas likely reflecting chronic prior hepatitis        IMPRESSION:      Since one week prior, overall unchanged examination, with no CT evidence   of acute intra-abdominal pathology.    Multiple incidental findings as mentioned above are better seen on   reference contrast-enhanced examination    < end of copied text >

## 2019-07-29 NOTE — PRE-ANESTHESIA EVALUATION ADULT - NSANTHPMHFT_GEN_ALL_CORE
depression, anxiety, hearing loss, right breast ca s/p lumpectomy, CVA last year (lower extremity paresthesias), HTN, HLD, DM, ?CKD

## 2019-07-29 NOTE — BRIEF OPERATIVE NOTE - NSICDXBRIEFPOSTOP_GEN_ALL_CORE_FT
POST-OP DIAGNOSIS:  Second degree AV block 29-Jul-2019 14:51:56  Dell Ludwig  Bradycardia 29-Jul-2019 14:51:31  Dell Ludwig

## 2019-07-29 NOTE — PROGRESS NOTE ADULT - SUBJECTIVE AND OBJECTIVE BOX
Patient was seen and examined. Spoke with RN. Chart reviewed.  daughter at bedside  npo awaiting procedure today,    No events overnight.  Vital Signs Last 24 Hrs  T(F): 97.4 (2019 15:02), Max: 98 (2019 12:39)  HR: 72 (2019 15:32) (70 - 90)  BP: 124/57 (2019 15:32) (120/72 - 149/67)  SpO2: 100% (2019 15:32) (96% - 100%)  MEDICATIONS  (STANDING):  aluminum hydroxide/magnesium hydroxide/simethicone Suspension 30 milliLiter(s) Oral once  amLODIPine   Tablet 10 milliGRAM(s) Oral daily  aspirin enteric coated 162 milliGRAM(s) Oral daily  atorvastatin 80 milliGRAM(s) Oral at bedtime  ciprofloxacin     Tablet 500 milliGRAM(s) Oral daily  dextrose 5%. 1000 milliLiter(s) (50 mL/Hr) IV Continuous <Continuous>  dextrose 50% Injectable 12.5 Gram(s) IV Push once  dextrose 50% Injectable 25 Gram(s) IV Push once  dextrose 50% Injectable 25 Gram(s) IV Push once  docusate sodium 100 milliGRAM(s) Oral daily  heparin  Injectable 5000 Unit(s) SubCutaneous every 8 hours  insulin glargine Injectable (LANTUS) 8 Unit(s) SubCutaneous at bedtime  insulin lispro (HumaLOG) corrective regimen sliding scale   SubCutaneous three times a day before meals  insulin lispro Injectable (HumaLOG) 4 Unit(s) SubCutaneous three times a day before meals  melatonin 3 milliGRAM(s) Oral at bedtime  nortriptyline 25 milliGRAM(s) Oral at bedtime  pantoprazole    Tablet 40 milliGRAM(s) Oral before breakfast  senna 2 Tablet(s) Oral at bedtime  sertraline 100 milliGRAM(s) Oral daily  vancomycin  IVPB 1000 milliGRAM(s) IV Intermittent every 12 hours    MEDICATIONS  (PRN):  acetaminophen   Tablet .. 650 milliGRAM(s) Oral every 6 hours PRN Mild Pain (1 - 3), Moderate Pain (4 - 6)  acetaminophen   Tablet .. 650 milliGRAM(s) Oral every 6 hours PRN Mild Pain (1 - 3)  clonazePAM  Tablet 0.25 milliGRAM(s) Oral daily PRN agitation  dextrose 40% Gel 15 Gram(s) Oral once PRN Blood Glucose LESS THAN 70 milliGRAM(s)/deciliter  glucagon  Injectable 1 milliGRAM(s) IntraMuscular once PRN Glucose LESS THAN 70 milligrams/deciliter  HYDROmorphone  Injectable 0.1 milliGRAM(s) IV Push every 10 minutes PRN Moderate Pain (4 - 6)  hydrOXYzine  Oral Tab/Cap - Peds 25 milliGRAM(s) Oral every 8 hours PRN Anxiety  ondansetron Injectable 4 milliGRAM(s) IV Push every 4 hours PRN Nausea and/or Vomiting  sodium chloride 0.65% Nasal 1 Spray(s) Both Nostrils once PRN Nasal Congestion    Labs:                        7.7    4.44  )-----------( 219      ( 2019 05:29 )             23.8     2019 05:29    144    |  106    |  25     ----------------------------<  174    3.9     |  27     |  1.7      Ca    8.9        2019 05:29    TPro  5.9    /  Alb  3.2    /  TBili  0.3    /  DBili  x      /  AST  25     /  ALT  26     /  AlkPhos  107    2019 05:29    PT/INR - ( 2019 05:29 )   PT: 13.30 sec;   INR: 1.16 ratio         PTT - ( 2019 05:29 )  PTT:37.4 sec  Urinalysis Basic - ( 2019 20:10 )    Color: Yellow / Appearance: Turbid / S.013 / pH: x  Gluc: x / Ketone: Negative  / Bili: Negative / Urobili: <2 mg/dL   Blood: x / Protein: 30 mg/dL / Nitrite: Negative   Leuk Esterase: Large / RBC: 6-10 /HPF / WBC >50 /HPF   Sq Epi: x / Non Sq Epi: x / Bacteria: Many          Radiology:    General: comfortable, NAD  Neurology: A&Ox3, nonfocal  Head:  Normocephalic, atraumatic  ENT:  Mucosa moist, no ulcerations  Neck:  Supple, no JVD,   Skin: no breakdowns (as per RN)  Resp: CTA B/L  CV: RRR, S1S2,   GI: Soft, NT, bowel sounds  MS: No edema, + peripheral pulses, FROM all 4 extremity

## 2019-07-29 NOTE — PROGRESS NOTE ADULT - ASSESSMENT
80 yo female with a PMH of DM, HTN, HLD, s/p cholecystectomy CVA, hearing loss, and breast CA presents with epigastric abdominal pain. Sharp pain with no radiation, constant since onset. Admitted for rule out billary Obstruction.  Not found to have any Biliary Pathology with normal LFT's. Found to have splenic and pancreatic chronic pathology on imaging. Patient  found to have Mobitz II  2:1  with symptomatic bradycardia.      Todays events:     IMPRESSION  Mobitz II  2:1  with symptomatic bradycardia.    Epigastric Pain  - scheduled EGD 08/02 following Dr. Wallace   Normocytic Anaemia   JABARI resolving   HO DM  HO  HTN  HO  HLD  HO s/p cholecystectomy   HO CVA with residual , with ICA stenosis >70%  evaluated by DR. Ty but patient reluctant for intervention        Plan  Mobitz II  2:1  with symptomatic bradycardia.    - PPM placement today     Epigastric Pain  - scheduled EGD 08/02 following Dr. Wallace   - will need egd in light Anemia In vs. OP   - lipase now  wnl   - no evidence of cholecystitis     Normocytic Anaemia   - Anemia of chronic disease   - daily CBC's   - F/u GI     HO DM  - fs with insuline regimen     HO  HTN  - c/w current meds amlodipine   - held ACEi for jabari   - HTN not at goal will evaluate after OR     HO  HLD  - c/w  current meds statin     HO CVA with residual , with ICA stenosis >70%  evaluated by DR. Ty but patient reluctant for intervention  - c/w [id number]      Electrolyte Imbalances: Correct as needed  []  Hyponatremia   /   Hypernatremia  []   []  Hypokalemia   /   Hyperkalemia  []   []  Hypochlorhydria   /    Hypochlorhydria  []   []  Hypomagnesemia   /   Hypermagnesemia  []   []  Hypophosphatemia   /   Hyperphosphatemia  []       GI ppx:                                   [] Not indicated   /   [x] Pantoprazole 40mg PO Daily    DVT ppx:  [] Not indicated / [x] Heparin 5000mg SubQ / [] Lovenox 40mg SubQ / [] SCDs    Fluids:   [x] PO  |  [] IVF    Activity:  [X] Assisatnce needed   [X] Increase as Tolerated  /  [] OOB w/ assist  /  [] Bed Rest    BMI:  Height (cm): 152.4 (07-29)  Weight (kg): 66.7 (07-29)  BMI (kg/m2): 28.7 (07-29)        DISPO:  Patient to be discharged when condition(s) optimized.  [x ] From Home     [ ] NH/SNF   [ ] 4A Rehab  [ ] Detox Clinic  [X] Plan Discussion with patient and/or family.  [X] Discussed Case and Plan with the Medical Attending.    CODE STATUS  [X] FULL   /    [] DNR

## 2019-07-30 ENCOUNTER — TRANSCRIPTION ENCOUNTER (OUTPATIENT)
Age: 79
End: 2019-07-30

## 2019-07-30 VITALS — WEIGHT: 145.95 LBS | HEIGHT: 60 IN

## 2019-07-30 LAB
ANION GAP SERPL CALC-SCNC: 15 MMOL/L — HIGH (ref 7–14)
BASOPHILS # BLD AUTO: 0.04 K/UL — SIGNIFICANT CHANGE UP (ref 0–0.2)
BASOPHILS NFR BLD AUTO: 0.6 % — SIGNIFICANT CHANGE UP (ref 0–1)
BUN SERPL-MCNC: 23 MG/DL — HIGH (ref 10–20)
CALCIUM SERPL-MCNC: 9 MG/DL — SIGNIFICANT CHANGE UP (ref 8.5–10.1)
CHLORIDE SERPL-SCNC: 104 MMOL/L — SIGNIFICANT CHANGE UP (ref 98–110)
CO2 SERPL-SCNC: 24 MMOL/L — SIGNIFICANT CHANGE UP (ref 17–32)
CREAT SERPL-MCNC: 1.6 MG/DL — HIGH (ref 0.7–1.5)
EOSINOPHIL # BLD AUTO: 0.29 K/UL — SIGNIFICANT CHANGE UP (ref 0–0.7)
EOSINOPHIL NFR BLD AUTO: 4.4 % — SIGNIFICANT CHANGE UP (ref 0–8)
GLUCOSE BLDC GLUCOMTR-MCNC: 174 MG/DL — HIGH (ref 70–99)
GLUCOSE BLDC GLUCOMTR-MCNC: 250 MG/DL — HIGH (ref 70–99)
GLUCOSE BLDC GLUCOMTR-MCNC: 289 MG/DL — HIGH (ref 70–99)
GLUCOSE BLDC GLUCOMTR-MCNC: 308 MG/DL — HIGH (ref 70–99)
GLUCOSE SERPL-MCNC: 189 MG/DL — HIGH (ref 70–99)
HCT VFR BLD CALC: 26.4 % — LOW (ref 37–47)
HGB BLD-MCNC: 8.4 G/DL — LOW (ref 12–16)
IMM GRANULOCYTES NFR BLD AUTO: 0.8 % — HIGH (ref 0.1–0.3)
LYMPHOCYTES # BLD AUTO: 0.84 K/UL — LOW (ref 1.2–3.4)
LYMPHOCYTES # BLD AUTO: 12.6 % — LOW (ref 20.5–51.1)
MAGNESIUM SERPL-MCNC: 2.1 MG/DL — SIGNIFICANT CHANGE UP (ref 1.8–2.4)
MCHC RBC-ENTMCNC: 30.2 PG — SIGNIFICANT CHANGE UP (ref 27–31)
MCHC RBC-ENTMCNC: 31.8 G/DL — LOW (ref 32–37)
MCV RBC AUTO: 95 FL — SIGNIFICANT CHANGE UP (ref 81–99)
MONOCYTES # BLD AUTO: 0.87 K/UL — HIGH (ref 0.1–0.6)
MONOCYTES NFR BLD AUTO: 13.1 % — HIGH (ref 1.7–9.3)
NEUTROPHILS # BLD AUTO: 4.56 K/UL — SIGNIFICANT CHANGE UP (ref 1.4–6.5)
NEUTROPHILS NFR BLD AUTO: 68.5 % — SIGNIFICANT CHANGE UP (ref 42.2–75.2)
NRBC # BLD: 0 /100 WBCS — SIGNIFICANT CHANGE UP (ref 0–0)
PLATELET # BLD AUTO: 255 K/UL — SIGNIFICANT CHANGE UP (ref 130–400)
POTASSIUM SERPL-MCNC: 4.2 MMOL/L — SIGNIFICANT CHANGE UP (ref 3.5–5)
POTASSIUM SERPL-SCNC: 4.2 MMOL/L — SIGNIFICANT CHANGE UP (ref 3.5–5)
RBC # BLD: 2.78 M/UL — LOW (ref 4.2–5.4)
RBC # FLD: 13.8 % — SIGNIFICANT CHANGE UP (ref 11.5–14.5)
SODIUM SERPL-SCNC: 143 MMOL/L — SIGNIFICANT CHANGE UP (ref 135–146)
WBC # BLD: 6.65 K/UL — SIGNIFICANT CHANGE UP (ref 4.8–10.8)
WBC # FLD AUTO: 6.65 K/UL — SIGNIFICANT CHANGE UP (ref 4.8–10.8)

## 2019-07-30 PROCEDURE — 71046 X-RAY EXAM CHEST 2 VIEWS: CPT | Mod: 26

## 2019-07-30 RX ORDER — AMLODIPINE BESYLATE 2.5 MG/1
1 TABLET ORAL
Qty: 30 | Refills: 0
Start: 2019-07-30 | End: 2019-08-28

## 2019-07-30 RX ADMIN — Medication 250 MILLIGRAM(S): at 13:01

## 2019-07-30 RX ADMIN — PANTOPRAZOLE SODIUM 40 MILLIGRAM(S): 20 TABLET, DELAYED RELEASE ORAL at 06:38

## 2019-07-30 RX ADMIN — HEPARIN SODIUM 5000 UNIT(S): 5000 INJECTION INTRAVENOUS; SUBCUTANEOUS at 06:39

## 2019-07-30 RX ADMIN — Medication 100 MILLIGRAM(S): at 12:10

## 2019-07-30 RX ADMIN — Medication 2: at 17:58

## 2019-07-30 RX ADMIN — Medication 650 MILLIGRAM(S): at 10:59

## 2019-07-30 RX ADMIN — Medication 4: at 12:07

## 2019-07-30 RX ADMIN — AMLODIPINE BESYLATE 10 MILLIGRAM(S): 2.5 TABLET ORAL at 06:38

## 2019-07-30 RX ADMIN — Medication 162 MILLIGRAM(S): at 12:10

## 2019-07-30 RX ADMIN — Medication 650 MILLIGRAM(S): at 13:09

## 2019-07-30 RX ADMIN — HEPARIN SODIUM 5000 UNIT(S): 5000 INJECTION INTRAVENOUS; SUBCUTANEOUS at 13:05

## 2019-07-30 RX ADMIN — SERTRALINE 100 MILLIGRAM(S): 25 TABLET, FILM COATED ORAL at 12:10

## 2019-07-30 RX ADMIN — Medication 4 UNIT(S): at 17:58

## 2019-07-30 RX ADMIN — Medication 4 UNIT(S): at 12:07

## 2019-07-30 NOTE — DIETITIAN INITIAL EVALUATION ADULT. - ENERGY NEEDS
Calories: 5122-1428 kcal/day (MSJ x 1.2-1.3 AF)  Protein: 66-79 gm/day (1-1.2 gm/kg CBW)  Fluid: 1 ml/kcal

## 2019-07-30 NOTE — DIETITIAN INITIAL EVALUATION ADULT. - FEEDING SKILL
Pt reports having good appetite/po intake and is consuming 75% of her meal trays. Pt requesting for Glucerna once daily as she has it at home as a snack./independent

## 2019-07-30 NOTE — PROGRESS NOTE ADULT - SUBJECTIVE AND OBJECTIVE BOX
Patient was seen and examined. Spoke with RN. Chart reviewed.  comf    No events overnight.  Vital Signs Last 24 Hrs  T(F): 97.6 (2019 14:20), Max: 98 (2019 05:49)  HR: 78 (2019 14:20) (71 - 78)  BP: 142/67 (2019 14:20) (140/65 - 159/71)  SpO2: 98% (2019 14:38) (98% - 98%)  MEDICATIONS  (STANDING):  amLODIPine   Tablet 10 milliGRAM(s) Oral daily  aspirin enteric coated 162 milliGRAM(s) Oral daily  atorvastatin 80 milliGRAM(s) Oral at bedtime  dextrose 5%. 1000 milliLiter(s) (50 mL/Hr) IV Continuous <Continuous>  dextrose 50% Injectable 12.5 Gram(s) IV Push once  dextrose 50% Injectable 25 Gram(s) IV Push once  dextrose 50% Injectable 25 Gram(s) IV Push once  docusate sodium 100 milliGRAM(s) Oral daily  heparin  Injectable 5000 Unit(s) SubCutaneous every 8 hours  insulin glargine Injectable (LANTUS) 8 Unit(s) SubCutaneous at bedtime  insulin lispro (HumaLOG) corrective regimen sliding scale   SubCutaneous three times a day before meals  insulin lispro Injectable (HumaLOG) 4 Unit(s) SubCutaneous three times a day before meals  melatonin 3 milliGRAM(s) Oral at bedtime  nortriptyline 25 milliGRAM(s) Oral at bedtime  pantoprazole    Tablet 40 milliGRAM(s) Oral before breakfast  senna 2 Tablet(s) Oral at bedtime  sertraline 100 milliGRAM(s) Oral daily    MEDICATIONS  (PRN):  acetaminophen   Tablet .. 650 milliGRAM(s) Oral every 6 hours PRN Mild Pain (1 - 3), Moderate Pain (4 - 6)  clonazePAM  Tablet 0.25 milliGRAM(s) Oral daily PRN agitation  dextrose 40% Gel 15 Gram(s) Oral once PRN Blood Glucose LESS THAN 70 milliGRAM(s)/deciliter  glucagon  Injectable 1 milliGRAM(s) IntraMuscular once PRN Glucose LESS THAN 70 milligrams/deciliter  hydrOXYzine  Oral Tab/Cap - Peds 25 milliGRAM(s) Oral every 8 hours PRN Anxiety  sodium chloride 0.65% Nasal 1 Spray(s) Both Nostrils once PRN Nasal Congestion    Labs:                        8.4    6.65  )-----------( 255      ( 2019 05:31 )             26.4                         7.7    4.44  )-----------( 219      ( 2019 05:29 )             23.8     2019 05:31    143    |  104    |  23     ----------------------------<  189    4.2     |  24     |  1.6    2019 05:29    144    |  106    |  25     ----------------------------<  174    3.9     |  27     |  1.7      Ca    9.0        2019 05:31  Ca    8.9        2019 05:29  Mg     2.1       2019 05:31    TPro  5.9    /  Alb  3.2    /  TBili  0.3    /  DBili  x      /  AST  25     /  ALT  26     /  AlkPhos  107    2019 05:29    PT/INR - ( 2019 05:29 )   PT: 13.30 sec;   INR: 1.16 ratio         PTT - ( 2019 05:29 )  PTT:37.4 sec  Urinalysis Basic - ( 2019 20:10 )    Color: Yellow / Appearance: Turbid / S.013 / pH: x  Gluc: x / Ketone: Negative  / Bili: Negative / Urobili: <2 mg/dL   Blood: x / Protein: 30 mg/dL / Nitrite: Negative   Leuk Esterase: Large / RBC: 6-10 /HPF / WBC >50 /HPF   Sq Epi: x / Non Sq Epi: x / Bacteria: Many        Culture - Urine (collected 2019 22:09)  Source: .Urine Clean Catch (Midstream)  Preliminary Report (2019 08:16):    >100,000 CFU/ml Escherichia coli        Radiology:    General: comfortable, NAD  Neurology: A&Ox3, nonfocal  Head:  Normocephalic, atraumatic  ENT:  Mucosa moist, no ulcerations  Neck:  Supple, no JVD,   Skin: no breakdowns (as per RN)  Resp: CTA B/L  CV: RRR, S1S2,   GI: Soft, NT, bowel sounds  MS: No edema, + peripheral pulses, FROM all 4 extremity

## 2019-07-30 NOTE — DISCHARGE NOTE PROVIDER - PROVIDER TOKENS
PROVIDER:[TOKEN:[[id number]]],PROVIDER:[TOKEN:[[id number]]],PROVIDER:[TOKEN:[[id number]]]

## 2019-07-30 NOTE — PROGRESS NOTE ADULT - SUBJECTIVE AND OBJECTIVE BOX
LENGTH OF HOSPITAL STAY: 7d      CHIEF COMPLAINT:   Patient is a 79y old  Female who presents with a chief complaint of abdominal pain (2019 15:44)      OVER Past 24hrs:  The patient was seen and examined at bedside there were acute events during the night. Patient endorses mild pain of PPM insertion site. She dneis fever chills or burning urination.         PAST MEDICAL & SURGICAL HISTORY  PAST MEDICAL & SURGICAL HISTORY:  CVA (cerebral vascular accident):  rt weakness  Bilateral hearing loss, unspecified hearing loss type  High cholesterol  Depression  BP (high blood pressure): 20 yr  Breast CA:  s/p rt  DM (diabetes mellitus): 24 yr  History of cholecystectomy:   History of lumpectomy of right breast  H/O total knee replacement, right        REVIEW OF SYSTEMS  CONSTITUTIONAL: No fever  RESPIRATORY: No cough, wheezing, chills or hemoptysis; No shortness of breath  CARDIOVASCULAR: mild chest pain, NO palpitations, dizziness, or leg swelling  GENITOURINARY: No dysuria, frequency      ALLERGIES:  honeydew (Unknown)  latex (Unknown)  penicillins (Unknown)  pickles (Unknown)  shellfish (Unknown)  Sulfacetamide Sodium-Sulfur (Rash)    MEDICATIONS:  STANDING MEDICATIONS  amLODIPine   Tablet 10 milliGRAM(s) Oral daily  aspirin enteric coated 162 milliGRAM(s) Oral daily  atorvastatin 80 milliGRAM(s) Oral at bedtime  ciprofloxacin     Tablet 500 milliGRAM(s) Oral daily  dextrose 5%. 1000 milliLiter(s) IV Continuous <Continuous>  dextrose 50% Injectable 12.5 Gram(s) IV Push once  dextrose 50% Injectable 25 Gram(s) IV Push once  dextrose 50% Injectable 25 Gram(s) IV Push once  docusate sodium 100 milliGRAM(s) Oral daily  heparin  Injectable 5000 Unit(s) SubCutaneous every 8 hours  insulin glargine Injectable (LANTUS) 8 Unit(s) SubCutaneous at bedtime  insulin lispro (HumaLOG) corrective regimen sliding scale   SubCutaneous three times a day before meals  insulin lispro Injectable (HumaLOG) 4 Unit(s) SubCutaneous three times a day before meals  melatonin 3 milliGRAM(s) Oral at bedtime  nortriptyline 25 milliGRAM(s) Oral at bedtime  pantoprazole    Tablet 40 milliGRAM(s) Oral before breakfast  senna 2 Tablet(s) Oral at bedtime  sertraline 100 milliGRAM(s) Oral daily  vancomycin  IVPB 1000 milliGRAM(s) IV Intermittent every 12 hours    PRN MEDICATIONS  acetaminophen   Tablet .. 650 milliGRAM(s) Oral every 6 hours PRN  clonazePAM  Tablet 0.25 milliGRAM(s) Oral daily PRN  dextrose 40% Gel 15 Gram(s) Oral once PRN  glucagon  Injectable 1 milliGRAM(s) IntraMuscular once PRN  hydrOXYzine  Oral Tab/Cap - Peds 25 milliGRAM(s) Oral every 8 hours PRN  sodium chloride 0.65% Nasal 1 Spray(s) Both Nostrils once PRN    VITALS:   T(F): 98  HR: 77  BP: 140/65  RR: 18  SpO2: 100%    PHYSICAL EXAM:  General: No acute distress.  Alert, oriented, interactive, nonfocal. Elderly      PULM: Clear to auscultation bilaterally, no significant sputum production.    CVS: Regular rate and rhythm, no murmurs, rubs, or gallops. PPM insertion site clean dry.      ABD: Soft, nondistended, nontender, no masses.    EXT: No edema, nontender.    SKIN: Warm and well perfused, no rashes noted.    LABS:                        8.4    6.65  )-----------( 255      ( 2019 05:31 )             26.4     07-30    143  |  104  |  23<H>  ----------------------------<  189<H>  4.2   |  24  |  1.6<H>    Ca    9.0      2019 05:31  Mg     2.1     07-30    TPro  5.9<L>  /  Alb  3.2<L>  /  TBili  0.3  /  DBili  x   /  AST  25  /  ALT  26  /  AlkPhos  107  07-29    PT/INR - ( 2019 05:29 )   PT: 13.30 sec;   INR: 1.16 ratio         PTT - ( 2019 05:29 )  PTT:37.4 sec  Urinalysis Basic - ( 2019 20:10 )    Color: Yellow / Appearance: Turbid / S.013 / pH: x  Gluc: x / Ketone: Negative  / Bili: Negative / Urobili: <2 mg/dL   Blood: x / Protein: 30 mg/dL / Nitrite: Negative   Leuk Esterase: Large / RBC: 6-10 /HPF / WBC >50 /HPF   Sq Epi: x / Non Sq Epi: x / Bacteria: Many            Culture - Urine (collected 2019 22:09)  Source: .Urine Clean Catch (Midstream)  Preliminary Report (2019 08:16):    >100,000 CFU/ml Escherichia coli

## 2019-07-30 NOTE — DIETITIAN INITIAL EVALUATION ADULT. - PHYSICAL APPEARANCE
alert and oriented. BMI: 28.7, IBW: 100#, unknown UBW, but stable per pt. no edema noted and skin intact.

## 2019-07-30 NOTE — DISCHARGE NOTE NURSING/CASE MANAGEMENT/SOCIAL WORK - NSDCDPATPORTLINK_GEN_ALL_CORE
You can access the [facility], offered by [facility], by registering with the following website: [email or web address]

## 2019-07-30 NOTE — DISCHARGE NOTE PROVIDER - HOSPITAL COURSE
80 yo female with a PMH of DM, HTN, HLD, s/p cholecystectomy CVA, hearing loss, and breast CA presents with epigastric abdominal pain. Sharp pain with no radiation, constant since onset. Admitted for rule out billary Obstruction.  Not found to have any Biliary Pathology with normal LFT's. Found to have splenic and pancreatic chronic pathology on imaging. Patient  found to have Mobitz II  2:1  with symptomatic bradycardia.          Todays events: DC planing Home with Home PT,  Patient is hemodynamically stable with no systemic signs of infection and stable for discharge today.  Device interrogated, PPM leads in placed.         IMPRESSION    Mobitz II  2:1  with symptomatic bradycardia.      Asymptomatic Bacteruria no need for Abx completed 3days of cipro    Epigastric Pain  - scheduled EGD on  08/02 following Dr. Wallace     Normocytic Anaemia     JABARI resolving     HO DM    HO  HTN    HO  HLD    HO s/p cholecystectomy     HO CVA with residual , with ICA stenosis >70%  evaluated by Dr. Ty but patient reluctant for intervention                Plan    Mobitz II  2:1  with symptomatic bradycardia.      - TTE: Left ventricular ejection fraction, by visual estimation, is 50 to 55%. Normal global left ventricular systolic function.        - PPM placement successful     - Chest X-ray pa/lat leads in place     - interrogated     -Pt has an appt to see Dr. Ludwig on 8/6/2019 @ 11:15am.     - FU in 3-4 weeks with Dr Johnson            Epigastric Pain  - scheduled EGD 08/02 following Dr. Wallace     - will need egd in light Anemia In vs. OP     - lipase now  wnl     - no evidence of cholecystitis     - OP  follow up  08/02 following Dr. Wallace for EGD             Normocytic Anaemia likely Anemia of chronic disease      - Hgb Stable     - Anemia of chronic disease         JABARI improving     - Kidney US no obstruction or hydronephrosis, small kidneys.     - non-oliguric, improving, proteinuria 1.6 g/g     - FENa: 3.3% consistent with ATN    - Possibly CONNIE after exposure to contrast on 7/15    - continue holding metformin and ACEi on dc     - PMD f/u OP                HO DM controlled     - fs with insuline regimen         HO  HTN controlled     - c/w current meds amlodipine     - held ACEi for jabari         - HTN not at goal will evaluate after OR         HO  HLD    - c/w  current meds statin         HO CVA with residual , with ICA stenosis >70%  evaluated by DR. Ty but patient reluctant for intervention    - c/w ASA 162mg    - CTA of the neck in June 2018 that showed 60-70% left ICA stenosis and carotid duplex in December 2018 that showed >70% left CHINYERE. Evaluated by Dr. Burch who recommended  TCAR (transcarotid artery revascularization), however patient was also reluctant to have it.     - f/u with Dr. Burch OP     - Evaluated by PT/rehab, patient will benefit from short term rehab.         FINAL DISCHARGE INTERVIEW:  Resident(s) Present: (Name:______Tanvir Singh _______), RN Present: (Name:  ___________)        DISCHARGE MEDICATION RECONCILIATION  reviewed with Attending (Name:_____Dr. Alcaraz     ______)        DISPOSITION:   [  ] Home,    [ x ] Home with Visiting Nursing Services,   [    ]  SNF/ NH,    [   ] Acute Rehab (4A),   [   ] Other (Specify:_________)

## 2019-07-30 NOTE — PROGRESS NOTE ADULT - PROVIDER SPECIALTY LIST ADULT
CT Surgery
Electrophysiology
Electrophysiology
Internal Medicine
Nephrology
Nephrology
Electrophysiology
Internal Medicine
Nephrology
Internal Medicine
Internal Medicine

## 2019-07-30 NOTE — DISCHARGE NOTE PROVIDER - NSDCFUADDAPPT_GEN_ALL_CORE_FT
Please, with in two weeks of discharge follow up with Dr. Macias/Faisal STOREY in 3-4 weeks with Dr Johnson  Pt has an appt to see Dr. Ludwig on 8/6/2019 @ 11:15am.   Please, with in one week of discharge follow up with Dr. Wallace

## 2019-07-30 NOTE — DISCHARGE NOTE PROVIDER - NSDCCPCAREPLAN_GEN_ALL_CORE_FT
PRINCIPAL DISCHARGE DIAGNOSIS  Diagnosis: Bradycardia  Assessment and Plan of Treatment: you have recieved a permanent pacemaker  for  slow heart rate. Pt has an appt to see Dr. Ludwig on 8/6/2019 @ 11:15am.   No Heavy lifting >5 lbs. Do not raise your arm above shoulder level for 4-6 weeks. No shower, bathtub, no wetting device site for 5 days. No driving for 5 days.        SECONDARY DISCHARGE DIAGNOSES  Diagnosis: History of cerebrovascular accident (CVA) in adulthood  Assessment and Plan of Treatment: continue current medication.    Diagnosis: Generalized abdominal pain  Assessment and Plan of Treatment: Please f/u with Dr. Lemus 08/02 for EGD.    Diagnosis: HTN (hypertension)  Assessment and Plan of Treatment: Please start taking amlodipine 10mg daily.    Diagnosis: Elevated troponin  Assessment and Plan of Treatment: No further intervention.    Diagnosis: Acute renal failure, unspecified acute renal failure type  Assessment and Plan of Treatment: Please follow up with PMD for repeat BMP.  Pleaase stop metformin and  and lisinorpril now now.

## 2019-07-30 NOTE — DIETITIAN INITIAL EVALUATION ADULT. - NAME AND PHONE
Pt to maintain >75% of meals and snacks over the next 7 days. RD to monitor diet order, energy intake, glucose profile, NFPF

## 2019-07-30 NOTE — PROGRESS NOTE ADULT - ASSESSMENT
78 yo female with a PMH of DM, HTN, HLD, s/p cholecystectomy CVA, hearing loss, and breast CA presents with epigastric abdominal pain. Sharp pain with no radiation, constant since onset. Admitted for rule out billary Obstruction.  Not found to have any Biliary Pathology with normal LFT's. Found to have splenic and pancreatic chronic pathology on imaging. Patient  found to have Mobitz II  2:1  with symptomatic bradycardia.      Todays events: DC planing Home with Home pt.    IMPRESSION  Mobitz II  2:1  with symptomatic bradycardia.    Asymptomatic Bacteruria no need for Abx completed 3days of cipro  Epigastric Pain  - scheduled EGD on  08/02 following Dr. Wallace   Normocytic Anaemia   JABARI resolving   HO DM  HO  HTN  HO  HLD  HO s/p cholecystectomy   HO CVA with residual , with ICA stenosis >70%  evaluated by Dr. Ty but patient reluctant for intervention        Plan  Mobitz II  2:1  with symptomatic bradycardia.    - PPM placement successful   - f/u Chest X-ray pa/lat  - interrogated     Epigastric Pain  - scheduled EGD 08/02 following Dr. Wallace   - will need egd in light Anemia In vs. OP   - lipase now  wnl   - no evidence of cholecystitis     Normocytic Anaemia likely Anemia of chronic disease    - Hgb Stable   - Anemia of chronic disease       HO DM controlled   - fs with insuline regimen     HO  HTN controlled   - c/w current meds amlodipine   - held ACEi for jabari     - HTN not at goal will evaluate after OR     HO  HLD  - c/w  current meds statin     HO CVA with residual , with ICA stenosis >70%  evaluated by DR. Ty but patient reluctant for intervention  - c/w ASA 162mg      Electrolyte Imbalances: Correct as needed  []  Hyponatremia   /   Hypernatremia  []   []  Hypokalemia   /   Hyperkalemia  []   []  Hypochlorhydria   /    Hypochlorhydria  []   []  Hypomagnesemia   /   Hypermagnesemia  []   []  Hypophosphatemia   /   Hyperphosphatemia  []       GI ppx:                                   [] Not indicated   /   [x] Pantoprazole 40mg PO Daily    DVT ppx:  [] Not indicated / [x] Heparin 5000mg SubQ / [] Lovenox 40mg SubQ / [] SCDs    Fluids:   [x] PO  |  [] IVF    Activity:  [X] Assisatnce needed   [X] Increase as Tolerated  /  [] OOB w/ assist  /  [] Bed Rest    BMI:  Height (cm): 152.4 (07-29)  Weight (kg): 66.7 (07-29)  BMI (kg/m2): 28.7 (07-29)        DISPO:  Patient to be discharged when condition(s) optimized.  [x ] From Home     [ ] NH/SNF   [ ] 4A Rehab  [ ] Detox Clinic  [X] Plan Discussion with patient and/or family.  [X] Discussed Case and Plan with the Medical Attending.    CODE STATUS  [X] FULL   /    [] DNR

## 2019-07-30 NOTE — DISCHARGE NOTE PROVIDER - CARE PROVIDER_API CALL
Meño Taylor)  Cardiology; Internal Medicine  [address] [facility], [address]  [place] [address]  Phone: [phone]  Fax: [phone]  Follow Up Time:     Dell Ludwig)  Surgery; Thoracic and Cardiac Surgery  [address] [facility] [address] [facility], [address]  [place] [address]  Phone: [phone]  Fax: [phone]  Follow Up Time:     Dell Malone)  Internal Medicine  [address] [place] [address]  Phone: [phone]  Fax: [phone]  Follow Up Time:

## 2019-07-30 NOTE — PROGRESS NOTE ADULT - SUBJECTIVE AND OBJECTIVE BOX
INTERVAL HPI/OVERNIGHT EVENTS:    Patient s/p PPM implant  No event over night. Pt without complains    MEDICATIONS  (STANDING):  amLODIPine   Tablet 10 milliGRAM(s) Oral daily  aspirin enteric coated 162 milliGRAM(s) Oral daily  atorvastatin 80 milliGRAM(s) Oral at bedtime  dextrose 5%. 1000 milliLiter(s) (50 mL/Hr) IV Continuous <Continuous>  dextrose 50% Injectable 12.5 Gram(s) IV Push once  dextrose 50% Injectable 25 Gram(s) IV Push once  dextrose 50% Injectable 25 Gram(s) IV Push once  docusate sodium 100 milliGRAM(s) Oral daily  heparin  Injectable 5000 Unit(s) SubCutaneous every 8 hours  insulin glargine Injectable (LANTUS) 8 Unit(s) SubCutaneous at bedtime  insulin lispro (HumaLOG) corrective regimen sliding scale   SubCutaneous three times a day before meals  insulin lispro Injectable (HumaLOG) 4 Unit(s) SubCutaneous three times a day before meals  melatonin 3 milliGRAM(s) Oral at bedtime  nortriptyline 25 milliGRAM(s) Oral at bedtime  pantoprazole    Tablet 40 milliGRAM(s) Oral before breakfast  senna 2 Tablet(s) Oral at bedtime  sertraline 100 milliGRAM(s) Oral daily  vancomycin  IVPB 1000 milliGRAM(s) IV Intermittent every 12 hours    MEDICATIONS  (PRN):  acetaminophen   Tablet .. 650 milliGRAM(s) Oral every 6 hours PRN Mild Pain (1 - 3), Moderate Pain (4 - 6)  clonazePAM  Tablet 0.25 milliGRAM(s) Oral daily PRN agitation  dextrose 40% Gel 15 Gram(s) Oral once PRN Blood Glucose LESS THAN 70 milliGRAM(s)/deciliter  glucagon  Injectable 1 milliGRAM(s) IntraMuscular once PRN Glucose LESS THAN 70 milligrams/deciliter  hydrOXYzine  Oral Tab/Cap - Peds 25 milliGRAM(s) Oral every 8 hours PRN Anxiety  sodium chloride 0.65% Nasal 1 Spray(s) Both Nostrils once PRN Nasal Congestion      Allergies    honeydew (Unknown)  latex (Unknown)  penicillins (Unknown)  pickles (Unknown)  shellfish (Unknown)  Sulfacetamide Sodium-Sulfur (Rash)    Intolerances          Vital Signs Last 24 Hrs  T(C): 36.7 (30 Jul 2019 05:49), Max: 36.8 (29 Jul 2019 15:47)  T(F): 98 (30 Jul 2019 05:49), Max: 98.3 (29 Jul 2019 15:47)  HR: 77 (30 Jul 2019 05:49) (70 - 90)  BP: 140/65 (30 Jul 2019 05:49) (124/57 - 159/71)  BP(mean): --  RR: 18 (30 Jul 2019 05:49) (11 - 23)  SpO2: 100% (29 Jul 2019 15:47) (96% - 100%)    GENERAL: In no apparent distress, well nourished, and hydrated.  HEART: Regular rate and rhythm; No murmurs, rubs, or gallops.  	Wound healing well; No hematoma; no bleeding  PULMONARY: Clear to auscultation and perfusion.  No rales, wheezing, or rhonchi bilaterally.  ABDOMEN: Soft, Nontender, Nondistended; Bowel sounds present  EXTREMITIES:  2+ Peripheral Pulses, No clubbing, cyanosis, or edema  NEUROLOGICAL: Grossly nonfocal    RADIOLOGY & ADDITIONAL TESTS:      A/P   Patient s/p PPM implant    - CXR reviewed, lead in appropriate position  - Device interrogation done, working properly, reviewed with attending  - FU in 3-4 weeks with Dr Johnson    No Heavy lifting >5 lbs. Do not raise your arm above shoulder level for 4-6 weeks. No shower, bathtub, no wetting device site for 5 days. No driving for 5 days.  Follow instruction form Dr Ludwig team

## 2019-07-30 NOTE — PROGRESS NOTE ADULT - SUBJECTIVE AND OBJECTIVE BOX
OPERATIVE PROCEDURE(s):  DDD PPM              POD #   1                    79yFemale  SURGEON(s): Oziel  SUBJECTIVE ASSESSMENT: pt seen and examined. no acute complaints at this time.   Vital Signs Last 24 Hrs  T(F): 98 (2019 05:49), Max: 98.3 (2019 15:47)  HR: 77 (2019 05:49) (70 - 90)  BP: 140/65 (2019 05:49) (124/57 - 159/71)  RR: 18 (2019 05:49) (11 - 23)  SpO2: 100% (2019 15:47) (96% - 100%)      I&O's Detail    2019 07:01  -  2019 07:00  --------------------------------------------------------  IN:    Oral Fluid: 360 mL  Total IN: 360 mL    OUT:    Voided: 800 mL  Total OUT: 800 mL        Net:   I&O's Detail    2019 07:01  -  2019 07:00  --------------------------------------------------------  Total NET: -180 mL      2019 07:  -  2019 07:00  --------------------------------------------------------  Total NET: -440 mL        CAPILLARY BLOOD GLUCOSE      POCT Blood Glucose.: 308 mg/dL (2019 11:50)  POCT Blood Glucose.: 174 mg/dL (2019 07:54)  POCT Blood Glucose.: 289 mg/dL (2019 00:09)  POCT Blood Glucose.: 265 mg/dL (2019 21:46)  POCT Blood Glucose.: 158 mg/dL (2019 17:38)    Physical Exam:  General: NAD; A&Ox3  Cardiac: S1/S2, RRR, no murmur, no rubs  Lungs: unlabored respirations, CTA b/l, no wheeze, no rales, no crackles  Abdomen: Soft/NT/ND; positive bowel sounds x 4  Incisions: Incisions clean/dry/intact, tegaderm dressing in place on left chest  Extremities: No edema b/l lower extremities; good capillary refill; no cyanosis; palpable 1+ pedal pulses b/l          LABS:                        8.4<L>  6.65  )-----------( 255      ( 2019 05:31 )             26.4<L>                        7.7<L>  4.44<L> )-----------( 219      ( 2019 05:29 )             23.8<L>        143  |  104  |  23<H>  ----------------------------<  189<H>  4.2   |  24  |  1.6<H>      144  |  106  |  25<H>  ----------------------------<  174<H>  3.9   |  27  |  1.7<H>    Ca    9.0      2019 05:31  Mg     2.1     -    TPro  5.9<L> [6.0 - 8.0]  /  Alb  3.2<L> [3.5 - 5.2]  /  TBili  0.3 [0.2 - 1.2]  /  DBili  x   /  AST  25 [0 - 41]  /  ALT  26 [0 - 41]  /  AlkPhos  107 [30 - 115]      PT/INR - ( 2019 05:29 )   PT: ;   INR: 1.16 ratio         PTT - ( 2019 05:29 )  PTT:37.4 sec  Urinalysis Basic - ( 2019 20:10 )    Color: Yellow / Appearance: Turbid / S.013 / pH: x  Gluc: x / Ketone: Negative  / Bili: Negative / Urobili: <2 mg/dL   Blood: x / Protein: 30 mg/dL / Nitrite: Negative   Leuk Esterase: Large / RBC: 6-10 /HPF / WBC >50 /HPF   Sq Epi: x / Non Sq Epi: x / Bacteria: Many        RADIOLOGY & ADDITIONAL TESTS:  CXR: < from: Xray Chest 1 View AP/PA (19 @ 15:10) >    Post left-sided pacemaker. New left basilar opacity/atelectasis    < end of copied text >    EKG:  MEDICATIONS  (STANDING):  amLODIPine   Tablet 10 milliGRAM(s) Oral daily  aspirin enteric coated 162 milliGRAM(s) Oral daily  atorvastatin 80 milliGRAM(s) Oral at bedtime  dextrose 5%. 1000 milliLiter(s) (50 mL/Hr) IV Continuous <Continuous>  dextrose 50% Injectable 12.5 Gram(s) IV Push once  dextrose 50% Injectable 25 Gram(s) IV Push once  dextrose 50% Injectable 25 Gram(s) IV Push once  docusate sodium 100 milliGRAM(s) Oral daily  heparin  Injectable 5000 Unit(s) SubCutaneous every 8 hours  insulin glargine Injectable (LANTUS) 8 Unit(s) SubCutaneous at bedtime  insulin lispro (HumaLOG) corrective regimen sliding scale   SubCutaneous three times a day before meals  insulin lispro Injectable (HumaLOG) 4 Unit(s) SubCutaneous three times a day before meals  melatonin 3 milliGRAM(s) Oral at bedtime  nortriptyline 25 milliGRAM(s) Oral at bedtime  pantoprazole    Tablet 40 milliGRAM(s) Oral before breakfast  senna 2 Tablet(s) Oral at bedtime  sertraline 100 milliGRAM(s) Oral daily  vancomycin  IVPB 1000 milliGRAM(s) IV Intermittent every 12 hours    MEDICATIONS  (PRN):  acetaminophen   Tablet .. 650 milliGRAM(s) Oral every 6 hours PRN Mild Pain (1 - 3), Moderate Pain (4 - 6)  clonazePAM  Tablet 0.25 milliGRAM(s) Oral daily PRN agitation  dextrose 40% Gel 15 Gram(s) Oral once PRN Blood Glucose LESS THAN 70 milliGRAM(s)/deciliter  glucagon  Injectable 1 milliGRAM(s) IntraMuscular once PRN Glucose LESS THAN 70 milligrams/deciliter  hydrOXYzine  Oral Tab/Cap - Peds 25 milliGRAM(s) Oral every 8 hours PRN Anxiety  sodium chloride 0.65% Nasal 1 Spray(s) Both Nostrils once PRN Nasal Congestion    HEPARIN:  [x] YES [] NO  Dose: 5000 UNITS Q8H  LOVENOX:[] YES [x] NO  Dose: XX mg Q24H  COUMADIN: []  YES [x] NO  Dose: XX mg  Q24H  SCD's: YES b/l  GI Prophylaxis: Protonix [x], Pepcid [], None [], (Contra-indication:.....)      Allergies    honeydew (Unknown)  latex (Unknown)  penicillins (Unknown)  pickles (Unknown)  shellfish (Unknown)  Sulfacetamide Sodium-Sulfur (Rash)    Intolerances      Ambulation/Activity Status: ambulate as tolerated    Assessment/Plan:  79y Female status-post insertion of DDD PPM POD#1  - Case and plan discussed with CTU Intensivist and CT Surgeon - Dr. Waddell/Oziel/Barbara   - Continue CTU supportive care    - Continue DVT/GI prophylaxis  - Incentive Spirometry 10 times an hour  - Continue to advance physical activity as tolerated and continue PT/OT as directed  1.cont medical therapy as per medicine team  2. pain control as needed  3. please keep tegaderm dressing in place. will remove in office next week. Pt has an appt to see Dr. Ludwig on 2019 @ 11:15am.   4. interrogation done, EP evaluated, ok for discharge when medically cleared.

## 2019-07-30 NOTE — DIETITIAN INITIAL EVALUATION ADULT. - OTHER INFO
Nutrition Hx PTA: Pt states that she consumes 3 meals daily + consumes Glucerna once daily. Pt is allergic to melons, shellfish, and pickles. Pt follows a diabetic diet at home.     Pt p/w epigastric abdominal pain. Mobitz II  2:1  with symptomatic bradycardia-- PPM placement successful, f/u Chest X-ray pa/lat. Epigastric Pain  - scheduled EGD 08/02 following Dr. Wallace.

## 2019-07-30 NOTE — DISCHARGE NOTE PROVIDER - CARE PROVIDERS DIRECT ADDRESSES
,DirectAddress_Unknown,DirectAddress_Unknown,[email or web address]

## 2019-07-31 LAB
-  AMIKACIN: SIGNIFICANT CHANGE UP
-  AMOXICILLIN/CLAVULANIC ACID: SIGNIFICANT CHANGE UP
-  AMPICILLIN/SULBACTAM: SIGNIFICANT CHANGE UP
-  AMPICILLIN: SIGNIFICANT CHANGE UP
-  AZTREONAM: SIGNIFICANT CHANGE UP
-  CEFAZOLIN: SIGNIFICANT CHANGE UP
-  CEFEPIME: SIGNIFICANT CHANGE UP
-  CEFOXITIN: SIGNIFICANT CHANGE UP
-  CEFTRIAXONE: SIGNIFICANT CHANGE UP
-  CIPROFLOXACIN: SIGNIFICANT CHANGE UP
-  ERTAPENEM: SIGNIFICANT CHANGE UP
-  GENTAMICIN: SIGNIFICANT CHANGE UP
-  IMIPENEM: SIGNIFICANT CHANGE UP
-  LEVOFLOXACIN: SIGNIFICANT CHANGE UP
-  MEROPENEM: SIGNIFICANT CHANGE UP
-  NITROFURANTOIN: SIGNIFICANT CHANGE UP
-  PIPERACILLIN/TAZOBACTAM: SIGNIFICANT CHANGE UP
-  TIGECYCLINE: SIGNIFICANT CHANGE UP
-  TOBRAMYCIN: SIGNIFICANT CHANGE UP
-  TRIMETHOPRIM/SULFAMETHOXAZOLE: SIGNIFICANT CHANGE UP
CULTURE RESULTS: SIGNIFICANT CHANGE UP
GLUCOSE BLDC GLUCOMTR-MCNC: 205 MG/DL — HIGH (ref 70–99)
METHOD TYPE: SIGNIFICANT CHANGE UP
ORGANISM # SPEC MICROSCOPIC CNT: SIGNIFICANT CHANGE UP
ORGANISM # SPEC MICROSCOPIC CNT: SIGNIFICANT CHANGE UP
SPECIMEN SOURCE: SIGNIFICANT CHANGE UP

## 2019-08-07 ENCOUNTER — APPOINTMENT (OUTPATIENT)
Age: 79
End: 2019-08-07
Payer: MEDICARE

## 2019-08-07 VITALS
RESPIRATION RATE: 12 BRPM | HEIGHT: 60 IN | SYSTOLIC BLOOD PRESSURE: 158 MMHG | TEMPERATURE: 98.7 F | DIASTOLIC BLOOD PRESSURE: 86 MMHG | OXYGEN SATURATION: 98 % | HEART RATE: 81 BPM

## 2019-08-07 PROCEDURE — 99024 POSTOP FOLLOW-UP VISIT: CPT

## 2019-08-09 DIAGNOSIS — N17.0 ACUTE KIDNEY FAILURE WITH TUBULAR NECROSIS: ICD-10-CM

## 2019-08-09 DIAGNOSIS — F41.9 ANXIETY DISORDER, UNSPECIFIED: ICD-10-CM

## 2019-08-09 DIAGNOSIS — E87.5 HYPERKALEMIA: ICD-10-CM

## 2019-08-09 DIAGNOSIS — D63.1 ANEMIA IN CHRONIC KIDNEY DISEASE: ICD-10-CM

## 2019-08-09 DIAGNOSIS — H91.93 UNSPECIFIED HEARING LOSS, BILATERAL: ICD-10-CM

## 2019-08-09 DIAGNOSIS — E11.22 TYPE 2 DIABETES MELLITUS WITH DIABETIC CHRONIC KIDNEY DISEASE: ICD-10-CM

## 2019-08-09 DIAGNOSIS — I44.1 ATRIOVENTRICULAR BLOCK, SECOND DEGREE: ICD-10-CM

## 2019-08-09 DIAGNOSIS — Z99.3 DEPENDENCE ON WHEELCHAIR: ICD-10-CM

## 2019-08-09 DIAGNOSIS — I69.351 HEMIPLEGIA AND HEMIPARESIS FOLLOWING CEREBRAL INFARCTION AFFECTING RIGHT DOMINANT SIDE: ICD-10-CM

## 2019-08-09 DIAGNOSIS — I65.23 OCCLUSION AND STENOSIS OF BILATERAL CAROTID ARTERIES: ICD-10-CM

## 2019-08-09 DIAGNOSIS — R00.1 BRADYCARDIA, UNSPECIFIED: ICD-10-CM

## 2019-08-09 DIAGNOSIS — I12.9 HYPERTENSIVE CHRONIC KIDNEY DISEASE WITH STAGE 1 THROUGH STAGE 4 CHRONIC KIDNEY DISEASE, OR UNSPECIFIED CHRONIC KIDNEY DISEASE: ICD-10-CM

## 2019-08-09 DIAGNOSIS — I44.69: ICD-10-CM

## 2019-08-09 DIAGNOSIS — T50.8X5A ADVERSE EFFECT OF DIAGNOSTIC AGENTS, INITIAL ENCOUNTER: ICD-10-CM

## 2019-08-09 DIAGNOSIS — Z79.02 LONG TERM (CURRENT) USE OF ANTITHROMBOTICS/ANTIPLATELETS: ICD-10-CM

## 2019-08-09 DIAGNOSIS — Z79.4 LONG TERM (CURRENT) USE OF INSULIN: ICD-10-CM

## 2019-08-09 DIAGNOSIS — R10.13 EPIGASTRIC PAIN: ICD-10-CM

## 2019-08-09 DIAGNOSIS — Z92.3 PERSONAL HISTORY OF IRRADIATION: ICD-10-CM

## 2019-08-09 DIAGNOSIS — Z79.01 LONG TERM (CURRENT) USE OF ANTICOAGULANTS: ICD-10-CM

## 2019-08-09 DIAGNOSIS — N18.4 CHRONIC KIDNEY DISEASE, STAGE 4 (SEVERE): ICD-10-CM

## 2019-08-09 DIAGNOSIS — E78.5 HYPERLIPIDEMIA, UNSPECIFIED: ICD-10-CM

## 2019-08-09 DIAGNOSIS — N14.1 NEPHROPATHY INDUCED BY OTHER DRUGS, MEDICAMENTS AND BIOLOGICAL SUBSTANCES: ICD-10-CM

## 2019-08-09 DIAGNOSIS — K21.9 GASTRO-ESOPHAGEAL REFLUX DISEASE WITHOUT ESOPHAGITIS: ICD-10-CM

## 2019-08-09 DIAGNOSIS — K86.1 OTHER CHRONIC PANCREATITIS: ICD-10-CM

## 2019-08-09 DIAGNOSIS — E83.52 HYPERCALCEMIA: ICD-10-CM

## 2019-08-09 DIAGNOSIS — Z96.651 PRESENCE OF RIGHT ARTIFICIAL KNEE JOINT: ICD-10-CM

## 2019-08-09 DIAGNOSIS — Z85.3 PERSONAL HISTORY OF MALIGNANT NEOPLASM OF BREAST: ICD-10-CM

## 2019-08-09 DIAGNOSIS — R20.0 ANESTHESIA OF SKIN: ICD-10-CM

## 2019-08-09 DIAGNOSIS — I45.6 PRE-EXCITATION SYNDROME: ICD-10-CM

## 2019-08-09 DIAGNOSIS — F32.9 MAJOR DEPRESSIVE DISORDER, SINGLE EPISODE, UNSPECIFIED: ICD-10-CM

## 2019-08-09 DIAGNOSIS — Z79.82 LONG TERM (CURRENT) USE OF ASPIRIN: ICD-10-CM

## 2019-08-25 ENCOUNTER — INPATIENT (INPATIENT)
Facility: HOSPITAL | Age: 79
LOS: 1 days | Discharge: HOME HEALTH | End: 2019-08-27
Attending: INTERNAL MEDICINE | Admitting: INTERNAL MEDICINE
Payer: MEDICARE

## 2019-08-25 VITALS
RESPIRATION RATE: 18 BRPM | TEMPERATURE: 96 F | HEART RATE: 95 BPM | SYSTOLIC BLOOD PRESSURE: 147 MMHG | OXYGEN SATURATION: 97 % | DIASTOLIC BLOOD PRESSURE: 89 MMHG

## 2019-08-25 DIAGNOSIS — Z98.890 OTHER SPECIFIED POSTPROCEDURAL STATES: Chronic | ICD-10-CM

## 2019-08-25 DIAGNOSIS — Z90.49 ACQUIRED ABSENCE OF OTHER SPECIFIED PARTS OF DIGESTIVE TRACT: Chronic | ICD-10-CM

## 2019-08-25 DIAGNOSIS — Z96.651 PRESENCE OF RIGHT ARTIFICIAL KNEE JOINT: Chronic | ICD-10-CM

## 2019-08-25 LAB
ALBUMIN SERPL ELPH-MCNC: 4.7 G/DL — SIGNIFICANT CHANGE UP (ref 3.5–5.2)
ALP SERPL-CCNC: 126 U/L — HIGH (ref 30–115)
ALT FLD-CCNC: 22 U/L — SIGNIFICANT CHANGE UP (ref 0–41)
ANION GAP SERPL CALC-SCNC: 15 MMOL/L — HIGH (ref 7–14)
APPEARANCE UR: ABNORMAL
AST SERPL-CCNC: 113 U/L — HIGH (ref 0–41)
B-OH-BUTYR SERPL-SCNC: <0.2 MMOL/L — SIGNIFICANT CHANGE UP
BACTERIA # UR AUTO: ABNORMAL
BASE EXCESS BLDV CALC-SCNC: 8.2 MMOL/L — HIGH (ref -2–2)
BASOPHILS # BLD AUTO: 0.04 K/UL — SIGNIFICANT CHANGE UP (ref 0–0.2)
BASOPHILS NFR BLD AUTO: 0.5 % — SIGNIFICANT CHANGE UP (ref 0–1)
BILIRUB SERPL-MCNC: 0.4 MG/DL — SIGNIFICANT CHANGE UP (ref 0.2–1.2)
BILIRUB UR-MCNC: NEGATIVE — SIGNIFICANT CHANGE UP
BUN SERPL-MCNC: 20 MG/DL — SIGNIFICANT CHANGE UP (ref 10–20)
CA-I SERPL-SCNC: 1.21 MMOL/L — SIGNIFICANT CHANGE UP (ref 1.12–1.3)
CALCIUM SERPL-MCNC: 9.9 MG/DL — SIGNIFICANT CHANGE UP (ref 8.5–10.1)
CHLORIDE SERPL-SCNC: 96 MMOL/L — LOW (ref 98–110)
CO2 SERPL-SCNC: 27 MMOL/L — SIGNIFICANT CHANGE UP (ref 17–32)
COLOR SPEC: YELLOW — SIGNIFICANT CHANGE UP
CREAT SERPL-MCNC: 1.6 MG/DL — HIGH (ref 0.7–1.5)
DIFF PNL FLD: SIGNIFICANT CHANGE UP
EOSINOPHIL # BLD AUTO: 0.3 K/UL — SIGNIFICANT CHANGE UP (ref 0–0.7)
EOSINOPHIL NFR BLD AUTO: 3.5 % — SIGNIFICANT CHANGE UP (ref 0–8)
EPI CELLS # UR: 1 /HPF — SIGNIFICANT CHANGE UP (ref 0–5)
GAS PNL BLDV: 143 MMOL/L — SIGNIFICANT CHANGE UP (ref 136–145)
GAS PNL BLDV: SIGNIFICANT CHANGE UP
GLUCOSE SERPL-MCNC: 196 MG/DL — HIGH (ref 70–99)
GLUCOSE UR QL: NEGATIVE — SIGNIFICANT CHANGE UP
HCO3 BLDV-SCNC: 32 MMOL/L — HIGH (ref 22–29)
HCT VFR BLD CALC: 29.8 % — LOW (ref 37–47)
HCT VFR BLDA CALC: 30.7 % — LOW (ref 34–44)
HGB BLD CALC-MCNC: 10 G/DL — LOW (ref 14–18)
HGB BLD-MCNC: 9.9 G/DL — LOW (ref 12–16)
HYALINE CASTS # UR AUTO: 7 /LPF — SIGNIFICANT CHANGE UP (ref 0–7)
IMM GRANULOCYTES NFR BLD AUTO: 0.3 % — SIGNIFICANT CHANGE UP (ref 0.1–0.3)
KETONES UR-MCNC: NEGATIVE — SIGNIFICANT CHANGE UP
LACTATE BLDV-MCNC: 1.5 MMOL/L — SIGNIFICANT CHANGE UP (ref 0.5–1.6)
LEUKOCYTE ESTERASE UR-ACNC: ABNORMAL
LYMPHOCYTES # BLD AUTO: 1.24 K/UL — SIGNIFICANT CHANGE UP (ref 1.2–3.4)
LYMPHOCYTES # BLD AUTO: 14.5 % — LOW (ref 20.5–51.1)
MCHC RBC-ENTMCNC: 31 PG — SIGNIFICANT CHANGE UP (ref 27–31)
MCHC RBC-ENTMCNC: 33.2 G/DL — SIGNIFICANT CHANGE UP (ref 32–37)
MCV RBC AUTO: 93.4 FL — SIGNIFICANT CHANGE UP (ref 81–99)
MONOCYTES # BLD AUTO: 0.87 K/UL — HIGH (ref 0.1–0.6)
MONOCYTES NFR BLD AUTO: 10.1 % — HIGH (ref 1.7–9.3)
NEUTROPHILS # BLD AUTO: 6.1 K/UL — SIGNIFICANT CHANGE UP (ref 1.4–6.5)
NEUTROPHILS NFR BLD AUTO: 71.1 % — SIGNIFICANT CHANGE UP (ref 42.2–75.2)
NITRITE UR-MCNC: NEGATIVE — SIGNIFICANT CHANGE UP
NRBC # BLD: 0 /100 WBCS — SIGNIFICANT CHANGE UP (ref 0–0)
NT-PROBNP SERPL-SCNC: 663 PG/ML — HIGH (ref 0–300)
PCO2 BLDV: 43 MMHG — SIGNIFICANT CHANGE UP (ref 41–51)
PH BLDV: 7.49 — HIGH (ref 7.26–7.43)
PH UR: 7 — SIGNIFICANT CHANGE UP (ref 5–8)
PLATELET # BLD AUTO: 266 K/UL — SIGNIFICANT CHANGE UP (ref 130–400)
PO2 BLDV: 38 MMHG — SIGNIFICANT CHANGE UP (ref 20–40)
POTASSIUM BLDV-SCNC: 3.9 MMOL/L — SIGNIFICANT CHANGE UP (ref 3.3–5.6)
POTASSIUM SERPL-MCNC: 8.1 MMOL/L — CRITICAL HIGH (ref 3.5–5)
POTASSIUM SERPL-SCNC: 8.1 MMOL/L — CRITICAL HIGH (ref 3.5–5)
PROT SERPL-MCNC: 8.2 G/DL — HIGH (ref 6–8)
PROT UR-MCNC: ABNORMAL
RBC # BLD: 3.19 M/UL — LOW (ref 4.2–5.4)
RBC # FLD: 13.5 % — SIGNIFICANT CHANGE UP (ref 11.5–14.5)
RBC CASTS # UR COMP ASSIST: 28 /HPF — HIGH (ref 0–4)
SAO2 % BLDV: 75 % — SIGNIFICANT CHANGE UP
SODIUM SERPL-SCNC: 138 MMOL/L — SIGNIFICANT CHANGE UP (ref 135–146)
SP GR SPEC: 1.01 — SIGNIFICANT CHANGE UP (ref 1.01–1.02)
TROPONIN T SERPL-MCNC: <0.01 NG/ML — SIGNIFICANT CHANGE UP
UROBILINOGEN FLD QL: SIGNIFICANT CHANGE UP
WBC # BLD: 8.58 K/UL — SIGNIFICANT CHANGE UP (ref 4.8–10.8)
WBC # FLD AUTO: 8.58 K/UL — SIGNIFICANT CHANGE UP (ref 4.8–10.8)
WBC UR QL: 570 /HPF — HIGH (ref 0–5)

## 2019-08-25 PROCEDURE — 71045 X-RAY EXAM CHEST 1 VIEW: CPT | Mod: 26

## 2019-08-25 PROCEDURE — 99285 EMERGENCY DEPT VISIT HI MDM: CPT

## 2019-08-25 PROCEDURE — 93010 ELECTROCARDIOGRAM REPORT: CPT

## 2019-08-25 RX ORDER — SODIUM CHLORIDE 9 MG/ML
1000 INJECTION INTRAMUSCULAR; INTRAVENOUS; SUBCUTANEOUS ONCE
Refills: 0 | Status: COMPLETED | OUTPATIENT
Start: 2019-08-25 | End: 2019-08-25

## 2019-08-25 RX ORDER — AZTREONAM 2 G
2000 VIAL (EA) INJECTION ONCE
Refills: 0 | Status: COMPLETED | OUTPATIENT
Start: 2019-08-25 | End: 2019-08-25

## 2019-08-25 RX ADMIN — SODIUM CHLORIDE 1000 MILLILITER(S): 9 INJECTION INTRAMUSCULAR; INTRAVENOUS; SUBCUTANEOUS at 22:44

## 2019-08-25 RX ADMIN — Medication 100 MILLIGRAM(S): at 22:50

## 2019-08-25 RX ADMIN — SODIUM CHLORIDE 1000 MILLILITER(S): 9 INJECTION INTRAMUSCULAR; INTRAVENOUS; SUBCUTANEOUS at 20:59

## 2019-08-25 NOTE — ED ADULT NURSE NOTE - PMH
Bilateral hearing loss, unspecified hearing loss type    BP (high blood pressure)  19 yr  Breast CA  2014 s/p rt  CVA (cerebral vascular accident)  6/18 rt weakness  Depression    DM (diabetes mellitus)  24 yr  High cholesterol

## 2019-08-25 NOTE — ED PROVIDER NOTE - CLINICAL SUMMARY MEDICAL DECISION MAKING FREE TEXT BOX
79yoF with h/o HTN, HLD, DM, CVA with residual L weakness, presents with SOB and hyperglycemia. Glc was 300 today though refused transport to ED. Later was noted to be SOB, pt states she was just anxious and hyperventilating 2/2 having an argument with her son and this happens often - aide states she has been trying to reach son all day and he is unreachable. Pt also states she is sad due to a comment her son and daughter in law made to her. States she is on Zoloft, denies SI/HI. Denies SOB since then, CP, leg pain or swelling from baseline, fever, dysuria, vomiting, diarrhea, or any other symptoms. On exam, afebrile, hemodynamically stable, saturating well, NAD, well appearing, no increased WOB, head NCAT, EOMI grossly, anicteric, MM dry, no JVD, RRR, nml S1/S2, no m/r/g, lungs CTAB, no w/r/r, abd soft, NT, ND, nml BS, no rebound or guarding, AAO, CN's 3-12 grossly intact, no leg cyanosis or edema (chronic LE findings), skin warm, dry, no rashes or hives. Pt categorically denies actual SOB other than that related to anxiety with specific identifiable trigger. No e/o fluid overload. No signs of u/l edema on my exam, or suggestion of PE. Character low suspicion for ACS and ECG/trop unremarkable. No e/o PNA or PTX. Noted UTI which may explain patient's increased agitation and depression this past week. No fever or other signs of infection. Son has concern as pt has stated would like to throw herself down the stairs, pt currently denying SI and have low suspicion for acute suicidality, psych will follow. Pt with multiple allergies and is resistant to the other IV abx, will admit for aztreonam. Patient well appearing, hemodynamically stable. Admitted to internal medicine for further monitoring, w/u, and care.

## 2019-08-25 NOTE — ED ADULT NURSE NOTE - OBJECTIVE STATEMENT
patient presents to the ED for evaluation of hyperglycemia since this morning. also reports some shortness of breath. denies chest pain, nausea, vomiting, headache, fever. dizziness

## 2019-08-25 NOTE — ED PROVIDER NOTE - ATTENDING CONTRIBUTION TO CARE
79yoF with h/o HTN, HLD, DM, CVA with residual L weakness, presents with SOB and hyperglycemia. Glc was 300 today though refused transport to ED. Later was noted to be SOB, pt states she was just anxious and hyperventilating 2/2 having an argument with her son and this happens often - aide states she has been trying to reach son all day and he is unreachable. Pt also states she is sad due to a comment her son and daughter in law made to her. States she is on Zoloft, denies SI/HI. Denies SOB since then, CP, leg pain or swelling from baseline, fever, dysuria, vomiting, diarrhea, or any other symptoms. On exam, afebrile, hemodynamically stable, saturating well, NAD, well appearing, no increased WOB, head NCAT, EOMI grossly, anicteric, MM dry, no JVD, RRR, nml S1/S2, no m/r/g, lungs CTAB, no w/r/r, abd soft, NT, ND, nml BS, no rebound or guarding, AAO, CN's 3-12 grossly intact, no leg cyanosis or edema (chronic LE findings), skin warm, dry, no rashes or hives. Pt categorically denies actual SOB other than that related to anxiety with specific identifiable trigger. No e/o fluid overload. No signs of u/l edema on my exam, or suggestion of PE. Character low suspicion for ACS and ECG/trop unremarkable ___. No e/o PNA or PTX. No s/s's to suggest etiology to hyperglycemia such as fever or infectious s/s's. 79yoF with h/o HTN, HLD, DM, CVA with residual L weakness, presents with SOB and hyperglycemia. Glc was 300 today though refused transport to ED. Later was noted to be SOB, pt states she was just anxious and hyperventilating 2/2 having an argument with her son and this happens often - aide states she has been trying to reach son all day and he is unreachable. Pt also states she is sad due to a comment her son and daughter in law made to her. States she is on Zoloft, denies SI/HI. Denies SOB since then, CP, leg pain or swelling from baseline, fever, dysuria, vomiting, diarrhea, or any other symptoms. On exam, afebrile, hemodynamically stable, saturating well, NAD, well appearing, no increased WOB, head NCAT, EOMI grossly, anicteric, MM dry, no JVD, RRR, nml S1/S2, no m/r/g, lungs CTAB, no w/r/r, abd soft, NT, ND, nml BS, no rebound or guarding, AAO, CN's 3-12 grossly intact, no leg cyanosis or edema (chronic LE findings), skin warm, dry, no rashes or hives. Pt categorically denies actual SOB other than that related to anxiety with specific identifiable trigger. No e/o fluid overload. No signs of u/l edema on my exam, or suggestion of PE. Character low suspicion for ACS and ECG/trop unremarkable. No e/o PNA or PTX. Noted UTI which may explain patient's increased agitation and depression this past week. No fever or other signs of infection. 79yoF with h/o HTN, HLD, DM, CVA with residual L weakness, presents with SOB and hyperglycemia. Glc was 300 today though refused transport to ED. Later was noted to be SOB, pt states she was just anxious and hyperventilating 2/2 having an argument with her son and this happens often - aide states she has been trying to reach son all day and he is unreachable. Pt also states she is sad due to a comment her son and daughter in law made to her. States she is on Zoloft, denies SI/HI. Denies SOB since then, CP, leg pain or swelling from baseline, fever, dysuria, vomiting, diarrhea, or any other symptoms. On exam, afebrile, hemodynamically stable, saturating well, NAD, well appearing, no increased WOB, head NCAT, EOMI grossly, anicteric, MM dry, no JVD, RRR, nml S1/S2, no m/r/g, lungs CTAB, no w/r/r, abd soft, NT, ND, nml BS, no rebound or guarding, AAO, CN's 3-12 grossly intact, no leg cyanosis or edema (chronic LE findings), skin warm, dry, no rashes or hives. Pt categorically denies actual SOB other than that related to anxiety with specific identifiable trigger. No e/o fluid overload. No signs of u/l edema on my exam, or suggestion of PE. Character low suspicion for ACS and ECG/trop unremarkable. No e/o PNA or PTX. Noted UTI which may explain patient's increased agitation and depression this past week. No fever or other signs of infection. Son has concern as pt has stated would like to throw herself down the stairs, pt currently denying SI and have low suspicion for acute suicidality, psych will follow. Pt with multiple allergies and is resistant to the other IV abx, will admit for aztreonam. Patient well appearing, hemodynamically stable. Admitted to internal medicine for further monitoring, w/u, and care.

## 2019-08-25 NOTE — ED PROVIDER NOTE - PHYSICAL EXAMINATION
Constitutional: Well developed, well nourished. NAD  Head: Normocephalic, atraumatic.  Eyes: PERRL, EOMI.  ENT: No nasal discharge. Mucous membranes dry.  Neck: Supple. Painless ROM.  Cardiovascular:  Regular rate and rhythm.   Pulmonary: Lungs clear to auscultation bilaterally.  Abdominal: Soft. Nondistended. No rebound, guarding, rigidity.  Extremities. Pelvis stable. No lower extremity edema, symmetric calves.  Skin: No rashes, cyanosis.  Neuro: AAOx3. Left hemiplegia limited rom(baseline)  Psych: Normal mood. Normal affect.

## 2019-08-25 NOTE — ED PROVIDER NOTE - CHILD ABUSE FACILITY
Hpi Title: Evaluation of Skin Lesions Have Your Spot(S) Been Treated In The Past?: has not been treated [facility]

## 2019-08-25 NOTE — ED PROVIDER NOTE - NS ED ROS FT
Constitutional: No fever, chills.  Eyes: No visual changes.  ENT: No hearing changes.  Neck: No neck pain or stiffness.  Cardiovascular: No chest pain, palpitations, edema.  Pulmonary: see hpi  Abdominal:  No nausea, vomiting, diarrhea.  : No dysuria, frequency.  Neuro: No headache, syncope, dizziness.  MS: No back pain. No calf pain/swelling.  Psych: No suicidal ideations.

## 2019-08-25 NOTE — ED PROVIDER NOTE - OBJECTIVE STATEMENT
79 yold female to Ed Pmhx Htn, Iddm, Htn, Hld, Cva with Left hemiplegia, breast ca with rigth mastectoy, PPM; Pt biba with aide for evaluation of sob, hyperglycemia and anxiety; sx started today fs high as per aide - refused ems transfer this am and this evening after dinner developed sob/anxiety; pt denies chest pain, n/v/diaphroesis, neck or back pain, fever, chills; pt also depressed due to lack of immobility s/p cva with left hemiplegia

## 2019-08-25 NOTE — ED ADULT NURSE NOTE - INTERVENTIONS DEFINITIONS
[place] to call system/Call bell, personal items and telephone within reach/Instruct patient to call for assistance/Physically safe environment: no spills, clutter or unnecessary equipment/Stretcher in lowest position, wheels locked, appropriate side rails in place/Provide visual cue, wrist band, yellow gown, etc./Monitor gait and stability/Reinforce activity limits and safety measures with patient and family

## 2019-08-25 NOTE — ED PROVIDER NOTE - PROGRESS NOTE DETAILS
D/w son Robert ([phone]) who states over the past week past has become particularly aggressive with family and agitated and has threatened to throw herself down the stairs. Agrees with abx for UTI and psych c/s at this point as well as f/u with PMD f/u regarding possible dementia and depression.

## 2019-08-26 ENCOUNTER — TRANSCRIPTION ENCOUNTER (OUTPATIENT)
Age: 79
End: 2019-08-26

## 2019-08-26 DIAGNOSIS — I63.9 CEREBRAL INFARCTION, UNSPECIFIED: ICD-10-CM

## 2019-08-26 DIAGNOSIS — F32.2 MAJOR DEPRESSIVE DISORDER, SINGLE EPISODE, SEVERE WITHOUT PSYCHOTIC FEATURES: ICD-10-CM

## 2019-08-26 DIAGNOSIS — F43.22 ADJUSTMENT DISORDER WITH ANXIETY: ICD-10-CM

## 2019-08-26 LAB
ALBUMIN SERPL ELPH-MCNC: 4.3 G/DL — SIGNIFICANT CHANGE UP (ref 3.5–5.2)
ALP SERPL-CCNC: 121 U/L — HIGH (ref 30–115)
ALT FLD-CCNC: 11 U/L — SIGNIFICANT CHANGE UP (ref 0–41)
ANION GAP SERPL CALC-SCNC: 14 MMOL/L — SIGNIFICANT CHANGE UP (ref 7–14)
AST SERPL-CCNC: 15 U/L — SIGNIFICANT CHANGE UP (ref 0–41)
BILIRUB SERPL-MCNC: 0.3 MG/DL — SIGNIFICANT CHANGE UP (ref 0.2–1.2)
BLD GP AB SCN SERPL QL: SIGNIFICANT CHANGE UP
BUN SERPL-MCNC: 21 MG/DL — HIGH (ref 10–20)
CALCIUM SERPL-MCNC: 9.4 MG/DL — SIGNIFICANT CHANGE UP (ref 8.5–10.1)
CHLORIDE SERPL-SCNC: 101 MMOL/L — SIGNIFICANT CHANGE UP (ref 98–110)
CO2 SERPL-SCNC: 27 MMOL/L — SIGNIFICANT CHANGE UP (ref 17–32)
CREAT SERPL-MCNC: 1.5 MG/DL — SIGNIFICANT CHANGE UP (ref 0.7–1.5)
GLUCOSE BLDC GLUCOMTR-MCNC: 156 MG/DL — HIGH (ref 70–99)
GLUCOSE BLDC GLUCOMTR-MCNC: 156 MG/DL — HIGH (ref 70–99)
GLUCOSE BLDC GLUCOMTR-MCNC: 262 MG/DL — HIGH (ref 70–99)
GLUCOSE BLDC GLUCOMTR-MCNC: 279 MG/DL — HIGH (ref 70–99)
GLUCOSE BLDC GLUCOMTR-MCNC: 301 MG/DL — HIGH (ref 70–99)
GLUCOSE SERPL-MCNC: 284 MG/DL — HIGH (ref 70–99)
POTASSIUM SERPL-MCNC: 3.6 MMOL/L — SIGNIFICANT CHANGE UP (ref 3.5–5)
POTASSIUM SERPL-SCNC: 3.6 MMOL/L — SIGNIFICANT CHANGE UP (ref 3.5–5)
PROT SERPL-MCNC: 6.8 G/DL — SIGNIFICANT CHANGE UP (ref 6–8)
SODIUM SERPL-SCNC: 142 MMOL/L — SIGNIFICANT CHANGE UP (ref 135–146)

## 2019-08-26 PROCEDURE — 73030 X-RAY EXAM OF SHOULDER: CPT | Mod: 26,LT

## 2019-08-26 PROCEDURE — 70450 CT HEAD/BRAIN W/O DYE: CPT | Mod: 26

## 2019-08-26 PROCEDURE — 99222 1ST HOSP IP/OBS MODERATE 55: CPT | Mod: GC

## 2019-08-26 RX ORDER — DEXTROSE 50 % IN WATER 50 %
12.5 SYRINGE (ML) INTRAVENOUS ONCE
Refills: 0 | Status: DISCONTINUED | OUTPATIENT
Start: 2019-08-26 | End: 2019-08-27

## 2019-08-26 RX ORDER — HEPARIN SODIUM 5000 [USP'U]/ML
5000 INJECTION INTRAVENOUS; SUBCUTANEOUS EVERY 12 HOURS
Refills: 0 | Status: DISCONTINUED | OUTPATIENT
Start: 2019-08-26 | End: 2019-08-27

## 2019-08-26 RX ORDER — DEXTROSE 50 % IN WATER 50 %
25 SYRINGE (ML) INTRAVENOUS ONCE
Refills: 0 | Status: DISCONTINUED | OUTPATIENT
Start: 2019-08-26 | End: 2019-08-27

## 2019-08-26 RX ORDER — ASPIRIN/CALCIUM CARB/MAGNESIUM 324 MG
81 TABLET ORAL DAILY
Refills: 0 | Status: DISCONTINUED | OUTPATIENT
Start: 2019-08-26 | End: 2019-08-26

## 2019-08-26 RX ORDER — AZTREONAM 2 G
VIAL (EA) INJECTION
Refills: 0 | Status: DISCONTINUED | OUTPATIENT
Start: 2019-08-26 | End: 2019-08-26

## 2019-08-26 RX ORDER — MEROPENEM 1 G/30ML
1000 INJECTION INTRAVENOUS EVERY 12 HOURS
Refills: 0 | Status: DISCONTINUED | OUTPATIENT
Start: 2019-08-26 | End: 2019-08-26

## 2019-08-26 RX ORDER — INSULIN GLARGINE 100 [IU]/ML
18 INJECTION, SOLUTION SUBCUTANEOUS AT BEDTIME
Refills: 0 | Status: DISCONTINUED | OUTPATIENT
Start: 2019-08-26 | End: 2019-08-27

## 2019-08-26 RX ORDER — AZTREONAM 2 G
1000 VIAL (EA) INJECTION ONCE
Refills: 0 | Status: COMPLETED | OUTPATIENT
Start: 2019-08-26 | End: 2019-08-26

## 2019-08-26 RX ORDER — AZTREONAM 2 G
1000 VIAL (EA) INJECTION EVERY 12 HOURS
Refills: 0 | Status: DISCONTINUED | OUTPATIENT
Start: 2019-08-26 | End: 2019-08-26

## 2019-08-26 RX ORDER — INSULIN LISPRO 100/ML
7 VIAL (ML) SUBCUTANEOUS
Refills: 0 | Status: DISCONTINUED | OUTPATIENT
Start: 2019-08-26 | End: 2019-08-27

## 2019-08-26 RX ORDER — ACETAMINOPHEN 500 MG
2 TABLET ORAL
Qty: 0 | Refills: 0 | DISCHARGE
Start: 2019-08-26

## 2019-08-26 RX ORDER — GLUCAGON INJECTION, SOLUTION 0.5 MG/.1ML
1 INJECTION, SOLUTION SUBCUTANEOUS ONCE
Refills: 0 | Status: DISCONTINUED | OUTPATIENT
Start: 2019-08-26 | End: 2019-08-27

## 2019-08-26 RX ORDER — CLOPIDOGREL BISULFATE 75 MG/1
75 TABLET, FILM COATED ORAL DAILY
Refills: 0 | Status: DISCONTINUED | OUTPATIENT
Start: 2019-08-26 | End: 2019-08-26

## 2019-08-26 RX ORDER — HYDROXYZINE HCL 10 MG
25 TABLET ORAL EVERY 8 HOURS
Refills: 0 | Status: DISCONTINUED | OUTPATIENT
Start: 2019-08-26 | End: 2019-08-27

## 2019-08-26 RX ORDER — DOCUSATE SODIUM 100 MG
100 CAPSULE ORAL DAILY
Refills: 0 | Status: DISCONTINUED | OUTPATIENT
Start: 2019-08-26 | End: 2019-08-27

## 2019-08-26 RX ORDER — INSULIN LISPRO 100/ML
VIAL (ML) SUBCUTANEOUS
Refills: 0 | Status: DISCONTINUED | OUTPATIENT
Start: 2019-08-26 | End: 2019-08-27

## 2019-08-26 RX ORDER — CLONAZEPAM 1 MG
0.25 TABLET ORAL AT BEDTIME
Refills: 0 | Status: DISCONTINUED | OUTPATIENT
Start: 2019-08-26 | End: 2019-08-27

## 2019-08-26 RX ORDER — ASPIRIN/CALCIUM CARB/MAGNESIUM 324 MG
162 TABLET ORAL DAILY
Refills: 0 | Status: DISCONTINUED | OUTPATIENT
Start: 2019-08-26 | End: 2019-08-26

## 2019-08-26 RX ORDER — ACETAMINOPHEN 500 MG
650 TABLET ORAL EVERY 6 HOURS
Refills: 0 | Status: DISCONTINUED | OUTPATIENT
Start: 2019-08-26 | End: 2019-08-27

## 2019-08-26 RX ORDER — SODIUM CHLORIDE 9 MG/ML
1000 INJECTION, SOLUTION INTRAVENOUS
Refills: 0 | Status: DISCONTINUED | OUTPATIENT
Start: 2019-08-26 | End: 2019-08-27

## 2019-08-26 RX ORDER — SENNA PLUS 8.6 MG/1
2 TABLET ORAL AT BEDTIME
Refills: 0 | Status: DISCONTINUED | OUTPATIENT
Start: 2019-08-26 | End: 2019-08-27

## 2019-08-26 RX ORDER — NORTRIPTYLINE HYDROCHLORIDE 10 MG/1
25 CAPSULE ORAL AT BEDTIME
Refills: 0 | Status: DISCONTINUED | OUTPATIENT
Start: 2019-08-26 | End: 2019-08-27

## 2019-08-26 RX ORDER — ASPIRIN/CALCIUM CARB/MAGNESIUM 324 MG
162 TABLET ORAL DAILY
Refills: 0 | Status: DISCONTINUED | OUTPATIENT
Start: 2019-08-26 | End: 2019-08-27

## 2019-08-26 RX ORDER — PANTOPRAZOLE SODIUM 20 MG/1
40 TABLET, DELAYED RELEASE ORAL
Refills: 0 | Status: DISCONTINUED | OUTPATIENT
Start: 2019-08-26 | End: 2019-08-27

## 2019-08-26 RX ORDER — DEXTROSE 50 % IN WATER 50 %
15 SYRINGE (ML) INTRAVENOUS ONCE
Refills: 0 | Status: DISCONTINUED | OUTPATIENT
Start: 2019-08-26 | End: 2019-08-27

## 2019-08-26 RX ORDER — CHLORHEXIDINE GLUCONATE 213 G/1000ML
1 SOLUTION TOPICAL
Refills: 0 | Status: DISCONTINUED | OUTPATIENT
Start: 2019-08-26 | End: 2019-08-27

## 2019-08-26 RX ORDER — AMLODIPINE BESYLATE 2.5 MG/1
10 TABLET ORAL DAILY
Refills: 0 | Status: DISCONTINUED | OUTPATIENT
Start: 2019-08-26 | End: 2019-08-27

## 2019-08-26 RX ORDER — ATORVASTATIN CALCIUM 80 MG/1
80 TABLET, FILM COATED ORAL AT BEDTIME
Refills: 0 | Status: DISCONTINUED | OUTPATIENT
Start: 2019-08-26 | End: 2019-08-27

## 2019-08-26 RX ORDER — SERTRALINE 25 MG/1
100 TABLET, FILM COATED ORAL DAILY
Refills: 0 | Status: DISCONTINUED | OUTPATIENT
Start: 2019-08-26 | End: 2019-08-27

## 2019-08-26 RX ADMIN — INSULIN GLARGINE 18 UNIT(S): 100 INJECTION, SOLUTION SUBCUTANEOUS at 21:56

## 2019-08-26 RX ADMIN — ATORVASTATIN CALCIUM 80 MILLIGRAM(S): 80 TABLET, FILM COATED ORAL at 21:56

## 2019-08-26 RX ADMIN — NORTRIPTYLINE HYDROCHLORIDE 25 MILLIGRAM(S): 10 CAPSULE ORAL at 21:55

## 2019-08-26 RX ADMIN — Medication 7 UNIT(S): at 12:13

## 2019-08-26 RX ADMIN — Medication 50 MILLIGRAM(S): at 06:17

## 2019-08-26 RX ADMIN — Medication 4: at 08:41

## 2019-08-26 RX ADMIN — Medication 100 MILLIGRAM(S): at 11:26

## 2019-08-26 RX ADMIN — Medication 7 UNIT(S): at 17:12

## 2019-08-26 RX ADMIN — Medication 1: at 17:12

## 2019-08-26 RX ADMIN — HEPARIN SODIUM 5000 UNIT(S): 5000 INJECTION INTRAVENOUS; SUBCUTANEOUS at 06:19

## 2019-08-26 RX ADMIN — Medication 0.25 MILLIGRAM(S): at 22:22

## 2019-08-26 RX ADMIN — SERTRALINE 100 MILLIGRAM(S): 25 TABLET, FILM COATED ORAL at 11:26

## 2019-08-26 RX ADMIN — AMLODIPINE BESYLATE 10 MILLIGRAM(S): 2.5 TABLET ORAL at 06:17

## 2019-08-26 RX ADMIN — HEPARIN SODIUM 5000 UNIT(S): 5000 INJECTION INTRAVENOUS; SUBCUTANEOUS at 17:13

## 2019-08-26 RX ADMIN — SENNA PLUS 2 TABLET(S): 8.6 TABLET ORAL at 21:55

## 2019-08-26 RX ADMIN — Medication 162 MILLIGRAM(S): at 11:26

## 2019-08-26 RX ADMIN — CHLORHEXIDINE GLUCONATE 1 APPLICATION(S): 213 SOLUTION TOPICAL at 06:17

## 2019-08-26 RX ADMIN — Medication 3: at 12:14

## 2019-08-26 RX ADMIN — Medication 7 UNIT(S): at 09:32

## 2019-08-26 RX ADMIN — PANTOPRAZOLE SODIUM 40 MILLIGRAM(S): 20 TABLET, DELAYED RELEASE ORAL at 06:17

## 2019-08-26 NOTE — PHYSICAL THERAPY INITIAL EVALUATION ADULT - SPECIFY REASON(S)
As per pt, has not been ambulating for 2-3 years now; onel-lift dependent at home, with 24/7 caregiver. Pt. does not require skilled PT at this time.

## 2019-08-26 NOTE — CHART NOTE - NSCHARTNOTEFT_GEN_A_CORE
<<<RESIDENT DISCHARGE NOTE>>>     JAYASHREE FAGAN  MRN-[id number]    VITAL SIGNS:  T(F): 99.2 (08-26-19 @ 05:38), Max: 99.2 (08-26-19 @ 05:38)  HR: 101 (08-26-19 @ 05:38)  BP: 157/90 (08-26-19 @ 05:38)  SpO2: 97% (08-25-19 @ 19:18)  Weight (kg): 75 (08-26-19 @ 10:48)  BMI (kg/m2): 32.3 (08-26-19 @ 10:48)    PHYSICAL EXAMINATION:  General: No acute distress, denies pain  Head & Neck: Atraumatic, no bruits, no thyromegaly  Pulmonary: Lung sounds clear and equal bilaterally, no crackles or wheeze  Cardiovascular: Regular rate and rhythm, no bruits, murmurs, or rubs  Gastrointestinal/Abdomen & Pelvis: Nondistended, nontender to palpation, no bruits, no masses  Neurologic/Motor: AAOx3, no tremors, no fasciculations, CN II-XII intact bilaterally     TEST RESULTS:                        9.9    8.58  )-----------( 266      ( 25 Aug 2019 20:45 )             29.8       08-26    142  |  101  |  21<H>  ----------------------------<  284<H>  3.6   |  27  |  1.5    Ca    9.4      26 Aug 2019 05:47    TPro  6.8  /  Alb  4.3  /  TBili  0.3  /  DBili  x   /  AST  15  /  ALT  11  /  AlkPhos  121<H>  08-26      FINAL DISCHARGE INTERVIEW:  Resident(s) Present: (Name:_____Dr. Michael Morton MD________), RN Present: (Name:  ___RN_______)    DISCHARGE MEDICATION RECONCILIATION  reviewed with Attending (Name:____Dr. Tony MD_______)    DISPOSITION:   [ X ] Home,    [  ] Home with Visiting Nursing Services,   [    ]  SNF/ NH,    [   ] Acute Rehab (4A),   [   ] Other (Specify:_________)

## 2019-08-26 NOTE — BEHAVIORAL HEALTH ASSESSMENT NOTE - SUMMARY
79-year-old female, domiciled alone in [facility], with 24/7 home health aid, mother of two children (aged 44 and 45), retired , Druze katelyn (with strong belief) with a pmh of T2DM, HTN, psychiatric history of anxiety, without previous IPP admission, admitted to medicine for treatment of urinary tract infection, psychiatry consulted for reported suicidality on admission.     Joint assessment by attending psychiatrist and resident:   Based on our assessment, the patient is not acutely suicidal, she does not recall making such statements in the ED, direct care physicians (medical resident and medical attending) have not heard the patient making any of such statements. There appears to be no acute mood disturbance at this time. Affective instability is present as evidence of tearfulness when discussing daughters comments, however, she is bright when talking about her grandchildren.     There is no indication for additional pharmacotherapy in this patient.   Please note, the patient has a prolonged QTc of 517 and had a similar reading in July 22 (however reading on July 15) was WNL. Tricyclic antidepressant such as nortriptyline can prolong QT interval, would recommend very cautious use of this. 79-year-old female, domiciled alone in [facility], with 24/7 home health aid, mother of two children (aged 44 and 45), retired , Zoroastrian katelyn (with strong belief) with a pmh of T2DM, HTN, psychiatric history of anxiety, without previous IPP admission, admitted to medicine for treatment of urinary tract infection, psychiatry consulted for reported suicidality on admission.     Joint assessment by attending psychiatrist and resident:   Based on our assessment, the patient is not acutely suicidal, she does not recall making such statements in the ED, direct care physicians (medical resident and medical attending) have not heard the patient making any of such statements. There appears to be no acute mood disturbance at this time. Affective instability is present as evidence of tearfulness when discussing daughters comments, however, she is bright when talking about her grandchildren.     There is no indication for additional pharmacotherapy in this patient.   Please note, the patient has a prolonged QTc of 517 and had a similar reading in July. Tricyclic antidepressant such as nortriptyline can prolong QT interval, would recommend very cautious use of this, please consider holding this medication with restarting after repeat EKGs. Also recommend chart review for offending agents with risk/benefit consideration. 79-year-old female, domiciled alone in [facility], with 24/7 home health aid, mother of two children (aged 44 and 45), retired , [employer] katelyn (with strong belief) with a pmh of T2DM, HTN, psychiatric history of anxiety, without previous IPP admission, admitted to medicine for treatment of urinary tract infection, psychiatry consulted for reported suicidality on admission.     Joint assessment by attending psychiatrist and resident:   Based on our assessment, the patient is not acutely suicidal, she does not recall making such statements in the ED, direct care physicians (medical resident and medical attending) have not heard the patient making any of such statements. There appears to be no acute mood disturbance at this time. Affective instability is present as evidence of tearfulness when discussing daughters comments, however, she is bright when talking about her grandchildren.     There is no indication for additional pharmacotherapy in this patient.   Please note, the patient has a prolonged QTc of 517 and had a similar reading in July. Tricyclic antidepressant such as nortriptyline can prolong QT interval. Please contact the PMD and inform them of the reading so this can be monitored and the risk/benefits can be evaluated.

## 2019-08-26 NOTE — DISCHARGE NOTE PROVIDER - NSDCFUSCHEDAPPT_GEN_ALL_CORE_FT
JAYASHREE FAGAN ; 08/28/2019 ; [facility] [address] ; 09/12/2019 ; [facility] [address]

## 2019-08-26 NOTE — BEHAVIORAL HEALTH ASSESSMENT NOTE - NSBHCONSULTPRIMARYDISCUSSYES_PSY_A_CORE FT
Spoke with Dr. Morton, discussed prolonged QTc and need for medication review. Spoke with Dr. Morton, discussed prolonged QTc and need for medication review and contact with outpatient prescriber.

## 2019-08-26 NOTE — BEHAVIORAL HEALTH ASSESSMENT NOTE - HPI (INCLUDE ILLNESS QUALITY, SEVERITY, DURATION, TIMING, CONTEXT, MODIFYING FACTORS, ASSOCIATED SIGNS AND SYMPTOMS)
79-year-old female, domiciled alone in [facility], with 24/7 home health aid, mother of two children (aged 44 and 45), retired , Faith katelyn (with strong belief) with a pmh of T2DM, HTN, psychiatric history of anxiety, without previous IPP admission, admitted to medicine for treatment of urinary tract infection, psychiatry consulted for reported suicidality on admission.     Patient seen bedside, appears calm, euthymic, eating breakfast, cooperative with interview. She is alert, oriented to date (month and year but note date, appropriate), place, and person.     States that she was talking to her daughter shortly before admission, got in an argument and was told that "you think are a good person, you are not you are a bad person, you are mean, and ugly." She states that this made her very upset, however she was cheered up by her grandkids when they came to play with her. She does not recall making and statements of suicidality in the emergency room and states "I would never do that, I love god"    Denies current use of substances (including alcohol, nicotine), acute sx of psychosis, acute sx of yakov.

## 2019-08-26 NOTE — H&P ADULT - NSHPLABSRESULTS_GEN_ALL_CORE
Complete Blood Count + Automated Diff (08.25.19 @ 20:45)    WBC Count: 8.58 K/uL    RBC Count: 3.19 M/uL    Hemoglobin: 9.9 g/dL    Hematocrit: 29.8 %    Mean Cell Volume: 93.4 fL    Mean Cell Hemoglobin: 31.0 pg    Mean Cell Hemoglobin Conc: 33.2 g/dL    Red Cell Distrib Width: 13.5 %    Platelet Count - Automated: 266 K/uL    Auto Neutrophil #: 6.10 K/uL    Auto Lymphocyte #: 1.24 K/uL    Auto Monocyte #: 0.87 K/uL    Auto Eosinophil #: 0.30 K/uL    Auto Basophil #: 0.04 K/uL    Auto Neutrophil %: 71.1: Differential percentages must be correlated with absolute numbers for  clinical significance. %    Auto Lymphocyte %: 14.5 %    Auto Monocyte %: 10.1 %    Auto Eosinophil %: 3.5 %    Auto Basophil %: 0.5 %    Auto Immature Granulocyte %: 0.3 %    Nucleated RBC: 0 /100 WBCs  Comprehensive Metabolic Panel (08.25.19 @ 20:45)    Sodium, Serum: 138 mmol/L    Potassium, Serum: 8.1: Hemolyzed. Interpret with caution  Critical value:GROSSLY HEMOLYZED. mmol/L    Chloride, Serum: 96 mmol/L    Carbon Dioxide, Serum: 27 mmol/L    Anion Gap, Serum: 15 mmol/L    Blood Urea Nitrogen, Serum: 20 mg/dL    Creatinine, Serum: 1.6 mg/dL    Glucose, Serum: 196 mg/dL    Calcium, Total Serum: 9.9 mg/dL    Protein Total, Serum: 8.2: Hemolyzed. Interpret with caution g/dL    Albumin, Serum: 4.7 g/dL    Bilirubin Total, Serum: 0.4 mg/dL    Alkaline Phosphatase, Serum: 126: Hemolyzed. Interpret with caution U/L    Aspartate Aminotransferase (AST/SGOT): 113: Hemolyzed. Interpret with caution U/L    Alanine Aminotransferase (ALT/SGPT): 22: Hemolyzed. Interpret with caution U/L    eGFR if Non : 30: Interpretative comment  The units for eGFR are mL/min/1.73M2 (normalized body surface area). The  eGFR is calculated from a serum creatinine using the CKD-EPI equation.  Other variables required for calculation are race, age and sex. Among  patients with chronic kidney disease (CKD), the eGFR is useful in  determining the stage of disease according to KDOQI CKD classification.  All eGFR results are reported numerically with the following  interpretation.          GFR                    With                 Without     (ml/min/1.73 m2)    Kidney Damage       Kidney Damage        >= 90                    Stage 1                     Normal        60-89                    Stage 2                     Decreased GFR        30-59     Stage 3                     Stage 3        15-29                    Stage 4                     Stage 4        < 15                      Stage 5                     Stage 5  Each stage of CKD assumes that the associated GFR level has been in  effect for at least 3 months. Determination of stages one and two (with  eGFR > 59 ml/min/m2) requires estimation of kidney damage for at least 3  months as defined by structural or functional abnormalities.  Limitations: All estimates of GFR will be less accurate for patients at  extremes of muscle mass (including but not limited to frail elderly,  critically ill, or cancer patients), those with unusual diets, and those  with conditions associated with reduced secretion or extrarenal  elimination of creatinine. The eGFR equation is not recommended for use  in patients with unstable creatinine levels. mL/min/1.73M2    eGFR if African American: 35 mL/min/1.73M2  < from: Transthoracic Echocardiogram (07.23.19 @ 11:58) >      Summary:   1. Left ventricular ejection fraction, by visual estimation, is 50 to   55%.   2. Normal global left ventricular systolic function.   3. Mild mitral annular calcification.   4. Sclerotic aortic valve with normal opening.    < end of copied text >

## 2019-08-26 NOTE — H&P ADULT - NSHPPHYSICALEXAM_GEN_ALL_CORE
GENERAL: NAD, alert oriented X4 (name, place, time and good past memory)  HEENT: No lymphadenopathy   CHEST: Clear to auscultate bilaterally   HEART: Normal S1 and S2  ABDOMEN: Soft, distended.   EXTREMITIES: Left side upper and lower extremity weakness at baseline. No edema noted  SKIN: No rash

## 2019-08-26 NOTE — BEHAVIORAL HEALTH ASSESSMENT NOTE - NSBHCONSULTMEDS_PSY_A_CORE FT
Continued home medication with medication review for potential causes of QTc prolongation including amitriptyline and Zoloft, with risk/benefit discussion with patient. Could hold Nortriptyline to address this. Continued home medication with medication review for potential causes of QTc prolongation including amitriptyline, with risk/benefit discussion with patient. Could hold Nortriptyline to address this. Continued home medication with medication review for potential causes of QTc prolongation including amitriptyline, with risk/benefit discussion and discussion with prescriber.

## 2019-08-26 NOTE — BEHAVIORAL HEALTH ASSESSMENT NOTE - NSBHSUICPROTECTFACT_PSY_A_CORE
Responsibility to family and others/Identifies reasons for living/Future oriented Responsibility to family and others/Identifies reasons for living/Future oriented/High spirituality

## 2019-08-26 NOTE — DISCHARGE NOTE PROVIDER - NSDCFUADDINST_GEN_ALL_CORE_FT
Please follow up with your primary care physician within 1 week of your discharge from [facility]. Please take all medications as prescribed. If you experience any worsening or recurrence of your symptoms, please call 9-1-1 immediately or report to the nearest Emergency Department. If you have any questions or concerns, please do not hesitate to call the hospital at [phone].

## 2019-08-26 NOTE — H&P ADULT - HISTORY OF PRESENT ILLNESS
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 As per the patient she has been having an arguments with her son over her late  (told son that his father had extra-marital affair), has been depressed and anxious because as per the patient, son is avoiding her. Also it was reported that she wanted to jump off the stairs but currently she denies any suicidal or self harming thoughts. She complains of left shoulder pain, denies any chest pain, racing of heart, denies abdominal pain.

## 2019-08-26 NOTE — DISCHARGE NOTE PROVIDER - HOSPITAL COURSE
Patient is an 79-year-old female with a past medical history of depression, DM, HTN, CVA in 2018, anxiety presented to the hospital after having an argument with her son regarding her late . She became very anxious and labile in mood. She reported that she wanted to off the stairs, which prompted her visit to the ED. She was not found to be in any immediate distress or danger, and denied SI in the ED and on the medical floor. She was evaluated by Psychiatry on the medical floor. They did not find evidence of SI or any threat to herself. She was found to have asymptomatic bacteruria on UA. She was empirically treated with Aztreonam as she is allergic to PCN. Her urine culture subsequently demonstrated growth of ESBL. Given that this was symptomatic, the medical attending ordered that no treatment be pursued. The problems the patient was treated for in te hospital include:         1. Asymptomatic ESBL Bacteruria     -No symptoms of urethritis, cystitis, or pyelonephritis, no sepsis. Vitals stable. Treatment was initiated but soon withdrew as she was exhibiting no symptoms and was of sound mental status. She was monitored for any change in status or functionality while inpatient. Vitals were monitored and wewre satisfactory.

## 2019-08-26 NOTE — PHYSICAL THERAPY INITIAL EVALUATION ADULT - GENERAL OBSERVATIONS, REHAB EVAL
[id number] noon Chart reviewed. Pt. seen semirecline in bed , in No apparent distress, + left hemiparesis, IV lock, Pt. agreed to activity/therapy.

## 2019-08-26 NOTE — BEHAVIORAL HEALTH ASSESSMENT NOTE - NSBHCONSULTFOLLOWAFTERCARE_PSY_A_CORE FT
Follow up with Primary care physician.   Please provide patient information for [facility]:  [address], [place] [address]  Open · Closes 5PM  Phone: [phone] Follow up with Primary care physician.   Please provide patient information for [facility] which can provide in-home psychiatric services:  [address] [facility], [place] [address]  Open · Closes 5PM  Phone: [phone]

## 2019-08-26 NOTE — H&P ADULT - ASSESSMENT
#Asymptomatic Bacteuria   -UA positive. No symptoms of Cystitis.  -Will keep on Aztreonam for now as patient is anaphylactic to Penicillin. Last Urine culture grew ESBL.  -Follow up with urine cultures and blood cultures.  -Follow up Left shoulder Xray. Patient complaining of pain.   -Follow up with Alkaline phosphatase and AST    # History of anxiety/depression  -Continue with home medications.   -ER consulted psych for further evaluation. Patient not suicidal at this time.      # History of Mobitz II  2:1  with symptomatic bradycardia s/p Pacemaker placement.  -Stable.    # Normocytic Anaemia likely Anemia of chronic disease    - Hgb Stable   -maintain type and screen and transfuse if Hemoglobin below 8.    # DM   -Continue with Insulin Regimen.    # HTN controlled   - c/w current meds amlodipine     # HO CVA with residual   - c/w ASA 162mg and Statin     # DVT prophylaxis  -On Heparin SQ

## 2019-08-26 NOTE — H&P ADULT - NSICDXPASTMEDICALHX_GEN_ALL_CORE_FT
PAST MEDICAL HISTORY:  Bilateral hearing loss, unspecified hearing loss type     BP (high blood pressure) 20 yr    Breast CA 2014 s/p rt    CVA (cerebral vascular accident) 6/18 lft weakness    Depression     DM (diabetes mellitus) 24 yr    High cholesterol

## 2019-08-26 NOTE — H&P ADULT - ATTENDING COMMENTS
I agree with the above history ,physical and plan which I Have reviewed and examined independently    further plan see my notes

## 2019-08-26 NOTE — BEHAVIORAL HEALTH ASSESSMENT NOTE - NSBHCHARTREVIEWLAB_PSY_A_CORE FT
Urinalysis (08.25.19 @ 21:30)    Glucose Qualitative, Urine: Negative    Blood, Urine: Trace    pH Urine: 7.0    Color: Yellow    Urine Appearance: Turbid    Bilirubin: Negative    Ketone - Urine: Negative    Specific Gravity: 1.010    Protein, Urine: 30 mg/dL    Urobilinogen: <2 mg/dL    Nitrite: Negative    Leukocyte Esterase Concentration: Large

## 2019-08-26 NOTE — BEHAVIORAL HEALTH ASSESSMENT NOTE - NSBHCHARTREVIEWIMAGING_PSY_A_CORE FT
CT Head No Cont (08.26.19 @ 00:03) >  IMPRESSION:  No CT evidenceof acute intracranial pathology.   Parenchymal atrophy and chronic microvascular ischemic changes.

## 2019-08-26 NOTE — BEHAVIORAL HEALTH ASSESSMENT NOTE - RISK ASSESSMENT
Risk Factors: Affective instability, interpersonal distress with daughter, anxiety, history of anxiety and depression    Protective factors: No acute mood disturbance, no stated suicidality, future oriented toward playing with grandkids, compliant with psychiatric treatment and follow up.     Based on the above, the patient does not meet criteria for inpatient psychiatric admission.

## 2019-08-26 NOTE — DISCHARGE NOTE PROVIDER - CARE PROVIDER_API CALL
Ariel Macias)  Internal Medicine  [address] [place] [address]  Phone: [phone]  Fax: [phone]  Follow Up Time: 1 week

## 2019-08-26 NOTE — BEHAVIORAL HEALTH ASSESSMENT NOTE - NSBHCHARTREVIEWVS_PSY_A_CORE FT
Vital Signs Last 24 Hrs  T(C): 37.3 (26 Aug 2019 05:38), Max: 37.3 (26 Aug 2019 05:38)  T(F): 99.2 (26 Aug 2019 05:38), Max: 99.2 (26 Aug 2019 05:38)  HR: 101 (26 Aug 2019 05:38) (95 - 101)  BP: 157/90 (26 Aug 2019 05:38) (144/76 - 157/90)  BP(mean): --  RR: 18 (26 Aug 2019 05:38) (18 - 18)  SpO2: 97% (25 Aug 2019 19:18) (97% - 97%)

## 2019-08-26 NOTE — BEHAVIORAL HEALTH ASSESSMENT NOTE - OTHER PAST PSYCHIATRIC HISTORY (INCLUDE DETAILS REGARDING ONSET, COURSE OF ILLNESS, INPATIENT/OUTPATIENT TREATMENT)
History of anxiety, states she was previously treated by Dr. Bereket Worley, last saw several years ago, states she takes Zoloft, prescribed by her PCP. No previous IPP admission, no previous suicide attempts.

## 2019-08-26 NOTE — DISCHARGE NOTE PROVIDER - NSDCCPCAREPLAN_GEN_ALL_CORE_FT
PRINCIPAL DISCHARGE DIAGNOSIS  Diagnosis: UTI (urinary tract infection)  Assessment and Plan of Treatment:   1. Asymptomatic ESBL Bacteruria   -No symptoms of urethritis, cystitis, or pyelonephritis, no sepsis. Vitals stable. Treatment was initiated but soon withdrew as she was exhibiting no symptoms and was of sound mental status. She was monitored for any change in status or functionality while inpatient. Vitals were monitored and were satisfactory.

## 2019-08-27 ENCOUNTER — TRANSCRIPTION ENCOUNTER (OUTPATIENT)
Age: 79
End: 2019-08-27

## 2019-08-27 VITALS
TEMPERATURE: 99 F | HEART RATE: 86 BPM | SYSTOLIC BLOOD PRESSURE: 170 MMHG | DIASTOLIC BLOOD PRESSURE: 77 MMHG | RESPIRATION RATE: 18 BRPM

## 2019-08-27 LAB
ALBUMIN SERPL ELPH-MCNC: 3.9 G/DL — SIGNIFICANT CHANGE UP (ref 3.5–5.2)
ALP SERPL-CCNC: 107 U/L — SIGNIFICANT CHANGE UP (ref 30–115)
ALT FLD-CCNC: 9 U/L — SIGNIFICANT CHANGE UP (ref 0–41)
ANION GAP SERPL CALC-SCNC: 13 MMOL/L — SIGNIFICANT CHANGE UP (ref 7–14)
AST SERPL-CCNC: 15 U/L — SIGNIFICANT CHANGE UP (ref 0–41)
BASOPHILS # BLD AUTO: 0.03 K/UL — SIGNIFICANT CHANGE UP (ref 0–0.2)
BASOPHILS NFR BLD AUTO: 0.5 % — SIGNIFICANT CHANGE UP (ref 0–1)
BILIRUB SERPL-MCNC: 0.3 MG/DL — SIGNIFICANT CHANGE UP (ref 0.2–1.2)
BUN SERPL-MCNC: 20 MG/DL — SIGNIFICANT CHANGE UP (ref 10–20)
CALCIUM SERPL-MCNC: 9.4 MG/DL — SIGNIFICANT CHANGE UP (ref 8.5–10.1)
CHLORIDE SERPL-SCNC: 103 MMOL/L — SIGNIFICANT CHANGE UP (ref 98–110)
CO2 SERPL-SCNC: 27 MMOL/L — SIGNIFICANT CHANGE UP (ref 17–32)
CREAT SERPL-MCNC: 1.3 MG/DL — SIGNIFICANT CHANGE UP (ref 0.7–1.5)
EOSINOPHIL # BLD AUTO: 0.33 K/UL — SIGNIFICANT CHANGE UP (ref 0–0.7)
EOSINOPHIL NFR BLD AUTO: 5.7 % — SIGNIFICANT CHANGE UP (ref 0–8)
GLUCOSE BLDC GLUCOMTR-MCNC: 111 MG/DL — HIGH (ref 70–99)
GLUCOSE BLDC GLUCOMTR-MCNC: 119 MG/DL — HIGH (ref 70–99)
GLUCOSE SERPL-MCNC: 105 MG/DL — HIGH (ref 70–99)
HCT VFR BLD CALC: 25.4 % — LOW (ref 37–47)
HGB BLD-MCNC: 8.1 G/DL — LOW (ref 12–16)
IMM GRANULOCYTES NFR BLD AUTO: 0.3 % — SIGNIFICANT CHANGE UP (ref 0.1–0.3)
LYMPHOCYTES # BLD AUTO: 0.98 K/UL — LOW (ref 1.2–3.4)
LYMPHOCYTES # BLD AUTO: 16.8 % — LOW (ref 20.5–51.1)
MAGNESIUM SERPL-MCNC: 2.1 MG/DL — SIGNIFICANT CHANGE UP (ref 1.8–2.4)
MCHC RBC-ENTMCNC: 30 PG — SIGNIFICANT CHANGE UP (ref 27–31)
MCHC RBC-ENTMCNC: 31.9 G/DL — LOW (ref 32–37)
MCV RBC AUTO: 94.1 FL — SIGNIFICANT CHANGE UP (ref 81–99)
MONOCYTES # BLD AUTO: 0.73 K/UL — HIGH (ref 0.1–0.6)
MONOCYTES NFR BLD AUTO: 12.5 % — HIGH (ref 1.7–9.3)
NEUTROPHILS # BLD AUTO: 3.75 K/UL — SIGNIFICANT CHANGE UP (ref 1.4–6.5)
NEUTROPHILS NFR BLD AUTO: 64.2 % — SIGNIFICANT CHANGE UP (ref 42.2–75.2)
NRBC # BLD: 0 /100 WBCS — SIGNIFICANT CHANGE UP (ref 0–0)
PHOSPHATE SERPL-MCNC: 4.9 MG/DL — SIGNIFICANT CHANGE UP (ref 2.1–4.9)
PLATELET # BLD AUTO: 219 K/UL — SIGNIFICANT CHANGE UP (ref 130–400)
POTASSIUM SERPL-MCNC: 3.7 MMOL/L — SIGNIFICANT CHANGE UP (ref 3.5–5)
POTASSIUM SERPL-SCNC: 3.7 MMOL/L — SIGNIFICANT CHANGE UP (ref 3.5–5)
PROT SERPL-MCNC: 6.3 G/DL — SIGNIFICANT CHANGE UP (ref 6–8)
RBC # BLD: 2.7 M/UL — LOW (ref 4.2–5.4)
RBC # FLD: 13.5 % — SIGNIFICANT CHANGE UP (ref 11.5–14.5)
SODIUM SERPL-SCNC: 143 MMOL/L — SIGNIFICANT CHANGE UP (ref 135–146)
WBC # BLD: 5.84 K/UL — SIGNIFICANT CHANGE UP (ref 4.8–10.8)
WBC # FLD AUTO: 5.84 K/UL — SIGNIFICANT CHANGE UP (ref 4.8–10.8)

## 2019-08-27 RX ADMIN — Medication 650 MILLIGRAM(S): at 05:20

## 2019-08-27 RX ADMIN — HEPARIN SODIUM 5000 UNIT(S): 5000 INJECTION INTRAVENOUS; SUBCUTANEOUS at 06:45

## 2019-08-27 RX ADMIN — Medication 100 MILLIGRAM(S): at 12:08

## 2019-08-27 RX ADMIN — Medication 1: at 17:09

## 2019-08-27 RX ADMIN — SERTRALINE 100 MILLIGRAM(S): 25 TABLET, FILM COATED ORAL at 12:09

## 2019-08-27 RX ADMIN — Medication 162 MILLIGRAM(S): at 12:08

## 2019-08-27 RX ADMIN — CHLORHEXIDINE GLUCONATE 1 APPLICATION(S): 213 SOLUTION TOPICAL at 06:44

## 2019-08-27 RX ADMIN — Medication 7 UNIT(S): at 08:01

## 2019-08-27 RX ADMIN — PANTOPRAZOLE SODIUM 40 MILLIGRAM(S): 20 TABLET, DELAYED RELEASE ORAL at 06:44

## 2019-08-27 RX ADMIN — Medication 7 UNIT(S): at 17:09

## 2019-08-27 RX ADMIN — Medication 650 MILLIGRAM(S): at 04:40

## 2019-08-27 RX ADMIN — AMLODIPINE BESYLATE 10 MILLIGRAM(S): 2.5 TABLET ORAL at 06:44

## 2019-08-27 RX ADMIN — Medication 7 UNIT(S): at 12:09

## 2019-08-27 NOTE — DISCHARGE NOTE NURSING/CASE MANAGEMENT/SOCIAL WORK - PATIENT PORTAL LINK FT
You can access the FollowMyHealth Patient Portal offered by [facility] by registering at the following website: [email or web address]. By joining [employer]’s FollowMyHealth portal, you will also be able to view your health information using other applications (apps) compatible with our system.

## 2019-08-27 NOTE — PROGRESS NOTE ADULT - ASSESSMENT
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 As per the patient she has been having an arguments with her son over her late  (told son that his father had extra-marital affair), has been depressed and anxious because as per the patient, son is avoiding her. Also it was reported that she wanted to jump off the stairs but currently she denies any suicidal or self-harming thoughts. She complains of left shoulder pain, denies any chest pain, racing of heart, and denies abdominal pain.     #Asymptomatic Bacteruria   -UA positive. No symptoms of Cystitis.  -Discharge on PO Nitrofurantoin 100mg twice daily   -Follow up with urine cultures and blood cultures.  - PT and PM&R recs: no further PT needed, pending long-term placement    # History of anxiety/depression  -Continue with home medications.   -ER consulted psych for further evaluation. Patient not suicidal at this time.      # History of Mobitz II 2:1 with symptomatic bradycardia s/p Pacemaker placement.  -Stable.    # Normocytic Anemia likely Anemia of chronic disease    - Hb Stable   -Maintain type and screen and transfuse if Hemoglobin below 8.    # DM   -Continue with Insulin Regimen    # HTN controlled   - c/w current meds amlodipine     # HO CVA with residual   - c/w ASA 162mg and Statin     # DVT prophylaxis  -On Heparin SQ
PT rehab consult noted     discharge today HOME   patient have 24 AID      UTI asymptomatic will discharge on nitrofurantion

## 2019-08-28 ENCOUNTER — APPOINTMENT (OUTPATIENT)
Dept: CARDIOLOGY | Facility: CLINIC | Age: 79
End: 2019-08-28

## 2019-08-28 ENCOUNTER — EMERGENCY (EMERGENCY)
Facility: HOSPITAL | Age: 79
LOS: 0 days | Discharge: HOME | End: 2019-08-28
Attending: EMERGENCY MEDICINE | Admitting: EMERGENCY MEDICINE
Payer: MEDICARE

## 2019-08-28 VITALS
SYSTOLIC BLOOD PRESSURE: 135 MMHG | HEART RATE: 92 BPM | RESPIRATION RATE: 18 BRPM | WEIGHT: 160.06 LBS | DIASTOLIC BLOOD PRESSURE: 79 MMHG | TEMPERATURE: 98 F | OXYGEN SATURATION: 100 %

## 2019-08-28 VITALS
HEART RATE: 82 BPM | SYSTOLIC BLOOD PRESSURE: 115 MMHG | DIASTOLIC BLOOD PRESSURE: 70 MMHG | OXYGEN SATURATION: 98 % | RESPIRATION RATE: 18 BRPM

## 2019-08-28 DIAGNOSIS — Z79.4 LONG TERM (CURRENT) USE OF INSULIN: ICD-10-CM

## 2019-08-28 DIAGNOSIS — Z91.013 ALLERGY TO SEAFOOD: ICD-10-CM
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
## 2019-08-28 DIAGNOSIS — Z90.49 ACQUIRED ABSENCE OF OTHER SPECIFIED PARTS OF DIGESTIVE TRACT: Chronic | ICD-10-CM
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
## 2019-08-28 DIAGNOSIS — M54.2 CERVICALGIA: ICD-10-CM

## 2019-08-28 DIAGNOSIS — E11.9 TYPE 2 DIABETES MELLITUS WITHOUT COMPLICATIONS: ICD-10-CM

## 2019-08-28 DIAGNOSIS — Z96.651 PRESENCE OF RIGHT ARTIFICIAL KNEE JOINT: Chronic | ICD-10-CM

## 2019-08-28 DIAGNOSIS — M54.6 PAIN IN THORACIC SPINE: ICD-10-CM

## 2019-08-28 DIAGNOSIS — E78.00 PURE HYPERCHOLESTEROLEMIA, UNSPECIFIED: ICD-10-CM

## 2019-08-28 DIAGNOSIS — Z88.0 ALLERGY STATUS TO PENICILLIN: ICD-10-CM

## 2019-08-28 DIAGNOSIS — I10 ESSENTIAL (PRIMARY) HYPERTENSION: ICD-10-CM
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
## 2019-08-28 DIAGNOSIS — Z98.890 OTHER SPECIFIED POSTPROCEDURAL STATES: Chronic | ICD-10-CM

## 2019-08-28 LAB
-  AMIKACIN: SIGNIFICANT CHANGE UP
-  AMPICILLIN/SULBACTAM: SIGNIFICANT CHANGE UP
-  AMPICILLIN: SIGNIFICANT CHANGE UP
-  AZTREONAM: SIGNIFICANT CHANGE UP
-  CEFAZOLIN: SIGNIFICANT CHANGE UP
-  CEFEPIME: SIGNIFICANT CHANGE UP
-  CEFOXITIN: SIGNIFICANT CHANGE UP
-  CEFTRIAXONE: SIGNIFICANT CHANGE UP
-  CIPROFLOXACIN: SIGNIFICANT CHANGE UP
-  GENTAMICIN: SIGNIFICANT CHANGE UP
-  IMIPENEM: SIGNIFICANT CHANGE UP
-  LEVOFLOXACIN: SIGNIFICANT CHANGE UP
-  MEROPENEM: SIGNIFICANT CHANGE UP
-  NITROFURANTOIN: SIGNIFICANT CHANGE UP
-  PIPERACILLIN/TAZOBACTAM: SIGNIFICANT CHANGE UP
-  TIGECYCLINE: SIGNIFICANT CHANGE UP
-  TOBRAMYCIN: SIGNIFICANT CHANGE UP
-  TRIMETHOPRIM/SULFAMETHOXAZOLE: SIGNIFICANT CHANGE UP
CULTURE RESULTS: SIGNIFICANT CHANGE UP
GLUCOSE BLDC GLUCOMTR-MCNC: 198 MG/DL — HIGH (ref 70–99)
METHOD TYPE: SIGNIFICANT CHANGE UP
ORGANISM # SPEC MICROSCOPIC CNT: SIGNIFICANT CHANGE UP
ORGANISM # SPEC MICROSCOPIC CNT: SIGNIFICANT CHANGE UP
SPECIMEN SOURCE: SIGNIFICANT CHANGE UP

## 2019-08-28 PROCEDURE — 71045 X-RAY EXAM CHEST 1 VIEW: CPT | Mod: 26

## 2019-08-28 PROCEDURE — 99284 EMERGENCY DEPT VISIT MOD MDM: CPT

## 2019-08-28 PROCEDURE — 71250 CT THORAX DX C-: CPT | Mod: 26

## 2019-08-28 PROCEDURE — 70450 CT HEAD/BRAIN W/O DYE: CPT | Mod: 26

## 2019-08-28 PROCEDURE — 72125 CT NECK SPINE W/O DYE: CPT | Mod: 26

## 2019-08-28 NOTE — ED PROVIDER NOTE - OBJECTIVE STATEMENT
Patient BIBA, Fell onto buttocks while transferring to wheelchair by home aides. Staff states she slid to floor, patient states she fell ot floor and c/o neck and upper back pain, No LOC, no chest pain, no SOB. no abdominal pain

## 2019-08-28 NOTE — ED ADULT NURSE NOTE - PMH
Bilateral hearing loss, unspecified hearing loss type    BP (high blood pressure)  20 yr  Breast CA  2014 s/p rt  CVA (cerebral vascular accident)  6/18 lft weakness  Depression    DM (diabetes mellitus)  24 yr  High cholesterol

## 2019-08-28 NOTE — ED PROVIDER NOTE - PATIENT PORTAL LINK FT
You can access the FollowMyHealth Patient Portal offered by [facility] by registering at the following website: [email or web address]. By joining [employer]’s FollowMyHealth portal, you will also be able to view your health information using other applications (apps) compatible with our system.

## 2019-08-28 NOTE — ED PROVIDER NOTE - NSFOLLOWUPINSTRUCTIONS_ED_ALL_ED_FT
tylenol for pain,, Follow up with PMD pain persists, Return to ED if worsening pain, fever, shortness of breath or change in mental status results.

## 2019-08-28 NOTE — ED PROVIDER NOTE - CPE EDP ENMT NORM
normal... Cartilage Graft Text: The defect edges were debeveled with a #15 scalpel blade.  Given the location of the defect, shape of the defect, the fact the defect involved a full thickness cartilage defect a cartilage graft was deemed most appropriate.  An appropriate donor site was identified, cleansed, and anesthetized. The cartilage graft was then harvested and transferred to the recipient site, oriented appropriately and then sutured into place.  The secondary defect was then repaired using a primary closure.

## 2019-08-28 NOTE — ED ADULT TRIAGE NOTE - ARRIVAL INFO ADDITIONAL COMMENTS
Pt advises she has chronic pain to left arm and back and pain is no different from her chronic pain.

## 2019-08-28 NOTE — ED PROVIDER NOTE - CLINICAL SUMMARY MEDICAL DECISION MAKING FREE TEXT BOX
Patient presents for evaluation after she sustained a slumping episode after transferring from to a wheelchair, she denies any headache, visual changes chest pain shortness of breath abdominal pain or any other extremity pain she denies any back pain we obtained cxr as well as non-con ct of the chest which is negative

## 2019-08-28 NOTE — ED PROVIDER NOTE - GASTROINTESTINAL NEGATIVE STATEMENT, MLM
no abdominal pain, no bloating, no constipation, no diarrhea, no nausea and no vomiting. Patient informed/Explanation of wait

## 2019-08-28 NOTE — ED ADULT TRIAGE NOTE - CHIEF COMPLAINT QUOTE
Pt brought in by ambulance from home. As per HHA "I was transferring her from the hoya lift to the wheelchair. She was sitting in the chair when she leaned forward and her legs went out from under her. She started to slide from the chair so I lowered her to the floor." Pt has a history of CVA. Pt complains "I hit my head and I hit my back." Pt complains of pain to left arm and upper back.

## 2019-08-28 NOTE — ED PROVIDER NOTE - ATTENDING CONTRIBUTION TO CARE
I was present for and supervised the key and critical aspects of the procedures performed during the care of the patient. Patient presents for evaluation of fall and slumping while transferring to a wheelchair she denies any headache, visual changes, chest pain, shortness of breath abdominal pain or back pain she denies any other extremity pain at this time she   On physical exam she is nc./at perrla eomi oropharynx clear cta b/l, no pain to palpation of the chest wall with no bruising abd-soft nt nd bs+ ext from pedal pulses 2 +=   a/p- she sustained no loc and no vomiting.  she denies any headache we obtained cxr as well as ct of the chest I will continue to monitor atthis time

## 2019-08-30 DIAGNOSIS — I69.354 HEMIPLEGIA AND HEMIPARESIS FOLLOWING CEREBRAL INFARCTION AFFECTING LEFT NON-DOMINANT SIDE: ICD-10-CM

## 2019-08-30 DIAGNOSIS — Z95.0 PRESENCE OF CARDIAC PACEMAKER: ICD-10-CM

## 2019-08-30 DIAGNOSIS — Z79.4 LONG TERM (CURRENT) USE OF INSULIN: ICD-10-CM

## 2019-08-30 DIAGNOSIS — F32.2 MAJOR DEPRESSIVE DISORDER, SINGLE EPISODE, SEVERE WITHOUT PSYCHOTIC FEATURES: ICD-10-CM

## 2019-08-30 DIAGNOSIS — K21.9 GASTRO-ESOPHAGEAL REFLUX DISEASE WITHOUT ESOPHAGITIS: ICD-10-CM

## 2019-08-30 DIAGNOSIS — H91.93 UNSPECIFIED HEARING LOSS, BILATERAL: ICD-10-CM

## 2019-08-30 DIAGNOSIS — E11.65 TYPE 2 DIABETES MELLITUS WITH HYPERGLYCEMIA: ICD-10-CM

## 2019-08-30 DIAGNOSIS — B96.20 UNSPECIFIED ESCHERICHIA COLI [E. COLI] AS THE CAUSE OF DISEASES CLASSIFIED ELSEWHERE: ICD-10-CM

## 2019-08-30 DIAGNOSIS — R45.1 RESTLESSNESS AND AGITATION: ICD-10-CM

## 2019-08-30 DIAGNOSIS — Z96.651 PRESENCE OF RIGHT ARTIFICIAL KNEE JOINT: ICD-10-CM

## 2019-08-30 DIAGNOSIS — Z79.82 LONG TERM (CURRENT) USE OF ASPIRIN: ICD-10-CM

## 2019-08-30 DIAGNOSIS — I44.1 ATRIOVENTRICULAR BLOCK, SECOND DEGREE: ICD-10-CM

## 2019-08-30 DIAGNOSIS — D64.9 ANEMIA, UNSPECIFIED: ICD-10-CM

## 2019-08-30 DIAGNOSIS — I10 ESSENTIAL (PRIMARY) HYPERTENSION: ICD-10-CM

## 2019-08-30 DIAGNOSIS — T43.225A ADVERSE EFFECT OF SELECTIVE SEROTONIN REUPTAKE INHIBITORS, INITIAL ENCOUNTER: ICD-10-CM

## 2019-08-30 DIAGNOSIS — E78.5 HYPERLIPIDEMIA, UNSPECIFIED: ICD-10-CM

## 2019-08-30 DIAGNOSIS — Z85.3 PERSONAL HISTORY OF MALIGNANT NEOPLASM OF BREAST: ICD-10-CM

## 2019-08-30 DIAGNOSIS — I45.81 LONG QT SYNDROME: ICD-10-CM

## 2019-08-30 DIAGNOSIS — N39.0 URINARY TRACT INFECTION, SITE NOT SPECIFIED: ICD-10-CM

## 2019-08-30 DIAGNOSIS — F41.9 ANXIETY DISORDER, UNSPECIFIED: ICD-10-CM

## 2019-08-30 DIAGNOSIS — Z16.29 RESISTANCE TO OTHER SINGLE SPECIFIED ANTIBIOTIC: ICD-10-CM

## 2019-08-30 DIAGNOSIS — Z79.02 LONG TERM (CURRENT) USE OF ANTITHROMBOTICS/ANTIPLATELETS: ICD-10-CM

## 2019-08-31 LAB
CULTURE RESULTS: SIGNIFICANT CHANGE UP
SPECIMEN SOURCE: SIGNIFICANT CHANGE UP

## 2019-09-09 NOTE — PATIENT PROFILE ADULT. - FALL HARM RISK CONCLUSION
Silver Nitrate Text: The wound bed was treated with silver nitrate after the biopsy was performed. Render Path Notes In Note?: No Biopsy Method: 15 blade- incisional biopsy technique Hemostasis: Drysol and Electrocautery Detail Level: Detailed Curettage Text: The wound bed was treated with curettage after the biopsy was performed. Lab: 6 Billing Type: Third-Party Bill Post-Care Instructions: I reviewed with the patient in detail post-care instructions. Patient is to keep the biopsy site dry overnight, and then apply bacitracin twice daily until healed. Patient may apply hydrogen peroxide soaks to remove any crusting. Size Of Lesion In Cm: 0 Anesthesia Type: 1% lidocaine with epinephrine Wound Care: Polysporin ointment Cryotherapy Text: The wound bed was treated with cryotherapy after the biopsy was performed. Notification Instructions: Patient will be notified of biopsy results. However, patient instructed to call the office if not contacted within 2 weeks. Depth Of Biopsy: dermis Electrodesiccation Text: The wound bed was treated with electrodesiccation after the biopsy was performed. Biopsy Type: H and E Consent: Written consent was obtained and risks were reviewed including but not limited to scarring, infection, bleeding, scabbing, incomplete removal, nerve damage and allergy to anesthesia. Electrodesiccation And Curettage Text: The wound bed was treated with electrodesiccation and curettage after the biopsy was performed. Type Of Destruction Used: Curettage Anesthesia Volume In Cc (Will Not Render If 0): 0.5 Dressing: pressure dressing Was A Bandage Applied: Yes Fall with Harm Risk

## 2019-09-13 ENCOUNTER — EMERGENCY (EMERGENCY)
Facility: HOSPITAL | Age: 79
LOS: 0 days | Discharge: HOME | End: 2019-09-13
Attending: EMERGENCY MEDICINE | Admitting: EMERGENCY MEDICINE
Payer: MEDICARE

## 2019-09-13 VITALS
RESPIRATION RATE: 17 BRPM | HEART RATE: 91 BPM | DIASTOLIC BLOOD PRESSURE: 67 MMHG | SYSTOLIC BLOOD PRESSURE: 151 MMHG | OXYGEN SATURATION: 98 %

## 2019-09-13 VITALS
OXYGEN SATURATION: 95 % | SYSTOLIC BLOOD PRESSURE: 151 MMHG | HEART RATE: 119 BPM | DIASTOLIC BLOOD PRESSURE: 106 MMHG | RESPIRATION RATE: 18 BRPM

## 2019-09-13 DIAGNOSIS — K59.00 CONSTIPATION, UNSPECIFIED: ICD-10-CM

## 2019-09-13 DIAGNOSIS — Z91.040 LATEX ALLERGY STATUS: ICD-10-CM

## 2019-09-13 DIAGNOSIS — Z98.890 OTHER SPECIFIED POSTPROCEDURAL STATES: Chronic | ICD-10-CM

## 2019-09-13 DIAGNOSIS — Z85.3 PERSONAL HISTORY OF MALIGNANT NEOPLASM OF BREAST: ICD-10-CM

## 2019-09-13 DIAGNOSIS — E11.65 TYPE 2 DIABETES MELLITUS WITH HYPERGLYCEMIA: ICD-10-CM

## 2019-09-13 DIAGNOSIS — Z88.0 ALLERGY STATUS TO PENICILLIN: ICD-10-CM

## 2019-09-13 DIAGNOSIS — Z91.013 ALLERGY TO SEAFOOD: ICD-10-CM

## 2019-09-13 DIAGNOSIS — Z90.49 ACQUIRED ABSENCE OF OTHER SPECIFIED PARTS OF DIGESTIVE TRACT: Chronic | ICD-10-CM

## 2019-09-13 DIAGNOSIS — Z96.651 PRESENCE OF RIGHT ARTIFICIAL KNEE JOINT: Chronic | ICD-10-CM

## 2019-09-13 DIAGNOSIS — Z88.8 ALLERGY STATUS TO OTHER DRUGS, MEDICAMENTS AND BIOLOGICAL SUBSTANCES: ICD-10-CM

## 2019-09-13 DIAGNOSIS — Z88.2 ALLERGY STATUS TO SULFONAMIDES: ICD-10-CM

## 2019-09-13 DIAGNOSIS — R11.0 NAUSEA: ICD-10-CM

## 2019-09-13 LAB
ALBUMIN SERPL ELPH-MCNC: 4.4 G/DL — SIGNIFICANT CHANGE UP (ref 3.5–5.2)
ALP SERPL-CCNC: 127 U/L — HIGH (ref 30–115)
ALT FLD-CCNC: 17 U/L — SIGNIFICANT CHANGE UP (ref 0–41)
ANION GAP SERPL CALC-SCNC: 18 MMOL/L — HIGH (ref 7–14)
AST SERPL-CCNC: 66 U/L — HIGH (ref 0–41)
BASE EXCESS BLDV CALC-SCNC: 3.6 MMOL/L — HIGH (ref -2–2)
BASOPHILS # BLD AUTO: 0.04 K/UL — SIGNIFICANT CHANGE UP (ref 0–0.2)
BASOPHILS NFR BLD AUTO: 0.6 % — SIGNIFICANT CHANGE UP (ref 0–1)
BILIRUB SERPL-MCNC: 0.4 MG/DL — SIGNIFICANT CHANGE UP (ref 0.2–1.2)
BUN SERPL-MCNC: 27 MG/DL — HIGH (ref 10–20)
CA-I SERPL-SCNC: 1.19 MMOL/L — SIGNIFICANT CHANGE UP (ref 1.12–1.3)
CALCIUM SERPL-MCNC: 9.4 MG/DL — SIGNIFICANT CHANGE UP (ref 8.5–10.1)
CHLORIDE SERPL-SCNC: 97 MMOL/L — LOW (ref 98–110)
CO2 SERPL-SCNC: 24 MMOL/L — SIGNIFICANT CHANGE UP (ref 17–32)
CREAT SERPL-MCNC: 1.5 MG/DL — SIGNIFICANT CHANGE UP (ref 0.7–1.5)
EOSINOPHIL # BLD AUTO: 0.07 K/UL — SIGNIFICANT CHANGE UP (ref 0–0.7)
EOSINOPHIL NFR BLD AUTO: 1 % — SIGNIFICANT CHANGE UP (ref 0–8)
GAS PNL BLDV: 142 MMOL/L — SIGNIFICANT CHANGE UP (ref 136–145)
GAS PNL BLDV: SIGNIFICANT CHANGE UP
GLUCOSE SERPL-MCNC: 440 MG/DL — HIGH (ref 70–99)
HCO3 BLDV-SCNC: 28 MMOL/L — SIGNIFICANT CHANGE UP (ref 22–29)
HCT VFR BLD CALC: 28.3 % — LOW (ref 37–47)
HCT VFR BLDA CALC: 30.8 % — LOW (ref 34–44)
HGB BLD CALC-MCNC: 10.1 G/DL — LOW (ref 14–18)
HGB BLD-MCNC: 9.2 G/DL — LOW (ref 12–16)
HOROWITZ INDEX BLDV+IHG-RTO: 21 — SIGNIFICANT CHANGE UP
IMM GRANULOCYTES NFR BLD AUTO: 0.4 % — HIGH (ref 0.1–0.3)
LACTATE BLDV-MCNC: 0.9 MMOL/L — SIGNIFICANT CHANGE UP (ref 0.5–1.6)
LYMPHOCYTES # BLD AUTO: 0.65 K/UL — LOW (ref 1.2–3.4)
LYMPHOCYTES # BLD AUTO: 9.6 % — LOW (ref 20.5–51.1)
MAGNESIUM SERPL-MCNC: 2.1 MG/DL — SIGNIFICANT CHANGE UP (ref 1.8–2.4)
MCHC RBC-ENTMCNC: 30.7 PG — SIGNIFICANT CHANGE UP (ref 27–31)
MCHC RBC-ENTMCNC: 32.5 G/DL — SIGNIFICANT CHANGE UP (ref 32–37)
MCV RBC AUTO: 94.3 FL — SIGNIFICANT CHANGE UP (ref 81–99)
MONOCYTES # BLD AUTO: 0.44 K/UL — SIGNIFICANT CHANGE UP (ref 0.1–0.6)
MONOCYTES NFR BLD AUTO: 6.5 % — SIGNIFICANT CHANGE UP (ref 1.7–9.3)
NEUTROPHILS # BLD AUTO: 5.57 K/UL — SIGNIFICANT CHANGE UP (ref 1.4–6.5)
NEUTROPHILS NFR BLD AUTO: 81.9 % — HIGH (ref 42.2–75.2)
NRBC # BLD: 0 /100 WBCS — SIGNIFICANT CHANGE UP (ref 0–0)
PCO2 BLDV: 43 MMHG — SIGNIFICANT CHANGE UP (ref 41–51)
PH BLDV: 7.43 — SIGNIFICANT CHANGE UP (ref 7.26–7.43)
PHOSPHATE SERPL-MCNC: 4 MG/DL — SIGNIFICANT CHANGE UP (ref 2.1–4.9)
PLATELET # BLD AUTO: 286 K/UL — SIGNIFICANT CHANGE UP (ref 130–400)
PO2 BLDV: 60 MMHG — HIGH (ref 20–40)
POTASSIUM BLDV-SCNC: 3.7 MMOL/L — SIGNIFICANT CHANGE UP (ref 3.3–5.6)
POTASSIUM SERPL-MCNC: 5.1 MMOL/L — HIGH (ref 3.5–5)
POTASSIUM SERPL-SCNC: 5.1 MMOL/L — HIGH (ref 3.5–5)
PROT SERPL-MCNC: 7.9 G/DL — SIGNIFICANT CHANGE UP (ref 6–8)
RBC # BLD: 3 M/UL — LOW (ref 4.2–5.4)
RBC # FLD: 13.2 % — SIGNIFICANT CHANGE UP (ref 11.5–14.5)
SAO2 % BLDV: 91 % — SIGNIFICANT CHANGE UP
SODIUM SERPL-SCNC: 139 MMOL/L — SIGNIFICANT CHANGE UP (ref 135–146)
WBC # BLD: 6.8 K/UL — SIGNIFICANT CHANGE UP (ref 4.8–10.8)
WBC # FLD AUTO: 6.8 K/UL — SIGNIFICANT CHANGE UP (ref 4.8–10.8)

## 2019-09-13 PROCEDURE — 74177 CT ABD & PELVIS W/CONTRAST: CPT | Mod: 26

## 2019-09-13 PROCEDURE — 99284 EMERGENCY DEPT VISIT MOD MDM: CPT

## 2019-09-13 RX ORDER — ONDANSETRON 8 MG/1
4 TABLET, FILM COATED ORAL ONCE
Refills: 0 | Status: COMPLETED | OUTPATIENT
Start: 2019-09-13 | End: 2019-09-13

## 2019-09-13 RX ORDER — SODIUM CHLORIDE 9 MG/ML
1000 INJECTION INTRAMUSCULAR; INTRAVENOUS; SUBCUTANEOUS ONCE
Refills: 0 | Status: COMPLETED | OUTPATIENT
Start: 2019-09-13 | End: 2019-09-13

## 2019-09-13 RX ORDER — ONDANSETRON 8 MG/1
1 TABLET, FILM COATED ORAL
Qty: 6 | Refills: 0
Start: 2019-09-13

## 2019-09-13 RX ADMIN — ONDANSETRON 4 MILLIGRAM(S): 8 TABLET, FILM COATED ORAL at 15:40

## 2019-09-13 RX ADMIN — SODIUM CHLORIDE 2000 MILLILITER(S): 9 INJECTION INTRAMUSCULAR; INTRAVENOUS; SUBCUTANEOUS at 15:39

## 2019-09-13 NOTE — ED PROVIDER NOTE - PHYSICAL EXAMINATION
GEN: alert, NAD  HEAD:  normocephalic, atraumatic  EYES:  conjunctivae without injection, drainage or discharge  ENMT:  nasal mucosa moist; mouth moist without ulcerations or lesions; throat moist without erythema, exudate, ulcerations or lesions  NECK:  supple, no masses, no significant lymphadenopathy  CARDIAC:  regular rate and rhythm, normal S1 and S2, systolic ejection murmur  RESP:  respiratory rate and effort appear normal for age; lungs are clear to auscultation bilaterally; no rales or wheezes  ABDOMEN:  LLQ tenderness w/ small mass; soft  MUSCULOSKELETAL/NEURO:  normal movement, normal tone  SKIN:  normal skin color for age and race, well-perfused; warm and dry

## 2019-09-13 NOTE — ED PROVIDER NOTE - OBJECTIVE STATEMENT
79yF pmhx HTN, HLD, DM, breast CA accompanied by home health aid c/o nausea starting when she woke up this morning, worsening w/ 5 episodes of NBNB emesis over the past hour; associated w/ epigastric abd pain, and constipation. Pt denies chest pain, dizziness, dysuria/polyuria, SOB. Pt reports coming to the ED for similar sx 3 times in the past few months; HHA reports pt's blood sugar was 500 half an hour ago.

## 2019-09-13 NOTE — ED PROVIDER NOTE - NS ED ROS FT
Review of Systems:  	•	CONSTITUTIONAL - no fever, no diaphoresis  	•	SKIN - no rash, no lesions  	•	HEMATOLOGIC - no bleeding, no bruising  	•	EYES - no discharge, no injection  	•	ENT - no otorrhea, no rhinorrhea  	•	RESPIRATORY - no shortness of breath, no cough  	•	CARDIAC - no chest pain, no palpitations  	•	GI - +abd pain, +nausea, +vomiting, +constipation, no bleeding  	•	GENITO-URINARY - no dysuria, no hematuria  	•	ENDO - no polydypsia, no polyurea, no heat/no cold intolerance  	•	NEUROLOGIC - no weakness, no headache, no anesthesia, no paresthesias

## 2019-09-13 NOTE — ED PROVIDER NOTE - CARE PROVIDER_API CALL
Ariel Macias)  Internal Medicine  [address] [place] [address]  Phone: [phone]  Fax: [phone]  Follow Up Time: 1-3 Days

## 2019-09-13 NOTE — ED PROVIDER NOTE - PATIENT PORTAL LINK FT
You can access the FollowMyHealth Patient Portal offered by [facility] by registering at the following website: [email or web address]. By joining [employer]’s FollowMyHealth portal, you will also be able to view your health information using other applications (apps) compatible with our system.

## 2019-09-13 NOTE — ED PROVIDER NOTE - PROGRESS NOTE DETAILS
ATTENDING NOTE:   80 y/o F with PMH of DM, insulin dependent presents to ED c/o N/V with associated lower abdominal pain. Vital signs noted. Abdomen shows mild LLQ TTP. VBG does not show acidosis, DKA unlikely. IVF, IV antiemetics, CT ordered and pending.

## 2019-09-13 NOTE — ED PROVIDER NOTE - NSFOLLOWUPINSTRUCTIONS_ED_ALL_ED_FT
Keep a log of your blood sugar measurements, and take it to Dr Macias.     Acute Nausea and Vomiting    WHAT YOU NEED TO KNOW:    Acute nausea and vomiting start suddenly, worsen quickly, and last a short time.    DISCHARGE INSTRUCTIONS:    Return to the emergency department if:     You see blood in your vomit or your bowel movements.      You have sudden, severe pain in your chest and upper abdomen after hard vomiting or retching.      You have swelling in your neck and chest.       You are dizzy, cold, and thirsty and your eyes and mouth are dry.      You are urinating very little or not at all.      You have muscle weakness, leg cramps, and trouble breathing.       Your heart is beating much faster than normal.       You continue to vomit for more than 48 hours.     Contact your healthcare provider if:     You have frequent dry heaves (vomiting but nothing comes out).      Your nausea and vomiting does not get better or go away after you use medicine.      You have questions or concerns about your condition or treatment.    Medicines: You may need any of the following:     Medicines may be given to calm your stomach and stop your vomiting. You may also need medicines to help you feel more relaxed or to stop nausea and vomiting caused by motion sickness.      Gastrointestinal stimulants are used to help empty your stomach and bowels. This may help decrease nausea and vomiting.      Take your medicine as directed. Contact your healthcare provider if you think your medicine is not helping or if you have side effects. Tell him or her if you are allergic to any medicine. Keep a list of the medicines, vitamins, and herbs you take. Include the amounts, and when and why you take them. Bring the list or the pill bottles to follow-up visits. Carry your medicine list with you in case of an emergency.    Prevent or manage acute nausea and vomiting:     Do not drink alcohol. Alcohol may upset or irritate your stomach. Too much alcohol can also cause acute nausea and vomiting.      Control stress. Headaches due to stress may cause nausea and vomiting. Find ways to relax and manage your stress. Get more rest and sleep.      Drink more liquids as directed. Vomiting can lead to dehydration. It is important to drink more liquids to help replace lost body fluids. Ask your healthcare provider how much liquid to drink each day and which liquids are best for you. Your provider may recommend that you drink an oral rehydration solution (ORS). ORS contains water, salts, and sugar that are needed to replace the lost body fluids. Ask what kind of ORS to use, how much to drink, and where to get it.      Eat smaller meals, more often. Eat small amounts of food every 2 to 3 hours, even if you are not hungry. Food in your stomach may decrease your nausea.      Talk to your healthcare provider before you take over-the-counter (OTC) medicines. These medicines can cause serious problems if you use certain other medicines, or you have a medical condition. You may have problems if you use too much or use them for longer than the label says. Follow directions on the label carefully.     Follow up with your healthcare provider as directed: Write down your questions so you remember to ask them during your follow-up visits.

## 2019-09-13 NOTE — ED PROVIDER NOTE - CLINICAL SUMMARY MEDICAL DECISION MAKING FREE TEXT BOX
Good response to ED tx.  sx improved. dx testing reviewed. Out patient follow up advised. Copies of all diagnostic studies were given to patient to aid in follow up.

## 2019-10-28 NOTE — DISCHARGE NOTE PROVIDER - PROVIDER TOKENS
PROVIDER:[TOKEN:[[id number]],FOLLOWUP:[1 week]] Partial Purse String (Simple) Text: Given the location of the defect and the characteristics of the surrounding skin a simple purse string closure was deemed most appropriate.  Undermining was performed circumfirentially around the surgical defect.  A purse string suture was then placed and tightened. Wound tension only allowed a partial closure of the circular defect.

## 2019-11-07 ENCOUNTER — APPOINTMENT (OUTPATIENT)
Dept: CARDIOLOGY | Facility: CLINIC | Age: 79
End: 2019-11-07
Payer: MEDICARE

## 2019-11-07 VITALS — DIASTOLIC BLOOD PRESSURE: 80 MMHG | RESPIRATION RATE: 18 BRPM | HEART RATE: 88 BPM | SYSTOLIC BLOOD PRESSURE: 120 MMHG

## 2019-11-07 PROCEDURE — 93000 ELECTROCARDIOGRAM COMPLETE: CPT

## 2019-11-07 PROCEDURE — 99204 OFFICE O/P NEW MOD 45 MIN: CPT

## 2019-11-07 NOTE — PHYSICAL EXAM
[Normal Appearance] : normal appearance [General Appearance - Well Developed] : well developed [Well Groomed] : well groomed [General Appearance - In No Acute Distress] : no acute distress [General Appearance - Well Nourished] : well nourished [No Deformities] : no deformities [Normal Conjunctiva] : the conjunctiva exhibited no abnormalities [Eyelids - No Xanthelasma] : the eyelids demonstrated no xanthelasmas [Normal Oral Mucosa] : normal oral mucosa [No Oral Pallor] : no oral pallor [No Oral Cyanosis] : no oral cyanosis [Normal Jugular Venous A Waves Present] : normal jugular venous A waves present [Normal Jugular Venous V Waves Present] : normal jugular venous V waves present [No Jugular Venous Lim A Waves] : no jugular venous lim A waves [Heart Rate And Rhythm] : heart rate and rhythm were normal [Heart Sounds] : normal S1 and S2 [Murmurs] : no murmurs present [Auscultation Breath Sounds / Voice Sounds] : lungs were clear to auscultation bilaterally [Exaggerated Use Of Accessory Muscles For Inspiration] : no accessory muscle use [Respiration, Rhythm And Depth] : normal respiratory rhythm and effort [Bowel Sounds] : normal bowel sounds [Abdomen Soft] : soft [Abdomen Tenderness] : non-tender [Abdomen Mass (___ Cm)] : no abdominal mass palpated [FreeTextEntry1] : wheelchair  [Nail Clubbing] : no clubbing of the fingernails [Cyanosis, Localized] : no localized cyanosis [Petechial Hemorrhages (___cm)] : no petechial hemorrhages [] : no rash [Skin Color & Pigmentation] : normal skin color and pigmentation [No Venous Stasis] : no venous stasis [Skin Lesions] : no skin lesions [No Skin Ulcers] : no skin ulcer [No Xanthoma] : no  xanthoma was observed [Oriented To Time, Place, And Person] : oriented to person, place, and time

## 2019-11-13 ENCOUNTER — APPOINTMENT (OUTPATIENT)
Dept: CARDIOLOGY | Facility: CLINIC | Age: 79
End: 2019-11-13
Payer: MEDICARE

## 2019-11-13 PROCEDURE — 93283 PRGRMG EVAL IMPLANTABLE DFB: CPT | Mod: 59

## 2019-11-13 PROCEDURE — 99215 OFFICE O/P EST HI 40 MIN: CPT | Mod: 25

## 2019-12-02 NOTE — ED PROVIDER NOTE - CARE PLAN
Principal Discharge DX:	CVA (cerebral vascular accident) - hx of >20 year HTN  - renal function has been relatively stable since last admission.   - Monitor BMP daily w/ mg and phos   - Monitor Cr  - avoid nephrotoxic agents   - Renal c/s appreciated

## 2019-12-15 ENCOUNTER — EMERGENCY (EMERGENCY)
Facility: HOSPITAL | Age: 79
LOS: 0 days | Discharge: HOME | End: 2019-12-15
Attending: EMERGENCY MEDICINE
Payer: MEDICARE

## 2019-12-15 VITALS
RESPIRATION RATE: 18 BRPM | DIASTOLIC BLOOD PRESSURE: 78 MMHG | SYSTOLIC BLOOD PRESSURE: 143 MMHG | OXYGEN SATURATION: 98 % | TEMPERATURE: 98 F | HEART RATE: 82 BPM

## 2019-12-15 VITALS
WEIGHT: 160.06 LBS | HEART RATE: 60 BPM | DIASTOLIC BLOOD PRESSURE: 61 MMHG | SYSTOLIC BLOOD PRESSURE: 95 MMHG | OXYGEN SATURATION: 100 % | TEMPERATURE: 98 F | RESPIRATION RATE: 18 BRPM

## 2019-12-15 DIAGNOSIS — Z90.49 ACQUIRED ABSENCE OF OTHER SPECIFIED PARTS OF DIGESTIVE TRACT: Chronic | ICD-10-CM

## 2019-12-15 DIAGNOSIS — Z98.890 OTHER SPECIFIED POSTPROCEDURAL STATES: Chronic | ICD-10-CM

## 2019-12-15 DIAGNOSIS — Z02.9 ENCOUNTER FOR ADMINISTRATIVE EXAMINATIONS, UNSPECIFIED: ICD-10-CM

## 2019-12-15 DIAGNOSIS — Z96.651 PRESENCE OF RIGHT ARTIFICIAL KNEE JOINT: Chronic | ICD-10-CM

## 2019-12-15 PROCEDURE — 99283 EMERGENCY DEPT VISIT LOW MDM: CPT

## 2019-12-15 RX ADMIN — Medication 30 MILLILITER(S): at 06:20

## 2019-12-15 NOTE — ED PROVIDER NOTE - NS ED ROS FT
Constitutional: no fever, chills, no recent weight loss, change in appetite or malaise  Eyes: no redness/discharge/pain/vision changes  ENT: no rhinorrhea/ear pain/sore throat  Cardiac: No chest pain, SOB or edema.  Respiratory: No cough or respiratory distress  GI: No nausea, vomiting, diarrhea or abdominal pain.  : No dysuria, frequency, urgency or hematuria  MS: no pain to back or extremities, no loss of ROM, no weakness  Neuro: No headache or weakness. No LOC.  Skin: No skin rash.  Endocrine: + diabetes.

## 2019-12-15 NOTE — ED PROVIDER NOTE - PROGRESS NOTE DETAILS
Spoke with health aid agency supervisor ([phone]), the new home health aid will come to ED to meet up with patient and patient can be discharged home with the new home health aid.

## 2019-12-15 NOTE — ED PROVIDER NOTE - ATTENDING CONTRIBUTION TO CARE
I personally evaluated the patient. I reviewed the Resident’s or Physician Assistant’s note (as assigned above), and agree with the findings and plan except as documented in my note.  Chart reviewed. H/O CVA, breast CA, DM, bedridden, brought in by EMS because health aid who takes care of her is a patient in the ED. Patient denies any complaints. Exam unremarkable.

## 2019-12-15 NOTE — ED PROVIDER NOTE - PATIENT PORTAL LINK FT
You can access the FollowMyHealth Patient Portal offered by [facility] by registering at the following website: [email or web address]. By joining [employer]’s FollowMyHealth portal, you will also be able to view your health information using other applications (apps) compatible with our system.

## 2019-12-15 NOTE — ED PROVIDER NOTE - OBJECTIVE STATEMENT
80 yo female hx of breast cancer/HLD/DM/CVA with left side weakness BIBA for medical evaluation. patient came to ED because of her home health aid got sick at home and she couldn't be left alone at home so she was brought to ED by [facility] with her home health aid. patient otherwise denies any medical complaints.

## 2019-12-17 ENCOUNTER — INPATIENT (INPATIENT)
Facility: HOSPITAL | Age: 79
LOS: 1 days | Discharge: HOME HEALTH | End: 2019-12-19
Attending: HOSPITALIST | Admitting: HOSPITALIST
Payer: MEDICARE

## 2019-12-17 VITALS
RESPIRATION RATE: 20 BRPM | DIASTOLIC BLOOD PRESSURE: 68 MMHG | TEMPERATURE: 100 F | WEIGHT: 164.91 LBS | SYSTOLIC BLOOD PRESSURE: 138 MMHG | OXYGEN SATURATION: 95 % | HEART RATE: 107 BPM

## 2019-12-17 DIAGNOSIS — Z96.651 PRESENCE OF RIGHT ARTIFICIAL KNEE JOINT: Chronic | ICD-10-CM

## 2019-12-17 DIAGNOSIS — Z90.49 ACQUIRED ABSENCE OF OTHER SPECIFIED PARTS OF DIGESTIVE TRACT: Chronic | ICD-10-CM

## 2019-12-17 DIAGNOSIS — Z98.890 OTHER SPECIFIED POSTPROCEDURAL STATES: Chronic | ICD-10-CM

## 2019-12-17 LAB
ALBUMIN SERPL ELPH-MCNC: 4.4 G/DL — SIGNIFICANT CHANGE UP (ref 3.5–5.2)
ALP SERPL-CCNC: 134 U/L — HIGH (ref 30–115)
ALT FLD-CCNC: 19 U/L — SIGNIFICANT CHANGE UP (ref 0–41)
ANION GAP SERPL CALC-SCNC: 19 MMOL/L — HIGH (ref 7–14)
ANISOCYTOSIS BLD QL: SLIGHT — SIGNIFICANT CHANGE UP
APPEARANCE UR: CLEAR — SIGNIFICANT CHANGE UP
AST SERPL-CCNC: 40 U/L — SIGNIFICANT CHANGE UP (ref 0–41)
BACTERIA # UR AUTO: ABNORMAL
BASOPHILS # BLD AUTO: 0.01 K/UL — SIGNIFICANT CHANGE UP (ref 0–0.2)
BASOPHILS NFR BLD AUTO: 0.1 % — SIGNIFICANT CHANGE UP (ref 0–1)
BILIRUB SERPL-MCNC: 0.4 MG/DL — SIGNIFICANT CHANGE UP (ref 0.2–1.2)
BILIRUB UR-MCNC: NEGATIVE — SIGNIFICANT CHANGE UP
BUN SERPL-MCNC: 37 MG/DL — HIGH (ref 10–20)
BURR CELLS BLD QL SMEAR: SLIGHT — SIGNIFICANT CHANGE UP
CALCIUM SERPL-MCNC: 9.5 MG/DL — SIGNIFICANT CHANGE UP (ref 8.5–10.1)
CHLORIDE SERPL-SCNC: 99 MMOL/L — SIGNIFICANT CHANGE UP (ref 98–110)
CO2 SERPL-SCNC: 25 MMOL/L — SIGNIFICANT CHANGE UP (ref 17–32)
COLOR SPEC: YELLOW — SIGNIFICANT CHANGE UP
CREAT SERPL-MCNC: 1.6 MG/DL — HIGH (ref 0.7–1.5)
DIFF PNL FLD: NEGATIVE — SIGNIFICANT CHANGE UP
EOSINOPHIL # BLD AUTO: 0.05 K/UL — SIGNIFICANT CHANGE UP (ref 0–0.7)
EOSINOPHIL NFR BLD AUTO: 0.5 % — SIGNIFICANT CHANGE UP (ref 0–8)
EPI CELLS # UR: ABNORMAL /HPF
FLU A RESULT: NEGATIVE — SIGNIFICANT CHANGE UP
FLU A RESULT: NEGATIVE — SIGNIFICANT CHANGE UP
FLUAV AG NPH QL: NEGATIVE — SIGNIFICANT CHANGE UP
FLUBV AG NPH QL: NEGATIVE — SIGNIFICANT CHANGE UP
GLUCOSE BLDC GLUCOMTR-MCNC: 125 MG/DL — HIGH (ref 70–99)
GLUCOSE BLDC GLUCOMTR-MCNC: 196 MG/DL — HIGH (ref 70–99)
GLUCOSE SERPL-MCNC: 259 MG/DL — HIGH (ref 70–99)
GLUCOSE UR QL: NEGATIVE MG/DL — SIGNIFICANT CHANGE UP
HCT VFR BLD CALC: 32 % — LOW (ref 37–47)
HGB BLD-MCNC: 10.5 G/DL — LOW (ref 12–16)
IMM GRANULOCYTES NFR BLD AUTO: 0.3 % — SIGNIFICANT CHANGE UP (ref 0.1–0.3)
KETONES UR-MCNC: NEGATIVE — SIGNIFICANT CHANGE UP
LEUKOCYTE ESTERASE UR-ACNC: ABNORMAL
LIDOCAIN IGE QN: 70 U/L — HIGH (ref 7–60)
LYMPHOCYTES # BLD AUTO: 0.11 K/UL — LOW (ref 1.2–3.4)
LYMPHOCYTES # BLD AUTO: 1.2 % — LOW (ref 20.5–51.1)
MAGNESIUM SERPL-MCNC: 2.2 MG/DL — SIGNIFICANT CHANGE UP (ref 1.8–2.4)
MANUAL SMEAR VERIFICATION: SIGNIFICANT CHANGE UP
MCHC RBC-ENTMCNC: 28.8 PG — SIGNIFICANT CHANGE UP (ref 27–31)
MCHC RBC-ENTMCNC: 32.8 G/DL — SIGNIFICANT CHANGE UP (ref 32–37)
MCV RBC AUTO: 87.9 FL — SIGNIFICANT CHANGE UP (ref 81–99)
MONOCYTES # BLD AUTO: 0.52 K/UL — SIGNIFICANT CHANGE UP (ref 0.1–0.6)
MONOCYTES NFR BLD AUTO: 5.6 % — SIGNIFICANT CHANGE UP (ref 1.7–9.3)
NEUTROPHILS # BLD AUTO: 8.63 K/UL — HIGH (ref 1.4–6.5)
NEUTROPHILS NFR BLD AUTO: 92.3 % — HIGH (ref 42.2–75.2)
NEUTS BAND # BLD: 7 % — HIGH (ref 0–6)
NITRITE UR-MCNC: NEGATIVE — SIGNIFICANT CHANGE UP
NRBC # BLD: 0 /100 WBCS — SIGNIFICANT CHANGE UP (ref 0–0)
NRBC # BLD: 0 /100 — SIGNIFICANT CHANGE UP (ref 0–0)
PH UR: 6 — SIGNIFICANT CHANGE UP (ref 5–8)
PLAT MORPH BLD: NORMAL — SIGNIFICANT CHANGE UP
PLATELET # BLD AUTO: 220 K/UL — SIGNIFICANT CHANGE UP (ref 130–400)
PLATELET COUNT - ESTIMATE: NORMAL — SIGNIFICANT CHANGE UP
POTASSIUM SERPL-MCNC: 4.3 MMOL/L — SIGNIFICANT CHANGE UP (ref 3.5–5)
POTASSIUM SERPL-SCNC: 4.3 MMOL/L — SIGNIFICANT CHANGE UP (ref 3.5–5)
PROT SERPL-MCNC: 7.2 G/DL — SIGNIFICANT CHANGE UP (ref 6–8)
PROT UR-MCNC: ABNORMAL MG/DL
RBC # BLD: 3.64 M/UL — LOW (ref 4.2–5.4)
RBC # FLD: 13.9 % — SIGNIFICANT CHANGE UP (ref 11.5–14.5)
RBC BLD AUTO: NORMAL — SIGNIFICANT CHANGE UP
RSV RESULT: NEGATIVE — SIGNIFICANT CHANGE UP
RSV RNA RESP QL NAA+PROBE: NEGATIVE — SIGNIFICANT CHANGE UP
SODIUM SERPL-SCNC: 143 MMOL/L — SIGNIFICANT CHANGE UP (ref 135–146)
SP GR SPEC: 1.02 — SIGNIFICANT CHANGE UP (ref 1.01–1.03)
UROBILINOGEN FLD QL: 0.2 MG/DL — SIGNIFICANT CHANGE UP (ref 0.2–0.2)
WBC # BLD: 9.35 K/UL — SIGNIFICANT CHANGE UP (ref 4.8–10.8)
WBC # FLD AUTO: 9.35 K/UL — SIGNIFICANT CHANGE UP (ref 4.8–10.8)
WBC UR QL: SIGNIFICANT CHANGE UP /HPF

## 2019-12-17 PROCEDURE — 99497 ADVNCD CARE PLAN 30 MIN: CPT | Mod: 25

## 2019-12-17 PROCEDURE — 71045 X-RAY EXAM CHEST 1 VIEW: CPT | Mod: 26

## 2019-12-17 PROCEDURE — 99285 EMERGENCY DEPT VISIT HI MDM: CPT

## 2019-12-17 PROCEDURE — 99223 1ST HOSP IP/OBS HIGH 75: CPT

## 2019-12-17 PROCEDURE — 74176 CT ABD & PELVIS W/O CONTRAST: CPT | Mod: 26

## 2019-12-17 RX ORDER — DEXTROSE 50 % IN WATER 50 %
25 SYRINGE (ML) INTRAVENOUS ONCE
Refills: 0 | Status: DISCONTINUED | OUTPATIENT
Start: 2019-12-17 | End: 2019-12-19

## 2019-12-17 RX ORDER — INSULIN GLARGINE 100 [IU]/ML
18 INJECTION, SOLUTION SUBCUTANEOUS AT BEDTIME
Refills: 0 | Status: DISCONTINUED | OUTPATIENT
Start: 2019-12-17 | End: 2019-12-19

## 2019-12-17 RX ORDER — PANTOPRAZOLE SODIUM 20 MG/1
40 TABLET, DELAYED RELEASE ORAL
Refills: 0 | Status: DISCONTINUED | OUTPATIENT
Start: 2019-12-17 | End: 2019-12-19

## 2019-12-17 RX ORDER — SERTRALINE 25 MG/1
100 TABLET, FILM COATED ORAL DAILY
Refills: 0 | Status: DISCONTINUED | OUTPATIENT
Start: 2019-12-17 | End: 2019-12-19

## 2019-12-17 RX ORDER — SODIUM CHLORIDE 9 MG/ML
1000 INJECTION INTRAMUSCULAR; INTRAVENOUS; SUBCUTANEOUS ONCE
Refills: 0 | Status: COMPLETED | OUTPATIENT
Start: 2019-12-17 | End: 2019-12-17

## 2019-12-17 RX ORDER — ATORVASTATIN CALCIUM 80 MG/1
80 TABLET, FILM COATED ORAL AT BEDTIME
Refills: 0 | Status: DISCONTINUED | OUTPATIENT
Start: 2019-12-17 | End: 2019-12-19

## 2019-12-17 RX ORDER — SODIUM CHLORIDE 9 MG/ML
1000 INJECTION, SOLUTION INTRAVENOUS
Refills: 0 | Status: DISCONTINUED | OUTPATIENT
Start: 2019-12-17 | End: 2019-12-19

## 2019-12-17 RX ORDER — GLUCAGON INJECTION, SOLUTION 0.5 MG/.1ML
1 INJECTION, SOLUTION SUBCUTANEOUS ONCE
Refills: 0 | Status: DISCONTINUED | OUTPATIENT
Start: 2019-12-17 | End: 2019-12-19

## 2019-12-17 RX ORDER — DEXTROSE 50 % IN WATER 50 %
15 SYRINGE (ML) INTRAVENOUS ONCE
Refills: 0 | Status: DISCONTINUED | OUTPATIENT
Start: 2019-12-17 | End: 2019-12-19

## 2019-12-17 RX ORDER — DOCUSATE SODIUM 100 MG
100 CAPSULE ORAL ONCE
Refills: 0 | Status: DISCONTINUED | OUTPATIENT
Start: 2019-12-17 | End: 2019-12-17

## 2019-12-17 RX ORDER — AMLODIPINE BESYLATE 2.5 MG/1
10 TABLET ORAL ONCE
Refills: 0 | Status: COMPLETED | OUTPATIENT
Start: 2019-12-17 | End: 2019-12-17

## 2019-12-17 RX ORDER — CLOPIDOGREL BISULFATE 75 MG/1
75 TABLET, FILM COATED ORAL DAILY
Refills: 0 | Status: DISCONTINUED | OUTPATIENT
Start: 2019-12-17 | End: 2019-12-19

## 2019-12-17 RX ORDER — NORTRIPTYLINE HYDROCHLORIDE 10 MG/1
25 CAPSULE ORAL ONCE
Refills: 0 | Status: COMPLETED | OUTPATIENT
Start: 2019-12-17 | End: 2019-12-17

## 2019-12-17 RX ORDER — ONDANSETRON 8 MG/1
4 TABLET, FILM COATED ORAL ONCE
Refills: 0 | Status: COMPLETED | OUTPATIENT
Start: 2019-12-17 | End: 2019-12-17

## 2019-12-17 RX ORDER — DEXTROSE 50 % IN WATER 50 %
12.5 SYRINGE (ML) INTRAVENOUS ONCE
Refills: 0 | Status: DISCONTINUED | OUTPATIENT
Start: 2019-12-17 | End: 2019-12-19

## 2019-12-17 RX ORDER — ACETAMINOPHEN 500 MG
975 TABLET ORAL ONCE
Refills: 0 | Status: COMPLETED | OUTPATIENT
Start: 2019-12-17 | End: 2019-12-17

## 2019-12-17 RX ORDER — INSULIN LISPRO 100/ML
7 VIAL (ML) SUBCUTANEOUS
Refills: 0 | Status: DISCONTINUED | OUTPATIENT
Start: 2019-12-17 | End: 2019-12-19

## 2019-12-17 RX ORDER — HEPARIN SODIUM 5000 [USP'U]/ML
5000 INJECTION INTRAVENOUS; SUBCUTANEOUS EVERY 12 HOURS
Refills: 0 | Status: DISCONTINUED | OUTPATIENT
Start: 2019-12-17 | End: 2019-12-19

## 2019-12-17 RX ADMIN — SERTRALINE 100 MILLIGRAM(S): 25 TABLET, FILM COATED ORAL at 13:17

## 2019-12-17 RX ADMIN — AMLODIPINE BESYLATE 10 MILLIGRAM(S): 2.5 TABLET ORAL at 13:18

## 2019-12-17 RX ADMIN — Medication 975 MILLIGRAM(S): at 11:50

## 2019-12-17 RX ADMIN — Medication 975 MILLIGRAM(S): at 10:10

## 2019-12-17 RX ADMIN — PANTOPRAZOLE SODIUM 40 MILLIGRAM(S): 20 TABLET, DELAYED RELEASE ORAL at 13:17

## 2019-12-17 RX ADMIN — NORTRIPTYLINE HYDROCHLORIDE 25 MILLIGRAM(S): 10 CAPSULE ORAL at 13:17

## 2019-12-17 RX ADMIN — SODIUM CHLORIDE 1000 MILLILITER(S): 9 INJECTION INTRAMUSCULAR; INTRAVENOUS; SUBCUTANEOUS at 04:32

## 2019-12-17 RX ADMIN — Medication 7 UNIT(S): at 12:49

## 2019-12-17 RX ADMIN — HEPARIN SODIUM 5000 UNIT(S): 5000 INJECTION INTRAVENOUS; SUBCUTANEOUS at 17:46

## 2019-12-17 RX ADMIN — ONDANSETRON 4 MILLIGRAM(S): 8 TABLET, FILM COATED ORAL at 04:32

## 2019-12-17 RX ADMIN — CLOPIDOGREL BISULFATE 75 MILLIGRAM(S): 75 TABLET, FILM COATED ORAL at 13:17

## 2019-12-17 NOTE — GOALS OF CARE CONVERSATION - ADVANCED CARE PLANNING - CONVERSATION DETAILS
Patient with h/o breast ca and CVA. Discussed GOC, patient said she has a living will but wishes to remain full code.

## 2019-12-17 NOTE — ED PROVIDER NOTE - NS ED ROS FT
CONST: No fever, chills or bodyaches  EYES: No pain, redness, drainage or visual changes.  ENT: No ear pain or discharge, nasal discharge or congestion. No sore throat  CARD: No chest pain, palpitations  RESP: No SOB, cough, hemoptysis.  GI: (+) nausea and vomiting. No abdominal pain   : No urinary symptoms.   MS: No joint pain, back pain or extremity pain/injury  SKIN: No rashes  NEURO: No headache, dizziness, paresthesias or LOC

## 2019-12-17 NOTE — H&P ADULT - NSHPLABSRESULTS_GEN_ALL_CORE
10.5   9.35  )-----------( 220      ( 17 Dec 2019 04:00 )             32.0       12-17    143  |  99  |  37<H>  ----------------------------<  259<H>  4.3   |  25  |  1.6<H>    Ca    9.5      17 Dec 2019 04:00  Mg     2.2     12-17    TPro  7.2  /  Alb  4.4  /  TBili  0.4  /  DBili  x   /  AST  40  /  ALT  19  /  AlkPhos  13  4<H>  12-17    CTAbd      IMPRESSION:    No CT evidence of acute intra-abdominal pathology within the limits of a   noncontrast exam.    CXR    IMPRESSION:     No radiographic evidence of acute cardiopulmonary disease.

## 2019-12-17 NOTE — H&P ADULT - NSHPPHYSICALEXAM_GEN_ALL_CORE
Vital Signs Last 24 Hrs  T(C): 37 (17 Dec 2019 10:18), Max: 38.3 (17 Dec 2019 08:44)  T(F): 98.6 (17 Dec 2019 10:18), Max: 101 (17 Dec 2019 08:44)  HR: 97 (17 Dec 2019 10:18) (97 - 107)  BP: 99/52 (17 Dec 2019 10:18) (99/52 - 138/68)  BP(mean): --  RR: 18 (17 Dec 2019 10:18) (18 - 20)  SpO2: 96% (17 Dec 2019 10:18) (95% - 96%)    General: WN/WD NAD  Neurology: A&Ox3, nonfocal, MORALES x 4  Eyes: PERRLA Gross vision intact  ENT/Neck: Neck supple, No JVD, Gross hearing intact  Respiratory: CTA B/L, No wheezing, rales, rhonchi  CV: RRR, S1S2, no murmurs, rubs or gallops  Abdominal: Soft, NT, ND +BS,   Extremities: No edema, + peripheral pulses  Skin: No Rashes, Hematoma, Ecchymosis

## 2019-12-17 NOTE — H&P ADULT - NSHPREVIEWOFSYSTEMS_GEN_ALL_CORE
REVIEW OF SYSTEMS:    CONSTITUTIONAL: POSITIVE weakness, fevers or chills  EYES/ENT: No visual changes;  No vertigo or throat pain   NECK: No pain or stiffness  RESPIRATORY: No cough, wheezing, hemoptysis; No shortness of breath  CARDIOVASCULAR: No chest pain or palpitations  GASTROINTESTINAL: No abdominal or epigastric pain. POSITIVE nausea, vomiting,, No diarrhea or constipation. No melena or hematochezia.  GENITOURINARY: No dysuria, frequency or hematuria  NEUROLOGICAL: No numbness or weakness  SKIN: No itching, rashes

## 2019-12-17 NOTE — H&P ADULT - ASSESSMENT
78 yo f with DM2, HTN, HLD, S/p PPM, anxiety, breast ca on remission admitted for       1. Intractable vomiting with concern for acute dehydration  -IVF  -Diabetic diet  -Antiemetics      2. JABARI on CKD 4  - IVF  -Monitor renal function    3. HTN  - resumed home med    4. DM2 with hyperglycemia  -Resumed lantus and Humolag TIDAC  -Diabetic protocol    5. Anxiety  - resumed home medications    6.HLD/ S/P PPM  - Plavix  -Statin    DVT prophylaxis  GI Prophy  PPI 78 yo f with DM2, HTN, HLD, S/p PPM, anxiety, breast ca on remission admitted for       1. Intractable vomiting with concern for acute dehydration  -IVF  -Diabetic diet  -Antiemetics      2. JABARI on CKD 4  - IVF  -Monitor renal function    3. HTN  - resumed home med    4. DM2 with hyperglycemia  -Resumed lantus and Humolag TIDAC  -Diabetic protocol    5. Anxiety  - resumed home medications    6.HLD/ S/P PPM  - Plavix  -Statin  7. Anemia suspect of CKD  H/H stable  -Monitor    DVT prophylaxis  GI Prophy  PPI 78 yo f with DM2, HTN, HLD, S/p PPM, anxiety, breast ca on remission admitted for       1. Intractable vomiting with concern for acute dehydration  -IVF  -Diabetic diet  -Antiemetics      2. JABARI on CKD 4  - IVF  -Monitor renal function    3. HTN  - resumed home med    4. DM2 with hyperglycemia  -Resumed lantus and Humolag TIDAC  -Diabetic protocol    5. Depression  - resumed home medications    6.HLD/ S/P PPM/ CVA  - Plavix  -Statin    7. Anemia suspect of CKD  H/H stable  -Monitor    DVT prophylaxis  GI Prophy  PPI

## 2019-12-17 NOTE — ED PROVIDER NOTE - CLINICAL SUMMARY MEDICAL DECISION MAKING FREE TEXT BOX
79yF pw vomiting  x  5  + sick contacts -   in ed  pt  vomit x 1 food,  temp 100 oral  labs  wnl  ct abd pelvis  no acute pathology - pt feels better  no longer vomiting  .  Patient to be discharged from ED. Any available test results were discussed with and printed  for patient.  Verbal instructions given, including instructions to return to ED immediately for any new, worsening, or concerning symptoms. Patient reports understanding of above with capacity and insight. Written discharge instructions additionally given, including follow-up plan.

## 2019-12-17 NOTE — PATIENT PROFILE ADULT - NSTRANSFEREYEGLASSESPAIRS_GEN_A_NUR
1 pair HPI:    Patient ID: Paty Shah is a 75year old female. Patient presents with:  Back Pain    HPI patient complains of low back pain right greater than left for several weeks. This began after being sick with coughing.   Patient also questions whether Constitutional: She appears well-nourished. No distress. Cardiovascular: Normal rate and regular rhythm. No murmur heard.   Pulmonary/Chest: Effort normal and breath sounds normal.   Musculoskeletal:        Back:        COMPLAINT: LBP, R>L, off and on Somatic dysfunction of upper extremities  Segmental dysfunction of abdomen  Add (attention deficit disorder)  Patient Instructions   Patient was instructed in zone 1 self activation techniques.   She was able to demonstrate good performance  Reviewed diaphshaina

## 2019-12-17 NOTE — ED PROVIDER NOTE - PROGRESS NOTE DETAILS
I was directly involved in the care of this patient. PA Fellow Joy note/plan reviewed and agreed. pt admits symptoms have improved. pt denies vomiting since given zofran. discussed results with pt. pt advised to f/u with GI. pt is agreeable to plan and discharge. pt found to have fever 101, with 7% bandemia, will check UA, CXR and consider admission

## 2019-12-17 NOTE — ED PROVIDER NOTE - PHYSICAL EXAMINATION
Physical Exam    Vital Signs: I have reviewed the initial vital signs.  Constitutional: well-nourished, appears stated age, no acute distress  Eyes: Conjunctiva pink, Sclera clear.   Cardiovascular: S1 and S2, regular rate, regular rhythm, well-perfused extremities, radial pulses equal and 2+  Respiratory: unlabored respiratory effort, clear to auscultation bilaterally no wheezing, rales and rhonchi  Gastrointestinal: soft, non-tender, nondistended abdomen, no pulsatile mass, normal bowl sounds.   Musculoskeletal: 1+ lower extremity edema  Integumentary: warm, dry, no rash  Neurologic: a&o x3  Psychiatric: appropriate mood, appropriate affect

## 2019-12-17 NOTE — ED PROVIDER NOTE - NSFOLLOWUPINSTRUCTIONS_ED_ALL_ED_FT
Follow up with your  PMD  in 2-3 days or sooner if continued vomiting  or any new or worsening symptoms     Vomiting, Adult  Vomiting occurs when stomach contents are thrown up and out of the mouth. Many people notice nausea before vomiting. Vomiting can make you feel weak and dehydrated. Dehydration can make you tired and thirsty, cause you to have a dry mouth, and decrease how often you urinate. Older adults and people who have other diseases or a weak immune system are at higher risk for dehydration. It is important to treat vomiting as told by your health care provider.    Follow these instructions at home:  Follow your health care provider’s instructions about how to care for yourself at home.    Eating and drinking     Follow these recommendations as told by your health care provider:    Take an oral rehydration solution (ORS). This is a drink that is sold at pharmacies and retail stores.  Eat bland, easy-to-digest foods in small amounts as you are able. These foods include bananas, applesauce, rice, lean meats, toast, and crackers.  Drink clear fluids in small amounts as you are able. Clear fluids include water, ice chips, low-calorie sports drinks, and fruit juice that has water added (diluted fruit juice).  Avoid fluids that contain a lot of sugar or caffeine.  Avoid alcohol and foods that are spicy or fatty.    General instructions     Image   Wash your hands frequently with soap and water. If soap and water are not available, use hand . Make sure that everyone in your household washes their hands frequently.  Take over-the-counter and prescription medicines only as told by your health care provider.  Watch your condition for any changes.  Keep all follow-up visits as told by your health care provider. This is important.  Contact a health care provider if:  You have a fever.  You are not able to keep fluids down.  Your vomiting gets worse.  You have new symptoms.  You feel light-headed or dizzy.  You have a headache.  You have muscle cramps.  Get help right away if:  You have pain in your chest, neck, arm, or jaw.  You feel extremely weak or you faint.  You have persistent vomiting.  You have vomit that is bright red or looks like black coffee grounds.  You have stools that are bloody or black, or stools that look like tar.  You have severe pain, cramping, or bloating in your abdomen.  You have a severe headache, a stiff neck, or both.  You have a rash.  You have trouble breathing or you are breathing very quickly.  Your heart is beating very quickly.  Your skin feels cold and clammy.  You feel confused.  You have pain while urinating.  You have signs of dehydration, such as:    Dark urine, or very little or no urine.  Cracked lips.  Dry mouth.  Sunken eyes.  Sleepiness.  Weakness.    These symptoms may represent a serious problem that is an emergency. Do not wait to see if the symptoms will go away. Get medical help right away. Call your local emergency services (911 in the [place] Do not drive yourself to the hospital.     This information is not intended to replace advice given to you by your health care provider. Make sure you discuss any questions you have with your health care provider.

## 2019-12-17 NOTE — ED PROVIDER NOTE - OBJECTIVE STATEMENT
78 y/o female with a PMH of breast ca, cva, pacemaker on aspirin daily, and DM presents to the ED for evaluation of nausea, and nonbloody emesis that began 12/16 around 8:00pm. As per aid, pt has vomited over ten times. Pt admits tolerating PO. Pt admits to gallbladder removal and c section in the past. Pt denies fever, chills, d/c, abdominal pain, chest pain, back pain, sob, urinary symptoms, or blood in the stool.

## 2019-12-18 LAB
ALBUMIN SERPL ELPH-MCNC: 3.8 G/DL — SIGNIFICANT CHANGE UP (ref 3.5–5.2)
ALP SERPL-CCNC: 107 U/L — SIGNIFICANT CHANGE UP (ref 30–115)
ALT FLD-CCNC: 21 U/L — SIGNIFICANT CHANGE UP (ref 0–41)
ANION GAP SERPL CALC-SCNC: 13 MMOL/L — SIGNIFICANT CHANGE UP (ref 7–14)
AST SERPL-CCNC: 29 U/L — SIGNIFICANT CHANGE UP (ref 0–41)
BILIRUB SERPL-MCNC: 0.4 MG/DL — SIGNIFICANT CHANGE UP (ref 0.2–1.2)
BUN SERPL-MCNC: 44 MG/DL — HIGH (ref 10–20)
CALCIUM SERPL-MCNC: 8.6 MG/DL — SIGNIFICANT CHANGE UP (ref 8.5–10.1)
CHLORIDE SERPL-SCNC: 103 MMOL/L — SIGNIFICANT CHANGE UP (ref 98–110)
CO2 SERPL-SCNC: 26 MMOL/L — SIGNIFICANT CHANGE UP (ref 17–32)
CREAT SERPL-MCNC: 1.6 MG/DL — HIGH (ref 0.7–1.5)
ESTIMATED AVERAGE GLUCOSE: 206 MG/DL — HIGH (ref 68–114)
GLUCOSE BLDC GLUCOMTR-MCNC: 137 MG/DL — HIGH (ref 70–99)
GLUCOSE BLDC GLUCOMTR-MCNC: 158 MG/DL — HIGH (ref 70–99)
GLUCOSE BLDC GLUCOMTR-MCNC: 162 MG/DL — HIGH (ref 70–99)
GLUCOSE BLDC GLUCOMTR-MCNC: 223 MG/DL — HIGH (ref 70–99)
GLUCOSE SERPL-MCNC: 215 MG/DL — HIGH (ref 70–99)
HBA1C BLD-MCNC: 8.8 % — HIGH (ref 4–5.6)
HCT VFR BLD CALC: 28.4 % — LOW (ref 37–47)
HGB BLD-MCNC: 8.8 G/DL — LOW (ref 12–16)
MCHC RBC-ENTMCNC: 28.2 PG — SIGNIFICANT CHANGE UP (ref 27–31)
MCHC RBC-ENTMCNC: 31 G/DL — LOW (ref 32–37)
MCV RBC AUTO: 91 FL — SIGNIFICANT CHANGE UP (ref 81–99)
NRBC # BLD: 0 /100 WBCS — SIGNIFICANT CHANGE UP (ref 0–0)
PLATELET # BLD AUTO: 190 K/UL — SIGNIFICANT CHANGE UP (ref 130–400)
POTASSIUM SERPL-MCNC: 3.8 MMOL/L — SIGNIFICANT CHANGE UP (ref 3.5–5)
POTASSIUM SERPL-SCNC: 3.8 MMOL/L — SIGNIFICANT CHANGE UP (ref 3.5–5)
PROT SERPL-MCNC: 6.5 G/DL — SIGNIFICANT CHANGE UP (ref 6–8)
RBC # BLD: 3.12 M/UL — LOW (ref 4.2–5.4)
RBC # FLD: 14.2 % — SIGNIFICANT CHANGE UP (ref 11.5–14.5)
SODIUM SERPL-SCNC: 142 MMOL/L — SIGNIFICANT CHANGE UP (ref 135–146)
WBC # BLD: 5.25 K/UL — SIGNIFICANT CHANGE UP (ref 4.8–10.8)
WBC # FLD AUTO: 5.25 K/UL — SIGNIFICANT CHANGE UP (ref 4.8–10.8)

## 2019-12-18 PROCEDURE — 99233 SBSQ HOSP IP/OBS HIGH 50: CPT

## 2019-12-18 RX ORDER — SENNA PLUS 8.6 MG/1
2 TABLET ORAL AT BEDTIME
Refills: 0 | Status: DISCONTINUED | OUTPATIENT
Start: 2019-12-18 | End: 2019-12-19

## 2019-12-18 RX ORDER — ONDANSETRON 8 MG/1
4 TABLET, FILM COATED ORAL EVERY 6 HOURS
Refills: 0 | Status: DISCONTINUED | OUTPATIENT
Start: 2019-12-18 | End: 2019-12-19

## 2019-12-18 RX ORDER — ACETAMINOPHEN 500 MG
650 TABLET ORAL EVERY 6 HOURS
Refills: 0 | Status: DISCONTINUED | OUTPATIENT
Start: 2019-12-18 | End: 2019-12-19

## 2019-12-18 RX ADMIN — SENNA PLUS 2 TABLET(S): 8.6 TABLET ORAL at 21:07

## 2019-12-18 RX ADMIN — SERTRALINE 100 MILLIGRAM(S): 25 TABLET, FILM COATED ORAL at 11:24

## 2019-12-18 RX ADMIN — PANTOPRAZOLE SODIUM 40 MILLIGRAM(S): 20 TABLET, DELAYED RELEASE ORAL at 05:39

## 2019-12-18 RX ADMIN — HEPARIN SODIUM 5000 UNIT(S): 5000 INJECTION INTRAVENOUS; SUBCUTANEOUS at 05:38

## 2019-12-18 RX ADMIN — Medication 7 UNIT(S): at 08:16

## 2019-12-18 RX ADMIN — CLOPIDOGREL BISULFATE 75 MILLIGRAM(S): 75 TABLET, FILM COATED ORAL at 14:09

## 2019-12-18 RX ADMIN — Medication 7 UNIT(S): at 17:08

## 2019-12-18 RX ADMIN — INSULIN GLARGINE 18 UNIT(S): 100 INJECTION, SOLUTION SUBCUTANEOUS at 22:05

## 2019-12-18 RX ADMIN — Medication 650 MILLIGRAM(S): at 17:08

## 2019-12-18 RX ADMIN — HEPARIN SODIUM 5000 UNIT(S): 5000 INJECTION INTRAVENOUS; SUBCUTANEOUS at 17:07

## 2019-12-18 RX ADMIN — Medication 10 MILLIGRAM(S): at 10:05

## 2019-12-18 RX ADMIN — ATORVASTATIN CALCIUM 80 MILLIGRAM(S): 80 TABLET, FILM COATED ORAL at 21:07

## 2019-12-18 RX ADMIN — ONDANSETRON 4 MILLIGRAM(S): 8 TABLET, FILM COATED ORAL at 10:09

## 2019-12-19 ENCOUNTER — TRANSCRIPTION ENCOUNTER (OUTPATIENT)
Age: 79
End: 2019-12-19

## 2019-12-19 VITALS
TEMPERATURE: 98 F | DIASTOLIC BLOOD PRESSURE: 66 MMHG | SYSTOLIC BLOOD PRESSURE: 130 MMHG | RESPIRATION RATE: 16 BRPM | HEART RATE: 88 BPM

## 2019-12-19 LAB
GLUCOSE BLDC GLUCOMTR-MCNC: 135 MG/DL — HIGH (ref 70–99)
GLUCOSE BLDC GLUCOMTR-MCNC: 193 MG/DL — HIGH (ref 70–99)
GLUCOSE BLDC GLUCOMTR-MCNC: 395 MG/DL — HIGH (ref 70–99)

## 2019-12-19 PROCEDURE — 99233 SBSQ HOSP IP/OBS HIGH 50: CPT

## 2019-12-19 RX ORDER — SENNA PLUS 8.6 MG/1
2 TABLET ORAL
Qty: 0 | Refills: 0 | DISCHARGE
Start: 2019-12-19

## 2019-12-19 RX ORDER — ONDANSETRON 8 MG/1
1 TABLET, FILM COATED ORAL
Qty: 40 | Refills: 0
Start: 2019-12-19 | End: 2019-12-28

## 2019-12-19 RX ORDER — CLOPIDOGREL BISULFATE 75 MG/1
1 TABLET, FILM COATED ORAL
Qty: 0 | Refills: 0 | DISCHARGE
Start: 2019-12-19

## 2019-12-19 RX ADMIN — Medication 7 UNIT(S): at 12:36

## 2019-12-19 RX ADMIN — CLOPIDOGREL BISULFATE 75 MILLIGRAM(S): 75 TABLET, FILM COATED ORAL at 12:37

## 2019-12-19 RX ADMIN — HEPARIN SODIUM 5000 UNIT(S): 5000 INJECTION INTRAVENOUS; SUBCUTANEOUS at 17:03

## 2019-12-19 RX ADMIN — HEPARIN SODIUM 5000 UNIT(S): 5000 INJECTION INTRAVENOUS; SUBCUTANEOUS at 05:30

## 2019-12-19 RX ADMIN — Medication 30 MILLILITER(S): at 04:26

## 2019-12-19 RX ADMIN — PANTOPRAZOLE SODIUM 40 MILLIGRAM(S): 20 TABLET, DELAYED RELEASE ORAL at 09:13

## 2019-12-19 RX ADMIN — ONDANSETRON 4 MILLIGRAM(S): 8 TABLET, FILM COATED ORAL at 03:02

## 2019-12-19 RX ADMIN — SERTRALINE 100 MILLIGRAM(S): 25 TABLET, FILM COATED ORAL at 12:37

## 2019-12-19 RX ADMIN — Medication 7 UNIT(S): at 17:03

## 2019-12-19 NOTE — PROGRESS NOTE ADULT - ATTENDING COMMENTS
Patient seen and examined independently of PA. My addendum supersedes PA notation. Case discussed with housestaff, nursing and patient
Patient seen independently of PA  >30 minutes spent coordinating discharge planning, medicine reconciliation, follow up plan, and direct patient encounter  see discharge summary for further recommendation

## 2019-12-19 NOTE — DISCHARGE NOTE NURSING/CASE MANAGEMENT/SOCIAL WORK - PATIENT PORTAL LINK FT
You can access the FollowMyHealth Patient Portal offered by [facility] by registering at the following website: [email or web address]. By joining [employer]’s FollowMyHealth portal, you will also be able to view your health information using other applications (apps) compatible with our system.

## 2019-12-19 NOTE — DISCHARGE NOTE PROVIDER - NSDCMRMEDTOKEN_GEN_ALL_CORE_FT
atorvastatin 80 mg oral tablet: 1 tab(s) orally once a day (at bedtime)  clopidogrel 75 mg oral tablet: 1 tab(s) orally once a day  docusate sodium 100 mg oral capsule: 1 cap(s) orally once a day  exemestane 25 mg oral tablet: 1 tab(s) orally once a day  HumaLOG 100 units/mL subcutaneous solution: 7 unit(s) subcutaneous 3 times a day (before meals)  insulin glargine 100 units/mL subcutaneous solution: 18 unit(s) subcutaneous once a day (at bedtime)  nortriptyline 25 mg oral capsule: 1 cap(s) orally once a day (at bedtime)  ondansetron 4 mg oral tablet: 1 tab(s) orally every 6 hours, As Needed -Nausea and/or Vomiting - for nausea   pantoprazole 40 mg oral delayed release tablet: 1 tab(s) orally once a day (before a meal)  sertraline 100 mg oral tablet: 1 tab(s) orally once a day atorvastatin 80 mg oral tablet: 1 tab(s) orally once a day (at bedtime)  clopidogrel 75 mg oral tablet: 1 tab(s) orally once a day  docusate sodium 100 mg oral capsule: 1 cap(s) orally 3 times a day- PRN for constipation- OTC is okay  exemestane 25 mg oral tablet: 1 tab(s) orally once a day  HumaLOG 100 units/mL subcutaneous solution: 7 unit(s) subcutaneous 3 times a day (before meals)  insulin glargine 100 units/mL subcutaneous solution: 18 unit(s) subcutaneous once a day (at bedtime)  nortriptyline 25 mg oral capsule: 1 cap(s) orally once a day (at bedtime)  ondansetron 4 mg oral tablet: 1 tab(s) orally every 6 hours, As Needed -Nausea and/or Vomiting - for nausea   pantoprazole 40 mg oral delayed release tablet: 1 tab(s) orally once a day (before a meal)  senna oral tablet: 2 tab(s) orally once a day (at bedtime)- PRN for constipation- OTC is okay  sertraline 100 mg oral tablet: 1 tab(s) orally once a day

## 2019-12-19 NOTE — PROGRESS NOTE ADULT - ASSESSMENT
80 yo f with DM2, HTN, HLD, S/p PPM, anxiety, breast ca on remission admitted for       1. Intractable vomiting with concern for acute dehydration- possibly 2/2 enteritis probably viral; + component of constipation  - tolerating advancing diet, po antiemetics prn  + sick contact  high fiber diet, stool softeners, ambulate, suppository    2. JABARI on CKD 4  improved s/p ivh, was likely 2/2 prerenal azotemia  -Monitor renal function    3. HTN  - resumed home med    4. DM2 with hyperglycemia  -Resumed lantus and Humolag TIDAC  -Diabetic protocol    5. Depression  - resumed home medications    6.HLD/ S/P PPM/ CVA  - Plavix  -Statin    7. Anemia suspect of CKD  H/H stable    patient is currently tolerating oral diet, and medically stable for discharge with outpatient followup with her PMD
78 yo f with DM2, HTN, HLD, S/p PPM, anxiety, breast ca on remission admitted for       1. Intractable vomiting with concern for acute dehydration- possibly 2/2 enteritis probably viral; + component of constipation  - tolerating advancing diet, po antiemetics  + sick contact  high fiber diet, stool softeners, ambulate, suppository    2. JABARI on CKD 4  improved s/p ivh, was likely 2/2 prerenal azotemia  -Monitor renal function    3. HTN  - resumed home med    4. DM2 with hyperglycemia  -Resumed lantus and Humolag TIDAC  -Diabetic protocol    5. Depression  - resumed home medications    6.HLD/ S/P PPM/ CVA  - Plavix  -Statin    7. Anemia suspect of CKD  H/H stable  -Monitor    DVT prophylaxis  GI Prophy  PPI          #Progress Note Handoff    Pending (specify):  Consults_________, Tests________, Test Results_______, Other___reinstate home health aid______    Family discussion: plan of care discussed with patient    Disposition: home with aid tomorrow

## 2019-12-19 NOTE — DISCHARGE NOTE PROVIDER - HOSPITAL COURSE
78 yo f with DM2, HTN, HLD, S/p PPM, anxiety, breast ca on remission admitted for             1. Intractable vomiting with concern for acute dehydration- possibly 2/2 enteritis probably viral; + component of constipation    - tolerating advancing diet, po antiemetics prn    + sick contact    high fiber diet, stool softeners, ambulate, suppository        2. JABARI on CKD 4    improved s/p ivh, was likely 2/2 prerenal azotemia    -Monitor renal function        3. HTN    - resumed home med        4. DM2 with hyperglycemia    -Resumed lantus and Humolag TIDAC    -Diabetic protocol        5. Depression    - resumed home medications        6.HLD/ S/P PPM/ CVA    - Plavix    -Statin        7. Anemia suspect of CKD    H/H stable        patient is currently tolerating oral diet, and medically stable for discharge with outpatient followup with her PMD 80 yo f with DM2, HTN, HLD, S/p PPM, anxiety, breast ca on remission admitted for             1. Intractable vomiting with concern for acute dehydration- possibly 2/2 enteritis probably viral; + component of constipation    - tolerating advancing diet, po antiemetics prn    + sick contact    high fiber diet, stool softeners, ambulate, suppository        2. CKD 4    JABARI ruled out, empirically hydrated for clinical dehydration    Cr at baseline of 1.6        3. HTN    - resumed home med        4. DM2 with hyperglycemia    -Resumed lantus and Humolag TIDAC    -Diabetic protocol        5. Depression    - resumed home medications        6.HLD/ S/P PPM/ CVA    - Plavix    -Statin        7. Anemia suspect of CKD    H/H stable        patient is currently tolerating oral diet, and medically stable for discharge with outpatient followup with her PMD

## 2019-12-19 NOTE — DISCHARGE NOTE PROVIDER - CARE PROVIDER_API CALL
Ariel Macias)  Internal Medicine  [address] [place] [address]  Phone: [phone]  Fax: [phone]  Established Patient  Follow Up Time: 1 week

## 2019-12-19 NOTE — PROGRESS NOTE ADULT - SUBJECTIVE AND OBJECTIVE BOX
JAYASHREE FAGAN  79y  Female      Patient is a 79y old  Female who presents with a chief complaint of nausea with vomiting (17 Dec 2019 12:46)      INTERVAL HPI/OVERNIGHT EVENTS: no nausea/vomiting today, tolerating advancing diet. no bm today, last one was hard and large      REVIEW OF SYSTEMS:  as above  All other review of systems negative    T(C): 36.8 (19 @ 14:08), Max: 36.8 (19 @ 17:11)  HR: 92 (19 @ 14:08) (86 - 95)  BP: 114/69 (19 @ 14:08) (103/51 - 125/56)  RR: 18 (19 @ 05:15) ( - )  SpO2: 92% (19 @ 08:43) (92% - 92%)  Wt(kg): --Vital Signs Last 24 Hrs  T(C): 36.8 (18 Dec 2019 14:08), Max: 36.8 (17 Dec 2019 17:11)  T(F): 98.3 (18 Dec 2019 14:08), Max: 98.3 (17 Dec 2019 17:11)  HR: 92 (18 Dec 2019 14:08) (86 - 95)  BP: 114/69 (18 Dec 2019 14:08) (103/51 - 125/56)  BP(mean): --  RR: 18 (18 Dec 2019 05:15) ( - )  SpO2: 92% (18 Dec 2019 08:43) (92% - 92%)      19 @ 07:01  -  19 @ 07:00  --------------------------------------------------------  IN: 0 mL / OUT: 200 mL / NET: -200 mL        PHYSICAL EXAM:  GENERAL: NAD  PSYCH: no agitation, baseline mentation  NERVOUS SYSTEM:  Alert & Oriented X3, no new focal deficits  PULMONARY: Clear to percussion bilaterally; No rales, rhonchi, wheezing, or rubs  CARDIOVASCULAR: Regular rate and rhythm; No murmurs, rubs, or gallops, +ppm   GI: Soft, Nondistended; Bowel sounds present, c sections scar, LLQ and LUQ tender to palpation  EXTREMITIES:  2+ Peripheral Pulses, No clubbing, cyanosis, or edema    Consultant(s) Notes Reviewed:  [x ] YES  [ ] NO    Discussed with Consultants/Other Providers [ x] YES     LABS                          8.8    5.25  )-----------( 190      ( 18 Dec 2019 06:34 )             28.4     12-18    142  |  103  |  44<H>  ----------------------------<  215<H>  3.8   |  26  |  1.6<H>    Ca    8.6      18 Dec 2019 06:34  Mg     2.2     12-    TPro  6.5  /  Alb  3.8  /  TBili  0.4  /  DBili  x   /  AST  29  /  ALT  21  /  AlkPhos  107  12-18      Urinalysis Basic - ( 17 Dec 2019 08:46 )    Color: Yellow / Appearance: Clear / S.020 / pH: x  Gluc: x / Ketone: Negative  / Bili: Negative / Urobili: 0.2 mg/dL   Blood: x / Protein: Trace mg/dL / Nitrite: Negative   Leuk Esterase: Trace / RBC: x / WBC 3-5 /HPF   Sq Epi: x / Non Sq Epi: Occasional /HPF / Bacteria: Few        Lactate Trend        CAPILLARY BLOOD GLUCOSE      POCT Blood Glucose.: 137 mg/dL (18 Dec 2019 11:14)        RADIOLOGY & ADDITIONAL TESTS:    Imaging Personally Reviewed:  [ ] YES  [ ] NO    HEALTH ISSUES - PROBLEM Dx:
JAYASHREE FAGAN  79y  Female      Patient is a 79y old  Female who presents with a chief complaint of nausea with vomiting (19 Dec 2019 07:30)      INTERVAL HPI/OVERNIGHT EVENTS: no longer with nausea/ vomiting. urinating well. a bit constipated but had a BM      REVIEW OF SYSTEMS:  as above  All other review of systems negative    T(C): 36.6 (12-19-19 @ 14:48), Max: 36.6 (12-19-19 @ 06:14)  HR: 88 (12-19-19 @ 14:48) (88 - 90)  BP: 130/66 (12-19-19 @ 14:48) (130/66 - 141/66)  RR: 16 (12-19-19 @ 14:48) (16 - 18)  SpO2: --  Wt(kg): --Vital Signs Last 24 Hrs  T(C): 36.6 (19 Dec 2019 14:48), Max: 36.6 (19 Dec 2019 06:14)  T(F): 97.9 (19 Dec 2019 14:48), Max: 97.9 (19 Dec 2019 06:14)  HR: 88 (19 Dec 2019 14:48) (88 - 90)  BP: 130/66 (19 Dec 2019 14:48) (130/66 - 141/66)  BP(mean): --  RR: 16 (19 Dec 2019 14:48) (16 - 18)  SpO2: --        PHYSICAL EXAM:  GENERAL: NAD, deconditioned  PSYCH: no agitation, baseline mentation  NERVOUS SYSTEM:  Alert & Oriented X3, no new focal deficits  PULMONARY: Clear to percussion bilaterally; No rales, rhonchi, wheezing, or rubs  CARDIOVASCULAR: Regular rate and rhythm; No murmurs, rubs, or gallops  GI: Soft, Nontender, Nondistended; Bowel sounds present  Rectal: hard stool ball  EXTREMITIES:  2+ Peripheral Pulses, No clubbing, cyanosis, or edema, LE weakness b/l chronic  SKIN less dry    Consultant(s) Notes Reviewed:  [x ] YES  [ ] NO    Discussed with Consultants/Other Providers [ x] YES     LABS                          8.8    5.25  )-----------( 190      ( 18 Dec 2019 06:34 )             28.4     12-18    142  |  103  |  44<H>  ----------------------------<  215<H>  3.8   |  26  |  1.6<H>    Ca    8.6      18 Dec 2019 06:34    TPro  6.5  /  Alb  3.8  /  TBili  0.4  /  DBili  x   /  AST  29  /  ALT  21  /  AlkPhos  107  12-18          Lactate Trend        CAPILLARY BLOOD GLUCOSE      POCT Blood Glucose.: 193 mg/dL (19 Dec 2019 11:39)        RADIOLOGY & ADDITIONAL TESTS:    Imaging Personally Reviewed:  [ ] YES  [ ] NO    HEALTH ISSUES - PROBLEM Dx:

## 2019-12-19 NOTE — DISCHARGE NOTE PROVIDER - NSDCCPCAREPLAN_GEN_ALL_CORE_FT
PRINCIPAL DISCHARGE DIAGNOSIS  Diagnosis: Enteritis  Assessment and Plan of Treatment: initially intractible nausea/vomiting. improved with supportive care. now tolerating diet      SECONDARY DISCHARGE DIAGNOSES  Diagnosis: JABARI (acute kidney injury)  Assessment and Plan of Treatment: on ckd4. jabari was secondary to prerenal azotemia/ dehydration. improved now, back to baseline.    Diagnosis: Dehydration  Assessment and Plan of Treatment: initially required IVH, now tolerating oral food and liquids well without incident    Diagnosis: Vomiting  Assessment and Plan of Treatment: secondary to suspected viral gastroenteritis. improved with supportive care. dehydration resolved PRINCIPAL DISCHARGE DIAGNOSIS  Diagnosis: Enteritis  Assessment and Plan of Treatment: initially intractible nausea/vomiting. improved with supportive care. now tolerating diet      SECONDARY DISCHARGE DIAGNOSES  Diagnosis: Constipation  Assessment and Plan of Treatment: chronic. high fiber diet. drink water. over the counter PRN stool softeners senna/colace. encourage out of bed to wheelchair transfers with assistance    Diagnosis: JABARI (acute kidney injury)  Assessment and Plan of Treatment: on ckd4. jabari was secondary to prerenal azotemia/ dehydration. improved now, back to baseline.    Diagnosis: Dehydration  Assessment and Plan of Treatment: initially required IVH, now tolerating oral food and liquids well without incident    Diagnosis: Vomiting  Assessment and Plan of Treatment: secondary to suspected viral gastroenteritis. improved with supportive care. dehydration resolved PRINCIPAL DISCHARGE DIAGNOSIS  Diagnosis: Enteritis  Assessment and Plan of Treatment: initially intractible nausea/vomiting. improved with supportive care. now tolerating diet      SECONDARY DISCHARGE DIAGNOSES  Diagnosis: Constipation  Assessment and Plan of Treatment: chronic. high fiber diet. drink water. over the counter PRN stool softeners senna/colace. encourage out of bed to wheelchair transfers with assistance    Diagnosis: Dehydration  Assessment and Plan of Treatment: initially required IVH, now tolerating oral food and liquids well without incident    Diagnosis: Vomiting  Assessment and Plan of Treatment: secondary to suspected viral gastroenteritis. improved with supportive care. dehydration resolved

## 2019-12-20 LAB
-  AMIKACIN: SIGNIFICANT CHANGE UP
-  AZTREONAM: SIGNIFICANT CHANGE UP
-  CEFEPIME: SIGNIFICANT CHANGE UP
-  CEFTAZIDIME: SIGNIFICANT CHANGE UP
-  CIPROFLOXACIN: SIGNIFICANT CHANGE UP
-  GENTAMICIN: SIGNIFICANT CHANGE UP
-  IMIPENEM: SIGNIFICANT CHANGE UP
-  LEVOFLOXACIN: SIGNIFICANT CHANGE UP
-  MEROPENEM: SIGNIFICANT CHANGE UP
-  PIPERACILLIN/TAZOBACTAM: SIGNIFICANT CHANGE UP
-  TOBRAMYCIN: SIGNIFICANT CHANGE UP
CULTURE RESULTS: SIGNIFICANT CHANGE UP
METHOD TYPE: SIGNIFICANT CHANGE UP
ORGANISM # SPEC MICROSCOPIC CNT: SIGNIFICANT CHANGE UP
ORGANISM # SPEC MICROSCOPIC CNT: SIGNIFICANT CHANGE UP
SPECIMEN SOURCE: SIGNIFICANT CHANGE UP

## 2020-01-03 DIAGNOSIS — F32.9 MAJOR DEPRESSIVE DISORDER, SINGLE EPISODE, UNSPECIFIED: ICD-10-CM

## 2020-01-03 DIAGNOSIS — Z91.013 ALLERGY TO SEAFOOD: ICD-10-CM

## 2020-01-03 DIAGNOSIS — A08.4 VIRAL INTESTINAL INFECTION, UNSPECIFIED: ICD-10-CM

## 2020-01-03 DIAGNOSIS — D63.1 ANEMIA IN CHRONIC KIDNEY DISEASE: ICD-10-CM

## 2020-01-03 DIAGNOSIS — I12.9 HYPERTENSIVE CHRONIC KIDNEY DISEASE WITH STAGE 1 THROUGH STAGE 4 CHRONIC KIDNEY DISEASE, OR UNSPECIFIED CHRONIC KIDNEY DISEASE: ICD-10-CM

## 2020-01-03 DIAGNOSIS — E78.5 HYPERLIPIDEMIA, UNSPECIFIED: ICD-10-CM

## 2020-01-03 DIAGNOSIS — H91.93 UNSPECIFIED HEARING LOSS, BILATERAL: ICD-10-CM

## 2020-01-03 DIAGNOSIS — Z91.040 LATEX ALLERGY STATUS: ICD-10-CM

## 2020-01-03 DIAGNOSIS — N18.4 CHRONIC KIDNEY DISEASE, STAGE 4 (SEVERE): ICD-10-CM

## 2020-01-03 DIAGNOSIS — Z85.3 PERSONAL HISTORY OF MALIGNANT NEOPLASM OF BREAST: ICD-10-CM

## 2020-01-03 DIAGNOSIS — E11.65 TYPE 2 DIABETES MELLITUS WITH HYPERGLYCEMIA: ICD-10-CM

## 2020-01-03 DIAGNOSIS — E11.22 TYPE 2 DIABETES MELLITUS WITH DIABETIC CHRONIC KIDNEY DISEASE: ICD-10-CM

## 2020-01-03 DIAGNOSIS — Z95.0 PRESENCE OF CARDIAC PACEMAKER: ICD-10-CM

## 2020-01-03 DIAGNOSIS — Z86.73 PERSONAL HISTORY OF TRANSIENT ISCHEMIC ATTACK (TIA), AND CEREBRAL INFARCTION WITHOUT RESIDUAL DEFICITS: ICD-10-CM

## 2020-01-03 DIAGNOSIS — K59.00 CONSTIPATION, UNSPECIFIED: ICD-10-CM

## 2020-01-03 DIAGNOSIS — Z88.2 ALLERGY STATUS TO SULFONAMIDES: ICD-10-CM

## 2020-01-03 DIAGNOSIS — R50.9 FEVER, UNSPECIFIED: ICD-10-CM

## 2020-01-03 DIAGNOSIS — Z88.0 ALLERGY STATUS TO PENICILLIN: ICD-10-CM

## 2020-02-21 ENCOUNTER — INPATIENT (INPATIENT)
Facility: HOSPITAL | Age: 80
LOS: 3 days | Discharge: HOME HEALTH | End: 2020-02-25
Attending: INTERNAL MEDICINE | Admitting: INTERNAL MEDICINE
Payer: MEDICARE

## 2020-02-21 VITALS
HEART RATE: 90 BPM | SYSTOLIC BLOOD PRESSURE: 123 MMHG | WEIGHT: 250 LBS | RESPIRATION RATE: 18 BRPM | OXYGEN SATURATION: 99 % | DIASTOLIC BLOOD PRESSURE: 68 MMHG | TEMPERATURE: 101 F

## 2020-02-21 DIAGNOSIS — Z90.49 ACQUIRED ABSENCE OF OTHER SPECIFIED PARTS OF DIGESTIVE TRACT: Chronic | ICD-10-CM

## 2020-02-21 DIAGNOSIS — Z98.890 OTHER SPECIFIED POSTPROCEDURAL STATES: Chronic | ICD-10-CM

## 2020-02-21 DIAGNOSIS — Z96.651 PRESENCE OF RIGHT ARTIFICIAL KNEE JOINT: Chronic | ICD-10-CM

## 2020-02-21 LAB
ALBUMIN SERPL ELPH-MCNC: 4.1 G/DL — SIGNIFICANT CHANGE UP (ref 3.5–5.2)
ALP SERPL-CCNC: 197 U/L — HIGH (ref 30–115)
ALT FLD-CCNC: 13 U/L — SIGNIFICANT CHANGE UP (ref 0–41)
ANION GAP SERPL CALC-SCNC: 13 MMOL/L — SIGNIFICANT CHANGE UP (ref 7–14)
APPEARANCE UR: ABNORMAL
APTT BLD: 25.9 SEC — LOW (ref 27–39.2)
AST SERPL-CCNC: 16 U/L — SIGNIFICANT CHANGE UP (ref 0–41)
BACTERIA # UR AUTO: NEGATIVE — SIGNIFICANT CHANGE UP
BASE EXCESS BLDV CALC-SCNC: 4.6 MMOL/L — HIGH (ref -2–2)
BASOPHILS # BLD AUTO: 0.03 K/UL — SIGNIFICANT CHANGE UP (ref 0–0.2)
BASOPHILS NFR BLD AUTO: 0.4 % — SIGNIFICANT CHANGE UP (ref 0–1)
BILIRUB SERPL-MCNC: 0.4 MG/DL — SIGNIFICANT CHANGE UP (ref 0.2–1.2)
BILIRUB UR-MCNC: NEGATIVE — SIGNIFICANT CHANGE UP
BUN SERPL-MCNC: 28 MG/DL — HIGH (ref 10–20)
CA-I SERPL-SCNC: 1.18 MMOL/L — SIGNIFICANT CHANGE UP (ref 1.12–1.3)
CALCIUM SERPL-MCNC: 9.3 MG/DL — SIGNIFICANT CHANGE UP (ref 8.5–10.1)
CHLORIDE SERPL-SCNC: 100 MMOL/L — SIGNIFICANT CHANGE UP (ref 98–110)
CO2 SERPL-SCNC: 27 MMOL/L — SIGNIFICANT CHANGE UP (ref 17–32)
COLOR SPEC: YELLOW — SIGNIFICANT CHANGE UP
CREAT SERPL-MCNC: 1.4 MG/DL — SIGNIFICANT CHANGE UP (ref 0.7–1.5)
DIFF PNL FLD: SIGNIFICANT CHANGE UP
EOSINOPHIL # BLD AUTO: 0.13 K/UL — SIGNIFICANT CHANGE UP (ref 0–0.7)
EOSINOPHIL NFR BLD AUTO: 1.8 % — SIGNIFICANT CHANGE UP (ref 0–8)
EPI CELLS # UR: 1 /HPF — SIGNIFICANT CHANGE UP (ref 0–5)
FLU A RESULT: NEGATIVE — SIGNIFICANT CHANGE UP
FLU A RESULT: NEGATIVE — SIGNIFICANT CHANGE UP
FLUAV AG NPH QL: NEGATIVE — SIGNIFICANT CHANGE UP
FLUBV AG NPH QL: NEGATIVE — SIGNIFICANT CHANGE UP
GAS PNL BLDV: 145 MMOL/L — SIGNIFICANT CHANGE UP (ref 136–145)
GAS PNL BLDV: SIGNIFICANT CHANGE UP
GLUCOSE BLDC GLUCOMTR-MCNC: 167 MG/DL — HIGH (ref 70–99)
GLUCOSE BLDC GLUCOMTR-MCNC: 262 MG/DL — HIGH (ref 70–99)
GLUCOSE BLDC GLUCOMTR-MCNC: 270 MG/DL — HIGH (ref 70–99)
GLUCOSE SERPL-MCNC: 264 MG/DL — HIGH (ref 70–99)
GLUCOSE UR QL: NEGATIVE — SIGNIFICANT CHANGE UP
HCO3 BLDV-SCNC: 30 MMOL/L — HIGH (ref 22–29)
HCT VFR BLD CALC: 28.6 % — LOW (ref 37–47)
HCT VFR BLDA CALC: 28.9 % — LOW (ref 34–44)
HGB BLD CALC-MCNC: 9.4 G/DL — LOW (ref 14–18)
HGB BLD-MCNC: 9.3 G/DL — LOW (ref 12–16)
HYALINE CASTS # UR AUTO: 0 /LPF — SIGNIFICANT CHANGE UP (ref 0–7)
IMM GRANULOCYTES NFR BLD AUTO: 0.1 % — SIGNIFICANT CHANGE UP (ref 0.1–0.3)
INR BLD: 1.23 RATIO — SIGNIFICANT CHANGE UP (ref 0.65–1.3)
KETONES UR-MCNC: NEGATIVE — SIGNIFICANT CHANGE UP
LACTATE BLDV-MCNC: 1 MMOL/L — SIGNIFICANT CHANGE UP (ref 0.5–1.6)
LACTATE SERPL-SCNC: 1 MMOL/L — SIGNIFICANT CHANGE UP (ref 0.7–2)
LEUKOCYTE ESTERASE UR-ACNC: ABNORMAL
LIDOCAIN IGE QN: 27 U/L — SIGNIFICANT CHANGE UP (ref 7–60)
LYMPHOCYTES # BLD AUTO: 1.09 K/UL — LOW (ref 1.2–3.4)
LYMPHOCYTES # BLD AUTO: 15.4 % — LOW (ref 20.5–51.1)
MAGNESIUM SERPL-MCNC: 2.1 MG/DL — SIGNIFICANT CHANGE UP (ref 1.8–2.4)
MCHC RBC-ENTMCNC: 29.4 PG — SIGNIFICANT CHANGE UP (ref 27–31)
MCHC RBC-ENTMCNC: 32.5 G/DL — SIGNIFICANT CHANGE UP (ref 32–37)
MCV RBC AUTO: 90.5 FL — SIGNIFICANT CHANGE UP (ref 81–99)
MONOCYTES # BLD AUTO: 0.87 K/UL — HIGH (ref 0.1–0.6)
MONOCYTES NFR BLD AUTO: 12.3 % — HIGH (ref 1.7–9.3)
NEUTROPHILS # BLD AUTO: 4.94 K/UL — SIGNIFICANT CHANGE UP (ref 1.4–6.5)
NEUTROPHILS NFR BLD AUTO: 70 % — SIGNIFICANT CHANGE UP (ref 42.2–75.2)
NITRITE UR-MCNC: POSITIVE
NRBC # BLD: 0 /100 WBCS — SIGNIFICANT CHANGE UP (ref 0–0)
PCO2 BLDV: 46 MMHG — SIGNIFICANT CHANGE UP (ref 41–51)
PH BLDV: 7.42 — SIGNIFICANT CHANGE UP (ref 7.26–7.43)
PH UR: 6 — SIGNIFICANT CHANGE UP (ref 5–8)
PLATELET # BLD AUTO: 252 K/UL — SIGNIFICANT CHANGE UP (ref 130–400)
PO2 BLDV: 49 MMHG — HIGH (ref 20–40)
POTASSIUM BLDV-SCNC: 3.4 MMOL/L — SIGNIFICANT CHANGE UP (ref 3.3–5.6)
POTASSIUM SERPL-MCNC: 3.5 MMOL/L — SIGNIFICANT CHANGE UP (ref 3.5–5)
POTASSIUM SERPL-SCNC: 3.5 MMOL/L — SIGNIFICANT CHANGE UP (ref 3.5–5)
PROT SERPL-MCNC: 7.3 G/DL — SIGNIFICANT CHANGE UP (ref 6–8)
PROT UR-MCNC: SIGNIFICANT CHANGE UP
PROTHROM AB SERPL-ACNC: 14.1 SEC — HIGH (ref 9.95–12.87)
RBC # BLD: 3.16 M/UL — LOW (ref 4.2–5.4)
RBC # FLD: 14.1 % — SIGNIFICANT CHANGE UP (ref 11.5–14.5)
RBC CASTS # UR COMP ASSIST: 1 /HPF — SIGNIFICANT CHANGE UP (ref 0–4)
RSV RESULT: NEGATIVE — SIGNIFICANT CHANGE UP
RSV RNA RESP QL NAA+PROBE: NEGATIVE — SIGNIFICANT CHANGE UP
SAO2 % BLDV: 86 % — SIGNIFICANT CHANGE UP
SODIUM SERPL-SCNC: 140 MMOL/L — SIGNIFICANT CHANGE UP (ref 135–146)
SP GR SPEC: 1.01 — SIGNIFICANT CHANGE UP (ref 1.01–1.02)
UROBILINOGEN FLD QL: SIGNIFICANT CHANGE UP
WBC # BLD: 7.07 K/UL — SIGNIFICANT CHANGE UP (ref 4.8–10.8)
WBC # FLD AUTO: 7.07 K/UL — SIGNIFICANT CHANGE UP (ref 4.8–10.8)
WBC UR QL: 104 /HPF — HIGH (ref 0–5)

## 2020-02-21 PROCEDURE — 93010 ELECTROCARDIOGRAM REPORT: CPT

## 2020-02-21 PROCEDURE — 99285 EMERGENCY DEPT VISIT HI MDM: CPT

## 2020-02-21 PROCEDURE — 71045 X-RAY EXAM CHEST 1 VIEW: CPT | Mod: 26

## 2020-02-21 RX ORDER — PANTOPRAZOLE SODIUM 20 MG/1
40 TABLET, DELAYED RELEASE ORAL
Refills: 0 | Status: DISCONTINUED | OUTPATIENT
Start: 2020-02-21 | End: 2020-02-25

## 2020-02-21 RX ORDER — DEXTROSE 50 % IN WATER 50 %
15 SYRINGE (ML) INTRAVENOUS ONCE
Refills: 0 | Status: DISCONTINUED | OUTPATIENT
Start: 2020-02-21 | End: 2020-02-25

## 2020-02-21 RX ORDER — SODIUM CHLORIDE 9 MG/ML
2000 INJECTION INTRAMUSCULAR; INTRAVENOUS; SUBCUTANEOUS ONCE
Refills: 0 | Status: COMPLETED | OUTPATIENT
Start: 2020-02-21 | End: 2020-02-21

## 2020-02-21 RX ORDER — AMLODIPINE BESYLATE 2.5 MG/1
10 TABLET ORAL DAILY
Refills: 0 | Status: DISCONTINUED | OUTPATIENT
Start: 2020-02-21 | End: 2020-02-25

## 2020-02-21 RX ORDER — INSULIN LISPRO 100/ML
VIAL (ML) SUBCUTANEOUS
Refills: 0 | Status: DISCONTINUED | OUTPATIENT
Start: 2020-02-21 | End: 2020-02-25

## 2020-02-21 RX ORDER — FUROSEMIDE 40 MG
20 TABLET ORAL DAILY
Refills: 0 | Status: DISCONTINUED | OUTPATIENT
Start: 2020-02-21 | End: 2020-02-25

## 2020-02-21 RX ORDER — SERTRALINE 25 MG/1
100 TABLET, FILM COATED ORAL DAILY
Refills: 0 | Status: DISCONTINUED | OUTPATIENT
Start: 2020-02-21 | End: 2020-02-25

## 2020-02-21 RX ORDER — INSULIN LISPRO 100/ML
5 VIAL (ML) SUBCUTANEOUS
Refills: 0 | Status: DISCONTINUED | OUTPATIENT
Start: 2020-02-21 | End: 2020-02-22

## 2020-02-21 RX ORDER — AZTREONAM 2 G
1000 VIAL (EA) INJECTION ONCE
Refills: 0 | Status: COMPLETED | OUTPATIENT
Start: 2020-02-21 | End: 2020-02-21

## 2020-02-21 RX ORDER — DEXTROSE 50 % IN WATER 50 %
25 SYRINGE (ML) INTRAVENOUS ONCE
Refills: 0 | Status: DISCONTINUED | OUTPATIENT
Start: 2020-02-21 | End: 2020-02-25

## 2020-02-21 RX ORDER — QUETIAPINE FUMARATE 200 MG/1
25 TABLET, FILM COATED ORAL
Refills: 0 | Status: DISCONTINUED | OUTPATIENT
Start: 2020-02-21 | End: 2020-02-25

## 2020-02-21 RX ORDER — LEVOTHYROXINE SODIUM 125 MCG
25 TABLET ORAL DAILY
Refills: 0 | Status: DISCONTINUED | OUTPATIENT
Start: 2020-02-21 | End: 2020-02-25

## 2020-02-21 RX ORDER — HEPARIN SODIUM 5000 [USP'U]/ML
5000 INJECTION INTRAVENOUS; SUBCUTANEOUS EVERY 8 HOURS
Refills: 0 | Status: DISCONTINUED | OUTPATIENT
Start: 2020-02-21 | End: 2020-02-25

## 2020-02-21 RX ORDER — DEXTROSE 50 % IN WATER 50 %
12.5 SYRINGE (ML) INTRAVENOUS ONCE
Refills: 0 | Status: DISCONTINUED | OUTPATIENT
Start: 2020-02-21 | End: 2020-02-25

## 2020-02-21 RX ORDER — NORTRIPTYLINE HYDROCHLORIDE 10 MG/1
25 CAPSULE ORAL AT BEDTIME
Refills: 0 | Status: DISCONTINUED | OUTPATIENT
Start: 2020-02-21 | End: 2020-02-25

## 2020-02-21 RX ORDER — CEFTRIAXONE 500 MG/1
1000 INJECTION, POWDER, FOR SOLUTION INTRAMUSCULAR; INTRAVENOUS ONCE
Refills: 0 | Status: COMPLETED | OUTPATIENT
Start: 2020-02-21 | End: 2020-02-21

## 2020-02-21 RX ORDER — CEFTRIAXONE 500 MG/1
1000 INJECTION, POWDER, FOR SOLUTION INTRAMUSCULAR; INTRAVENOUS EVERY 24 HOURS
Refills: 0 | Status: DISCONTINUED | OUTPATIENT
Start: 2020-02-22 | End: 2020-02-22

## 2020-02-21 RX ORDER — EXEMESTANE 25 MG/1
25 TABLET, SUGAR COATED ORAL DAILY
Refills: 0 | Status: DISCONTINUED | OUTPATIENT
Start: 2020-02-21 | End: 2020-02-25

## 2020-02-21 RX ORDER — ATORVASTATIN CALCIUM 80 MG/1
80 TABLET, FILM COATED ORAL AT BEDTIME
Refills: 0 | Status: DISCONTINUED | OUTPATIENT
Start: 2020-02-21 | End: 2020-02-25

## 2020-02-21 RX ORDER — INSULIN GLARGINE 100 [IU]/ML
30 INJECTION, SOLUTION SUBCUTANEOUS AT BEDTIME
Refills: 0 | Status: DISCONTINUED | OUTPATIENT
Start: 2020-02-21 | End: 2020-02-25

## 2020-02-21 RX ORDER — CLOPIDOGREL BISULFATE 75 MG/1
75 TABLET, FILM COATED ORAL DAILY
Refills: 0 | Status: DISCONTINUED | OUTPATIENT
Start: 2020-02-21 | End: 2020-02-25

## 2020-02-21 RX ORDER — ASPIRIN/CALCIUM CARB/MAGNESIUM 324 MG
81 TABLET ORAL DAILY
Refills: 0 | Status: DISCONTINUED | OUTPATIENT
Start: 2020-02-21 | End: 2020-02-25

## 2020-02-21 RX ORDER — CHLORHEXIDINE GLUCONATE 213 G/1000ML
1 SOLUTION TOPICAL
Refills: 0 | Status: DISCONTINUED | OUTPATIENT
Start: 2020-02-21 | End: 2020-02-25

## 2020-02-21 RX ORDER — CEFTRIAXONE 500 MG/1
INJECTION, POWDER, FOR SOLUTION INTRAMUSCULAR; INTRAVENOUS
Refills: 0 | Status: DISCONTINUED | OUTPATIENT
Start: 2020-02-21 | End: 2020-02-22

## 2020-02-21 RX ORDER — SENNA PLUS 8.6 MG/1
2 TABLET ORAL AT BEDTIME
Refills: 0 | Status: DISCONTINUED | OUTPATIENT
Start: 2020-02-21 | End: 2020-02-25

## 2020-02-21 RX ORDER — SODIUM CHLORIDE 9 MG/ML
3500 INJECTION INTRAMUSCULAR; INTRAVENOUS; SUBCUTANEOUS ONCE
Refills: 0 | Status: DISCONTINUED | OUTPATIENT
Start: 2020-02-21 | End: 2020-02-21

## 2020-02-21 RX ORDER — SODIUM CHLORIDE 9 MG/ML
1000 INJECTION, SOLUTION INTRAVENOUS
Refills: 0 | Status: DISCONTINUED | OUTPATIENT
Start: 2020-02-21 | End: 2020-02-25

## 2020-02-21 RX ORDER — GLUCAGON INJECTION, SOLUTION 0.5 MG/.1ML
1 INJECTION, SOLUTION SUBCUTANEOUS ONCE
Refills: 0 | Status: DISCONTINUED | OUTPATIENT
Start: 2020-02-21 | End: 2020-02-25

## 2020-02-21 RX ADMIN — HEPARIN SODIUM 5000 UNIT(S): 5000 INJECTION INTRAVENOUS; SUBCUTANEOUS at 14:09

## 2020-02-21 RX ADMIN — Medication 3: at 12:14

## 2020-02-21 RX ADMIN — ATORVASTATIN CALCIUM 80 MILLIGRAM(S): 80 TABLET, FILM COATED ORAL at 21:45

## 2020-02-21 RX ADMIN — Medication 50 MILLIGRAM(S): at 10:22

## 2020-02-21 RX ADMIN — Medication 3: at 18:07

## 2020-02-21 RX ADMIN — HEPARIN SODIUM 5000 UNIT(S): 5000 INJECTION INTRAVENOUS; SUBCUTANEOUS at 21:46

## 2020-02-21 RX ADMIN — SODIUM CHLORIDE 2000 MILLILITER(S): 9 INJECTION INTRAMUSCULAR; INTRAVENOUS; SUBCUTANEOUS at 07:09

## 2020-02-21 RX ADMIN — INSULIN GLARGINE 30 UNIT(S): 100 INJECTION, SOLUTION SUBCUTANEOUS at 21:04

## 2020-02-21 RX ADMIN — PANTOPRAZOLE SODIUM 40 MILLIGRAM(S): 20 TABLET, DELAYED RELEASE ORAL at 13:58

## 2020-02-21 RX ADMIN — SERTRALINE 100 MILLIGRAM(S): 25 TABLET, FILM COATED ORAL at 13:58

## 2020-02-21 RX ADMIN — NORTRIPTYLINE HYDROCHLORIDE 25 MILLIGRAM(S): 10 CAPSULE ORAL at 23:06

## 2020-02-21 RX ADMIN — CHLORHEXIDINE GLUCONATE 1 APPLICATION(S): 213 SOLUTION TOPICAL at 13:59

## 2020-02-21 RX ADMIN — CEFTRIAXONE 100 MILLIGRAM(S): 500 INJECTION, POWDER, FOR SOLUTION INTRAMUSCULAR; INTRAVENOUS at 13:57

## 2020-02-21 RX ADMIN — CLOPIDOGREL BISULFATE 75 MILLIGRAM(S): 75 TABLET, FILM COATED ORAL at 13:57

## 2020-02-21 RX ADMIN — Medication 5 UNIT(S): at 18:07

## 2020-02-21 RX ADMIN — Medication 5 UNIT(S): at 12:14

## 2020-02-21 RX ADMIN — AMLODIPINE BESYLATE 10 MILLIGRAM(S): 2.5 TABLET ORAL at 21:45

## 2020-02-21 RX ADMIN — SENNA PLUS 2 TABLET(S): 8.6 TABLET ORAL at 23:06

## 2020-02-21 RX ADMIN — Medication 20 MILLIGRAM(S): at 13:58

## 2020-02-21 NOTE — ED PROVIDER NOTE - CARE PLAN
Principal Discharge DX:	Sepsis  Secondary Diagnosis:	UTI (urinary tract infection)  Secondary Diagnosis:	AMS (altered mental status)

## 2020-02-21 NOTE — ED PROVIDER NOTE - CLINICAL SUMMARY MEDICAL DECISION MAKING FREE TEXT BOX
pt with Sepsis, SIRS + infection. nl lactate, no evid of severe sepsis. received IVF fluids. admit for UTI

## 2020-02-21 NOTE — ED PROVIDER NOTE - PHYSICAL EXAMINATION
Physical Exam    Vital Signs: I have reviewed the initial vital signs.  Constitutional: well-nourished, appears stated age, no acute distress  Cardiovascular: regular rate, regular rhythm, well-perfused extremities, radial pulses equal and 2+  Respiratory: unlabored respiratory effort, clear to auscultation bilaterally no wheezing, rales and rhonchi. pt is speaking full sentences. no accessory muscle use.   Gastrointestinal: (+) diffuse abdominal tenderness throughout. soft, non-distended abdomen, no pulsatile mass  Musculoskeletal: supple neck, no lower extremity edema, no midline tenderness. no pressure ulcers noted.   Integumentary: warm, dry, no rash  Neurologic: pt is agitated. pt is aware to person and place. deficits in the left upper extremity due to previous CVA.

## 2020-02-21 NOTE — ED ADULT NURSE NOTE - OBJECTIVE STATEMENT
patient complaints of weakness and fever at home. Patient denies chest pain, n/v. Patient denies SOB. Patient reports back pain.

## 2020-02-21 NOTE — H&P ADULT - ATTENDING COMMENTS
Patient seen and evaluated. Agree with the resident as above, except as stated in this section.  metabolic encephalopathy   UTI    lethargy resolved   IV abx   f/u cx  ID eval   glycemic control   no sedatives   monitor resp status   aspiration precaution   fall precautions   DVT ppx   Decub prevention as per RN protocol

## 2020-02-21 NOTE — H&P ADULT - NSHPPHYSICALEXAM_GEN_ALL_CORE
Vital Signs Last 24 Hrs  T(C): 38.9 (21 Feb 2020 06:17), Max: 38.9 (21 Feb 2020 06:17)  T(F): 102 (21 Feb 2020 06:17), Max: 102 (21 Feb 2020 06:17)  HR: 92 (21 Feb 2020 06:24) (90 - 92)  BP: 124/63 (21 Feb 2020 06:24) (123/68 - 124/63)  RR: 18 (21 Feb 2020 06:24) (18 - 18)  SpO2: 99% (21 Feb 2020 06:24) (99% - 99%) Vital Signs Last 24 Hrs  T(C): 38.9 (21 Feb 2020 06:17), Max: 38.9 (21 Feb 2020 06:17)  T(F): 102 (21 Feb 2020 06:17), Max: 102 (21 Feb 2020 06:17)  HR: 92 (21 Feb 2020 06:24) (90 - 92)  BP: 124/63 (21 Feb 2020 06:24) (123/68 - 124/63)  RR: 18 (21 Feb 2020 06:24) (18 - 18)  SpO2: 99% (21 Feb 2020 06:24) (99% - 99%)    General: NAD, resting comfortably in bed   HEENT: NCAT, no pharyngeal erythema, no lymphadenopathy   Cardiac: regular rate and rhythm, normal s1, s2. No rubs gallops or murmurs  Chest/Lungs: CTA b/l, no wheezes, rales or rhonchi   Abdomen/: Soft, non tender, non distended, normal bowel sounds  Extremities: trace pitting edema in b/l LE   Neuro: A&O x 3 (person, place and time but not situation)  Skin: No rashes or open wounds   Psych: Normal affect Vital Signs Last 24 Hrs  T(C): 38.9 (21 Feb 2020 06:17), Max: 38.9 (21 Feb 2020 06:17)  T(F): 102 (21 Feb 2020 06:17), Max: 102 (21 Feb 2020 06:17)  HR: 92 (21 Feb 2020 06:24) (90 - 92)  BP: 124/63 (21 Feb 2020 06:24) (123/68 - 124/63)  RR: 18 (21 Feb 2020 06:24) (18 - 18)  SpO2: 99% (21 Feb 2020 06:24) (99% - 99%)    General: NAD, resting comfortably in bed   HEENT: NCAT, no pharyngeal erythema, no lymphadenopathy   Cardiac: regular rate and rhythm, normal s1, s2. No rubs gallops or murmurs  Chest/Lungs: CTA b/l, no wheezes, rales or rhonchi   Abdomen/: Soft, non tender, non distended, normal bowel sounds. No suprapubic or CVA tenderness  Extremities: trace pitting edema in b/l LE   Neuro: A&O x 3 (person, place and time but not situation)  Skin: No rashes or open wounds   Psych: Normal affect

## 2020-02-21 NOTE — ED PROVIDER NOTE - ATTENDING CONTRIBUTION TO CARE
78 yo female hx of breast cancer/HLD/DM/CVA with left side brought by aid for fever. 80 yo female hx of breast cancer/HLD/DM/CVA with left side brought by aid for fever. pt denies cp, sob, ap, v,d, or sick contacts. no neck pain or ha.     vss  gen- NAD, aaox3  card-rrr  lungs-ctab, no wheezing or rhonchi  abd-soft, nontender, mild suprapubic discomfort on palpation, no guarding or rebound  neuro- full str/sensation, cn ii-xii grossly intact, normal coordination and gait    will work up for sepsis, no clear source on hx, possible uti given suprapubic discomfort  will get sepsis labs, vbg, ctx, ua, uctx, fluids per ideal body weight 80 yo female hx of breast cancer/HLD/DM/CVA with left side brought by aid for fever. pt denies cp, sob, ap, v,d, or sick contacts. no neck pain or ha.     vs febrile  gen- NAD, elderly  card-rrr  lungs-ctab, no wheezing or rhonchi  abd-soft, nontender, mild suprapubic discomfort on palpation, no guarding or rebound  neuro- full str/sensation, cn ii-xii grossly intact, normal coordination and gait    will work up for sepsis, no clear source on hx, possible uti given suprapubic discomfort  will get sepsis labs, vbg, ctx, ua, uctx, fluids per ideal body weight

## 2020-02-21 NOTE — H&P ADULT - NSHPLABSRESULTS_GEN_ALL_CORE
9.3    7.07  )-----------( 252      ( 21 Feb 2020 06:32 )             28.6       140  |  100  |  28<H>  ----------------------------<  264<H>  3.5   |  27  |  1.4    Ca    9.3      21 Feb 2020 06:32  Mg     2.1     02-21    TPro  7.3  /  Alb  4.1  /  TBili  0.4  /  DBili  x   /  AST  16  /  ALT  13  /  AlkPhos  197<H>  02-21    Blood Gas Venous - Lactate (02.21.20 @ 08:16)    Blood Gas Venous - Lactate: 1.0 mmoL/L    Urinalysis (02.21.20 @ 08:28)    pH Urine: 6.0    Glucose Qualitative, Urine: Negative    Blood, Urine: Trace    Color: Yellow    Urine Appearance: Turbid    Bilirubin: Negative    Ketone - Urine: Negative    Specific Gravity: 1.014    Protein, Urine: Trace    Urobilinogen: <2 mg/dL    Nitrite: Positive    Leukocyte Esterase Concentration: Large    FLU A B RSV Detection by PCR (02.21.20 @ 06:38)    Flu A Result: Negative: Negative results do not preclude influenza infection and  should not be used as the sole basis for treatment or  other patient management decisions.  A positive result may occur in the absence of viable virus.  By: [employer] Flu viral assay by Reverse Transcriptase  Polymerase Chain Reaction (RT-PCR).    Flu B Result: Negative    RSV Result: Negative

## 2020-02-21 NOTE — ED ADULT NURSE NOTE - CARDIO ASSESSMENT
Patient: Alexa Rust Date: 3/1/2017   : 1925    89 year old female      OUTPATIENT WOUND CARE PROGRESS NOTE    Supervising Wound Care / Hyperbaric Medicine Physician: Not Applicable  Consulting Provider:  JANNETH Maddox  Date of Consultation/Last Comprehensive Exam:  16  Referring  Provider:  Brandy Greene NP    SUBJECTIVE:    Chief Complaint:  Toe ulcer      Wound/Ulcer Present:    Venous leg ulcer:  Current Vascular Assessment:  Physical exam.     Current Compression Therapy:  None    Arterial insufficiency ulcer    Additional Wound Category:  None     Maximum Baseline Ambulatory Status:  Ambulates unassisted    History of Present Illness:  This is a 89 year old female here today for a non-healing wound to her left great toe. Reports the wound has been present since mid 2016. She is not sure how the wound occurred. She saw Brandy Greene NP on 16 due to purulent drainage and redness to the wound. Patient tolerated a 10 day course of Keflex without any issues. Staff at her SNF were performing 3x weekly dressing changes with Nayely. Patient states the drainage has improved and redness resolved. She reported pain to the wound upon palpation. Reports a history of pain in her calf at rest, but none recently. Does have tingling pain in her legs at night which she feels is due to her RLS. She is a former 1 ppd smoker for 50 years, quit in . No history of non-healing wounds or autoimmune disease. She saw Dr. Chaudhry for vascular disease on 17. She is s/p angiogram 17 with stenting of the left common and external iliac artery territories and angioplasty of the left SFA.     Interval 3/1/2017  Patient reports her wound and left calf pain have improved since her angiogram 17. She denies any new wound concerns today.    Current Treatment Regimen:  Dressing:  Acetic acid gauze and Iodoflex   Frequency:  Three times a week   Changed by:  Nursing home  staff    Review of Systems: Denies fevers, chills, nausea, vomiting, chest pain, or shortness of breath. No appetite changes or diarrhea. Pertinent items are noted in HPI.      Past Medical History   Diagnosis Date   • Carotid artery occlusion      right   • Dislocated shoulder    • Disorder of bone and cartilage, unspecified 08/28/2008   • GERD (gastroesophageal reflux disease)    • Hyperlipidemia    • Hypertension    • Osteoarthritis    • Senile dementia    • Urinary incontinence    • Vascular disease      Past Surgical History   Procedure Laterality Date   • Thromboendart w/w0 graft carotid  neck incs  09/13/2007     left Carotid Endarterectomy   • Salivary gland surgery  06/14/2007     partial sialoadenectomy   • Salivary gland surgery  10/14/2004     partial sialoadenectomy   • Eye surgery       Social History     Social History   • Marital status:      Spouse name: N/A   • Number of children: N/A   • Years of education: N/A     Occupational History   • Not on file.     Social History Main Topics   • Smoking status: Former Smoker     Packs/day: 1.00     Years: 50.00     Types: Cigarettes     Quit date: 12/31/2011   • Smokeless tobacco: Never Used   • Alcohol use 0.0 oz/week     0 Standard drinks or equivalent per week      Comment: Rarely has a glass of vera sweet   • Drug use: No   • Sexual activity: Not on file     Other Topics Concern   • Not on file     Social History Narrative     Family History   Problem Relation Age of Onset   • Cancer Brother    • Heart disease Brother    • Cancer Brother    • Diabetes Brother    • Diabetes Brother    • Aneurysm Brother        Current Outpatient Prescriptions   Medication Sig   • atenolol (TENORMIN) 50 MG tablet TAKE ONE TABLET DAILY   • acetic acid 0.25 % irrigation solution Moisten gauze with acetic acid 0.25%. Apply to wound bed, soak for 10-15 minute prior to dressing changes. Three times per week.   • hydrochlorothiazide (HYDRODIURIL) 25 MG tablet TAKE ONE  TABLET DAILY   • atorvastatin (LIPITOR) 20 MG tablet Take 1 tablet by mouth daily.   • CAPZASIN-HP 0.1 % Cream APPLY TOPICALLY TO AFFECTED AREAS 3 TIMES DAILY AS NEEDED   • clopidogrel (PLAVIX) 75 MG tablet Take 1 tablet by mouth daily. Not to be taken together with aspirin.   • diclofenac (VOLTAREN) 1 % gel Apply topically 4 times daily. Apply to the to knees prn pain .   • cephALEXin (KEFLEX) 500 MG capsule Take 1 capsule by mouth 4 times daily.   • diclofenac (VOLTAREN) 1 % gel APPLY 4 GRAMS TO AFFECTED KNEE(S) 4 TIMES DAILY AS NEEDED FOR PAIN   • clotrimazole-betamethasone (LOTRISONE) cream Apply thin film under breasts twice daily until healed then prn   • Sodium Phosphates (ENEMA RE)    • acetaminophen (TYLENOL) 325 MG tablet 2 tabs(=650 mg) po every 4-6 hours prn pain   • traMADOL (ULTRAM) 50 MG tablet TAKE ONE TABLET BY MOUTH FOUR TIMES DAILY (ON FILE TO FAX)   • guaifenesin 100 MG/5ML TAKE ONE TEASPOON (5ML) EVERY 4 HOURS AS NEEDED FOR COUGH   • levothyroxine (SYNTHROID, LEVOTHROID) 75 MCG tablet Take 1 tablet by mouth daily.   • Multiple Vitamins-Minerals (MULTIVITAMIN PO)    • UNABLE TO FIND Biotene mouthwash   • sennosides-docusate sodium (SENOKOT-S) 8.6-50 MG tablet TAKE ONE TABLET TWICE DAILY (AND UPDATE IN ONE WEEK)   • acetaminophen (TYLENOL) 500 MG tablet Take 1-2 tablets every four hours as needed for pain/fever.   • prednisoLONE acetate (PRED FORTE) 1 % ophthalmic suspension INSTILL ONE DROP INTO THE RIGHT EYE AT BEDTIME.**SHAKE WELL**   • benzonatate (TESSALON) 200 MG capsule Take 1 capsule by mouth 3 times daily as needed for Cough.   • GNP MILK OF MAGNESIA 1200 MG/15ML suspension TAKE 30ML (2 TABLESPOONS) DAILY AS NEEDED FOR CONSTIPATION   • latanoprost (XALATAN) 0.005 % ophthalmic solution INSTILL ONE DROP INTO LEFT EYE AT 5PM SUPPERTIME   • Multiple Vitamins-Minerals (CENTRUM PO) Take  by mouth.   • acetaminophen (TYLENOL) 500 MG tablet Take 500 mg by mouth 3 times daily.   • aspirin  (ASPIR-LOW) 81 MG EC tablet Take 81 mg by mouth daily.   • bisacodyl (DULCOLAX) 10 MG suppository Place 10 mg rectally daily as needed. For extreme cases of constipation   • Calcium Carbonate (CALCIUM 600 PO) Take 1 tablet by mouth every morning.   • Calcium Carbonate Antacid (TUMS PO) Chew 2 to 3 tablets as needed for heartburn.     No current facility-administered medications for this encounter.         ALLERGIES:  Cardizem la [diltiazem hcl] and Diltiazem    OBJECTIVE:  Vital Signs:    Visit Vitals   • BP (!) 123/95 (BP Location: [facility], Patient Position: Sitting)   • Pulse 64   • Temp 97.7 °F (36.5 °C) (Temporal Artery)   • Resp 20         Physical Exam:  General appearance: Appears stated age, Alert, oriented to person, place, time and situation, in no distress and cooperative    BLE are with scattered varicosities especially to the medial ankles. No edema. Unable to palpate pulses. PT and DP pulses are present and monophasic with the doppler machine.    Wound: Left medial foot at base of hallux with a \"punched out\" ulceration with fat layer exposed. Wound base with fibrinous slough tissue. Wound debrided of nonviable tissue to 100% granulation. No undermining, tunneling or palpable bone. No purulent drainage or malodor. Nakia wound intact with no erythema, warmth, ischemia or necrosis.    Wound Bed Quality:  Granulation tissue and Fibrin      Nakia-wound Quality:    Intact    Additional Descriptors:  none    Wound Measurements Per Flowsheet:     Wound Foot Left Medial Pressure ulcer-Wound Length (cm): 0.4 cm  Wound Foot Left Medial Pressure ulcer-Wound Width (cm): 0.4 cm  Wound Foot Left Medial Pressure ulcer-Depth (cm): 0.2 cm (post debridement)                                                       PROCEDURE:  Debridement: Selective Non-Excisional Debridement of Non-viable Tissue.  Informed consent obtained.  Time out was completed.  Patient, procedure, and site verified.  Anesthesia-  Topical  Size-  Less than or  equal to 20 sq cm  Instrument-  Curette  Non-viable tissue removed-  Fibrin/Slough  Hemostasis achieved-  Pressure  Patient procedure was-  tolerated well, no complications.  Patient stable upon completion of procedure.  Wound dimensions unchanged post procedure.    Procedure was Performed by:  Nurse Practitioner    Laboratory assessments reviewed:  No results found for: PAB   Albumin (g/dL)   Date Value   02/17/2014 3.5      No results available in last 24 hours    Lab Results   Component Value Date    WBC 10.4 02/02/2017    GLUCOSE 110 (H) 02/02/2017    HGBA1C 6.1 (H) 05/18/2016    CREATININE 0.92 02/02/2017    GFRA 63 02/02/2017    GFRNA 54 02/02/2017        Culture results:  No results found for: SDES No results found for: CULT     Diagnostic Assessments Reviewed:  Vascular Studies:  Angiogram 2/2/17  FINDINGS:  Infrarenal abdominal aorta is patent. There is significant stenosis in the left common and left external iliac artery territories, with large  eccentric atheromas of the coral reef-type. The right external iliac artery also demonstrates significant stenosis; however, no gradient was noted on the right. Stenting of the left was performed. Slight underdilatation was performed, given the patient was very sensitive to balloon dilatation. Left leg runoff also demonstrated patency of the left common femoral and profunda femoris arteries. There were multifocal high-grade eccentric coral reef-type stenoses throughout the left superficial femoral artery territory, which were treated with plain old balloon angioplasty. Infragenicular runoff demonstrates occluded anterior tibial and tibioperoneal trunk. There is a reconstituted posterior tibial artery. The peroneal is occluded. Recanalization was not attempted, given that there were extensive collaterals noted at the ostium of the reconstituted stump and the distal popliteal artery.    Nutritional Assessment:  Prealbumin and/or Albumin reviewed    Wound treatment  goals are palliative:  No    DIAGNOSES:  Arterial insufficiency ulcer Left foot  Venous insufficiency, chronic BLE  Former smoker  Pre-diabetes    Medical Decision Making:    Left foot ulcer has been present since mid October. No precipitating injury or cause that patient is aware of. There are no s/sx of acute infection. Patient tolerated a course of oral Keflex. Patient with 1 ppd 50 year history of smoking, quit in 2011. She has mixed venous/arterial disease. She is also pre-diabetic. She is is s/p angiogram 2/2/17 with stenting of the left common and external iliac artery territories and angioplasty of the left SFA per Dr. Chaudhry. She reports the color in her LLE has improved and she no longer has leg or wound pain. Wound healing has stalled since initiation of Iodoflex.     Stop Acetic acid soaks and Iodoflex.   Nayely to left foot wound followed by dry secondary dressing, change dressing 3x weekly per SNF staff (Nayely is a topical wound dressing containing collagen and silver designed to absorb exudate and decrease bioburden providing a biodegradable matrix that promotes granulation).  Flexible shoe or slipper with /slipper socks for ambulation to avoid pressure to wound bed.  Encouraged increased oral protein to promote wound healing.  Consider x-ray to rule out underlying osteomyelitis if no improvement.  Follow-up in 3 weeks.    Plan of Care:  Advanced Wound Care Recommendations:  See above  Percent Wound Closure from consult:  See measurements  Care plan to augment wound closure:  Not applicable.  See above    JANNETH Maddox   ---

## 2020-02-21 NOTE — H&P ADULT - NSHPSOCIALHISTORY_GEN_ALL_CORE
Denies cigarettes, alcohol use or illicit drugs. Patient lives at home with 224/7 home health aides.

## 2020-02-21 NOTE — H&P ADULT - HISTORY OF PRESENT ILLNESS
78 y/o F w/ PMH of DM II, HLD, HTN, CVA with residual LUE weakness, Breast CA in remission, CAD, s/p PPM, CKD 3b/4 presents to the ED for evaluation of weakness and lethargy.      In the ED, Tmax of 102 F, HR 92, /63, RR 18 O2 99% on RA. No leukocytosis present on labs, lactate normal. UA was positive for nitrite, LE and WBC. Fly negative. CXR without opacities (pending official read). Patient given 2L NS bolus and 1 dose of aztreonam. 78 y/o F w/ PMH of DM II, HLD, HTN, CVA with residual LUE weakness, Breast CA in remission, CAD, s/p PPM, CKD 3b/4 presents to the ED for evaluation of weakness and lethargy. Patient is a poor historian and aide was no longer at bedside. History taken from charts and from her son. Per son, he was called by the aide the morning of presentation that his mother was noted be lethargic & sluggish. Her AM fingerstick was found to be in the 350s. Aide called EMS who brought her to the hospital. It is unclear to the son how long she had been having symptoms or if any additional symptoms were noted. Patient herself denies urinary symptoms, fevers, chills, chest pain, SOB, abdominal pain, n/v, diarrhea or constipation or recent sick contacts      In the ED, Tmax of 102 F, HR 92, /63, RR 18 O2 99% on RA. No leukocytosis present on labs, lactate normal. UA was positive for nitrite, LE and WBC. Flu panel was negative. CXR without opacities (pending official read). Patient given 2L NS bolus and 1 dose of aztreonam. 78 y/o F w/ PMH of DM II, HLD, HTN, CVA with residual LUE weakness, Breast CA in remission, CAD, s/p PPM, CKD 3b/4, Hypothyroidism presents to the ED for evaluation of weakness and lethargy. Patient is a poor historian and aide was no longer at bedside. History taken from charts and from her son. Per son, he was called by the aide the morning of presentation that his mother was noted be lethargic & sluggish. Her AM fingerstick was found to be in the 350s. Aide called EMS who brought her to the hospital. It is unclear to the son how long she had been having symptoms or if any additional symptoms were noted. Patient herself denies urinary symptoms, fevers, chills, chest pain, SOB, abdominal pain, n/v, diarrhea or constipation or recent sick contacts      In the ED, Tmax of 102 F, HR 92, /63, RR 18 O2 99% on RA. No leukocytosis present on labs, lactate normal. UA was positive for nitrite, LE and WBC. Flu panel was negative. CXR without opacities (pending official read). Patient given 2L NS bolus and 1 dose of aztreonam.

## 2020-02-21 NOTE — ED ADULT NURSE NOTE - NSIMPLEMENTINTERV_GEN_ALL_ED
Implemented All Fall with Harm Risk Interventions:  [place] to call system. Call bell, personal items and telephone within reach. Instruct patient to call for assistance. Room bathroom lighting operational. Non-slip footwear when patient is off stretcher. Physically safe environment: no spills, clutter or unnecessary equipment. Stretcher in lowest position, wheels locked, appropriate side rails in place. Provide visual cue, wrist band, yellow gown, etc. Monitor gait and stability. Monitor for mental status changes and reorient to person, place, and time. Review medications for side effects contributing to fall risk. Reinforce activity limits and safety measures with patient and family. Provide visual clues: red socks.

## 2020-02-21 NOTE — ED PROVIDER NOTE - OBJECTIVE STATEMENT
80 y/o female with a PMH of DM, HLD, HTN, CVA, breast ca, and pacemaker presents to the ED for evaluation of weakness and lethargy. pt was brought in by her aid, who states she has not been acting herself lately, and feels more tired. pt is a poor historian. pt denies fever, chills, urinary symptoms, abdominal pain, chest pain, sob, cough, nasal congestion, recent sick contacts, rashes, dizziness, or recent fall.

## 2020-02-21 NOTE — ED PROVIDER NOTE - NS ED ROS FT
CONST: (+) weakness. No fever, chills or bodyaches  EYES: No pain, redness, drainage or visual changes.  ENT: No ear pain or discharge, nasal discharge or congestion. No sore throat  CARD: No chest pain, palpitations  RESP: No SOB, cough, hemoptysis.  GI: No abdominal pain, N/V/D  : No urinary symptoms  MS: No joint pain, back pain or extremity pain/injury  SKIN: No rashes  NEURO: No headache, dizziness, paresthesias or LOC

## 2020-02-21 NOTE — H&P ADULT - ASSESSMENT
78 y/o F w/ PMH of DM II, HLD, HTN, CVA with residual LUE weakness, Breast CA in remission, CAD, s/p PPM, CKD 3b/4 presents to the ED for evaluation of weakness and lethargy. 80 y/o F w/ PMH of DM II, HLD, HTN, CVA with residual LUE weakness, Breast CA in remission, CAD, s/p PPM, CKD 3b/4, Hypothyroidism presents to the ED for evaluation of weakness and lethargy.    #Lethargy 2/2 to acute cystitis   - Sepsis present on admission (Tmax 102, HR 90)   - s/p 2L NS bolus and 1 dose aztreonam in the ED  - UA positive, pending UCx results  - follow up blood culture results   - follow up official CXR read  - will cover with CTX (pt has had it in past for previous UTI w/o issue)     # HO CAD   # s/p PPM  - patient denies any active chest pain   - c/w lasix, clopidogrel and statin     # DM II  - POCT w/ meals and bedtime  - c/w home insulin regimen     # HTN  - c/w amlodipine     # HLD  - c/w atorvastatin     #CKD 3B  - Cr on admission 1.4 appears to be around her baseline  - monitor Cr with BMP    # Hypothyroidism  - c/w levothyroxine 25 mcg    # CVA w/ residual LUE weakness  - OP follow up     #Breast CA in remission  - OP follow up     #DVT PPx: Heparin 5000 subq  #GI PPx: Protonix  #Diet: DASH w/ carb consistency   #Activity: AAT  #Code status: Full code  #Dispo: from home, acute 80 y/o F w/ PMH of DM II, HLD, HTN, CVA with residual LUE weakness, Breast CA in remission, CAD, s/p PPM, CKD 3b/4, Hypothyroidism presents to the ED for evaluation of weakness and lethargy.    #Lethargy 2/2 to acute cystitis   - Sepsis present on admission (T max 102, HR 90)   - s/p 2L NS bolus and 1 dose aztreonam in the ED  - UA positive, pending UCx results  - follow up blood culture results   - follow up official CXR read  - will cover with CTX (pt has had it in past for previous UTI w/o issue)     # HO CAD   # s/p PPM  - patient denies any active chest pain   - c/w lasix, clopidogrel and statin     # DM II  - POCT w/ meals and bedtime  - c/w home insulin regimen     # HTN  - c/w amlodipine     # HLD  - c/w atorvastatin     #CKD 3B  - Cr on admission 1.4 appears to be around her baseline  - monitor Cr with BMP    # Hypothyroidism  - c/w levothyroxine 25 mcg    # CVA w/ residual LUE weakness  - OP follow up     #Breast CA in remission  - on exemestane 25mg daily which is non formulary  - son to bring in medication, will need to fill out non formulary form   - OP follow up     #DVT PPx: Heparin 5000 subq  #GI PPx: Protonix  #Diet: DASH w/ carb consistency   #Activity: AAT  #Code status: Full code  #Dispo: from home, acute 80 y/o F w/ PMH of DM II, HLD, HTN, CVA with residual LUE weakness, Breast CA in remission, CAD, s/p PPM, CKD 3b/4, Hypothyroidism presents to the ED for evaluation of weakness and lethargy.    #Lethargy 2/2 to acute cystitis   - Sepsis present on admission (T max 102, HR 90)   - s/p 2L NS bolus and 1 dose aztreonam in the ED  - UA positive, pending UCx results  - follow up blood culture results   - follow up official CXR read  - will cover with CTX (pt has had it in past for previous UTI w/o issue)     # HO CAD   # s/p PPM  - patient denies any active chest pain   - c/w lasix, clopidogrel and statin     # DM II  - POCT w/ meals and bedtime  - c/w home insulin regimen     # HTN  - c/w amlodipine     # HLD  - c/w atorvastatin     #CKD 3B  - Cr on admission 1.4 appears to be around her baseline  - monitor Cr with BMP    # Hypothyroidism  - c/w levothyroxine 25 mcg    # CVA w/ residual LUE weakness  - OP follow up     #Breast CA in remission  - on exemestane 25mg daily which is non formulary  - son to bring in medication, will need to fill out non formulary form   - OP follow up     #Med rec completed with son and [facility] pharmacy ([facility])    #DVT PPx: Heparin 5000 subq  #GI PPx: Protonix  #Diet: DASH w/ carb consistency   #Activity: AAT  #Code status: Full code  #Dispo: from home, acute 80 y/o F w/ PMH of DM II, HLD, HTN, CVA with residual LUE weakness, Breast CA in remission, CAD, s/p PPM, CKD 3b/4, Hypothyroidism presents to the ED for evaluation of weakness and lethargy.    #Lethargy 2/2 to acute cystitis   - Sepsis present on admission (T max 102, HR 90). Lactate on admission 1   - s/p 2L NS bolus and 1 dose aztreonam in the ED  - UA positive, pending UCx results  - follow up blood culture results   - follow up official CXR read  - will cover with CTX (pt has had it in past for previous UTI w/o issue)     # HO CAD   # s/p PPM  - patient denies any active chest pain   - c/w asa, plavis, and statin. c/w lasix     # DM II  - POCT w/ meals and bedtime  - pharmacy has no recent fast insulin script but son states pt uses bolus insulin  - will start humalog 5 units TID with meals, please adjust according to FS  - c/w lantus 30 units at bedtime (home dose confirmed by pharmacy)     # HTN  - c/w amlodipine     # HLD  - c/w atorvastatin     #CKD 3B  - Cr on admission 1.4 appears to be around her baseline  - monitor Cr with BMP    # Hypothyroidism  - c/w levothyroxine 25 mcg    # CVA w/ residual LUE weakness  - OP follow up     #Breast CA in remission  - on exemestane 25mg daily which is non formulary  - son to bring in medication, will need to fill out non formulary form   - OP follow up     #Med rec completed with son and [facility] pharmacy ([facility])    #DVT PPx: Heparin 5000 subq  #GI PPx: Protonix  #Diet: DASH w/ carb consistency   #Activity: AAT  #Code status: Full code  #Dispo: from home, acute

## 2020-02-21 NOTE — ED PROVIDER NOTE - PROGRESS NOTE DETAILS
pt ideal body weight is 60kg. pt given 2L. CO- pending endorsed to Dr. RAYMOND, pending labs, imaging, source for fever, reassessment Authored by Dr. Castellon: s/o to me by dr choi. pt with fever. no sick contacts. elderly female, dry mm, abd soft, no resp distress. CTAB, no rashes.  pending labs. IVF going. pt ideal body weight is 60kg. pt given 2L. IVF given as per ideal body weight based on her height patient endorsed to me by OANH Zhang. Authored by Dr. Castellon: p-ending UA> labs reviewd pt alert, nad on reexam. lactate <2. antibiotics ordered. BP and HR wnl.

## 2020-02-22 ENCOUNTER — TRANSCRIPTION ENCOUNTER (OUTPATIENT)
Age: 80
End: 2020-02-22

## 2020-02-22 LAB
ANION GAP SERPL CALC-SCNC: 15 MMOL/L — HIGH (ref 7–14)
BASOPHILS # BLD AUTO: 0.04 K/UL — SIGNIFICANT CHANGE UP (ref 0–0.2)
BASOPHILS NFR BLD AUTO: 0.7 % — SIGNIFICANT CHANGE UP (ref 0–1)
BUN SERPL-MCNC: 27 MG/DL — HIGH (ref 10–20)
CALCIUM SERPL-MCNC: 9.1 MG/DL — SIGNIFICANT CHANGE UP (ref 8.5–10.1)
CHLORIDE SERPL-SCNC: 103 MMOL/L — SIGNIFICANT CHANGE UP (ref 98–110)
CO2 SERPL-SCNC: 26 MMOL/L — SIGNIFICANT CHANGE UP (ref 17–32)
CREAT SERPL-MCNC: 1.4 MG/DL — SIGNIFICANT CHANGE UP (ref 0.7–1.5)
EOSINOPHIL # BLD AUTO: 0.19 K/UL — SIGNIFICANT CHANGE UP (ref 0–0.7)
EOSINOPHIL NFR BLD AUTO: 3.2 % — SIGNIFICANT CHANGE UP (ref 0–8)
ESTIMATED AVERAGE GLUCOSE: 223 MG/DL — HIGH (ref 68–114)
GLUCOSE BLDC GLUCOMTR-MCNC: 136 MG/DL — HIGH (ref 70–99)
GLUCOSE BLDC GLUCOMTR-MCNC: 169 MG/DL — HIGH (ref 70–99)
GLUCOSE BLDC GLUCOMTR-MCNC: 235 MG/DL — HIGH (ref 70–99)
GLUCOSE BLDC GLUCOMTR-MCNC: 262 MG/DL — HIGH (ref 70–99)
GLUCOSE SERPL-MCNC: 223 MG/DL — HIGH (ref 70–99)
HBA1C BLD-MCNC: 9.4 % — HIGH (ref 4–5.6)
HCT VFR BLD CALC: 29.6 % — LOW (ref 37–47)
HGB BLD-MCNC: 9.4 G/DL — LOW (ref 12–16)
IMM GRANULOCYTES NFR BLD AUTO: 0.3 % — SIGNIFICANT CHANGE UP (ref 0.1–0.3)
LYMPHOCYTES # BLD AUTO: 1.22 K/UL — SIGNIFICANT CHANGE UP (ref 1.2–3.4)
LYMPHOCYTES # BLD AUTO: 20.3 % — LOW (ref 20.5–51.1)
MAGNESIUM SERPL-MCNC: 2 MG/DL — SIGNIFICANT CHANGE UP (ref 1.8–2.4)
MCHC RBC-ENTMCNC: 28.4 PG — SIGNIFICANT CHANGE UP (ref 27–31)
MCHC RBC-ENTMCNC: 31.8 G/DL — LOW (ref 32–37)
MCV RBC AUTO: 89.4 FL — SIGNIFICANT CHANGE UP (ref 81–99)
MONOCYTES # BLD AUTO: 0.64 K/UL — HIGH (ref 0.1–0.6)
MONOCYTES NFR BLD AUTO: 10.6 % — HIGH (ref 1.7–9.3)
NEUTROPHILS # BLD AUTO: 3.91 K/UL — SIGNIFICANT CHANGE UP (ref 1.4–6.5)
NEUTROPHILS NFR BLD AUTO: 64.9 % — SIGNIFICANT CHANGE UP (ref 42.2–75.2)
NRBC # BLD: 0 /100 WBCS — SIGNIFICANT CHANGE UP (ref 0–0)
PLATELET # BLD AUTO: 270 K/UL — SIGNIFICANT CHANGE UP (ref 130–400)
POTASSIUM SERPL-MCNC: 3.3 MMOL/L — LOW (ref 3.5–5)
POTASSIUM SERPL-SCNC: 3.3 MMOL/L — LOW (ref 3.5–5)
RBC # BLD: 3.31 M/UL — LOW (ref 4.2–5.4)
RBC # FLD: 13.8 % — SIGNIFICANT CHANGE UP (ref 11.5–14.5)
SODIUM SERPL-SCNC: 144 MMOL/L — SIGNIFICANT CHANGE UP (ref 135–146)
WBC # BLD: 6.02 K/UL — SIGNIFICANT CHANGE UP (ref 4.8–10.8)
WBC # FLD AUTO: 6.02 K/UL — SIGNIFICANT CHANGE UP (ref 4.8–10.8)

## 2020-02-22 RX ORDER — CEFEPIME 1 G/1
1000 INJECTION, POWDER, FOR SOLUTION INTRAMUSCULAR; INTRAVENOUS ONCE
Refills: 0 | Status: COMPLETED | OUTPATIENT
Start: 2020-02-22 | End: 2020-02-22

## 2020-02-22 RX ORDER — INSULIN LISPRO 100/ML
8 VIAL (ML) SUBCUTANEOUS
Refills: 0 | Status: DISCONTINUED | OUTPATIENT
Start: 2020-02-22 | End: 2020-02-25

## 2020-02-22 RX ORDER — CEFEPIME 1 G/1
1000 INJECTION, POWDER, FOR SOLUTION INTRAMUSCULAR; INTRAVENOUS EVERY 12 HOURS
Refills: 0 | Status: DISCONTINUED | OUTPATIENT
Start: 2020-02-22 | End: 2020-02-24

## 2020-02-22 RX ORDER — CEFEPIME 1 G/1
INJECTION, POWDER, FOR SOLUTION INTRAMUSCULAR; INTRAVENOUS
Refills: 0 | Status: DISCONTINUED | OUTPATIENT
Start: 2020-02-22 | End: 2020-02-24

## 2020-02-22 RX ORDER — POTASSIUM CHLORIDE 20 MEQ
20 PACKET (EA) ORAL
Refills: 0 | Status: COMPLETED | OUTPATIENT
Start: 2020-02-22 | End: 2020-02-22

## 2020-02-22 RX ADMIN — Medication 81 MILLIGRAM(S): at 12:31

## 2020-02-22 RX ADMIN — Medication 25 MICROGRAM(S): at 05:56

## 2020-02-22 RX ADMIN — Medication 5 UNIT(S): at 06:02

## 2020-02-22 RX ADMIN — Medication 20 MILLIGRAM(S): at 05:56

## 2020-02-22 RX ADMIN — INSULIN GLARGINE 30 UNIT(S): 100 INJECTION, SOLUTION SUBCUTANEOUS at 22:12

## 2020-02-22 RX ADMIN — CEFEPIME 100 MILLIGRAM(S): 1 INJECTION, POWDER, FOR SOLUTION INTRAMUSCULAR; INTRAVENOUS at 18:20

## 2020-02-22 RX ADMIN — AMLODIPINE BESYLATE 10 MILLIGRAM(S): 2.5 TABLET ORAL at 05:57

## 2020-02-22 RX ADMIN — Medication 20 MILLIEQUIVALENT(S): at 15:06

## 2020-02-22 RX ADMIN — CLOPIDOGREL BISULFATE 75 MILLIGRAM(S): 75 TABLET, FILM COATED ORAL at 12:32

## 2020-02-22 RX ADMIN — PANTOPRAZOLE SODIUM 40 MILLIGRAM(S): 20 TABLET, DELAYED RELEASE ORAL at 06:02

## 2020-02-22 RX ADMIN — NORTRIPTYLINE HYDROCHLORIDE 25 MILLIGRAM(S): 10 CAPSULE ORAL at 21:34

## 2020-02-22 RX ADMIN — Medication 3: at 12:29

## 2020-02-22 RX ADMIN — CHLORHEXIDINE GLUCONATE 1 APPLICATION(S): 213 SOLUTION TOPICAL at 05:56

## 2020-02-22 RX ADMIN — HEPARIN SODIUM 5000 UNIT(S): 5000 INJECTION INTRAVENOUS; SUBCUTANEOUS at 05:59

## 2020-02-22 RX ADMIN — QUETIAPINE FUMARATE 25 MILLIGRAM(S): 200 TABLET, FILM COATED ORAL at 18:21

## 2020-02-22 RX ADMIN — Medication 2: at 06:02

## 2020-02-22 RX ADMIN — Medication 8 UNIT(S): at 18:17

## 2020-02-22 RX ADMIN — SENNA PLUS 2 TABLET(S): 8.6 TABLET ORAL at 21:34

## 2020-02-22 RX ADMIN — Medication 8 UNIT(S): at 12:28

## 2020-02-22 RX ADMIN — ATORVASTATIN CALCIUM 80 MILLIGRAM(S): 80 TABLET, FILM COATED ORAL at 21:34

## 2020-02-22 RX ADMIN — Medication 20 MILLIEQUIVALENT(S): at 15:05

## 2020-02-22 RX ADMIN — HEPARIN SODIUM 5000 UNIT(S): 5000 INJECTION INTRAVENOUS; SUBCUTANEOUS at 21:35

## 2020-02-22 RX ADMIN — EXEMESTANE 25 MILLIGRAM(S): 25 TABLET, SUGAR COATED ORAL at 18:21

## 2020-02-22 RX ADMIN — HEPARIN SODIUM 5000 UNIT(S): 5000 INJECTION INTRAVENOUS; SUBCUTANEOUS at 15:06

## 2020-02-22 RX ADMIN — CEFEPIME 100 MILLIGRAM(S): 1 INJECTION, POWDER, FOR SOLUTION INTRAMUSCULAR; INTRAVENOUS at 15:07

## 2020-02-22 RX ADMIN — QUETIAPINE FUMARATE 25 MILLIGRAM(S): 200 TABLET, FILM COATED ORAL at 05:57

## 2020-02-22 RX ADMIN — SERTRALINE 100 MILLIGRAM(S): 25 TABLET, FILM COATED ORAL at 12:31

## 2020-02-22 NOTE — DISCHARGE NOTE PROVIDER - NSDCCPCAREPLAN_GEN_ALL_CORE_FT
PRINCIPAL DISCHARGE DIAGNOSIS  Diagnosis: Urinary tract infection  Assessment and Plan of Treatment:   You were noted either on arrival or during this hospitalization to have a Urinary Tract Infection. You may have already been treated and completed the antibiotics, please refer to the list of medications present on your discharge paperwork. If you notice that there are antibiotics listed, these may be to treat your infection, be sure to complete taking the full course, whether you have symptoms or not, as prescribed.  While taking antibiotics, you may benefit from taking a probiotic such as florastore to help to try and prevent an infectious type of diarrhea known as C Diff. If you notice that you begin having severe watery diarrhea, more than 4-5 episodes a day, please see your Primary Care Doctor or come to the ER to have your stool tested for this infection.   It is not necessary to repeat a urine test to see if the infection is gone, it is assumed that after treatment it should have resolved. However, if you continue to have symptoms, please see your Primary Care doctor or return to the ER.      SECONDARY DISCHARGE DIAGNOSES  Diagnosis: AMS (altered mental status)  Assessment and Plan of Treatment: You were lethargic on admission secondary to your acute infection

## 2020-02-22 NOTE — DISCHARGE NOTE PROVIDER - CARE PROVIDER_API CALL
Ariel Macias)  Internal Medicine  [address] [place] [address]  Phone: [phone]  Fax: [phone]  Follow Up Time:

## 2020-02-22 NOTE — DISCHARGE NOTE PROVIDER - NSDCMRMEDTOKEN_GEN_ALL_CORE_FT
amLODIPine 10 mg oral tablet: 1 tab(s) orally once a day  atorvastatin 80 mg oral tablet: 1 tab(s) orally once a day (at bedtime)  clopidogrel 75 mg oral tablet: 1 tab(s) orally once a day  Ecotrin: 81 milligram(s) orally once a day  exemestane 25 mg oral tablet: 1 tab(s) orally once a day  HumaLOG 100 units/mL subcutaneous solution: Sliding scale 3 times a day (before meals)  insulin glargine 100 units/mL subcutaneous solution: 30 unit(s) subcutaneous once a day (at bedtime)  Lasix 20 mg oral tablet: 1 tab(s) orally once a day  levothyroxine 25 mcg (0.025 mg) oral tablet: 1 tab(s) orally once a day  nortriptyline 25 mg oral capsule: 1 cap(s) orally once a day (at bedtime)  pantoprazole 40 mg oral delayed release tablet: 1 tab(s) orally once a day (before a meal)  QUEtiapine 25 mg oral tablet: 1 tab(s) orally 2 times a day  senna oral tablet: 2 tab(s) orally once a day (at bedtime)- PRN for constipation- OTC is okay  sertraline 100 mg oral tablet: 1 tab(s) orally once a day amLODIPine 10 mg oral tablet: 1 tab(s) orally once a day  atorvastatin 80 mg oral tablet: 1 tab(s) orally once a day (at bedtime)  cefpodoxime 100 mg oral tablet: 1 tab(s) orally every 12 hours  clopidogrel 75 mg oral tablet: 1 tab(s) orally once a day  Ecotrin: 81 milligram(s) orally once a day  exemestane 25 mg oral tablet: 1 tab(s) orally once a day  HumaLOG 100 units/mL subcutaneous solution: Sliding scale 3 times a day (before meals)  insulin glargine 100 units/mL subcutaneous solution: 30 unit(s) subcutaneous once a day (at bedtime)  Lasix 20 mg oral tablet: 1 tab(s) orally once a day  levothyroxine 25 mcg (0.025 mg) oral tablet: 1 tab(s) orally once a day  nortriptyline 25 mg oral capsule: 1 cap(s) orally once a day (at bedtime)  pantoprazole 40 mg oral delayed release tablet: 1 tab(s) orally once a day (before a meal)  QUEtiapine 25 mg oral tablet: 1 tab(s) orally 2 times a day  senna oral tablet: 2 tab(s) orally once a day (at bedtime)- PRN for constipation- OTC is okay  sertraline 100 mg oral tablet: 1 tab(s) orally once a day

## 2020-02-22 NOTE — DISCHARGE NOTE PROVIDER - HOSPITAL COURSE
80 y/o F w/ PMH of DM II, HLD, HTN, CVA with residual LUE weakness, Breast CA in remission, CAD, s/p PPM, CKD 3b/4, Hypothyroidism presents to the ED for evaluation of weakness and lethargy.    She was admitted for acute cystitis with sepsis on admission.     Her urine cultures grew....    She was started on cefepime (2/22) for possible pseudomonas and planned for discharge on ....... 78 y/o F w/ PMH of DM II, HLD, HTN, CVA with residual LUE weakness, Breast CA in remission, CAD, s/p PPM, CKD 3b/4, Hypothyroidism presents to the ED for evaluation of weakness and lethargy.    She was admitted for acute cystitis with sepsis on admission.     Her urine cultures grew pan sensitive E.coli    She was started on cefepime (2/22) and planned for discharge on Vantin for a total of 7 days 78 y/o F w/ PMH of DM II, HLD, HTN, CVA with residual LUE weakness, Breast CA in remission, CAD, s/p PPM, CKD 3b/4, Hypothyroidism presents to the ED for evaluation of weakness and lethargy.    She was admitted for acute cystitis with sepsis on admission.     Her urine cultures grew pan sensitive E.coli    She was started on cefepime (2/22) and planned for discharge on Vantin for a total of 7 days.

## 2020-02-22 NOTE — PROGRESS NOTE ADULT - SUBJECTIVE AND OBJECTIVE BOX
JAYASHREE FAGAN 79y Female  MRN#: [id number]     SUBJECTIVE  Patient is a 79y old Female who presents with a chief complaint of AMS & Weakness (2020 09:41)  Currently admitted to medicine with the primary diagnosis of Sepsis  Today is hospital day 1d, and this morning she is feeling a lot better. She reports no dysuria.     OBJECTIVE  PAST MEDICAL & SURGICAL HISTORY  CVA (cerebral vascular accident):  lft weakness  Bilateral hearing loss, unspecified hearing loss type  High cholesterol  Depression  BP (high blood pressure): 20 yr  Breast CA:  s/p rt  DM (diabetes mellitus): 24 yr  History of cholecystectomy:   History of lumpectomy of right breast  H/O total knee replacement, right    ALLERGIES:  honeydew (Unknown)  latex (Unknown)  penicillins (Unknown)  pickles (Unknown)  shellfish (Unknown)  sulfa drugs (Hives)  Sulfacetamide Sodium-Sulfur (Rash)    MEDICATIONS:  STANDING MEDICATIONS  amLODIPine   Tablet 10 milliGRAM(s) Oral daily  aspirin  chewable 81 milliGRAM(s) Oral daily  atorvastatin 80 milliGRAM(s) Oral at bedtime  cefepime   IVPB      cefepime   IVPB 1000 milliGRAM(s) IV Intermittent once  cefepime   IVPB 1000 milliGRAM(s) IV Intermittent every 12 hours  chlorhexidine 4% Liquid 1 Application(s) Topical <User Schedule>  clopidogrel Tablet 75 milliGRAM(s) Oral daily  dextrose 5%. 1000 milliLiter(s) IV Continuous <Continuous>  dextrose 50% Injectable 12.5 Gram(s) IV Push once  dextrose 50% Injectable 25 Gram(s) IV Push once  dextrose 50% Injectable 25 Gram(s) IV Push once  exemestane 25 milliGRAM(s) Oral daily  furosemide    Tablet 20 milliGRAM(s) Oral daily  heparin  Injectable 5000 Unit(s) SubCutaneous every 8 hours  insulin glargine Injectable (LANTUS) 30 Unit(s) SubCutaneous at bedtime  insulin lispro (HumaLOG) corrective regimen sliding scale   SubCutaneous three times a day before meals  insulin lispro Injectable (HumaLOG) 8 Unit(s) SubCutaneous three times a day before meals  levothyroxine 25 MICROGram(s) Oral daily  nortriptyline 25 milliGRAM(s) Oral at bedtime  pantoprazole    Tablet 40 milliGRAM(s) Oral before breakfast  potassium chloride    Tablet ER 20 milliEquivalent(s) Oral every 2 hours  QUEtiapine 25 milliGRAM(s) Oral two times a day  senna 2 Tablet(s) Oral at bedtime  sertraline 100 milliGRAM(s) Oral daily    PRN MEDICATIONS  dextrose 40% Gel 15 Gram(s) Oral once PRN  glucagon  Injectable 1 milliGRAM(s) IntraMuscular once PRN      VITAL SIGNS: Last 24 Hours  T(C): 36.2 (2020 08:20), Max: 37.7 (2020 09:55)  T(F): 97.1 (2020 08:20), Max: 99.8 (2020 09:55)  HR: 74 (2020 08:20) (74 - 83)  BP: 136/73 (2020 08:20) (109/51 - 136/73)  BP(mean): 109 (2020 08:20) (73 - 109)  RR: 20 (2020 08:20) (20 - 20)  SpO2: --    LABS:                        9.4    6.02  )-----------( 270      ( 2020 06:14 )             29.6     02-22    144  |  103  |  27<H>  ----------------------------<  223<H>  3.3<L>   |  26  |  1.4    Ca    9.1      2020 06:14  Mg     2.0     -    TPro  7.3  /  Alb  4.1  /  TBili  0.4  /  DBili  x   /  AST  16  /  ALT  13  /  AlkPhos  197<H>  02-21    PT/INR - ( 2020 06:32 )   PT: 14.10 sec;   INR: 1.23 ratio         PTT - ( 2020 06:32 )  PTT:25.9 sec  Urinalysis Basic - ( 2020 08:28 )    Color: Yellow / Appearance: Turbid / S.014 / pH: x  Gluc: x / Ketone: Negative  / Bili: Negative / Urobili: <2 mg/dL   Blood: x / Protein: Trace / Nitrite: Positive   Leuk Esterase: Large / RBC: 1 /HPF /  /HPF   Sq Epi: x / Non Sq Epi: 1 /HPF / Bacteria: Negative    PHYSICAL EXAM:    GENERAL: NAD, well-developed, AAOx3  HEENT:  Atraumatic, Normocephalic. EOMI, PERRLA, conjunctiva and sclera clear, No JVD  PULMONARY: Clear to auscultation bilaterally; No wheeze  CARDIOVASCULAR: Regular rate and rhythm; No murmurs, rubs, or gallops  GASTROINTESTINAL: Soft, Nontender, Nondistended; Bowel sounds present  MUSCULOSKELETAL:  2+ Peripheral Pulses, No clubbing, cyanosis, or edema  NEUROLOGY: non-focal  SKIN: No rashes or lesions      ASSESSMENT & PLAN  78 y/o F w/ PMH of DM II, HLD, HTN, CVA with residual LUE weakness, Breast CA in remission, CAD, s/p PPM, CKD 3b/4, Hypothyroidism presents to the ED for evaluation of weakness and lethargy.    # Acute cystitis with sepsis on admission. Patient had lethargy secondary to acute infection.   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 s/p PPM    # DM II, hba1c =8.8%  - POCT w/ meals and bedtime  - FS poorly controlled  - continue Lantus 30 and increase lispro to 8 U    # HTN - well controlled  - c/w amlodipine     # HLD  - c/w atorvastatin     #CKD 3B - stable   - Cr on admission 1.4 appears to be around her baseline  - monitor Cr with BMP    # Hypothyroidism  - c/w levothyroxine 25 mcg    # CVA w/ residual LUE weakness  - on aspirin and plavix and statin    #Breast CA in remission  - on exemestane 25mg daily which is non formulary    #DVT PPx: Heparin 5000 subq  #GI PPx: Protonix  #Diet: DASH w/ carb consistency   #Activity: AAT  #Code status: Full code  #Dispo: from home, acute

## 2020-02-22 NOTE — DISCHARGE NOTE PROVIDER - NSDCFUSCHEDAPPT_GEN_ALL_CORE_FT
JAYASHREE FAGAN ; 03/18/2020 ; [facility] [address] ; 05/11/2020 ; NP [facility] [address] JAYASHREE FAGAN ; 03/18/2020 ; [facility] [address] ; 05/11/2020 ; NP [facility] [address] JAYASHREE FAGAN ; 03/18/2020 ; [facility] [address] ; 05/11/2020 ; NP [facility] [address]

## 2020-02-22 NOTE — PROGRESS NOTE ADULT - SUBJECTIVE AND OBJECTIVE BOX
Patient was seen and examined. Spoke with RN. Chart reviewed.  No events overnight.  Vital Signs Last 24 Hrs  T(F): 97.1 (2020 08:20), Max: 98.9 (2020 12:30)  HR: 74 (2020 08:20) (74 - 83)  BP: 136/73 (2020 08:20) (109/51 - 136/73)  SpO2: --  MEDICATIONS  (STANDING):  amLODIPine   Tablet 10 milliGRAM(s) Oral daily  aspirin  chewable 81 milliGRAM(s) Oral daily  atorvastatin 80 milliGRAM(s) Oral at bedtime  cefepime   IVPB      cefepime   IVPB 1000 milliGRAM(s) IV Intermittent once  cefepime   IVPB 1000 milliGRAM(s) IV Intermittent every 12 hours  chlorhexidine 4% Liquid 1 Application(s) Topical <User Schedule>  clopidogrel Tablet 75 milliGRAM(s) Oral daily  dextrose 5%. 1000 milliLiter(s) (50 mL/Hr) IV Continuous <Continuous>  dextrose 50% Injectable 12.5 Gram(s) IV Push once  dextrose 50% Injectable 25 Gram(s) IV Push once  dextrose 50% Injectable 25 Gram(s) IV Push once  exemestane 25 milliGRAM(s) Oral daily  furosemide    Tablet 20 milliGRAM(s) Oral daily  heparin  Injectable 5000 Unit(s) SubCutaneous every 8 hours  insulin glargine Injectable (LANTUS) 30 Unit(s) SubCutaneous at bedtime  insulin lispro (HumaLOG) corrective regimen sliding scale   SubCutaneous three times a day before meals  insulin lispro Injectable (HumaLOG) 8 Unit(s) SubCutaneous three times a day before meals  levothyroxine 25 MICROGram(s) Oral daily  nortriptyline 25 milliGRAM(s) Oral at bedtime  pantoprazole    Tablet 40 milliGRAM(s) Oral before breakfast  potassium chloride    Tablet ER 20 milliEquivalent(s) Oral every 2 hours  QUEtiapine 25 milliGRAM(s) Oral two times a day  senna 2 Tablet(s) Oral at bedtime  sertraline 100 milliGRAM(s) Oral daily    MEDICATIONS  (PRN):  dextrose 40% Gel 15 Gram(s) Oral once PRN Blood Glucose LESS THAN 70 milliGRAM(s)/deciliter  glucagon  Injectable 1 milliGRAM(s) IntraMuscular once PRN Glucose LESS THAN 70 milligrams/deciliter    Labs:                        9.4    6.02  )-----------( 270      ( 2020 06:14 )             29.6                         9.3    7.07  )-----------( 252      ( 2020 06:32 )             28.6     2020 06:14    144    |  103    |  27     ----------------------------<  223    3.3     |  26     |  1.4    2020 06:32    140    |  100    |  28     ----------------------------<  264    3.5     |  27     |  1.4      Ca    9.1        2020 06:14  Ca    9.3        2020 06:32  Mg     2.0       2020 06:14  Mg     2.1       2020 06:32    TPro  7.3    /  Alb  4.1    /  TBili  0.4    /  DBili  x      /  AST  16     /  ALT  13     /  AlkPhos  197    2020 06:32    PT/INR - ( 2020 06:32 )   PT: 14.10 sec;   INR: 1.23 ratio         PTT - ( 2020 06:32 )  PTT:25.9 sec  Urinalysis Basic - ( 2020 08:28 )    Color: Yellow / Appearance: Turbid / S.014 / pH: x  Gluc: x / Ketone: Negative  / Bili: Negative / Urobili: <2 mg/dL   Blood: x / Protein: Trace / Nitrite: Positive   Leuk Esterase: Large / RBC: 1 /HPF /  /HPF   Sq Epi: x / Non Sq Epi: 1 /HPF / Bacteria: Negative        General: comfortable, NAD  Neurology: A&Ox3, nonfocal  Head:  Normocephalic, atraumatic  ENT:  Mucosa moist, no ulcerations  Neck:  Supple, no JVD,   Skin: no breakdowns (as per RN)  Resp: CTA B/L  CV: RRR, S1S2,   GI: Soft, NT, bowel sounds  MS: No edema, + peripheral pulses, FROM all 4 extremity      A/P:  80 y/o F w/ PMH of DM II, HLD, HTN, CVA with residual LUE weakness, Breast CA in remission, CAD, s/p PPM, CKD 3b/4, Hypothyroidism presents to the ED for evaluation of weakness and lethargy.    IV abx   f/u cx  ID eval   glycemic control   no sedatives   monitor resp status   aspiration precaution   fall precautions   DVT prophylaxis  Decubitus prevention- all measures as per RN protocol  Please call or text me with any questions or updates Patient was seen and examined. Spoke with RN. Chart reviewed.  No events overnight.  Vital Signs Last 24 Hrs  T(F): 97.1 (2020 08:20), Max: 98.9 (2020 12:30)  HR: 74 (2020 08:20) (74 - 83)  BP: 136/73 (2020 08:20) (109/51 - 136/73)  SpO2: --  MEDICATIONS  (STANDING):  amLODIPine   Tablet 10 milliGRAM(s) Oral daily  aspirin  chewable 81 milliGRAM(s) Oral daily  atorvastatin 80 milliGRAM(s) Oral at bedtime  cefepime   IVPB      cefepime   IVPB 1000 milliGRAM(s) IV Intermittent once  cefepime   IVPB 1000 milliGRAM(s) IV Intermittent every 12 hours  chlorhexidine 4% Liquid 1 Application(s) Topical <User Schedule>  clopidogrel Tablet 75 milliGRAM(s) Oral daily  dextrose 5%. 1000 milliLiter(s) (50 mL/Hr) IV Continuous <Continuous>  dextrose 50% Injectable 12.5 Gram(s) IV Push once  dextrose 50% Injectable 25 Gram(s) IV Push once  dextrose 50% Injectable 25 Gram(s) IV Push once  exemestane 25 milliGRAM(s) Oral daily  furosemide    Tablet 20 milliGRAM(s) Oral daily  heparin  Injectable 5000 Unit(s) SubCutaneous every 8 hours  insulin glargine Injectable (LANTUS) 30 Unit(s) SubCutaneous at bedtime  insulin lispro (HumaLOG) corrective regimen sliding scale   SubCutaneous three times a day before meals  insulin lispro Injectable (HumaLOG) 8 Unit(s) SubCutaneous three times a day before meals  levothyroxine 25 MICROGram(s) Oral daily  nortriptyline 25 milliGRAM(s) Oral at bedtime  pantoprazole    Tablet 40 milliGRAM(s) Oral before breakfast  potassium chloride    Tablet ER 20 milliEquivalent(s) Oral every 2 hours  QUEtiapine 25 milliGRAM(s) Oral two times a day  senna 2 Tablet(s) Oral at bedtime  sertraline 100 milliGRAM(s) Oral daily    MEDICATIONS  (PRN):  dextrose 40% Gel 15 Gram(s) Oral once PRN Blood Glucose LESS THAN 70 milliGRAM(s)/deciliter  glucagon  Injectable 1 milliGRAM(s) IntraMuscular once PRN Glucose LESS THAN 70 milligrams/deciliter    Labs:                        9.4    6.02  )-----------( 270      ( 2020 06:14 )             29.6                         9.3    7.07  )-----------( 252      ( 2020 06:32 )             28.6     2020 06:14    144    |  103    |  27     ----------------------------<  223    3.3     |  26     |  1.4    2020 06:32    140    |  100    |  28     ----------------------------<  264    3.5     |  27     |  1.4      Ca    9.1        2020 06:14  Ca    9.3        2020 06:32  Mg     2.0       2020 06:14  Mg     2.1       2020 06:32    TPro  7.3    /  Alb  4.1    /  TBili  0.4    /  DBili  x      /  AST  16     /  ALT  13     /  AlkPhos  197    2020 06:32    PT/INR - ( 2020 06:32 )   PT: 14.10 sec;   INR: 1.23 ratio         PTT - ( 2020 06:32 )  PTT:25.9 sec  Urinalysis Basic - ( 2020 08:28 )    Color: Yellow / Appearance: Turbid / S.014 / pH: x  Gluc: x / Ketone: Negative  / Bili: Negative / Urobili: <2 mg/dL   Blood: x / Protein: Trace / Nitrite: Positive   Leuk Esterase: Large / RBC: 1 /HPF /  /HPF   Sq Epi: x / Non Sq Epi: 1 /HPF / Bacteria: Negative        General: comfortable, NAD  Neurology: A&Ox3, nonfocal  Head:  Normocephalic, atraumatic  ENT:  Mucosa moist, no ulcerations  Neck:  Supple, no JVD,   Skin: no breakdowns (as per RN)  Resp: CTA B/L  CV: RRR, S1S2,   GI: Soft, NT, bowel sounds  MS: No edema, + peripheral pulses, FROM all 4 extremity      A/P:  78 y/o F w/ PMH of DM II, HLD, HTN, CVA with residual LUE weakness, Breast CA in remission, CAD, s/p PPM, CKD 3b/4, Hypothyroidism presents to the ED for evaluation of weakness and lethargy.    IV abx   f/u cx  ID eval   glycemic control   no sedatives   monitor resp status   aspiration precaution   fall precautions   OOB to chair   PT/Rehab   DVT prophylaxis  Decubitus prevention- all measures as per RN protocol  Please call or text me with any questions or updates

## 2020-02-23 LAB
-  AMIKACIN: SIGNIFICANT CHANGE UP
-  AMPICILLIN/SULBACTAM: SIGNIFICANT CHANGE UP
-  AMPICILLIN: SIGNIFICANT CHANGE UP
-  AZTREONAM: SIGNIFICANT CHANGE UP
-  CEFAZOLIN: SIGNIFICANT CHANGE UP
-  CEFEPIME: SIGNIFICANT CHANGE UP
-  CEFOXITIN: SIGNIFICANT CHANGE UP
-  CEFTRIAXONE: SIGNIFICANT CHANGE UP
-  CIPROFLOXACIN: SIGNIFICANT CHANGE UP
-  GENTAMICIN: SIGNIFICANT CHANGE UP
-  IMIPENEM: SIGNIFICANT CHANGE UP
-  LEVOFLOXACIN: SIGNIFICANT CHANGE UP
-  MEROPENEM: SIGNIFICANT CHANGE UP
-  NITROFURANTOIN: SIGNIFICANT CHANGE UP
-  PIPERACILLIN/TAZOBACTAM: SIGNIFICANT CHANGE UP
-  TIGECYCLINE: SIGNIFICANT CHANGE UP
-  TOBRAMYCIN: SIGNIFICANT CHANGE UP
-  TRIMETHOPRIM/SULFAMETHOXAZOLE: SIGNIFICANT CHANGE UP
ANION GAP SERPL CALC-SCNC: 13 MMOL/L — SIGNIFICANT CHANGE UP (ref 7–14)
BUN SERPL-MCNC: 24 MG/DL — HIGH (ref 10–20)
CALCIUM SERPL-MCNC: 9.1 MG/DL — SIGNIFICANT CHANGE UP (ref 8.5–10.1)
CHLORIDE SERPL-SCNC: 105 MMOL/L — SIGNIFICANT CHANGE UP (ref 98–110)
CO2 SERPL-SCNC: 29 MMOL/L — SIGNIFICANT CHANGE UP (ref 17–32)
CREAT SERPL-MCNC: 1.3 MG/DL — SIGNIFICANT CHANGE UP (ref 0.7–1.5)
CULTURE RESULTS: SIGNIFICANT CHANGE UP
GLUCOSE BLDC GLUCOMTR-MCNC: 129 MG/DL — HIGH (ref 70–99)
GLUCOSE BLDC GLUCOMTR-MCNC: 186 MG/DL — HIGH (ref 70–99)
GLUCOSE BLDC GLUCOMTR-MCNC: 208 MG/DL — HIGH (ref 70–99)
GLUCOSE BLDC GLUCOMTR-MCNC: 78 MG/DL — SIGNIFICANT CHANGE UP (ref 70–99)
GLUCOSE SERPL-MCNC: 139 MG/DL — HIGH (ref 70–99)
HCT VFR BLD CALC: 28.8 % — LOW (ref 37–47)
HGB BLD-MCNC: 9.3 G/DL — LOW (ref 12–16)
MCHC RBC-ENTMCNC: 28.5 PG — SIGNIFICANT CHANGE UP (ref 27–31)
MCHC RBC-ENTMCNC: 32.3 G/DL — SIGNIFICANT CHANGE UP (ref 32–37)
MCV RBC AUTO: 88.3 FL — SIGNIFICANT CHANGE UP (ref 81–99)
METHOD TYPE: SIGNIFICANT CHANGE UP
NRBC # BLD: 0 /100 WBCS — SIGNIFICANT CHANGE UP (ref 0–0)
ORGANISM # SPEC MICROSCOPIC CNT: SIGNIFICANT CHANGE UP
ORGANISM # SPEC MICROSCOPIC CNT: SIGNIFICANT CHANGE UP
PLATELET # BLD AUTO: 271 K/UL — SIGNIFICANT CHANGE UP (ref 130–400)
POTASSIUM SERPL-MCNC: 3.4 MMOL/L — LOW (ref 3.5–5)
POTASSIUM SERPL-SCNC: 3.4 MMOL/L — LOW (ref 3.5–5)
RBC # BLD: 3.26 M/UL — LOW (ref 4.2–5.4)
RBC # FLD: 13.8 % — SIGNIFICANT CHANGE UP (ref 11.5–14.5)
SODIUM SERPL-SCNC: 147 MMOL/L — HIGH (ref 135–146)
SPECIMEN SOURCE: SIGNIFICANT CHANGE UP
WBC # BLD: 5.75 K/UL — SIGNIFICANT CHANGE UP (ref 4.8–10.8)
WBC # FLD AUTO: 5.75 K/UL — SIGNIFICANT CHANGE UP (ref 4.8–10.8)

## 2020-02-23 RX ADMIN — EXEMESTANE 25 MILLIGRAM(S): 25 TABLET, SUGAR COATED ORAL at 13:07

## 2020-02-23 RX ADMIN — ATORVASTATIN CALCIUM 80 MILLIGRAM(S): 80 TABLET, FILM COATED ORAL at 21:44

## 2020-02-23 RX ADMIN — INSULIN GLARGINE 30 UNIT(S): 100 INJECTION, SOLUTION SUBCUTANEOUS at 21:45

## 2020-02-23 RX ADMIN — Medication 25 MICROGRAM(S): at 06:22

## 2020-02-23 RX ADMIN — HEPARIN SODIUM 5000 UNIT(S): 5000 INJECTION INTRAVENOUS; SUBCUTANEOUS at 13:06

## 2020-02-23 RX ADMIN — QUETIAPINE FUMARATE 25 MILLIGRAM(S): 200 TABLET, FILM COATED ORAL at 06:22

## 2020-02-23 RX ADMIN — CLOPIDOGREL BISULFATE 75 MILLIGRAM(S): 75 TABLET, FILM COATED ORAL at 13:07

## 2020-02-23 RX ADMIN — CEFEPIME 100 MILLIGRAM(S): 1 INJECTION, POWDER, FOR SOLUTION INTRAMUSCULAR; INTRAVENOUS at 06:23

## 2020-02-23 RX ADMIN — Medication 8 UNIT(S): at 18:48

## 2020-02-23 RX ADMIN — Medication 8 UNIT(S): at 06:42

## 2020-02-23 RX ADMIN — QUETIAPINE FUMARATE 25 MILLIGRAM(S): 200 TABLET, FILM COATED ORAL at 18:46

## 2020-02-23 RX ADMIN — Medication 81 MILLIGRAM(S): at 13:07

## 2020-02-23 RX ADMIN — NORTRIPTYLINE HYDROCHLORIDE 25 MILLIGRAM(S): 10 CAPSULE ORAL at 21:45

## 2020-02-23 RX ADMIN — SENNA PLUS 2 TABLET(S): 8.6 TABLET ORAL at 21:45

## 2020-02-23 RX ADMIN — CHLORHEXIDINE GLUCONATE 1 APPLICATION(S): 213 SOLUTION TOPICAL at 06:22

## 2020-02-23 RX ADMIN — Medication 20 MILLIGRAM(S): at 06:22

## 2020-02-23 RX ADMIN — SERTRALINE 100 MILLIGRAM(S): 25 TABLET, FILM COATED ORAL at 13:07

## 2020-02-23 RX ADMIN — HEPARIN SODIUM 5000 UNIT(S): 5000 INJECTION INTRAVENOUS; SUBCUTANEOUS at 06:22

## 2020-02-23 RX ADMIN — HEPARIN SODIUM 5000 UNIT(S): 5000 INJECTION INTRAVENOUS; SUBCUTANEOUS at 21:44

## 2020-02-23 RX ADMIN — AMLODIPINE BESYLATE 10 MILLIGRAM(S): 2.5 TABLET ORAL at 06:21

## 2020-02-23 RX ADMIN — PANTOPRAZOLE SODIUM 40 MILLIGRAM(S): 20 TABLET, DELAYED RELEASE ORAL at 06:22

## 2020-02-23 RX ADMIN — CEFEPIME 100 MILLIGRAM(S): 1 INJECTION, POWDER, FOR SOLUTION INTRAMUSCULAR; INTRAVENOUS at 18:48

## 2020-02-23 RX ADMIN — Medication 8 UNIT(S): at 13:05

## 2020-02-23 NOTE — PROGRESS NOTE ADULT - SUBJECTIVE AND OBJECTIVE BOX
Patient was seen and examined in vent unit. Spoke with RN. Chart reviewed.  No events overnight. In good spirits  Vital Signs Last 24 Hrs  T(F): 98.7 (23 Feb 2020 08:17), Max: 99.9 (23 Feb 2020 00:36)  HR: 78 (23 Feb 2020 08:17) (78 - 94)  BP: 122/64 (23 Feb 2020 08:17) (122/64 - 148/70)  SpO2: --  MEDICATIONS  (STANDING):  amLODIPine   Tablet 10 milliGRAM(s) Oral daily  aspirin  chewable 81 milliGRAM(s) Oral daily  atorvastatin 80 milliGRAM(s) Oral at bedtime  cefepime   IVPB      cefepime   IVPB 1000 milliGRAM(s) IV Intermittent every 12 hours  chlorhexidine 4% Liquid 1 Application(s) Topical <User Schedule>  clopidogrel Tablet 75 milliGRAM(s) Oral daily  dextrose 5%. 1000 milliLiter(s) (50 mL/Hr) IV Continuous <Continuous>  dextrose 50% Injectable 12.5 Gram(s) IV Push once  dextrose 50% Injectable 25 Gram(s) IV Push once  dextrose 50% Injectable 25 Gram(s) IV Push once  exemestane 25 milliGRAM(s) Oral daily  furosemide    Tablet 20 milliGRAM(s) Oral daily  heparin  Injectable 5000 Unit(s) SubCutaneous every 8 hours  insulin glargine Injectable (LANTUS) 30 Unit(s) SubCutaneous at bedtime  insulin lispro (HumaLOG) corrective regimen sliding scale   SubCutaneous three times a day before meals  insulin lispro Injectable (HumaLOG) 8 Unit(s) SubCutaneous three times a day before meals  levothyroxine 25 MICROGram(s) Oral daily  nortriptyline 25 milliGRAM(s) Oral at bedtime  pantoprazole    Tablet 40 milliGRAM(s) Oral before breakfast  QUEtiapine 25 milliGRAM(s) Oral two times a day  senna 2 Tablet(s) Oral at bedtime  sertraline 100 milliGRAM(s) Oral daily    MEDICATIONS  (PRN):  dextrose 40% Gel 15 Gram(s) Oral once PRN Blood Glucose LESS THAN 70 milliGRAM(s)/deciliter  glucagon  Injectable 1 milliGRAM(s) IntraMuscular once PRN Glucose LESS THAN 70 milligrams/deciliter    Labs:                        9.3    5.75  )-----------( 271      ( 23 Feb 2020 05:59 )             28.8                         9.4    6.02  )-----------( 270      ( 22 Feb 2020 06:14 )             29.6     23 Feb 2020 05:59    147    |  105    |  24     ----------------------------<  139    3.4     |  29     |  1.3    22 Feb 2020 06:14    144    |  103    |  27     ----------------------------<  223    3.3     |  26     |  1.4      Ca    9.1        23 Feb 2020 05:59  Ca    9.1        22 Feb 2020 06:14  Mg     2.0       22 Feb 2020 06:14            Culture - Urine (collected 21 Feb 2020 08:28)  Source: .Urine Clean Catch (Midstream)  Preliminary Report (22 Feb 2020 16:31):    >100,000 CFU/ml Gram Negative Rods    Culture - Blood (collected 21 Feb 2020 06:32)  Source: .Blood Blood-Peripheral  Preliminary Report (22 Feb 2020 17:01):    No growth to date.    Culture - Blood (collected 21 Feb 2020 06:32)  Source: .Blood Blood-Peripheral  Preliminary Report (22 Feb 2020 17:01):    No growth to date.      General: comfortable, NAD  Neurology: A&Ox3, nonfocal  Head:  Normocephalic, atraumatic  ENT:  Mucosa moist, no ulcerations, [place]  Neck:  Supple, no JVD,   Resp: CTA B/L  CV: RRR, S1S2,   GI: Soft, NT, bowel sounds      A/P:  78 y/o F w/ PMH of DM II, HLD, HTN, CVA with residual LUE weakness, Breast CA in remission, CAD, s/p PPM, CKD 3b/4, Hypothyroidism presents to the ED for evaluation of weakness and lethargy- admitted with UTI; now clinically improving    IV abx   f/u culture and sensitivity  ID eval   glycemic control   no sedatives   monitor resp status   aspiration precaution   fall precautions   correct lytes as needed- PO K supplement  OOB to chair   PT/Rehab   DVT prophylaxis  Decubitus prevention- all measures as per RN protocol  Please call or text me with any questions or updates

## 2020-02-24 LAB
ANION GAP SERPL CALC-SCNC: 16 MMOL/L — HIGH (ref 7–14)
BUN SERPL-MCNC: 27 MG/DL — HIGH (ref 10–20)
CALCIUM SERPL-MCNC: 9 MG/DL — SIGNIFICANT CHANGE UP (ref 8.5–10.1)
CHLORIDE SERPL-SCNC: 100 MMOL/L — SIGNIFICANT CHANGE UP (ref 98–110)
CO2 SERPL-SCNC: 26 MMOL/L — SIGNIFICANT CHANGE UP (ref 17–32)
CREAT SERPL-MCNC: 1.3 MG/DL — SIGNIFICANT CHANGE UP (ref 0.7–1.5)
GLUCOSE BLDC GLUCOMTR-MCNC: 167 MG/DL — HIGH (ref 70–99)
GLUCOSE BLDC GLUCOMTR-MCNC: 231 MG/DL — HIGH (ref 70–99)
GLUCOSE BLDC GLUCOMTR-MCNC: 82 MG/DL — SIGNIFICANT CHANGE UP (ref 70–99)
GLUCOSE BLDC GLUCOMTR-MCNC: 95 MG/DL — SIGNIFICANT CHANGE UP (ref 70–99)
GLUCOSE SERPL-MCNC: 186 MG/DL — HIGH (ref 70–99)
POTASSIUM SERPL-MCNC: 3.2 MMOL/L — LOW (ref 3.5–5)
POTASSIUM SERPL-SCNC: 3.2 MMOL/L — LOW (ref 3.5–5)
SODIUM SERPL-SCNC: 142 MMOL/L — SIGNIFICANT CHANGE UP (ref 135–146)

## 2020-02-24 RX ORDER — POTASSIUM CHLORIDE 20 MEQ
40 PACKET (EA) ORAL ONCE
Refills: 0 | Status: COMPLETED | OUTPATIENT
Start: 2020-02-24 | End: 2020-02-24

## 2020-02-24 RX ORDER — CEFPODOXIME PROXETIL 100 MG
1 TABLET ORAL
Qty: 8 | Refills: 0
Start: 2020-02-24 | End: 2020-02-27

## 2020-02-24 RX ORDER — CIPROFLOXACIN LACTATE 400MG/40ML
500 VIAL (ML) INTRAVENOUS EVERY 12 HOURS
Refills: 0 | Status: DISCONTINUED | OUTPATIENT
Start: 2020-02-24 | End: 2020-02-24

## 2020-02-24 RX ORDER — CEFPODOXIME PROXETIL 100 MG
100 TABLET ORAL EVERY 12 HOURS
Refills: 0 | Status: DISCONTINUED | OUTPATIENT
Start: 2020-02-24 | End: 2020-02-25

## 2020-02-24 RX ADMIN — SENNA PLUS 2 TABLET(S): 8.6 TABLET ORAL at 21:48

## 2020-02-24 RX ADMIN — HEPARIN SODIUM 5000 UNIT(S): 5000 INJECTION INTRAVENOUS; SUBCUTANEOUS at 05:21

## 2020-02-24 RX ADMIN — EXEMESTANE 25 MILLIGRAM(S): 25 TABLET, SUGAR COATED ORAL at 12:24

## 2020-02-24 RX ADMIN — Medication 100 MILLIGRAM(S): at 17:50

## 2020-02-24 RX ADMIN — AMLODIPINE BESYLATE 10 MILLIGRAM(S): 2.5 TABLET ORAL at 05:21

## 2020-02-24 RX ADMIN — CEFEPIME 100 MILLIGRAM(S): 1 INJECTION, POWDER, FOR SOLUTION INTRAMUSCULAR; INTRAVENOUS at 05:24

## 2020-02-24 RX ADMIN — SERTRALINE 100 MILLIGRAM(S): 25 TABLET, FILM COATED ORAL at 12:24

## 2020-02-24 RX ADMIN — Medication 8 UNIT(S): at 07:41

## 2020-02-24 RX ADMIN — CHLORHEXIDINE GLUCONATE 1 APPLICATION(S): 213 SOLUTION TOPICAL at 05:21

## 2020-02-24 RX ADMIN — QUETIAPINE FUMARATE 25 MILLIGRAM(S): 200 TABLET, FILM COATED ORAL at 05:23

## 2020-02-24 RX ADMIN — Medication 81 MILLIGRAM(S): at 12:24

## 2020-02-24 RX ADMIN — QUETIAPINE FUMARATE 25 MILLIGRAM(S): 200 TABLET, FILM COATED ORAL at 17:03

## 2020-02-24 RX ADMIN — ATORVASTATIN CALCIUM 80 MILLIGRAM(S): 80 TABLET, FILM COATED ORAL at 21:48

## 2020-02-24 RX ADMIN — Medication 25 MICROGRAM(S): at 05:21

## 2020-02-24 RX ADMIN — Medication 2: at 12:27

## 2020-02-24 RX ADMIN — CLOPIDOGREL BISULFATE 75 MILLIGRAM(S): 75 TABLET, FILM COATED ORAL at 12:24

## 2020-02-24 RX ADMIN — NORTRIPTYLINE HYDROCHLORIDE 25 MILLIGRAM(S): 10 CAPSULE ORAL at 21:48

## 2020-02-24 RX ADMIN — Medication 8 UNIT(S): at 12:26

## 2020-02-24 RX ADMIN — HEPARIN SODIUM 5000 UNIT(S): 5000 INJECTION INTRAVENOUS; SUBCUTANEOUS at 14:08

## 2020-02-24 RX ADMIN — Medication 20 MILLIGRAM(S): at 05:21

## 2020-02-24 RX ADMIN — HEPARIN SODIUM 5000 UNIT(S): 5000 INJECTION INTRAVENOUS; SUBCUTANEOUS at 21:48

## 2020-02-24 RX ADMIN — Medication 1: at 07:41

## 2020-02-24 RX ADMIN — Medication 40 MILLIEQUIVALENT(S): at 10:54

## 2020-02-24 RX ADMIN — Medication 8 UNIT(S): at 17:02

## 2020-02-24 RX ADMIN — PANTOPRAZOLE SODIUM 40 MILLIGRAM(S): 20 TABLET, DELAYED RELEASE ORAL at 06:35

## 2020-02-24 NOTE — CONSULT NOTE ADULT - ASSESSMENT
IMPRESSION: Rehab of gait dysfunction    PRECAUTIONS: [  ] Cardiac  [  ] Respiratory  [  ] Seizures [  ] Contact Isolation  [  ] Droplet Isolation  [  ] Other    Weight Bearing Status:     RECOMMENDATION:    Out of Bed to Chair     DVT/Decubiti Prophylaxis    REHAB PLAN:     [ x  ] Bedside P/T 3-5 times a week   [   ]   Bedside O/T  2-3 times a week             [   ] No Rehab Therapy Indicated                   [   ]  Speech Therapy   Conditioning/ROM                                    ADL  Bed Mobility                                               Conditioning/ROM  Transfers                                                     Bed Mobility  Sitting /Standing Balance                         Transfers                                        Gait Training                                               Sitting/Standing Balance  Stair Training [   ]Applicable                    Home equipment Eval                                                                        Splinting  [   ] Only      GOALS:   ADL   [   ]   Independent                    Transfers  [   ] Independent                          Ambulation  [   ] Independent     [  x  ] With device                            [   ]  CG                                                         [ x  ]  CG                                                                  [   ] CG                            [    ] Min A                                                   [   ] Min A                                                              [ x  ] Min  A          DISCHARGE PLAN:   [   ]  Good candidate for Intensive Rehabilitation/Hospital based                                             Will tolerate 3hrs Intensive Rehab Daily                                       [ x   ]  Short Term Rehab in Skilled Nursing Facility                         vs              [  x  ]  Home with Outpatient or VN services / 24hr care                                         [    ]  Possible Candidate for Intensive Hospital based Rehab

## 2020-02-24 NOTE — PROGRESS NOTE ADULT - SUBJECTIVE AND OBJECTIVE BOX
JAYASHREE FAGAN 79y Female  MRN#: [id number]     SUBJECTIVE  Patient is a 79y old Female who presents with a chief complaint of AMS & Weakness (23 Feb 2020 09:16)  Currently admitted to medicine with the primary diagnosis of Urinary tract infection  Today is hospital day 3d, and this morning she is well. Patient slept well. Denied CP, SOB, abdominal pain, or changes in BM or urinary habits.      OBJECTIVE  PAST MEDICAL & SURGICAL HISTORY  CVA (cerebral vascular accident): 6/18 lft weakness  Bilateral hearing loss, unspecified hearing loss type  High cholesterol  Depression  BP (high blood pressure): 20 yr  Breast CA: 2014 s/p rt  DM (diabetes mellitus): 24 yr  History of cholecystectomy: 1992  History of lumpectomy of right breast  H/O total knee replacement, right    ALLERGIES:  honeydew (Unknown)  latex (Unknown)  penicillins (Unknown)  pickles (Unknown)  shellfish (Unknown)  sulfa drugs (Hives)  Sulfacetamide Sodium-Sulfur (Rash)    MEDICATIONS:  STANDING MEDICATIONS  amLODIPine   Tablet 10 milliGRAM(s) Oral daily  aspirin  chewable 81 milliGRAM(s) Oral daily  atorvastatin 80 milliGRAM(s) Oral at bedtime  chlorhexidine 4% Liquid 1 Application(s) Topical <User Schedule>  ciprofloxacin     Tablet 500 milliGRAM(s) Oral every 12 hours  clopidogrel Tablet 75 milliGRAM(s) Oral daily  dextrose 5%. 1000 milliLiter(s) IV Continuous <Continuous>  dextrose 50% Injectable 12.5 Gram(s) IV Push once  dextrose 50% Injectable 25 Gram(s) IV Push once  dextrose 50% Injectable 25 Gram(s) IV Push once  exemestane 25 milliGRAM(s) Oral daily  furosemide    Tablet 20 milliGRAM(s) Oral daily  heparin  Injectable 5000 Unit(s) SubCutaneous every 8 hours  insulin glargine Injectable (LANTUS) 30 Unit(s) SubCutaneous at bedtime  insulin lispro (HumaLOG) corrective regimen sliding scale   SubCutaneous three times a day before meals  insulin lispro Injectable (HumaLOG) 8 Unit(s) SubCutaneous three times a day before meals  levothyroxine 25 MICROGram(s) Oral daily  nortriptyline 25 milliGRAM(s) Oral at bedtime  pantoprazole    Tablet 40 milliGRAM(s) Oral before breakfast  potassium chloride    Tablet ER 40 milliEquivalent(s) Oral once  QUEtiapine 25 milliGRAM(s) Oral two times a day  senna 2 Tablet(s) Oral at bedtime  sertraline 100 milliGRAM(s) Oral daily    PRN MEDICATIONS  dextrose 40% Gel 15 Gram(s) Oral once PRN  glucagon  Injectable 1 milliGRAM(s) IntraMuscular once PRN      VITAL SIGNS: Last 24 Hours  T(C): 36.2 (24 Feb 2020 08:10), Max: 37.1 (24 Feb 2020 00:06)  T(F): 97.2 (24 Feb 2020 08:10), Max: 98.8 (24 Feb 2020 00:06)  HR: 97 (24 Feb 2020 08:10) (85 - 97)  BP: 109/56 (24 Feb 2020 08:10) (109/56 - 137/76)  BP(mean): 74 (24 Feb 2020 08:10) (74 - 95)  RR: 20 (24 Feb 2020 08:10) (18 - 20)  SpO2: --    LABS:             02-24    142  |  100  |  27<H>  ----------------------------<  186<H>  3.2<L>   |  26  |  1.3    Ca    9.0      24 Feb 2020 05:32    RADIOLOGY:    PHYSICAL EXAM:    GENERAL: NAD, well-developed, AAOx3  HEENT:  Atraumatic, Normocephalic. EOMI, PERRLA, conjunctiva and sclera clear, No JVD  PULMONARY: Clear to auscultation bilaterally; No wheeze  CARDIOVASCULAR: Regular rate and rhythm; No murmurs, rubs, or gallops  GASTROINTESTINAL: Soft, Nontender, Nondistended; Bowel sounds present  MUSCULOSKELETAL:  2+ Peripheral Pulses, No clubbing, cyanosis, or edema  NEUROLOGY: non-focal  SKIN: No rashes or lesions    ASSESSMENT & PLAN    78 y/o F w/ PMH of DM II, HLD, HTN, CVA with residual LUE weakness, Breast CA in remission, CAD, s/p PPM, CKD 3b/4, Hypothyroidism presents to the ED for evaluation of weakness and lethargy.    # Acute cystitis with sepsis on admission. Patient had lethargy secondary to acute infection.   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    # DM II, hba1c =8.8%  - POCT w/ meals and bedtime  - FS well controlled  - continue Lantus 30 and increase lispro to 8 U    # HTN - well controlled  - c/w amlodipine     # HLD  - c/w atorvastatin     #CKD 3B - stable   - Cr on admission 1.4 appears to be around her baseline  - monitor Cr with BMP    # Hypothyroidism  - c/w levothyroxine 25 mcg    # CVA w/ residual LUE weakness  - on aspirin and plavix and statin    #Breast CA in remission  - on exemestane 25mg daily which is non formulary    #electrolyte abnormalities  - mild hypokalemia  - correct as appropriate     #DVT PPx: Heparin 5000 subq  #GI PPx: Protonix  #Diet: DASH w/ carb consistency   #Activity: AAT  #Code status: Full code  #Dispo: from home, acute

## 2020-02-24 NOTE — PROGRESS NOTE ADULT - SUBJECTIVE AND OBJECTIVE BOX
JAYASHREE FAGAN  79y  Female      Patient is a 79y old  Female who presents with a chief complaint of AMS & Weakness (24 Feb 2020 09:30), diagnosed with E Coli UTI, doing better now on IV Abs      INTERVAL HPI/OVERNIGHT EVENTS:  No overnight events    HEALTH ISSUES - PROBLEM Dx:  CVA (cerebral vascular accident): 6/18 lft weakness  Bilateral hearing loss, unspecified hearing loss type  High cholesterol  Depression  BP (high blood pressure): 20 yr  Breast CA: 2014 s/p rt  DM (diabetes mellitus): 24 yr  History of cholecystectomy: 1992  History of lumpectomy of right breast  H/O total knee replacement, right      Vital Signs Last 24 Hrs  T(C): 36.2 (24 Feb 2020 08:10), Max: 37.1 (24 Feb 2020 00:06)  T(F): 97.2 (24 Feb 2020 08:10), Max: 98.8 (24 Feb 2020 00:06)  HR: 97 (24 Feb 2020 08:10) (85 - 97)  BP: 109/56 (24 Feb 2020 08:10) (109/56 - 137/76)  BP(mean): 74 (24 Feb 2020 08:10) (74 - 95)  RR: 20 (24 Feb 2020 08:10) (18 - 20)  SpO2: --    amLODIPine   Tablet 10 milliGRAM(s) Oral daily  aspirin  chewable 81 milliGRAM(s) Oral daily  atorvastatin 80 milliGRAM(s) Oral at bedtime  chlorhexidine 4% Liquid 1 Application(s) Topical <User Schedule>  ciprofloxacin     Tablet 500 milliGRAM(s) Oral every 12 hours  clopidogrel Tablet 75 milliGRAM(s) Oral daily  dextrose 40% Gel 15 Gram(s) Oral once PRN  dextrose 5%. 1000 milliLiter(s) IV Continuous <Continuous>  dextrose 50% Injectable 12.5 Gram(s) IV Push once  dextrose 50% Injectable 25 Gram(s) IV Push once  dextrose 50% Injectable 25 Gram(s) IV Push once  exemestane 25 milliGRAM(s) Oral daily  furosemide    Tablet 20 milliGRAM(s) Oral daily  glucagon  Injectable 1 milliGRAM(s) IntraMuscular once PRN  heparin  Injectable 5000 Unit(s) SubCutaneous every 8 hours  insulin glargine Injectable (LANTUS) 30 Unit(s) SubCutaneous at bedtime  insulin lispro (HumaLOG) corrective regimen sliding scale   SubCutaneous three times a day before meals  insulin lispro Injectable (HumaLOG) 8 Unit(s) SubCutaneous three times a day before meals  levothyroxine 25 MICROGram(s) Oral daily  nortriptyline 25 milliGRAM(s) Oral at bedtime  pantoprazole    Tablet 40 milliGRAM(s) Oral before breakfast  potassium chloride    Tablet ER 40 milliEquivalent(s) Oral once  QUEtiapine 25 milliGRAM(s) Oral two times a day  senna 2 Tablet(s) Oral at bedtime  sertraline 100 milliGRAM(s) Oral daily      PHYSICAL EXAM:  GENERAL: NAD, well-groomed, well-developed  HEAD:  Atraumatic, Normocephalic  EYES: EOMI, PERRLA, conjunctiva and sclera clear  HEENT: No tonsillar erythema, exudates, or enlargement; Moist mucous membranes, Good dentition, No lesions  NECK: Supple, No JVD, Normal thyroid  NERVOUS SYSTEM:  Alert & Oriented X3, Good concentration; L hemiparesis  CHEST/LUNG: Clear to percussion bilaterally; No rales, rhonchi, wheezing, or rubs  HEART: Regular rate and rhythm; No murmurs, rubs, or gallops  ABDOMEN: Soft, Nontender, Nondistended; Bowel sounds present  EXTREMITIES:  2+ Peripheral Pulses, No clubbing, cyanosis, or edema  LYMPH: No lymphadenopathy noted  SKIN: No rashes or lesions      LABS                         9.3    5.75  )-----------( 271      ( 23 Feb 2020 05:59 )             28.8     02-24    142  |  100  |  27<H>  ----------------------------<  186<H>  3.2<L>   |  26  |  1.3    Ca    9.0      24 Feb 2020 05:32        RADIOLOGY & ADDITIONAL TESTS:    ASSESSMENT & PLAN:     78 y/o F w/ PMH of DM II, HLD, HTN, CVA with residual LUE weakness, Breast CA in remission, CAD, s/p PPM, CKD 3b/4, Hypothyroidism presents to the ED for evaluation of weakness and lethargy.    # Acute cystitis with sepsis on admission. Patient had lethargy secondary to acute infection.   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    # DM II, hba1c =8.8%  - POCT w/ meals and bedtime  - FS well controlled  - continue Lantus 30 and increase lispro to 8 U    # HTN - well controlled  - c/w amlodipine     # HLD  - c/w atorvastatin     #CKD 3B - stable   - Cr on admission 1.4 appears to be around her baseline  - monitor Cr with BMP    # Hypothyroidism  - c/w levothyroxine 25 mcg    # CVA w/ residual LUE weakness  - on aspirin and plavix and statin    #Breast CA in remission  - on exemestane 25mg daily which is non formulary    #electrolyte abnormalities  - mild hypokalemia  - correct as appropriate     #DVT PPx: Heparin 5000 subq  #GI PPx: Protonix  #Diet: DASH w/ carb consistency   #Activity: AAT  #Code status: Full code  #Dispo: from home, acute     May D/C to home today if remains afebrile  Any questions or concerns please call me at  [phone]

## 2020-02-24 NOTE — CONSULT NOTE ADULT - SUBJECTIVE AND OBJECTIVE BOX
HPI:  80 y/o F w/ PMH of DM II, HLD, HTN, CVA with residual LUE weakness, Breast CA in remission, CAD, s/p PPM, CKD 3b/4, Hypothyroidism presents to the ED for evaluation of weakness and lethargy. Patient is a poor historian and aide was no longer at bedside. History taken from charts and from her son. Per son, he was called by the aide the morning of presentation that his mother was noted be lethargic & sluggish. Her AM fingerstick was found to be in the 350s. Aide called EMS who brought her to the hospital. It is unclear to the son how long she had been having symptoms or if any additional symptoms were noted. Patient herself denies urinary symptoms, fevers, chills, chest pain, SOB, abdominal pain, n/v, diarrhea or constipation or recent sick contacts      In the ED, Tmax of 102 F, HR 92, /63, RR 18 O2 99% on RA. No leukocytosis present on labs, lactate normal. UA was positive for nitrite, LE and WBC. Flu panel was negative. CXR without opacities (pending official read). Patient given 2L NS bolus and 1 dose of aztreonam. (21 Feb 2020 09:41)      PAST MEDICAL & SURGICAL HISTORY:  CVA (cerebral vascular accident): 6/18 lft weakness  Bilateral hearing loss, unspecified hearing loss type  High cholesterol  Depression  BP (high blood pressure): 20 yr  Breast CA: 2014 s/p rt  DM (diabetes mellitus): 24 yr  History of cholecystectomy: 1992  History of lumpectomy of right breast  H/O total knee replacement, right      Hospital Course:    TODAY'S SUBJECTIVE & REVIEW OF SYMPTOMS:     Constitutional WNL   Cardio WNL   Resp WNL   GI WNL  Heme WNL  Endo WNL  Skin WNL  MSK WNL  Neuro left side weakness  Cognitive WNL  Psych WNL      MEDICATIONS  (STANDING):  amLODIPine   Tablet 10 milliGRAM(s) Oral daily  aspirin  chewable 81 milliGRAM(s) Oral daily  atorvastatin 80 milliGRAM(s) Oral at bedtime  cefpodoxime 100 milliGRAM(s) Oral every 12 hours  chlorhexidine 4% Liquid 1 Application(s) Topical <User Schedule>  clopidogrel Tablet 75 milliGRAM(s) Oral daily  dextrose 5%. 1000 milliLiter(s) (50 mL/Hr) IV Continuous <Continuous>  dextrose 50% Injectable 12.5 Gram(s) IV Push once  dextrose 50% Injectable 25 Gram(s) IV Push once  dextrose 50% Injectable 25 Gram(s) IV Push once  exemestane 25 milliGRAM(s) Oral daily  furosemide    Tablet 20 milliGRAM(s) Oral daily  heparin  Injectable 5000 Unit(s) SubCutaneous every 8 hours  insulin glargine Injectable (LANTUS) 30 Unit(s) SubCutaneous at bedtime  insulin lispro (HumaLOG) corrective regimen sliding scale   SubCutaneous three times a day before meals  insulin lispro Injectable (HumaLOG) 8 Unit(s) SubCutaneous three times a day before meals  levothyroxine 25 MICROGram(s) Oral daily  nortriptyline 25 milliGRAM(s) Oral at bedtime  pantoprazole    Tablet 40 milliGRAM(s) Oral before breakfast  QUEtiapine 25 milliGRAM(s) Oral two times a day  senna 2 Tablet(s) Oral at bedtime  sertraline 100 milliGRAM(s) Oral daily    MEDICATIONS  (PRN):  dextrose 40% Gel 15 Gram(s) Oral once PRN Blood Glucose LESS THAN 70 milliGRAM(s)/deciliter  glucagon  Injectable 1 milliGRAM(s) IntraMuscular once PRN Glucose LESS THAN 70 milligrams/deciliter      FAMILY HISTORY:  No pertinent family history in first degree relatives      Allergies    honeydew (Unknown)  latex (Unknown)  penicillins (Unknown)  pickles (Unknown)  shellfish (Unknown)  sulfa drugs (Hives)  Sulfacetamide Sodium-Sulfur (Rash)    Intolerances        SOCIAL HISTORY:    [  ] Etoh  [  ] Smoking  [  ] Substance abuse     Home Environment:  [  ] Home Alone  [  ] Lives with Family  [ x ] Home Health Aid 24hr    Dwelling:  [  ] Apartment  [ x ] Private House  [  ] Adult Home  [  ] Skilled Nursing Facility      [  ] Short Term  [  ] Long Term  [  ] Stairs       Elevator [  ]    FUNCTIONAL STATUS PTA: (Check all that apply)  Ambulation: [   ]Independent    [x  ] Dependent     [  ] Non-Ambulatory  Assistive Device: [  ] SA Cane  [  ]  Q Cane  [  ] Walker  [ x ]  Wheelchair  ADL : [  ] Independent  [x  ]  Dependent       Vital Signs Last 24 Hrs  T(C): 36.2 (24 Feb 2020 08:10), Max: 37.1 (24 Feb 2020 00:06)  T(F): 97.2 (24 Feb 2020 08:10), Max: 98.8 (24 Feb 2020 00:06)  HR: 97 (24 Feb 2020 08:10) (86 - 97)  BP: 109/56 (24 Feb 2020 08:10) (109/56 - 137/76)  BP(mean): 74 (24 Feb 2020 08:10) (74 - 95)  RR: 20 (24 Feb 2020 08:10) (20 - 20)  SpO2: --      PHYSICAL EXAM: Alert & awake  GENERAL: NAD  HEAD:  Atraumatic, Normocephalic  CHEST/LUNG: Clear   HEART: S1S2+  ABDOMEN: Soft, Nontender  EXTREMITIES:  no calf tenderness    NERVOUS SYSTEM:  Cranial Nerves 2-12 intact [  ] Abnormal  [  ]  ROM: WFL all extremities [  ]  Abnormal [x  ]limited left side  Motor Strength: WFL all extremities  [  ]  Abnormal [ x ]limited left side  Sensation: intact to light touch [  ] Abnormal [  ]  Reflexes: Symmetric [  ]  Abnormal [  ]    FUNCTIONAL STATUS:  Bed Mobility: Independent [  ]  Supervision [  ]  Needs Assistance [x  ]  N/A [  ]  Transfers: Independent [  ]  Supervision [  ]  Needs Assistance [ x ]  N/A [  ]   Ambulation: Independent [  ]  Supervision [  ]  Needs Assistance [  ]  N/A [  ]  ADL: Independent [  ] Requires Assistance [  ] N/A [  ]      LABS:                        9.3    5.75  )-----------( 271      ( 23 Feb 2020 05:59 )             28.8     02-24    142  |  100  |  27<H>  ----------------------------<  186<H>  3.2<L>   |  26  |  1.3    Ca    9.0      24 Feb 2020 05:32            RADIOLOGY & ADDITIONAL STUDIES:    Assesment:

## 2020-02-25 ENCOUNTER — TRANSCRIPTION ENCOUNTER (OUTPATIENT)
Age: 80
End: 2020-02-25

## 2020-02-25 VITALS
SYSTOLIC BLOOD PRESSURE: 121 MMHG | TEMPERATURE: 98 F | DIASTOLIC BLOOD PRESSURE: 57 MMHG | HEART RATE: 88 BPM | RESPIRATION RATE: 20 BRPM

## 2020-02-25 LAB
ANION GAP SERPL CALC-SCNC: 14 MMOL/L — SIGNIFICANT CHANGE UP (ref 7–14)
BUN SERPL-MCNC: 23 MG/DL — HIGH (ref 10–20)
CALCIUM SERPL-MCNC: 9 MG/DL — SIGNIFICANT CHANGE UP (ref 8.5–10.1)
CHLORIDE SERPL-SCNC: 100 MMOL/L — SIGNIFICANT CHANGE UP (ref 98–110)
CO2 SERPL-SCNC: 28 MMOL/L — SIGNIFICANT CHANGE UP (ref 17–32)
CREAT SERPL-MCNC: 1.2 MG/DL — SIGNIFICANT CHANGE UP (ref 0.7–1.5)
GLUCOSE BLDC GLUCOMTR-MCNC: 120 MG/DL — HIGH (ref 70–99)
GLUCOSE BLDC GLUCOMTR-MCNC: 324 MG/DL — HIGH (ref 70–99)
GLUCOSE SERPL-MCNC: 125 MG/DL — HIGH (ref 70–99)
HCT VFR BLD CALC: 28.1 % — LOW (ref 37–47)
HGB BLD-MCNC: 9.2 G/DL — LOW (ref 12–16)
MCHC RBC-ENTMCNC: 29.1 PG — SIGNIFICANT CHANGE UP (ref 27–31)
MCHC RBC-ENTMCNC: 32.7 G/DL — SIGNIFICANT CHANGE UP (ref 32–37)
MCV RBC AUTO: 88.9 FL — SIGNIFICANT CHANGE UP (ref 81–99)
NRBC # BLD: 0 /100 WBCS — SIGNIFICANT CHANGE UP (ref 0–0)
PLATELET # BLD AUTO: 290 K/UL — SIGNIFICANT CHANGE UP (ref 130–400)
POTASSIUM SERPL-MCNC: 3.7 MMOL/L — SIGNIFICANT CHANGE UP (ref 3.5–5)
POTASSIUM SERPL-SCNC: 3.7 MMOL/L — SIGNIFICANT CHANGE UP (ref 3.5–5)
RBC # BLD: 3.16 M/UL — LOW (ref 4.2–5.4)
RBC # FLD: 13.7 % — SIGNIFICANT CHANGE UP (ref 11.5–14.5)
SODIUM SERPL-SCNC: 142 MMOL/L — SIGNIFICANT CHANGE UP (ref 135–146)
WBC # BLD: 5.88 K/UL — SIGNIFICANT CHANGE UP (ref 4.8–10.8)
WBC # FLD AUTO: 5.88 K/UL — SIGNIFICANT CHANGE UP (ref 4.8–10.8)

## 2020-02-25 RX ORDER — CEFPODOXIME PROXETIL 100 MG
1 TABLET ORAL
Qty: 6 | Refills: 0
Start: 2020-02-25 | End: 2020-02-27

## 2020-02-25 RX ADMIN — Medication 4: at 11:45

## 2020-02-25 RX ADMIN — Medication 8 UNIT(S): at 05:37

## 2020-02-25 RX ADMIN — Medication 8 UNIT(S): at 11:44

## 2020-02-25 RX ADMIN — PANTOPRAZOLE SODIUM 40 MILLIGRAM(S): 20 TABLET, DELAYED RELEASE ORAL at 05:36

## 2020-02-25 RX ADMIN — Medication 100 MILLIGRAM(S): at 05:36

## 2020-02-25 RX ADMIN — CLOPIDOGREL BISULFATE 75 MILLIGRAM(S): 75 TABLET, FILM COATED ORAL at 11:43

## 2020-02-25 RX ADMIN — EXEMESTANE 25 MILLIGRAM(S): 25 TABLET, SUGAR COATED ORAL at 11:43

## 2020-02-25 RX ADMIN — HEPARIN SODIUM 5000 UNIT(S): 5000 INJECTION INTRAVENOUS; SUBCUTANEOUS at 05:36

## 2020-02-25 RX ADMIN — AMLODIPINE BESYLATE 10 MILLIGRAM(S): 2.5 TABLET ORAL at 05:36

## 2020-02-25 RX ADMIN — Medication 25 MICROGRAM(S): at 05:36

## 2020-02-25 RX ADMIN — QUETIAPINE FUMARATE 25 MILLIGRAM(S): 200 TABLET, FILM COATED ORAL at 05:36

## 2020-02-25 RX ADMIN — Medication 20 MILLIGRAM(S): at 05:36

## 2020-02-25 RX ADMIN — Medication 81 MILLIGRAM(S): at 11:44

## 2020-02-25 RX ADMIN — HEPARIN SODIUM 5000 UNIT(S): 5000 INJECTION INTRAVENOUS; SUBCUTANEOUS at 13:53

## 2020-02-25 RX ADMIN — CHLORHEXIDINE GLUCONATE 1 APPLICATION(S): 213 SOLUTION TOPICAL at 05:37

## 2020-02-25 RX ADMIN — SERTRALINE 100 MILLIGRAM(S): 25 TABLET, FILM COATED ORAL at 11:43

## 2020-02-25 NOTE — CHART NOTE - NSCHARTNOTEFT_GEN_A_CORE
<<<RESIDENT DISCHARGE NOTE>>>     JAYASHREE FAGAN  MRN-[id number]    VITAL SIGNS:  T(F): 98.5 (02-25-20 @ 07:59), Max: 99.1 (02-24-20 @ 23:35)  HR: 88 (02-25-20 @ 07:59)  BP: 121/57 (02-25-20 @ 07:59)  SpO2: --      PHYSICAL EXAMINATION:  GENERAL: NAD, well-developed, AAOx3  HEENT:  Atraumatic, Normocephalic. EOMI, PERRLA, conjunctiva and sclera clear, No JVD  PULMONARY: Clear to auscultation bilaterally; No wheeze  CARDIOVASCULAR: Regular rate and rhythm; No murmurs, rubs, or gallops  GASTROINTESTINAL: Soft, Nontender, Nondistended; Bowel sounds present  MUSCULOSKELETAL:  2+ Peripheral Pulses, No clubbing, cyanosis, or edema  NEUROLOGY: non-focal  SKIN: No rashes or lesions  TEST RESULTS:                        9.2    5.88  )-----------( 290      ( 25 Feb 2020 05:55 )             28.1       02-25    142  |  100  |  23<H>  ----------------------------<  125<H>  3.7   |  28  |  1.2    Ca    9.0      25 Feb 2020 05:55      Will discharge on antibiotics to complete a total of 7 days of antibiotics for UTI.   Patient will need home help (24hours/day).     The patient was aware of her plan of care and is agreeable to the circumstances surrounding her discharge. I have personally spoken to the patients daughter who is agreeable to the plan.       FINAL DISCHARGE INTERVIEW:  Resident(s) Present: (Name:___MARIJA_________)    DISCHARGE MEDICATION RECONCILIATION  reviewed with Attending (Name:DANNY_______)    DISPOSITION:   [  ] Home,    [ x ] Home with Visiting Nursing Services,   [    ]  SNF/ NH,    [   ] Acute Rehab (4A),   [   ] Other (Specify:_________)

## 2020-02-25 NOTE — DISCHARGE NOTE NURSING/CASE MANAGEMENT/SOCIAL WORK - PATIENT PORTAL LINK FT
You can access the FollowMyHealth Patient Portal offered by [facility] by registering at the following website: [email or web address]. By joining [facility]’s FollowMyHealth portal, you will also be able to view your health information using other applications (apps) compatible with our system.

## 2020-02-25 NOTE — PROGRESS NOTE ADULT - SUBJECTIVE AND OBJECTIVE BOX
JAYASHREE FAGAN  79y  Female      Patient is a 79y old  Female who presents with a chief complaint of AMS & Weakness (24 Feb 2020 15:46), doing better now after Rx for UTI      INTERVAL HPI/OVERNIGHT EVENTS:  No overnight events, feels well    HEALTH ISSUES - PROBLEM Dx:  CVA (cerebral vascular accident): 6/18 lft weakness  Bilateral hearing loss, unspecified hearing loss type  High cholesterol  Depression  BP (high blood pressure): 20 yr  Breast CA: 2014 s/p rt  DM (diabetes mellitus): 24 yr  History of cholecystectomy: 1992  History of lumpectomy of right breast  H/O total knee replacement, right    Vital Signs Last 24 Hrs  T(C): 36.9 (25 Feb 2020 07:59), Max: 37.3 (24 Feb 2020 23:35)  T(F): 98.5 (25 Feb 2020 07:59), Max: 99.1 (24 Feb 2020 23:35)  HR: 88 (25 Feb 2020 07:59) (81 - 107)  BP: 121/57 (25 Feb 2020 07:59) (119/64 - 137/63)  BP(mean): 82 (25 Feb 2020 07:59) (82 - 91)  RR: 20 (25 Feb 2020 07:59) (20 - 22)  SpO2: --    amLODIPine   Tablet 10 milliGRAM(s) Oral daily  aspirin  chewable 81 milliGRAM(s) Oral daily  atorvastatin 80 milliGRAM(s) Oral at bedtime  cefpodoxime 100 milliGRAM(s) Oral every 12 hours  chlorhexidine 4% Liquid 1 Application(s) Topical <User Schedule>  clopidogrel Tablet 75 milliGRAM(s) Oral daily  dextrose 40% Gel 15 Gram(s) Oral once PRN  dextrose 5%. 1000 milliLiter(s) IV Continuous <Continuous>  dextrose 50% Injectable 12.5 Gram(s) IV Push once  dextrose 50% Injectable 25 Gram(s) IV Push once  dextrose 50% Injectable 25 Gram(s) IV Push once  exemestane 25 milliGRAM(s) Oral daily  furosemide    Tablet 20 milliGRAM(s) Oral daily  glucagon  Injectable 1 milliGRAM(s) IntraMuscular once PRN  heparin  Injectable 5000 Unit(s) SubCutaneous every 8 hours  insulin glargine Injectable (LANTUS) 30 Unit(s) SubCutaneous at bedtime  insulin lispro (HumaLOG) corrective regimen sliding scale   SubCutaneous three times a day before meals  insulin lispro Injectable (HumaLOG) 8 Unit(s) SubCutaneous three times a day before meals  levothyroxine 25 MICROGram(s) Oral daily  nortriptyline 25 milliGRAM(s) Oral at bedtime  pantoprazole    Tablet 40 milliGRAM(s) Oral before breakfast  QUEtiapine 25 milliGRAM(s) Oral two times a day  senna 2 Tablet(s) Oral at bedtime  sertraline 100 milliGRAM(s) Oral daily      PHYSICAL EXAM:  GENERAL: NAD, well-groomed, well-developed  HEAD:  Atraumatic, Normocephalic  EYES: EOMI, PERRLA, conjunctiva and sclera clear  HEENT: No tonsillar erythema, exudates, or enlargement; Moist mucous membranes, Good dentition, No lesions  NECK: Supple, No JVD, Normal thyroid  NERVOUS SYSTEM:  Alert & Oriented X3, Good concentration; L hemiparesis  CHEST/LUNG: Clear to percussion bilaterally; No rales, rhonchi, wheezing, or rubs  HEART: Regular rate and rhythm; No murmurs, rubs, or gallops  ABDOMEN: Soft, Nontender, Nondistended; Bowel sounds present  EXTREMITIES:  2+ Peripheral Pulses, No clubbing, cyanosis, or edema  LYMPH: No lymphadenopathy noted  SKIN: No rashes or lesions      LABS                         9.2    5.88  )-----------( 290      ( 25 Feb 2020 05:55 )             28.1     02-25    142  |  100  |  23<H>  ----------------------------<  125<H>  3.7   |  28  |  1.2    Ca    9.0      25 Feb 2020 05:55        RADIOLOGY & ADDITIONAL TESTS:    ASSESSMENT & PLAN:     80 y/o F w/ PMH of DM II, HLD, HTN, CVA with residual LUE weakness, Breast CA in remission, CAD, s/p PPM, CKD 3b/4, Hypothyroidism presents to the ED for evaluation of weakness and lethargy.    # Acute cystitis with sepsis on admission. Patient had lethargy secondary to acute infection.   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    # DM II, hba1c =8.8%  - POCT w/ meals and bedtime  - FS well controlled  - continue Lantus 30 and increase lispro to 8 U    # HTN - well controlled  - c/w amlodipine     # HLD  - c/w atorvastatin     #CKD 3B - stable   - Cr on admission 1.4 appears to be around her baseline  - monitor Cr with BMP    # Hypothyroidism  - c/w levothyroxine 25 mcg    # CVA w/ residual LUE weakness  - on aspirin and plavix and statin    #Breast CA in remission  - on exemestane 25mg daily which is non formulary    #electrolyte abnormalities  - mild hypokalemia  - correct as appropriate     #DVT PPx: Heparin 5000 subq  #GI PPx: Protonix  #Diet: DASH w/ carb consistency   #Activity: AAT  #Code status: Full code  #Dispo: from home, acute     D/C Plan now for SNF Rehab, stable for transfer  Any questions or concerns please call me at  [phone]

## 2020-02-26 LAB
CULTURE RESULTS: SIGNIFICANT CHANGE UP
CULTURE RESULTS: SIGNIFICANT CHANGE UP
SPECIMEN SOURCE: SIGNIFICANT CHANGE UP
SPECIMEN SOURCE: SIGNIFICANT CHANGE UP

## 2020-03-02 DIAGNOSIS — Z88.2 ALLERGY STATUS TO SULFONAMIDES: ICD-10-CM

## 2020-03-02 DIAGNOSIS — Z95.0 PRESENCE OF CARDIAC PACEMAKER: ICD-10-CM

## 2020-03-02 DIAGNOSIS — Z91.013 ALLERGY TO SEAFOOD: ICD-10-CM

## 2020-03-02 DIAGNOSIS — N18.3 CHRONIC KIDNEY DISEASE, STAGE 3 (MODERATE): ICD-10-CM

## 2020-03-02 DIAGNOSIS — Z91.018 ALLERGY TO OTHER FOODS: ICD-10-CM

## 2020-03-02 DIAGNOSIS — E11.22 TYPE 2 DIABETES MELLITUS WITH DIABETIC CHRONIC KIDNEY DISEASE: ICD-10-CM

## 2020-03-02 DIAGNOSIS — Z79.4 LONG TERM (CURRENT) USE OF INSULIN: ICD-10-CM

## 2020-03-02 DIAGNOSIS — R26.9 UNSPECIFIED ABNORMALITIES OF GAIT AND MOBILITY: ICD-10-CM

## 2020-03-02 DIAGNOSIS — Z85.3 PERSONAL HISTORY OF MALIGNANT NEOPLASM OF BREAST: ICD-10-CM

## 2020-03-02 DIAGNOSIS — A41.9 SEPSIS, UNSPECIFIED ORGANISM: ICD-10-CM

## 2020-03-02 DIAGNOSIS — E03.9 HYPOTHYROIDISM, UNSPECIFIED: ICD-10-CM

## 2020-03-02 DIAGNOSIS — N30.00 ACUTE CYSTITIS WITHOUT HEMATURIA: ICD-10-CM

## 2020-03-02 DIAGNOSIS — E87.6 HYPOKALEMIA: ICD-10-CM

## 2020-03-02 DIAGNOSIS — E78.00 PURE HYPERCHOLESTEROLEMIA, UNSPECIFIED: ICD-10-CM

## 2020-03-02 DIAGNOSIS — I25.10 ATHEROSCLEROTIC HEART DISEASE OF NATIVE CORONARY ARTERY WITHOUT ANGINA PECTORIS: ICD-10-CM

## 2020-03-02 DIAGNOSIS — B96.20 UNSPECIFIED ESCHERICHIA COLI [E. COLI] AS THE CAUSE OF DISEASES CLASSIFIED ELSEWHERE: ICD-10-CM

## 2020-03-02 DIAGNOSIS — H91.90 UNSPECIFIED HEARING LOSS, UNSPECIFIED EAR: ICD-10-CM

## 2020-03-02 DIAGNOSIS — Z96.651 PRESENCE OF RIGHT ARTIFICIAL KNEE JOINT: ICD-10-CM

## 2020-03-02 DIAGNOSIS — Z91.040 LATEX ALLERGY STATUS: ICD-10-CM

## 2020-03-02 DIAGNOSIS — Z88.0 ALLERGY STATUS TO PENICILLIN: ICD-10-CM

## 2020-03-02 DIAGNOSIS — I69.954 HEMIPLEGIA AND HEMIPARESIS FOLLOWING UNSPECIFIED CEREBROVASCULAR DISEASE AFFECTING LEFT NON-DOMINANT SIDE: ICD-10-CM

## 2020-03-02 DIAGNOSIS — F32.9 MAJOR DEPRESSIVE DISORDER, SINGLE EPISODE, UNSPECIFIED: ICD-10-CM

## 2020-03-02 DIAGNOSIS — I12.9 HYPERTENSIVE CHRONIC KIDNEY DISEASE WITH STAGE 1 THROUGH STAGE 4 CHRONIC KIDNEY DISEASE, OR UNSPECIFIED CHRONIC KIDNEY DISEASE: ICD-10-CM

## 2020-03-05 ENCOUNTER — OUTPATIENT (OUTPATIENT)
Dept: OUTPATIENT SERVICES | Facility: HOSPITAL | Age: 80
LOS: 1 days | Discharge: HOME | End: 2020-03-05
Payer: MEDICARE

## 2020-03-05 DIAGNOSIS — Z90.49 ACQUIRED ABSENCE OF OTHER SPECIFIED PARTS OF DIGESTIVE TRACT: Chronic | ICD-10-CM

## 2020-03-05 DIAGNOSIS — Z98.890 OTHER SPECIFIED POSTPROCEDURAL STATES: Chronic | ICD-10-CM

## 2020-03-05 DIAGNOSIS — Z96.651 PRESENCE OF RIGHT ARTIFICIAL KNEE JOINT: Chronic | ICD-10-CM

## 2020-03-05 DIAGNOSIS — Z12.31 ENCOUNTER FOR SCREENING MAMMOGRAM FOR MALIGNANT NEOPLASM OF BREAST: ICD-10-CM

## 2020-03-05 PROCEDURE — 77067 SCR MAMMO BI INCL CAD: CPT | Mod: 26

## 2020-03-18 ENCOUNTER — APPOINTMENT (OUTPATIENT)
Dept: CARDIOLOGY | Facility: CLINIC | Age: 80
End: 2020-03-18

## 2020-04-09 NOTE — PATIENT PROFILE ADULT - NSTRANSFERBELONGINGSDISPO_GEN_A_NUR
I originally set this up yesterday to do a TCM call but we have now decided to pursue a hospice referral with [facility] so I do not feel that a TCM visit right now will be appropriate/helpful.    I just called Alix to talk to her about this and she is fine with how things are proceeding.  If she needs me, she can call and we will figure out how to set up a visit somehow.  
with patient

## 2020-04-27 NOTE — PATIENT PROFILE ADULT. - LIMITATIONS ON VISITORS/PHONE CALLS
[TextBox_4] : Known history of sleep apnea\par Last sleep study 2014 severe LUZMA\par Last office visit 2018 with documented compliance with CPAP\par Patient reports CPAP machine becoming less functional turning off during the night requiring multiple restarts.\par Notes no significant weight change. CPAP continues to be effective when used [Never] : never none

## 2020-09-15 ENCOUNTER — RESULT CHARGE (OUTPATIENT)
Age: 80
End: 2020-09-15

## 2020-09-15 ENCOUNTER — APPOINTMENT (OUTPATIENT)
Dept: CARDIOLOGY | Facility: CLINIC | Age: 80
End: 2020-09-15
Payer: MEDICARE

## 2020-09-15 VITALS — DIASTOLIC BLOOD PRESSURE: 80 MMHG | HEART RATE: 70 BPM | RESPIRATION RATE: 18 BRPM | SYSTOLIC BLOOD PRESSURE: 126 MMHG

## 2020-09-15 VITALS — BODY MASS INDEX: 35.34 KG/M2 | HEIGHT: 60 IN | WEIGHT: 180 LBS | TEMPERATURE: 97 F

## 2020-09-15 DIAGNOSIS — I65.23 OCCLUSION AND STENOSIS OF BILATERAL CAROTID ARTERIES: ICD-10-CM

## 2020-09-15 PROCEDURE — 93000 ELECTROCARDIOGRAM COMPLETE: CPT

## 2020-09-15 PROCEDURE — 99214 OFFICE O/P EST MOD 30 MIN: CPT

## 2020-09-15 NOTE — PHYSICAL EXAM
[Normal Appearance] : normal appearance [General Appearance - Well Developed] : well developed [General Appearance - Well Nourished] : well nourished [Well Groomed] : well groomed [No Deformities] : no deformities [General Appearance - In No Acute Distress] : no acute distress [Normal Conjunctiva] : the conjunctiva exhibited no abnormalities [Eyelids - No Xanthelasma] : the eyelids demonstrated no xanthelasmas [Normal Oral Mucosa] : normal oral mucosa [No Oral Pallor] : no oral pallor [No Oral Cyanosis] : no oral cyanosis [Normal Jugular Venous A Waves Present] : normal jugular venous A waves present [Normal Jugular Venous V Waves Present] : normal jugular venous V waves present [No Jugular Venous Lim A Waves] : no jugular venous lim A waves [Heart Rate And Rhythm] : heart rate and rhythm were normal [Heart Sounds] : normal S1 and S2 [Murmurs] : no murmurs present [Respiration, Rhythm And Depth] : normal respiratory rhythm and effort [Exaggerated Use Of Accessory Muscles For Inspiration] : no accessory muscle use [Auscultation Breath Sounds / Voice Sounds] : lungs were clear to auscultation bilaterally [Bowel Sounds] : normal bowel sounds [Abdomen Soft] : soft [Abdomen Tenderness] : non-tender [Abdomen Mass (___ Cm)] : no abdominal mass palpated [FreeTextEntry1] : wheelchair  [Nail Clubbing] : no clubbing of the fingernails [Cyanosis, Localized] : no localized cyanosis [Petechial Hemorrhages (___cm)] : no petechial hemorrhages [Skin Color & Pigmentation] : normal skin color and pigmentation [] : no rash [No Venous Stasis] : no venous stasis [Skin Lesions] : no skin lesions [No Skin Ulcers] : no skin ulcer [No Xanthoma] : no  xanthoma was observed [Oriented To Time, Place, And Person] : oriented to person, place, and time

## 2020-09-25 ENCOUNTER — RECORD ABSTRACTING (OUTPATIENT)
Age: 80
End: 2020-09-25

## 2020-09-25 DIAGNOSIS — Z86.79 PERSONAL HISTORY OF OTHER DISEASES OF THE CIRCULATORY SYSTEM: ICD-10-CM

## 2020-09-25 DIAGNOSIS — Z87.898 PERSONAL HISTORY OF OTHER SPECIFIED CONDITIONS: ICD-10-CM

## 2020-11-09 ENCOUNTER — APPOINTMENT (OUTPATIENT)
Dept: CARDIOLOGY | Facility: CLINIC | Age: 80
End: 2020-11-09
Payer: MEDICARE

## 2020-11-09 VITALS
BODY MASS INDEX: 36.32 KG/M2 | HEART RATE: 85 BPM | TEMPERATURE: 98.5 F | WEIGHT: 185 LBS | SYSTOLIC BLOOD PRESSURE: 140 MMHG | DIASTOLIC BLOOD PRESSURE: 90 MMHG | HEIGHT: 60 IN

## 2020-11-09 PROCEDURE — 93280 PM DEVICE PROGR EVAL DUAL: CPT

## 2020-11-09 PROCEDURE — 99213 OFFICE O/P EST LOW 20 MIN: CPT

## 2020-11-09 PROCEDURE — 93000 ELECTROCARDIOGRAM COMPLETE: CPT | Mod: 59

## 2020-11-09 RX ORDER — INSULIN LISPRO 100 [IU]/ML
100 INJECTION, SOLUTION INTRAVENOUS; SUBCUTANEOUS
Refills: 0 | Status: DISCONTINUED | COMMUNITY
End: 2020-11-09

## 2020-11-09 RX ORDER — QUETIAPINE 25 MG/1
25 TABLET, FILM COATED ORAL
Refills: 0 | Status: DISCONTINUED | COMMUNITY
End: 2020-11-09

## 2020-11-09 RX ORDER — GABAPENTIN 100 MG
100 TABLET ORAL 3 TIMES DAILY
Refills: 0 | Status: DISCONTINUED | COMMUNITY
End: 2020-11-09

## 2020-11-09 RX ORDER — INSULIN DETEMIR 100 [IU]/ML
100 INJECTION, SOLUTION SUBCUTANEOUS
Refills: 0 | Status: DISCONTINUED | COMMUNITY
End: 2020-11-09

## 2020-11-09 RX ORDER — QUETIAPINE FUMARATE 25 MG/1
25 TABLET ORAL 3 TIMES DAILY
Refills: 0 | Status: DISCONTINUED | COMMUNITY
End: 2020-11-09

## 2020-11-09 NOTE — ASSESSMENT
[FreeTextEntry1] : 81 yo F with history of Mobtiz 2 s/p DC PPM, CVA, HTN here for follow up visit. \par \par Mobitz 2 s/p DC PPM\par - Interrogation as above. No arrhythmias. Normal functioning PPM. \par - Will enroll patient in remote monitor\par \par HTN\par - BP slightly elevated\par - 2g Na diet enforced\par \par Follow up in 9 months\par \par I have also advised the patient to go to the nearest emergency room if she experiences any chest pain, dyspnea, syncope, or has any other compelling symptoms.\par

## 2020-11-09 NOTE — PROCEDURE
[No] : not [NSR] : normal sinus rhythm [See Device Printout] : See device printout [Pacemaker] : pacemaker [DDD] : DDD [Longevity: ___ months] : The estimated remaining battery life is [unfilled] months [Threshold Testing Performed] : Threshold testing was performed [Lead Imp:  ___ohms] : lead impedance was [unfilled] ohms [Sensing Amplitude ___mv] : sensing amplitude was [unfilled] mv [___V @] : [unfilled] V [___ ms] : [unfilled] ms [Programmed for Longevity] : output reprogrammed for improved battery longevity [Pace ___ %] : Pace [unfilled]% [de-identified] : [facility]  [de-identified] : L311 [de-identified] : [id number] [de-identified] : 7/29/2019 [de-identified] : 59 [de-identified] : Normal device function. \par No events noted.

## 2020-11-09 NOTE — HISTORY OF PRESENT ILLNESS
[de-identified] : \par Cardiologist: Dr. Norwood\par \par 79 yo F with history of HTN, HL, DM, CVA (left sided weakness), Mobitz 2 s/p DC PPM. She denies any cardiovascular complaints including chest pain, dyspnea, palpitations, dizziness, lightheadedness, presyncope or syncope.\par

## 2020-11-09 NOTE — PHYSICAL EXAM
[Normal Appearance] : normal appearance [Well Groomed] : well groomed [No Deformities] : no deformities [General Appearance - In No Acute Distress] : no acute distress [FreeTextEntry1] : sitting in a wheelchair [Heart Rate And Rhythm] : heart rate and rhythm were normal [Heart Sounds] : normal S1 and S2 [] : no respiratory distress [Respiration, Rhythm And Depth] : normal respiratory rhythm and effort [Exaggerated Use Of Accessory Muscles For Inspiration] : no accessory muscle use [Auscultation Breath Sounds / Voice Sounds] : lungs were clear to auscultation bilaterally [Left Infraclavicular] : left infraclavicular area [Well-Healed] : well-healed [Abdomen Soft] : soft [Nail Clubbing] : no clubbing of the fingernails

## 2021-01-20 NOTE — DIETITIAN INITIAL EVALUATION ADULT. - AGE
(induced) termination of pregnancy with unspecified complications    H/O bariatric surgery  Gastric sleeve 2018  History of D&C    History of herpes labialis  Valtrex 500mg po bid  Status post hysteroscopic myomectomy     79

## 2021-03-09 NOTE — PATIENT PROFILE ADULT - BRADEN SCORE
Anxiety    CAD (coronary artery disease)    Herniated Disc    Herniated lumbar intervertebral disc    Hypercholesteremia    Migraine  2X weekly  
16

## 2021-03-16 ENCOUNTER — APPOINTMENT (OUTPATIENT)
Dept: CARDIOLOGY | Facility: CLINIC | Age: 81
End: 2021-03-16
Payer: MEDICARE

## 2021-03-16 VITALS — RESPIRATION RATE: 18 BRPM | DIASTOLIC BLOOD PRESSURE: 80 MMHG | SYSTOLIC BLOOD PRESSURE: 120 MMHG | HEART RATE: 72 BPM

## 2021-03-16 VITALS — WEIGHT: 185 LBS | TEMPERATURE: 96.2 F | HEIGHT: 60 IN | BODY MASS INDEX: 36.32 KG/M2

## 2021-03-16 PROCEDURE — 93000 ELECTROCARDIOGRAM COMPLETE: CPT

## 2021-03-16 PROCEDURE — 99214 OFFICE O/P EST MOD 30 MIN: CPT

## 2021-03-16 NOTE — PHYSICAL EXAM
[General Appearance - Well Developed] : well developed [Normal Appearance] : normal appearance [Well Groomed] : well groomed [General Appearance - Well Nourished] : well nourished [No Deformities] : no deformities [General Appearance - In No Acute Distress] : no acute distress [Normal Conjunctiva] : the conjunctiva exhibited no abnormalities [Eyelids - No Xanthelasma] : the eyelids demonstrated no xanthelasmas [Normal Oral Mucosa] : normal oral mucosa [No Oral Pallor] : no oral pallor [No Oral Cyanosis] : no oral cyanosis [Normal Jugular Venous A Waves Present] : normal jugular venous A waves present [Normal Jugular Venous V Waves Present] : normal jugular venous V waves present [No Jugular Venous Lim A Waves] : no jugular venous lim A waves [Heart Rate And Rhythm] : heart rate and rhythm were normal [Heart Sounds] : normal S1 and S2 [Murmurs] : no murmurs present [Respiration, Rhythm And Depth] : normal respiratory rhythm and effort [Exaggerated Use Of Accessory Muscles For Inspiration] : no accessory muscle use [Auscultation Breath Sounds / Voice Sounds] : lungs were clear to auscultation bilaterally [Bowel Sounds] : normal bowel sounds [Abdomen Soft] : soft [Abdomen Tenderness] : non-tender [Abdomen Mass (___ Cm)] : no abdominal mass palpated [FreeTextEntry1] : wheelchair  [Nail Clubbing] : no clubbing of the fingernails [Cyanosis, Localized] : no localized cyanosis [Petechial Hemorrhages (___cm)] : no petechial hemorrhages [Skin Color & Pigmentation] : normal skin color and pigmentation [] : no rash [No Venous Stasis] : no venous stasis [Skin Lesions] : no skin lesions [No Skin Ulcers] : no skin ulcer [No Xanthoma] : no  xanthoma was observed [Oriented To Time, Place, And Person] : oriented to person, place, and time

## 2021-04-01 ENCOUNTER — NON-APPOINTMENT (OUTPATIENT)
Age: 81
End: 2021-04-01

## 2021-04-01 ENCOUNTER — APPOINTMENT (OUTPATIENT)
Dept: CARDIOLOGY | Facility: CLINIC | Age: 81
End: 2021-04-01
Payer: MEDICARE

## 2021-04-01 PROCEDURE — 93294 REM INTERROG EVL PM/LDLS PM: CPT

## 2021-04-01 PROCEDURE — 93296 REM INTERROG EVL PM/IDS: CPT

## 2021-04-09 ENCOUNTER — EMERGENCY (EMERGENCY)
Facility: HOSPITAL | Age: 81
LOS: 0 days | Discharge: HOME | End: 2021-04-09
Attending: STUDENT IN AN ORGANIZED HEALTH CARE EDUCATION/TRAINING PROGRAM | Admitting: STUDENT IN AN ORGANIZED HEALTH CARE EDUCATION/TRAINING PROGRAM
Payer: MEDICARE

## 2021-04-09 VITALS
OXYGEN SATURATION: 96 % | SYSTOLIC BLOOD PRESSURE: 223 MMHG | RESPIRATION RATE: 18 BRPM | TEMPERATURE: 98 F | HEIGHT: 60 IN | DIASTOLIC BLOOD PRESSURE: 129 MMHG | WEIGHT: 279.99 LBS | HEART RATE: 93 BPM

## 2021-04-09 DIAGNOSIS — Z98.890 OTHER SPECIFIED POSTPROCEDURAL STATES: Chronic | ICD-10-CM

## 2021-04-09 DIAGNOSIS — Z88.2 ALLERGY STATUS TO SULFONAMIDES: ICD-10-CM

## 2021-04-09 DIAGNOSIS — I69.934 MONOPLEGIA OF UPPER LIMB FOLLOWING UNSPECIFIED CEREBROVASCULAR DISEASE AFFECTING LEFT NON-DOMINANT SIDE: ICD-10-CM

## 2021-04-09 DIAGNOSIS — Z88.0 ALLERGY STATUS TO PENICILLIN: ICD-10-CM

## 2021-04-09 DIAGNOSIS — N18.30 CHRONIC KIDNEY DISEASE, STAGE 3 UNSPECIFIED: ICD-10-CM

## 2021-04-09 DIAGNOSIS — Z79.4 LONG TERM (CURRENT) USE OF INSULIN: ICD-10-CM

## 2021-04-09 DIAGNOSIS — Z96.651 PRESENCE OF RIGHT ARTIFICIAL KNEE JOINT: Chronic | ICD-10-CM

## 2021-04-09 DIAGNOSIS — Z95.0 PRESENCE OF CARDIAC PACEMAKER: ICD-10-CM
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
## 2021-04-09 DIAGNOSIS — Z85.3 PERSONAL HISTORY OF MALIGNANT NEOPLASM OF BREAST: ICD-10-CM

## 2021-04-09 DIAGNOSIS — N18.32 CHRONIC KIDNEY DISEASE, STAGE 3B: ICD-10-CM

## 2021-04-09 DIAGNOSIS — E78.5 HYPERLIPIDEMIA, UNSPECIFIED: ICD-10-CM

## 2021-04-09 DIAGNOSIS — Z91.018 ALLERGY TO OTHER FOODS: ICD-10-CM

## 2021-04-09 DIAGNOSIS — S00.03XA CONTUSION OF SCALP, INITIAL ENCOUNTER: ICD-10-CM

## 2021-04-09 DIAGNOSIS — S09.90XA UNSPECIFIED INJURY OF HEAD, INITIAL ENCOUNTER: ICD-10-CM

## 2021-04-09 DIAGNOSIS — Z91.013 ALLERGY TO SEAFOOD: ICD-10-CM
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
## 2021-04-09 DIAGNOSIS — Z91.040 LATEX ALLERGY STATUS: ICD-10-CM

## 2021-04-09 DIAGNOSIS — Z79.899 OTHER LONG TERM (CURRENT) DRUG THERAPY: ICD-10-CM

## 2021-04-09 DIAGNOSIS — I12.9 HYPERTENSIVE CHRONIC KIDNEY DISEASE WITH STAGE 1 THROUGH STAGE 4 CHRONIC KIDNEY DISEASE, OR UNSPECIFIED CHRONIC KIDNEY DISEASE: ICD-10-CM

## 2021-04-09 DIAGNOSIS — Z90.49 ACQUIRED ABSENCE OF OTHER SPECIFIED PARTS OF DIGESTIVE TRACT: Chronic | ICD-10-CM

## 2021-04-09 DIAGNOSIS — E11.22 TYPE 2 DIABETES MELLITUS WITH DIABETIC CHRONIC KIDNEY DISEASE: ICD-10-CM

## 2021-04-09 PROCEDURE — 99284 EMERGENCY DEPT VISIT MOD MDM: CPT | Mod: GC

## 2021-04-09 PROCEDURE — 72125 CT NECK SPINE W/O DYE: CPT | Mod: 26

## 2021-04-09 PROCEDURE — 70450 CT HEAD/BRAIN W/O DYE: CPT | Mod: 26

## 2021-04-09 NOTE — ED PROVIDER NOTE - PATIENT PORTAL LINK FT
You can access the FollowMyHealth Patient Portal offered by [facility] by registering at the following website: [email or web address]. By joining [employer]’s FollowMyHealth portal, you will also be able to view your health information using other applications (apps) compatible with our system.

## 2021-04-09 NOTE — ED PROVIDER NOTE - CLINICAL SUMMARY MEDICAL DECISION MAKING FREE TEXT BOX
81F presents to [facility] for eval for mechanical fall; she was being transferred out of the onel lift into her wheelchair and fell backwards, hitting her head on the way down. Denies LOC and has been mentating baseline; denies headache, n/v, visual changes. Gen - NAD, Head - NC, mild ttp and edema to occiput, Pharynx - clear, MMM, Heart - RRR, no m/g/r, Lungs - CTAB, no w/c/r, Abdomen - soft, NT, ND, Skin - No rash, Extremities - FROM, no edema, erythema, ecchymosis, brisk cap refill, Neuro - CN 2-12 intact, nl strength and sensation. HCT CT C-spine with no acute injuries and pt dc with aid. I have fully discussed the medical management and delivery of care with the patient. I have discussed any available labs, imaging and treatment options with the patient. All Questions answered at the bedside and printed copies of all results provided and recommended to review with PCP. Patient confirms understanding and has been given detailed return precautions. Patient instructed to return to the ED should symptoms persist or worsen. Patient has demonstrated capacity and has verbalized understanding. Patient is well appearing upon discharge.

## 2021-04-09 NOTE — ED PROVIDER NOTE - NSFOLLOWUPINSTRUCTIONS_ED_ALL_ED_FT
Contusion    A contusion is a deep bruise. Contusions are the result of a blunt injury to tissues and muscle fibers under the skin. The injury causes bleeding under the skin. The skin overlying the contusion may turn blue, purple, or yellow. Minor injuries will give you a painless contusion, but more severe contusions may stay painful and swollen for a few weeks.     CAUSES  This condition is usually caused by a blow, trauma, or direct force to an area of the body.    SYMPTOMS  Symptoms of this condition include:    Swelling of the injured area.  Pain and tenderness in the injured area.  Discoloration. The area may have redness and then turn blue, purple, or yellow.    DIAGNOSIS  This condition is diagnosed based on a physical exam and medical history. An X-ray, CT scan, or MRI may be needed to determine if there are any associated injuries, such as broken bones (fractures).    TREATMENT  Specific treatment for this condition depends on what area of the body was injured. In general, the best treatment for a contusion is resting, icing, applying pressure to (compression), and elevating the injured area. This is often called the RICE strategy. Over-the-counter anti-inflammatory medicines may also be recommended for pain control.     HOME CARE INSTRUCTIONS  Rest the injured area.   If directed, apply ice to the injured area:  Put ice in a plastic bag.  Place a towel between your skin and the bag.  Leave the ice on for 20 minutes, 2–3 times per day.  If directed, apply light compression to the injured area using an elastic bandage. Make sure the bandage is not wrapped too tightly. Remove and reapply the bandage as directed by your health care provider.  If possible, raise (elevate) the injured area above the level of your heart while you are sitting or lying down.   Take over-the-counter and prescription medicines only as told by your health care provider.    SEEK MEDICAL CARE IF:  Your symptoms do not improve after several days of treatment.  Your symptoms get worse.   You have difficulty moving the injured area.    SEEK IMMEDIATE MEDICAL CARE IF:  You have severe pain.  You have numbness in a hand or foot.   Your hand or foot turns pale or cold.    ADDITIONAL NOTES AND INSTRUCTIONS    Please follow up with your Primary MD in 24-48 hr.  Seek immediate medical care for any new/worsening signs or symptoms.

## 2021-04-09 NOTE — ED PROVIDER NOTE - OBJECTIVE STATEMENT
81y F w/ PMH of DM II, HLD, HTN, CVA with residual LUE weakness, Breast CA in remission, CAD, s/p PPM, CKD 3b/4, and Hypothyroidism presents with mechanical fall from wheelchair. Fell backwards. Hit head. No LOC. Complaining of headache in the occipital region. No other injuries. Denies fever, chills, CP, SOB, abd pain, n/v/d, or numbness/tingling.

## 2021-04-09 NOTE — ED PROVIDER NOTE - CARE PROVIDER_API CALL
Ariel Macias  INTERNAL MEDICINE  [address] [place] [address]  Phone: [phone]  Fax: [phone]  Established Patient  Follow Up Time: 1-3 Days

## 2021-04-09 NOTE — ED PROVIDER NOTE - PHYSICAL EXAMINATION
CONSTITUTIONAL: Well-developed; well-nourished; in no acute distress.   SKIN: warm, dry  HEAD: Normocephalic; Mild edema to the occipital scalp.   EYES: PERRL, EOMI, no conjunctival erythema  ENT: No nasal discharge; airway clear.  NECK: Supple; non tender.  CARD: S1, S2 normal; no murmurs, gallops, or rubs. Regular rate and rhythm.   RESP: No wheezes, rales or rhonchi.  ABD: soft ntnd  EXT: Normal ROM.  No clubbing, cyanosis or edema.   LYMPH: No acute cervical adenopathy.  NEURO: Alert, oriented, grossly unremarkable  PSYCH: Cooperative, appropriate.

## 2021-04-09 NOTE — ED PROVIDER NOTE - NS ED ROS FT
Eyes:  No visual changes, eye pain or discharge.  ENMT:  No hearing changes, pain, no sore throat or runny nose, no difficulty swallowing  Cardiac:  No chest pain, SOB or edema. No chest pain with exertion.  Respiratory:  No cough or respiratory distress. No hemoptysis. No history of asthma or RAD.  GI:  No nausea, vomiting, diarrhea or abdominal pain.  :  No dysuria, frequency or burning.  MS:  No myalgia, muscle weakness, joint pain or back pain.  Neuro:  No weakness.  No LOC. + headache  Skin:  No skin rash.   Endocrine:  + history of thyroid disease, diabetes.

## 2021-04-30 ENCOUNTER — EMERGENCY (EMERGENCY)
Facility: HOSPITAL | Age: 81
LOS: 0 days | Discharge: HOME | End: 2021-04-30
Attending: EMERGENCY MEDICINE | Admitting: EMERGENCY MEDICINE
Payer: MEDICARE

## 2021-04-30 VITALS
OXYGEN SATURATION: 94 % | HEIGHT: 60 IN | SYSTOLIC BLOOD PRESSURE: 144 MMHG | TEMPERATURE: 96 F | DIASTOLIC BLOOD PRESSURE: 83 MMHG | RESPIRATION RATE: 20 BRPM | WEIGHT: 199.96 LBS | HEART RATE: 97 BPM

## 2021-04-30 VITALS — OXYGEN SATURATION: 95 %

## 2021-04-30 DIAGNOSIS — I10 ESSENTIAL (PRIMARY) HYPERTENSION: ICD-10-CM

## 2021-04-30 DIAGNOSIS — F32.9 MAJOR DEPRESSIVE DISORDER, SINGLE EPISODE, UNSPECIFIED: ICD-10-CM

## 2021-04-30 DIAGNOSIS — Z91.040 LATEX ALLERGY STATUS: ICD-10-CM

## 2021-04-30 DIAGNOSIS — I69.334 MONOPLEGIA OF UPPER LIMB FOLLOWING CEREBRAL INFARCTION AFFECTING LEFT NON-DOMINANT SIDE: ICD-10-CM

## 2021-04-30 DIAGNOSIS — Z88.2 ALLERGY STATUS TO SULFONAMIDES: ICD-10-CM

## 2021-04-30 DIAGNOSIS — Z79.899 OTHER LONG TERM (CURRENT) DRUG THERAPY: ICD-10-CM

## 2021-04-30 DIAGNOSIS — Z85.3 PERSONAL HISTORY OF MALIGNANT NEOPLASM OF BREAST: ICD-10-CM

## 2021-04-30 DIAGNOSIS — Z96.651 PRESENCE OF RIGHT ARTIFICIAL KNEE JOINT: Chronic | ICD-10-CM

## 2021-04-30 DIAGNOSIS — Z79.01 LONG TERM (CURRENT) USE OF ANTICOAGULANTS: ICD-10-CM

## 2021-04-30 DIAGNOSIS — Z91.013 ALLERGY TO SEAFOOD: ICD-10-CM

## 2021-04-30 DIAGNOSIS — R09.02 HYPOXEMIA: ICD-10-CM

## 2021-04-30 DIAGNOSIS — Z20.822 CONTACT WITH AND (SUSPECTED) EXPOSURE TO COVID-19: ICD-10-CM

## 2021-04-30 DIAGNOSIS — Z91.018 ALLERGY TO OTHER FOODS: ICD-10-CM

## 2021-04-30 DIAGNOSIS — E11.9 TYPE 2 DIABETES MELLITUS WITHOUT COMPLICATIONS: ICD-10-CM

## 2021-04-30 DIAGNOSIS — Z88.0 ALLERGY STATUS TO PENICILLIN: ICD-10-CM

## 2021-04-30 DIAGNOSIS — E78.5 HYPERLIPIDEMIA, UNSPECIFIED: ICD-10-CM

## 2021-04-30 DIAGNOSIS — Z90.49 ACQUIRED ABSENCE OF OTHER SPECIFIED PARTS OF DIGESTIVE TRACT: Chronic | ICD-10-CM

## 2021-04-30 DIAGNOSIS — Z79.4 LONG TERM (CURRENT) USE OF INSULIN: ICD-10-CM

## 2021-04-30 DIAGNOSIS — Z98.890 OTHER SPECIFIED POSTPROCEDURAL STATES: Chronic | ICD-10-CM

## 2021-04-30 LAB
ALBUMIN SERPL ELPH-MCNC: 3.8 G/DL — SIGNIFICANT CHANGE UP (ref 3.5–5.2)
ALP SERPL-CCNC: 163 U/L — HIGH (ref 30–115)
ALT FLD-CCNC: 11 U/L — SIGNIFICANT CHANGE UP (ref 0–41)
ANION GAP SERPL CALC-SCNC: 10 MMOL/L — SIGNIFICANT CHANGE UP (ref 7–14)
AST SERPL-CCNC: 16 U/L — SIGNIFICANT CHANGE UP (ref 0–41)
BASE EXCESS BLDV CALC-SCNC: 9.8 MMOL/L — HIGH (ref -2–2)
BASOPHILS # BLD AUTO: 0.04 K/UL — SIGNIFICANT CHANGE UP (ref 0–0.2)
BASOPHILS NFR BLD AUTO: 0.5 % — SIGNIFICANT CHANGE UP (ref 0–1)
BILIRUB SERPL-MCNC: 0.3 MG/DL — SIGNIFICANT CHANGE UP (ref 0.2–1.2)
BUN SERPL-MCNC: 15 MG/DL — SIGNIFICANT CHANGE UP (ref 10–20)
CA-I SERPL-SCNC: 1.2 MMOL/L — SIGNIFICANT CHANGE UP (ref 1.12–1.3)
CALCIUM SERPL-MCNC: 9.2 MG/DL — SIGNIFICANT CHANGE UP (ref 8.5–10.1)
CHLORIDE SERPL-SCNC: 100 MMOL/L — SIGNIFICANT CHANGE UP (ref 98–110)
CO2 SERPL-SCNC: 34 MMOL/L — HIGH (ref 17–32)
CREAT SERPL-MCNC: 1.2 MG/DL — SIGNIFICANT CHANGE UP (ref 0.7–1.5)
EOSINOPHIL # BLD AUTO: 0.24 K/UL — SIGNIFICANT CHANGE UP (ref 0–0.7)
EOSINOPHIL NFR BLD AUTO: 3.2 % — SIGNIFICANT CHANGE UP (ref 0–8)
GAS PNL BLDV: 144 MMOL/L — SIGNIFICANT CHANGE UP (ref 136–145)
GAS PNL BLDV: SIGNIFICANT CHANGE UP
GLUCOSE SERPL-MCNC: 246 MG/DL — HIGH (ref 70–99)
HCO3 BLDV-SCNC: 37 MMOL/L — HIGH (ref 22–29)
HCT VFR BLD CALC: 38.9 % — SIGNIFICANT CHANGE UP (ref 37–47)
HCT VFR BLDA CALC: 40.1 % — SIGNIFICANT CHANGE UP (ref 34–44)
HGB BLD CALC-MCNC: 13.1 G/DL — LOW (ref 14–18)
HGB BLD-MCNC: 12.2 G/DL — SIGNIFICANT CHANGE UP (ref 12–16)
HOROWITZ INDEX BLDV+IHG-RTO: 21 — SIGNIFICANT CHANGE UP
IMM GRANULOCYTES NFR BLD AUTO: 0.3 % — SIGNIFICANT CHANGE UP (ref 0.1–0.3)
LACTATE BLDV-MCNC: 1.4 MMOL/L — SIGNIFICANT CHANGE UP (ref 0.5–1.6)
LYMPHOCYTES # BLD AUTO: 1.34 K/UL — SIGNIFICANT CHANGE UP (ref 1.2–3.4)
LYMPHOCYTES # BLD AUTO: 18 % — LOW (ref 20.5–51.1)
MCHC RBC-ENTMCNC: 29.7 PG — SIGNIFICANT CHANGE UP (ref 27–31)
MCHC RBC-ENTMCNC: 31.4 G/DL — LOW (ref 32–37)
MCV RBC AUTO: 94.6 FL — SIGNIFICANT CHANGE UP (ref 81–99)
MONOCYTES # BLD AUTO: 0.7 K/UL — HIGH (ref 0.1–0.6)
MONOCYTES NFR BLD AUTO: 9.4 % — HIGH (ref 1.7–9.3)
NEUTROPHILS # BLD AUTO: 5.1 K/UL — SIGNIFICANT CHANGE UP (ref 1.4–6.5)
NEUTROPHILS NFR BLD AUTO: 68.6 % — SIGNIFICANT CHANGE UP (ref 42.2–75.2)
NRBC # BLD: 0 /100 WBCS — SIGNIFICANT CHANGE UP (ref 0–0)
NT-PROBNP SERPL-SCNC: 781 PG/ML — HIGH (ref 0–300)
PCO2 BLDV: 64 MMHG — HIGH (ref 41–51)
PH BLDV: 7.38 — SIGNIFICANT CHANGE UP (ref 7.26–7.43)
PLATELET # BLD AUTO: 251 K/UL — SIGNIFICANT CHANGE UP (ref 130–400)
PO2 BLDV: 37 MMHG — SIGNIFICANT CHANGE UP (ref 20–40)
POTASSIUM BLDV-SCNC: 3.3 MMOL/L — SIGNIFICANT CHANGE UP (ref 3.3–5.6)
POTASSIUM SERPL-MCNC: 3.5 MMOL/L — SIGNIFICANT CHANGE UP (ref 3.5–5)
POTASSIUM SERPL-SCNC: 3.5 MMOL/L — SIGNIFICANT CHANGE UP (ref 3.5–5)
PROT SERPL-MCNC: 7.1 G/DL — SIGNIFICANT CHANGE UP (ref 6–8)
RAPID RVP RESULT: SIGNIFICANT CHANGE UP
RBC # BLD: 4.11 M/UL — LOW (ref 4.2–5.4)
RBC # FLD: 13.5 % — SIGNIFICANT CHANGE UP (ref 11.5–14.5)
SAO2 % BLDV: 70 % — SIGNIFICANT CHANGE UP
SARS-COV-2 RNA SPEC QL NAA+PROBE: SIGNIFICANT CHANGE UP
SODIUM SERPL-SCNC: 144 MMOL/L — SIGNIFICANT CHANGE UP (ref 135–146)
TROPONIN T SERPL-MCNC: <0.01 NG/ML — SIGNIFICANT CHANGE UP
WBC # BLD: 7.44 K/UL — SIGNIFICANT CHANGE UP (ref 4.8–10.8)
WBC # FLD AUTO: 7.44 K/UL — SIGNIFICANT CHANGE UP (ref 4.8–10.8)

## 2021-04-30 PROCEDURE — 71045 X-RAY EXAM CHEST 1 VIEW: CPT | Mod: 26

## 2021-04-30 PROCEDURE — 99285 EMERGENCY DEPT VISIT HI MDM: CPT

## 2021-04-30 RX ORDER — SODIUM CHLORIDE 9 MG/ML
1000 INJECTION, SOLUTION INTRAVENOUS ONCE
Refills: 0 | Status: COMPLETED | OUTPATIENT
Start: 2021-04-30 | End: 2021-04-30

## 2021-04-30 RX ORDER — MAGNESIUM SULFATE 500 MG/ML
2 VIAL (ML) INJECTION ONCE
Refills: 0 | Status: COMPLETED | OUTPATIENT
Start: 2021-04-30 | End: 2021-04-30

## 2021-04-30 RX ORDER — ALBUTEROL 90 UG/1
1 AEROSOL, METERED ORAL ONCE
Refills: 0 | Status: COMPLETED | OUTPATIENT
Start: 2021-04-30 | End: 2021-04-30

## 2021-04-30 RX ADMIN — ALBUTEROL 1 PUFF(S): 90 AEROSOL, METERED ORAL at 13:23

## 2021-04-30 RX ADMIN — SODIUM CHLORIDE 1000 MILLILITER(S): 9 INJECTION, SOLUTION INTRAVENOUS at 14:01

## 2021-04-30 RX ADMIN — Medication 50 GRAM(S): at 13:24

## 2021-04-30 RX ADMIN — SODIUM CHLORIDE 1000 MILLILITER(S): 9 INJECTION, SOLUTION INTRAVENOUS at 13:23

## 2021-04-30 NOTE — ED PROVIDER NOTE - PATIENT PORTAL LINK FT
You can access the FollowMyHealth Patient Portal offered by [facility] by registering at the following website: [email or web address]. By joining [employer]’s FollowMyHealth portal, you will also be able to view your health information using other applications (apps) compatible with our system.

## 2021-04-30 NOTE — ED ADULT NURSE NOTE - NSIMPLEMENTINTERV_GEN_ALL_ED
Implemented All Fall with Harm Risk Interventions:  [place] to call system. Call bell, personal items and telephone within reach. Instruct patient to call for assistance. Room bathroom lighting operational. Non-slip footwear when patient is off stretcher. Physically safe environment: no spills, clutter or unnecessary equipment. Stretcher in lowest position, wheels locked, appropriate side rails in place. Provide visual cue, wrist band, yellow gown, etc. Monitor gait and stability. Monitor for mental status changes and reorient to person, place, and time. Review medications for side effects contributing to fall risk. Reinforce activity limits and safety measures with patient and family. Provide visual clues: red socks.

## 2021-04-30 NOTE — ED ADULT TRIAGE NOTE - CHIEF COMPLAINT QUOTE
aide states PT came and checked oxygen level and it was low. pt denies complaints. as per aide, family concerned pt has uti

## 2021-04-30 NOTE — ED ADULT NURSE NOTE - NS_NURSE_DISC_TEACHING_YN_ED_ALL_ED
no midline spine tenderness, no paraspinal tenderness, no erythema, no edema, no obvious deformity b/l.
Yes

## 2021-04-30 NOTE — ED PROVIDER NOTE - ATTENDING CONTRIBUTION TO CARE
Pt here for fatigue and incidental noting of hypoxia at rest prior to PT initiation.  No other symptoms - no cough, no SOB.    Exam: RRR, CTAB, soft NT abdomen, no calf tenderness, NAD  Plan: labs, xr, ekg

## 2021-04-30 NOTE — ED PROVIDER NOTE - OBJECTIVE STATEMENT
81 year old female w hx of DM2, HTN, HLD, breast CA in remission, CVA with residual LUE weakness presents to the ED for evaluation of a low O2 sat at home. Pt was getting ready for at home physical therapy when the PT checked routine vitals before starting, patient found to have O2 sat of 88% on RA. She denies fevers/chills, URI sx, cough, sob, wheezing, palpitations, syncope, diaphoresis, n/v, abd pain, leg pain/swelling. denies prior hx of lung disease or need for home O2.

## 2021-04-30 NOTE — ED PROVIDER NOTE - PHYSICAL EXAMINATION
VITALS:  I have reviewed the initial vital signs.  GENERAL: Well-developed, well-nourished, in no acute distress. Nontoxic. Aid at bedside.  HEENT: Sclera clear. EOMI, PERRLA. Mucous membranes moist.  NECK: supple w FROM. No JVD.  CARDIO: RRR, nl S1 and S2. No murmurs, rubs, or gallops. No peripheral edema. 2+ radial and pedal pulses bilaterally. No chest wall tenderness.  PULM: Normal effort. CTA b/l without wheezes, rales, or rhonchi.  MSK: No leg warmth, swelling, erythema, or ttp.  GI: Abdomen soft and non-distended. Nontender.  SKIN: Warm, dry. No pallor. No rash.  NEURO: A&Ox3. Speech clear. No focal deficits.  PSYCH: Calm and cooperative.

## 2021-04-30 NOTE — ED PROVIDER NOTE - PROGRESS NOTE DETAILS
MELISSA: Patient continues to deny symptoms in ED. Satting 95% on RA. Results discussed with patient and aid, printed copies given. Both agreeable and verbalizes understanding of plan of care, f/u, and return precautions.

## 2021-06-06 ENCOUNTER — EMERGENCY (EMERGENCY)
Facility: HOSPITAL | Age: 81
LOS: 0 days | Discharge: HOME | End: 2021-06-06
Attending: EMERGENCY MEDICINE | Admitting: EMERGENCY MEDICINE
Payer: MEDICARE

## 2021-06-06 VITALS
SYSTOLIC BLOOD PRESSURE: 169 MMHG | HEART RATE: 99 BPM | TEMPERATURE: 98 F | DIASTOLIC BLOOD PRESSURE: 82 MMHG | OXYGEN SATURATION: 99 % | RESPIRATION RATE: 18 BRPM

## 2021-06-06 VITALS — WEIGHT: 199.96 LBS | HEIGHT: 60 IN

## 2021-06-06 DIAGNOSIS — Z96.651 PRESENCE OF RIGHT ARTIFICIAL KNEE JOINT: ICD-10-CM

## 2021-06-06 DIAGNOSIS — E11.9 TYPE 2 DIABETES MELLITUS WITHOUT COMPLICATIONS: ICD-10-CM

## 2021-06-06 DIAGNOSIS — M62.81 MUSCLE WEAKNESS (GENERALIZED): ICD-10-CM

## 2021-06-06 DIAGNOSIS — I10 ESSENTIAL (PRIMARY) HYPERTENSION: ICD-10-CM

## 2021-06-06 DIAGNOSIS — Z91.018 ALLERGY TO OTHER FOODS: ICD-10-CM

## 2021-06-06 DIAGNOSIS — Z88.2 ALLERGY STATUS TO SULFONAMIDES: ICD-10-CM

## 2021-06-06 DIAGNOSIS — Z79.890 HORMONE REPLACEMENT THERAPY: ICD-10-CM

## 2021-06-06 DIAGNOSIS — Z86.69 PERSONAL HISTORY OF OTHER DISEASES OF THE NERVOUS SYSTEM AND SENSE ORGANS: ICD-10-CM

## 2021-06-06 DIAGNOSIS — Z98.890 OTHER SPECIFIED POSTPROCEDURAL STATES: ICD-10-CM

## 2021-06-06 DIAGNOSIS — E78.00 PURE HYPERCHOLESTEROLEMIA, UNSPECIFIED: ICD-10-CM

## 2021-06-06 DIAGNOSIS — Z96.651 PRESENCE OF RIGHT ARTIFICIAL KNEE JOINT: Chronic | ICD-10-CM

## 2021-06-06 DIAGNOSIS — R19.7 DIARRHEA, UNSPECIFIED: ICD-10-CM

## 2021-06-06 DIAGNOSIS — Z86.73 PERSONAL HISTORY OF TRANSIENT ISCHEMIC ATTACK (TIA), AND CEREBRAL INFARCTION WITHOUT RESIDUAL DEFICITS: ICD-10-CM

## 2021-06-06 DIAGNOSIS — Z86.59 PERSONAL HISTORY OF OTHER MENTAL AND BEHAVIORAL DISORDERS: ICD-10-CM

## 2021-06-06 DIAGNOSIS — Z98.890 OTHER SPECIFIED POSTPROCEDURAL STATES: Chronic | ICD-10-CM

## 2021-06-06 DIAGNOSIS — Z79.4 LONG TERM (CURRENT) USE OF INSULIN: ICD-10-CM

## 2021-06-06 DIAGNOSIS — Z20.822 CONTACT WITH AND (SUSPECTED) EXPOSURE TO COVID-19: ICD-10-CM

## 2021-06-06 DIAGNOSIS — Z85.3 PERSONAL HISTORY OF MALIGNANT NEOPLASM OF BREAST: ICD-10-CM

## 2021-06-06 DIAGNOSIS — R11.2 NAUSEA WITH VOMITING, UNSPECIFIED: ICD-10-CM

## 2021-06-06 DIAGNOSIS — Z88.8 ALLERGY STATUS TO OTHER DRUGS, MEDICAMENTS AND BIOLOGICAL SUBSTANCES: ICD-10-CM

## 2021-06-06 DIAGNOSIS — Z91.040 LATEX ALLERGY STATUS: ICD-10-CM

## 2021-06-06 DIAGNOSIS — R10.13 EPIGASTRIC PAIN: ICD-10-CM

## 2021-06-06 DIAGNOSIS — R10.9 UNSPECIFIED ABDOMINAL PAIN: ICD-10-CM

## 2021-06-06 DIAGNOSIS — Z88.0 ALLERGY STATUS TO PENICILLIN: ICD-10-CM

## 2021-06-06 DIAGNOSIS — Z90.49 ACQUIRED ABSENCE OF OTHER SPECIFIED PARTS OF DIGESTIVE TRACT: Chronic | ICD-10-CM

## 2021-06-06 LAB
ALBUMIN SERPL ELPH-MCNC: 4.1 G/DL — SIGNIFICANT CHANGE UP (ref 3.5–5.2)
ALP SERPL-CCNC: 143 U/L — HIGH (ref 30–115)
ALT FLD-CCNC: 14 U/L — SIGNIFICANT CHANGE UP (ref 0–41)
ANION GAP SERPL CALC-SCNC: 14 MMOL/L — SIGNIFICANT CHANGE UP (ref 7–14)
APPEARANCE UR: CLEAR — SIGNIFICANT CHANGE UP
AST SERPL-CCNC: 26 U/L — SIGNIFICANT CHANGE UP (ref 0–41)
BACTERIA # UR AUTO: ABNORMAL
BASOPHILS # BLD AUTO: 0.05 K/UL — SIGNIFICANT CHANGE UP (ref 0–0.2)
BASOPHILS NFR BLD AUTO: 0.6 % — SIGNIFICANT CHANGE UP (ref 0–1)
BILIRUB DIRECT SERPL-MCNC: <0.2 MG/DL — SIGNIFICANT CHANGE UP (ref 0–0.2)
BILIRUB INDIRECT FLD-MCNC: >0.4 MG/DL — SIGNIFICANT CHANGE UP (ref 0.2–1.2)
BILIRUB SERPL-MCNC: 0.6 MG/DL — SIGNIFICANT CHANGE UP (ref 0.2–1.2)
BILIRUB UR-MCNC: ABNORMAL
BUN SERPL-MCNC: 14 MG/DL — SIGNIFICANT CHANGE UP (ref 10–20)
CALCIUM SERPL-MCNC: 10.6 MG/DL — HIGH (ref 8.5–10.1)
CHLORIDE SERPL-SCNC: 96 MMOL/L — LOW (ref 98–110)
CO2 SERPL-SCNC: 30 MMOL/L — SIGNIFICANT CHANGE UP (ref 17–32)
COD CRY URNS QL: NEGATIVE — SIGNIFICANT CHANGE UP
COLOR SPEC: YELLOW — SIGNIFICANT CHANGE UP
CREAT SERPL-MCNC: 1 MG/DL — SIGNIFICANT CHANGE UP (ref 0.7–1.5)
DIFF PNL FLD: ABNORMAL
EOSINOPHIL # BLD AUTO: 0.03 K/UL — SIGNIFICANT CHANGE UP (ref 0–0.7)
EOSINOPHIL NFR BLD AUTO: 0.4 % — SIGNIFICANT CHANGE UP (ref 0–8)
EPI CELLS # UR: ABNORMAL /HPF
GLUCOSE SERPL-MCNC: 238 MG/DL — HIGH (ref 70–99)
GLUCOSE UR QL: NEGATIVE MG/DL — SIGNIFICANT CHANGE UP
GRAN CASTS # UR COMP ASSIST: NEGATIVE — SIGNIFICANT CHANGE UP
HCT VFR BLD CALC: 43.5 % — SIGNIFICANT CHANGE UP (ref 37–47)
HGB BLD-MCNC: 14 G/DL — SIGNIFICANT CHANGE UP (ref 12–16)
HYALINE CASTS # UR AUTO: ABNORMAL /LPF
IMM GRANULOCYTES NFR BLD AUTO: 0.2 % — SIGNIFICANT CHANGE UP (ref 0.1–0.3)
KETONES UR-MCNC: 15
LACTATE SERPL-SCNC: 1.2 MMOL/L — SIGNIFICANT CHANGE UP (ref 0.7–2)
LEUKOCYTE ESTERASE UR-ACNC: NEGATIVE — SIGNIFICANT CHANGE UP
LIDOCAIN IGE QN: 12 U/L — SIGNIFICANT CHANGE UP (ref 7–60)
LYMPHOCYTES # BLD AUTO: 0.91 K/UL — LOW (ref 1.2–3.4)
LYMPHOCYTES # BLD AUTO: 10.7 % — LOW (ref 20.5–51.1)
MCHC RBC-ENTMCNC: 29.6 PG — SIGNIFICANT CHANGE UP (ref 27–31)
MCHC RBC-ENTMCNC: 32.2 G/DL — SIGNIFICANT CHANGE UP (ref 32–37)
MCV RBC AUTO: 92 FL — SIGNIFICANT CHANGE UP (ref 81–99)
MONOCYTES # BLD AUTO: 0.47 K/UL — SIGNIFICANT CHANGE UP (ref 0.1–0.6)
MONOCYTES NFR BLD AUTO: 5.5 % — SIGNIFICANT CHANGE UP (ref 1.7–9.3)
NEUTROPHILS # BLD AUTO: 7 K/UL — HIGH (ref 1.4–6.5)
NEUTROPHILS NFR BLD AUTO: 82.6 % — HIGH (ref 42.2–75.2)
NITRITE UR-MCNC: NEGATIVE — SIGNIFICANT CHANGE UP
NRBC # BLD: 0 /100 WBCS — SIGNIFICANT CHANGE UP (ref 0–0)
PH UR: 7.5 — SIGNIFICANT CHANGE UP (ref 5–8)
PLATELET # BLD AUTO: 263 K/UL — SIGNIFICANT CHANGE UP (ref 130–400)
POTASSIUM SERPL-MCNC: 4.6 MMOL/L — SIGNIFICANT CHANGE UP (ref 3.5–5)
POTASSIUM SERPL-SCNC: 4.6 MMOL/L — SIGNIFICANT CHANGE UP (ref 3.5–5)
PROT SERPL-MCNC: 7.4 G/DL — SIGNIFICANT CHANGE UP (ref 6–8)
PROT UR-MCNC: >=300 MG/DL
RAPID RVP RESULT: SIGNIFICANT CHANGE UP
RBC # BLD: 4.73 M/UL — SIGNIFICANT CHANGE UP (ref 4.2–5.4)
RBC # FLD: 13.1 % — SIGNIFICANT CHANGE UP (ref 11.5–14.5)
RBC CASTS # UR COMP ASSIST: ABNORMAL /HPF
SARS-COV-2 RNA SPEC QL NAA+PROBE: SIGNIFICANT CHANGE UP
SODIUM SERPL-SCNC: 140 MMOL/L — SIGNIFICANT CHANGE UP (ref 135–146)
SP GR SPEC: 1.02 — SIGNIFICANT CHANGE UP (ref 1.01–1.03)
TRI-PHOS CRY UR QL COMP ASSIST: NEGATIVE — SIGNIFICANT CHANGE UP
URATE CRY FLD QL MICRO: NEGATIVE — SIGNIFICANT CHANGE UP
UROBILINOGEN FLD QL: 0.2 MG/DL — SIGNIFICANT CHANGE UP (ref 0.2–0.2)
WBC # BLD: 8.48 K/UL — SIGNIFICANT CHANGE UP (ref 4.8–10.8)
WBC # FLD AUTO: 8.48 K/UL — SIGNIFICANT CHANGE UP (ref 4.8–10.8)
WBC UR QL: SIGNIFICANT CHANGE UP /HPF

## 2021-06-06 PROCEDURE — 74177 CT ABD & PELVIS W/CONTRAST: CPT | Mod: 26,MA

## 2021-06-06 PROCEDURE — 99284 EMERGENCY DEPT VISIT MOD MDM: CPT

## 2021-06-06 RX ORDER — ONDANSETRON 8 MG/1
4 TABLET, FILM COATED ORAL ONCE
Refills: 0 | Status: COMPLETED | OUTPATIENT
Start: 2021-06-06 | End: 2021-06-06

## 2021-06-06 RX ADMIN — ONDANSETRON 4 MILLIGRAM(S): 8 TABLET, FILM COATED ORAL at 05:51

## 2021-06-06 NOTE — ED PROVIDER NOTE - ATTENDING CONTRIBUTION TO CARE
I personally evaluated the patient. I reviewed the Resident’s or Physician Assistant’s note (as assigned above), and agree with the findings and plan except as documented in my note.  Chart reviewed.  H/O CVA, DM, breast CA, bilateral hearing loss, has a 24 hour health aid, brought in with abdominal pain vomiting and diarrhea.  Exam shows alert patient in no distress, HEENT NCAT, lungs clear, RR S1S2, abdomens oft +epigastric tenderness +BS, no rebound or guarding, no CCE.

## 2021-06-06 NOTE — ED PROVIDER NOTE - PHYSICAL EXAMINATION
CONSTITUTIONAL: Well-developed; well-nourished; in no acute distress, nontoxic appearing  SKIN: skin exam is warm and dry  HEAD: Normocephalic; atraumatic.  ENT: MMM, no nasal congestion  NECK: Supple; non tender.  ROM intact.  CARD: S1, S2 normal, no murmur  RESP: No wheezes, rales or rhonchi. Good air movement bilaterally  ABD: soft, +epigastric TTP, no rebound/guarding. No CVAT   EXT: Normal ROM.   NEURO: awake, alert, following commands, oriented, grossly unremarkable. No Focal deficits. GCS 15.   PSYCH: Cooperative, appropriate.

## 2021-06-06 NOTE — ED PROVIDER NOTE - CARE PROVIDER_API CALL
Ariel Macias  INTERNAL MEDICINE  [address] [place] [address]  Phone: [phone]  Fax: [phone]  Follow Up Time: 1-3 Days

## 2021-06-06 NOTE — ED ADULT NURSE REASSESSMENT NOTE - NS ED NURSE REASSESS COMMENT FT1
spoke to daughter.  advised mom will be discharged.  she will meet her at the house.  patient awaiting transport.

## 2021-06-06 NOTE — ED PROVIDER NOTE - CLINICAL SUMMARY MEDICAL DECISION MAKING FREE TEXT BOX
patient presents with abdominal pain accepted sign out ct results obtained and negative patient's ct negative for acute findings I will discharge at this time follow up to pmd as well as gi patient is tolerating po, pain is improved

## 2021-06-06 NOTE — ED PROVIDER NOTE - NS ED ROS FT
Review of Systems:  	•	CONSTITUTIONAL: no fever, no diaphoresis, no chills  	•	SKIN: no rash  	•	HEMATOLOGIC: no bleeding, no bruising  	•	ENT: no sore throat   	•	RESPIRATORY: no shortness of breath, no cough  	•	CARDIAC: no chest pain, no palpitations  	•	GI: +abd pain, +nausea, +vomiting, +diarrhea  	•	GENITO-URINARY: no discharge, no dysuria; no hematuria, no increased urinary frequency  	•	MUSCULOSKELETAL: no joint paint, no swelling, no redness  	•	NEUROLOGIC: no weakness, no headache, no paresthesias, no LOC  	•	PSYCH: no anxiety, non suicidal, non homicidal, no hallucination, no depression

## 2021-06-06 NOTE — ED PROVIDER NOTE - OBJECTIVE STATEMENT
81 year old female, past medical history DM, HTN, HDL, breast CA in remission, CVA with residual LUE weakness, who presents with abd pain and nausea/vomiting, diarrhea. no fever, chills, chest pain, SOB, back pain, urinary symptoms.

## 2021-06-06 NOTE — ED PROVIDER NOTE - PROGRESS NOTE DETAILS
Signed out to Dr. Guillermo.  CT pending. AA: Repeat abdominal exam unremarkable. Labs, CT unremarkable. Return precautions given to pt to return in 24-48 hours if sxs persist, worsen, inability tolerating po, fever/chills, lethargy.

## 2021-06-06 NOTE — ED PROVIDER NOTE - PATIENT PORTAL LINK FT
You can access the FollowMyHealth Patient Portal offered by [facility] by registering at the following website: [email or web address]. By joining [employer]’s FollowMyHealth portal, you will also be able to view your health information using other applications (apps) compatible with our system.

## 2021-06-06 NOTE — ED ADULT NURSE NOTE - CAS DISCH ACCOMP BY
***GEN - NAD; well appearing; A+O x3 ***HEAD - NC/AT   ***PULMONARY - CTA b/l, symmetric breath sounds. ***CARDIAC -s1s2, RRR, no M,G,R  ***ABDOMEN - ND, NT, soft, no guarding, no rebound, no ajit's   ***SKIN - no rash or bruising   ***NEUROLOGIC - alert and oriented, follows commands, sensation nl, motor nl, strength and sensation intact    ***PSYCH - insight and judgment nl, memory nl, affect nl, thought nl  MSK- right sided lumbar paraspinal TTP, no midline TTP Self

## 2021-06-07 LAB
CULTURE RESULTS: NO GROWTH — SIGNIFICANT CHANGE UP
SPECIMEN SOURCE: SIGNIFICANT CHANGE UP

## 2021-07-01 ENCOUNTER — NON-APPOINTMENT (OUTPATIENT)
Age: 81
End: 2021-07-01

## 2021-07-01 ENCOUNTER — APPOINTMENT (OUTPATIENT)
Dept: CARDIOLOGY | Facility: CLINIC | Age: 81
End: 2021-07-01
Payer: MEDICARE

## 2021-07-01 PROCEDURE — 93294 REM INTERROG EVL PM/LDLS PM: CPT

## 2021-07-01 PROCEDURE — 93296 REM INTERROG EVL PM/IDS: CPT

## 2021-07-27 ENCOUNTER — OUTPATIENT (OUTPATIENT)
Dept: OUTPATIENT SERVICES | Facility: HOSPITAL | Age: 81
LOS: 1 days | Discharge: HOME | End: 2021-07-27
Payer: MEDICARE

## 2021-07-27 DIAGNOSIS — Q89.09 CONGENITAL MALFORMATIONS OF SPLEEN: ICD-10-CM

## 2021-07-27 DIAGNOSIS — Z96.651 PRESENCE OF RIGHT ARTIFICIAL KNEE JOINT: Chronic | ICD-10-CM

## 2021-07-27 DIAGNOSIS — Z98.890 OTHER SPECIFIED POSTPROCEDURAL STATES: Chronic | ICD-10-CM

## 2021-07-27 DIAGNOSIS — Z90.49 ACQUIRED ABSENCE OF OTHER SPECIFIED PARTS OF DIGESTIVE TRACT: Chronic | ICD-10-CM

## 2021-07-27 PROCEDURE — 76700 US EXAM ABDOM COMPLETE: CPT | Mod: 26

## 2021-08-13 ENCOUNTER — EMERGENCY (EMERGENCY)
Facility: HOSPITAL | Age: 81
LOS: 0 days | Discharge: HOME | End: 2021-08-13
Attending: EMERGENCY MEDICINE | Admitting: EMERGENCY MEDICINE
Payer: MEDICARE

## 2021-08-13 VITALS
SYSTOLIC BLOOD PRESSURE: 118 MMHG | TEMPERATURE: 99 F | OXYGEN SATURATION: 98 % | DIASTOLIC BLOOD PRESSURE: 62 MMHG | RESPIRATION RATE: 18 BRPM | HEART RATE: 103 BPM

## 2021-08-13 VITALS
RESPIRATION RATE: 18 BRPM | OXYGEN SATURATION: 96 % | TEMPERATURE: 99 F | HEIGHT: 60 IN | DIASTOLIC BLOOD PRESSURE: 83 MMHG | HEART RATE: 110 BPM | SYSTOLIC BLOOD PRESSURE: 177 MMHG | WEIGHT: 179.9 LBS

## 2021-08-13 DIAGNOSIS — Z86.73 PERSONAL HISTORY OF TRANSIENT ISCHEMIC ATTACK (TIA), AND CEREBRAL INFARCTION WITHOUT RESIDUAL DEFICITS: ICD-10-CM

## 2021-08-13 DIAGNOSIS — R10.12 LEFT UPPER QUADRANT PAIN: ICD-10-CM

## 2021-08-13 DIAGNOSIS — Z20.822 CONTACT WITH AND (SUSPECTED) EXPOSURE TO COVID-19: ICD-10-CM

## 2021-08-13 DIAGNOSIS — Z98.890 OTHER SPECIFIED POSTPROCEDURAL STATES: Chronic | ICD-10-CM

## 2021-08-13 DIAGNOSIS — Z90.89 ACQUIRED ABSENCE OF OTHER ORGANS: ICD-10-CM

## 2021-08-13 DIAGNOSIS — Z90.49 ACQUIRED ABSENCE OF OTHER SPECIFIED PARTS OF DIGESTIVE TRACT: ICD-10-CM

## 2021-08-13 DIAGNOSIS — Z91.040 LATEX ALLERGY STATUS: ICD-10-CM

## 2021-08-13 DIAGNOSIS — I10 ESSENTIAL (PRIMARY) HYPERTENSION: ICD-10-CM

## 2021-08-13 DIAGNOSIS — Z85.3 PERSONAL HISTORY OF MALIGNANT NEOPLASM OF BREAST: ICD-10-CM

## 2021-08-13 DIAGNOSIS — Z91.018 ALLERGY TO OTHER FOODS: ICD-10-CM

## 2021-08-13 DIAGNOSIS — E78.00 PURE HYPERCHOLESTEROLEMIA, UNSPECIFIED: ICD-10-CM

## 2021-08-13 DIAGNOSIS — R10.9 UNSPECIFIED ABDOMINAL PAIN: ICD-10-CM

## 2021-08-13 DIAGNOSIS — Z91.013 ALLERGY TO SEAFOOD: ICD-10-CM

## 2021-08-13 DIAGNOSIS — E11.9 TYPE 2 DIABETES MELLITUS WITHOUT COMPLICATIONS: ICD-10-CM

## 2021-08-13 DIAGNOSIS — Z88.2 ALLERGY STATUS TO SULFONAMIDES: ICD-10-CM

## 2021-08-13 DIAGNOSIS — Z79.4 LONG TERM (CURRENT) USE OF INSULIN: ICD-10-CM

## 2021-08-13 DIAGNOSIS — Z88.0 ALLERGY STATUS TO PENICILLIN: ICD-10-CM

## 2021-08-13 DIAGNOSIS — Z96.651 PRESENCE OF RIGHT ARTIFICIAL KNEE JOINT: Chronic | ICD-10-CM

## 2021-08-13 DIAGNOSIS — Z86.59 PERSONAL HISTORY OF OTHER MENTAL AND BEHAVIORAL DISORDERS: ICD-10-CM

## 2021-08-13 DIAGNOSIS — N39.0 URINARY TRACT INFECTION, SITE NOT SPECIFIED: ICD-10-CM

## 2021-08-13 DIAGNOSIS — Z79.899 OTHER LONG TERM (CURRENT) DRUG THERAPY: ICD-10-CM

## 2021-08-13 DIAGNOSIS — Z79.02 LONG TERM (CURRENT) USE OF ANTITHROMBOTICS/ANTIPLATELETS: ICD-10-CM

## 2021-08-13 DIAGNOSIS — R10.84 GENERALIZED ABDOMINAL PAIN: ICD-10-CM

## 2021-08-13 DIAGNOSIS — Z90.49 ACQUIRED ABSENCE OF OTHER SPECIFIED PARTS OF DIGESTIVE TRACT: Chronic | ICD-10-CM

## 2021-08-13 DIAGNOSIS — Z88.8 ALLERGY STATUS TO OTHER DRUGS, MEDICAMENTS AND BIOLOGICAL SUBSTANCES: ICD-10-CM

## 2021-08-13 DIAGNOSIS — Z86.69 PERSONAL HISTORY OF OTHER DISEASES OF THE NERVOUS SYSTEM AND SENSE ORGANS: ICD-10-CM

## 2021-08-13 LAB
ALBUMIN SERPL ELPH-MCNC: 4.1 G/DL — SIGNIFICANT CHANGE UP (ref 3.5–5.2)
ALP SERPL-CCNC: 127 U/L — HIGH (ref 30–115)
ALT FLD-CCNC: 15 U/L — SIGNIFICANT CHANGE UP (ref 0–41)
ANION GAP SERPL CALC-SCNC: 17 MMOL/L — HIGH (ref 7–14)
APPEARANCE UR: ABNORMAL
AST SERPL-CCNC: 30 U/L — SIGNIFICANT CHANGE UP (ref 0–41)
BACTERIA # UR AUTO: ABNORMAL
BASOPHILS # BLD AUTO: 0.05 K/UL — SIGNIFICANT CHANGE UP (ref 0–0.2)
BASOPHILS NFR BLD AUTO: 0.6 % — SIGNIFICANT CHANGE UP (ref 0–1)
BILIRUB DIRECT SERPL-MCNC: <0.2 MG/DL — SIGNIFICANT CHANGE UP (ref 0–0.2)
BILIRUB INDIRECT FLD-MCNC: >0.3 MG/DL — SIGNIFICANT CHANGE UP (ref 0.2–1.2)
BILIRUB SERPL-MCNC: 0.5 MG/DL — SIGNIFICANT CHANGE UP (ref 0.2–1.2)
BILIRUB UR-MCNC: NEGATIVE — SIGNIFICANT CHANGE UP
BUN SERPL-MCNC: 15 MG/DL — SIGNIFICANT CHANGE UP (ref 10–20)
CALCIUM SERPL-MCNC: 9.7 MG/DL — SIGNIFICANT CHANGE UP (ref 8.5–10.1)
CHLORIDE SERPL-SCNC: 99 MMOL/L — SIGNIFICANT CHANGE UP (ref 98–110)
CO2 SERPL-SCNC: 28 MMOL/L — SIGNIFICANT CHANGE UP (ref 17–32)
COLOR SPEC: YELLOW — SIGNIFICANT CHANGE UP
CREAT SERPL-MCNC: 1 MG/DL — SIGNIFICANT CHANGE UP (ref 0.7–1.5)
DIFF PNL FLD: ABNORMAL
EOSINOPHIL # BLD AUTO: 0.18 K/UL — SIGNIFICANT CHANGE UP (ref 0–0.7)
EOSINOPHIL NFR BLD AUTO: 2.2 % — SIGNIFICANT CHANGE UP (ref 0–8)
EPI CELLS # UR: ABNORMAL /HPF
GLUCOSE SERPL-MCNC: 205 MG/DL — HIGH (ref 70–99)
GLUCOSE UR QL: NEGATIVE — SIGNIFICANT CHANGE UP
HCT VFR BLD CALC: 41.7 % — SIGNIFICANT CHANGE UP (ref 37–47)
HGB BLD-MCNC: 13.3 G/DL — SIGNIFICANT CHANGE UP (ref 12–16)
IMM GRANULOCYTES NFR BLD AUTO: 0.2 % — SIGNIFICANT CHANGE UP (ref 0.1–0.3)
KETONES UR-MCNC: 80 — SIGNIFICANT CHANGE UP
LACTATE SERPL-SCNC: 1.1 MMOL/L — SIGNIFICANT CHANGE UP (ref 0.7–2)
LEUKOCYTE ESTERASE UR-ACNC: ABNORMAL
LIDOCAIN IGE QN: 10 U/L — SIGNIFICANT CHANGE UP (ref 7–60)
LYMPHOCYTES # BLD AUTO: 1.08 K/UL — LOW (ref 1.2–3.4)
LYMPHOCYTES # BLD AUTO: 13.2 % — LOW (ref 20.5–51.1)
MCHC RBC-ENTMCNC: 29.6 PG — SIGNIFICANT CHANGE UP (ref 27–31)
MCHC RBC-ENTMCNC: 31.9 G/DL — LOW (ref 32–37)
MCV RBC AUTO: 92.9 FL — SIGNIFICANT CHANGE UP (ref 81–99)
MONOCYTES # BLD AUTO: 0.56 K/UL — SIGNIFICANT CHANGE UP (ref 0.1–0.6)
MONOCYTES NFR BLD AUTO: 6.8 % — SIGNIFICANT CHANGE UP (ref 1.7–9.3)
NEUTROPHILS # BLD AUTO: 6.29 K/UL — SIGNIFICANT CHANGE UP (ref 1.4–6.5)
NEUTROPHILS NFR BLD AUTO: 77 % — HIGH (ref 42.2–75.2)
NITRITE UR-MCNC: NEGATIVE — SIGNIFICANT CHANGE UP
NRBC # BLD: 0 /100 WBCS — SIGNIFICANT CHANGE UP (ref 0–0)
PH UR: 7 — SIGNIFICANT CHANGE UP (ref 5–8)
PLATELET # BLD AUTO: 274 K/UL — SIGNIFICANT CHANGE UP (ref 130–400)
POTASSIUM SERPL-MCNC: 4.3 MMOL/L — SIGNIFICANT CHANGE UP (ref 3.5–5)
POTASSIUM SERPL-SCNC: 4.3 MMOL/L — SIGNIFICANT CHANGE UP (ref 3.5–5)
PROT SERPL-MCNC: 7.1 G/DL — SIGNIFICANT CHANGE UP (ref 6–8)
PROT UR-MCNC: >=300
RBC # BLD: 4.49 M/UL — SIGNIFICANT CHANGE UP (ref 4.2–5.4)
RBC # FLD: 13.7 % — SIGNIFICANT CHANGE UP (ref 11.5–14.5)
RBC CASTS # UR COMP ASSIST: ABNORMAL /HPF
SARS-COV-2 RNA SPEC QL NAA+PROBE: SIGNIFICANT CHANGE UP
SODIUM SERPL-SCNC: 144 MMOL/L — SIGNIFICANT CHANGE UP (ref 135–146)
SP GR SPEC: 1.02 — SIGNIFICANT CHANGE UP (ref 1.01–1.03)
UROBILINOGEN FLD QL: 1 (ref 0.2–0.2)
WBC # BLD: 8.18 K/UL — SIGNIFICANT CHANGE UP (ref 4.8–10.8)
WBC # FLD AUTO: 8.18 K/UL — SIGNIFICANT CHANGE UP (ref 4.8–10.8)
WBC UR QL: >50 /HPF

## 2021-08-13 PROCEDURE — 74177 CT ABD & PELVIS W/CONTRAST: CPT | Mod: 26,MA

## 2021-08-13 PROCEDURE — 99284 EMERGENCY DEPT VISIT MOD MDM: CPT

## 2021-08-13 RX ORDER — ONDANSETRON 8 MG/1
4 TABLET, FILM COATED ORAL ONCE
Refills: 0 | Status: COMPLETED | OUTPATIENT
Start: 2021-08-13 | End: 2021-08-13

## 2021-08-13 RX ORDER — CEFPODOXIME PROXETIL 100 MG
200 TABLET ORAL ONCE
Refills: 0 | Status: COMPLETED | OUTPATIENT
Start: 2021-08-13 | End: 2021-08-13

## 2021-08-13 RX ORDER — CEFPODOXIME PROXETIL 100 MG
1 TABLET ORAL
Qty: 7 | Refills: 0
Start: 2021-08-13

## 2021-08-13 RX ORDER — MORPHINE SULFATE 50 MG/1
4 CAPSULE, EXTENDED RELEASE ORAL ONCE
Refills: 0 | Status: DISCONTINUED | OUTPATIENT
Start: 2021-08-13 | End: 2021-08-13

## 2021-08-13 RX ADMIN — MORPHINE SULFATE 4 MILLIGRAM(S): 50 CAPSULE, EXTENDED RELEASE ORAL at 13:49

## 2021-08-13 RX ADMIN — MORPHINE SULFATE 4 MILLIGRAM(S): 50 CAPSULE, EXTENDED RELEASE ORAL at 16:13

## 2021-08-13 RX ADMIN — ONDANSETRON 4 MILLIGRAM(S): 8 TABLET, FILM COATED ORAL at 13:49

## 2021-08-13 RX ADMIN — Medication 200 MILLIGRAM(S): at 16:45

## 2021-08-13 NOTE — ED PROVIDER NOTE - PHYSICAL EXAMINATION
Gen: Alert, NAD, well appearing  Head: NC, AT, PERRL, EOMI  Neck: +supple, no tenderness  Pulm: Bilateral BS, normal resp effort  CV: RRR  Abd: soft, +tenderness to generalized abdomen worse on LUQ +rebound no guarding  Skin: no rash  Neuro: AAOx3

## 2021-08-13 NOTE — ED PROVIDER NOTE - CLINICAL SUMMARY MEDICAL DECISION MAKING FREE TEXT BOX
Good response to ED tx.  sx improved.  dx testing reviewed.  In my opinion, out patient treatment and follow up are appropriate.  Copies of all diagnostic studies were given to patient to aid in follow up.

## 2021-08-13 NOTE — ED PROVIDER NOTE - NS ED ROS FT
Review of Systems    Constitutional: (-) fever  Cardiovascular: (-) chest pain  Respiratory: (-) cough  Gastrointestinal: (-) vomiting, (-) diarrhea  Musculoskeletal: (-) neck pain  Integumentary: (-) rash  Neurological: (-) headache

## 2021-08-13 NOTE — ED ADULT TRIAGE NOTE - CHIEF COMPLAINT QUOTE
BIBA via [employer] from home, home attendant at bedside, pt is complaining of pain to left side of abdomen that began yesterday, associated with nausea, denies vomiting, Denies chest pain

## 2021-08-13 NOTE — ED PROVIDER NOTE - PATIENT PORTAL LINK FT
You can access the FollowMyHealth Patient Portal offered by [facility] by registering at the following website: [email or web address]. By joining [employer]’s FollowMyHealth portal, you will also be able to view your health information using other applications (apps) compatible with our system.

## 2021-08-13 NOTE — ED ADULT NURSE NOTE - NSIMPLEMENTINTERV_GEN_ALL_ED
Implemented All Fall Risk Interventions:  [place] to call system. Call bell, personal items and telephone within reach. Instruct patient to call for assistance. Room bathroom lighting operational. Non-slip footwear when patient is off stretcher. Physically safe environment: no spills, clutter or unnecessary equipment. Stretcher in lowest position, wheels locked, appropriate side rails in place. Provide visual cue, wrist band, yellow gown, etc. Monitor gait and stability. Monitor for mental status changes and reorient to person, place, and time. Review medications for side effects contributing to fall risk. Reinforce activity limits and safety measures with patient and family.

## 2021-08-13 NOTE — ED ADULT NURSE NOTE - NSFALLRSKASSESASSIST_ED_ALL_ED
Detail Level: Detailed Showing: no hyphae Koh Intro Text (From The.....): A KOH prep was ordered and evaluated from the Koh Procedure Text (Tissue Harvesting Technique): A 15 blade scalpel was used to scrape the skin. The skin scrapings were placed on a glass slide, covered with a coverslip and a KOH solution was applied. no

## 2021-08-13 NOTE — ED PROVIDER NOTE - OBJECTIVE STATEMENT
Patient is a 81 years old F pmhx DM, HTN, cholecystectomy presents to the ED for evaluation of abdominal pain.  As per patient pain started yesterday left side nonradiating.  Pt sts similar pain in the past that went away but this time has been consistent.  Pt denies nausea or vomiting or black stool, but does not recall last time she had a BM.  aid at bedside works with her Tues & Thur sts no BM while with patient.

## 2021-08-16 ENCOUNTER — APPOINTMENT (OUTPATIENT)
Dept: CARDIOLOGY | Facility: CLINIC | Age: 81
End: 2021-08-16
Payer: MEDICARE

## 2021-08-16 PROCEDURE — 93306 TTE W/DOPPLER COMPLETE: CPT
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
## 2021-09-22 ENCOUNTER — APPOINTMENT (OUTPATIENT)
Dept: CARDIOLOGY | Facility: CLINIC | Age: 81
End: 2021-09-22

## 2021-09-30 ENCOUNTER — NON-APPOINTMENT (OUTPATIENT)
Age: 81
End: 2021-09-30

## 2021-09-30 ENCOUNTER — APPOINTMENT (OUTPATIENT)
Dept: CARDIOLOGY | Facility: CLINIC | Age: 81
End: 2021-09-30
Payer: MEDICARE

## 2021-09-30 PROCEDURE — 93294 REM INTERROG EVL PM/LDLS PM: CPT

## 2021-09-30 PROCEDURE — 93296 REM INTERROG EVL PM/IDS: CPT

## 2021-10-04 ENCOUNTER — EMERGENCY (EMERGENCY)
Facility: HOSPITAL | Age: 81
LOS: 0 days | Discharge: HOME | End: 2021-10-05
Attending: EMERGENCY MEDICINE | Admitting: EMERGENCY MEDICINE
Payer: MEDICARE

## 2021-10-04 VITALS
RESPIRATION RATE: 20 BRPM | OXYGEN SATURATION: 100 % | HEIGHT: 60 IN | DIASTOLIC BLOOD PRESSURE: 83 MMHG | WEIGHT: 199.96 LBS | TEMPERATURE: 99 F | HEART RATE: 85 BPM | SYSTOLIC BLOOD PRESSURE: 127 MMHG

## 2021-10-04 DIAGNOSIS — R06.7 SNEEZING: ICD-10-CM

## 2021-10-04 DIAGNOSIS — Z96.651 PRESENCE OF RIGHT ARTIFICIAL KNEE JOINT: ICD-10-CM

## 2021-10-04 DIAGNOSIS — F32.9 MAJOR DEPRESSIVE DISORDER, SINGLE EPISODE, UNSPECIFIED: ICD-10-CM

## 2021-10-04 DIAGNOSIS — R32 UNSPECIFIED URINARY INCONTINENCE: ICD-10-CM

## 2021-10-04 DIAGNOSIS — Z79.84 LONG TERM (CURRENT) USE OF ORAL HYPOGLYCEMIC DRUGS: ICD-10-CM

## 2021-10-04 DIAGNOSIS — Z79.4 LONG TERM (CURRENT) USE OF INSULIN: ICD-10-CM

## 2021-10-04 DIAGNOSIS — Z88.0 ALLERGY STATUS TO PENICILLIN: ICD-10-CM

## 2021-10-04 DIAGNOSIS — E11.9 TYPE 2 DIABETES MELLITUS WITHOUT COMPLICATIONS: ICD-10-CM

## 2021-10-04 DIAGNOSIS — E78.5 HYPERLIPIDEMIA, UNSPECIFIED: ICD-10-CM

## 2021-10-04 DIAGNOSIS — E78.00 PURE HYPERCHOLESTEROLEMIA, UNSPECIFIED: ICD-10-CM

## 2021-10-04 DIAGNOSIS — Z20.822 CONTACT WITH AND (SUSPECTED) EXPOSURE TO COVID-19: ICD-10-CM

## 2021-10-04 DIAGNOSIS — I10 ESSENTIAL (PRIMARY) HYPERTENSION: ICD-10-CM

## 2021-10-04 DIAGNOSIS — Z91.018 ALLERGY TO OTHER FOODS: ICD-10-CM

## 2021-10-04 DIAGNOSIS — Z88.2 ALLERGY STATUS TO SULFONAMIDES: ICD-10-CM

## 2021-10-04 DIAGNOSIS — Z98.890 OTHER SPECIFIED POSTPROCEDURAL STATES: Chronic | ICD-10-CM

## 2021-10-04 DIAGNOSIS — Z91.013 ALLERGY TO SEAFOOD: ICD-10-CM

## 2021-10-04 DIAGNOSIS — R10.30 LOWER ABDOMINAL PAIN, UNSPECIFIED: ICD-10-CM

## 2021-10-04 DIAGNOSIS — Z91.040 LATEX ALLERGY STATUS: ICD-10-CM

## 2021-10-04 DIAGNOSIS — Z90.12 ACQUIRED ABSENCE OF LEFT BREAST AND NIPPLE: ICD-10-CM

## 2021-10-04 DIAGNOSIS — Z90.49 ACQUIRED ABSENCE OF OTHER SPECIFIED PARTS OF DIGESTIVE TRACT: ICD-10-CM

## 2021-10-04 DIAGNOSIS — Z96.651 PRESENCE OF RIGHT ARTIFICIAL KNEE JOINT: Chronic | ICD-10-CM

## 2021-10-04 DIAGNOSIS — Z80.3 FAMILY HISTORY OF MALIGNANT NEOPLASM OF BREAST: ICD-10-CM

## 2021-10-04 DIAGNOSIS — Z86.73 PERSONAL HISTORY OF TRANSIENT ISCHEMIC ATTACK (TIA), AND CEREBRAL INFARCTION WITHOUT RESIDUAL DEFICITS: ICD-10-CM

## 2021-10-04 DIAGNOSIS — Z90.49 ACQUIRED ABSENCE OF OTHER SPECIFIED PARTS OF DIGESTIVE TRACT: Chronic | ICD-10-CM

## 2021-10-04 LAB
ALBUMIN SERPL ELPH-MCNC: 3.6 G/DL — SIGNIFICANT CHANGE UP (ref 3.5–5.2)
ALP SERPL-CCNC: 167 U/L — HIGH (ref 30–115)
ALT FLD-CCNC: 18 U/L — SIGNIFICANT CHANGE UP (ref 0–41)
ANION GAP SERPL CALC-SCNC: 14 MMOL/L — SIGNIFICANT CHANGE UP (ref 7–14)
APPEARANCE UR: CLEAR — SIGNIFICANT CHANGE UP
AST SERPL-CCNC: 43 U/L — HIGH (ref 0–41)
BACTERIA # UR AUTO: ABNORMAL
BASOPHILS # BLD AUTO: 0.05 K/UL — SIGNIFICANT CHANGE UP (ref 0–0.2)
BASOPHILS NFR BLD AUTO: 0.5 % — SIGNIFICANT CHANGE UP (ref 0–1)
BILIRUB DIRECT SERPL-MCNC: <0.2 MG/DL — SIGNIFICANT CHANGE UP (ref 0–0.2)
BILIRUB INDIRECT FLD-MCNC: >0.1 MG/DL — LOW (ref 0.2–1.2)
BILIRUB SERPL-MCNC: 0.3 MG/DL — SIGNIFICANT CHANGE UP (ref 0.2–1.2)
BILIRUB UR-MCNC: NEGATIVE — SIGNIFICANT CHANGE UP
BUN SERPL-MCNC: 15 MG/DL — SIGNIFICANT CHANGE UP (ref 10–20)
CALCIUM SERPL-MCNC: 8.9 MG/DL — SIGNIFICANT CHANGE UP (ref 8.5–10.1)
CHLORIDE SERPL-SCNC: 104 MMOL/L — SIGNIFICANT CHANGE UP (ref 98–110)
CO2 SERPL-SCNC: 26 MMOL/L — SIGNIFICANT CHANGE UP (ref 17–32)
COLOR SPEC: YELLOW — SIGNIFICANT CHANGE UP
CREAT SERPL-MCNC: 1.1 MG/DL — SIGNIFICANT CHANGE UP (ref 0.7–1.5)
DIFF PNL FLD: NEGATIVE — SIGNIFICANT CHANGE UP
EOSINOPHIL # BLD AUTO: 0.27 K/UL — SIGNIFICANT CHANGE UP (ref 0–0.7)
EOSINOPHIL NFR BLD AUTO: 2.9 % — SIGNIFICANT CHANGE UP (ref 0–8)
EPI CELLS # UR: ABNORMAL /HPF
GLUCOSE SERPL-MCNC: 128 MG/DL — HIGH (ref 70–99)
GLUCOSE UR QL: NEGATIVE MG/DL — SIGNIFICANT CHANGE UP
HCT VFR BLD CALC: 39.5 % — SIGNIFICANT CHANGE UP (ref 37–47)
HGB BLD-MCNC: 12.7 G/DL — SIGNIFICANT CHANGE UP (ref 12–16)
IMM GRANULOCYTES NFR BLD AUTO: 0.5 % — HIGH (ref 0.1–0.3)
KETONES UR-MCNC: NEGATIVE — SIGNIFICANT CHANGE UP
LACTATE SERPL-SCNC: 1.2 MMOL/L — SIGNIFICANT CHANGE UP (ref 0.7–2)
LEUKOCYTE ESTERASE UR-ACNC: NEGATIVE — SIGNIFICANT CHANGE UP
LIDOCAIN IGE QN: 73 U/L — HIGH (ref 7–60)
LYMPHOCYTES # BLD AUTO: 1.34 K/UL — SIGNIFICANT CHANGE UP (ref 1.2–3.4)
LYMPHOCYTES # BLD AUTO: 14.3 % — LOW (ref 20.5–51.1)
MCHC RBC-ENTMCNC: 30.2 PG — SIGNIFICANT CHANGE UP (ref 27–31)
MCHC RBC-ENTMCNC: 32.2 G/DL — SIGNIFICANT CHANGE UP (ref 32–37)
MCV RBC AUTO: 93.8 FL — SIGNIFICANT CHANGE UP (ref 81–99)
MONOCYTES # BLD AUTO: 0.83 K/UL — HIGH (ref 0.1–0.6)
MONOCYTES NFR BLD AUTO: 8.9 % — SIGNIFICANT CHANGE UP (ref 1.7–9.3)
NEUTROPHILS # BLD AUTO: 6.8 K/UL — HIGH (ref 1.4–6.5)
NEUTROPHILS NFR BLD AUTO: 72.9 % — SIGNIFICANT CHANGE UP (ref 42.2–75.2)
NITRITE UR-MCNC: NEGATIVE — SIGNIFICANT CHANGE UP
NRBC # BLD: 0 /100 WBCS — SIGNIFICANT CHANGE UP (ref 0–0)
PH UR: 6.5 — SIGNIFICANT CHANGE UP (ref 5–8)
PLATELET # BLD AUTO: 268 K/UL — SIGNIFICANT CHANGE UP (ref 130–400)
POTASSIUM SERPL-MCNC: 4.4 MMOL/L — SIGNIFICANT CHANGE UP (ref 3.5–5)
POTASSIUM SERPL-SCNC: 4.4 MMOL/L — SIGNIFICANT CHANGE UP (ref 3.5–5)
PROT SERPL-MCNC: 6.5 G/DL — SIGNIFICANT CHANGE UP (ref 6–8)
PROT UR-MCNC: 100 MG/DL
RBC # BLD: 4.21 M/UL — SIGNIFICANT CHANGE UP (ref 4.2–5.4)
RBC # FLD: 13.8 % — SIGNIFICANT CHANGE UP (ref 11.5–14.5)
RBC CASTS # UR COMP ASSIST: SIGNIFICANT CHANGE UP /HPF
SARS-COV-2 RNA SPEC QL NAA+PROBE: SIGNIFICANT CHANGE UP
SODIUM SERPL-SCNC: 144 MMOL/L — SIGNIFICANT CHANGE UP (ref 135–146)
SP GR SPEC: 1.02 — SIGNIFICANT CHANGE UP (ref 1.01–1.03)
TROPONIN T SERPL-MCNC: <0.01 NG/ML — SIGNIFICANT CHANGE UP
UROBILINOGEN FLD QL: 0.2 MG/DL — SIGNIFICANT CHANGE UP
WBC # BLD: 9.34 K/UL — SIGNIFICANT CHANGE UP (ref 4.8–10.8)
WBC # FLD AUTO: 9.34 K/UL — SIGNIFICANT CHANGE UP (ref 4.8–10.8)
WBC UR QL: SIGNIFICANT CHANGE UP /HPF

## 2021-10-04 PROCEDURE — 74177 CT ABD & PELVIS W/CONTRAST: CPT | Mod: 26,MA

## 2021-10-04 PROCEDURE — 93010 ELECTROCARDIOGRAM REPORT: CPT | Mod: 76

## 2021-10-04 PROCEDURE — 99285 EMERGENCY DEPT VISIT HI MDM: CPT | Mod: GC

## 2021-10-04 RX ORDER — ONDANSETRON 8 MG/1
4 TABLET, FILM COATED ORAL ONCE
Refills: 0 | Status: COMPLETED | OUTPATIENT
Start: 2021-10-04 | End: 2021-10-04

## 2021-10-04 RX ORDER — MAGNESIUM SULFATE 500 MG/ML
2 VIAL (ML) INJECTION ONCE
Refills: 0 | Status: COMPLETED | OUTPATIENT
Start: 2021-10-04 | End: 2021-10-04

## 2021-10-04 RX ORDER — SODIUM CHLORIDE 9 MG/ML
1000 INJECTION INTRAMUSCULAR; INTRAVENOUS; SUBCUTANEOUS ONCE
Refills: 0 | Status: COMPLETED | OUTPATIENT
Start: 2021-10-04 | End: 2021-10-04

## 2021-10-04 RX ADMIN — ONDANSETRON 4 MILLIGRAM(S): 8 TABLET, FILM COATED ORAL at 20:38

## 2021-10-04 RX ADMIN — Medication 50 GRAM(S): at 21:39

## 2021-10-04 RX ADMIN — SODIUM CHLORIDE 1000 MILLILITER(S): 9 INJECTION INTRAMUSCULAR; INTRAVENOUS; SUBCUTANEOUS at 13:13

## 2021-10-04 NOTE — ED PROVIDER NOTE - ATTENDING CONTRIBUTION TO CARE
81F with PMH HTN, T2DM lap christine, UTI treated 8/13/21 who presents to [facility] for suprapubic discomfort and an episode of stress urinary incontinence. Reports sx similar to prior UTI last month. Denies fevers, chills, vomiting, diarrhea, bloody or melanotic stool, flank pain, decreased energy. Her aid Gina at the bedside agrees with history. On arrival vs wnl.     Gen - NAD, Head - NCAT, Pharynx - clear, MMM, Heart - RRR, no m/g/r, Lungs - CTAB, no w/c/r, Abdomen - soft, NT, ND, Skin - No rash, Extremities - FROM, no edema, erythema, ecchymosis, brisk cap refill, Neuro - A&O x3, equal strength and sensation, non-focal exam    a/p:  urinary sx, suprapubic discomfrt  reassuring exam, no ttp  labs, ua, ivf  reassess

## 2021-10-04 NOTE — ED PROVIDER NOTE - OBJECTIVE STATEMENT
81F hx of HTN, DM, CVA, HLD presents with lower abd pain since this morning, notes that it is non-radiating, had an episode of urinary incontinence after sneezing this morning, denies any vomiting/diarrhea/fever/cough/chest pain/shortness of breath. Didn't take anything.

## 2021-10-04 NOTE — ED PROVIDER NOTE - PROGRESS NOTE DETAILS
JR: Patient endorsed to Dr. Cervantes- plan f/u CT and dispo pending findings. Overall quite well appearing. ok to d/c home otherwise. repeat  QT -  375 Transport was postponed as aid for patient could not make it home until noon. EMS came to bring patient home. She was cleared for discharge by prior ED team.

## 2021-10-04 NOTE — ED PROVIDER NOTE - NSCAREINITIATED _GEN_ER
01/20/21 1105   Provider Notification   Reason for Communication Evaluate; Review case   Provider Name Dr Magnus Valencia   Provider Notification Physician   Method of Communication Face to face   Response At bedside     MD rounded, RN at bedside and removed b/l groin dressing, f/u o/p 3-4 weeks, MD to write script for pain med, ok to d/c from vascular stand point Rahul Snyder(Resident)

## 2021-10-04 NOTE — ED ADULT NURSE NOTE - NSIMPLEMENTINTERV_GEN_ALL_ED
Implemented All Fall with Harm Risk Interventions:  [place] to call system. Call bell, personal items and telephone within reach. Instruct patient to call for assistance. Room bathroom lighting operational. Non-slip footwear when patient is off stretcher. Physically safe environment: no spills, clutter or unnecessary equipment. Stretcher in lowest position, wheels locked, appropriate side rails in place. Provide visual cue, wrist band, yellow gown, etc. Monitor gait and stability. Monitor for mental status changes and reorient to person, place, and time. Review medications for side effects contributing to fall risk. Reinforce activity limits and safety measures with patient and family. Provide visual clues: red socks.

## 2021-10-04 NOTE — ED PROVIDER NOTE - CLINICAL SUMMARY MEDICAL DECISION MAKING FREE TEXT BOX
81F with PMH HTN, T2DM lap christine, UTI treated 8/13/21 who presents to [facility] for suprapubic discomfort and an episode of stress urinary incontinence.  no uti labs reviewed wnl  CT AP added: 	No evidence of acute/inflammatory process in the abdomen or pelvis.  Family  left ED.  told nurses  patient's 24hr aid will not be home to accept  patient on discharge  until 8am.   call placed to patient  son and daughter - aware of results -      WIll give patients  night meds that she  missed from  being inED,   - pt  can take her  morning meds a t home per son    EKG with prolong  qt 528 -  Mg given -    EKG repeated  decreased to 518  -  Pt previous  EKG had

## 2021-10-04 NOTE — ED ADULT NURSE NOTE - NSFALLRSKINDICTYPE_ED_ALL_ED
Need for Mobility Assisted Device

## 2021-10-04 NOTE — ED PROVIDER NOTE - PHYSICAL EXAMINATION
Vital Signs: I have reviewed the initial vital signs.  Constitutional: well-nourished, appears stated age, no acute distress.  HEENT: Airway patent, moist MM, no erythema/swelling/deformity of oral structures. EOMI, PERRLA.  CV: regular rate, regular rhythm, well-perfused extremities, 2+ b/l DP and radial pulses equal.  Lungs: BCTA, no increased WOB.  ABD: soft, NTND, no guarding or rebound, no pulsatile mass, no hernias, no flank pain.   MSK: Neck supple, nontender, nl ROM, no stepoff. Chest nontender. Back nontender in TLS spine or to b/l bony structures. Ext nontender, nl rom, no deformity.   INTEG: Skin warm, dry, no rash.  NEURO: A&Ox3, moving all extremities, normal speech  PSYCH: Calm, cooperative, normal affect and interaction.

## 2021-10-04 NOTE — ED PROVIDER NOTE - NSFOLLOWUPINSTRUCTIONS_ED_ALL_ED_FT
FOllow up your  PMD 2-3days    Abdominal Pain, Adult  ImageMany things can cause belly (abdominal) pain. Most times, belly pain is not dangerous. Many cases of belly pain can be watched and treated at home. Sometimes belly pain is serious, though. Your doctor will try to find the cause of your belly pain.    Follow these instructions at home:  Take over-the-counter and prescription medicines only as told by your doctor. Do not take medicines that help you poop (laxatives) unless told to by your doctor.  Drink enough fluid to keep your pee (urine) clear or pale yellow.  Watch your belly pain for any changes.  Keep all follow-up visits as told by your doctor. This is important.  Contact a doctor if:  Your belly pain changes or gets worse.  You are not hungry, or you lose weight without trying.  You are having trouble pooping (constipated) or have watery poop (diarrhea) for more than 2–3 days.  You have pain when you pee or poop.  Your belly pain wakes you up at night.  Your pain gets worse with meals, after eating, or with certain foods.  You are throwing up and cannot keep anything down.  You have a fever.  Get help right away if:  Your pain does not go away as soon as your doctor says it should.  You cannot stop throwing up.  Your pain is only in areas of your belly, such as the right side or the left lower part of the belly.  You have bloody or black poop, or poop that looks like tar.  You have very bad pain, cramping, or bloating in your belly.  You have signs of not having enough fluid or water in your body (dehydration), such as:    Dark pee, very little pee, or no pee.  Cracked lips.  Dry mouth.  Sunken eyes.  Sleepiness.  Weakness.    This information is not intended to replace advice given to you by your health care provider. Make sure you discuss any questions you have with your health care provider.    Document Released: 06/05/2009 Document Revised: 07/07/2017 Document Reviewed: 05/31/2017  [employer] Interactive Patient Education © 2018 [employer]

## 2021-10-05 VITALS
DIASTOLIC BLOOD PRESSURE: 78 MMHG | RESPIRATION RATE: 18 BRPM | TEMPERATURE: 98 F | HEART RATE: 85 BPM | OXYGEN SATURATION: 100 % | SYSTOLIC BLOOD PRESSURE: 122 MMHG

## 2021-10-05 LAB
CULTURE RESULTS: NO GROWTH — SIGNIFICANT CHANGE UP
SPECIMEN SOURCE: SIGNIFICANT CHANGE UP

## 2021-10-05 PROCEDURE — 93010 ELECTROCARDIOGRAM REPORT: CPT

## 2021-10-05 RX ORDER — TEMAZEPAM 15 MG/1
30 CAPSULE ORAL AT BEDTIME
Refills: 0 | Status: DISCONTINUED | OUTPATIENT
Start: 2021-10-05 | End: 2021-10-05

## 2021-10-05 RX ORDER — ACETAMINOPHEN 500 MG
650 TABLET ORAL ONCE
Refills: 0 | Status: COMPLETED | OUTPATIENT
Start: 2021-10-05 | End: 2021-10-05

## 2021-10-05 RX ORDER — GABAPENTIN 400 MG/1
300 CAPSULE ORAL ONCE
Refills: 0 | Status: COMPLETED | OUTPATIENT
Start: 2021-10-05 | End: 2021-10-05

## 2021-10-05 RX ORDER — NORTRIPTYLINE HYDROCHLORIDE 10 MG/1
25 CAPSULE ORAL ONCE
Refills: 0 | Status: COMPLETED | OUTPATIENT
Start: 2021-10-05 | End: 2021-10-05

## 2021-10-05 RX ADMIN — GABAPENTIN 300 MILLIGRAM(S): 400 CAPSULE ORAL at 04:30

## 2021-10-05 RX ADMIN — TEMAZEPAM 30 MILLIGRAM(S): 15 CAPSULE ORAL at 05:01

## 2021-10-05 RX ADMIN — NORTRIPTYLINE HYDROCHLORIDE 25 MILLIGRAM(S): 10 CAPSULE ORAL at 04:30

## 2021-10-05 RX ADMIN — Medication 650 MILLIGRAM(S): at 00:42

## 2021-10-06 NOTE — PATIENT PROFILE ADULT. - AS SC BRADEN NUTRITION
Airway  Performed by: Fawn Reynaga MD  Authorized by: Fawn Reynaga MD     Final Airway Type:  Supraglottic airway  Final Supraglottic Airway:  Unique  SGA Size*:  3  Attempts*:  1   Patient Identified, Procedure confirmed, Emergency equipment available and Safety protocols followed  Location:  OR  Urgency:  Elective  Difficult Airway: No    Indications for Airway Management:  Anesthesia  Sedation Level:  Anesthetized  Mask Difficulty Assessment:  0 - not attempted  Performed By:  Anesthesiologist  Anesthesiologist:  Fawn Reynaga MD         (2) probably inadequate

## 2021-10-14 ENCOUNTER — APPOINTMENT (OUTPATIENT)
Dept: CARDIOLOGY | Facility: CLINIC | Age: 81
End: 2021-10-14

## 2021-11-08 ENCOUNTER — INPATIENT (INPATIENT)
Facility: HOSPITAL | Age: 81
LOS: 0 days | Discharge: HOME | End: 2021-11-09
Attending: STUDENT IN AN ORGANIZED HEALTH CARE EDUCATION/TRAINING PROGRAM | Admitting: STUDENT IN AN ORGANIZED HEALTH CARE EDUCATION/TRAINING PROGRAM
Payer: MEDICARE

## 2021-11-08 VITALS
OXYGEN SATURATION: 97 % | WEIGHT: 179.9 LBS | DIASTOLIC BLOOD PRESSURE: 99 MMHG | RESPIRATION RATE: 20 BRPM | HEART RATE: 106 BPM | TEMPERATURE: 97 F | HEIGHT: 60 IN | SYSTOLIC BLOOD PRESSURE: 155 MMHG

## 2021-11-08 DIAGNOSIS — Z90.49 ACQUIRED ABSENCE OF OTHER SPECIFIED PARTS OF DIGESTIVE TRACT: Chronic | ICD-10-CM

## 2021-11-08 DIAGNOSIS — Z96.651 PRESENCE OF RIGHT ARTIFICIAL KNEE JOINT: Chronic | ICD-10-CM

## 2021-11-08 DIAGNOSIS — Z98.890 OTHER SPECIFIED POSTPROCEDURAL STATES: Chronic | ICD-10-CM

## 2021-11-08 LAB
ALBUMIN SERPL ELPH-MCNC: 4.2 G/DL — SIGNIFICANT CHANGE UP (ref 3.5–5.2)
ALP SERPL-CCNC: 150 U/L — HIGH (ref 30–115)
ALT FLD-CCNC: 16 U/L — SIGNIFICANT CHANGE UP (ref 0–41)
ANION GAP SERPL CALC-SCNC: 16 MMOL/L — HIGH (ref 7–14)
APPEARANCE UR: CLEAR — SIGNIFICANT CHANGE UP
AST SERPL-CCNC: 21 U/L — SIGNIFICANT CHANGE UP (ref 0–41)
B-OH-BUTYR SERPL-SCNC: 1 MMOL/L — HIGH
BASE EXCESS BLDV CALC-SCNC: 6.7 MMOL/L — HIGH (ref -2–3)
BASOPHILS # BLD AUTO: 0.06 K/UL — SIGNIFICANT CHANGE UP (ref 0–0.2)
BASOPHILS NFR BLD AUTO: 0.8 % — SIGNIFICANT CHANGE UP (ref 0–1)
BILIRUB DIRECT SERPL-MCNC: <0.2 MG/DL — SIGNIFICANT CHANGE UP (ref 0–0.2)
BILIRUB INDIRECT FLD-MCNC: >0.4 MG/DL — SIGNIFICANT CHANGE UP (ref 0.2–1.2)
BILIRUB SERPL-MCNC: 0.6 MG/DL — SIGNIFICANT CHANGE UP (ref 0.2–1.2)
BILIRUB UR-MCNC: NEGATIVE — SIGNIFICANT CHANGE UP
BUN SERPL-MCNC: 14 MG/DL — SIGNIFICANT CHANGE UP (ref 10–20)
CA-I SERPL-SCNC: 1.2 MMOL/L — SIGNIFICANT CHANGE UP (ref 1.15–1.33)
CALCIUM SERPL-MCNC: 9.8 MG/DL — SIGNIFICANT CHANGE UP (ref 8.5–10.1)
CHLORIDE SERPL-SCNC: 97 MMOL/L — LOW (ref 98–110)
CO2 SERPL-SCNC: 28 MMOL/L — SIGNIFICANT CHANGE UP (ref 17–32)
COLOR SPEC: YELLOW — SIGNIFICANT CHANGE UP
CREAT SERPL-MCNC: 1 MG/DL — SIGNIFICANT CHANGE UP (ref 0.7–1.5)
DIFF PNL FLD: ABNORMAL
EOSINOPHIL # BLD AUTO: 0.07 K/UL — SIGNIFICANT CHANGE UP (ref 0–0.7)
EOSINOPHIL NFR BLD AUTO: 1 % — SIGNIFICANT CHANGE UP (ref 0–8)
EPI CELLS # UR: ABNORMAL /HPF
GAS PNL BLDV: 140 MMOL/L — SIGNIFICANT CHANGE UP (ref 136–145)
GAS PNL BLDV: SIGNIFICANT CHANGE UP
GLUCOSE SERPL-MCNC: 305 MG/DL — HIGH (ref 70–99)
GLUCOSE UR QL: 250 MG/DL
HCO3 BLDV-SCNC: 33 MMOL/L — HIGH (ref 22–29)
HCT VFR BLD CALC: 42.8 % — SIGNIFICANT CHANGE UP (ref 37–47)
HGB BLD-MCNC: 14 G/DL — SIGNIFICANT CHANGE UP (ref 12–16)
IMM GRANULOCYTES NFR BLD AUTO: 0.3 % — SIGNIFICANT CHANGE UP (ref 0.1–0.3)
KETONES UR-MCNC: 15
LACTATE BLDV-MCNC: 1.3 MMOL/L — SIGNIFICANT CHANGE UP (ref 0.5–2)
LEUKOCYTE ESTERASE UR-ACNC: NEGATIVE — SIGNIFICANT CHANGE UP
LIDOCAIN IGE QN: 13 U/L — SIGNIFICANT CHANGE UP (ref 7–60)
LYMPHOCYTES # BLD AUTO: 0.95 K/UL — LOW (ref 1.2–3.4)
LYMPHOCYTES # BLD AUTO: 12.9 % — LOW (ref 20.5–51.1)
MCHC RBC-ENTMCNC: 30.4 PG — SIGNIFICANT CHANGE UP (ref 27–31)
MCHC RBC-ENTMCNC: 32.7 G/DL — SIGNIFICANT CHANGE UP (ref 32–37)
MCV RBC AUTO: 93 FL — SIGNIFICANT CHANGE UP (ref 81–99)
MONOCYTES # BLD AUTO: 0.51 K/UL — SIGNIFICANT CHANGE UP (ref 0.1–0.6)
MONOCYTES NFR BLD AUTO: 6.9 % — SIGNIFICANT CHANGE UP (ref 1.7–9.3)
NEUTROPHILS # BLD AUTO: 5.74 K/UL — SIGNIFICANT CHANGE UP (ref 1.4–6.5)
NEUTROPHILS NFR BLD AUTO: 78.1 % — HIGH (ref 42.2–75.2)
NITRITE UR-MCNC: NEGATIVE — SIGNIFICANT CHANGE UP
NRBC # BLD: 0 /100 WBCS — SIGNIFICANT CHANGE UP (ref 0–0)
PCO2 BLDV: 55 MMHG — HIGH (ref 39–42)
PH BLDV: 7.39 — SIGNIFICANT CHANGE UP (ref 7.32–7.43)
PH UR: 7 — SIGNIFICANT CHANGE UP (ref 5–8)
PLATELET # BLD AUTO: 259 K/UL — SIGNIFICANT CHANGE UP (ref 130–400)
PO2 BLDV: 35 MMHG — SIGNIFICANT CHANGE UP
POTASSIUM BLDV-SCNC: 4.1 MMOL/L — SIGNIFICANT CHANGE UP (ref 3.5–5.1)
POTASSIUM SERPL-MCNC: 4.5 MMOL/L — SIGNIFICANT CHANGE UP (ref 3.5–5)
POTASSIUM SERPL-SCNC: 4.5 MMOL/L — SIGNIFICANT CHANGE UP (ref 3.5–5)
PROT SERPL-MCNC: 7.6 G/DL — SIGNIFICANT CHANGE UP (ref 6–8)
PROT UR-MCNC: >=300 MG/DL
RBC # BLD: 4.6 M/UL — SIGNIFICANT CHANGE UP (ref 4.2–5.4)
RBC # FLD: 13.4 % — SIGNIFICANT CHANGE UP (ref 11.5–14.5)
RBC CASTS # UR COMP ASSIST: ABNORMAL /HPF
SAO2 % BLDV: 64 % — SIGNIFICANT CHANGE UP
SARS-COV-2 RNA SPEC QL NAA+PROBE: SIGNIFICANT CHANGE UP
SODIUM SERPL-SCNC: 141 MMOL/L — SIGNIFICANT CHANGE UP (ref 135–146)
SP GR SPEC: 1.02 — SIGNIFICANT CHANGE UP (ref 1.01–1.03)
TROPONIN T SERPL-MCNC: <0.01 NG/ML — SIGNIFICANT CHANGE UP
UROBILINOGEN FLD QL: 0.2 MG/DL — SIGNIFICANT CHANGE UP
WBC # BLD: 7.35 K/UL — SIGNIFICANT CHANGE UP (ref 4.8–10.8)
WBC # FLD AUTO: 7.35 K/UL — SIGNIFICANT CHANGE UP (ref 4.8–10.8)

## 2021-11-08 PROCEDURE — 99285 EMERGENCY DEPT VISIT HI MDM: CPT | Mod: GC

## 2021-11-08 PROCEDURE — 99497 ADVNCD CARE PLAN 30 MIN: CPT | Mod: 25

## 2021-11-08 PROCEDURE — 99222 1ST HOSP IP/OBS MODERATE 55: CPT | Mod: AI

## 2021-11-08 PROCEDURE — 93010 ELECTROCARDIOGRAM REPORT: CPT

## 2021-11-08 PROCEDURE — 74177 CT ABD & PELVIS W/CONTRAST: CPT | Mod: 26,MA

## 2021-11-08 RX ORDER — ATORVASTATIN CALCIUM 80 MG/1
80 TABLET, FILM COATED ORAL AT BEDTIME
Refills: 0 | Status: DISCONTINUED | OUTPATIENT
Start: 2021-11-08 | End: 2021-11-09

## 2021-11-08 RX ORDER — LEVOTHYROXINE SODIUM 125 MCG
25 TABLET ORAL DAILY
Refills: 0 | Status: DISCONTINUED | OUTPATIENT
Start: 2021-11-08 | End: 2021-11-09

## 2021-11-08 RX ORDER — SERTRALINE 25 MG/1
150 TABLET, FILM COATED ORAL DAILY
Refills: 0 | Status: DISCONTINUED | OUTPATIENT
Start: 2021-11-08 | End: 2021-11-09

## 2021-11-08 RX ORDER — SODIUM CHLORIDE 9 MG/ML
1000 INJECTION INTRAMUSCULAR; INTRAVENOUS; SUBCUTANEOUS ONCE
Refills: 0 | Status: COMPLETED | OUTPATIENT
Start: 2021-11-08 | End: 2021-11-08

## 2021-11-08 RX ORDER — ASPIRIN/CALCIUM CARB/MAGNESIUM 324 MG
162 TABLET ORAL DAILY
Refills: 0 | Status: DISCONTINUED | OUTPATIENT
Start: 2021-11-08 | End: 2021-11-09

## 2021-11-08 RX ORDER — DONEPEZIL HYDROCHLORIDE 10 MG/1
10 TABLET, FILM COATED ORAL AT BEDTIME
Refills: 0 | Status: DISCONTINUED | OUTPATIENT
Start: 2021-11-08 | End: 2021-11-09

## 2021-11-08 RX ORDER — AMLODIPINE BESYLATE 2.5 MG/1
10 TABLET ORAL DAILY
Refills: 0 | Status: DISCONTINUED | OUTPATIENT
Start: 2021-11-08 | End: 2021-11-09

## 2021-11-08 RX ORDER — TEMAZEPAM 15 MG/1
30 CAPSULE ORAL AT BEDTIME
Refills: 0 | Status: DISCONTINUED | OUTPATIENT
Start: 2021-11-08 | End: 2021-11-09

## 2021-11-08 RX ORDER — CLOPIDOGREL BISULFATE 75 MG/1
75 TABLET, FILM COATED ORAL DAILY
Refills: 0 | Status: DISCONTINUED | OUTPATIENT
Start: 2021-11-08 | End: 2021-11-09

## 2021-11-08 RX ORDER — SERTRALINE 25 MG/1
50 TABLET, FILM COATED ORAL DAILY
Refills: 0 | Status: DISCONTINUED | OUTPATIENT
Start: 2021-11-08 | End: 2021-11-09

## 2021-11-08 RX ORDER — DIPHENHYDRAMINE HCL 50 MG
50 CAPSULE ORAL ONCE
Refills: 0 | Status: COMPLETED | OUTPATIENT
Start: 2021-11-08 | End: 2021-11-08

## 2021-11-08 RX ORDER — ACETAMINOPHEN 500 MG
650 TABLET ORAL ONCE
Refills: 0 | Status: COMPLETED | OUTPATIENT
Start: 2021-11-08 | End: 2021-11-08

## 2021-11-08 RX ORDER — PANTOPRAZOLE SODIUM 20 MG/1
40 TABLET, DELAYED RELEASE ORAL
Refills: 0 | Status: DISCONTINUED | OUTPATIENT
Start: 2021-11-08 | End: 2021-11-09

## 2021-11-08 RX ORDER — FUROSEMIDE 40 MG
20 TABLET ORAL DAILY
Refills: 0 | Status: DISCONTINUED | OUTPATIENT
Start: 2021-11-08 | End: 2021-11-09

## 2021-11-08 RX ORDER — FERROUS SULFATE 325(65) MG
325 TABLET ORAL
Refills: 0 | Status: DISCONTINUED | OUTPATIENT
Start: 2021-11-08 | End: 2021-11-09

## 2021-11-08 RX ORDER — GABAPENTIN 400 MG/1
300 CAPSULE ORAL
Refills: 0 | Status: DISCONTINUED | OUTPATIENT
Start: 2021-11-08 | End: 2021-11-09

## 2021-11-08 RX ORDER — NORTRIPTYLINE HYDROCHLORIDE 10 MG/1
25 CAPSULE ORAL AT BEDTIME
Refills: 0 | Status: DISCONTINUED | OUTPATIENT
Start: 2021-11-08 | End: 2021-11-09

## 2021-11-08 RX ORDER — ONDANSETRON 8 MG/1
4 TABLET, FILM COATED ORAL ONCE
Refills: 0 | Status: COMPLETED | OUTPATIENT
Start: 2021-11-08 | End: 2021-11-08

## 2021-11-08 RX ADMIN — ONDANSETRON 4 MILLIGRAM(S): 8 TABLET, FILM COATED ORAL at 11:22

## 2021-11-08 RX ADMIN — Medication 50 MILLIGRAM(S): at 14:09

## 2021-11-08 RX ADMIN — SODIUM CHLORIDE 1000 MILLILITER(S): 9 INJECTION INTRAMUSCULAR; INTRAVENOUS; SUBCUTANEOUS at 11:21

## 2021-11-08 RX ADMIN — Medication 650 MILLIGRAM(S): at 13:22

## 2021-11-08 NOTE — ED PROVIDER NOTE - CLINICAL SUMMARY MEDICAL DECISION MAKING FREE TEXT BOX
81 yr old f that presents with n/v/ abd pain. labs, imaging obtained. pt admitted to medicine for placement.

## 2021-11-08 NOTE — ED PROVIDER NOTE - NS ED ROS FT
Constitutional:  see HPI  Head:  no headache, dizziness, loss of consciousness  Eyes:  no visual changes; no eye pain, redness, or discharge  ENMT: no mouth or throat sores or lesions; no throat pain  Cardiac: no chest pain, tachycardia or palpitations  Respiratory: no cough, wheezing, shortness of breath, chest tightness, or trouble breathing  GI: + nausea, vomiting, abdominal pain, no diarrhea   :  no dysuria, frequency, or burning with urination  MS: no myalgias, , joint pain, injury or swelling  Neuro: no numbness or tingling; no seizure  Skin:  no rashes or color changes; no lacerations or abrasions

## 2021-11-08 NOTE — ED PROVIDER NOTE - PHYSICAL EXAMINATION
VITAL SIGNS: I have reviewed nursing notes and confirm.  CONSTITUTIONAL: well-appearing, appropriate for age, non-toxic, NAD  SKIN: Warm dry, normal skin turgor  HEAD: NCAT  EYES: PERRLA  ENT: + dry mucous membranes, normal pharynx with no erythema or exudates.   NECK: Supple; non tender. Full ROM. No cervical LAD  CARD: RRR, no murmurs, rubs or gallops  RESP: clear to ausculation b/l.  No rales, rhonchi, or wheezing.  ABD: + diffuse mild abd ttp, + well healed surgical scar s/p cholecystomy. soft, + BS, non-distended, no rebound or guarding.   EXT: Full ROM, no bony tenderness, no pedal edema, no calf tenderness  NEURO: normal motor. normal sensory.

## 2021-11-08 NOTE — ED PROVIDER NOTE - OBJECTIVE STATEMENT
82 y/o F w PMHx of DM type 2 on insulin and CVA w residual left sided weakness was BIBA for n/v and abd pain since last night. Pt had 2 episodes of vomiting this AM. FS en route by . Denies f/c, cp, sob, diarrhea, or HA.

## 2021-11-08 NOTE — CHART NOTE - NSCHARTNOTEFT_GEN_A_CORE
I have placed a call to on-call  who will attempt to contact [facility] so that patient can be discharged safely from ED.  Awaiting call back.

## 2021-11-08 NOTE — H&P ADULT - ASSESSMENT
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    # HTN   - c/w amlodipine     # HLD  - c/w atorvastatin     #CKD 3B - stable   - Cr on admission 1.4 appears to be around her baseline  - monitor Cr with BMP    # CVA w/ residual LUE weakness  - on aspirin, Plavix and statin    # Hypothyroidism  - c/w levothyroxine    # Breast CA in remission  - on exemestane 25mg daily which is non formulary    # DVT PPx: Lovenox    # GI PPx: Protonix     #Diet: DASH w/ carb consistency     #Activity: AAT    # Anticipated for discharge  - pt lives at home with 24/7 home health aides --> social consult     # Code status: Full code     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  # CAD  # CVA w/ residual LUE weakness  - on aspirin, Plavix and statin    # HTN   - c/w amlodipine, Lasix     # HLD  - c/w atorvastatin     #CKD 3B - stable   - Cr on admission 1.4 appears to be around her baseline  - monitor Cr with BMP    # Hypothyroidism  - c/w levothyroxine    # Dementia   # Depression   - c/w donepezil   - c/w nortriptyline sertraline     # Breast CA in remission  - on exemestane 25mg daily which is non formulary    # DVT PPx: Lovenox    # GI PPx: Protonix     # Diet: DASH w/ carb consistency     # Activity: AAT    # Anticipated for discharge  - pt lives at home with 24/7 home health aides --> social consult     # Full code

## 2021-11-08 NOTE — H&P ADULT - HISTORY OF PRESENT ILLNESS
80 y/o F w/ PMH of DM II, HLD, HTN, CVA with residual LUE weakness, Breast CA in remission, CAD, s/p PPM, CKD 3b/4, Hypothyroidism presents to the ED for elevated FS associated with nausea and vomiting.     Patietn denies any fever, chills, sob, chest pain, abd pain, no urinary or bowel issues.  80 y/o F w/ PMH of DM II, HLD, HTN, CVA with residual LUE weakness, Breast CA in remission, CAD, s/p PPM, CKD 3b/4, Hypothyroidism presents to the ED for elevated FS associated with nausea and vomiting.     Patietn denies any fever, chills, sob, chest pain, abd pain, no urinary or bowel issues.   Med rec with patient 's daughter Sindi 80 y/o F w/ PMH of DM II, HLD, HTN, CVA with residual LUE weakness, Breast CA in remission, CAD, s/p PPM, CKD 3b/4, Hypothyroidism presents to the ED for elevated FS associated with nausea and vomiting.     Patietn denies any fever, chills, sob, chest pain, abd pain, no urinary or bowel issues.   Med rec with patient 's daughter Sindi  Pt is a full code Pt is a poor historian, not sure why she came to ER. Denies any complains    81 yr old female with hx dementia, DM II, HLD, HTN, CVA with residual LUE weakness, Breast CA in remission, CAD, s/p PPM, CKD 3b/4, Hypothyroidism presents to the ED for elevated FS associated with nausea and vomiting. Patient denies any fever, chills, sob, chest pain, abd pain, no urinary or bowel issues.     In Ed: FS 350s, pt give fluids in ER --> FS improved and plan to d/c pt, but aide at bedside left --> pt being admitted to monitor FS and reinstate aide and d/c tomorrow     Med rec with patient 's daughter Sindi

## 2021-11-08 NOTE — ED ADULT TRIAGE NOTE - CHIEF COMPLAINT QUOTE
BIBA from home as per EMS pt [facility] pt was vomiting all last night and twice this am. Pt c/o of nausea and vomiting.

## 2021-11-08 NOTE — H&P ADULT - NSICDXFAMILYHX_GEN_ALL_CORE_FT
FAMILY HISTORY:  Mother  Still living? No  FHx: diabetes mellitus, Age at diagnosis: Age Unknown

## 2021-11-08 NOTE — H&P ADULT - NSHPPHYSICALEXAM_GEN_ALL_CORE
GENERAL: NAD, well-developed, Non-toxic, stated age   HEAD:  Atraumatic, Normocephalic  EYES: EOMI, Sclera White   NECK: Supple, No JVD  CHEST/LUNG: clear b/l breath sounds; No wheezing, rhonchi, or crackles  HEART: Regular rate and rhythm; s1, s2, No murmurs, rubs, or gallops  ABDOMEN: Soft, Nontender, Nondistended; Bowel sounds present, No rebound or guarding noted   EXTREMITIES:  No lower extremity edema or calf tenderness to palpation.  No clubbing or cyanosis  PSYCH: AAOx3, pleasant, cooperative, not anxious  NEUROLOGY: CN intact, 5/5 strength in all extremities, Sensation grossly intact.   SKIN: No rashes or lesions GENERAL: NAD, well-developed, Non-toxic, stated age   HEAD:  Atraumatic, Normocephalic  EYES: EOMI, Sclera White   NECK: Supple, No JVD  CHEST/LUNG: clear b/l breath sounds; No wheezing, rhonchi, or crackles  HEART: Regular rate and rhythm; s1, s2, No murmurs, rubs, or gallops  ABDOMEN: Soft, Nontender, Nondistended; Bowel sounds present, No rebound or guarding noted   EXTREMITIES:  No lower extremity edema or calf tenderness to palpation.  No clubbing or cyanosis  PSYCH: AAOx3, confused  NEUROLOGY: CN intact, 5/5 strength in all extremities, Sensation grossly intact.   SKIN: No rashes or lesions

## 2021-11-08 NOTE — ED PROVIDER NOTE - PROGRESS NOTE DETAILS
endorsed to Dr Judge f/u with family/dispo Authored by Dr. Pemberton: Spoke to pts son Jamir ([phone]) who is unable to  patient from ED. He has contacted HHA agency to coordinate HHA meeting with patient for safe discharge, currently has not heard back. Authored by Dr. Pemberton: Attempted to contact on call social work and left 2 voicemails. Pt currently stable in bed.

## 2021-11-08 NOTE — H&P ADULT - NSHPLABSRESULTS_GEN_ALL_CORE
14.0   7.35  )-----------( 259      ( 08 Nov 2021 11:13 )             42.8       141  |  97<L>  |  14  ----------------------------<  305<H>  4.5   |  28  |  1.0    Ca    9.8      08 Nov 2021 11:13    TPro  7.6  /  Alb  4.2  /  TBili  0.6  /  DBili  <0.2  /  AST  21  /  ALT  16  /  AlkPhos  150<H>  11-08      CT Abdomen and Pelvis w/ IV Cont (11.08.21):  Mild perivesical fat stranding. Correlate with urinalysis for infection.   Multiple nonspecific splenic hypodensities similar to prior.

## 2021-11-08 NOTE — ED ADULT TRIAGE NOTE - NS ED NURSE BANDS TYPE
Continue your Percocet every 6 hours.  Add oxycodone 5 mg every 3 hours as needed for breakthrough pain.  Contact Dr. Ford later today for further instructions.  Return to the emergency department if symptoms worsen.  
Name band;

## 2021-11-08 NOTE — H&P ADULT - CONVERSATION DETAILS
Spoke with HCP regarding living will and what her wishes would be if his heart were to stop beating or her respiratory status worsened. HCP states at this point she would want all life sustaining methods to be carried out and therefore remain FULL CODE

## 2021-11-08 NOTE — ED PROVIDER NOTE - ATTENDING CONTRIBUTION TO CARE
81y female above PMH BIB HHA for n/v/abd pain, on exam vital signs appreciated, nontoxic, poor historian, denies pain to me but moans when touched in abdomen, will cehck labs, urine, imaging

## 2021-11-08 NOTE — H&P ADULT - NSHPSOCIALHISTORY_GEN_ALL_CORE
Substance Use (street drugs): ( x ) never used  (  ) other:  Tobacco Usage:  ( x  ) never smoked   (   ) former smoker   (   ) current smoker  (     ) pack year  Alcohol Usage: None     Patient lives at home with 24/7 home health aides.

## 2021-11-09 ENCOUNTER — TRANSCRIPTION ENCOUNTER (OUTPATIENT)
Age: 81
End: 2021-11-09

## 2021-11-09 VITALS
DIASTOLIC BLOOD PRESSURE: 78 MMHG | RESPIRATION RATE: 18 BRPM | HEART RATE: 88 BPM | TEMPERATURE: 98 F | SYSTOLIC BLOOD PRESSURE: 140 MMHG

## 2021-11-09 LAB
A1C WITH ESTIMATED AVERAGE GLUCOSE RESULT: 7.8 % — HIGH (ref 4–5.6)
ANION GAP SERPL CALC-SCNC: 14 MMOL/L — SIGNIFICANT CHANGE UP (ref 7–14)
B-OH-BUTYR SERPL-SCNC: 0.4 MMOL/L — SIGNIFICANT CHANGE UP
BASOPHILS # BLD AUTO: 0.05 K/UL — SIGNIFICANT CHANGE UP (ref 0–0.2)
BASOPHILS NFR BLD AUTO: 0.7 % — SIGNIFICANT CHANGE UP (ref 0–1)
BUN SERPL-MCNC: 14 MG/DL — SIGNIFICANT CHANGE UP (ref 10–20)
CALCIUM SERPL-MCNC: 9.6 MG/DL — SIGNIFICANT CHANGE UP (ref 8.5–10.1)
CHLORIDE SERPL-SCNC: 99 MMOL/L — SIGNIFICANT CHANGE UP (ref 98–110)
CO2 SERPL-SCNC: 28 MMOL/L — SIGNIFICANT CHANGE UP (ref 17–32)
CREAT SERPL-MCNC: 1.1 MG/DL — SIGNIFICANT CHANGE UP (ref 0.7–1.5)
EOSINOPHIL # BLD AUTO: 0.08 K/UL — SIGNIFICANT CHANGE UP (ref 0–0.7)
EOSINOPHIL NFR BLD AUTO: 1.1 % — SIGNIFICANT CHANGE UP (ref 0–8)
ESTIMATED AVERAGE GLUCOSE: 177 MG/DL — HIGH (ref 68–114)
GLUCOSE BLDC GLUCOMTR-MCNC: 103 MG/DL — HIGH (ref 70–99)
GLUCOSE BLDC GLUCOMTR-MCNC: 150 MG/DL — HIGH (ref 70–99)
GLUCOSE BLDC GLUCOMTR-MCNC: 186 MG/DL — HIGH (ref 70–99)
GLUCOSE BLDC GLUCOMTR-MCNC: 225 MG/DL — HIGH (ref 70–99)
GLUCOSE SERPL-MCNC: 251 MG/DL — HIGH (ref 70–99)
HCT VFR BLD CALC: 40.2 % — SIGNIFICANT CHANGE UP (ref 37–47)
HGB BLD-MCNC: 13.2 G/DL — SIGNIFICANT CHANGE UP (ref 12–16)
IMM GRANULOCYTES NFR BLD AUTO: 0.3 % — SIGNIFICANT CHANGE UP (ref 0.1–0.3)
LYMPHOCYTES # BLD AUTO: 1.33 K/UL — SIGNIFICANT CHANGE UP (ref 1.2–3.4)
LYMPHOCYTES # BLD AUTO: 18.1 % — LOW (ref 20.5–51.1)
MAGNESIUM SERPL-MCNC: 2 MG/DL — SIGNIFICANT CHANGE UP (ref 1.8–2.4)
MCHC RBC-ENTMCNC: 30.7 PG — SIGNIFICANT CHANGE UP (ref 27–31)
MCHC RBC-ENTMCNC: 32.8 G/DL — SIGNIFICANT CHANGE UP (ref 32–37)
MCV RBC AUTO: 93.5 FL — SIGNIFICANT CHANGE UP (ref 81–99)
MONOCYTES # BLD AUTO: 0.61 K/UL — HIGH (ref 0.1–0.6)
MONOCYTES NFR BLD AUTO: 8.3 % — SIGNIFICANT CHANGE UP (ref 1.7–9.3)
NEUTROPHILS # BLD AUTO: 5.26 K/UL — SIGNIFICANT CHANGE UP (ref 1.4–6.5)
NEUTROPHILS NFR BLD AUTO: 71.5 % — SIGNIFICANT CHANGE UP (ref 42.2–75.2)
NRBC # BLD: 0 /100 WBCS — SIGNIFICANT CHANGE UP (ref 0–0)
PLATELET # BLD AUTO: 266 K/UL — SIGNIFICANT CHANGE UP (ref 130–400)
POTASSIUM SERPL-MCNC: 4.1 MMOL/L — SIGNIFICANT CHANGE UP (ref 3.5–5)
POTASSIUM SERPL-SCNC: 4.1 MMOL/L — SIGNIFICANT CHANGE UP (ref 3.5–5)
RBC # BLD: 4.3 M/UL — SIGNIFICANT CHANGE UP (ref 4.2–5.4)
RBC # FLD: 13.4 % — SIGNIFICANT CHANGE UP (ref 11.5–14.5)
SODIUM SERPL-SCNC: 141 MMOL/L — SIGNIFICANT CHANGE UP (ref 135–146)
WBC # BLD: 7.35 K/UL — SIGNIFICANT CHANGE UP (ref 4.8–10.8)
WBC # FLD AUTO: 7.35 K/UL — SIGNIFICANT CHANGE UP (ref 4.8–10.8)

## 2021-11-09 PROCEDURE — 99238 HOSP IP/OBS DSCHRG MGMT 30/<: CPT

## 2021-11-09 RX ORDER — SODIUM CHLORIDE 9 MG/ML
1000 INJECTION, SOLUTION INTRAVENOUS
Refills: 0 | Status: DISCONTINUED | OUTPATIENT
Start: 2021-11-09 | End: 2021-11-09

## 2021-11-09 RX ORDER — DEXTROSE 50 % IN WATER 50 %
15 SYRINGE (ML) INTRAVENOUS ONCE
Refills: 0 | Status: DISCONTINUED | OUTPATIENT
Start: 2021-11-09 | End: 2021-11-09

## 2021-11-09 RX ORDER — DEXTROSE 50 % IN WATER 50 %
12.5 SYRINGE (ML) INTRAVENOUS ONCE
Refills: 0 | Status: DISCONTINUED | OUTPATIENT
Start: 2021-11-09 | End: 2021-11-09

## 2021-11-09 RX ORDER — DEXTROSE 50 % IN WATER 50 %
25 SYRINGE (ML) INTRAVENOUS ONCE
Refills: 0 | Status: DISCONTINUED | OUTPATIENT
Start: 2021-11-09 | End: 2021-11-09

## 2021-11-09 RX ORDER — LABETALOL HCL 100 MG
200 TABLET ORAL ONCE
Refills: 0 | Status: COMPLETED | OUTPATIENT
Start: 2021-11-09 | End: 2021-11-09

## 2021-11-09 RX ORDER — ENOXAPARIN SODIUM 100 MG/ML
40 INJECTION SUBCUTANEOUS AT BEDTIME
Refills: 0 | Status: DISCONTINUED | OUTPATIENT
Start: 2021-11-09 | End: 2021-11-09

## 2021-11-09 RX ORDER — INSULIN LISPRO 100/ML
7 VIAL (ML) SUBCUTANEOUS
Refills: 0 | Status: DISCONTINUED | OUTPATIENT
Start: 2021-11-09 | End: 2021-11-09

## 2021-11-09 RX ORDER — INSULIN GLARGINE 100 [IU]/ML
21 INJECTION, SOLUTION SUBCUTANEOUS AT BEDTIME
Refills: 0 | Status: DISCONTINUED | OUTPATIENT
Start: 2021-11-09 | End: 2021-11-09

## 2021-11-09 RX ORDER — INSULIN LISPRO 100/ML
VIAL (ML) SUBCUTANEOUS
Refills: 0 | Status: DISCONTINUED | OUTPATIENT
Start: 2021-11-09 | End: 2021-11-09

## 2021-11-09 RX ORDER — GLUCAGON INJECTION, SOLUTION 0.5 MG/.1ML
1 INJECTION, SOLUTION SUBCUTANEOUS ONCE
Refills: 0 | Status: DISCONTINUED | OUTPATIENT
Start: 2021-11-09 | End: 2021-11-09

## 2021-11-09 RX ADMIN — PANTOPRAZOLE SODIUM 40 MILLIGRAM(S): 20 TABLET, DELAYED RELEASE ORAL at 05:44

## 2021-11-09 RX ADMIN — Medication 7 UNIT(S): at 11:39

## 2021-11-09 RX ADMIN — AMLODIPINE BESYLATE 10 MILLIGRAM(S): 2.5 TABLET ORAL at 05:44

## 2021-11-09 RX ADMIN — GABAPENTIN 300 MILLIGRAM(S): 400 CAPSULE ORAL at 17:14

## 2021-11-09 RX ADMIN — Medication 162 MILLIGRAM(S): at 11:36

## 2021-11-09 RX ADMIN — ENOXAPARIN SODIUM 40 MILLIGRAM(S): 100 INJECTION SUBCUTANEOUS at 21:13

## 2021-11-09 RX ADMIN — Medication 7 UNIT(S): at 08:03

## 2021-11-09 RX ADMIN — NORTRIPTYLINE HYDROCHLORIDE 25 MILLIGRAM(S): 10 CAPSULE ORAL at 21:13

## 2021-11-09 RX ADMIN — Medication 2: at 08:03

## 2021-11-09 RX ADMIN — ATORVASTATIN CALCIUM 80 MILLIGRAM(S): 80 TABLET, FILM COATED ORAL at 21:13

## 2021-11-09 RX ADMIN — Medication 20 MILLIGRAM(S): at 05:43

## 2021-11-09 RX ADMIN — SERTRALINE 150 MILLIGRAM(S): 25 TABLET, FILM COATED ORAL at 11:36

## 2021-11-09 RX ADMIN — Medication 325 MILLIGRAM(S): at 17:14

## 2021-11-09 RX ADMIN — GABAPENTIN 300 MILLIGRAM(S): 400 CAPSULE ORAL at 05:43

## 2021-11-09 RX ADMIN — Medication 200 MILLIGRAM(S): at 21:13

## 2021-11-09 RX ADMIN — TEMAZEPAM 30 MILLIGRAM(S): 15 CAPSULE ORAL at 01:21

## 2021-11-09 RX ADMIN — DONEPEZIL HYDROCHLORIDE 10 MILLIGRAM(S): 10 TABLET, FILM COATED ORAL at 21:13

## 2021-11-09 RX ADMIN — Medication 325 MILLIGRAM(S): at 05:44

## 2021-11-09 RX ADMIN — CLOPIDOGREL BISULFATE 75 MILLIGRAM(S): 75 TABLET, FILM COATED ORAL at 11:38

## 2021-11-09 RX ADMIN — Medication 25 MICROGRAM(S): at 05:44

## 2021-11-09 NOTE — DISCHARGE NOTE PROVIDER - NSDCFUSCHEDAPPT_GEN_ALL_CORE_FT
JAYASHREE FAGAN ; 12/30/2021 ; [facility] [address] [facility]

## 2021-11-09 NOTE — DISCHARGE NOTE PROVIDER - CARE PROVIDER_API CALL
Ariel Macias)  Internal Medicine  [address] [place] [address]  Phone: [phone]  Fax: [phone]  Follow Up Time: 1 week

## 2021-11-09 NOTE — DISCHARGE NOTE PROVIDER - NSDCCPCAREPLAN_GEN_ALL_CORE_FT
PRINCIPAL DISCHARGE DIAGNOSIS  Diagnosis: Hyperglycemia  Assessment and Plan of Treatment: Continue your home dose of levemir and humalog. Continue to monitor your blood sugar. Continue carbohydrated controlled diet. Follow up with Primary care physician

## 2021-11-09 NOTE — DISCHARGE NOTE PROVIDER - NSDCHOSPICE_GEN_A_CORE
Patient calling states that she is no longer taking the Meloxicam, states that she does take it once in a while.    Is requesting refill of pantoprazole. Please advise.   No

## 2021-11-09 NOTE — DISCHARGE NOTE NURSING/CASE MANAGEMENT/SOCIAL WORK - PATIENT PORTAL LINK FT
You can access the FollowMyHealth Patient Portal offered by [facility] by registering at the following website: [email or web address]. By joining [facility]’s FollowMyHealth portal, you will also be able to view your health information using other applications (apps) compatible with our system.

## 2021-11-09 NOTE — DISCHARGE NOTE PROVIDER - HOSPITAL COURSE
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  # CAD  # CVA w/ residual LUE weakness  - on aspirin, Plavix and statin    # HTN   - c/w amlodipine, Lasix     # HLD  - c/w atorvastatin     #CKD 3B - stable   - Cr on admission 1.4 appears to be around her baseline  - monitor Cr with BMP    # Hypothyroidism  - c/w levothyroxine    # Dementia   # Depression   - c/w donepezil   - c/w nortriptyline sertraline     # Breast CA in remission  - on exemestane 25mg daily which is non formulary    # DVT PPx: Lovenox    # GI PPx: Protonix     # Diet: DASH w/ carb consistency    82 y/o F w/ PMH of DM II, HLD, HTN, CVA with residual LUE weakness, Breast CA in remission, CAD, s/p PPM, CKD 3b/4, Hypothyroidism presents to the ED for elevated  blood sugar. Patient was stable in ER but needed hospitalization because of delay in aide service arrangement. Patient monitored and continued at her baseline without new  complaints. Patient is discharged to home with home aide service. 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

## 2021-11-09 NOTE — DISCHARGE NOTE PROVIDER - NSDCMRMEDTOKEN_GEN_ALL_CORE_FT
amLODIPine 10 mg oral tablet: 1 tab(s) orally once a day  atorvastatin 80 mg oral tablet: 1 tab(s) orally once a day (at bedtime)  clopidogrel 75 mg oral tablet: 1 tab(s) orally once a day  Ecotrin Adult Low Strength 81 mg oral delayed release tablet: 2 tab(s) orally once a day  exemestane 25 mg oral tablet: 1 tab(s) orally once a day  ferrous sulfate 324 mg (65 mg elemental iron) oral tablet: 1  orally 2 times a day  gabapentin 300 mg oral capsule: orally 2 times a day  HumaLOG 100 units/mL subcutaneous solution: Sliding scale 3 times a day (before meals)  Lasix 20 mg oral tablet: 1 tab(s) orally once a day  Levemir:   levothyroxine 25 mcg (0.025 mg) oral tablet: 1 tab(s) orally once a day  nortriptyline 25 mg oral capsule: 1 cap(s) orally once a day (at bedtime)  pantoprazole 40 mg oral delayed release tablet: 1 tab(s) orally once a day (before a meal)  senna oral tablet: 2 tab(s) orally once a day (at bedtime)- PRN for constipation- OTC is okay  sertraline 100 mg oral tablet: 1 tab(s) orally once a day  sertraline 50 mg oral tablet: 1 tab(s) orally once a day  temazepam 30 mg oral capsule: 1 cap(s) orally once a day (at bedtime)   amLODIPine 10 mg oral tablet: 1 tab(s) orally once a day  atorvastatin 80 mg oral tablet: 1 tab(s) orally once a day (at bedtime)  clopidogrel 75 mg oral tablet: 1 tab(s) orally once a day  Ecotrin Adult Low Strength 81 mg oral delayed release tablet: 2 tab(s) orally once a day  exemestane 25 mg oral tablet: 1 tab(s) orally once a day  ferrous sulfate 324 mg (65 mg elemental iron) oral tablet: 1  orally 2 times a day  gabapentin 300 mg oral capsule: orally 2 times a day  HumaLOG 100 units/mL subcutaneous solution: Sliding scale 3 times a day (before meals)  Lasix 20 mg oral tablet: 1 tab(s) orally once a day  Levemir: 30  subcutaneous once a day (at bedtime)  levothyroxine 25 mcg (0.025 mg) oral tablet: 1 tab(s) orally once a day  nortriptyline 25 mg oral capsule: 1 cap(s) orally once a day (at bedtime)  pantoprazole 40 mg oral delayed release tablet: 1 tab(s) orally once a day (before a meal)  senna oral tablet: 2 tab(s) orally once a day (at bedtime)- PRN for constipation- OTC is okay  sertraline 100 mg oral tablet: 1 tab(s) orally once a day  sertraline 50 mg oral tablet: 1 tab(s) orally once a day  temazepam 30 mg oral capsule: 1 cap(s) orally once a day (at bedtime)

## 2021-11-10 ENCOUNTER — INPATIENT (INPATIENT)
Facility: HOSPITAL | Age: 81
LOS: 1 days | Discharge: HOME | End: 2021-11-12
Attending: HOSPITALIST | Admitting: HOSPITALIST
Payer: MEDICARE

## 2021-11-10 VITALS
RESPIRATION RATE: 18 BRPM | HEIGHT: 60 IN | TEMPERATURE: 98 F | WEIGHT: 270.07 LBS | SYSTOLIC BLOOD PRESSURE: 127 MMHG | DIASTOLIC BLOOD PRESSURE: 74 MMHG | HEART RATE: 81 BPM | OXYGEN SATURATION: 100 %

## 2021-11-10 DIAGNOSIS — Z96.651 PRESENCE OF RIGHT ARTIFICIAL KNEE JOINT: Chronic | ICD-10-CM

## 2021-11-10 DIAGNOSIS — Z90.49 ACQUIRED ABSENCE OF OTHER SPECIFIED PARTS OF DIGESTIVE TRACT: Chronic | ICD-10-CM

## 2021-11-10 DIAGNOSIS — Z98.890 OTHER SPECIFIED POSTPROCEDURAL STATES: Chronic | ICD-10-CM

## 2021-11-10 LAB
ALBUMIN SERPL ELPH-MCNC: 3.6 G/DL — SIGNIFICANT CHANGE UP (ref 3.5–5.2)
ALP SERPL-CCNC: 142 U/L — HIGH (ref 30–115)
ALT FLD-CCNC: 16 U/L — SIGNIFICANT CHANGE UP (ref 0–41)
ANION GAP SERPL CALC-SCNC: 11 MMOL/L — SIGNIFICANT CHANGE UP (ref 7–14)
AST SERPL-CCNC: 25 U/L — SIGNIFICANT CHANGE UP (ref 0–41)
B-OH-BUTYR SERPL-SCNC: <0.2 MMOL/L — SIGNIFICANT CHANGE UP
BASE EXCESS BLDV CALC-SCNC: 6.4 MMOL/L — HIGH (ref -2–3)
BASOPHILS # BLD AUTO: 0.04 K/UL — SIGNIFICANT CHANGE UP (ref 0–0.2)
BASOPHILS NFR BLD AUTO: 0.6 % — SIGNIFICANT CHANGE UP (ref 0–1)
BILIRUB SERPL-MCNC: 0.3 MG/DL — SIGNIFICANT CHANGE UP (ref 0.2–1.2)
BUN SERPL-MCNC: 23 MG/DL — HIGH (ref 10–20)
CA-I SERPL-SCNC: 1.12 MMOL/L — LOW (ref 1.15–1.33)
CALCIUM SERPL-MCNC: 8.9 MG/DL — SIGNIFICANT CHANGE UP (ref 8.5–10.1)
CHLORIDE SERPL-SCNC: 98 MMOL/L — SIGNIFICANT CHANGE UP (ref 98–110)
CO2 SERPL-SCNC: 28 MMOL/L — SIGNIFICANT CHANGE UP (ref 17–32)
COVID-19 NUCLEOCAPSID GAM AB INTERP: NEGATIVE — SIGNIFICANT CHANGE UP
COVID-19 NUCLEOCAPSID TOTAL GAM ANTIBODY RESULT: 0.08 INDEX — SIGNIFICANT CHANGE UP
COVID-19 SPIKE DOMAIN AB INTERP: POSITIVE
COVID-19 SPIKE DOMAIN ANTIBODY RESULT: >250 U/ML — HIGH
CREAT SERPL-MCNC: 1.5 MG/DL — SIGNIFICANT CHANGE UP (ref 0.7–1.5)
EOSINOPHIL # BLD AUTO: 0.18 K/UL — SIGNIFICANT CHANGE UP (ref 0–0.7)
EOSINOPHIL NFR BLD AUTO: 2.8 % — SIGNIFICANT CHANGE UP (ref 0–8)
GAS PNL BLDV: 137 MMOL/L — SIGNIFICANT CHANGE UP (ref 136–145)
GAS PNL BLDV: SIGNIFICANT CHANGE UP
GLUCOSE BLDC GLUCOMTR-MCNC: 245 MG/DL — HIGH (ref 70–99)
GLUCOSE SERPL-MCNC: 356 MG/DL — HIGH (ref 70–99)
HCO3 BLDV-SCNC: 34 MMOL/L — HIGH (ref 22–29)
HCT VFR BLD CALC: 36.3 % — LOW (ref 37–47)
HCT VFR BLDA CALC: 36 % — SIGNIFICANT CHANGE UP (ref 34.5–46.5)
HGB BLD CALC-MCNC: 12.1 G/DL — SIGNIFICANT CHANGE UP (ref 11.7–16.1)
HGB BLD-MCNC: 11.6 G/DL — LOW (ref 12–16)
IMM GRANULOCYTES NFR BLD AUTO: 0.6 % — HIGH (ref 0.1–0.3)
LACTATE BLDV-MCNC: 1 MMOL/L — SIGNIFICANT CHANGE UP (ref 0.5–2)
LYMPHOCYTES # BLD AUTO: 1.2 K/UL — SIGNIFICANT CHANGE UP (ref 1.2–3.4)
LYMPHOCYTES # BLD AUTO: 18.4 % — LOW (ref 20.5–51.1)
MAGNESIUM SERPL-MCNC: 2 MG/DL — SIGNIFICANT CHANGE UP (ref 1.8–2.4)
MCHC RBC-ENTMCNC: 30.3 PG — SIGNIFICANT CHANGE UP (ref 27–31)
MCHC RBC-ENTMCNC: 32 G/DL — SIGNIFICANT CHANGE UP (ref 32–37)
MCV RBC AUTO: 94.8 FL — SIGNIFICANT CHANGE UP (ref 81–99)
MONOCYTES # BLD AUTO: 0.71 K/UL — HIGH (ref 0.1–0.6)
MONOCYTES NFR BLD AUTO: 10.9 % — HIGH (ref 1.7–9.3)
NEUTROPHILS # BLD AUTO: 4.36 K/UL — SIGNIFICANT CHANGE UP (ref 1.4–6.5)
NEUTROPHILS NFR BLD AUTO: 66.7 % — SIGNIFICANT CHANGE UP (ref 42.2–75.2)
NRBC # BLD: 0 /100 WBCS — SIGNIFICANT CHANGE UP (ref 0–0)
PCO2 BLDV: 63 MMHG — HIGH (ref 39–42)
PH BLDV: 7.34 — SIGNIFICANT CHANGE UP (ref 7.32–7.43)
PLATELET # BLD AUTO: 199 K/UL — SIGNIFICANT CHANGE UP (ref 130–400)
PO2 BLDV: 41 MMHG — SIGNIFICANT CHANGE UP
POTASSIUM BLDV-SCNC: 4.5 MMOL/L — SIGNIFICANT CHANGE UP (ref 3.5–5.1)
POTASSIUM SERPL-MCNC: 4 MMOL/L — SIGNIFICANT CHANGE UP (ref 3.5–5)
POTASSIUM SERPL-SCNC: 4 MMOL/L — SIGNIFICANT CHANGE UP (ref 3.5–5)
PROT SERPL-MCNC: 6.3 G/DL — SIGNIFICANT CHANGE UP (ref 6–8)
RBC # BLD: 3.83 M/UL — LOW (ref 4.2–5.4)
RBC # FLD: 13.4 % — SIGNIFICANT CHANGE UP (ref 11.5–14.5)
SAO2 % BLDV: 70.3 % — SIGNIFICANT CHANGE UP
SARS-COV-2 IGG+IGM SERPL QL IA: 0.08 INDEX — SIGNIFICANT CHANGE UP
SARS-COV-2 IGG+IGM SERPL QL IA: >250 U/ML — HIGH
SARS-COV-2 IGG+IGM SERPL QL IA: NEGATIVE — SIGNIFICANT CHANGE UP
SARS-COV-2 IGG+IGM SERPL QL IA: POSITIVE
SARS-COV-2 RNA SPEC QL NAA+PROBE: SIGNIFICANT CHANGE UP
SODIUM SERPL-SCNC: 137 MMOL/L — SIGNIFICANT CHANGE UP (ref 135–146)
TROPONIN T SERPL-MCNC: 0.02 NG/ML — HIGH
WBC # BLD: 6.53 K/UL — SIGNIFICANT CHANGE UP (ref 4.8–10.8)
WBC # FLD AUTO: 6.53 K/UL — SIGNIFICANT CHANGE UP (ref 4.8–10.8)

## 2021-11-10 PROCEDURE — 71045 X-RAY EXAM CHEST 1 VIEW: CPT | Mod: 26

## 2021-11-10 PROCEDURE — 93010 ELECTROCARDIOGRAM REPORT: CPT

## 2021-11-10 PROCEDURE — 99221 1ST HOSP IP/OBS SF/LOW 40: CPT

## 2021-11-10 PROCEDURE — 99285 EMERGENCY DEPT VISIT HI MDM: CPT

## 2021-11-10 RX ORDER — INSULIN LISPRO 100/ML
VIAL (ML) SUBCUTANEOUS
Refills: 0 | Status: DISCONTINUED | OUTPATIENT
Start: 2021-11-10 | End: 2021-11-11

## 2021-11-10 RX ORDER — TEMAZEPAM 15 MG/1
15 CAPSULE ORAL AT BEDTIME
Refills: 0 | Status: DISCONTINUED | OUTPATIENT
Start: 2021-11-10 | End: 2021-11-12

## 2021-11-10 RX ORDER — FERROUS SULFATE 325(65) MG
325 TABLET ORAL
Refills: 0 | Status: DISCONTINUED | OUTPATIENT
Start: 2021-11-10 | End: 2021-11-12

## 2021-11-10 RX ORDER — DEXTROSE 50 % IN WATER 50 %
12.5 SYRINGE (ML) INTRAVENOUS ONCE
Refills: 0 | Status: DISCONTINUED | OUTPATIENT
Start: 2021-11-10 | End: 2021-11-12

## 2021-11-10 RX ORDER — INSULIN LISPRO 100/ML
8 VIAL (ML) SUBCUTANEOUS
Refills: 0 | Status: DISCONTINUED | OUTPATIENT
Start: 2021-11-10 | End: 2021-11-11

## 2021-11-10 RX ORDER — SERTRALINE 25 MG/1
150 TABLET, FILM COATED ORAL DAILY
Refills: 0 | Status: DISCONTINUED | OUTPATIENT
Start: 2021-11-10 | End: 2021-11-12

## 2021-11-10 RX ORDER — DEXTROSE 50 % IN WATER 50 %
25 SYRINGE (ML) INTRAVENOUS ONCE
Refills: 0 | Status: DISCONTINUED | OUTPATIENT
Start: 2021-11-10 | End: 2021-11-12

## 2021-11-10 RX ORDER — INSULIN GLARGINE 100 [IU]/ML
1 INJECTION, SOLUTION SUBCUTANEOUS AT BEDTIME
Refills: 0 | Status: DISCONTINUED | OUTPATIENT
Start: 2021-11-10 | End: 2021-11-10

## 2021-11-10 RX ORDER — GABAPENTIN 400 MG/1
300 CAPSULE ORAL
Refills: 0 | Status: DISCONTINUED | OUTPATIENT
Start: 2021-11-10 | End: 2021-11-12

## 2021-11-10 RX ORDER — INSULIN LISPRO 100/ML
1 VIAL (ML) SUBCUTANEOUS
Refills: 0 | Status: DISCONTINUED | OUTPATIENT
Start: 2021-11-10 | End: 2021-11-10

## 2021-11-10 RX ORDER — FUROSEMIDE 40 MG
20 TABLET ORAL DAILY
Refills: 0 | Status: DISCONTINUED | OUTPATIENT
Start: 2021-11-10 | End: 2021-11-12

## 2021-11-10 RX ORDER — ASPIRIN/CALCIUM CARB/MAGNESIUM 324 MG
162 TABLET ORAL DAILY
Refills: 0 | Status: DISCONTINUED | OUTPATIENT
Start: 2021-11-10 | End: 2021-11-12

## 2021-11-10 RX ORDER — NORTRIPTYLINE HYDROCHLORIDE 10 MG/1
25 CAPSULE ORAL AT BEDTIME
Refills: 0 | Status: DISCONTINUED | OUTPATIENT
Start: 2021-11-10 | End: 2021-11-12

## 2021-11-10 RX ORDER — SENNA PLUS 8.6 MG/1
2 TABLET ORAL AT BEDTIME
Refills: 0 | Status: DISCONTINUED | OUTPATIENT
Start: 2021-11-10 | End: 2021-11-12

## 2021-11-10 RX ORDER — AMLODIPINE BESYLATE 2.5 MG/1
10 TABLET ORAL DAILY
Refills: 0 | Status: DISCONTINUED | OUTPATIENT
Start: 2021-11-10 | End: 2021-11-12

## 2021-11-10 RX ORDER — HEPARIN SODIUM 5000 [USP'U]/ML
5000 INJECTION INTRAVENOUS; SUBCUTANEOUS
Refills: 0 | Status: DISCONTINUED | OUTPATIENT
Start: 2021-11-10 | End: 2021-11-12

## 2021-11-10 RX ORDER — SODIUM CHLORIDE 9 MG/ML
1000 INJECTION, SOLUTION INTRAVENOUS
Refills: 0 | Status: DISCONTINUED | OUTPATIENT
Start: 2021-11-10 | End: 2021-11-12

## 2021-11-10 RX ORDER — ATORVASTATIN CALCIUM 80 MG/1
80 TABLET, FILM COATED ORAL AT BEDTIME
Refills: 0 | Status: DISCONTINUED | OUTPATIENT
Start: 2021-11-10 | End: 2021-11-12

## 2021-11-10 RX ORDER — GLUCAGON INJECTION, SOLUTION 0.5 MG/.1ML
1 INJECTION, SOLUTION SUBCUTANEOUS ONCE
Refills: 0 | Status: DISCONTINUED | OUTPATIENT
Start: 2021-11-10 | End: 2021-11-12

## 2021-11-10 RX ORDER — INSULIN GLARGINE 100 [IU]/ML
30 INJECTION, SOLUTION SUBCUTANEOUS AT BEDTIME
Refills: 0 | Status: DISCONTINUED | OUTPATIENT
Start: 2021-11-10 | End: 2021-11-12

## 2021-11-10 RX ORDER — LEVOTHYROXINE SODIUM 125 MCG
25 TABLET ORAL
Refills: 0 | Status: DISCONTINUED | OUTPATIENT
Start: 2021-11-10 | End: 2021-11-12

## 2021-11-10 RX ORDER — DEXTROSE 50 % IN WATER 50 %
15 SYRINGE (ML) INTRAVENOUS ONCE
Refills: 0 | Status: DISCONTINUED | OUTPATIENT
Start: 2021-11-10 | End: 2021-11-12

## 2021-11-10 RX ORDER — CLOPIDOGREL BISULFATE 75 MG/1
75 TABLET, FILM COATED ORAL DAILY
Refills: 0 | Status: DISCONTINUED | OUTPATIENT
Start: 2021-11-10 | End: 2021-11-12

## 2021-11-10 RX ORDER — PANTOPRAZOLE SODIUM 20 MG/1
40 TABLET, DELAYED RELEASE ORAL
Refills: 0 | Status: DISCONTINUED | OUTPATIENT
Start: 2021-11-10 | End: 2021-11-12

## 2021-11-10 RX ADMIN — INSULIN GLARGINE 30 UNIT(S): 100 INJECTION, SOLUTION SUBCUTANEOUS at 23:06

## 2021-11-10 NOTE — ED ADULT TRIAGE NOTE - TEMPERATURE IN CELSIUS (DEGREES C)
07/08/18 1402   Final Note   Assessment Type Final Discharge Note   Discharge Disposition Home   What phone number can be called within the next 1-3 days to see how you are doing after discharge? ([phone] )   Hospital Follow Up  Appt(s) scheduled? Yes   Discharge plans and expectations educations in teach back method with documentation complete? Yes   Right Care Referral Info   Post Acute Recommendation No Care      36.6

## 2021-11-10 NOTE — ED PROVIDER NOTE - CARE PLAN
Principal Discharge DX:	Weakness  Secondary Diagnosis:	Elevated troponin  Secondary Diagnosis:	Hyperglycemia   1

## 2021-11-10 NOTE — H&P ADULT - NSHPLABSRESULTS_GEN_ALL_CORE
11.6   6.53  )-----------( 199      ( 10 Nov 2021 17:40 )             36.3     11-10    138  |  100  |  23<H>  ----------------------------<  269<H>  3.7   |  27  |  1.5    Ca    9.1      10 Nov 2021 21:30  Mg     2.0     11-10    TPro  6.3  /  Alb  3.6  /  TBili  0.3  /  DBili  x   /  AST  25  /  ALT  16  /  AlkPhos  142<H>  11-10              Lactate Trend    CARDIAC MARKERS ( 10 Nov 2021 21:30 )  x     / 0.02 ng/mL / x     / x     / x      CARDIAC MARKERS ( 10 Nov 2021 17:40 )  x     / 0.02 ng/mL / x     / x     / x          CAPILLARY BLOOD GLUCOSE      POCT Blood Glucose.: 245 mg/dL (10 Nov 2021 22:28) 11.6   6.53  )-----------( 199      ( 10 Nov 2021 17:40 )             36.3     11-10    138  |  100  |  23<H>  ----------------------------<  269<H>  3.7   |  27  |  1.5    Ca    9.1      10 Nov 2021 21:30  Mg     2.0     11-10    TPro  6.3  /  Alb  3.6  /  TBili  0.3  /  DBili  x   /  AST  25  /  ALT  16  /  AlkPhos  142<H>  11-10              Lactate Trend    CARDIAC MARKERS ( 10 Nov 2021 21:30 )  x     / 0.02 ng/mL / x     / x     / x      CARDIAC MARKERS ( 10 Nov 2021 17:40 )  x     / 0.02 ng/mL / x     / x     / x          CAPILLARY BLOOD GLUCOSE      POCT Blood Glucose.: 245 mg/dL (10 Nov 2021 22:28)    EKG (per ER) - sinus, no lashanda

## 2021-11-10 NOTE — ED PROVIDER NOTE - PHYSICAL EXAMINATION
Physical Exam    Vital Signs: I have reviewed the initial vital signs.  Constitutional: well-nourished, appears stated age, no acute distress, obese female   Eyes: Conjunctiva pink, Sclera clear,  Cardiovascular: S1 and S2, regular rate, regular rhythm, well-perfused extremities, radial pulses equal and 2+  Respiratory: unlabored respiratory effort, clear to auscultation bilaterally no wheezing, rales and rhonchi  Gastrointestinal: soft, non-tender abdomen, no pulsatile mass, normal bowl sounds  Musculoskeletal: supple neck, no lower extremity edema, no midline tenderness  Integumentary: warm, dry, no rash  Neurologic: awake, alert, nvi

## 2021-11-10 NOTE — ED PROVIDER NOTE - CLINICAL SUMMARY MEDICAL DECISION MAKING FREE TEXT BOX
81 yr old f that presents with hyperglycemia/weakness  -labs  -ekg  -imaging  -ua  -ivf  -pt found to have a troponin of 0.02  -pt admitted to medicine

## 2021-11-10 NOTE — H&P ADULT - ASSESSMENT
Poorly controlled type 2 DM on insulin- monitor while in hospital    Elevated troponin- though I doubt cardiac will trend in hospital    Episode of dyspnea in ER- monitor for recurrence    Medication  management- used EMR from Oct, 2021 Ex[employer] is non formulary) would review when possible  Poorly controlled type 2 DM on insulin- monitor while in hospital    Elevated troponin- though I doubt cardiac will trend in hospital    Episode of dyspnea in ER- monitor for recurrence    Medication  management- used EMR from Oct, 2021 Exemestane is non formulary) would review when possible     Addendum 11/11/2021 at 2018- Based on chart review, patient is characterized as functional quadriplegia on admission

## 2021-11-10 NOTE — H&P ADULT - HISTORY OF PRESENT ILLNESS
(Patient is a poor historian) 80yo female whose PMH includes HTN, HLD, DM type 2 and CVA with left sided weakness,  is being admitted due to hyperglycemia. While in the ER she was also found to have an elevated troponin (she denies chest pain), and had transient period of dyspnea (now gone)

## 2021-11-10 NOTE — ED PROVIDER NOTE - ATTENDING CONTRIBUTION TO CARE
81 yr old f w/ a pmh significant for DM, CVA, breast cancer, depression, HLD who presents with hyperglycemia and weakness. Pt and home health aid state that today pt appeared weak and did not want to participate in physical therapy. Of note, as per EMS, pts FSG was >500 in the field. Pt was not given any meds. Pt denies any chest pain, sob, vomiting, abd pain, or any other complaints.     VITAL SIGNS: I have reviewed nursing notes and confirm.  CONSTITUTIONAL: non-toxic, well appearing  SKIN: no rash, no petechiae.  EYES: EOMI, pink conjunctiva, anicteric  ENT: tongue midline, no exudates, MMM  NECK: Supple; no meningismus, no JVD  CARD: RRR, no murmurs, equal radial pulses bilaterally 2+  RESP: CTAB, no respiratory distress  ABD: Soft, non-tender, non-distended, no peritoneal signs, no HSM, no CVA tenderness  EXT: Normal ROM x4.   NEURO: Alert, oriented x3   PSYCH: Cooperative, appropriate.    a/p  81 yr old f that presents with hyperglycemia/weakness  -labs  -ekg  -imaging  -ua  -ivf  -reassesses  -dispo pending

## 2021-11-11 LAB
A1C WITH ESTIMATED AVERAGE GLUCOSE RESULT: 8.6 % — HIGH (ref 4–5.6)
ALBUMIN SERPL ELPH-MCNC: 3.9 G/DL — SIGNIFICANT CHANGE UP (ref 3.5–5.2)
ALP SERPL-CCNC: 125 U/L — HIGH (ref 30–115)
ALT FLD-CCNC: 16 U/L — SIGNIFICANT CHANGE UP (ref 0–41)
ANION GAP SERPL CALC-SCNC: 12 MMOL/L — SIGNIFICANT CHANGE UP (ref 7–14)
AST SERPL-CCNC: 20 U/L — SIGNIFICANT CHANGE UP (ref 0–41)
BILIRUB SERPL-MCNC: 0.3 MG/DL — SIGNIFICANT CHANGE UP (ref 0.2–1.2)
BUN SERPL-MCNC: 23 MG/DL — HIGH (ref 10–20)
CALCIUM SERPL-MCNC: 9.3 MG/DL — SIGNIFICANT CHANGE UP (ref 8.5–10.1)
CHLORIDE SERPL-SCNC: 101 MMOL/L — SIGNIFICANT CHANGE UP (ref 98–110)
CK MB CFR SERPL CALC: 2.8 NG/ML — SIGNIFICANT CHANGE UP (ref 0.6–6.3)
CK SERPL-CCNC: 84 U/L — SIGNIFICANT CHANGE UP (ref 0–225)
CO2 SERPL-SCNC: 31 MMOL/L — SIGNIFICANT CHANGE UP (ref 17–32)
CREAT SERPL-MCNC: 1.5 MG/DL — SIGNIFICANT CHANGE UP (ref 0.7–1.5)
CULTURE RESULTS: SIGNIFICANT CHANGE UP
ESTIMATED AVERAGE GLUCOSE: 200 MG/DL — HIGH (ref 68–114)
GLUCOSE BLDC GLUCOMTR-MCNC: 127 MG/DL — HIGH (ref 70–99)
GLUCOSE BLDC GLUCOMTR-MCNC: 153 MG/DL — HIGH (ref 70–99)
GLUCOSE BLDC GLUCOMTR-MCNC: 195 MG/DL — HIGH (ref 70–99)
GLUCOSE BLDC GLUCOMTR-MCNC: 203 MG/DL — HIGH (ref 70–99)
GLUCOSE BLDC GLUCOMTR-MCNC: 70 MG/DL — SIGNIFICANT CHANGE UP (ref 70–99)
GLUCOSE SERPL-MCNC: 159 MG/DL — HIGH (ref 70–99)
HCT VFR BLD CALC: 38.3 % — SIGNIFICANT CHANGE UP (ref 37–47)
HGB BLD-MCNC: 12.2 G/DL — SIGNIFICANT CHANGE UP (ref 12–16)
MCHC RBC-ENTMCNC: 30.1 PG — SIGNIFICANT CHANGE UP (ref 27–31)
MCHC RBC-ENTMCNC: 31.9 G/DL — LOW (ref 32–37)
MCV RBC AUTO: 94.6 FL — SIGNIFICANT CHANGE UP (ref 81–99)
NRBC # BLD: 0 /100 WBCS — SIGNIFICANT CHANGE UP (ref 0–0)
OSMOLALITY SERPL: 311 MOS/KG — HIGH (ref 280–301)
PLATELET # BLD AUTO: 221 K/UL — SIGNIFICANT CHANGE UP (ref 130–400)
POTASSIUM SERPL-MCNC: 3.9 MMOL/L — SIGNIFICANT CHANGE UP (ref 3.5–5)
POTASSIUM SERPL-SCNC: 3.9 MMOL/L — SIGNIFICANT CHANGE UP (ref 3.5–5)
PROT SERPL-MCNC: 6.5 G/DL — SIGNIFICANT CHANGE UP (ref 6–8)
RBC # BLD: 4.05 M/UL — LOW (ref 4.2–5.4)
RBC # FLD: 13.3 % — SIGNIFICANT CHANGE UP (ref 11.5–14.5)
SODIUM SERPL-SCNC: 144 MMOL/L — SIGNIFICANT CHANGE UP (ref 135–146)
SPECIMEN SOURCE: SIGNIFICANT CHANGE UP
TROPONIN T SERPL-MCNC: 0.02 NG/ML — HIGH
WBC # BLD: 7.09 K/UL — SIGNIFICANT CHANGE UP (ref 4.8–10.8)
WBC # FLD AUTO: 7.09 K/UL — SIGNIFICANT CHANGE UP (ref 4.8–10.8)

## 2021-11-11 PROCEDURE — 93010 ELECTROCARDIOGRAM REPORT: CPT

## 2021-11-11 PROCEDURE — 99231 SBSQ HOSP IP/OBS SF/LOW 25: CPT

## 2021-11-11 RX ORDER — INFLUENZA VIRUS VACCINE 15; 15; 15; 15 UG/.5ML; UG/.5ML; UG/.5ML; UG/.5ML
0.7 SUSPENSION INTRAMUSCULAR ONCE
Refills: 0 | Status: COMPLETED | OUTPATIENT
Start: 2021-11-11 | End: 2021-11-12

## 2021-11-11 RX ORDER — INSULIN LISPRO 100/ML
10 VIAL (ML) SUBCUTANEOUS
Refills: 0 | Status: DISCONTINUED | OUTPATIENT
Start: 2021-11-11 | End: 2021-11-12

## 2021-11-11 RX ORDER — ACETAMINOPHEN 500 MG
650 TABLET ORAL EVERY 6 HOURS
Refills: 0 | Status: DISCONTINUED | OUTPATIENT
Start: 2021-11-11 | End: 2021-11-12

## 2021-11-11 RX ORDER — INSULIN LISPRO 100/ML
VIAL (ML) SUBCUTANEOUS
Refills: 0 | Status: DISCONTINUED | OUTPATIENT
Start: 2021-11-11 | End: 2021-11-12

## 2021-11-11 RX ADMIN — Medication 2: at 17:24

## 2021-11-11 RX ADMIN — NORTRIPTYLINE HYDROCHLORIDE 25 MILLIGRAM(S): 10 CAPSULE ORAL at 21:33

## 2021-11-11 RX ADMIN — Medication 650 MILLIGRAM(S): at 20:05

## 2021-11-11 RX ADMIN — PANTOPRAZOLE SODIUM 40 MILLIGRAM(S): 20 TABLET, DELAYED RELEASE ORAL at 12:43

## 2021-11-11 RX ADMIN — Medication 20 MILLIGRAM(S): at 06:24

## 2021-11-11 RX ADMIN — ATORVASTATIN CALCIUM 80 MILLIGRAM(S): 80 TABLET, FILM COATED ORAL at 21:33

## 2021-11-11 RX ADMIN — SERTRALINE 150 MILLIGRAM(S): 25 TABLET, FILM COATED ORAL at 17:25

## 2021-11-11 RX ADMIN — GABAPENTIN 300 MILLIGRAM(S): 400 CAPSULE ORAL at 06:24

## 2021-11-11 RX ADMIN — Medication 25 MICROGRAM(S): at 06:25

## 2021-11-11 RX ADMIN — GABAPENTIN 300 MILLIGRAM(S): 400 CAPSULE ORAL at 17:26

## 2021-11-11 RX ADMIN — CLOPIDOGREL BISULFATE 75 MILLIGRAM(S): 75 TABLET, FILM COATED ORAL at 12:44

## 2021-11-11 RX ADMIN — TEMAZEPAM 15 MILLIGRAM(S): 15 CAPSULE ORAL at 22:57

## 2021-11-11 RX ADMIN — HEPARIN SODIUM 5000 UNIT(S): 5000 INJECTION INTRAVENOUS; SUBCUTANEOUS at 06:26

## 2021-11-11 RX ADMIN — Medication 325 MILLIGRAM(S): at 06:25

## 2021-11-11 RX ADMIN — Medication 325 MILLIGRAM(S): at 17:26

## 2021-11-11 RX ADMIN — HEPARIN SODIUM 5000 UNIT(S): 5000 INJECTION INTRAVENOUS; SUBCUTANEOUS at 17:27

## 2021-11-11 RX ADMIN — Medication 8 UNIT(S): at 12:07

## 2021-11-11 RX ADMIN — Medication 162 MILLIGRAM(S): at 12:44

## 2021-11-11 RX ADMIN — AMLODIPINE BESYLATE 10 MILLIGRAM(S): 2.5 TABLET ORAL at 06:30

## 2021-11-11 RX ADMIN — Medication 2: at 12:08

## 2021-11-11 RX ADMIN — Medication 10 UNIT(S): at 17:24

## 2021-11-11 NOTE — CONSULT NOTE ADULT - SUBJECTIVE AND OBJECTIVE BOX
HPI:  81 y o female whose PMH includes HTN, HLD, DM type 2 and CVA with left sided weakness,  is being admitted due to hyperglycemia. While in the ER she was also found to have an elevated troponin (she denies chest pain), and had transient period of dyspnea .  ptn  seen and exam  at  bed  side she  is  poor  hx  ptn  has  24 hr home care  and  has  wc and  onel  as  per  ptn      PTN  REFERRED TO ACUTE  REHAB  FOR  EVAL AND  TX   PAST MEDICAL & SURGICAL HISTORY:  DM (diabetes mellitus)  24 yr    Breast CA  2014 s/p rt    BP (high blood pressure)  20 yr    Depression    High cholesterol    Bilateral hearing loss, unspecified hearing loss type    CVA (cerebral vascular accident)  6/18 lt weakness    H/O total knee replacement, right    History of lumpectomy of right breast    History of cholecystectomy  1992        Hospital Course:    TODAY'S SUBJECTIVE & REVIEW OF SYMPTOMS:     Constitutional WNL   Cardio WNL   Resp WNL   GI WNL  Heme WNL  Endo WNL  Skin WNL  MSK WNL  Neuro WNL  Cognitive WNL  Psych WNL      MEDICATIONS  (STANDING):  amLODIPine   Tablet 10 milliGRAM(s) Oral daily  aspirin enteric coated 162 milliGRAM(s) Oral daily  atorvastatin 80 milliGRAM(s) Oral at bedtime  clopidogrel Tablet 75 milliGRAM(s) Oral daily  dextrose 40% Gel 15 Gram(s) Oral once  dextrose 5%. 1000 milliLiter(s) (50 mL/Hr) IV Continuous <Continuous>  dextrose 5%. 1000 milliLiter(s) (100 mL/Hr) IV Continuous <Continuous>  dextrose 50% Injectable 25 Gram(s) IV Push once  dextrose 50% Injectable 12.5 Gram(s) IV Push once  dextrose 50% Injectable 25 Gram(s) IV Push once  ferrous    sulfate 325 milliGRAM(s) Oral two times a day  furosemide    Tablet 20 milliGRAM(s) Oral daily  gabapentin 300 milliGRAM(s) Oral two times a day  glucagon  Injectable 1 milliGRAM(s) IntraMuscular once  heparin   Injectable 5000 Unit(s) SubCutaneous two times a day  influenza  Vaccine (HIGH DOSE) 0.7 milliLiter(s) IntraMuscular once  insulin glargine Injectable (LANTUS) 30 Unit(s) SubCutaneous at bedtime  insulin lispro (ADMELOG) corrective regimen sliding scale   SubCutaneous three times a day before meals  insulin lispro Injectable (ADMELOG) 8 Unit(s) SubCutaneous three times a day before meals  levothyroxine 25 MICROGram(s) Oral <User Schedule>  nortriptyline 25 milliGRAM(s) Oral at bedtime  pantoprazole    Tablet 40 milliGRAM(s) Oral before breakfast  sertraline 150 milliGRAM(s) Oral daily    MEDICATIONS  (PRN):  senna 2 Tablet(s) Oral at bedtime PRN Constipation  temazepam 15 milliGRAM(s) Oral at bedtime PRN Insomnia      FAMILY HISTORY:  FHx: diabetes mellitus (Mother)        Allergies    honeydew (Unknown)  latex (Unknown)  penicillins (Unknown)  pickles (Unknown)  shellfish (Unknown)  sulfa drugs (Hives)  Sulfacetamide Sodium-Sulfur (Rash)    Intolerances        SOCIAL HISTORY:    [  ] Etoh  [  ] Smoking  [  ] Substance abuse     Home Environment:  [ x ] Home Alone  [  ] Lives with Family  [x  ] Home Health Aid 24  hr      Dwelling:  [  ] Apartment  [  x] Private House  [  ] Adult Home  [  ] Skilled Nursing Facility      [  ] Short Term  [  ] Long Term  [  ] Stairs       Elevator [  ]    FUNCTIONAL STATUS PTA: (Check all that apply)  Ambulation: [   ]Independent    [ x ] Dependent     [  ] Non-Ambulatory  Assistive Device: [  ] SA Cane  [  ]  Q Cane  [  ] Walker  [ x ]  Wheelchair  ADL : [  ] Independent  [ x ]  Dependent       Vital Signs Last 24 Hrs  T(C): 36.4 (11 Nov 2021 05:22), Max: 36.6 (10 Nov 2021 17:11)  T(F): 97.5 (11 Nov 2021 05:22), Max: 97.9 (10 Nov 2021 17:11)  HR: 75 (11 Nov 2021 05:22) (71 - 83)  BP: 179/83 (11 Nov 2021 05:22) (127/74 - 179/83)  BP(mean): --  RR: 16 (11 Nov 2021 05:22) (16 - 18)  SpO2: 99% (11 Nov 2021 09:11) (99% - 100%)      PHYSICAL EXAM: Alert & Oriented X 2  GENERAL: NAD, well-groomed, well-developed  HEAD:  Atraumatic, Normocephalic  EYES: EOMI, PERRLA, conjunctiva and sclera clear  NECK: Supple, No JVD, Normal thyroid  CHEST/LUNG: Clear to percussion bilaterally; No rales, rhonchi, wheezing, or rubs  HEART: Regular rate and rhythm; No murmurs, rubs, or gallops  ABDOMEN: Soft, Nontender, Nondistended; Bowel sounds present  EXTREMITIES:  2+ Peripheral Pulses, No clubbing, cyanosis, or edema    NERVOUS SYSTEM:  Cranial Nerves 2-12 intact [ x ] Abnormal  [  ]  ROM: WFL all extremities [  ]  Abnormal [ x ]  Motor Strength: WFL all extremities  [  ]  Abnormal [ x ]  Sensation: intact to light touch [  ] Abnormal [ x ]  Reflexes: Symmetric [  ]  Abnormal [ x ]    FUNCTIONAL STATUS:  Bed Mobility: Independent [  ]  Supervision [  ]  Needs Assistance [x  ]  N/A [  ]  Transfers: Independent [  ]  Supervision [  ]  Needs Assistance [x  ]  N/A [  ]   Ambulation: Independent [  ]  Supervision [  ]  Needs Assistance [x ]  N/A [  ]  ADL: Independent [  ] Requires Assistance [  ] N/A [ x ]  SEE PT/OT IE NOTES    LABS:                        12.2   7.09  )-----------( 221      ( 11 Nov 2021 07:05 )             38.3     11-11    144  |  101  |  23<H>  ----------------------------<  159<H>  3.9   |  31  |  1.5    Ca    9.3      11 Nov 2021 07:05  Mg     2.0     11-10    TPro  6.5  /  Alb  3.9  /  TBili  0.3  /  DBili  x   /  AST  20  /  ALT  16  /  AlkPhos  125<H>  11-11          RADIOLOGY & ADDITIONAL STUDIES:    Assesment:

## 2021-11-11 NOTE — CONSULT NOTE ADULT - ASSESSMENT
IMPRESSION: Rehab of 82 y/o  f  rehab  for  decline  cva  lt side weakness  debility     PRECAUTIONS: [  ] Cardiac  [  ] Respiratory  [  ] Seizures [  ] Contact Isolation  [  ] Droplet Isolation  [ FALL ] Other    Weight Bearing Status:     RECOMMENDATION:    Out of Bed to Chair     DVT/Decubiti Prophylaxis    REHAB PLAN:     [  xx ] Bedside P/T 3-5 times a week   [   ]   Bedside O/T  2-3 times a week             [   ] No Rehab Therapy Indicated                   [   ]  Speech Therapy   Conditioning/ROM                                    ADL  Bed Mobility                                               Conditioning/ROM  Transfers                                                     Bed Mobility  Sitting /Standing Balance                         Transfers                                        Gait Training                                               Sitting/Standing Balance  Stair Training [   ]Applicable                    Home equipment Eval                                                                        Splinting  [   ] Only      GOALS:   ADL   [   x]   Independent                    Transfers  [ x  ] Independent                          Ambulation  [  x ] Independent     [  x  ] With device                            [  x ]  CG                                                         [ x  ]  CG                                                                  [   x] CG                            [    ] Min A                                                   [   ] Min A                                                              [   ] Min  A          DISCHARGE PLAN:   [   ]  Good candidate for Intensive Rehabilitation/Hospital based-[facility]A [facility]                                             Will tolerate 3hrs Intensive Rehab Daily                                       [  xx  ]  Short Term Rehab in Skilled Nursing Facility pt  agree                                        [    ]  Home with Outpatient or  services                                         [    ]  Possible Candidate for Intensive Hospital based Rehab

## 2021-11-11 NOTE — PHYSICAL THERAPY INITIAL EVALUATION ADULT - GENERAL OBSERVATIONS, REHAB EVAL
Pt encountered semi-reclined in bed, NAD, +tele, +primafit, ok to be seen by PT as confirmed by RN and pt agreeable. +chart reviewed

## 2021-11-11 NOTE — PHYSICAL THERAPY INITIAL EVALUATION ADULT - PLANNED THERAPY INTERVENTIONS, PT EVAL
Pt is dependent x2 for bed mobility - pt reports she does not sit up EOB or stand at home - uses onel lift for OOB into W/C with aide. Pt at her functional baseline - no need for skilled PT in acute care setting. D/C pt from PT. Please reconsult if pt status changes.

## 2021-11-11 NOTE — CHART NOTE - NSCHARTNOTEFT_GEN_A_CORE
FSBS now 70. Patient getting oral juices and will then have repeat done. For now delaying tonight's dose of Lantus 30 units

## 2021-11-11 NOTE — PHYSICAL THERAPY INITIAL EVALUATION ADULT - ADDITIONAL COMMENTS
Pt with history of CVA with L sided weakness. Pt states she lives in a private home with 24 hour aides - she states she is not able to stand or walk, doesn't assist with bed mobility - uses Joann lift for OOB to W/C.

## 2021-11-11 NOTE — PROGRESS NOTE ADULT - SUBJECTIVE AND OBJECTIVE BOX
KAMILA FAGANOINETTE  81y, Female  Allergy: honeydew (Unknown)  latex (Unknown)  penicillins (Unknown)  pickles (Unknown)  shellfish (Unknown)  sulfa drugs (Hives)  Sulfacetamide Sodium-Sulfur (Rash)    Hospital Day: 1d    Patient seen and examined. No acute events overnight    PMH/PSH:  PAST MEDICAL & SURGICAL HISTORY:  DM (diabetes mellitus)  24 yr    Breast CA  2014 s/p rt    BP (high blood pressure)  20 yr    Depression    High cholesterol    Bilateral hearing loss, unspecified hearing loss type    CVA (cerebral vascular accident)  6/18 lft weakness    H/O total knee replacement, right    History of lumpectomy of right breast    History of cholecystectomy  1992    VITALS:  T(F): 97.5 (11-11-21 @ 05:22), Max: 97.9 (11-10-21 @ 17:11)  HR: 75 (11-11-21 @ 05:22)  BP: 179/83 (11-11-21 @ 05:22) (127/74 - 179/83)  RR: 16 (11-11-21 @ 05:22)  SpO2: 99% (11-11-21 @ 09:11)    TESTS & MEASUREMENTS:  Weight (Kg): 87.6 (11-11-21 @ 03:07)  BMI (kg/m2): 37.7 (11-11)                        12.2   7.09  )-----------( 221      ( 11 Nov 2021 07:05 )             38.3     11-11    144  |  101  |  23<H>  ----------------------------<  159<H>  3.9   |  31  |  1.5    Ca    9.3      11 Nov 2021 07:05  Mg     2.0     11-10    TPro  6.5  /  Alb  3.9  /  TBili  0.3  /  DBili  x   /  AST  20  /  ALT  16  /  AlkPhos  125<H>  11-11    LIVER FUNCTIONS - ( 11 Nov 2021 07:05 )  Alb: 3.9 g/dL / Pro: 6.5 g/dL / ALK PHOS: 125 U/L / ALT: 16 U/L / AST: 20 U/L / GGT: x           CARDIAC MARKERS ( 11 Nov 2021 07:05 )  x     / 0.02 ng/mL / 84 U/L / x     / 2.8 ng/mL  CARDIAC MARKERS ( 10 Nov 2021 21:30 )  x     / 0.02 ng/mL / x     / x     / x      CARDIAC MARKERS ( 10 Nov 2021 17:40 )  x     / 0.02 ng/mL / x     / x     / x        RECENT DIAGNOSTIC ORDERS:  COVID-19 Malachi Domain Antibody: AM Sched. Collection: 11-Nov-2021 04:30 (11-10-21 @ 22:40)  COVID-19 Nucleocapsid Antibody: AM Sched. Collection: 11-Nov-2021 04:30 (11-10-21 @ 22:40)  Diet, DASH/TLC:   Sodium & Cholesterol Restricted  Consistent Carbohydrate Evening Snack  No Door  No Shellfish (11-10-21 @ 21:44)  Basic Metabolic Panel: 22:00 (11-10-21 @ 20:53)  Troponin T, Serum: 22:00 (11-10-21 @ 20:52)  A1C with Estimated Average Glucose: AM Sched. Collection: 11-Nov-2021 04:30 (11-10-21 @ 19:07)  12 Lead ECG:   Provider's Contact #: [phone] (11-10-21 @ 19:07)  Culture - Urine: Routine  Specimen Source: Catheterized  Addl Info: If indwelling urinary catheter > 14 days, obtain an order to remove and replace prior to c (11-10-21 @ 17:24)  Urinalysis: STAT (11-10-21 @ 17:24)    MEDICATIONS:  MEDICATIONS  (STANDING):  amLODIPine   Tablet 10 milliGRAM(s) Oral daily  aspirin enteric coated 162 milliGRAM(s) Oral daily  atorvastatin 80 milliGRAM(s) Oral at bedtime  clopidogrel Tablet 75 milliGRAM(s) Oral daily  dextrose 40% Gel 15 Gram(s) Oral once  dextrose 5%. 1000 milliLiter(s) (50 mL/Hr) IV Continuous <Continuous>  dextrose 5%. 1000 milliLiter(s) (100 mL/Hr) IV Continuous <Continuous>  dextrose 50% Injectable 25 Gram(s) IV Push once  dextrose 50% Injectable 12.5 Gram(s) IV Push once  dextrose 50% Injectable 25 Gram(s) IV Push once  ferrous    sulfate 325 milliGRAM(s) Oral two times a day  furosemide    Tablet 20 milliGRAM(s) Oral daily  gabapentin 300 milliGRAM(s) Oral two times a day  glucagon  Injectable 1 milliGRAM(s) IntraMuscular once  heparin   Injectable 5000 Unit(s) SubCutaneous two times a day  influenza  Vaccine (HIGH DOSE) 0.7 milliLiter(s) IntraMuscular once  insulin glargine Injectable (LANTUS) 30 Unit(s) SubCutaneous at bedtime  insulin lispro (ADMELOG) corrective regimen sliding scale   SubCutaneous three times a day before meals  insulin lispro Injectable (ADMELOG) 8 Unit(s) SubCutaneous three times a day before meals  levothyroxine 25 MICROGram(s) Oral <User Schedule>  nortriptyline 25 milliGRAM(s) Oral at bedtime  pantoprazole    Tablet 40 milliGRAM(s) Oral before breakfast  sertraline 150 milliGRAM(s) Oral daily    MEDICATIONS  (PRN):  senna 2 Tablet(s) Oral at bedtime PRN Constipation  temazepam 15 milliGRAM(s) Oral at bedtime PRN Insomnia    HOME MEDICATIONS:  amLODIPine 10 mg oral tablet (10-04)  atorvastatin 80 mg oral tablet (10-04)  clopidogrel 75 mg oral tablet (10-04)  Ecotrin Adult Low Strength 81 mg oral delayed release tablet (11-08)  exemestane 25 mg oral tablet (10-04)  ferrous sulfate 324 mg (65 mg elemental iron) oral tablet (11-08)  gabapentin 300 mg oral capsule (10-04)  HumaLOG 100 units/mL subcutaneous solution (10-04)  Lasix 20 mg oral tablet (10-04)  Levemir (11-09)  levothyroxine 25 mcg (0.025 mg) oral tablet (10-04)  nortriptyline 25 mg oral capsule (10-04)  pantoprazole 40 mg oral delayed release tablet (10-04)  senna oral tablet (10-04)  sertraline 100 mg oral tablet (10-04)  sertraline 50 mg oral tablet (11-08)  temazepam 30 mg oral capsule (11-08)    REVIEW OF SYSTEMS:  All other review of systems is negative unless indicated above.     PHYSICAL EXAM:  PHYSICAL EXAM:  GENERAL: NAD, well-developed  HEAD:  Atraumatic, Normocephalic  NECK: Supple, No JVD  CHEST/LUNG: Clear to auscultation bilaterally; No wheeze  HEART: Regular rate and rhythm; No murmurs, rubs, or gallops  ABDOMEN: Soft, Nontender, Nondistended; Bowel sounds present  EXTREMITIES:  2+ Peripheral Pulses, No clubbing, cyanosis, or edema  PSYCH: AAOx3  SKIN: No rashes or lesions

## 2021-11-11 NOTE — PROGRESS NOTE ADULT - ASSESSMENT
80yo female whose PMH includes HTN, HLD, DM type 2 and CVA with left sided weakness,  is being admitted due to hyperglycemia.    #Hyperglycemia  #DM2  Sugars are better controlled on current regimen  - Cont lantus 30U qHs  - Cont lispro 8 TID --> Increase to 10 TID  - ISS  - Monitor FS  - DC planning 82yo female whose PMH includes HTN, HLD, DM type 2 and CVA with left sided weakness,  is being admitted due to hyperglycemia.    #Hyperglycemia  #DM2  Sugars are better controlled on current regimen  - Cont lantus 30U qHs  - Cont lispro 8 TID --> Increase to 10 TID  - ISS  - Monitor FS  - DC planning    #HTN/HLD  #Hx of CVA, left sided weakness  - Cont amlodipine 10mg qD  - Cont atorvastatin 80mg qHs  - Continue plavix 75mg qD    #Hypothyroidism  - Cont synthroid 25 mcg qAM    #Depression  - Cont nortryptiline 25mg qD  - Cont sertraline 150mg qD    #Functional quadriplegia    DVT PPX, heparin

## 2021-11-11 NOTE — PHYSICAL THERAPY INITIAL EVALUATION ADULT - RANGE OF MOTION EXAMINATION, REHAB EVAL
R UE and LE AAROM grossly WFL; L shoulder flexion AROM to less than 90 degrees, LLE AAROM/PROM grossly WFL

## 2021-11-12 ENCOUNTER — TRANSCRIPTION ENCOUNTER (OUTPATIENT)
Age: 81
End: 2021-11-12

## 2021-11-12 VITALS
HEART RATE: 80 BPM | SYSTOLIC BLOOD PRESSURE: 144 MMHG | RESPIRATION RATE: 16 BRPM | TEMPERATURE: 97 F | DIASTOLIC BLOOD PRESSURE: 79 MMHG

## 2021-11-12 LAB
COVID-19 NUCLEOCAPSID GAM AB INTERP: NEGATIVE — SIGNIFICANT CHANGE UP
COVID-19 NUCLEOCAPSID TOTAL GAM ANTIBODY RESULT: 0.09 INDEX — SIGNIFICANT CHANGE UP
COVID-19 SPIKE DOMAIN AB INTERP: POSITIVE
COVID-19 SPIKE DOMAIN ANTIBODY RESULT: >250 U/ML — HIGH
GLUCOSE BLDC GLUCOMTR-MCNC: 176 MG/DL — HIGH (ref 70–99)
GLUCOSE BLDC GLUCOMTR-MCNC: 195 MG/DL — HIGH (ref 70–99)
GLUCOSE BLDC GLUCOMTR-MCNC: 387 MG/DL — HIGH (ref 70–99)
GLUCOSE BLDC GLUCOMTR-MCNC: 73 MG/DL — SIGNIFICANT CHANGE UP (ref 70–99)
SARS-COV-2 IGG+IGM SERPL QL IA: 0.09 INDEX — SIGNIFICANT CHANGE UP
SARS-COV-2 IGG+IGM SERPL QL IA: >250 U/ML — HIGH
SARS-COV-2 IGG+IGM SERPL QL IA: NEGATIVE — SIGNIFICANT CHANGE UP
SARS-COV-2 IGG+IGM SERPL QL IA: POSITIVE

## 2021-11-12 PROCEDURE — 99238 HOSP IP/OBS DSCHRG MGMT 30/<: CPT

## 2021-11-12 RX ADMIN — Medication 2: at 12:26

## 2021-11-12 RX ADMIN — Medication 325 MILLIGRAM(S): at 05:48

## 2021-11-12 RX ADMIN — SERTRALINE 150 MILLIGRAM(S): 25 TABLET, FILM COATED ORAL at 11:47

## 2021-11-12 RX ADMIN — Medication 325 MILLIGRAM(S): at 18:56

## 2021-11-12 RX ADMIN — Medication 25 MICROGRAM(S): at 05:48

## 2021-11-12 RX ADMIN — Medication 2: at 08:13

## 2021-11-12 RX ADMIN — Medication 20 MILLIGRAM(S): at 05:48

## 2021-11-12 RX ADMIN — Medication 162 MILLIGRAM(S): at 11:47

## 2021-11-12 RX ADMIN — CLOPIDOGREL BISULFATE 75 MILLIGRAM(S): 75 TABLET, FILM COATED ORAL at 11:47

## 2021-11-12 RX ADMIN — Medication 10 UNIT(S): at 12:26

## 2021-11-12 RX ADMIN — Medication 10 UNIT(S): at 08:15

## 2021-11-12 RX ADMIN — GABAPENTIN 300 MILLIGRAM(S): 400 CAPSULE ORAL at 05:48

## 2021-11-12 RX ADMIN — Medication 650 MILLIGRAM(S): at 14:23

## 2021-11-12 RX ADMIN — HEPARIN SODIUM 5000 UNIT(S): 5000 INJECTION INTRAVENOUS; SUBCUTANEOUS at 05:47

## 2021-11-12 RX ADMIN — INFLUENZA VIRUS VACCINE 0.7 MILLILITER(S): 15; 15; 15; 15 SUSPENSION INTRAMUSCULAR at 18:58

## 2021-11-12 RX ADMIN — AMLODIPINE BESYLATE 10 MILLIGRAM(S): 2.5 TABLET ORAL at 05:48

## 2021-11-12 RX ADMIN — HEPARIN SODIUM 5000 UNIT(S): 5000 INJECTION INTRAVENOUS; SUBCUTANEOUS at 18:56

## 2021-11-12 RX ADMIN — GABAPENTIN 300 MILLIGRAM(S): 400 CAPSULE ORAL at 18:56

## 2021-11-12 RX ADMIN — PANTOPRAZOLE SODIUM 40 MILLIGRAM(S): 20 TABLET, DELAYED RELEASE ORAL at 06:03

## 2021-11-12 NOTE — DISCHARGE NOTE PROVIDER - NSDCMRMEDTOKEN_GEN_ALL_CORE_FT
amLODIPine 10 mg oral tablet: 1 tab(s) orally once a day  atorvastatin 80 mg oral tablet: 1 tab(s) orally once a day (at bedtime)  clopidogrel 75 mg oral tablet: 1 tab(s) orally once a day  Ecotrin Adult Low Strength 81 mg oral delayed release tablet: 2 tab(s) orally once a day  exemestane 25 mg oral tablet: 1 tab(s) orally once a day  ferrous sulfate 324 mg (65 mg elemental iron) oral tablet: 1  orally 2 times a day  gabapentin 300 mg oral capsule: orally 2 times a day  HumaLOG 100 units/mL subcutaneous solution: 7 unit(s) subcutaneous 3 times a day (before meals)  Lasix 20 mg oral tablet: 1 tab(s) orally once a day  Levemir: 21  subcutaneous once a day (at bedtime)  levothyroxine 25 mcg (0.025 mg) oral tablet: 1 tab(s) orally once a day  nortriptyline 25 mg oral capsule: 1 cap(s) orally once a day (at bedtime)  pantoprazole 40 mg oral delayed release tablet: 1 tab(s) orally once a day (before a meal)  senna oral tablet: 2 tab(s) orally once a day (at bedtime)- PRN for constipation- OTC is okay  sertraline 100 mg oral tablet: 1 tab(s) orally once a day  sertraline 50 mg oral tablet: 1 tab(s) orally once a day  temazepam 30 mg oral capsule: 1 cap(s) orally once a day (at bedtime)   amLODIPine 10 mg oral tablet: 1 tab(s) orally once a day  atorvastatin 80 mg oral tablet: 1 tab(s) orally once a day (at bedtime)  clopidogrel 75 mg oral tablet: 1 tab(s) orally once a day  Ecotrin Adult Low Strength 81 mg oral delayed release tablet: 2 tab(s) orally once a day  exemestane 25 mg oral tablet: 1 tab(s) orally once a day  ferrous sulfate 324 mg (65 mg elemental iron) oral tablet: 1  orally 2 times a day  gabapentin 300 mg oral capsule: orally 2 times a day  HumaLOG 100 units/mL subcutaneous solution: Per insulin sliding scale at home  Lasix 20 mg oral tablet: 1 tab(s) orally once a day  Levemir: 30  subcutaneous once a day (at bedtime)  levothyroxine 25 mcg (0.025 mg) oral tablet: 1 tab(s) orally once a day  nortriptyline 25 mg oral capsule: 1 cap(s) orally once a day (at bedtime)  pantoprazole 40 mg oral delayed release tablet: 1 tab(s) orally once a day (before a meal)  senna oral tablet: 2 tab(s) orally once a day (at bedtime)- PRN for constipation- OTC is okay  sertraline 100 mg oral tablet: 1 tab(s) orally once a day  sertraline 50 mg oral tablet: 1 tab(s) orally once a day  temazepam 30 mg oral capsule: 1 cap(s) orally once a day (at bedtime)

## 2021-11-12 NOTE — DISCHARGE NOTE NURSING/CASE MANAGEMENT/SOCIAL WORK - PATIENT PORTAL LINK FT
You can access the FollowMyHealth Patient Portal offered by [facility] by registering at the following website: [email or web address]. By joining [facility]’s FollowMyHealth portal, you will also be able to view your health information using other applications (apps) compatible with our system.

## 2021-11-12 NOTE — DISCHARGE NOTE PROVIDER - CARE PROVIDER_API CALL
Ariel Macias)  Internal Medicine  [address] [place] [address]  Phone: [phone]  Fax: [phone]  Follow Up Time: 2 weeks

## 2021-11-12 NOTE — DISCHARGE NOTE PROVIDER - NSDCCPCAREPLAN_GEN_ALL_CORE_FT
PRINCIPAL DISCHARGE DIAGNOSIS  Diagnosis: Hyperglycemia  Assessment and Plan of Treatment: Take lantus 21U at bedtime, and humalog 7U three times a day with meals. Follow up with your PCP within 2 weeks of discharge for further management.       PRINCIPAL DISCHARGE DIAGNOSIS  Diagnosis: Hyperglycemia  Assessment and Plan of Treatment: Take lantus 30U at bedtime, and humalog three times a day with meals based on sliding scale. Follow up with your endocrinologist within 2 weeks of discharge for further management. Advise monitoring carbohydrate intake which could cause high blood sugars.

## 2021-11-12 NOTE — DISCHARGE NOTE PROVIDER - HOSPITAL COURSE
82yo female whose PMH includes HTN, HLD, DM type 2 and CVA with left sided weakness,  is being admitted due to hyperglycemia. FS were better controlled with insulin regimen inpatient. Patient is to be discharged on this regimen with outpatient endocrine follow-up.

## 2021-11-15 DIAGNOSIS — Z96.651 PRESENCE OF RIGHT ARTIFICIAL KNEE JOINT: ICD-10-CM

## 2021-11-15 DIAGNOSIS — R11.2 NAUSEA WITH VOMITING, UNSPECIFIED: ICD-10-CM

## 2021-11-15 DIAGNOSIS — E11.22 TYPE 2 DIABETES MELLITUS WITH DIABETIC CHRONIC KIDNEY DISEASE: ICD-10-CM

## 2021-11-15 DIAGNOSIS — Z91.013 ALLERGY TO SEAFOOD: ICD-10-CM

## 2021-11-15 DIAGNOSIS — E03.9 HYPOTHYROIDISM, UNSPECIFIED: ICD-10-CM

## 2021-11-15 DIAGNOSIS — I44.1 ATRIOVENTRICULAR BLOCK, SECOND DEGREE: ICD-10-CM

## 2021-11-15 DIAGNOSIS — Z88.0 ALLERGY STATUS TO PENICILLIN: ICD-10-CM

## 2021-11-15 DIAGNOSIS — I69.034: ICD-10-CM

## 2021-11-15 DIAGNOSIS — E11.10 TYPE 2 DIABETES MELLITUS WITH KETOACIDOSIS WITHOUT COMA: ICD-10-CM

## 2021-11-15 DIAGNOSIS — Z91.040 LATEX ALLERGY STATUS: ICD-10-CM

## 2021-11-15 DIAGNOSIS — F32.A DEPRESSION, UNSPECIFIED: ICD-10-CM

## 2021-11-15 DIAGNOSIS — I12.9 HYPERTENSIVE CHRONIC KIDNEY DISEASE WITH STAGE 1 THROUGH STAGE 4 CHRONIC KIDNEY DISEASE, OR UNSPECIFIED CHRONIC KIDNEY DISEASE: ICD-10-CM

## 2021-11-15 DIAGNOSIS — Z79.02 LONG TERM (CURRENT) USE OF ANTITHROMBOTICS/ANTIPLATELETS: ICD-10-CM

## 2021-11-15 DIAGNOSIS — Z91.018 ALLERGY TO OTHER FOODS: ICD-10-CM

## 2021-11-15 DIAGNOSIS — Z79.82 LONG TERM (CURRENT) USE OF ASPIRIN: ICD-10-CM

## 2021-11-15 DIAGNOSIS — Z88.2 ALLERGY STATUS TO SULFONAMIDES: ICD-10-CM

## 2021-11-15 DIAGNOSIS — Z90.49 ACQUIRED ABSENCE OF OTHER SPECIFIED PARTS OF DIGESTIVE TRACT: ICD-10-CM

## 2021-11-15 DIAGNOSIS — Z85.3 PERSONAL HISTORY OF MALIGNANT NEOPLASM OF BREAST: ICD-10-CM

## 2021-11-15 DIAGNOSIS — E78.00 PURE HYPERCHOLESTEROLEMIA, UNSPECIFIED: ICD-10-CM

## 2021-11-15 DIAGNOSIS — Z79.4 LONG TERM (CURRENT) USE OF INSULIN: ICD-10-CM

## 2021-11-15 DIAGNOSIS — Z95.0 PRESENCE OF CARDIAC PACEMAKER: ICD-10-CM

## 2021-11-15 DIAGNOSIS — N18.32 CHRONIC KIDNEY DISEASE, STAGE 3B: ICD-10-CM

## 2021-11-15 DIAGNOSIS — F03.90 UNSPECIFIED DEMENTIA WITHOUT BEHAVIORAL DISTURBANCE: ICD-10-CM

## 2021-11-15 DIAGNOSIS — I25.10 ATHEROSCLEROTIC HEART DISEASE OF NATIVE CORONARY ARTERY WITHOUT ANGINA PECTORIS: ICD-10-CM

## 2021-11-17 DIAGNOSIS — F32.A DEPRESSION, UNSPECIFIED: ICD-10-CM

## 2021-11-17 DIAGNOSIS — E03.9 HYPOTHYROIDISM, UNSPECIFIED: ICD-10-CM

## 2021-11-17 DIAGNOSIS — Z88.0 ALLERGY STATUS TO PENICILLIN: ICD-10-CM

## 2021-11-17 DIAGNOSIS — R53.2 FUNCTIONAL QUADRIPLEGIA: ICD-10-CM

## 2021-11-17 DIAGNOSIS — Z90.49 ACQUIRED ABSENCE OF OTHER SPECIFIED PARTS OF DIGESTIVE TRACT: ICD-10-CM

## 2021-11-17 DIAGNOSIS — R77.8 OTHER SPECIFIED ABNORMALITIES OF PLASMA PROTEINS: ICD-10-CM

## 2021-11-17 DIAGNOSIS — Z79.4 LONG TERM (CURRENT) USE OF INSULIN: ICD-10-CM

## 2021-11-17 DIAGNOSIS — Z88.2 ALLERGY STATUS TO SULFONAMIDES: ICD-10-CM

## 2021-11-17 DIAGNOSIS — E78.5 HYPERLIPIDEMIA, UNSPECIFIED: ICD-10-CM

## 2021-11-17 DIAGNOSIS — I10 ESSENTIAL (PRIMARY) HYPERTENSION: ICD-10-CM

## 2021-11-17 DIAGNOSIS — R73.9 HYPERGLYCEMIA, UNSPECIFIED: ICD-10-CM

## 2021-11-17 DIAGNOSIS — Z96.651 PRESENCE OF RIGHT ARTIFICIAL KNEE JOINT: ICD-10-CM

## 2021-11-17 DIAGNOSIS — E11.65 TYPE 2 DIABETES MELLITUS WITH HYPERGLYCEMIA: ICD-10-CM

## 2021-11-17 DIAGNOSIS — H91.93 UNSPECIFIED HEARING LOSS, BILATERAL: ICD-10-CM

## 2021-11-17 DIAGNOSIS — Z91.013 ALLERGY TO SEAFOOD: ICD-10-CM

## 2021-11-17 DIAGNOSIS — Z85.3 PERSONAL HISTORY OF MALIGNANT NEOPLASM OF BREAST: ICD-10-CM

## 2021-11-17 DIAGNOSIS — I69.334 MONOPLEGIA OF UPPER LIMB FOLLOWING CEREBRAL INFARCTION AFFECTING LEFT NON-DOMINANT SIDE: ICD-10-CM

## 2021-12-29 ENCOUNTER — INPATIENT (INPATIENT)
Facility: HOSPITAL | Age: 81
LOS: 7 days | Discharge: HOME HEALTH | End: 2022-01-06
Attending: STUDENT IN AN ORGANIZED HEALTH CARE EDUCATION/TRAINING PROGRAM | Admitting: STUDENT IN AN ORGANIZED HEALTH CARE EDUCATION/TRAINING PROGRAM
Payer: MEDICARE

## 2021-12-29 VITALS
OXYGEN SATURATION: 100 % | HEIGHT: 60 IN | TEMPERATURE: 101 F | DIASTOLIC BLOOD PRESSURE: 80 MMHG | WEIGHT: 184.97 LBS | RESPIRATION RATE: 18 BRPM | HEART RATE: 104 BPM | SYSTOLIC BLOOD PRESSURE: 160 MMHG

## 2021-12-29 DIAGNOSIS — Z90.49 ACQUIRED ABSENCE OF OTHER SPECIFIED PARTS OF DIGESTIVE TRACT: Chronic | ICD-10-CM

## 2021-12-29 DIAGNOSIS — Z98.890 OTHER SPECIFIED POSTPROCEDURAL STATES: Chronic | ICD-10-CM

## 2021-12-29 DIAGNOSIS — Z96.651 PRESENCE OF RIGHT ARTIFICIAL KNEE JOINT: Chronic | ICD-10-CM

## 2021-12-29 LAB
HCT VFR BLD CALC: 26.8 % — LOW (ref 37–47)
HGB BLD-MCNC: 8.4 G/DL — LOW (ref 12–16)
MCHC RBC-ENTMCNC: 30.3 PG — SIGNIFICANT CHANGE UP (ref 27–31)
MCHC RBC-ENTMCNC: 31.3 G/DL — LOW (ref 32–37)
MCV RBC AUTO: 96.8 FL — SIGNIFICANT CHANGE UP (ref 81–99)
PLATELET # BLD AUTO: 133 K/UL — SIGNIFICANT CHANGE UP (ref 130–400)
RBC # BLD: 2.77 M/UL — LOW (ref 4.2–5.4)
RBC # FLD: 13.2 % — SIGNIFICANT CHANGE UP (ref 11.5–14.5)
SARS-COV-2 RNA SPEC QL NAA+PROBE: DETECTED
WBC # BLD: 4.81 K/UL — SIGNIFICANT CHANGE UP (ref 4.8–10.8)
WBC # FLD AUTO: 4.81 K/UL — SIGNIFICANT CHANGE UP (ref 4.8–10.8)

## 2021-12-29 PROCEDURE — 93010 ELECTROCARDIOGRAM REPORT: CPT

## 2021-12-29 PROCEDURE — 71045 X-RAY EXAM CHEST 1 VIEW: CPT | Mod: 26

## 2021-12-29 PROCEDURE — 99285 EMERGENCY DEPT VISIT HI MDM: CPT | Mod: CS

## 2021-12-29 RX ORDER — ACETAMINOPHEN 500 MG
650 TABLET ORAL ONCE
Refills: 0 | Status: COMPLETED | OUTPATIENT
Start: 2021-12-29 | End: 2021-12-29

## 2021-12-29 RX ORDER — SODIUM CHLORIDE 9 MG/ML
1000 INJECTION, SOLUTION INTRAVENOUS ONCE
Refills: 0 | Status: COMPLETED | OUTPATIENT
Start: 2021-12-29 | End: 2021-12-29

## 2021-12-29 RX ADMIN — Medication 650 MILLIGRAM(S): at 23:30

## 2021-12-29 RX ADMIN — SODIUM CHLORIDE 1000 MILLILITER(S): 9 INJECTION, SOLUTION INTRAVENOUS at 23:30

## 2021-12-29 NOTE — ED PROVIDER NOTE - OBJECTIVE STATEMENT
80 yo F pmh of HTN, HLD, DM, CVA presents with fever and hypoxia. Patient reports that she has covid, unclear if she was tested. Found to be hypoxic so sent in for evaluation. Found to be hypoxic to the 70s on room air, improved with nasal canula. Reports cough, congestion, sneezing and runny nose since yesterday. no n/v/d. no chest pain, no shortness of breath, no palpitations. no sick contacts. patient is vaccinated. pmd is Dr. Saldivar.

## 2021-12-29 NOTE — ED PROVIDER NOTE - PHYSICAL EXAMINATION
CONSTITUTIONAL: Well-developed; well-nourished; in no acute distress.   SKIN: warm, dry  HEAD: Normocephalic; atraumatic.  EYES: PERRL, EOMI, no conjunctival erythema  ENT: No nasal discharge; airway clear.  NECK: Supple; non tender.  CARD: S1, S2 normal;  Regular rate and rhythm.   RESP: No wheezes, rales or rhonchi. non labored breathing.   ABD: soft non tender, non distended, no rebound or guarding  EXT: Normal ROM.  5/5 strength in all 4 extremities. no pedal edema of calf tenderness.   LYMPH: No acute cervical adenopathy.  NEURO: Alert, oriented, grossly unremarkable. neurovascularly intact  PSYCH: Cooperative, appropriate.

## 2021-12-29 NOTE — ED PROVIDER NOTE - CLINICAL SUMMARY MEDICAL DECISION MAKING FREE TEXT BOX
81yF HTN, HLD, DM, CVA   lives  with 24h  health aid,  pw  cough sneezing fever  since yesterday today  hypoxic to 70's on RA  per EMS -  pt covid + in  ED  opacity on CXR trop negative ddimer negative .   LA  was originally 8  (?)  CTap added  - no acute findings,  1 L ivf  given    LA repeat 0.9  pt admitted to medicine

## 2021-12-29 NOTE — ED PROVIDER NOTE - NS ED ROS FT
Eyes:  No visual changes, eye pain or discharge.  ENMT:  No hearing changes, pain, no sore throat or runny nose, no difficulty swallowing  Cardiac:  No chest pain, SOB or edema. No chest pain with exertion.  Respiratory: + cough, + hypoxia.   GI:  No nausea, vomiting, diarrhea or abdominal pain.  :  No dysuria, frequency or burning.  MS:  No myalgia, muscle weakness, joint pain or back pain.  Neuro:  No headache or weakness.  No LOC.  Skin:  No skin rash.   Endocrine: No history of thyroid disease or diabetes.

## 2021-12-30 LAB
ALBUMIN SERPL ELPH-MCNC: 2.6 G/DL — LOW (ref 3.5–5.2)
ALP SERPL-CCNC: 98 U/L — SIGNIFICANT CHANGE UP (ref 30–115)
ALT FLD-CCNC: 9 U/L — SIGNIFICANT CHANGE UP (ref 0–41)
ANION GAP SERPL CALC-SCNC: 18 MMOL/L — HIGH (ref 7–14)
AST SERPL-CCNC: 12 U/L — SIGNIFICANT CHANGE UP (ref 0–41)
BASE EXCESS BLDV CALC-SCNC: -1.2 MMOL/L — SIGNIFICANT CHANGE UP (ref -2–3)
BASE EXCESS BLDV CALC-SCNC: 6.9 MMOL/L — HIGH (ref -2–3)
BASOPHILS # BLD AUTO: 0.02 K/UL — SIGNIFICANT CHANGE UP (ref 0–0.2)
BASOPHILS NFR BLD AUTO: 0.4 % — SIGNIFICANT CHANGE UP (ref 0–1)
BILIRUB SERPL-MCNC: 0.2 MG/DL — SIGNIFICANT CHANGE UP (ref 0.2–1.2)
BUN SERPL-MCNC: 14 MG/DL — SIGNIFICANT CHANGE UP (ref 10–20)
CA-I SERPL-SCNC: 1.16 MMOL/L — SIGNIFICANT CHANGE UP (ref 1.15–1.33)
CA-I SERPL-SCNC: 1.23 MMOL/L — SIGNIFICANT CHANGE UP (ref 1.15–1.33)
CALCIUM SERPL-MCNC: 7.8 MG/DL — LOW (ref 8.5–10.1)
CHLORIDE SERPL-SCNC: 99 MMOL/L — SIGNIFICANT CHANGE UP (ref 98–110)
CO2 SERPL-SCNC: 20 MMOL/L — SIGNIFICANT CHANGE UP (ref 17–32)
CREAT SERPL-MCNC: 1 MG/DL — SIGNIFICANT CHANGE UP (ref 0.7–1.5)
D DIMER BLD IA.RAPID-MCNC: 225 NG/ML DDU — SIGNIFICANT CHANGE UP (ref 0–230)
EOSINOPHIL # BLD AUTO: 0.07 K/UL — SIGNIFICANT CHANGE UP (ref 0–0.7)
EOSINOPHIL NFR BLD AUTO: 1.5 % — SIGNIFICANT CHANGE UP (ref 0–8)
GAS PNL BLDV: 130 MMOL/L — LOW (ref 136–145)
GAS PNL BLDV: 136 MMOL/L — SIGNIFICANT CHANGE UP (ref 136–145)
GAS PNL BLDV: SIGNIFICANT CHANGE UP
GAS PNL BLDV: SIGNIFICANT CHANGE UP
GLUCOSE BLDC GLUCOMTR-MCNC: 202 MG/DL — HIGH (ref 70–99)
GLUCOSE BLDC GLUCOMTR-MCNC: 212 MG/DL — HIGH (ref 70–99)
GLUCOSE BLDC GLUCOMTR-MCNC: 254 MG/DL — HIGH (ref 70–99)
GLUCOSE BLDC GLUCOMTR-MCNC: 322 MG/DL — HIGH (ref 70–99)
GLUCOSE SERPL-MCNC: 200 MG/DL — HIGH (ref 70–99)
HCO3 BLDV-SCNC: 26 MMOL/L — SIGNIFICANT CHANGE UP (ref 22–29)
HCO3 BLDV-SCNC: 33 MMOL/L — HIGH (ref 22–29)
HCT VFR BLDA CALC: 10 % — CRITICAL LOW (ref 34.5–46.5)
HCT VFR BLDA CALC: 40 % — SIGNIFICANT CHANGE UP (ref 34.5–46.5)
HGB BLD CALC-MCNC: 13.4 G/DL — SIGNIFICANT CHANGE UP (ref 11.7–16.1)
HGB BLD CALC-MCNC: 3.2 G/DL — CRITICAL LOW (ref 11.7–16.1)
IMM GRANULOCYTES NFR BLD AUTO: 0.2 % — SIGNIFICANT CHANGE UP (ref 0.1–0.3)
LACTATE BLDV-MCNC: 0.9 MMOL/L — SIGNIFICANT CHANGE UP (ref 0.5–2)
LACTATE BLDV-MCNC: 8.1 MMOL/L — CRITICAL HIGH (ref 0.5–2)
LYMPHOCYTES # BLD AUTO: 0.38 K/UL — LOW (ref 1.2–3.4)
LYMPHOCYTES # BLD AUTO: 7.9 % — LOW (ref 20.5–51.1)
MONOCYTES # BLD AUTO: 0.72 K/UL — HIGH (ref 0.1–0.6)
MONOCYTES NFR BLD AUTO: 15 % — HIGH (ref 1.7–9.3)
NEUTROPHILS # BLD AUTO: 3.61 K/UL — SIGNIFICANT CHANGE UP (ref 1.4–6.5)
NEUTROPHILS NFR BLD AUTO: 75 % — SIGNIFICANT CHANGE UP (ref 42.2–75.2)
NRBC # BLD: 0 /100 WBCS — SIGNIFICANT CHANGE UP (ref 0–0)
PCO2 BLDV: 51 MMHG — HIGH (ref 39–42)
PCO2 BLDV: 59 MMHG — HIGH (ref 39–42)
PH BLDV: 7.31 — LOW (ref 7.32–7.43)
PH BLDV: 7.35 — SIGNIFICANT CHANGE UP (ref 7.32–7.43)
PO2 BLDV: 136 MMHG — SIGNIFICANT CHANGE UP
PO2 BLDV: 28 MMHG — SIGNIFICANT CHANGE UP
POTASSIUM BLDV-SCNC: 3.7 MMOL/L — SIGNIFICANT CHANGE UP (ref 3.5–5.1)
POTASSIUM BLDV-SCNC: 3.7 MMOL/L — SIGNIFICANT CHANGE UP (ref 3.5–5.1)
POTASSIUM SERPL-MCNC: 4 MMOL/L — SIGNIFICANT CHANGE UP (ref 3.5–5)
POTASSIUM SERPL-SCNC: 4 MMOL/L — SIGNIFICANT CHANGE UP (ref 3.5–5)
PROT SERPL-MCNC: 4.5 G/DL — LOW (ref 6–8)
SAO2 % BLDV: 57.3 % — SIGNIFICANT CHANGE UP
SAO2 % BLDV: 99.4 % — SIGNIFICANT CHANGE UP
SODIUM SERPL-SCNC: 137 MMOL/L — SIGNIFICANT CHANGE UP (ref 135–146)
TROPONIN T SERPL-MCNC: <0.01 NG/ML — SIGNIFICANT CHANGE UP

## 2021-12-30 PROCEDURE — 99233 SBSQ HOSP IP/OBS HIGH 50: CPT

## 2021-12-30 PROCEDURE — 74177 CT ABD & PELVIS W/CONTRAST: CPT | Mod: 26,MA

## 2021-12-30 RX ORDER — SODIUM CHLORIDE 9 MG/ML
1000 INJECTION, SOLUTION INTRAVENOUS
Refills: 0 | Status: DISCONTINUED | OUTPATIENT
Start: 2021-12-30 | End: 2022-01-06

## 2021-12-30 RX ORDER — PANTOPRAZOLE SODIUM 20 MG/1
40 TABLET, DELAYED RELEASE ORAL
Refills: 0 | Status: DISCONTINUED | OUTPATIENT
Start: 2021-12-30 | End: 2022-01-06

## 2021-12-30 RX ORDER — TEMAZEPAM 15 MG/1
30 CAPSULE ORAL AT BEDTIME
Refills: 0 | Status: DISCONTINUED | OUTPATIENT
Start: 2021-12-30 | End: 2022-01-06

## 2021-12-30 RX ORDER — CHLORHEXIDINE GLUCONATE 213 G/1000ML
1 SOLUTION TOPICAL
Refills: 0 | Status: DISCONTINUED | OUTPATIENT
Start: 2021-12-30 | End: 2022-01-06

## 2021-12-30 RX ORDER — SERTRALINE 25 MG/1
100 TABLET, FILM COATED ORAL DAILY
Refills: 0 | Status: DISCONTINUED | OUTPATIENT
Start: 2021-12-30 | End: 2022-01-06

## 2021-12-30 RX ORDER — FUROSEMIDE 40 MG
20 TABLET ORAL DAILY
Refills: 0 | Status: DISCONTINUED | OUTPATIENT
Start: 2021-12-30 | End: 2022-01-06

## 2021-12-30 RX ORDER — REMDESIVIR 5 MG/ML
200 INJECTION INTRAVENOUS EVERY 24 HOURS
Refills: 0 | Status: COMPLETED | OUTPATIENT
Start: 2021-12-30 | End: 2021-12-30

## 2021-12-30 RX ORDER — CLOPIDOGREL BISULFATE 75 MG/1
75 TABLET, FILM COATED ORAL DAILY
Refills: 0 | Status: DISCONTINUED | OUTPATIENT
Start: 2021-12-30 | End: 2022-01-06

## 2021-12-30 RX ORDER — DEXTROSE 50 % IN WATER 50 %
12.5 SYRINGE (ML) INTRAVENOUS ONCE
Refills: 0 | Status: DISCONTINUED | OUTPATIENT
Start: 2021-12-30 | End: 2022-01-06

## 2021-12-30 RX ORDER — SENNA PLUS 8.6 MG/1
2 TABLET ORAL AT BEDTIME
Refills: 0 | Status: DISCONTINUED | OUTPATIENT
Start: 2021-12-30 | End: 2022-01-06

## 2021-12-30 RX ORDER — ASPIRIN/CALCIUM CARB/MAGNESIUM 324 MG
81 TABLET ORAL DAILY
Refills: 0 | Status: DISCONTINUED | OUTPATIENT
Start: 2021-12-30 | End: 2022-01-06

## 2021-12-30 RX ORDER — ACETAMINOPHEN 500 MG
650 TABLET ORAL EVERY 6 HOURS
Refills: 0 | Status: DISCONTINUED | OUTPATIENT
Start: 2021-12-30 | End: 2022-01-06

## 2021-12-30 RX ORDER — INSULIN LISPRO 100/ML
5 VIAL (ML) SUBCUTANEOUS
Refills: 0 | Status: DISCONTINUED | OUTPATIENT
Start: 2021-12-30 | End: 2022-01-06

## 2021-12-30 RX ORDER — GLUCAGON INJECTION, SOLUTION 0.5 MG/.1ML
1 INJECTION, SOLUTION SUBCUTANEOUS ONCE
Refills: 0 | Status: DISCONTINUED | OUTPATIENT
Start: 2021-12-30 | End: 2022-01-06

## 2021-12-30 RX ORDER — INSULIN GLARGINE 100 [IU]/ML
30 INJECTION, SOLUTION SUBCUTANEOUS AT BEDTIME
Refills: 0 | Status: DISCONTINUED | OUTPATIENT
Start: 2021-12-30 | End: 2021-12-31

## 2021-12-30 RX ORDER — DEXTROSE 50 % IN WATER 50 %
15 SYRINGE (ML) INTRAVENOUS ONCE
Refills: 0 | Status: DISCONTINUED | OUTPATIENT
Start: 2021-12-30 | End: 2022-01-06

## 2021-12-30 RX ORDER — ALBUTEROL 90 UG/1
2 AEROSOL, METERED ORAL EVERY 6 HOURS
Refills: 0 | Status: DISCONTINUED | OUTPATIENT
Start: 2021-12-30 | End: 2022-01-06

## 2021-12-30 RX ORDER — ENOXAPARIN SODIUM 100 MG/ML
40 INJECTION SUBCUTANEOUS
Refills: 0 | Status: DISCONTINUED | OUTPATIENT
Start: 2021-12-30 | End: 2021-12-30

## 2021-12-30 RX ORDER — DEXTROSE 50 % IN WATER 50 %
25 SYRINGE (ML) INTRAVENOUS ONCE
Refills: 0 | Status: DISCONTINUED | OUTPATIENT
Start: 2021-12-30 | End: 2022-01-06

## 2021-12-30 RX ORDER — AMLODIPINE BESYLATE 2.5 MG/1
10 TABLET ORAL DAILY
Refills: 0 | Status: DISCONTINUED | OUTPATIENT
Start: 2021-12-30 | End: 2022-01-06

## 2021-12-30 RX ORDER — NORTRIPTYLINE HYDROCHLORIDE 10 MG/1
25 CAPSULE ORAL AT BEDTIME
Refills: 0 | Status: DISCONTINUED | OUTPATIENT
Start: 2021-12-30 | End: 2022-01-06

## 2021-12-30 RX ORDER — ATORVASTATIN CALCIUM 80 MG/1
80 TABLET, FILM COATED ORAL AT BEDTIME
Refills: 0 | Status: DISCONTINUED | OUTPATIENT
Start: 2021-12-30 | End: 2022-01-06

## 2021-12-30 RX ORDER — REMDESIVIR 5 MG/ML
100 INJECTION INTRAVENOUS EVERY 24 HOURS
Refills: 0 | Status: COMPLETED | OUTPATIENT
Start: 2021-12-31 | End: 2022-01-03

## 2021-12-30 RX ORDER — FERROUS SULFATE 325(65) MG
325 TABLET ORAL
Refills: 0 | Status: DISCONTINUED | OUTPATIENT
Start: 2021-12-30 | End: 2022-01-06

## 2021-12-30 RX ORDER — DEXAMETHASONE 0.5 MG/5ML
6 ELIXIR ORAL DAILY
Refills: 0 | Status: DISCONTINUED | OUTPATIENT
Start: 2021-12-30 | End: 2022-01-06

## 2021-12-30 RX ORDER — REMDESIVIR 5 MG/ML
INJECTION INTRAVENOUS
Refills: 0 | Status: COMPLETED | OUTPATIENT
Start: 2021-12-30 | End: 2022-01-03

## 2021-12-30 RX ORDER — LEVOTHYROXINE SODIUM 125 MCG
25 TABLET ORAL DAILY
Refills: 0 | Status: DISCONTINUED | OUTPATIENT
Start: 2021-12-30 | End: 2022-01-06

## 2021-12-30 RX ORDER — GABAPENTIN 400 MG/1
300 CAPSULE ORAL
Refills: 0 | Status: DISCONTINUED | OUTPATIENT
Start: 2021-12-30 | End: 2022-01-06

## 2021-12-30 RX ORDER — ENOXAPARIN SODIUM 100 MG/ML
40 INJECTION SUBCUTANEOUS DAILY
Refills: 0 | Status: DISCONTINUED | OUTPATIENT
Start: 2021-12-30 | End: 2021-12-30

## 2021-12-30 RX ORDER — ENOXAPARIN SODIUM 100 MG/ML
40 INJECTION SUBCUTANEOUS
Refills: 0 | Status: DISCONTINUED | OUTPATIENT
Start: 2021-12-30 | End: 2022-01-06

## 2021-12-30 RX ADMIN — Medication 325 MILLIGRAM(S): at 17:04

## 2021-12-30 RX ADMIN — Medication 20 MILLIGRAM(S): at 12:02

## 2021-12-30 RX ADMIN — CHLORHEXIDINE GLUCONATE 1 APPLICATION(S): 213 SOLUTION TOPICAL at 12:01

## 2021-12-30 RX ADMIN — CLOPIDOGREL BISULFATE 75 MILLIGRAM(S): 75 TABLET, FILM COATED ORAL at 12:01

## 2021-12-30 RX ADMIN — Medication 81 MILLIGRAM(S): at 12:02

## 2021-12-30 RX ADMIN — GABAPENTIN 300 MILLIGRAM(S): 400 CAPSULE ORAL at 10:21

## 2021-12-30 RX ADMIN — PANTOPRAZOLE SODIUM 40 MILLIGRAM(S): 20 TABLET, DELAYED RELEASE ORAL at 10:21

## 2021-12-30 RX ADMIN — AMLODIPINE BESYLATE 10 MILLIGRAM(S): 2.5 TABLET ORAL at 10:22

## 2021-12-30 RX ADMIN — REMDESIVIR 500 MILLIGRAM(S): 5 INJECTION INTRAVENOUS at 22:20

## 2021-12-30 RX ADMIN — ATORVASTATIN CALCIUM 80 MILLIGRAM(S): 80 TABLET, FILM COATED ORAL at 22:19

## 2021-12-30 RX ADMIN — Medication 6 MILLIGRAM(S): at 12:02

## 2021-12-30 RX ADMIN — SERTRALINE 100 MILLIGRAM(S): 25 TABLET, FILM COATED ORAL at 12:02

## 2021-12-30 RX ADMIN — Medication 25 MICROGRAM(S): at 12:02

## 2021-12-30 RX ADMIN — ENOXAPARIN SODIUM 40 MILLIGRAM(S): 100 INJECTION SUBCUTANEOUS at 17:04

## 2021-12-30 RX ADMIN — Medication 5 UNIT(S): at 12:09

## 2021-12-30 RX ADMIN — GABAPENTIN 300 MILLIGRAM(S): 400 CAPSULE ORAL at 17:04

## 2021-12-30 RX ADMIN — Medication 650 MILLIGRAM(S): at 20:33

## 2021-12-30 RX ADMIN — Medication 5 UNIT(S): at 10:23

## 2021-12-30 RX ADMIN — Medication 325 MILLIGRAM(S): at 10:22

## 2021-12-30 RX ADMIN — INSULIN GLARGINE 30 UNIT(S): 100 INJECTION, SOLUTION SUBCUTANEOUS at 22:00

## 2021-12-30 NOTE — H&P ADULT - NSHPSOURCEINFORD_GEN_ALL_CORE
Chart(s)/Patient Verified Results  COMP METABOLIC PANEL WITH CBCA (CPNL,CBCA) 17Jan2018 12:01AM YOJANA BENTON   [Jan 18, 2018 1:31PM YOJANA BENTON]  TSH normal    Chemistry normal/acceptable    Iron TIBC and ferritin are normal.   [Jan 17, 2018 11:01PM ,]  Reason: TNP BY ACL for COMP METABOLIC PANEL WITH CBCA (CPNL,CBCA)  Test not performed: Specimen clotted.   [Jan 17, 2018 11:01PM ,]  Reason: TNP BY ACL for COMP METABOLIC PANEL WITH CBCA (CPNL,CBCA)  Test not performed: Specimen clotted.   [Jan 17, 2018 11:01PM ,]  Reason: TNP BY ACL for COMP METABOLIC PANEL WITH CBCA (CPNL,CBCA)  Test not performed: Specimen clotted.   [Jan 17, 2018 11:01PM ,]  Reason: TNP BY ACL for COMP METABOLIC PANEL WITH CBCA (CPNL,CBCA)  Test not performed: Specimen clotted.   [Jan 17, 2018 11:01PM ,]  Reason: TNP BY ACL for COMP METABOLIC PANEL WITH CBCA (CPNL,CBCA)  Test not performed: Specimen clotted.   [Jan 17, 2018 11:01PM ,]  Reason: TNP BY ACL for COMP METABOLIC PANEL WITH CBCA (CPNL,CBCA)  Test not performed: Specimen clotted.   [Jan 17, 2018 11:01PM ,]  Reason: TNP BY ACL for COMP METABOLIC PANEL WITH CBCA (CPNL,CBCA)  Test not performed: Specimen clotted.   [Jan 17, 2018 11:01PM ,]  Reason: TNP BY ACL for COMP METABOLIC PANEL WITH CBCA (CPNL,CBCA)  Test not performed: Specimen clotted.   [Jan 17, 2018 10:53PM ,]  Reason: ^Test not perfo for COMP METABOLIC PANEL WITH CBCA (CPNL,CBCA)  ^Test not performed: Specimen clotted.   [Jan 17, 2018 10:53PM ,]  Reason: ^Test not perfo for COMP METABOLIC PANEL WITH CBCA (CPNL,CBCA)  ^Test not performed: Specimen clotted.     Test Name Result Flag Reference   SODIUM 141 mmol/L  135-145   POTASSIUM 4.2 mmol/L  3.4-5.1   CHLORIDE 103 mmol/L     CARBON DIOXIDE 27 mmol/L  21-32   ANION GAP 15 mmol/L  10-20   GLUCOSE 75 mg/dl  65-99   BUN 12 mg/dl  6-20   CREATININE 0.81 mg/dl  0.67-1.17   GFR EST.AFRICAN AMER >90     eGFR results = or >90 mL/min/1.73m2 = Normal kidney function.   GFR EST.NONAFRI AMER >90      eGFR results = or >90 mL/min/1.73m2 = Normal kidney function.   BUN/CREATININE RATIO 15  7-25   BILIRUBIN TOTAL 0.7 mg/dl  0.2-1.0   GOT/AST 30 Units/L  <38   ALKALINE PHOSPHATASE 68 Units/L     ALBUMIN 4.9 g/dl  3.6-5.1   TOTAL PROTEIN 8.3 g/dl H 6.4-8.2   GLOBULIN (CALCULATED) 3.4 g/dl  2.0-4.0   A/G RATIO 1.4  1.0-2.4   CALCIUM 9.5 mg/dl  8.4-10.2   GPT/ALT 35 Units/L  <79   FASTING STATUS UNKNOWN hrs       TSH WITH REFLEX 17Jan2018 12:01AM YOJANA BENTON     Test Name Result Flag Reference   TSH 3.535 mcUnits/mL  0.463-4.130   Findings most consistent with euthyroid state, no additional testing suggested. TSH may be normal in patients with thyroid dysfunction and pituitary disease. Clinical correlation recommended.  (Reflex TSH algorithm is not recommended in hospitalized patients. A variety of drugs, as well as serious acute and chronic illnesses may alter thyroid function tests. Commonly implicated drugs include glucocorticoids, dopamine, carbamazepine, iodine, amiodarone, lithium and heparin.)     VITAMIN B12/FOLATE 17Jan2018 12:01AM YOJANA BENTON     Test Name Result Flag Reference   VITAMIN B12 472 pg/ml  211-911   FOLATE 17.5 ng/ml  >5.4     IRON AND TIBC 17Jan2018 12:01AM YOJANA BENTON     Test Name Result Flag Reference   IRON 86 mcg/dl     % IRON SATURATION 22 %  15-45   IRON BINDING CAPACITY 391 mcg/dl  250-450     FERRITIN 17Jan2018 12:01AM YOJANA BENTON     Test Name Result Flag Reference   FERRITIN 377 ng/ml

## 2021-12-30 NOTE — H&P ADULT - ASSESSMENT
80 yo F pmh of HTN, HLD, DM, CVA presents with fever and hypoxia. Patient reports that she has covid, unclear if she was tested. Found to be hypoxic so sent in for evaluation. Found to be hypoxic to the 70s on room air, improved with nasal canula. Reports cough, congestion, sneezing and runny nose since yesterday. no n/v/d. no chest pain, no shortness of breath, no palpitations. no sick contacts. patient is vaccinated. pmd is Dr. Yariel mustafa.; oxygen  remdesivir, decadron    htn: norvasc    Dm : FS AC/HS    fuild overload  VS  CHF ? on lasix    hypothyroid: synthroid    neuropathy: neurontin    Depression: sertraline    Prophylaxis DVt/GI  80 yo F pmh of HTN, HLD, DM, CVA presents with fever and hypoxia. Patient reports that she has covid, unclear if she was tested. Found to be hypoxic so sent in for evaluation. Found to be hypoxic to the 70s on room air, improved with nasal canula. Reports cough, congestion, sneezing and runny nose since yesterday. no n/v/d. no chest pain, no shortness of breath, no palpitations. no sick contacts. patient is vaccinated. pmd is Dr. Yariel LABOY 19 PNA:  -Supplemental O2 PRN    Qualifies for remdesivir, decadron  -Will get ID consult  -Order inflammatory panel    HTN:  Continue norvasc  -DASH diet    DM2 :   Sliding scale with coverage  Lantus QHS and premeal coverage  CHO diet    Chronic systolic CHF:  -Continue Lasix    ypothyroid: synthroid    neuropathy: neurontin    Depression: sertraline    Prophylaxis DVt/GI  82 yo F pmh of HTN, HLD, DM, CVA presents with fever and hypoxia. Patient reports that she has covid, unclear if she was tested. Found to be hypoxic so sent in for evaluation. Found to be hypoxic to the 70s on room air, improved with nasal canula. Reports cough, congestion, sneezing and runny nose since yesterday. no n/v/d. no chest pain, no shortness of breath, no palpitations. no sick contacts. patient is vaccinated. pmd is Dr. Yariel LABOY 19 PNA:  -Supplemental O2 PRN    Qualifies for remdesivir, decadron  -Will get ID consult  -Order inflammatory panel    HTN:  Continue norvasc  -DASH diet    DM2 :   Sliding scale with coverage  Lantus QHS and premeal coverage  CHO diet    Chronic systolic CHF:  -Continue Lasix    Hypothyroid:   Continue synthroid    Neuropathy:   Continue neurontin    Depression:   Continue sertraline    Lovenox for DVT ppx as per COVID 19 guidelines

## 2021-12-30 NOTE — H&P ADULT - NSHPLABSRESULTS_GEN_ALL_CORE
8.4    4.81  )-----------( 133      ( 29 Dec 2021 23:50 )             26.8       12-29    137  |  99  |  14  ----------------------------<  200<H>  4.0   |  20  |  1.0    Ca    7.8<L>      29 Dec 2021 23:50    TPro  4.5<L>  /  Alb  2.6<L>  /  TBili  0.2  /  DBili  x   /  AST  12  /  ALT  9   /  AlkPhos  98  12-29                      Lactate Trend      CARDIAC MARKERS ( 29 Dec 2021 23:50 )  x     / <0.01 ng/mL / x     / x     / x            CAPILLARY BLOOD GLUCOSE

## 2021-12-30 NOTE — H&P ADULT - NSHPPHYSICALEXAM_GEN_ALL_CORE
GENERAL:  82y/o Female NAD, resting comfortably.  HEAD:  Atraumatic, Normocephalic  EYES: EOMI, PERRLA, conjunctiva and sclera clear  NECK: Supple, No JVD, no cervical lymphadenopathy, non-tender  CHEST/LUNG: shallow breathing; No wheeze, rhonchi, or rales  HEART: Regular rate and rhythm; S1&S2  ABDOMEN: Soft, Nontender, Nondistended x 4 quadrants; Bowel sounds present  EXTREMITIES:   Peripheral Pulses Present, No clubbing, no cyanosis, or no edema, no calf tenderness  PSYCH: AAOx3, cooperative, appropriate  NEUROLOGY: WNL  SKIN: WNL

## 2021-12-30 NOTE — H&P ADULT - HISTORY OF PRESENT ILLNESS
Chief Complaint: fever.    · Chief Complaint: The patient is a 81y Female complaining of fever.  · HPI Objective Statement: 80 yo F pmh of HTN, HLD, DM, CVA presents with fever and hypoxia. Patient reports that she has covid, unclear if she was tested. Found to be hypoxic so sent in for evaluation. Found to be hypoxic to the 70s on room air, improved with nasal canula. Reports cough, congestion, sneezing and runny nose since yesterday. no n/v/d. no chest pain, no shortness of breath, no palpitations. no sick contacts. patient is vaccinated. pmd is Dr. Saldivar.

## 2021-12-30 NOTE — PATIENT PROFILE ADULT - FALL HARM RISK - HARM RISK INTERVENTIONS
Assistance with ambulation/Assistance OOB with selected safe patient handling equipment/Communicate Risk of Fall with Harm to all staff/Discuss with provider need for PT consult/Monitor gait and stability/Reinforce activity limits and safety measures with patient and family/Tailored Fall Risk Interventions/Visual Cue: Yellow wristband and red socks/Bed in lowest position, wheels locked, appropriate side rails in place/Call bell, personal items and telephone in reach/Instruct patient to call for assistance before getting out of bed or chair/Non-slip footwear when patient is out of bed/Sellers to call system/Physically safe environment - no spills, clutter or unnecessary equipment/Purposeful Proactive Rounding/Room/bathroom lighting operational, light cord in reach

## 2021-12-31 LAB
A1C WITH ESTIMATED AVERAGE GLUCOSE RESULT: 8.2 % — HIGH (ref 4–5.6)
ALBUMIN SERPL ELPH-MCNC: 3.5 G/DL — SIGNIFICANT CHANGE UP (ref 3.5–5.2)
ALP SERPL-CCNC: 126 U/L — HIGH (ref 30–115)
ALT FLD-CCNC: 13 U/L — SIGNIFICANT CHANGE UP (ref 0–41)
ANION GAP SERPL CALC-SCNC: 18 MMOL/L — HIGH (ref 7–14)
APTT BLD: 37.4 SEC — SIGNIFICANT CHANGE UP (ref 27–39.2)
AST SERPL-CCNC: 19 U/L — SIGNIFICANT CHANGE UP (ref 0–41)
BASOPHILS # BLD AUTO: 0.01 K/UL — SIGNIFICANT CHANGE UP (ref 0–0.2)
BASOPHILS NFR BLD AUTO: 0.2 % — SIGNIFICANT CHANGE UP (ref 0–1)
BILIRUB SERPL-MCNC: 0.2 MG/DL — SIGNIFICANT CHANGE UP (ref 0.2–1.2)
BUN SERPL-MCNC: 27 MG/DL — HIGH (ref 10–20)
CALCIUM SERPL-MCNC: 8.4 MG/DL — LOW (ref 8.5–10.1)
CHLORIDE SERPL-SCNC: 100 MMOL/L — SIGNIFICANT CHANGE UP (ref 98–110)
CK SERPL-CCNC: 91 U/L — SIGNIFICANT CHANGE UP (ref 0–225)
CO2 SERPL-SCNC: 24 MMOL/L — SIGNIFICANT CHANGE UP (ref 17–32)
CREAT SERPL-MCNC: 1.5 MG/DL — SIGNIFICANT CHANGE UP (ref 0.7–1.5)
CRP SERPL-MCNC: 14 MG/L — HIGH
D DIMER BLD IA.RAPID-MCNC: 239 NG/ML DDU — HIGH (ref 0–230)
EOSINOPHIL # BLD AUTO: 0 K/UL — SIGNIFICANT CHANGE UP (ref 0–0.7)
EOSINOPHIL NFR BLD AUTO: 0 % — SIGNIFICANT CHANGE UP (ref 0–8)
ESTIMATED AVERAGE GLUCOSE: 189 MG/DL — HIGH (ref 68–114)
FERRITIN SERPL-MCNC: 506 NG/ML — HIGH (ref 15–150)
GLUCOSE BLDC GLUCOMTR-MCNC: 219 MG/DL — HIGH (ref 70–99)
GLUCOSE BLDC GLUCOMTR-MCNC: 293 MG/DL — HIGH (ref 70–99)
GLUCOSE BLDC GLUCOMTR-MCNC: 296 MG/DL — HIGH (ref 70–99)
GLUCOSE BLDC GLUCOMTR-MCNC: 302 MG/DL — HIGH (ref 70–99)
GLUCOSE SERPL-MCNC: 249 MG/DL — HIGH (ref 70–99)
HCT VFR BLD CALC: 36 % — LOW (ref 37–47)
HGB BLD-MCNC: 11.7 G/DL — LOW (ref 12–16)
IMM GRANULOCYTES NFR BLD AUTO: 0.4 % — HIGH (ref 0.1–0.3)
INR BLD: 1.19 RATIO — SIGNIFICANT CHANGE UP (ref 0.65–1.3)
LDH SERPL L TO P-CCNC: 215 — SIGNIFICANT CHANGE UP (ref 50–242)
LYMPHOCYTES # BLD AUTO: 0.48 K/UL — LOW (ref 1.2–3.4)
LYMPHOCYTES # BLD AUTO: 9.8 % — LOW (ref 20.5–51.1)
MCHC RBC-ENTMCNC: 30.1 PG — SIGNIFICANT CHANGE UP (ref 27–31)
MCHC RBC-ENTMCNC: 32.5 G/DL — SIGNIFICANT CHANGE UP (ref 32–37)
MCV RBC AUTO: 92.5 FL — SIGNIFICANT CHANGE UP (ref 81–99)
MONOCYTES # BLD AUTO: 0.39 K/UL — SIGNIFICANT CHANGE UP (ref 0.1–0.6)
MONOCYTES NFR BLD AUTO: 8 % — SIGNIFICANT CHANGE UP (ref 1.7–9.3)
NEUTROPHILS # BLD AUTO: 3.98 K/UL — SIGNIFICANT CHANGE UP (ref 1.4–6.5)
NEUTROPHILS NFR BLD AUTO: 81.6 % — HIGH (ref 42.2–75.2)
NRBC # BLD: 0 /100 WBCS — SIGNIFICANT CHANGE UP (ref 0–0)
PLATELET # BLD AUTO: 199 K/UL — SIGNIFICANT CHANGE UP (ref 130–400)
POTASSIUM SERPL-MCNC: 3.7 MMOL/L — SIGNIFICANT CHANGE UP (ref 3.5–5)
POTASSIUM SERPL-SCNC: 3.7 MMOL/L — SIGNIFICANT CHANGE UP (ref 3.5–5)
PROCALCITONIN SERPL-MCNC: 0.14 NG/ML — HIGH (ref 0.02–0.1)
PROT SERPL-MCNC: 6.6 G/DL — SIGNIFICANT CHANGE UP (ref 6–8)
PROTHROM AB SERPL-ACNC: 13.7 SEC — HIGH (ref 9.95–12.87)
RBC # BLD: 3.89 M/UL — LOW (ref 4.2–5.4)
RBC # FLD: 13.3 % — SIGNIFICANT CHANGE UP (ref 11.5–14.5)
SODIUM SERPL-SCNC: 142 MMOL/L — SIGNIFICANT CHANGE UP (ref 135–146)
TROPONIN T SERPL-MCNC: <0.01 NG/ML — SIGNIFICANT CHANGE UP
WBC # BLD: 4.88 K/UL — SIGNIFICANT CHANGE UP (ref 4.8–10.8)
WBC # FLD AUTO: 4.88 K/UL — SIGNIFICANT CHANGE UP (ref 4.8–10.8)

## 2021-12-31 PROCEDURE — 99232 SBSQ HOSP IP/OBS MODERATE 35: CPT

## 2021-12-31 RX ORDER — MORPHINE SULFATE 50 MG/1
1 CAPSULE, EXTENDED RELEASE ORAL ONCE
Refills: 0 | Status: DISCONTINUED | OUTPATIENT
Start: 2021-12-31 | End: 2021-12-31

## 2021-12-31 RX ORDER — INSULIN GLARGINE 100 [IU]/ML
34 INJECTION, SOLUTION SUBCUTANEOUS AT BEDTIME
Refills: 0 | Status: DISCONTINUED | OUTPATIENT
Start: 2021-12-31 | End: 2022-01-06

## 2021-12-31 RX ORDER — INSULIN LISPRO 100/ML
5 VIAL (ML) SUBCUTANEOUS
Refills: 0 | Status: DISCONTINUED | OUTPATIENT
Start: 2021-12-31 | End: 2022-01-06

## 2021-12-31 RX ADMIN — GABAPENTIN 300 MILLIGRAM(S): 400 CAPSULE ORAL at 17:10

## 2021-12-31 RX ADMIN — INSULIN GLARGINE 34 UNIT(S): 100 INJECTION, SOLUTION SUBCUTANEOUS at 22:30

## 2021-12-31 RX ADMIN — ENOXAPARIN SODIUM 40 MILLIGRAM(S): 100 INJECTION SUBCUTANEOUS at 17:14

## 2021-12-31 RX ADMIN — Medication 5 UNIT(S): at 08:10

## 2021-12-31 RX ADMIN — SERTRALINE 100 MILLIGRAM(S): 25 TABLET, FILM COATED ORAL at 12:26

## 2021-12-31 RX ADMIN — Medication 20 MILLIGRAM(S): at 07:17

## 2021-12-31 RX ADMIN — CLOPIDOGREL BISULFATE 75 MILLIGRAM(S): 75 TABLET, FILM COATED ORAL at 12:26

## 2021-12-31 RX ADMIN — PANTOPRAZOLE SODIUM 40 MILLIGRAM(S): 20 TABLET, DELAYED RELEASE ORAL at 07:24

## 2021-12-31 RX ADMIN — NORTRIPTYLINE HYDROCHLORIDE 25 MILLIGRAM(S): 10 CAPSULE ORAL at 00:18

## 2021-12-31 RX ADMIN — Medication 6 MILLIGRAM(S): at 07:16

## 2021-12-31 RX ADMIN — Medication 325 MILLIGRAM(S): at 17:09

## 2021-12-31 RX ADMIN — Medication 650 MILLIGRAM(S): at 23:35

## 2021-12-31 RX ADMIN — GABAPENTIN 300 MILLIGRAM(S): 400 CAPSULE ORAL at 07:18

## 2021-12-31 RX ADMIN — Medication 5 UNIT(S): at 16:58

## 2021-12-31 RX ADMIN — ATORVASTATIN CALCIUM 80 MILLIGRAM(S): 80 TABLET, FILM COATED ORAL at 21:57

## 2021-12-31 RX ADMIN — ENOXAPARIN SODIUM 40 MILLIGRAM(S): 100 INJECTION SUBCUTANEOUS at 07:17

## 2021-12-31 RX ADMIN — SENNA PLUS 2 TABLET(S): 8.6 TABLET ORAL at 21:57

## 2021-12-31 RX ADMIN — REMDESIVIR 500 MILLIGRAM(S): 5 INJECTION INTRAVENOUS at 21:58

## 2021-12-31 RX ADMIN — Medication 81 MILLIGRAM(S): at 12:26

## 2021-12-31 RX ADMIN — Medication 5 UNIT(S): at 12:26

## 2021-12-31 RX ADMIN — NORTRIPTYLINE HYDROCHLORIDE 25 MILLIGRAM(S): 10 CAPSULE ORAL at 21:57

## 2021-12-31 RX ADMIN — Medication 325 MILLIGRAM(S): at 07:23

## 2021-12-31 RX ADMIN — AMLODIPINE BESYLATE 10 MILLIGRAM(S): 2.5 TABLET ORAL at 07:17

## 2021-12-31 RX ADMIN — SENNA PLUS 2 TABLET(S): 8.6 TABLET ORAL at 00:18

## 2021-12-31 RX ADMIN — TEMAZEPAM 30 MILLIGRAM(S): 15 CAPSULE ORAL at 01:33

## 2021-12-31 RX ADMIN — Medication 25 MICROGRAM(S): at 07:17

## 2021-12-31 NOTE — CONSULT NOTE ADULT - SUBJECTIVE AND OBJECTIVE BOX
JAYASHREE FAGAN  81y, Female  Allergy: honeydew (Unknown)  latex (Unknown)  penicillins (Unknown)  pickles (Unknown)  shellfish (Unknown)  sulfa drugs (Hives)  Sulfacetamide Sodium-Sulfur (Rash)      CHIEF COMPLAINT:       LOS  1d    HPI  HPI:  Chief Complaint: fever.    · Chief Complaint: The patient is a 81y Female complaining of fever.  · HPI Objective Statement: 80 yo F pmh of HTN, HLD, DM, CVA, Breast CA, hearing loss presents with fever and hypoxia. Patient reports that she has covid, unclear if she was tested. Found to be hypoxic so sent in for evaluation. Found to be hypoxic to the 70s on room air, improved with nasal canula. Reports cough, congestion, sneezing and runny nose since yesterday. no n/v/d. no chest pain, no shortness of breath, no palpitations. no sick contacts. Patient is vaccinated.      INFECTIOUS DISEASE HISTORY:  Satting ***  CXR ***  C-Reactive Protein, Serum: 14 mg/L (12-29-21 @ 23:50)  D-Dimer Assay, Quantitative: 225 ng/mL DDU (12-29-21 @ 23:50)  Ferritin, Serum: 506 ng/mL (12-29-21 @ 23:50)  Procalcitonin, Serum: 0.14 ng/mL (12-29-21 @ 23:50)      PMH  PAST MEDICAL & SURGICAL HISTORY:  DM (diabetes mellitus)  24 yr    Breast CA  2014 s/p rt    BP (high blood pressure)  20 yr    Depression    High cholesterol    Bilateral hearing loss, unspecified hearing loss type    CVA (cerebral vascular accident)  6/18 lft weakness    H/O total knee replacement, right    History of lumpectomy of right breast    History of cholecystectomy  1992        FAMILY HISTORY  No pertinent family history in first degree relatives    No pertinent family history in first degree relatives    FHx: diabetes mellitus (Mother)        SOCIAL HISTORY  Social History:  denies smoking (30 Dec 2021 05:36)        ROS  General: Denies rigors, nightsweats  HEENT: Denies headache, rhinorrhea, sore throat, eye pain  CV: Denies CP, palpitations  PULM: Denies wheezing, hemoptysis  GI: Denies hematemesis, hematochezia, melena  : Denies discharge, hematuria  MSK: Denies arthralgias, myalgias  SKIN: Denies rash, lesions  NEURO: Denies paresthesias, weakness  PSYCH: Denies depression, anxiety    VITALS:  T(F): 98.4, Max: 98.4 (12-30-21 @ 21:25)  HR: 99  BP: 145/80  RR: 18Vital Signs Last 24 Hrs  T(C): 36.9 (30 Dec 2021 21:25), Max: 36.9 (30 Dec 2021 21:25)  T(F): 98.4 (30 Dec 2021 21:25), Max: 98.4 (30 Dec 2021 21:25)  HR: 99 (30 Dec 2021 21:25) (86 - 99)  BP: 145/80 (30 Dec 2021 21:25) (134/67 - 216/79)  BP(mean): --  RR: 18 (30 Dec 2021 21:25) (16 - 18)  SpO2: 98% (30 Dec 2021 19:53) (97% - 98%)    PHYSICAL EXAM:  ***    TESTS & MEASUREMENTS:                        8.4    4.81  )-----------( 133      ( 29 Dec 2021 23:50 )             26.8     12-29    137  |  99  |  14  ----------------------------<  200<H>  4.0   |  20  |  1.0    Ca    7.8<L>      29 Dec 2021 23:50    TPro  4.5<L>  /  Alb  2.6<L>  /  TBili  0.2  /  DBili  x   /  AST  12  /  ALT  9   /  AlkPhos  98  12-29      LIVER FUNCTIONS - ( 29 Dec 2021 23:50 )  Alb: 2.6 g/dL / Pro: 4.5 g/dL / ALK PHOS: 98 U/L / ALT: 9 U/L / AST: 12 U/L / GGT: x               Culture - Urine (collected 11-08-21 @ 16:57)  Source: Catheterized Catheterized  Final Report (11-11-21 @ 19:04):    <10,000 CFU/mL Normal Urogenital Yumiko    Culture - Urine (collected 10-04-21 @ 16:00)  Source: Clean Catch Clean Catch (Midstream)  Final Report (10-05-21 @ 18:32):    No growth    Culture - Urine (collected 06-06-21 @ 03:50)  Source: .Urine Clean Catch (Midstream)  Final Report (06-07-21 @ 16:34):    No growth        Blood Gas Venous - Lactate: 0.90 mmol/L (12-30-21 @ 03:12)  Blood Gas Venous - Lactate: 8.10 mmol/L (12-30-21 @ 00:18)      INFECTIOUS DISEASES TESTING  Procalcitonin, Serum: 0.14 ng/mL (12-29-21 @ 23:50)  COVID-19 PCR: Detected (12-29-21 @ 22:00)  COVID-19 PCR: NotDetec (11-10-21 @ 17:17)  COVID-19 PCR: NotDetec (11-08-21 @ 17:40)  COVID-19 PCR: NotDetec (10-04-21 @ 13:30)  COVID-19 PCR: NotDetec (08-13-21 @ 14:17)  Rapid RVP Result: NotDetec (06-06-21 @ 03:00)  Rapid RVP Result: NotDetec (04-30-21 @ 12:37)      INFLAMMATORY MARKERS  C-Reactive Protein, Serum: 14 mg/L (12-29-21 @ 23:50)      RADIOLOGY & ADDITIONAL TESTS:  I have personally reviewed the last Chest xray  CXR      CT  CT Abdomen and Pelvis w/ IV Cont:   ACC: [id number] EXAM:  CT ABDOMEN AND PELVIS IC                          PROCEDURE DATE:  12/30/2021          INTERPRETATION:  CLINICAL STATEMENT: Elevated lactate.    TECHNIQUE: Contiguous axial CT images were obtained from the lower chest   to the pubic symphysis following administration of 95cc Omnipaque 350   intravenous contrast.  Oral contrast was not administered.  Reformatted   images in the coronal and sagittal planes were acquired.    COMPARISON CT: CT abdomen pelvis 11/8/2021.      FINDINGS:    Motion artifact and artifact from overlying arms degrades evaluation of   the lower chest/upper abdomen.    LOWER CHEST: Motion limited. Cardiomegaly. Partially imaged pacer leads.   Bilateral subsegmental atelectasis.    HEPATOBILIARY: Postcholecystectomy. Patient's known right hepatic lobe   hemangioma, poorly visualized on today's study.    SPLEEN: Redemonstrated are multiple splenic hypodensities, limited in   evaluation by motion and artifact from bilateral overlying arms.    PANCREAS: Redemonstrated pancreatic calcifications compatible sequela of   chronic pancreatitis.    ADRENAL GLANDS: Unremarkable.    KIDNEYS: Symmetric renal enhancement. No hydronephrosis. Unchanged right   1 cm indeterminate hypodensity. Additional subcentimeter hypodensities,   too small to further characterize.    ABDOMINOPELVIC NODES: Unremarkable.    PELVIC ORGANS: Unremarkable.    PERITONEUM/MESENTERY/BOWEL: No evidence of bowel obstruction,   pneumoperitoneum, or ascites. Unremarkable appendix.    BONES/SOFT TISSUES: No acute osseous abnormality. Stable L2 vertebral   body compression deformity. Degenerative changes of the spine.    OTHER: Patent celiac artery, SMA and MIKE.    IMPRESSION:    No CT evidence for acute pathology in the abdomen/pelvis.    Chronic findings as above.    --- End of Report ---          MATEUSZ CHANDLER MD; Resident Radiologist  This document has been electronically signed.  TERESA BRIGGS MD; Attending Radiologist  This document has been electronically signed. Dec 30 2021  4:17AM (12-30-21 @ 01:34)      CARDIOLOGY TESTING  12 Lead ECG:   Ventricular Rate 104 BPM    Atrial Rate 104 BPM    P-R Interval 166 ms    QRS Duration 100 ms    Q-T Interval 362 ms    QTC Calculation(Bazett) 476 ms    P Axis 63 degrees    R Axis -59 degrees    T Axis 58 degrees    Diagnosis Line Sinus tachycardia  Low voltage QRS  Left anterior fascicular block  Poor R wave progression  Confirmed by TERESA MCGINNIS MD ([id number]) on 12/30/2021 12:06:03 PM (12-29-21 @ 22:46)      MEDICATIONS  amLODIPine   Tablet 10 Oral daily  aspirin enteric coated 81 Oral daily  atorvastatin 80 Oral at bedtime  chlorhexidine 4% Liquid 1 Topical <User Schedule>  clopidogrel Tablet 75 Oral daily  dexAMETHasone  Injectable 6 IV Push daily  dextrose 40% Gel 15 Oral once  dextrose 5%. 1000 IV Continuous <Continuous>  dextrose 5%. 1000 IV Continuous <Continuous>  dextrose 50% Injectable 25 IV Push once  dextrose 50% Injectable 12.5 IV Push once  dextrose 50% Injectable 25 IV Push once  enoxaparin Injectable 40 SubCutaneous two times a day  ferrous    sulfate 325 Oral two times a day  furosemide    Tablet 20 Oral daily  gabapentin 300 Oral two times a day  glucagon  Injectable 1 IntraMuscular once  insulin glargine Injectable (LANTUS) 30 SubCutaneous at bedtime  insulin lispro Injectable (ADMELOG) 5 SubCutaneous before breakfast  insulin lispro Injectable (ADMELOG) 5 SubCutaneous before lunch  levothyroxine 25 Oral daily  nortriptyline 25 Oral at bedtime  pantoprazole    Tablet 40 Oral before breakfast  remdesivir  IVPB  IV Intermittent   remdesivir  IVPB 100 IV Intermittent every 24 hours  senna 2 Oral at bedtime  sertraline 100 Oral daily      Weight  Weight (kg): 87.7 (12-30-21 @ 07:30)    ANTIBIOTICS:  remdesivir  IVPB   IV Intermittent   remdesivir  IVPB 100 milliGRAM(s) IV Intermittent every 24 hours      ALLERGIES:  honeydew (Unknown)  latex (Unknown)  penicillins (Unknown)  pickles (Unknown)  shellfish (Unknown)  sulfa drugs (Hives)  Sulfacetamide Sodium-Sulfur (Rash)

## 2021-12-31 NOTE — CONSULT NOTE ADULT - ASSESSMENT
ASSESSMENT  81y F admitted with COVID 19 HYPOXIA.    HPI:   82 yo F pmh of HTN, HLD, DM, CVA, Breast CA, hearing loss presents with fever and hypoxia. Patient reports that she has covid, unclear if she was tested. Found to be hypoxic so sent in for evaluation. Found to be hypoxic to the 70s on room air, improved with nasal canula. Reports cough, congestion, sneezing and runny nose since yesterday. no n/v/d. no chest pain, no shortness of breath, no palpitations. no sick contacts. Patient is vaccinated.      IMPRESSION  #COVID19 PNA, Severe  Cxray with possible interstitial opacities seen bilaterally.  C-Reactive Protein, Serum: 14 mg/L (12-29-21 @ 23:50)  D-Dimer Assay, Quantitative: 225 ng/mL DDU (12-29-21 @ 23:50)  Ferritin, Serum: 506 ng/mL (12-29-21 @ 23:50)  Procalcitonin, Serum: 0.14 ng/mL (12-29-21 @ 23:50)    #Lactic acidosis  #Obesity BMI (kg/m2): 37.8  #DM   # Atelectasis on CT  #Abx allergy: sulfa drugs (Hives) & penicillins (Unknown)    RECOMMENDATIONS  - Continue Remdesivir Day 2 - Day 5 100mg IV daily. Monitor Cr/LFTs  - Dexamethasone 6mg x 10 days or until Discharge (whichever is shorter)  - A/C per primary team  - Prone positioning if possible  - Monitor off Abx

## 2022-01-01 LAB
ALBUMIN SERPL ELPH-MCNC: 3.7 G/DL — SIGNIFICANT CHANGE UP (ref 3.5–5.2)
ALP SERPL-CCNC: 105 U/L — SIGNIFICANT CHANGE UP (ref 30–115)
ALT FLD-CCNC: 12 U/L — SIGNIFICANT CHANGE UP (ref 0–41)
ANION GAP SERPL CALC-SCNC: 14 MMOL/L — SIGNIFICANT CHANGE UP (ref 7–14)
AST SERPL-CCNC: 18 U/L — SIGNIFICANT CHANGE UP (ref 0–41)
BASOPHILS # BLD AUTO: 0.01 K/UL — SIGNIFICANT CHANGE UP (ref 0–0.2)
BASOPHILS NFR BLD AUTO: 0.2 % — SIGNIFICANT CHANGE UP (ref 0–1)
BILIRUB SERPL-MCNC: 0.2 MG/DL — SIGNIFICANT CHANGE UP (ref 0.2–1.2)
BUN SERPL-MCNC: 31 MG/DL — HIGH (ref 10–20)
CALCIUM SERPL-MCNC: 8.3 MG/DL — LOW (ref 8.5–10.1)
CHLORIDE SERPL-SCNC: 101 MMOL/L — SIGNIFICANT CHANGE UP (ref 98–110)
CO2 SERPL-SCNC: 28 MMOL/L — SIGNIFICANT CHANGE UP (ref 17–32)
CREAT SERPL-MCNC: 1.5 MG/DL — SIGNIFICANT CHANGE UP (ref 0.7–1.5)
CRP SERPL-MCNC: 27 MG/L — HIGH
EOSINOPHIL # BLD AUTO: 0.01 K/UL — SIGNIFICANT CHANGE UP (ref 0–0.7)
EOSINOPHIL NFR BLD AUTO: 0.2 % — SIGNIFICANT CHANGE UP (ref 0–8)
FERRITIN SERPL-MCNC: 593 NG/ML — HIGH (ref 15–150)
GLUCOSE BLDC GLUCOMTR-MCNC: 171 MG/DL — HIGH (ref 70–99)
GLUCOSE BLDC GLUCOMTR-MCNC: 174 MG/DL — HIGH (ref 70–99)
GLUCOSE BLDC GLUCOMTR-MCNC: 217 MG/DL — HIGH (ref 70–99)
GLUCOSE BLDC GLUCOMTR-MCNC: 89 MG/DL — SIGNIFICANT CHANGE UP (ref 70–99)
GLUCOSE SERPL-MCNC: 102 MG/DL — HIGH (ref 70–99)
HCT VFR BLD CALC: 35.6 % — LOW (ref 37–47)
HGB BLD-MCNC: 11.7 G/DL — LOW (ref 12–16)
IMM GRANULOCYTES NFR BLD AUTO: 0.4 % — HIGH (ref 0.1–0.3)
LYMPHOCYTES # BLD AUTO: 1.35 K/UL — SIGNIFICANT CHANGE UP (ref 1.2–3.4)
LYMPHOCYTES # BLD AUTO: 28.7 % — SIGNIFICANT CHANGE UP (ref 20.5–51.1)
MAGNESIUM SERPL-MCNC: 2 MG/DL — SIGNIFICANT CHANGE UP (ref 1.8–2.4)
MCHC RBC-ENTMCNC: 30.2 PG — SIGNIFICANT CHANGE UP (ref 27–31)
MCHC RBC-ENTMCNC: 32.9 G/DL — SIGNIFICANT CHANGE UP (ref 32–37)
MCV RBC AUTO: 92 FL — SIGNIFICANT CHANGE UP (ref 81–99)
MONOCYTES # BLD AUTO: 0.5 K/UL — SIGNIFICANT CHANGE UP (ref 0.1–0.6)
MONOCYTES NFR BLD AUTO: 10.6 % — HIGH (ref 1.7–9.3)
NEUTROPHILS # BLD AUTO: 2.81 K/UL — SIGNIFICANT CHANGE UP (ref 1.4–6.5)
NEUTROPHILS NFR BLD AUTO: 59.9 % — SIGNIFICANT CHANGE UP (ref 42.2–75.2)
NRBC # BLD: 0 /100 WBCS — SIGNIFICANT CHANGE UP (ref 0–0)
PHOSPHATE SERPL-MCNC: 3.7 MG/DL — SIGNIFICANT CHANGE UP (ref 2.1–4.9)
PLATELET # BLD AUTO: 206 K/UL — SIGNIFICANT CHANGE UP (ref 130–400)
POTASSIUM SERPL-MCNC: 3.5 MMOL/L — SIGNIFICANT CHANGE UP (ref 3.5–5)
POTASSIUM SERPL-SCNC: 3.5 MMOL/L — SIGNIFICANT CHANGE UP (ref 3.5–5)
PROCALCITONIN SERPL-MCNC: 0.21 NG/ML — HIGH (ref 0.02–0.1)
PROT SERPL-MCNC: 6.3 G/DL — SIGNIFICANT CHANGE UP (ref 6–8)
RBC # BLD: 3.87 M/UL — LOW (ref 4.2–5.4)
RBC # FLD: 13.4 % — SIGNIFICANT CHANGE UP (ref 11.5–14.5)
SODIUM SERPL-SCNC: 143 MMOL/L — SIGNIFICANT CHANGE UP (ref 135–146)
WBC # BLD: 4.7 K/UL — LOW (ref 4.8–10.8)
WBC # FLD AUTO: 4.7 K/UL — LOW (ref 4.8–10.8)

## 2022-01-01 PROCEDURE — 99232 SBSQ HOSP IP/OBS MODERATE 35: CPT

## 2022-01-01 RX ADMIN — CHLORHEXIDINE GLUCONATE 1 APPLICATION(S): 213 SOLUTION TOPICAL at 07:49

## 2022-01-01 RX ADMIN — GABAPENTIN 300 MILLIGRAM(S): 400 CAPSULE ORAL at 06:52

## 2022-01-01 RX ADMIN — SENNA PLUS 2 TABLET(S): 8.6 TABLET ORAL at 21:31

## 2022-01-01 RX ADMIN — ENOXAPARIN SODIUM 40 MILLIGRAM(S): 100 INJECTION SUBCUTANEOUS at 16:50

## 2022-01-01 RX ADMIN — CLOPIDOGREL BISULFATE 75 MILLIGRAM(S): 75 TABLET, FILM COATED ORAL at 13:41

## 2022-01-01 RX ADMIN — GABAPENTIN 300 MILLIGRAM(S): 400 CAPSULE ORAL at 16:50

## 2022-01-01 RX ADMIN — Medication 81 MILLIGRAM(S): at 13:42

## 2022-01-01 RX ADMIN — NORTRIPTYLINE HYDROCHLORIDE 25 MILLIGRAM(S): 10 CAPSULE ORAL at 21:30

## 2022-01-01 RX ADMIN — TEMAZEPAM 30 MILLIGRAM(S): 15 CAPSULE ORAL at 23:05

## 2022-01-01 RX ADMIN — Medication 650 MILLIGRAM(S): at 17:03

## 2022-01-01 RX ADMIN — Medication 25 MICROGRAM(S): at 06:51

## 2022-01-01 RX ADMIN — Medication 5 UNIT(S): at 12:04

## 2022-01-01 RX ADMIN — Medication 325 MILLIGRAM(S): at 06:53

## 2022-01-01 RX ADMIN — INSULIN GLARGINE 34 UNIT(S): 100 INJECTION, SOLUTION SUBCUTANEOUS at 22:22

## 2022-01-01 RX ADMIN — AMLODIPINE BESYLATE 10 MILLIGRAM(S): 2.5 TABLET ORAL at 06:49

## 2022-01-01 RX ADMIN — Medication 20 MILLIGRAM(S): at 06:51

## 2022-01-01 RX ADMIN — SERTRALINE 100 MILLIGRAM(S): 25 TABLET, FILM COATED ORAL at 13:41

## 2022-01-01 RX ADMIN — Medication 325 MILLIGRAM(S): at 16:50

## 2022-01-01 RX ADMIN — Medication 5 UNIT(S): at 16:51

## 2022-01-01 RX ADMIN — REMDESIVIR 500 MILLIGRAM(S): 5 INJECTION INTRAVENOUS at 21:31

## 2022-01-01 RX ADMIN — ENOXAPARIN SODIUM 40 MILLIGRAM(S): 100 INJECTION SUBCUTANEOUS at 06:54

## 2022-01-01 RX ADMIN — PANTOPRAZOLE SODIUM 40 MILLIGRAM(S): 20 TABLET, DELAYED RELEASE ORAL at 06:52

## 2022-01-01 RX ADMIN — TEMAZEPAM 30 MILLIGRAM(S): 15 CAPSULE ORAL at 01:19

## 2022-01-01 RX ADMIN — Medication 650 MILLIGRAM(S): at 16:50

## 2022-01-01 RX ADMIN — Medication 6 MILLIGRAM(S): at 06:54

## 2022-01-01 RX ADMIN — ATORVASTATIN CALCIUM 80 MILLIGRAM(S): 80 TABLET, FILM COATED ORAL at 21:30

## 2022-01-02 LAB
ALBUMIN SERPL ELPH-MCNC: 3.5 G/DL — SIGNIFICANT CHANGE UP (ref 3.5–5.2)
ALP SERPL-CCNC: 104 U/L — SIGNIFICANT CHANGE UP (ref 30–115)
ALT FLD-CCNC: 15 U/L — SIGNIFICANT CHANGE UP (ref 0–41)
ANION GAP SERPL CALC-SCNC: 14 MMOL/L — SIGNIFICANT CHANGE UP (ref 7–14)
AST SERPL-CCNC: 19 U/L — SIGNIFICANT CHANGE UP (ref 0–41)
BASOPHILS # BLD AUTO: 0.01 K/UL — SIGNIFICANT CHANGE UP (ref 0–0.2)
BASOPHILS NFR BLD AUTO: 0.2 % — SIGNIFICANT CHANGE UP (ref 0–1)
BILIRUB SERPL-MCNC: 0.2 MG/DL — SIGNIFICANT CHANGE UP (ref 0.2–1.2)
BUN SERPL-MCNC: 32 MG/DL — HIGH (ref 10–20)
CALCIUM SERPL-MCNC: 8.5 MG/DL — SIGNIFICANT CHANGE UP (ref 8.5–10.1)
CHLORIDE SERPL-SCNC: 103 MMOL/L — SIGNIFICANT CHANGE UP (ref 98–110)
CO2 SERPL-SCNC: 28 MMOL/L — SIGNIFICANT CHANGE UP (ref 17–32)
CREAT SERPL-MCNC: 1.4 MG/DL — SIGNIFICANT CHANGE UP (ref 0.7–1.5)
EOSINOPHIL # BLD AUTO: 0.01 K/UL — SIGNIFICANT CHANGE UP (ref 0–0.7)
EOSINOPHIL NFR BLD AUTO: 0.2 % — SIGNIFICANT CHANGE UP (ref 0–8)
GLUCOSE BLDC GLUCOMTR-MCNC: 106 MG/DL — HIGH (ref 70–99)
GLUCOSE BLDC GLUCOMTR-MCNC: 123 MG/DL — HIGH (ref 70–99)
GLUCOSE BLDC GLUCOMTR-MCNC: 133 MG/DL — HIGH (ref 70–99)
GLUCOSE BLDC GLUCOMTR-MCNC: 198 MG/DL — HIGH (ref 70–99)
GLUCOSE SERPL-MCNC: 83 MG/DL — SIGNIFICANT CHANGE UP (ref 70–99)
HCT VFR BLD CALC: 39.6 % — SIGNIFICANT CHANGE UP (ref 37–47)
HGB BLD-MCNC: 12.8 G/DL — SIGNIFICANT CHANGE UP (ref 12–16)
IMM GRANULOCYTES NFR BLD AUTO: 0.5 % — HIGH (ref 0.1–0.3)
LYMPHOCYTES # BLD AUTO: 0.9 K/UL — LOW (ref 1.2–3.4)
LYMPHOCYTES # BLD AUTO: 14.7 % — LOW (ref 20.5–51.1)
MCHC RBC-ENTMCNC: 30.4 PG — SIGNIFICANT CHANGE UP (ref 27–31)
MCHC RBC-ENTMCNC: 32.3 G/DL — SIGNIFICANT CHANGE UP (ref 32–37)
MCV RBC AUTO: 94.1 FL — SIGNIFICANT CHANGE UP (ref 81–99)
MONOCYTES # BLD AUTO: 0.35 K/UL — SIGNIFICANT CHANGE UP (ref 0.1–0.6)
MONOCYTES NFR BLD AUTO: 5.7 % — SIGNIFICANT CHANGE UP (ref 1.7–9.3)
NEUTROPHILS # BLD AUTO: 4.83 K/UL — SIGNIFICANT CHANGE UP (ref 1.4–6.5)
NEUTROPHILS NFR BLD AUTO: 78.7 % — HIGH (ref 42.2–75.2)
NRBC # BLD: 0 /100 WBCS — SIGNIFICANT CHANGE UP (ref 0–0)
PLATELET # BLD AUTO: 235 K/UL — SIGNIFICANT CHANGE UP (ref 130–400)
POTASSIUM SERPL-MCNC: 3.7 MMOL/L — SIGNIFICANT CHANGE UP (ref 3.5–5)
POTASSIUM SERPL-SCNC: 3.7 MMOL/L — SIGNIFICANT CHANGE UP (ref 3.5–5)
PROT SERPL-MCNC: 6.6 G/DL — SIGNIFICANT CHANGE UP (ref 6–8)
RBC # BLD: 4.21 M/UL — SIGNIFICANT CHANGE UP (ref 4.2–5.4)
RBC # FLD: 13.7 % — SIGNIFICANT CHANGE UP (ref 11.5–14.5)
SODIUM SERPL-SCNC: 145 MMOL/L — SIGNIFICANT CHANGE UP (ref 135–146)
WBC # BLD: 6.13 K/UL — SIGNIFICANT CHANGE UP (ref 4.8–10.8)
WBC # FLD AUTO: 6.13 K/UL — SIGNIFICANT CHANGE UP (ref 4.8–10.8)

## 2022-01-02 PROCEDURE — 99232 SBSQ HOSP IP/OBS MODERATE 35: CPT

## 2022-01-02 RX ADMIN — CHLORHEXIDINE GLUCONATE 1 APPLICATION(S): 213 SOLUTION TOPICAL at 06:19

## 2022-01-02 RX ADMIN — CLOPIDOGREL BISULFATE 75 MILLIGRAM(S): 75 TABLET, FILM COATED ORAL at 12:32

## 2022-01-02 RX ADMIN — Medication 6 MILLIGRAM(S): at 06:18

## 2022-01-02 RX ADMIN — Medication 25 MICROGRAM(S): at 06:18

## 2022-01-02 RX ADMIN — SERTRALINE 100 MILLIGRAM(S): 25 TABLET, FILM COATED ORAL at 12:31

## 2022-01-02 RX ADMIN — Medication 5 UNIT(S): at 17:09

## 2022-01-02 RX ADMIN — PANTOPRAZOLE SODIUM 40 MILLIGRAM(S): 20 TABLET, DELAYED RELEASE ORAL at 06:19

## 2022-01-02 RX ADMIN — NORTRIPTYLINE HYDROCHLORIDE 25 MILLIGRAM(S): 10 CAPSULE ORAL at 21:55

## 2022-01-02 RX ADMIN — Medication 325 MILLIGRAM(S): at 06:18

## 2022-01-02 RX ADMIN — AMLODIPINE BESYLATE 10 MILLIGRAM(S): 2.5 TABLET ORAL at 06:18

## 2022-01-02 RX ADMIN — Medication 5 UNIT(S): at 12:32

## 2022-01-02 RX ADMIN — Medication 81 MILLIGRAM(S): at 12:31

## 2022-01-02 RX ADMIN — GABAPENTIN 300 MILLIGRAM(S): 400 CAPSULE ORAL at 06:18

## 2022-01-02 RX ADMIN — ENOXAPARIN SODIUM 40 MILLIGRAM(S): 100 INJECTION SUBCUTANEOUS at 17:11

## 2022-01-02 RX ADMIN — INSULIN GLARGINE 34 UNIT(S): 100 INJECTION, SOLUTION SUBCUTANEOUS at 21:56

## 2022-01-02 RX ADMIN — Medication 325 MILLIGRAM(S): at 17:09

## 2022-01-02 RX ADMIN — SENNA PLUS 2 TABLET(S): 8.6 TABLET ORAL at 21:55

## 2022-01-02 RX ADMIN — ENOXAPARIN SODIUM 40 MILLIGRAM(S): 100 INJECTION SUBCUTANEOUS at 06:17

## 2022-01-02 RX ADMIN — ATORVASTATIN CALCIUM 80 MILLIGRAM(S): 80 TABLET, FILM COATED ORAL at 21:55

## 2022-01-02 RX ADMIN — Medication 20 MILLIGRAM(S): at 06:18

## 2022-01-02 RX ADMIN — GABAPENTIN 300 MILLIGRAM(S): 400 CAPSULE ORAL at 17:09

## 2022-01-02 RX ADMIN — REMDESIVIR 500 MILLIGRAM(S): 5 INJECTION INTRAVENOUS at 21:56

## 2022-01-02 NOTE — PHYSICAL THERAPY INITIAL EVALUATION ADULT - ACTIVE RANGE OF MOTION EXAMINATION, REHAB EVAL
Both upper extremities/Both lower extremities joints within functional limits and painfree AAROM  with tightness at mid-end ranges

## 2022-01-02 NOTE — PHYSICAL THERAPY INITIAL EVALUATION ADULT - STANDING BALANCE: DYNAMIC, REHAB EVAL
Unable to perform secondary to patient does not functionally stand or walk "for years, since stroke"

## 2022-01-02 NOTE — PHYSICAL THERAPY INITIAL EVALUATION ADULT - MANUAL MUSCLE TESTING RESULTS, REHAB EVAL
Both upper extremites/Both lower extremities muscle strength grossly graded 2 to 2+/5, within available range

## 2022-01-02 NOTE — PHYSICAL THERAPY INITIAL EVALUATION ADULT - LEVEL OF INDEPENDENCE: SIT/STAND, REHAB EVAL
secondary to patient does not functionally stand or walk "for years, since stroke"/unable to perform

## 2022-01-02 NOTE — PHYSICAL THERAPY INITIAL EVALUATION ADULT - ADDITIONAL COMMENTS
Per patiient,  she has 2 sets 12-hour aides at home; uses onel lift for bed to wheelchair transfers and had not been able to walk "for years, since stroke"

## 2022-01-02 NOTE — PHYSICAL THERAPY INITIAL EVALUATION ADULT - DISCHARGE DISPOSITION, PT EVAL
As discussed with case management, no further skilled PT indicated at this time in this setting as patient functionnig at same level at home where patient was dependennt for functional mobility including use of onel lift for bed to wheelchair transfers. Nursing/caregiver to continue with routine  ROM, turning, positioning. Please reconsult PT if needed and appropriate. AM-PAC mobility score=6

## 2022-01-02 NOTE — PHYSICAL THERAPY INITIAL EVALUATION ADULT - PRECAUTIONS/LIMITATIONS, REHAB EVAL
3 Li/mi via nasal cannula; airborne/contact precautions/fall precautions/isolation precautions/oxygen therapy device and L/min

## 2022-01-02 NOTE — PHYSICAL THERAPY INITIAL EVALUATION ADULT - PERTINENT HX OF CURRENT PROBLEM, REHAB EVAL
80 y/o female admitted with diagnoses of Covid-19, Hypoxia, presenting with fever and hypoxia from home

## 2022-01-02 NOTE — PHYSICAL THERAPY INITIAL EVALUATION ADULT - GENERAL OBSERVATIONS, REHAB EVAL
15:15-15:50 Chart reviewed. Pt encountered long sitting in bed, may be seen by Physical Therapist as confirmed with Nurse. Patient denied pain at rest, + O2 via NC/Primafit

## 2022-01-03 ENCOUNTER — TRANSCRIPTION ENCOUNTER (OUTPATIENT)
Age: 82
End: 2022-01-03

## 2022-01-03 LAB
ALBUMIN SERPL ELPH-MCNC: 3.4 G/DL — LOW (ref 3.5–5.2)
ALP SERPL-CCNC: 90 U/L — SIGNIFICANT CHANGE UP (ref 30–115)
ALT FLD-CCNC: 24 U/L — SIGNIFICANT CHANGE UP (ref 0–41)
ANION GAP SERPL CALC-SCNC: 13 MMOL/L — SIGNIFICANT CHANGE UP (ref 7–14)
AST SERPL-CCNC: 32 U/L — SIGNIFICANT CHANGE UP (ref 0–41)
BASOPHILS # BLD AUTO: 0.01 K/UL — SIGNIFICANT CHANGE UP (ref 0–0.2)
BASOPHILS NFR BLD AUTO: 0.2 % — SIGNIFICANT CHANGE UP (ref 0–1)
BILIRUB SERPL-MCNC: 0.2 MG/DL — SIGNIFICANT CHANGE UP (ref 0.2–1.2)
BUN SERPL-MCNC: 35 MG/DL — HIGH (ref 10–20)
CALCIUM SERPL-MCNC: 8 MG/DL — LOW (ref 8.5–10.1)
CHLORIDE SERPL-SCNC: 105 MMOL/L — SIGNIFICANT CHANGE UP (ref 98–110)
CO2 SERPL-SCNC: 27 MMOL/L — SIGNIFICANT CHANGE UP (ref 17–32)
CREAT SERPL-MCNC: 1.4 MG/DL — SIGNIFICANT CHANGE UP (ref 0.7–1.5)
EOSINOPHIL # BLD AUTO: 0.02 K/UL — SIGNIFICANT CHANGE UP (ref 0–0.7)
EOSINOPHIL NFR BLD AUTO: 0.4 % — SIGNIFICANT CHANGE UP (ref 0–8)
GLUCOSE BLDC GLUCOMTR-MCNC: 106 MG/DL — HIGH (ref 70–99)
GLUCOSE BLDC GLUCOMTR-MCNC: 276 MG/DL — HIGH (ref 70–99)
GLUCOSE BLDC GLUCOMTR-MCNC: 306 MG/DL — HIGH (ref 70–99)
GLUCOSE BLDC GLUCOMTR-MCNC: 330 MG/DL — HIGH (ref 70–99)
GLUCOSE SERPL-MCNC: 109 MG/DL — HIGH (ref 70–99)
HCT VFR BLD CALC: 33.6 % — LOW (ref 37–47)
HGB BLD-MCNC: 11 G/DL — LOW (ref 12–16)
IMM GRANULOCYTES NFR BLD AUTO: 0.6 % — HIGH (ref 0.1–0.3)
LYMPHOCYTES # BLD AUTO: 1.08 K/UL — LOW (ref 1.2–3.4)
LYMPHOCYTES # BLD AUTO: 23.1 % — SIGNIFICANT CHANGE UP (ref 20.5–51.1)
MCHC RBC-ENTMCNC: 30.6 PG — SIGNIFICANT CHANGE UP (ref 27–31)
MCHC RBC-ENTMCNC: 32.7 G/DL — SIGNIFICANT CHANGE UP (ref 32–37)
MCV RBC AUTO: 93.6 FL — SIGNIFICANT CHANGE UP (ref 81–99)
MONOCYTES # BLD AUTO: 0.39 K/UL — SIGNIFICANT CHANGE UP (ref 0.1–0.6)
MONOCYTES NFR BLD AUTO: 8.3 % — SIGNIFICANT CHANGE UP (ref 1.7–9.3)
NEUTROPHILS # BLD AUTO: 3.15 K/UL — SIGNIFICANT CHANGE UP (ref 1.4–6.5)
NEUTROPHILS NFR BLD AUTO: 67.4 % — SIGNIFICANT CHANGE UP (ref 42.2–75.2)
NRBC # BLD: 0 /100 WBCS — SIGNIFICANT CHANGE UP (ref 0–0)
PLATELET # BLD AUTO: 212 K/UL — SIGNIFICANT CHANGE UP (ref 130–400)
POTASSIUM SERPL-MCNC: 3.2 MMOL/L — LOW (ref 3.5–5)
POTASSIUM SERPL-SCNC: 3.2 MMOL/L — LOW (ref 3.5–5)
PROT SERPL-MCNC: 5.8 G/DL — LOW (ref 6–8)
RBC # BLD: 3.59 M/UL — LOW (ref 4.2–5.4)
RBC # FLD: 13.6 % — SIGNIFICANT CHANGE UP (ref 11.5–14.5)
SODIUM SERPL-SCNC: 145 MMOL/L — SIGNIFICANT CHANGE UP (ref 135–146)
WBC # BLD: 4.68 K/UL — LOW (ref 4.8–10.8)
WBC # FLD AUTO: 4.68 K/UL — LOW (ref 4.8–10.8)

## 2022-01-03 PROCEDURE — 99231 SBSQ HOSP IP/OBS SF/LOW 25: CPT

## 2022-01-03 RX ORDER — INSULIN LISPRO 100/ML
2 VIAL (ML) SUBCUTANEOUS ONCE
Refills: 0 | Status: COMPLETED | OUTPATIENT
Start: 2022-01-03 | End: 2022-01-03

## 2022-01-03 RX ADMIN — ATORVASTATIN CALCIUM 80 MILLIGRAM(S): 80 TABLET, FILM COATED ORAL at 23:15

## 2022-01-03 RX ADMIN — SENNA PLUS 2 TABLET(S): 8.6 TABLET ORAL at 23:16

## 2022-01-03 RX ADMIN — CLOPIDOGREL BISULFATE 75 MILLIGRAM(S): 75 TABLET, FILM COATED ORAL at 10:47

## 2022-01-03 RX ADMIN — Medication 81 MILLIGRAM(S): at 10:47

## 2022-01-03 RX ADMIN — GABAPENTIN 300 MILLIGRAM(S): 400 CAPSULE ORAL at 06:07

## 2022-01-03 RX ADMIN — PANTOPRAZOLE SODIUM 40 MILLIGRAM(S): 20 TABLET, DELAYED RELEASE ORAL at 06:07

## 2022-01-03 RX ADMIN — AMLODIPINE BESYLATE 10 MILLIGRAM(S): 2.5 TABLET ORAL at 06:08

## 2022-01-03 RX ADMIN — ENOXAPARIN SODIUM 40 MILLIGRAM(S): 100 INJECTION SUBCUTANEOUS at 06:07

## 2022-01-03 RX ADMIN — GABAPENTIN 300 MILLIGRAM(S): 400 CAPSULE ORAL at 17:44

## 2022-01-03 RX ADMIN — Medication 5 UNIT(S): at 12:11

## 2022-01-03 RX ADMIN — Medication 5 UNIT(S): at 08:28

## 2022-01-03 RX ADMIN — Medication 325 MILLIGRAM(S): at 06:07

## 2022-01-03 RX ADMIN — Medication 25 MICROGRAM(S): at 06:07

## 2022-01-03 RX ADMIN — Medication 2 UNIT(S): at 23:16

## 2022-01-03 RX ADMIN — Medication 6 MILLIGRAM(S): at 06:08

## 2022-01-03 RX ADMIN — SERTRALINE 100 MILLIGRAM(S): 25 TABLET, FILM COATED ORAL at 10:47

## 2022-01-03 RX ADMIN — NORTRIPTYLINE HYDROCHLORIDE 25 MILLIGRAM(S): 10 CAPSULE ORAL at 23:16

## 2022-01-03 RX ADMIN — ENOXAPARIN SODIUM 40 MILLIGRAM(S): 100 INJECTION SUBCUTANEOUS at 17:44

## 2022-01-03 RX ADMIN — INSULIN GLARGINE 34 UNIT(S): 100 INJECTION, SOLUTION SUBCUTANEOUS at 23:15

## 2022-01-03 RX ADMIN — Medication 20 MILLIGRAM(S): at 06:07

## 2022-01-03 RX ADMIN — REMDESIVIR 500 MILLIGRAM(S): 5 INJECTION INTRAVENOUS at 23:42

## 2022-01-03 RX ADMIN — Medication 5 UNIT(S): at 17:44

## 2022-01-03 RX ADMIN — Medication 325 MILLIGRAM(S): at 17:43

## 2022-01-03 NOTE — DISCHARGE NOTE PROVIDER - NSDCCPCAREPLAN_GEN_ALL_CORE_FT
PRINCIPAL DISCHARGE DIAGNOSIS  Diagnosis: COVID-19  Assessment and Plan of Treatment: Please complete your steroid (dexamethsone) as prescribed and follow with your primary care doctor.       PRINCIPAL DISCHARGE DIAGNOSIS  Diagnosis: COVID-19  Assessment and Plan of Treatment: Please complete your steroid (dexamethsone) as prescribed   follow with your PMD in 1 week       PRINCIPAL DISCHARGE DIAGNOSIS  Diagnosis: COVID-19  Assessment and Plan of Treatment: ACUTE HYPOXIC RESPIRATORY FAILURE 2/2 COVID-19 SEVERE PNEUMONIA  Please complete your steroid (dexamethsone) as prescribed   follow with your PMD in 1 week

## 2022-01-03 NOTE — DISCHARGE NOTE PROVIDER - NSDCMRMEDTOKEN_GEN_ALL_CORE_FT
amLODIPine 10 mg oral tablet: 1 tab(s) orally once a day  atorvastatin 80 mg oral tablet: 1 tab(s) orally once a day (at bedtime)  clopidogrel 75 mg oral tablet: 1 tab(s) orally once a day  dexamethasone 6 mg oral tablet: 1 tab(s) orally once a day   Ecotrin Adult Low Strength 81 mg oral delayed release tablet: 2 tab(s) orally once a day  exemestane 25 mg oral tablet: 1 tab(s) orally once a day  ferrous sulfate 324 mg (65 mg elemental iron) oral tablet: 1  orally 2 times a day  gabapentin 300 mg oral capsule: orally 2 times a day  HumaLOG 100 units/mL subcutaneous solution: Per insulin sliding scale at home  Lasix 20 mg oral tablet: 1 tab(s) orally once a day  Levemir: 30  subcutaneous once a day (at bedtime)  levothyroxine 25 mcg (0.025 mg) oral tablet: 1 tab(s) orally once a day  nortriptyline 25 mg oral capsule: 1 cap(s) orally once a day (at bedtime)  pantoprazole 40 mg oral delayed release tablet: 1 tab(s) orally once a day (before a meal)  senna oral tablet: 2 tab(s) orally once a day (at bedtime)- PRN for constipation- OTC is okay  sertraline 100 mg oral tablet: 1 tab(s) orally once a day  temazepam 30 mg oral capsule: 1 cap(s) orally once a day (at bedtime)

## 2022-01-03 NOTE — DISCHARGE NOTE PROVIDER - CARE PROVIDER_API CALL
Ariel Macias)  Internal Medicine  [address] [place] [address]  Phone: [phone]  Fax: [phone]  Follow Up Time:
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
## 2022-01-04 LAB
GLUCOSE BLDC GLUCOMTR-MCNC: 129 MG/DL — HIGH (ref 70–99)
GLUCOSE BLDC GLUCOMTR-MCNC: 213 MG/DL — HIGH (ref 70–99)
GLUCOSE BLDC GLUCOMTR-MCNC: 215 MG/DL — HIGH (ref 70–99)
GLUCOSE BLDC GLUCOMTR-MCNC: 237 MG/DL — HIGH (ref 70–99)
SARS-COV-2 RNA SPEC QL NAA+PROBE: SIGNIFICANT CHANGE UP

## 2022-01-04 PROCEDURE — 99231 SBSQ HOSP IP/OBS SF/LOW 25: CPT

## 2022-01-04 RX ORDER — DEXAMETHASONE 0.5 MG/5ML
1 ELIXIR ORAL
Qty: 5 | Refills: 0
Start: 2022-01-04 | End: 2022-01-08

## 2022-01-04 RX ADMIN — INSULIN GLARGINE 34 UNIT(S): 100 INJECTION, SOLUTION SUBCUTANEOUS at 21:46

## 2022-01-04 RX ADMIN — ATORVASTATIN CALCIUM 80 MILLIGRAM(S): 80 TABLET, FILM COATED ORAL at 21:46

## 2022-01-04 RX ADMIN — CLOPIDOGREL BISULFATE 75 MILLIGRAM(S): 75 TABLET, FILM COATED ORAL at 11:40

## 2022-01-04 RX ADMIN — Medication 5 UNIT(S): at 16:54

## 2022-01-04 RX ADMIN — SERTRALINE 100 MILLIGRAM(S): 25 TABLET, FILM COATED ORAL at 11:40

## 2022-01-04 RX ADMIN — NORTRIPTYLINE HYDROCHLORIDE 25 MILLIGRAM(S): 10 CAPSULE ORAL at 21:47

## 2022-01-04 RX ADMIN — GABAPENTIN 300 MILLIGRAM(S): 400 CAPSULE ORAL at 06:18

## 2022-01-04 RX ADMIN — Medication 6 MILLIGRAM(S): at 06:18

## 2022-01-04 RX ADMIN — PANTOPRAZOLE SODIUM 40 MILLIGRAM(S): 20 TABLET, DELAYED RELEASE ORAL at 06:21

## 2022-01-04 RX ADMIN — Medication 81 MILLIGRAM(S): at 11:40

## 2022-01-04 RX ADMIN — ENOXAPARIN SODIUM 40 MILLIGRAM(S): 100 INJECTION SUBCUTANEOUS at 17:33

## 2022-01-04 RX ADMIN — GABAPENTIN 300 MILLIGRAM(S): 400 CAPSULE ORAL at 17:33

## 2022-01-04 RX ADMIN — AMLODIPINE BESYLATE 10 MILLIGRAM(S): 2.5 TABLET ORAL at 06:19

## 2022-01-04 RX ADMIN — Medication 325 MILLIGRAM(S): at 06:18

## 2022-01-04 RX ADMIN — ENOXAPARIN SODIUM 40 MILLIGRAM(S): 100 INJECTION SUBCUTANEOUS at 06:18

## 2022-01-04 RX ADMIN — SENNA PLUS 2 TABLET(S): 8.6 TABLET ORAL at 21:47

## 2022-01-04 RX ADMIN — Medication 5 UNIT(S): at 08:24

## 2022-01-04 RX ADMIN — Medication 325 MILLIGRAM(S): at 17:33

## 2022-01-04 RX ADMIN — Medication 5 UNIT(S): at 12:11

## 2022-01-04 RX ADMIN — Medication 25 MICROGRAM(S): at 06:19

## 2022-01-04 RX ADMIN — Medication 20 MILLIGRAM(S): at 06:18

## 2022-01-05 LAB
GLUCOSE BLDC GLUCOMTR-MCNC: 183 MG/DL — HIGH (ref 70–99)
GLUCOSE BLDC GLUCOMTR-MCNC: 191 MG/DL — HIGH (ref 70–99)
GLUCOSE BLDC GLUCOMTR-MCNC: 194 MG/DL — HIGH (ref 70–99)
GLUCOSE BLDC GLUCOMTR-MCNC: 261 MG/DL — HIGH (ref 70–99)

## 2022-01-05 PROCEDURE — 99233 SBSQ HOSP IP/OBS HIGH 50: CPT

## 2022-01-05 RX ADMIN — Medication 25 MICROGRAM(S): at 05:54

## 2022-01-05 RX ADMIN — ATORVASTATIN CALCIUM 80 MILLIGRAM(S): 80 TABLET, FILM COATED ORAL at 22:07

## 2022-01-05 RX ADMIN — NORTRIPTYLINE HYDROCHLORIDE 25 MILLIGRAM(S): 10 CAPSULE ORAL at 22:07

## 2022-01-05 RX ADMIN — Medication 5 UNIT(S): at 11:52

## 2022-01-05 RX ADMIN — Medication 5 UNIT(S): at 17:59

## 2022-01-05 RX ADMIN — Medication 325 MILLIGRAM(S): at 05:43

## 2022-01-05 RX ADMIN — GABAPENTIN 300 MILLIGRAM(S): 400 CAPSULE ORAL at 05:43

## 2022-01-05 RX ADMIN — Medication 5 UNIT(S): at 08:16

## 2022-01-05 RX ADMIN — ENOXAPARIN SODIUM 40 MILLIGRAM(S): 100 INJECTION SUBCUTANEOUS at 05:55

## 2022-01-05 RX ADMIN — Medication 6 MILLIGRAM(S): at 05:55

## 2022-01-05 RX ADMIN — Medication 20 MILLIGRAM(S): at 05:44

## 2022-01-05 RX ADMIN — Medication 81 MILLIGRAM(S): at 11:52

## 2022-01-05 RX ADMIN — CHLORHEXIDINE GLUCONATE 1 APPLICATION(S): 213 SOLUTION TOPICAL at 05:55

## 2022-01-05 RX ADMIN — Medication 325 MILLIGRAM(S): at 17:58

## 2022-01-05 RX ADMIN — AMLODIPINE BESYLATE 10 MILLIGRAM(S): 2.5 TABLET ORAL at 05:43

## 2022-01-05 RX ADMIN — GABAPENTIN 300 MILLIGRAM(S): 400 CAPSULE ORAL at 17:58

## 2022-01-05 RX ADMIN — SERTRALINE 100 MILLIGRAM(S): 25 TABLET, FILM COATED ORAL at 11:52

## 2022-01-05 RX ADMIN — CLOPIDOGREL BISULFATE 75 MILLIGRAM(S): 75 TABLET, FILM COATED ORAL at 11:52

## 2022-01-05 RX ADMIN — INSULIN GLARGINE 34 UNIT(S): 100 INJECTION, SOLUTION SUBCUTANEOUS at 22:33

## 2022-01-05 RX ADMIN — ENOXAPARIN SODIUM 40 MILLIGRAM(S): 100 INJECTION SUBCUTANEOUS at 17:58

## 2022-01-05 RX ADMIN — PANTOPRAZOLE SODIUM 40 MILLIGRAM(S): 20 TABLET, DELAYED RELEASE ORAL at 05:56

## 2022-01-05 RX ADMIN — SENNA PLUS 2 TABLET(S): 8.6 TABLET ORAL at 22:07

## 2022-01-05 NOTE — PROGRESS NOTE ADULT - ASSESSMENT
80 yo F pmh of HTN, HLD, DM, CVA presents with fever and hypoxia. Patient reports that she has covid, unclear if she was tested. Found to be hypoxic so sent in for evaluation. Found to be hypoxic to the 70s on room air, improved with nasal canula. Reports cough, congestion, sneezing and runny nose since yesterday. no n/v/d. no chest pain, no shortness of breath, no palpitations. no sick contacts. patient is vaccinated. pmd is Dr. Yariel LABOY 19 PNA:  -Supplemental O2 PRN    Qualifies for remdesivir, decadron-continue  -ID consult appreciated    HTN:  Continue norvasc  -DASH diet    DM2 :   Sliding scale with coverage  Lantus QHS and premeal coverage  CHO diet    Chronic systolic CHF:  -Continue Lasix    Hypothyroid:   Continue synthroid    Neuropathy:   Continue neurontin    Depression:   Continue sertraline    Lovenox for DVT ppx as per COVID 19 guidelines
82 yo F pmh of HTN, HLD, DM, CVA presents with fever and hypoxia. Patient reports that she has covid, unclear if she was tested. Found to be hypoxic so sent in for evaluation. Found to be hypoxic to the 70s on room air, improved with nasal canula. Reports cough, congestion, sneezing and runny nose since yesterday. no n/v/d. no chest pain, no shortness of breath, no palpitations. no sick contacts. patient is vaccinated. pmd is Dr. Yariel LABOY 19 PNA:  -Finished Remdesivir, to complete PO dexamethasone as outpatient.  -ID consult appreciated  -Off O2    HTN:  -Continue norvasc  -DASH diet    DM2 :   Sliding scale with coverage  Lantus QHS and premeal coverage  CHO diet    Chronic systolic CHF:  -Continue Lasix    Hypothyroid:   Continue synthroid    Neuropathy:   Continue neurontin    Depression:   Continue sertraline    DC home with homecare tomorrow--> now testing COVID 19 neg / Awaiting HHA Availability per CM 1/6th 
82 yo F pmh of HTN, HLD, DM, CVA presents with fever and hypoxia. Patient reports that she has covid, unclear if she was tested. Found to be hypoxic so sent in for evaluation. Found to be hypoxic to the 70s on room air, improved with nasal canula. Reports cough, congestion, sneezing and runny nose since yesterday. no n/v/d. no chest pain, no shortness of breath, no palpitations. no sick contacts. patient is vaccinated. pmd is Dr. Yariel LABOY 19 PNA:  -Finished Remdesivir, to complete PO dexamethasone as outpatient.  -ID consult appreciated  -Off O2    HTN:  Continue norvasc  -DASH diet    DM2 :   Sliding scale with coverage  Lantus QHS and premeal coverage  CHO diet    Chronic systolic CHF:  -Continue Lasix    Hypothyroid:   Continue synthroid    Neuropathy:   Continue neurontin    Depression:   Continue sertraline    DC home with homecare tomorrow--> now testing COVID 19 neg.
82 yo F pmh of HTN, HLD, DM, CVA presents with fever and hypoxia. Patient reports that she has covid, unclear if she was tested. Found to be hypoxic so sent in for evaluation. Found to be hypoxic to the 70s on room air, improved with nasal canula. Reports cough, congestion, sneezing and runny nose since yesterday. no n/v/d. no chest pain, no shortness of breath, no palpitations. no sick contacts. patient is vaccinated. pmd is Dr. Yariel LABOY 19 PNA:  -Supplemental O2 PRN    Qualifies for remdesivir, decadron-continue  -ID consult appreciated    HTN:  Continue norvasc  -DASH diet    DM2 :   Sliding scale with coverage  Lantus QHS and premeal coverage  CHO diet    Chronic systolic CHF:  -Continue Lasix    Hypothyroid:   Continue synthroid    Neuropathy:   Continue neurontin    Depression:   Continue sertraline    Lovenox for DVT ppx as per COVID 19 guidelines    DC tomorrow.    
80 yo F pmh of HTN, HLD, DM, CVA presents with fever and hypoxia. Patient reports that she has covid, unclear if she was tested. Found to be hypoxic so sent in for evaluation. Found to be hypoxic to the 70s on room air, improved with nasal canula. Reports cough, congestion, sneezing and runny nose since yesterday. no n/v/d. no chest pain, no shortness of breath, no palpitations. no sick contacts. patient is vaccinated. pmd is Dr. Yariel LABOY 19 PNA:  -Supplemental O2 PRN    Qualifies for remdesivir, decadron-continue  -ID consult appreciated    HTN:  Continue norvasc  -DASH diet    DM2 :   Sliding scale with coverage  Lantus QHS and premeal coverage  CHO diet    Chronic systolic CHF:  -Continue Lasix    Hypothyroid:   Continue synthroid    Neuropathy:   Continue neurontin    Depression:   Continue sertraline    Lovenox for DVT ppx as per COVID 19 guidelines

## 2022-01-05 NOTE — PROGRESS NOTE ADULT - SUBJECTIVE AND OBJECTIVE BOX
82 yo F pmh of HTN, HLD, DM, CVA presents with fever and hypoxia. Patient reports that she has covid, unclear if she was tested. Found to be hypoxic so sent in for evaluation. Found to be hypoxic to the 70s on room air, improved with nasal canula. Reports cough, congestion, sneezing and runny nose since yesterday. no n/v/d. no chest pain, no shortness of breath, no palpitations. no sick contacts. patient is vaccinated. pmd is Dr. Saldivar.    Today:  Seen at bedside, no new complaints.          REVIEW OF SYSTEMS:  No new complaints.      MEDICATIONS  (STANDING):  amLODIPine   Tablet 10 milliGRAM(s) Oral daily  aspirin enteric coated 81 milliGRAM(s) Oral daily  atorvastatin 80 milliGRAM(s) Oral at bedtime  chlorhexidine 4% Liquid 1 Application(s) Topical <User Schedule>  clopidogrel Tablet 75 milliGRAM(s) Oral daily  dexAMETHasone  Injectable 6 milliGRAM(s) IV Push daily  dextrose 40% Gel 15 Gram(s) Oral once  dextrose 5%. 1000 milliLiter(s) (50 mL/Hr) IV Continuous <Continuous>  dextrose 5%. 1000 milliLiter(s) (100 mL/Hr) IV Continuous <Continuous>  dextrose 50% Injectable 25 Gram(s) IV Push once  dextrose 50% Injectable 12.5 Gram(s) IV Push once  dextrose 50% Injectable 25 Gram(s) IV Push once  enoxaparin Injectable 40 milliGRAM(s) SubCutaneous two times a day  ferrous    sulfate 325 milliGRAM(s) Oral two times a day  furosemide    Tablet 20 milliGRAM(s) Oral daily  gabapentin 300 milliGRAM(s) Oral two times a day  glucagon  Injectable 1 milliGRAM(s) IntraMuscular once  insulin glargine Injectable (LANTUS) 30 Unit(s) SubCutaneous at bedtime  insulin lispro Injectable (ADMELOG) 5 Unit(s) SubCutaneous before breakfast  insulin lispro Injectable (ADMELOG) 5 Unit(s) SubCutaneous before lunch  levothyroxine 25 MICROGram(s) Oral daily  nortriptyline 25 milliGRAM(s) Oral at bedtime  pantoprazole    Tablet 40 milliGRAM(s) Oral before breakfast  remdesivir  IVPB   IV Intermittent   remdesivir  IVPB 100 milliGRAM(s) IV Intermittent every 24 hours  senna 2 Tablet(s) Oral at bedtime  sertraline 100 milliGRAM(s) Oral daily    MEDICATIONS  (PRN):  acetaminophen     Tablet .. 650 milliGRAM(s) Oral every 6 hours PRN Temp greater or equal to 38C (100.4F), Mild Pain (1 - 3)  ALBUTerol    90 MICROgram(s) HFA Inhaler 2 Puff(s) Inhalation every 6 hours PRN Shortness of Breath and/or Wheezing  temazepam 30 milliGRAM(s) Oral at bedtime PRN Insomnia      Allergies  honeydew (Unknown)  latex (Unknown)  penicillins (Unknown)  pickles (Unknown)  shellfish (Unknown)  sulfa drugs (Hives)  Sulfacetamide Sodium-Sulfur (Rash)        FAMILY HISTORY:  FHx: diabetes mellitus (Mother)        Vital Signs Last 24 Hrs  T(C): 35.7 (31 Dec 2021 05:00), Max: 36.9 (30 Dec 2021 21:25)  T(F): 96.2 (31 Dec 2021 05:00), Max: 98.4 (30 Dec 2021 21:25)  HR: 75 (31 Dec 2021 05:00) (75 - 99)  BP: 130/64 (31 Dec 2021 05:00) (130/64 - 145/80)  RR: 18 (31 Dec 2021 05:00) (16 - 18)  SpO2: 98% (31 Dec 2021 09:58) (98% - 98%)    PHYSICAL EXAM:  GENERAL:  82y/o Female NAD, resting comfortably.  HEAD:  Atraumatic, Normocephalic  EYES: EOMI, PERRLA, conjunctiva and sclera clear  NECK: Supple, No JVD, no cervical lymphadenopathy, non-tender  CHEST/LUNG: shallow breathing; No wheeze, rhonchi, or rales  HEART: Regular rate and rhythm; S1&S2  ABDOMEN: Soft, Nontender, Nondistended x 4 quadrants; Bowel sounds present  EXTREMITIES:   Peripheral Pulses Present, No clubbing, no cyanosis, or no edema, no calf tenderness  PSYCH: AAOx3, cooperative, appropriate  NEUROLOGY: WNL  SKIN: WNL      LABS:                        8.4    4.81  )-----------( 133      ( 29 Dec 2021 23:50 )             26.8     12-29    137  |  99  |  14  ----------------------------<  200<H>  4.0   |  20  |  1.0    Ca    7.8<L>      29 Dec 2021 23:50    TPro  4.5<L>  /  Alb  2.6<L>  /  TBili  0.2  /  DBili  x   /  AST  12  /  ALT  9   /  AlkPhos  98  12-29      
80 yo F pmh of HTN, HLD, DM, CVA presents with fever and hypoxia. Patient reports that she has covid, unclear if she was tested. Found to be hypoxic so sent in for evaluation. Found to be hypoxic to the 70s on room air, improved with nasal canula. Reports cough, congestion, sneezing and runny nose since yesterday. no n/v/d. no chest pain, no shortness of breath, no palpitations. no sick contacts. patient is vaccinated. pmd is Dr. Saldivar    Today:  Seen at bedside, no new complaints.    REVIEW OF SYSTEMS:  no new complaints.    MEDICATIONS  (STANDING):  amLODIPine   Tablet 10 milliGRAM(s) Oral daily  aspirin enteric coated 81 milliGRAM(s) Oral daily  atorvastatin 80 milliGRAM(s) Oral at bedtime  chlorhexidine 4% Liquid 1 Application(s) Topical <User Schedule>  clopidogrel Tablet 75 milliGRAM(s) Oral daily  dexAMETHasone  Injectable 6 milliGRAM(s) IV Push daily  dextrose 40% Gel 15 Gram(s) Oral once  dextrose 5%. 1000 milliLiter(s) (50 mL/Hr) IV Continuous <Continuous>  dextrose 5%. 1000 milliLiter(s) (100 mL/Hr) IV Continuous <Continuous>  dextrose 50% Injectable 25 Gram(s) IV Push once  dextrose 50% Injectable 12.5 Gram(s) IV Push once  dextrose 50% Injectable 25 Gram(s) IV Push once  enoxaparin Injectable 40 milliGRAM(s) SubCutaneous two times a day  ferrous    sulfate 325 milliGRAM(s) Oral two times a day  furosemide    Tablet 20 milliGRAM(s) Oral daily  gabapentin 300 milliGRAM(s) Oral two times a day  glucagon  Injectable 1 milliGRAM(s) IntraMuscular once  insulin glargine Injectable (LANTUS) 34 Unit(s) SubCutaneous at bedtime  insulin lispro Injectable (ADMELOG) 5 Unit(s) SubCutaneous before dinner  insulin lispro Injectable (ADMELOG) 5 Unit(s) SubCutaneous before breakfast  insulin lispro Injectable (ADMELOG) 5 Unit(s) SubCutaneous before lunch  levothyroxine 25 MICROGram(s) Oral daily  nortriptyline 25 milliGRAM(s) Oral at bedtime  pantoprazole    Tablet 40 milliGRAM(s) Oral before breakfast  senna 2 Tablet(s) Oral at bedtime  sertraline 100 milliGRAM(s) Oral daily    MEDICATIONS  (PRN):  acetaminophen     Tablet .. 650 milliGRAM(s) Oral every 6 hours PRN Temp greater or equal to 38C (100.4F), Mild Pain (1 - 3)  ALBUTerol    90 MICROgram(s) HFA Inhaler 2 Puff(s) Inhalation every 6 hours PRN Shortness of Breath and/or Wheezing  temazepam 30 milliGRAM(s) Oral at bedtime PRN Insomnia      Allergies  honeydew (Unknown)  latex (Unknown)  penicillins (Unknown)  pickles (Unknown)  shellfish (Unknown)  sulfa drugs (Hives)  Sulfacetamide Sodium-Sulfur (Rash)      FAMILY HISTORY:  FHx: diabetes mellitus (Mother)      Vital Signs Last 24 Hrs  T(C): 36.2 (05 Jan 2022 14:30), Max: 36.7 (04 Jan 2022 21:47)  T(F): 97.1 (05 Jan 2022 14:30), Max: 98 (04 Jan 2022 21:47)  HR: 88 (05 Jan 2022 14:30) (69 - 88)  BP: 128/72 (05 Jan 2022 14:30) (128/57 - 147/73)  RR: 18 (05 Jan 2022 14:30) (16 - 18)  SpO2: 95% RA     PHYSICAL EXAM:  GENERAL:  80y/o Female NAD, resting comfortably.  HEAD:  Atraumatic, Normocephalic  EYES: EOMI, PERRLA, conjunctiva and sclera clear  NECK: Supple, No JVD, no cervical lymphadenopathy, non-tender  CHEST/LUNG: shallow breathing; No wheeze, rhonchi, or rales  HEART: Regular rate and rhythm; S1&S2  ABDOMEN: Soft, Nontender, Nondistended x 4 quadrants; Bowel sounds present  EXTREMITIES:   Peripheral Pulses Present, No clubbing, no cyanosis, or no edema, no calf tenderness  PSYCH: AAOx3, cooperative, appropriate      LABS:                        11.0   4.68  )-----------( 212      ( 03 Jan 2022 07:38 )             33.6     01-03    145  |  105  |  35<H>  ----------------------------<  109<H>  3.2<L>   |  27  |  1.4    Ca    8.0<L>      03 Jan 2022 07:38    TPro  5.8<L>  /  Alb  3.4<L>  /  TBili  0.2  /  DBili  x   /  AST  32  /  ALT  24  /  AlkPhos  90  01-03          
82 yo F pmh of HTN, HLD, DM, CVA presents with fever and hypoxia. Patient reports that she has covid, unclear if she was tested. Found to be hypoxic so sent in for evaluation. Found to be hypoxic to the 70s on room air, improved with nasal canula. Reports cough, congestion, sneezing and runny nose since yesterday. no n/v/d. no chest pain, no shortness of breath, no palpitations. no sick contacts. patient is vaccinated. pmd is Dr. Saldivar    Today:  Seen at bedside, no new complaints.          REVIEW OF SYSTEMS:  No new complaints.      MEDICATIONS  (STANDING):  amLODIPine   Tablet 10 milliGRAM(s) Oral daily  aspirin enteric coated 81 milliGRAM(s) Oral daily  atorvastatin 80 milliGRAM(s) Oral at bedtime  chlorhexidine 4% Liquid 1 Application(s) Topical <User Schedule>  clopidogrel Tablet 75 milliGRAM(s) Oral daily  dexAMETHasone  Injectable 6 milliGRAM(s) IV Push daily  dextrose 40% Gel 15 Gram(s) Oral once  dextrose 5%. 1000 milliLiter(s) (50 mL/Hr) IV Continuous <Continuous>  dextrose 5%. 1000 milliLiter(s) (100 mL/Hr) IV Continuous <Continuous>  dextrose 50% Injectable 25 Gram(s) IV Push once  dextrose 50% Injectable 12.5 Gram(s) IV Push once  dextrose 50% Injectable 25 Gram(s) IV Push once  enoxaparin Injectable 40 milliGRAM(s) SubCutaneous two times a day  ferrous    sulfate 325 milliGRAM(s) Oral two times a day  furosemide    Tablet 20 milliGRAM(s) Oral daily  gabapentin 300 milliGRAM(s) Oral two times a day  glucagon  Injectable 1 milliGRAM(s) IntraMuscular once  insulin glargine Injectable (LANTUS) 34 Unit(s) SubCutaneous at bedtime  insulin lispro Injectable (ADMELOG) 5 Unit(s) SubCutaneous before dinner  insulin lispro Injectable (ADMELOG) 5 Unit(s) SubCutaneous before breakfast  insulin lispro Injectable (ADMELOG) 5 Unit(s) SubCutaneous before lunch  levothyroxine 25 MICROGram(s) Oral daily  nortriptyline 25 milliGRAM(s) Oral at bedtime  pantoprazole    Tablet 40 milliGRAM(s) Oral before breakfast  remdesivir  IVPB   IV Intermittent   remdesivir  IVPB 100 milliGRAM(s) IV Intermittent every 24 hours  senna 2 Tablet(s) Oral at bedtime  sertraline 100 milliGRAM(s) Oral daily    MEDICATIONS  (PRN):  acetaminophen     Tablet .. 650 milliGRAM(s) Oral every 6 hours PRN Temp greater or equal to 38C (100.4F), Mild Pain (1 - 3)  ALBUTerol    90 MICROgram(s) HFA Inhaler 2 Puff(s) Inhalation every 6 hours PRN Shortness of Breath and/or Wheezing  temazepam 30 milliGRAM(s) Oral at bedtime PRN Insomnia      Allergies  honeydew (Unknown)  latex (Unknown)  penicillins (Unknown)  pickles (Unknown)  shellfish (Unknown)  sulfa drugs (Hives)  Sulfacetamide Sodium-Sulfur (Rash)        FAMILY HISTORY:  FHx: diabetes mellitus (Mother)        Vital Signs Last 24 Hrs  T(C): 36.6 (02 Jan 2022 12:45), Max: 36.6 (01 Jan 2022 21:00)  T(F): 97.9 (02 Jan 2022 12:45), Max: 97.9 (01 Jan 2022 21:00)  HR: 101 (02 Jan 2022 12:45) (66 - 101)  BP: 117/74 (02 Jan 2022 12:45) (117/74 - 153/58)  RR: 18 (02 Jan 2022 12:45) (18 - 18)  SpO2: 94% (02 Jan 2022 09:00) (94% - 100%)    PHYSICAL EXAM:  GENERAL:  80y/o Female NAD, resting comfortably.  HEAD:  Atraumatic, Normocephalic  EYES: EOMI, PERRLA, conjunctiva and sclera clear  NECK: Supple, No JVD, no cervical lymphadenopathy, non-tender  CHEST/LUNG: shallow breathing; No wheeze, rhonchi, or rales  HEART: Regular rate and rhythm; S1&S2  ABDOMEN: Soft, Nontender, Nondistended x 4 quadrants; Bowel sounds present  EXTREMITIES:   Peripheral Pulses Present, No clubbing, no cyanosis, or no edema, no calf tenderness  PSYCH: AAOx3, cooperative, appropriate      LABS:                        12.8   6.13  )-----------( 235      ( 02 Jan 2022 08:41 )             39.6     01-02    145  |  103  |  32<H>  ----------------------------<  83  3.7   |  28  |  1.4    Ca    8.5      02 Jan 2022 08:41  Phos  3.7     01-01  Mg     2.0     01-01    TPro  6.6  /  Alb  3.5  /  TBili  0.2  /  DBili  x   /  AST  19  /  ALT  15  /  AlkPhos  104  01-02    PT/INR - ( 31 Dec 2021 15:56 )   PT: 13.70 sec;   INR: 1.19 ratio         PTT - ( 31 Dec 2021 15:56 )  PTT:37.4 sec      
82 yo F pmh of HTN, HLD, DM, CVA presents with fever and hypoxia. Patient reports that she has covid, unclear if she was tested. Found to be hypoxic so sent in for evaluation. Found to be hypoxic to the 70s on room air, improved with nasal canula. Reports cough, congestion, sneezing and runny nose since yesterday. no n/v/d. no chest pain, no shortness of breath, no palpitations. no sick contacts. patient is vaccinated. pmd is Dr. Saldivar    Today:  Seen at bedside, no new complaints.          REVIEW OF SYSTEMS:  no new complaints.      MEDICATIONS  (STANDING):  amLODIPine   Tablet 10 milliGRAM(s) Oral daily  aspirin enteric coated 81 milliGRAM(s) Oral daily  atorvastatin 80 milliGRAM(s) Oral at bedtime  chlorhexidine 4% Liquid 1 Application(s) Topical <User Schedule>  clopidogrel Tablet 75 milliGRAM(s) Oral daily  dexAMETHasone  Injectable 6 milliGRAM(s) IV Push daily  dextrose 40% Gel 15 Gram(s) Oral once  dextrose 5%. 1000 milliLiter(s) (50 mL/Hr) IV Continuous <Continuous>  dextrose 5%. 1000 milliLiter(s) (100 mL/Hr) IV Continuous <Continuous>  dextrose 50% Injectable 25 Gram(s) IV Push once  dextrose 50% Injectable 12.5 Gram(s) IV Push once  dextrose 50% Injectable 25 Gram(s) IV Push once  enoxaparin Injectable 40 milliGRAM(s) SubCutaneous two times a day  ferrous    sulfate 325 milliGRAM(s) Oral two times a day  furosemide    Tablet 20 milliGRAM(s) Oral daily  gabapentin 300 milliGRAM(s) Oral two times a day  glucagon  Injectable 1 milliGRAM(s) IntraMuscular once  insulin glargine Injectable (LANTUS) 34 Unit(s) SubCutaneous at bedtime  insulin lispro Injectable (ADMELOG) 5 Unit(s) SubCutaneous before dinner  insulin lispro Injectable (ADMELOG) 5 Unit(s) SubCutaneous before breakfast  insulin lispro Injectable (ADMELOG) 5 Unit(s) SubCutaneous before lunch  levothyroxine 25 MICROGram(s) Oral daily  nortriptyline 25 milliGRAM(s) Oral at bedtime  pantoprazole    Tablet 40 milliGRAM(s) Oral before breakfast  senna 2 Tablet(s) Oral at bedtime  sertraline 100 milliGRAM(s) Oral daily    MEDICATIONS  (PRN):  acetaminophen     Tablet .. 650 milliGRAM(s) Oral every 6 hours PRN Temp greater or equal to 38C (100.4F), Mild Pain (1 - 3)  ALBUTerol    90 MICROgram(s) HFA Inhaler 2 Puff(s) Inhalation every 6 hours PRN Shortness of Breath and/or Wheezing  temazepam 30 milliGRAM(s) Oral at bedtime PRN Insomnia      Allergies  honeydew (Unknown)  latex (Unknown)  penicillins (Unknown)  pickles (Unknown)  shellfish (Unknown)  sulfa drugs (Hives)  Sulfacetamide Sodium-Sulfur (Rash)        FAMILY HISTORY:  FHx: diabetes mellitus (Mother)        Vital Signs Last 24 Hrs  T(C): 35.8 (04 Jan 2022 13:48), Max: 36.5 (04 Jan 2022 06:47)  T(F): 96.5 (04 Jan 2022 13:48), Max: 97.7 (04 Jan 2022 06:47)  HR: 75 (04 Jan 2022 13:48) (70 - 75)  BP: 130/77 (04 Jan 2022 13:48) (125/58 - 146/80)  RR: 16 (04 Jan 2022 13:48) (16 - 18)  SpO2: 98% (04 Jan 2022 08:56) (98% - 98%)    PHYSICAL EXAM:  GENERAL:  82y/o Female NAD, resting comfortably.  HEAD:  Atraumatic, Normocephalic  EYES: EOMI, PERRLA, conjunctiva and sclera clear  NECK: Supple, No JVD, no cervical lymphadenopathy, non-tender  CHEST/LUNG: shallow breathing; No wheeze, rhonchi, or rales  HEART: Regular rate and rhythm; S1&S2  ABDOMEN: Soft, Nontender, Nondistended x 4 quadrants; Bowel sounds present  EXTREMITIES:   Peripheral Pulses Present, No clubbing, no cyanosis, or no edema, no calf tenderness  PSYCH: AAOx3, cooperative, appropriate      LABS:                        11.0   4.68  )-----------( 212      ( 03 Jan 2022 07:38 )             33.6     01-03    145  |  105  |  35<H>  ----------------------------<  109<H>  3.2<L>   |  27  |  1.4    Ca    8.0<L>      03 Jan 2022 07:38    TPro  5.8<L>  /  Alb  3.4<L>  /  TBili  0.2  /  DBili  x   /  AST  32  /  ALT  24  /  AlkPhos  90  01-03          
82 yo F pmh of HTN, HLD, DM, CVA presents with fever and hypoxia. Patient reports that she has covid, unclear if she was tested. Found to be hypoxic so sent in for evaluation. Found to be hypoxic to the 70s on room air, improved with nasal canula. Reports cough, congestion, sneezing and runny nose since yesterday. no n/v/d. no chest pain, no shortness of breath, no palpitations. no sick contacts. patient is vaccinated. pmd is Dr. Saldivar.    Today:  Seen at bedside, no new complaints.            REVIEW OF SYSTEMS:  No new complaints.      MEDICATIONS  (STANDING):  amLODIPine   Tablet 10 milliGRAM(s) Oral daily  aspirin enteric coated 81 milliGRAM(s) Oral daily  atorvastatin 80 milliGRAM(s) Oral at bedtime  chlorhexidine 4% Liquid 1 Application(s) Topical <User Schedule>  clopidogrel Tablet 75 milliGRAM(s) Oral daily  dexAMETHasone  Injectable 6 milliGRAM(s) IV Push daily  dextrose 40% Gel 15 Gram(s) Oral once  dextrose 5%. 1000 milliLiter(s) (50 mL/Hr) IV Continuous <Continuous>  dextrose 5%. 1000 milliLiter(s) (100 mL/Hr) IV Continuous <Continuous>  dextrose 50% Injectable 25 Gram(s) IV Push once  dextrose 50% Injectable 12.5 Gram(s) IV Push once  dextrose 50% Injectable 25 Gram(s) IV Push once  enoxaparin Injectable 40 milliGRAM(s) SubCutaneous two times a day  ferrous    sulfate 325 milliGRAM(s) Oral two times a day  furosemide    Tablet 20 milliGRAM(s) Oral daily  gabapentin 300 milliGRAM(s) Oral two times a day  glucagon  Injectable 1 milliGRAM(s) IntraMuscular once  insulin glargine Injectable (LANTUS) 34 Unit(s) SubCutaneous at bedtime  insulin lispro Injectable (ADMELOG) 5 Unit(s) SubCutaneous before dinner  insulin lispro Injectable (ADMELOG) 5 Unit(s) SubCutaneous before breakfast  insulin lispro Injectable (ADMELOG) 5 Unit(s) SubCutaneous before lunch  levothyroxine 25 MICROGram(s) Oral daily  nortriptyline 25 milliGRAM(s) Oral at bedtime  pantoprazole    Tablet 40 milliGRAM(s) Oral before breakfast  remdesivir  IVPB   IV Intermittent   remdesivir  IVPB 100 milliGRAM(s) IV Intermittent every 24 hours  senna 2 Tablet(s) Oral at bedtime  sertraline 100 milliGRAM(s) Oral daily    MEDICATIONS  (PRN):  acetaminophen     Tablet .. 650 milliGRAM(s) Oral every 6 hours PRN Temp greater or equal to 38C (100.4F), Mild Pain (1 - 3)  ALBUTerol    90 MICROgram(s) HFA Inhaler 2 Puff(s) Inhalation every 6 hours PRN Shortness of Breath and/or Wheezing  temazepam 30 milliGRAM(s) Oral at bedtime PRN Insomnia      Allergies  honeydew (Unknown)  latex (Unknown)  penicillins (Unknown)  pickles (Unknown)  shellfish (Unknown)  sulfa drugs (Hives)  Sulfacetamide Sodium-Sulfur (Rash)              FAMILY HISTORY:  FHx: diabetes mellitus (Mother)        Vital Signs Last 24 Hrs  T(C): 36 (01 Jan 2022 05:00), Max: 37.5 (31 Dec 2021 14:06)  T(F): 96.8 (01 Jan 2022 05:00), Max: 99.5 (31 Dec 2021 14:06)  HR: 72 (01 Jan 2022 05:00) (70 - 77)  BP: 132/72 (01 Jan 2022 05:00) (121/59 - 140/74)  RR: 18 (01 Jan 2022 05:00) (18 - 18)  SpO2: 95% (31 Dec 2021 23:21) (95% - 95%)    PHYSICAL EXAM:  GENERAL:  80y/o Female NAD, resting comfortably.  HEAD:  Atraumatic, Normocephalic  EYES: EOMI, PERRLA, conjunctiva and sclera clear  NECK: Supple, No JVD, no cervical lymphadenopathy, non-tender  CHEST/LUNG: shallow breathing; No wheeze, rhonchi, or rales  HEART: Regular rate and rhythm; S1&S2  ABDOMEN: Soft, Nontender, Nondistended x 4 quadrants; Bowel sounds present  EXTREMITIES:   Peripheral Pulses Present, No clubbing, no cyanosis, or no edema, no calf tenderness  PSYCH: AAOx3, cooperative, appropriate  NEUROLOGY: WNL  SKIN: WNL      LABS:                        11.7   4.70  )-----------( 206      ( 01 Jan 2022 05:58 )             35.6     01-01    143  |  101  |  31<H>  ----------------------------<  102<H>  3.5   |  28  |  1.5    Ca    8.3<L>      01 Jan 2022 05:58  Phos  3.7     01-01  Mg     2.0     01-01    TPro  6.3  /  Alb  3.7  /  TBili  0.2  /  DBili  x   /  AST  18  /  ALT  12  /  AlkPhos  105  01-01    PT/INR - ( 31 Dec 2021 15:56 )   PT: 13.70 sec;   INR: 1.19 ratio         PTT - ( 31 Dec 2021 15:56 )  PTT:37.4 sec

## 2022-01-06 ENCOUNTER — APPOINTMENT (OUTPATIENT)
Dept: CARDIOLOGY | Facility: CLINIC | Age: 82
End: 2022-01-06
Payer: MEDICARE

## 2022-01-06 ENCOUNTER — NON-APPOINTMENT (OUTPATIENT)
Age: 82
End: 2022-01-06

## 2022-01-06 ENCOUNTER — TRANSCRIPTION ENCOUNTER (OUTPATIENT)
Age: 82
End: 2022-01-06

## 2022-01-06 VITALS
HEART RATE: 80 BPM | SYSTOLIC BLOOD PRESSURE: 130 MMHG | TEMPERATURE: 98 F | RESPIRATION RATE: 16 BRPM | DIASTOLIC BLOOD PRESSURE: 72 MMHG

## 2022-01-06 LAB
GLUCOSE BLDC GLUCOMTR-MCNC: 137 MG/DL — HIGH (ref 70–99)
GLUCOSE BLDC GLUCOMTR-MCNC: 258 MG/DL — HIGH (ref 70–99)

## 2022-01-06 PROCEDURE — 99239 HOSP IP/OBS DSCHRG MGMT >30: CPT

## 2022-01-06 PROCEDURE — 93294 REM INTERROG EVL PM/LDLS PM: CPT

## 2022-01-06 PROCEDURE — 93296 REM INTERROG EVL PM/IDS: CPT

## 2022-01-06 RX ADMIN — Medication 20 MILLIGRAM(S): at 05:35

## 2022-01-06 RX ADMIN — Medication 5 UNIT(S): at 08:11

## 2022-01-06 RX ADMIN — Medication 6 MILLIGRAM(S): at 05:36

## 2022-01-06 RX ADMIN — Medication 5 UNIT(S): at 12:18

## 2022-01-06 RX ADMIN — CHLORHEXIDINE GLUCONATE 1 APPLICATION(S): 213 SOLUTION TOPICAL at 05:35

## 2022-01-06 RX ADMIN — Medication 25 MICROGRAM(S): at 05:35

## 2022-01-06 RX ADMIN — SERTRALINE 100 MILLIGRAM(S): 25 TABLET, FILM COATED ORAL at 12:17

## 2022-01-06 RX ADMIN — GABAPENTIN 300 MILLIGRAM(S): 400 CAPSULE ORAL at 05:35

## 2022-01-06 RX ADMIN — Medication 81 MILLIGRAM(S): at 12:17

## 2022-01-06 RX ADMIN — PANTOPRAZOLE SODIUM 40 MILLIGRAM(S): 20 TABLET, DELAYED RELEASE ORAL at 05:34

## 2022-01-06 RX ADMIN — Medication 325 MILLIGRAM(S): at 05:35

## 2022-01-06 RX ADMIN — CLOPIDOGREL BISULFATE 75 MILLIGRAM(S): 75 TABLET, FILM COATED ORAL at 12:17

## 2022-01-06 RX ADMIN — ENOXAPARIN SODIUM 40 MILLIGRAM(S): 100 INJECTION SUBCUTANEOUS at 05:35

## 2022-01-06 RX ADMIN — AMLODIPINE BESYLATE 10 MILLIGRAM(S): 2.5 TABLET ORAL at 05:36

## 2022-01-06 NOTE — DISCHARGE NOTE NURSING/CASE MANAGEMENT/SOCIAL WORK - NSDCPEFALRISK_GEN_ALL_CORE
For information on Fall & Injury Prevention, visit: [email or web address] OR  [email or web address] OR  https://www.cdc.gov/steadi/patient.html

## 2022-01-06 NOTE — DISCHARGE NOTE NURSING/CASE MANAGEMENT/SOCIAL WORK - PATIENT PORTAL LINK FT
You can access the FollowMyHealth Patient Portal offered by [facility] by registering at the following website: [email or web address]. By joining [facility]’s FollowMyHealth portal, you will also be able to view your health information using other applications (apps) compatible with our system.

## 2022-01-06 NOTE — DISCHARGE NOTE NURSING/CASE MANAGEMENT/SOCIAL WORK - NSDCVIVACCINE_GEN_ALL_CORE_FT
influenza, high-dose, quadrivalent; 12-Nov-2021 18:58; Chris Martines (RN); [employer]; [id number] (Exp. Date: 30-Jun-2022); IntraMuscular; Deltoid Left.; 0.7 milliLiter(s); VIS (VIS Published: 06-Aug-2021, VIS Presented: 12-Nov-2021);

## 2022-01-09 NOTE — ED ADULT TRIAGE NOTE - TEMPERATURE IN FAHRENHEIT (DEGREES F)
[facility]  Cardiology progress Note    Patient Name: Celsa Majano  : 1954    CHIEF COMPLAINT  Hypertension, palpitations.      Subjective   Subjective     HISTORY OF PRESENT ILLNESS    Celsa Majano is a 67 y.o. female history of hypertension palpitations.  Palpitations stable.  No chest pain or shortness of    Review of Systems:   Constitutional no fever,  no weight loss   Skin no rash   Otolaryngeal no difficulty swallowing   Cardiovascular See HPI   Pulmonary no cough, no sputum production   Gastrointestinal no constipation, no diarrhea   Genitourinary no dysuria, no hematuria   Hematologic no easy bruisability, no abnormal bleeding   Musculoskeletal no muscle pain   Neurologic no dizziness, no falls         Personal History     Social History:  reports that she quit smoking about 32 years ago. Her smoking use included cigarettes. She has never used smokeless tobacco. She reports that she does not drink alcohol.    Home Medications:  Current Outpatient Medications on File Prior to Visit   Medication Sig   • aspirin 81 MG chewable tablet Chew 81 mg Daily.   • cholecalciferol (VITAMIN D3) 1.25 MG (99538 UT) capsule Take 50,000 Units by mouth Daily.   • DULoxetine (CYMBALTA) 30 MG capsule Take 30 mg by mouth Daily.   • ferrous sulfate 325 (65 FE) MG tablet Take 325 mg by mouth Daily With Breakfast.   • glimepiride (AMARYL) 2 MG tablet Take 2 mg by mouth Every Morning Before Breakfast.   • levothyroxine (SYNTHROID, LEVOTHROID) 100 MCG tablet Take 100 mcg by mouth Daily.   • lisinopril-hydrochlorothiazide (PRINZIDE,ZESTORETIC) 20-25 MG per tablet Take 1 tablet by mouth Daily.   • omeprazole (priLOSEC) 20 MG capsule Take 20 mg by mouth Daily.   • pravastatin (PRAVACHOL) 40 MG tablet Take 40 mg by mouth Daily.   • vitamin B-12 (CYANOCOBALAMIN) 1000 MCG tablet Take 1,000 mcg by mouth Daily.   • vitamin C (ASCORBIC ACID) 250 MG tablet Take 250 mg by mouth Daily.   • Zinc 50 MG tablet Take 1  tablet by mouth Daily.   • [DISCONTINUED] zinc oxide (DESITIN) 40 % paste paste Apply  topically to the appropriate area as directed Every 1 (One) Hour As Needed.     No current facility-administered medications on file prior to visit.     Allergies:  No Known Allergies    Objective    Objective       Vitals:   Heart Rate:  [66] 66  BP: (127)/(69) 127/69  Body mass index is 36.64 kg/m².     Physical Exam:   Constitutional: Awake, alert, No acute distress    Eyes: PERRLA, sclerae anicteric, no conjunctival injection   HENT: NCAT, mucous membranes moist   Neck: Supple, no thyromegaly, no lymphadenopathy, trachea midline   Respiratory: Clear to auscultation bilaterally, nonlabored respirations    Cardiovascular: RRR, no murmurs or rubs. Palpable pedal pulses bilaterally   Musculoskeletal: No bilateral ankle edema, no cyanosis to extremities   Psychiatric: Appropriate affect, cooperative   Neurologic: Oriented x 3, strength symmetric in all extremities, Cranial Nerves grossly intact to confrontation, speech clear   Skin: No rashes.    Result Review    Result Review:  I have personally reviewed the available results from  [x]  Laboratory  [x]  EKG  [x]  Cardiology  [x]  Medications  [x]  Old records  []  Other:   Procedures    Impression/Plan:  1.  Essential hypertension controlled: Continue lisinopril/hydrochlorothiazide 20/25 mg once a day.  2.  Hyperlipidemia: Continue pravastatin 40 mg once a day.  3.  Stable palpitations: No further palpitations.  4.  Moderate obesity: Low-fat diet and exercise advised.  5.  Precordial atypical chest pain: No further chest pain.  Sestamibi stress test negative.           Ashkan Shepehrd MD   01/10/22   14:10 EST    97

## 2022-01-13 ENCOUNTER — INPATIENT (INPATIENT)
Facility: HOSPITAL | Age: 82
LOS: 4 days | Discharge: HOME | End: 2022-01-18
Attending: INTERNAL MEDICINE | Admitting: INTERNAL MEDICINE
Payer: MEDICARE

## 2022-01-13 VITALS
RESPIRATION RATE: 18 BRPM | WEIGHT: 293 LBS | OXYGEN SATURATION: 99 % | HEART RATE: 97 BPM | HEIGHT: 60 IN | SYSTOLIC BLOOD PRESSURE: 168 MMHG | DIASTOLIC BLOOD PRESSURE: 80 MMHG

## 2022-01-13 DIAGNOSIS — Z96.651 PRESENCE OF RIGHT ARTIFICIAL KNEE JOINT: Chronic | ICD-10-CM

## 2022-01-13 DIAGNOSIS — Z98.890 OTHER SPECIFIED POSTPROCEDURAL STATES: Chronic | ICD-10-CM

## 2022-01-13 DIAGNOSIS — Z90.49 ACQUIRED ABSENCE OF OTHER SPECIFIED PARTS OF DIGESTIVE TRACT: Chronic | ICD-10-CM

## 2022-01-13 LAB
ALBUMIN SERPL ELPH-MCNC: 4.4 G/DL — SIGNIFICANT CHANGE UP (ref 3.5–5.2)
ALP SERPL-CCNC: 179 U/L — HIGH (ref 30–115)
ALT FLD-CCNC: 26 U/L — SIGNIFICANT CHANGE UP (ref 0–41)
ANION GAP SERPL CALC-SCNC: 18 MMOL/L — HIGH (ref 7–14)
APPEARANCE UR: CLEAR — SIGNIFICANT CHANGE UP
AST SERPL-CCNC: 16 U/L — SIGNIFICANT CHANGE UP (ref 0–41)
B-OH-BUTYR SERPL-SCNC: 1.6 MMOL/L — HIGH
BASE EXCESS BLDV CALC-SCNC: 7.7 MMOL/L — HIGH (ref -2–3)
BASOPHILS # BLD AUTO: 0.01 K/UL — SIGNIFICANT CHANGE UP (ref 0–0.2)
BASOPHILS NFR BLD AUTO: 0.1 % — SIGNIFICANT CHANGE UP (ref 0–1)
BILIRUB SERPL-MCNC: 0.8 MG/DL — SIGNIFICANT CHANGE UP (ref 0.2–1.2)
BILIRUB UR-MCNC: NEGATIVE — SIGNIFICANT CHANGE UP
BUN SERPL-MCNC: 28 MG/DL — HIGH (ref 10–20)
CA-I SERPL-SCNC: 1.05 MMOL/L — LOW (ref 1.15–1.33)
CALCIUM SERPL-MCNC: 10.3 MG/DL — HIGH (ref 8.5–10.1)
CHLORIDE SERPL-SCNC: 84 MMOL/L — LOW (ref 98–110)
CO2 SERPL-SCNC: 30 MMOL/L — SIGNIFICANT CHANGE UP (ref 17–32)
COLOR SPEC: YELLOW — SIGNIFICANT CHANGE UP
CREAT SERPL-MCNC: 1.4 MG/DL — SIGNIFICANT CHANGE UP (ref 0.7–1.5)
DIFF PNL FLD: ABNORMAL
EOSINOPHIL # BLD AUTO: 0.01 K/UL — SIGNIFICANT CHANGE UP (ref 0–0.7)
EOSINOPHIL NFR BLD AUTO: 0.1 % — SIGNIFICANT CHANGE UP (ref 0–8)
GAS PNL BLDV: 131 MMOL/L — LOW (ref 136–145)
GAS PNL BLDV: SIGNIFICANT CHANGE UP
GLUCOSE SERPL-MCNC: 801 MG/DL — CRITICAL HIGH (ref 70–99)
GLUCOSE UR QL: 500 MG/DL
HCO3 BLDV-SCNC: 34 MMOL/L — HIGH (ref 22–29)
HCT VFR BLD CALC: 41.1 % — SIGNIFICANT CHANGE UP (ref 37–47)
HCT VFR BLDA CALC: 39 % — SIGNIFICANT CHANGE UP (ref 34.5–46.5)
HGB BLD CALC-MCNC: 13 G/DL — SIGNIFICANT CHANGE UP (ref 11.7–16.1)
HGB BLD-MCNC: 13.9 G/DL — SIGNIFICANT CHANGE UP (ref 12–16)
IMM GRANULOCYTES NFR BLD AUTO: 0.5 % — HIGH (ref 0.1–0.3)
KETONES UR-MCNC: ABNORMAL
LACTATE BLDV-MCNC: 2 MMOL/L — SIGNIFICANT CHANGE UP (ref 0.5–2)
LEUKOCYTE ESTERASE UR-ACNC: NEGATIVE — SIGNIFICANT CHANGE UP
LYMPHOCYTES # BLD AUTO: 0.43 K/UL — LOW (ref 1.2–3.4)
LYMPHOCYTES # BLD AUTO: 4 % — LOW (ref 20.5–51.1)
MCHC RBC-ENTMCNC: 30.5 PG — SIGNIFICANT CHANGE UP (ref 27–31)
MCHC RBC-ENTMCNC: 33.8 G/DL — SIGNIFICANT CHANGE UP (ref 32–37)
MCV RBC AUTO: 90.1 FL — SIGNIFICANT CHANGE UP (ref 81–99)
MONOCYTES # BLD AUTO: 0.21 K/UL — SIGNIFICANT CHANGE UP (ref 0.1–0.6)
MONOCYTES NFR BLD AUTO: 2 % — SIGNIFICANT CHANGE UP (ref 1.7–9.3)
NEUTROPHILS # BLD AUTO: 9.91 K/UL — HIGH (ref 1.4–6.5)
NEUTROPHILS NFR BLD AUTO: 93.3 % — HIGH (ref 42.2–75.2)
NITRITE UR-MCNC: NEGATIVE — SIGNIFICANT CHANGE UP
NRBC # BLD: 0 /100 WBCS — SIGNIFICANT CHANGE UP (ref 0–0)
PCO2 BLDV: 57 MMHG — HIGH (ref 39–42)
PH BLDV: 7.39 — SIGNIFICANT CHANGE UP (ref 7.32–7.43)
PH UR: 7.5 — SIGNIFICANT CHANGE UP (ref 5–8)
PLATELET # BLD AUTO: 283 K/UL — SIGNIFICANT CHANGE UP (ref 130–400)
PO2 BLDV: 27 MMHG — SIGNIFICANT CHANGE UP
POTASSIUM BLDV-SCNC: 3.9 MMOL/L — SIGNIFICANT CHANGE UP (ref 3.5–5.1)
POTASSIUM SERPL-MCNC: 4.7 MMOL/L — SIGNIFICANT CHANGE UP (ref 3.5–5)
POTASSIUM SERPL-SCNC: 4.7 MMOL/L — SIGNIFICANT CHANGE UP (ref 3.5–5)
PROT SERPL-MCNC: 7.7 G/DL — SIGNIFICANT CHANGE UP (ref 6–8)
PROT UR-MCNC: 100 MG/DL
RBC # BLD: 4.56 M/UL — SIGNIFICANT CHANGE UP (ref 4.2–5.4)
RBC # FLD: 12.9 % — SIGNIFICANT CHANGE UP (ref 11.5–14.5)
SAO2 % BLDV: 43.3 % — SIGNIFICANT CHANGE UP
SARS-COV-2 RNA SPEC QL NAA+PROBE: DETECTED
SODIUM SERPL-SCNC: 132 MMOL/L — LOW (ref 135–146)
SP GR SPEC: 1.02 — SIGNIFICANT CHANGE UP (ref 1.01–1.03)
UROBILINOGEN FLD QL: 0.2 MG/DL — SIGNIFICANT CHANGE UP
WBC # BLD: 10.62 K/UL — SIGNIFICANT CHANGE UP (ref 4.8–10.8)
WBC # FLD AUTO: 10.62 K/UL — SIGNIFICANT CHANGE UP (ref 4.8–10.8)

## 2022-01-13 PROCEDURE — 99285 EMERGENCY DEPT VISIT HI MDM: CPT | Mod: FS,CS

## 2022-01-13 PROCEDURE — 71045 X-RAY EXAM CHEST 1 VIEW: CPT | Mod: 26

## 2022-01-13 RX ORDER — INSULIN GLARGINE 100 [IU]/ML
15 INJECTION, SOLUTION SUBCUTANEOUS AT BEDTIME
Refills: 0 | Status: DISCONTINUED | OUTPATIENT
Start: 2022-01-13 | End: 2022-01-14

## 2022-01-13 RX ORDER — INSULIN HUMAN 100 [IU]/ML
20 INJECTION, SOLUTION SUBCUTANEOUS ONCE
Refills: 0 | Status: COMPLETED | OUTPATIENT
Start: 2022-01-13 | End: 2022-01-13

## 2022-01-13 RX ORDER — INSULIN HUMAN 100 [IU]/ML
10 INJECTION, SOLUTION SUBCUTANEOUS ONCE
Refills: 0 | Status: COMPLETED | OUTPATIENT
Start: 2022-01-13 | End: 2022-01-13

## 2022-01-13 RX ORDER — SODIUM CHLORIDE 9 MG/ML
2000 INJECTION, SOLUTION INTRAVENOUS ONCE
Refills: 0 | Status: COMPLETED | OUTPATIENT
Start: 2022-01-13 | End: 2022-01-13

## 2022-01-13 RX ADMIN — INSULIN HUMAN 20 UNIT(S): 100 INJECTION, SOLUTION SUBCUTANEOUS at 22:34

## 2022-01-13 RX ADMIN — INSULIN HUMAN 10 UNIT(S): 100 INJECTION, SOLUTION SUBCUTANEOUS at 23:23

## 2022-01-13 RX ADMIN — SODIUM CHLORIDE 2000 MILLILITER(S): 9 INJECTION, SOLUTION INTRAVENOUS at 20:47

## 2022-01-13 RX ADMIN — INSULIN GLARGINE 15 UNIT(S): 100 INJECTION, SOLUTION SUBCUTANEOUS at 22:34

## 2022-01-13 NOTE — ED PROVIDER NOTE - OBJECTIVE STATEMENT
80 yo female hx of HTN/DM/HLD/CVA/recent admission of COVID last week BIBA from home 2/2 change of mental status and high sugar level. as per son over the phone, patient was taking dexamethasone until 1/8/22 at home. Her sugar has been "high" over the past week. They noted patient appeared to be more confused tonight (trying to talk to her mother) so they called EMS and brought patient to ED for evaluation.  Patient denies complaints in ED. denies HA/chest pain/abd pain/n/v/d.

## 2022-01-13 NOTE — ED ADULT TRIAGE NOTE - CHIEF COMPLAINT QUOTE
Pt BIB [facility] with Aide for HIGH blood sugar; glucose monitor read "HIGH" both at home and in ED on arrival.

## 2022-01-13 NOTE — ED PROVIDER NOTE - CARE PLAN
1 Principal Discharge DX:	Hyperglycemia  Secondary Diagnosis:	Hypernatremia   Principal Discharge DX:	Hyperglycemia  Secondary Diagnosis:	Hypernatremia  Secondary Diagnosis:	Diabetes type 2, uncontrolled

## 2022-01-13 NOTE — ED PROVIDER NOTE - ATTENDING CONTRIBUTION TO CARE
81yF DM with recent covid+ on dexamethasone p/w worsening hyperglycemia - FS >600 as per EMS.  Pt admits to taking her medicine as managed by her SNF but cannot provide much hx.

## 2022-01-13 NOTE — ED PROVIDER NOTE - CLINICAL SUMMARY MEDICAL DECISION MAKING FREE TEXT BOX
81yF DM on insulin recent covid on dexamethasone p/w uncontrolled hyperglycemia.  Pt nontoxic appearing.  Labs w/ marked hyperglycemia (glu >800) w/ associated hypernatremia c/w dehydration, but no DKA or HHS.  Attempted to treat with IVP or SQ insulin, but w/ limited success.  D/w ICU - pt started on insulin gtt and will adm.

## 2022-01-13 NOTE — ED PROVIDER NOTE - NS ED ROS FT
Constitutional: no fever, chills, no recent weight loss, change in appetite or malaise  Eyes: no redness/discharge/pain/vision changes  ENT: no rhinorrhea/ear pain/sore throat  Cardiac: No chest pain, SOB or edema.  Respiratory: No cough or respiratory distress  GI: No nausea, vomiting, diarrhea or abdominal pain.  : No dysuria, frequency, urgency or hematuria  MS: no pain to back or extremities, no loss of ROM, no weakness  Neuro: No headache or weakness. No LOC.  Skin: No skin rash.  Endocrine: see HPI

## 2022-01-13 NOTE — ED PROVIDER NOTE - PROGRESS NOTE DETAILS
spoke with intensivist Dr Gil, will try to give patient insulin drips for an hour. if FS improved <500 patient can be admitted to floor and dc insulin pump. . speak with Dr Gil, will continue insulin drip for another hour. and recheck FS. patient's sugar remained > 600 after multiple doses of insulin. will give IV insulin drip FS still > 500. continue insulin drip. will admit to ICU

## 2022-01-14 DIAGNOSIS — U07.1 COVID-19: ICD-10-CM

## 2022-01-14 DIAGNOSIS — Z91.018 ALLERGY TO OTHER FOODS: ICD-10-CM

## 2022-01-14 DIAGNOSIS — F32.A DEPRESSION, UNSPECIFIED: ICD-10-CM

## 2022-01-14 DIAGNOSIS — J96.01 ACUTE RESPIRATORY FAILURE WITH HYPOXIA: ICD-10-CM

## 2022-01-14 DIAGNOSIS — Z85.3 PERSONAL HISTORY OF MALIGNANT NEOPLASM OF BREAST: ICD-10-CM

## 2022-01-14 DIAGNOSIS — Z91.040 LATEX ALLERGY STATUS: ICD-10-CM

## 2022-01-14 DIAGNOSIS — E66.9 OBESITY, UNSPECIFIED: ICD-10-CM

## 2022-01-14 DIAGNOSIS — H91.93 UNSPECIFIED HEARING LOSS, BILATERAL: ICD-10-CM

## 2022-01-14 DIAGNOSIS — J12.82 PNEUMONIA DUE TO CORONAVIRUS DISEASE 2019: ICD-10-CM

## 2022-01-14 DIAGNOSIS — E11.40 TYPE 2 DIABETES MELLITUS WITH DIABETIC NEUROPATHY, UNSPECIFIED: ICD-10-CM

## 2022-01-14 DIAGNOSIS — Z91.013 ALLERGY TO SEAFOOD: ICD-10-CM

## 2022-01-14 DIAGNOSIS — I50.22 CHRONIC SYSTOLIC (CONGESTIVE) HEART FAILURE: ICD-10-CM

## 2022-01-14 DIAGNOSIS — E78.00 PURE HYPERCHOLESTEROLEMIA, UNSPECIFIED: ICD-10-CM

## 2022-01-14 DIAGNOSIS — I11.0 HYPERTENSIVE HEART DISEASE WITH HEART FAILURE: ICD-10-CM

## 2022-01-14 DIAGNOSIS — E87.2 ACIDOSIS: ICD-10-CM

## 2022-01-14 DIAGNOSIS — Z96.651 PRESENCE OF RIGHT ARTIFICIAL KNEE JOINT: ICD-10-CM

## 2022-01-14 DIAGNOSIS — Z79.4 LONG TERM (CURRENT) USE OF INSULIN: ICD-10-CM

## 2022-01-14 DIAGNOSIS — Z79.02 LONG TERM (CURRENT) USE OF ANTITHROMBOTICS/ANTIPLATELETS: ICD-10-CM

## 2022-01-14 DIAGNOSIS — Z88.2 ALLERGY STATUS TO SULFONAMIDES: ICD-10-CM

## 2022-01-14 DIAGNOSIS — Z88.0 ALLERGY STATUS TO PENICILLIN: ICD-10-CM

## 2022-01-14 DIAGNOSIS — E03.9 HYPOTHYROIDISM, UNSPECIFIED: ICD-10-CM

## 2022-01-14 DIAGNOSIS — I69.354 HEMIPLEGIA AND HEMIPARESIS FOLLOWING CEREBRAL INFARCTION AFFECTING LEFT NON-DOMINANT SIDE: ICD-10-CM

## 2022-01-14 LAB
A1C WITH ESTIMATED AVERAGE GLUCOSE RESULT: 9.2 % — HIGH (ref 4–5.6)
ALBUMIN SERPL ELPH-MCNC: 3.5 G/DL — SIGNIFICANT CHANGE UP (ref 3.5–5.2)
ALP SERPL-CCNC: 105 U/L — SIGNIFICANT CHANGE UP (ref 30–115)
ALT FLD-CCNC: 19 U/L — SIGNIFICANT CHANGE UP (ref 0–41)
ANION GAP SERPL CALC-SCNC: 12 MMOL/L — SIGNIFICANT CHANGE UP (ref 7–14)
AST SERPL-CCNC: 19 U/L — SIGNIFICANT CHANGE UP (ref 0–41)
BACTERIA # UR AUTO: SIGNIFICANT CHANGE UP /HPF
BASOPHILS # BLD AUTO: 0.02 K/UL — SIGNIFICANT CHANGE UP (ref 0–0.2)
BASOPHILS NFR BLD AUTO: 0.1 % — SIGNIFICANT CHANGE UP (ref 0–1)
BILIRUB SERPL-MCNC: 0.5 MG/DL — SIGNIFICANT CHANGE UP (ref 0.2–1.2)
BUN SERPL-MCNC: 26 MG/DL — HIGH (ref 10–20)
CALCIUM SERPL-MCNC: 9.5 MG/DL — SIGNIFICANT CHANGE UP (ref 8.5–10.1)
CHLORIDE SERPL-SCNC: 96 MMOL/L — LOW (ref 98–110)
CO2 SERPL-SCNC: 28 MMOL/L — SIGNIFICANT CHANGE UP (ref 17–32)
CREAT SERPL-MCNC: 1.4 MG/DL — SIGNIFICANT CHANGE UP (ref 0.7–1.5)
D DIMER BLD IA.RAPID-MCNC: 165 NG/ML DDU — SIGNIFICANT CHANGE UP (ref 0–230)
EOSINOPHIL # BLD AUTO: 0.04 K/UL — SIGNIFICANT CHANGE UP (ref 0–0.7)
EOSINOPHIL NFR BLD AUTO: 0.3 % — SIGNIFICANT CHANGE UP (ref 0–8)
EPI CELLS # UR: ABNORMAL /HPF
ESTIMATED AVERAGE GLUCOSE: 217 MG/DL — HIGH (ref 68–114)
GLUCOSE BLDC GLUCOMTR-MCNC: 116 MG/DL — HIGH (ref 70–99)
GLUCOSE BLDC GLUCOMTR-MCNC: 126 MG/DL — HIGH (ref 70–99)
GLUCOSE BLDC GLUCOMTR-MCNC: 129 MG/DL — HIGH (ref 70–99)
GLUCOSE BLDC GLUCOMTR-MCNC: 162 MG/DL — HIGH (ref 70–99)
GLUCOSE BLDC GLUCOMTR-MCNC: 163 MG/DL — HIGH (ref 70–99)
GLUCOSE BLDC GLUCOMTR-MCNC: 166 MG/DL — HIGH (ref 70–99)
GLUCOSE BLDC GLUCOMTR-MCNC: 196 MG/DL — HIGH (ref 70–99)
GLUCOSE BLDC GLUCOMTR-MCNC: 242 MG/DL — HIGH (ref 70–99)
GLUCOSE BLDC GLUCOMTR-MCNC: 302 MG/DL — HIGH (ref 70–99)
GLUCOSE BLDC GLUCOMTR-MCNC: 361 MG/DL — HIGH (ref 70–99)
GLUCOSE BLDC GLUCOMTR-MCNC: 399 MG/DL — HIGH (ref 70–99)
GLUCOSE BLDC GLUCOMTR-MCNC: 60 MG/DL — LOW (ref 70–99)
GLUCOSE BLDC GLUCOMTR-MCNC: 87 MG/DL — SIGNIFICANT CHANGE UP (ref 70–99)
GLUCOSE SERPL-MCNC: 169 MG/DL — HIGH (ref 70–99)
HCT VFR BLD CALC: 36.1 % — LOW (ref 37–47)
HGB BLD-MCNC: 12.3 G/DL — SIGNIFICANT CHANGE UP (ref 12–16)
IMM GRANULOCYTES NFR BLD AUTO: 0.4 % — HIGH (ref 0.1–0.3)
LDH SERPL L TO P-CCNC: 215 — SIGNIFICANT CHANGE UP (ref 50–242)
LYMPHOCYTES # BLD AUTO: 1.65 K/UL — SIGNIFICANT CHANGE UP (ref 1.2–3.4)
LYMPHOCYTES # BLD AUTO: 10.9 % — LOW (ref 20.5–51.1)
MAGNESIUM SERPL-MCNC: 1.7 MG/DL — LOW (ref 1.8–2.4)
MCHC RBC-ENTMCNC: 30 PG — SIGNIFICANT CHANGE UP (ref 27–31)
MCHC RBC-ENTMCNC: 34.1 G/DL — SIGNIFICANT CHANGE UP (ref 32–37)
MCV RBC AUTO: 88 FL — SIGNIFICANT CHANGE UP (ref 81–99)
MONOCYTES # BLD AUTO: 1.17 K/UL — HIGH (ref 0.1–0.6)
MONOCYTES NFR BLD AUTO: 7.7 % — SIGNIFICANT CHANGE UP (ref 1.7–9.3)
NEUTROPHILS # BLD AUTO: 12.19 K/UL — HIGH (ref 1.4–6.5)
NEUTROPHILS NFR BLD AUTO: 80.6 % — HIGH (ref 42.2–75.2)
NRBC # BLD: 0 /100 WBCS — SIGNIFICANT CHANGE UP (ref 0–0)
PHOSPHATE SERPL-MCNC: 1.5 MG/DL — LOW (ref 2.1–4.9)
PLATELET # BLD AUTO: 303 K/UL — SIGNIFICANT CHANGE UP (ref 130–400)
POTASSIUM SERPL-MCNC: 4.2 MMOL/L — SIGNIFICANT CHANGE UP (ref 3.5–5)
POTASSIUM SERPL-SCNC: 4.2 MMOL/L — SIGNIFICANT CHANGE UP (ref 3.5–5)
PROT SERPL-MCNC: 6.3 G/DL — SIGNIFICANT CHANGE UP (ref 6–8)
RBC # BLD: 4.1 M/UL — LOW (ref 4.2–5.4)
RBC # FLD: 12.7 % — SIGNIFICANT CHANGE UP (ref 11.5–14.5)
RBC CASTS # UR COMP ASSIST: SIGNIFICANT CHANGE UP /HPF
SODIUM SERPL-SCNC: 136 MMOL/L — SIGNIFICANT CHANGE UP (ref 135–146)
WBC # BLD: 15.13 K/UL — HIGH (ref 4.8–10.8)
WBC # FLD AUTO: 15.13 K/UL — HIGH (ref 4.8–10.8)
WBC UR QL: SIGNIFICANT CHANGE UP /HPF

## 2022-01-14 PROCEDURE — 99222 1ST HOSP IP/OBS MODERATE 55: CPT

## 2022-01-14 RX ORDER — SERTRALINE 25 MG/1
100 TABLET, FILM COATED ORAL DAILY
Refills: 0 | Status: DISCONTINUED | OUTPATIENT
Start: 2022-01-14 | End: 2022-01-18

## 2022-01-14 RX ORDER — INSULIN GLARGINE 100 [IU]/ML
20 INJECTION, SOLUTION SUBCUTANEOUS ONCE
Refills: 0 | Status: DISCONTINUED | OUTPATIENT
Start: 2022-01-14 | End: 2022-01-14

## 2022-01-14 RX ORDER — DEXTROSE 50 % IN WATER 50 %
15 SYRINGE (ML) INTRAVENOUS ONCE
Refills: 0 | Status: DISCONTINUED | OUTPATIENT
Start: 2022-01-14 | End: 2022-01-18

## 2022-01-14 RX ORDER — GLUCAGON INJECTION, SOLUTION 0.5 MG/.1ML
1 INJECTION, SOLUTION SUBCUTANEOUS ONCE
Refills: 0 | Status: DISCONTINUED | OUTPATIENT
Start: 2022-01-14 | End: 2022-01-14

## 2022-01-14 RX ORDER — POTASSIUM CHLORIDE 20 MEQ
40 PACKET (EA) ORAL ONCE
Refills: 0 | Status: COMPLETED | OUTPATIENT
Start: 2022-01-14 | End: 2022-01-14

## 2022-01-14 RX ORDER — INSULIN LISPRO 100/ML
5 VIAL (ML) SUBCUTANEOUS
Refills: 0 | Status: DISCONTINUED | OUTPATIENT
Start: 2022-01-14 | End: 2022-01-14

## 2022-01-14 RX ORDER — SODIUM CHLORIDE 9 MG/ML
1000 INJECTION INTRAMUSCULAR; INTRAVENOUS; SUBCUTANEOUS
Refills: 0 | Status: DISCONTINUED | OUTPATIENT
Start: 2022-01-14 | End: 2022-01-14

## 2022-01-14 RX ORDER — POTASSIUM CHLORIDE 20 MEQ
20 PACKET (EA) ORAL ONCE
Refills: 0 | Status: COMPLETED | OUTPATIENT
Start: 2022-01-14 | End: 2022-01-14

## 2022-01-14 RX ORDER — INSULIN LISPRO 100/ML
VIAL (ML) SUBCUTANEOUS
Refills: 0 | Status: DISCONTINUED | OUTPATIENT
Start: 2022-01-14 | End: 2022-01-14

## 2022-01-14 RX ORDER — SODIUM CHLORIDE 9 MG/ML
1000 INJECTION, SOLUTION INTRAVENOUS
Refills: 0 | Status: DISCONTINUED | OUTPATIENT
Start: 2022-01-14 | End: 2022-01-15

## 2022-01-14 RX ORDER — INSULIN LISPRO 100/ML
5 VIAL (ML) SUBCUTANEOUS
Refills: 0 | Status: DISCONTINUED | OUTPATIENT
Start: 2022-01-14 | End: 2022-01-18

## 2022-01-14 RX ORDER — INSULIN LISPRO 100/ML
VIAL (ML) SUBCUTANEOUS
Refills: 0 | Status: DISCONTINUED | OUTPATIENT
Start: 2022-01-14 | End: 2022-01-18

## 2022-01-14 RX ORDER — DEXTROSE 50 % IN WATER 50 %
12.5 SYRINGE (ML) INTRAVENOUS ONCE
Refills: 0 | Status: DISCONTINUED | OUTPATIENT
Start: 2022-01-14 | End: 2022-01-14

## 2022-01-14 RX ORDER — DEXTROSE 50 % IN WATER 50 %
25 SYRINGE (ML) INTRAVENOUS ONCE
Refills: 0 | Status: DISCONTINUED | OUTPATIENT
Start: 2022-01-14 | End: 2022-01-18

## 2022-01-14 RX ORDER — INSULIN LISPRO 100/ML
5 VIAL (ML) SUBCUTANEOUS
Refills: 0 | Status: DISCONTINUED | OUTPATIENT
Start: 2022-01-15 | End: 2022-01-18

## 2022-01-14 RX ORDER — HEPARIN SODIUM 5000 [USP'U]/ML
5000 INJECTION INTRAVENOUS; SUBCUTANEOUS EVERY 12 HOURS
Refills: 0 | Status: DISCONTINUED | OUTPATIENT
Start: 2022-01-14 | End: 2022-01-18

## 2022-01-14 RX ORDER — INSULIN LISPRO 100/ML
10 VIAL (ML) SUBCUTANEOUS
Refills: 0 | Status: DISCONTINUED | OUTPATIENT
Start: 2022-01-14 | End: 2022-01-14

## 2022-01-14 RX ORDER — INSULIN HUMAN 100 [IU]/ML
3 INJECTION, SOLUTION SUBCUTANEOUS
Qty: 100 | Refills: 0 | Status: DISCONTINUED | OUTPATIENT
Start: 2022-01-14 | End: 2022-01-14

## 2022-01-14 RX ORDER — SODIUM CHLORIDE 9 MG/ML
1000 INJECTION, SOLUTION INTRAVENOUS ONCE
Refills: 0 | Status: COMPLETED | OUTPATIENT
Start: 2022-01-14 | End: 2022-01-14

## 2022-01-14 RX ORDER — DEXTROSE 50 % IN WATER 50 %
12.5 SYRINGE (ML) INTRAVENOUS ONCE
Refills: 0 | Status: DISCONTINUED | OUTPATIENT
Start: 2022-01-14 | End: 2022-01-18

## 2022-01-14 RX ORDER — SODIUM CHLORIDE 9 MG/ML
1000 INJECTION, SOLUTION INTRAVENOUS
Refills: 0 | Status: DISCONTINUED | OUTPATIENT
Start: 2022-01-14 | End: 2022-01-18

## 2022-01-14 RX ORDER — GLUCAGON INJECTION, SOLUTION 0.5 MG/.1ML
1 INJECTION, SOLUTION SUBCUTANEOUS ONCE
Refills: 0 | Status: DISCONTINUED | OUTPATIENT
Start: 2022-01-14 | End: 2022-01-18

## 2022-01-14 RX ORDER — DEXTROSE 50 % IN WATER 50 %
15 SYRINGE (ML) INTRAVENOUS ONCE
Refills: 0 | Status: DISCONTINUED | OUTPATIENT
Start: 2022-01-14 | End: 2022-01-14

## 2022-01-14 RX ORDER — INSULIN HUMAN 100 [IU]/ML
10 INJECTION, SOLUTION SUBCUTANEOUS
Qty: 250 | Refills: 0 | Status: DISCONTINUED | OUTPATIENT
Start: 2022-01-14 | End: 2022-01-14

## 2022-01-14 RX ORDER — INSULIN GLARGINE 100 [IU]/ML
20 INJECTION, SOLUTION SUBCUTANEOUS AT BEDTIME
Refills: 0 | Status: DISCONTINUED | OUTPATIENT
Start: 2022-01-15 | End: 2022-01-18

## 2022-01-14 RX ORDER — LEVOTHYROXINE SODIUM 125 MCG
25 TABLET ORAL DAILY
Refills: 0 | Status: DISCONTINUED | OUTPATIENT
Start: 2022-01-14 | End: 2022-01-18

## 2022-01-14 RX ORDER — CHLORHEXIDINE GLUCONATE 213 G/1000ML
1 SOLUTION TOPICAL
Refills: 0 | Status: DISCONTINUED | OUTPATIENT
Start: 2022-01-14 | End: 2022-01-18

## 2022-01-14 RX ORDER — GABAPENTIN 400 MG/1
300 CAPSULE ORAL
Refills: 0 | Status: DISCONTINUED | OUTPATIENT
Start: 2022-01-14 | End: 2022-01-18

## 2022-01-14 RX ORDER — MAGNESIUM SULFATE 500 MG/ML
1 VIAL (ML) INJECTION ONCE
Refills: 0 | Status: COMPLETED | OUTPATIENT
Start: 2022-01-14 | End: 2022-01-14

## 2022-01-14 RX ORDER — AMLODIPINE BESYLATE 2.5 MG/1
10 TABLET ORAL DAILY
Refills: 0 | Status: DISCONTINUED | OUTPATIENT
Start: 2022-01-14 | End: 2022-01-18

## 2022-01-14 RX ORDER — INSULIN GLARGINE 100 [IU]/ML
20 INJECTION, SOLUTION SUBCUTANEOUS ONCE
Refills: 0 | Status: COMPLETED | OUTPATIENT
Start: 2022-01-14 | End: 2022-01-14

## 2022-01-14 RX ORDER — DEXTROSE 50 % IN WATER 50 %
25 SYRINGE (ML) INTRAVENOUS ONCE
Refills: 0 | Status: DISCONTINUED | OUTPATIENT
Start: 2022-01-14 | End: 2022-01-14

## 2022-01-14 RX ORDER — ATORVASTATIN CALCIUM 80 MG/1
80 TABLET, FILM COATED ORAL AT BEDTIME
Refills: 0 | Status: DISCONTINUED | OUTPATIENT
Start: 2022-01-14 | End: 2022-01-18

## 2022-01-14 RX ORDER — INSULIN HUMAN 100 [IU]/ML
8 INJECTION, SOLUTION SUBCUTANEOUS
Qty: 100 | Refills: 0 | Status: DISCONTINUED | OUTPATIENT
Start: 2022-01-14 | End: 2022-01-14

## 2022-01-14 RX ORDER — SODIUM CHLORIDE 9 MG/ML
1000 INJECTION, SOLUTION INTRAVENOUS
Refills: 0 | Status: DISCONTINUED | OUTPATIENT
Start: 2022-01-14 | End: 2022-01-14

## 2022-01-14 RX ORDER — CLOPIDOGREL BISULFATE 75 MG/1
75 TABLET, FILM COATED ORAL DAILY
Refills: 0 | Status: DISCONTINUED | OUTPATIENT
Start: 2022-01-14 | End: 2022-01-18

## 2022-01-14 RX ORDER — INSULIN HUMAN 100 [IU]/ML
10 INJECTION, SOLUTION SUBCUTANEOUS
Qty: 100 | Refills: 0 | Status: DISCONTINUED | OUTPATIENT
Start: 2022-01-14 | End: 2022-01-14

## 2022-01-14 RX ORDER — INSULIN GLARGINE 100 [IU]/ML
30 INJECTION, SOLUTION SUBCUTANEOUS ONCE
Refills: 0 | Status: DISCONTINUED | OUTPATIENT
Start: 2022-01-14 | End: 2022-01-14

## 2022-01-14 RX ORDER — INSULIN HUMAN 100 [IU]/ML
20 INJECTION, SOLUTION SUBCUTANEOUS ONCE
Refills: 0 | Status: DISCONTINUED | OUTPATIENT
Start: 2022-01-14 | End: 2022-01-14

## 2022-01-14 RX ORDER — NORTRIPTYLINE HYDROCHLORIDE 10 MG/1
25 CAPSULE ORAL AT BEDTIME
Refills: 0 | Status: DISCONTINUED | OUTPATIENT
Start: 2022-01-14 | End: 2022-01-18

## 2022-01-14 RX ORDER — PANTOPRAZOLE SODIUM 20 MG/1
40 TABLET, DELAYED RELEASE ORAL
Refills: 0 | Status: DISCONTINUED | OUTPATIENT
Start: 2022-01-14 | End: 2022-01-18

## 2022-01-14 RX ADMIN — INSULIN HUMAN 8 UNIT(S)/HR: 100 INJECTION, SOLUTION SUBCUTANEOUS at 10:21

## 2022-01-14 RX ADMIN — SERTRALINE 100 MILLIGRAM(S): 25 TABLET, FILM COATED ORAL at 13:48

## 2022-01-14 RX ADMIN — INSULIN HUMAN 12 UNIT(S)/HR: 100 INJECTION, SOLUTION SUBCUTANEOUS at 05:27

## 2022-01-14 RX ADMIN — ATORVASTATIN CALCIUM 80 MILLIGRAM(S): 80 TABLET, FILM COATED ORAL at 21:22

## 2022-01-14 RX ADMIN — AMLODIPINE BESYLATE 10 MILLIGRAM(S): 2.5 TABLET ORAL at 05:06

## 2022-01-14 RX ADMIN — NORTRIPTYLINE HYDROCHLORIDE 25 MILLIGRAM(S): 10 CAPSULE ORAL at 21:22

## 2022-01-14 RX ADMIN — Medication 50 MILLIEQUIVALENT(S): at 12:05

## 2022-01-14 RX ADMIN — CLOPIDOGREL BISULFATE 75 MILLIGRAM(S): 75 TABLET, FILM COATED ORAL at 13:48

## 2022-01-14 RX ADMIN — SODIUM CHLORIDE 75 MILLILITER(S): 9 INJECTION, SOLUTION INTRAVENOUS at 08:28

## 2022-01-14 RX ADMIN — GABAPENTIN 300 MILLIGRAM(S): 400 CAPSULE ORAL at 18:01

## 2022-01-14 RX ADMIN — Medication 2: at 15:25

## 2022-01-14 RX ADMIN — INSULIN GLARGINE 20 UNIT(S): 100 INJECTION, SOLUTION SUBCUTANEOUS at 13:28

## 2022-01-14 RX ADMIN — Medication 40 MILLIEQUIVALENT(S): at 13:52

## 2022-01-14 RX ADMIN — INSULIN HUMAN 10 UNIT(S)/HR: 100 INJECTION, SOLUTION SUBCUTANEOUS at 00:56

## 2022-01-14 RX ADMIN — Medication 25 MICROGRAM(S): at 05:07

## 2022-01-14 RX ADMIN — SODIUM CHLORIDE 1000 MILLILITER(S): 9 INJECTION, SOLUTION INTRAVENOUS at 02:59

## 2022-01-14 RX ADMIN — HEPARIN SODIUM 5000 UNIT(S): 5000 INJECTION INTRAVENOUS; SUBCUTANEOUS at 18:01

## 2022-01-14 RX ADMIN — SODIUM CHLORIDE 75 MILLILITER(S): 9 INJECTION INTRAMUSCULAR; INTRAVENOUS; SUBCUTANEOUS at 03:46

## 2022-01-14 RX ADMIN — Medication 100 GRAM(S): at 13:33

## 2022-01-14 RX ADMIN — Medication 5 UNIT(S): at 15:25

## 2022-01-14 RX ADMIN — HEPARIN SODIUM 5000 UNIT(S): 5000 INJECTION INTRAVENOUS; SUBCUTANEOUS at 05:14

## 2022-01-14 RX ADMIN — SODIUM CHLORIDE 75 MILLILITER(S): 9 INJECTION, SOLUTION INTRAVENOUS at 21:22

## 2022-01-14 RX ADMIN — INSULIN HUMAN 10 UNIT(S)/HR: 100 INJECTION, SOLUTION SUBCUTANEOUS at 07:07

## 2022-01-14 RX ADMIN — SODIUM CHLORIDE 75 MILLILITER(S): 9 INJECTION INTRAMUSCULAR; INTRAVENOUS; SUBCUTANEOUS at 05:27

## 2022-01-14 RX ADMIN — GABAPENTIN 300 MILLIGRAM(S): 400 CAPSULE ORAL at 05:07

## 2022-01-14 NOTE — H&P ADULT - ASSESSMENT
A/P:     1. Hyperglycemia   - c/w insulin ggt   - fs q 1 hr  - IV fluids  - fu hga1c   - replete electrolytes especially K   - fu TSH  - DVT ppx

## 2022-01-14 NOTE — CONSULT NOTE ADULT - ASSESSMENT
ASSESSMENT   80 yo female hx of HTN/DM/HLD/CVA/recent admission of COVID last week BIBA from home 2/2 change of mental status and high sugar level. as per son over the phone, patient was taking dexamethasone until 1/8/22 at home.     IMPRESSION  #Recent COVID-19  - COVID-19 PCR: Detected (12.29.21 @ 22:00), undetected on 1/4     #Metabolic Encephalopathy  #Hyperglycemia  #DM II  #Abx allergy: Sulfacetamide Sodium-Sulfur (Rash)        RECOMMENDATIONS  - F/u blood cultures, urine culture, wound culture  - Continue  - Continue  - Please check vanc trough 30 min prior to the 4th dose     Spectra [id number]   ASSESSMENT   80 yo female hx of HTN/DM/HLD/CVA/recent admission of COVID last week BIBA from home 2/2 change of mental status and high sugar level. as per son over the phone, patient was taking dexamethasone until 1/8/22 at home.     IMPRESSION  #Recent COVID-19  - COVID-19 PCR: Detected (12.29.21 @ 22:00), undetected on 1/4     #Metabolic Encephalopathy  #Hyperglycemia  #Leukocytosis   #DM II  #Abx allergy: Sulfacetamide Sodium-Sulfur (Rash)    RECOMMENDATIONS  - monitor off antibiotics - UA negative, CXR stable - no clear signs of bacterial infection   - supportive care for coronavirus -- repeat COVID PCR   - hyperglycemia management per primary   - trend WBC for now     Please call or message on Microsoft Teams if with any questions.  Spectra [phone]

## 2022-01-14 NOTE — CONSULT NOTE ADULT - ASSESSMENT
IMPRESSION:  HHS ?? dka resolved   covid positive   HTN      PLAN:    CNS: no sedation     HEENT: oral care     PULMONARY: keep pox > 92 %     CARDIOVASCULAR: continue IV hydration for 12 hrs and reassess     GI: GI prophylaxis.  Feeding     RENAL: replace K and mg   BMP after noon     INFECTIOUS DISEASE: id consult   procal   cx     HEMATOLOGICAL:  DVT prophylaxis.    ENDOCRINE:  Follow up FS.  switch insulin drip to lantus   follow fs   BMP afternoon follow AG bicarb     downgrade to floor case discussed with hospitalist

## 2022-01-14 NOTE — CONSULT NOTE ADULT - SUBJECTIVE AND OBJECTIVE BOX
Patient is a 81y old  Female who presents with a chief complaint of hyperglycemia (2022 06:14)      HPI:   82 yo female hx of HTN/DM/HLD/CVA/recent admission of COVID last week BIBA from home 2/2 change of mental status and high sugar level. as per son over the phone, patient was taking dexamethasone until 22 at home. Her sugar has been "high" over the past week. They noted patient appeared to be more confused tonight (trying to talk to her mother) so they called EMS and brought patient to ED for evaluation.  Patient denies complaints in ED. denies HA/chest pain/abd pain/n/v/d.   (2022 06:14)  now feel better FS improved 129   AG closed     PAST MEDICAL & SURGICAL HISTORY:  DM (diabetes mellitus)  23 yr    Breast CA   s/p rt    BP (high blood pressure)  20 yr    Depression    High cholesterol    Bilateral hearing loss, unspecified hearing loss type    CVA (cerebral vascular accident)   lft weakness    H/O total knee replacement, right    History of lumpectomy of right breast    History of cholecystectomy        Allergies    honeydew (Unknown)  latex (Unknown)  penicillins (Unknown)  pickles (Unknown)  shellfish (Unknown)  sulfa drugs (Hives)  Sulfacetamide Sodium-Sulfur (Rash)    Intolerances      Family history : no cardiovscular family history   Home Medications:  amLODIPine 10 mg oral tablet: 1 tab(s) orally once a day (04 Oct 2021 18:13)  atorvastatin 80 mg oral tablet: 1 tab(s) orally once a day (at bedtime) (04 Oct 2021 18:13)  clopidogrel 75 mg oral tablet: 1 tab(s) orally once a day (04 Oct 2021 18:13)  Ecotrin Adult Low Strength 81 mg oral delayed release tablet: 2 tab(s) orally once a day (2021 22:27)  exemestane 25 mg oral tablet: 1 tab(s) orally once a day (04 Oct 2021 18:13)  ferrous sulfate 324 mg (65 mg elemental iron) oral tablet: 1  orally 2 times a day (2021 22:34)  gabapentin 300 mg oral capsule: orally 2 times a day (04 Oct 2021 18:13)  HumaLOG 100 units/mL subcutaneous solution: Per insulin sliding scale at home (2021 15:31)  Lasix 20 mg oral tablet: 1 tab(s) orally once a day (04 Oct 2021 18:13)  Levemir: 30  subcutaneous once a day (at bedtime) (2021 15:31)  levothyroxine 25 mcg (0.025 mg) oral tablet: 1 tab(s) orally once a day (04 Oct 2021 18:13)  nortriptyline 25 mg oral capsule: 1 cap(s) orally once a day (at bedtime) (04 Oct 2021 18:13)  pantoprazole 40 mg oral delayed release tablet: 1 tab(s) orally once a day (before a meal) (04 Oct 2021 18:13)  senna oral tablet: 2 tab(s) orally once a day (at bedtime)- PRN for constipation- OTC is okay (04 Oct 2021 18:13)  sertraline 100 mg oral tablet: 1 tab(s) orally once a day (04 Oct 2021 18:13)  temazepam 30 mg oral capsule: 1 cap(s) orally once a day (at bedtime) (2021 22:32)    Occupation:  Alochol: Denied  Smoking: Denied  Drug Use: Denied  Marital Status:         ROS: as in HPI; All other systems reviewed are negative    ICU Vital Signs Last 24 Hrs  T(C): 36.6 (2022 03:04), Max: 36.6 (2022 03:04)  T(F): 97.9 (2022 03:04), Max: 97.9 (2022 03:04)  HR: 64 (2022 11:18) (64 - 118)  BP: 101/57 (2022 11:18) (101/57 - 168/80)  BP(mean): --  ABP: --  ABP(mean): --  RR: 18 (2022 11:18) (17 - 19)  SpO2: 96% (2022 11:18) (96% - 99%)        Physical Examination:    General: awake   HEENT: Pupils equal, reactive to light.  Symmetric.    PULM: Clear to auscultation bilaterally, no significant sputum production    CVS: Regular rate and rhythm, no murmurs, rubs, or gallops    ABD: Soft, nondistended, nontender, normoactive bowel sounds, no masses    EXT: No edema, nontender, no clubbing     SKIN: Warm and well perfused, no rashes noted.    Neurology : no motor or sensory deficit     Musculoskeletal : move all extremity     Lymphatic system: no Palpable node               I&O's Detail        LABS:                        12.3   15.13 )-----------( 303      ( 2022 10:00 )             36.1     2022 10:00    139    |  95     |  26     ----------------------------<  98     3.1     |  32     |  1.3      Ca    9.7        2022 10:00  Phos  1.5       2022 10:00  Mg     1.7       2022 10:00    TPro  7.7    /  Alb  4.4    /  TBili  0.8    /  DBili  x      /  AST  16     /  ALT  26     /  AlkPhos  179    2022 19:33  Amylase x     lipase x              CAPILLARY BLOOD GLUCOSE      POCT Blood Glucose.: 116 mg/dL (2022 11:09)      Urinalysis Basic - ( 2022 23:50 )    Color: Yellow / Appearance: Clear / S.020 / pH: x  Gluc: x / Ketone: Trace  / Bili: Negative / Urobili: 0.2 mg/dL   Blood: x / Protein: 100 mg/dL / Nitrite: Negative   Leuk Esterase: Negative / RBC: 1-2 /HPF / WBC 3-5 /HPF   Sq Epi: x / Non Sq Epi: Occasional /HPF / Bacteria: Occasional /HPF      Culture        MEDICATIONS  (STANDING):  amLODIPine   Tablet 10 milliGRAM(s) Oral daily  atorvastatin 80 milliGRAM(s) Oral at bedtime  chlorhexidine 4% Liquid 1 Application(s) Topical two times a day  clopidogrel Tablet 75 milliGRAM(s) Oral daily  dextrose 40% Gel 15 Gram(s) Oral once  dextrose 40% Gel 15 Gram(s) Oral once  dextrose 5% + sodium chloride 0.9%. 1000 milliLiter(s) (75 mL/Hr) IV Continuous <Continuous>  dextrose 5%. 1000 milliLiter(s) (50 mL/Hr) IV Continuous <Continuous>  dextrose 5%. 1000 milliLiter(s) (100 mL/Hr) IV Continuous <Continuous>  dextrose 5%. 1000 milliLiter(s) (50 mL/Hr) IV Continuous <Continuous>  dextrose 5%. 1000 milliLiter(s) (100 mL/Hr) IV Continuous <Continuous>  dextrose 50% Injectable 25 Gram(s) IV Push once  dextrose 50% Injectable 12.5 Gram(s) IV Push once  dextrose 50% Injectable 25 Gram(s) IV Push once  dextrose 50% Injectable 25 Gram(s) IV Push once  dextrose 50% Injectable 12.5 Gram(s) IV Push once  dextrose 50% Injectable 25 Gram(s) IV Push once  gabapentin 300 milliGRAM(s) Oral two times a day  glucagon  Injectable 1 milliGRAM(s) IntraMuscular once  glucagon  Injectable 1 milliGRAM(s) IntraMuscular once  heparin   Injectable 5000 Unit(s) SubCutaneous every 12 hours  insulin glargine Injectable (LANTUS) 30 Unit(s) SubCutaneous once  insulin lispro Injectable (ADMELOG) 10 Unit(s) SubCutaneous before lunch  insulin lispro Injectable (ADMELOG) 10 Unit(s) SubCutaneous before dinner  insulin regular Infusion 3 Unit(s)/Hr (3 mL/Hr) IV Continuous <Continuous>  levothyroxine 25 MICROGram(s) Oral daily  nortriptyline 25 milliGRAM(s) Oral at bedtime  pantoprazole    Tablet 40 milliGRAM(s) Oral before breakfast  potassium chloride   Powder 40 milliEquivalent(s) Oral once  potassium chloride  20 mEq/100 mL IVPB 20 milliEquivalent(s) IV Intermittent once  sertraline 100 milliGRAM(s) Oral daily    MEDICATIONS  (PRN):        RADIOLOGY: ***     CXR: mild increase marking   TLC:  OG:  ET tube:        CAM ICU:  ECHO:

## 2022-01-14 NOTE — PATIENT PROFILE ADULT - FALL HARM RISK - HARM RISK INTERVENTIONS
Assistance with ambulation/Assistance OOB with selected safe patient handling equipment/Communicate Risk of Fall with Harm to all staff/Discuss with provider need for PT consult/Monitor gait and stability/Reinforce activity limits and safety measures with patient and family/Tailored Fall Risk Interventions/Visual Cue: Yellow wristband and red socks/Bed in lowest position, wheels locked, appropriate side rails in place/Call bell, personal items and telephone in reach/Instruct patient to call for assistance before getting out of bed or chair/Non-slip footwear when patient is out of bed/[place] to call system/Physically safe environment - no spills, clutter or unnecessary equipment/Purposeful Proactive Rounding/Room/bathroom lighting operational, light cord in reach

## 2022-01-14 NOTE — H&P ADULT - NSHPPHYSICALEXAM_GEN_ALL_CORE
PHYSICAL EXAM:  GENERAL: NAD, well-groomed, well-developed  HEAD:  Atraumatic, Normocephalic  EYES: EOMI, PERRLA, conjunctiva and sclera clear  ENMT: No tonsillar erythema, exudates, or enlargement; Moist mucous membranes, Good dentition, No lesions  NECK: Supple, No JVD, Normal thyroid  NERVOUS SYSTEM:  Alert & Oriented   CHEST/LUNG: Clear to percussion bilaterally; No rales, rhonchi, wheezing, or rubs  HEART: Regular rate and rhythm; No murmurs, rubs, or gallops  ABDOMEN: Soft, Nontender, Nondistended; Bowel sounds present  EXTREMITIES:  2+ Peripheral Pulses, No clubbing, cyanosis, or edema  LYMPH: No lymphadenopathy noted

## 2022-01-14 NOTE — H&P ADULT - NSHPLABSRESULTS_GEN_ALL_CORE
labs  01-13    132<L>  |  84<L>  |  28<H>  ----------------------------<  801<HH>  4.7   |  30  |  1.4    Ca    10.3<H>      13 Jan 2022 19:33    TPro  7.7  /  Alb  4.4  /  TBili  0.8  /  DBili  x   /  AST  16  /  ALT  26  /  AlkPhos  179<H>  01-13                          13.9   10.62 )-----------( 283      ( 13 Jan 2022 19:33 )             41.1

## 2022-01-14 NOTE — CONSULT NOTE ADULT - SUBJECTIVE AND OBJECTIVE BOX
GRACIA JAYASHREE  81y, Female  Allergy: honeydew (Unknown)  latex (Unknown)  penicillins (Unknown)  pickles (Unknown)  shellfish (Unknown)  sulfa drugs (Hives)  Sulfacetamide Sodium-Sulfur (Rash)      CHIEF COMPLAINT: hyperglycemia (2022 11:38)      LOS      HPI:   82 yo female hx of HTN/DM/HLD/CVA/recent admission of COVID last week BIBA from home 2/2 change of mental status and high sugar level. as per son over the phone, patient was taking dexamethasone until 22 at home. Her sugar has been "high" over the past week. They noted patient appeared to be more confused tonight (trying to talk to her mother) so they called EMS and brought patient to ED for evaluation.  Patient denies complaints in ED. denies HA/chest pain/abd pain/n/v/d.   (2022 06:14)      INFECTIOUS DISEASE HISTORY:    PAST MEDICAL & SURGICAL HISTORY:  DM (diabetes mellitus)  24 yr    Breast CA   s/p rt    BP (high blood pressure)  20 yr    Depression    High cholesterol    Bilateral hearing loss, unspecified hearing loss type    CVA (cerebral vascular accident)   lft weakness    H/O total knee replacement, right    History of lumpectomy of right breast    History of cholecystectomy          FAMILY HISTORY  No pertinent family history in first degree relatives    No pertinent family history in first degree relatives    FHx: diabetes mellitus (Mother)        SOCIAL HISTORY  Social History:  no smoking   lives with family   no etoh (2022 06:14)        ROS  General: Denies rigors, nightsweats  HEENT: Denies headache, rhinorrhea, sore throat, eye pain  CV: Denies CP, palpitations  PULM: Denies wheezing, hemoptysis  GI: Denies hematemesis, hematochezia, melena  : Denies discharge, hematuria  MSK: Denies arthralgias, myalgias  SKIN: Denies rash, lesions  NEURO: Denies paresthesias, weakness  PSYCH: Denies depression, anxiety    VITALS:  T(F): 97.9, Max: 97.9 (22 @ 03:04)  HR: 88  BP: 133/65  RR: 18Vital Signs Last 24 Hrs  T(C): 36.6 (2022 03:04), Max: 36.6 (2022 03:04)  T(F): 97.9 (2022 03:04), Max: 97.9 (2022 03:04)  HR: 88 (2022 13:56) (64 - 118)  BP: 133/65 (2022 13:56) (101/57 - 168/80)  BP(mean): --  RR: 18 (2022 13:56) (17 - 19)  SpO2: 96% (2022 13:56) (96% - 99%)    PHYSICAL EXAM:  Gen: NAD, resting in bed  HEENT: Normocephalic, atraumatic  Neck: supple, no lymphadenopathy  CV: Regular rate & regular rhythm  Lungs: decreased BS at bases, no fremitus  Abdomen: Soft, BS present  Ext: Warm, well perfused  Neuro: non focal, awake  Skin: no rash, no erythema  Lines: no phlebitis    TESTS & MEASUREMENTS:                        12.3   15.13 )-----------( 303      ( 2022 10:00 )             36.1         139  |  95<L>  |  26<H>  ----------------------------<  98  3.1<L>   |  32  |  1.3    Ca    9.7      2022 10:00  Phos  1.5       Mg     1.7         TPro  7.7  /  Alb  4.4  /  TBili  0.8  /  DBili  x   /  AST  16  /  ALT  26  /  AlkPhos  179<H>      eGFR if : 44 mL/min/1.73M2 (22 @ 10:00)  eGFR if Non African American: 38 mL/min/1.73M2 (22 @ 10:00)  eGFR if Non African American: 35 mL/min/1.73M2 (22 @ 19:33)  eGFR if African American: 40 mL/min/1.73M2 (22 @ 19:33)    LIVER FUNCTIONS - ( 2022 19:33 )  Alb: 4.4 g/dL / Pro: 7.7 g/dL / ALK PHOS: 179 U/L / ALT: 26 U/L / AST: 16 U/L / GGT: x           Urinalysis Basic - ( 2022 23:50 )    Color: Yellow / Appearance: Clear / S.020 / pH: x  Gluc: x / Ketone: Trace  / Bili: Negative / Urobili: 0.2 mg/dL   Blood: x / Protein: 100 mg/dL / Nitrite: Negative   Leuk Esterase: Negative / RBC: 1-2 /HPF / WBC 3-5 /HPF   Sq Epi: x / Non Sq Epi: Occasional /HPF / Bacteria: Occasional /HPF        Culture - Urine (collected 21 @ 16:57)  Source: Catheterized Catheterized  Final Report (21 @ 19:04):    <10,000 CFU/mL Normal Urogenital Yumiko    Culture - Urine (collected 10-04-21 @ 16:00)  Source: Clean Catch Clean Catch (Midstream)  Final Report (10-05-21 @ 18:32):    No growth    Culture - Urine (collected 21 @ 03:50)  Source: .Urine Clean Catch (Midstream)  Final Report (21 @ 16:34):    No growth        Blood Gas Venous - Lactate: 2.00 mmol/L (22 @ 19:33)      INFECTIOUS DISEASES TESTING  COVID-19 PCR: Detected (22 @ 21:12)  COVID-19 PCR: NotDetec (22 @ 13:20)  Procalcitonin, Serum: 0.21 ng/mL (21 @ 15:56)  Procalcitonin, Serum: 0.14 ng/mL (21 @ 23:50)  COVID-19 PCR: Detected (21 @ 22:00)  COVID-19 PCR: NotDetec (11-10-21 @ 17:17)  COVID-19 PCR: NotDetec (21 @ 17:40)  COVID-19 PCR: NotDetec (10-04-21 @ 13:30)  COVID-19 PCR: NotDetec (21 @ 14:17)      RADIOLOGY & ADDITIONAL TESTS:  I have personally reviewed the last Chest xray  CXR  Xray Chest 1 View- PORTABLE-Urgent:   ACC: [id number] EXAM:  XR CHEST PORTABLE URGENT 1V                          PROCEDURE DATE:  2022          INTERPRETATION:  Clinical History / Reason for exam: Shortness of breath.    Comparison : Chest radiograph 2021.    Technique/Positioning: Single AP chest radiograph.    Findings:    Support devices: None.    Cardiac/mediastinum/hilum: Stable left chest wall dual chamber pacemaker.   Stable cardiomediastinal silhouette.    Lung parenchyma/Pleura: Low lung volumes with right basilar opacity.    Skeleton/soft tissues: Right axillary surgical clips. Degenerative   changes of the osseous structures.    Impression:    Low lung volumes with right basilar opacity.    --- End of Report ---            EWELINA FLORES MD; Attending Radiologist  This document has been electronically signed. 2022  8:50AM (22 @ 22:17)      CT      CARDIOLOGY TESTING      MEDICATIONS  amLODIPine   Tablet 10 Oral daily  atorvastatin 80 Oral at bedtime  chlorhexidine 4% Liquid 1 Topical two times a day  clopidogrel Tablet 75 Oral daily  dextrose 40% Gel 15 Oral once  dextrose 5% + sodium chloride 0.9%. 1000 IV Continuous <Continuous>  dextrose 5%. 1000 IV Continuous <Continuous>  dextrose 5%. 1000 IV Continuous <Continuous>  dextrose 50% Injectable 25 IV Push once  dextrose 50% Injectable 12.5 IV Push once  dextrose 50% Injectable 25 IV Push once  gabapentin 300 Oral two times a day  glucagon  Injectable 1 IntraMuscular once  heparin   Injectable 5000 SubCutaneous every 12 hours  insulin lispro (ADMELOG) corrective regimen sliding scale  SubCutaneous three times a day before meals  insulin lispro Injectable (ADMELOG) 5 SubCutaneous before dinner  levothyroxine 25 Oral daily  nortriptyline 25 Oral at bedtime  pantoprazole    Tablet 40 Oral before breakfast  sertraline 100 Oral daily      Weight  Weight (kg): 167.8 (22 @ 19:33)    ANTIBIOTICS:      ALLERGIES:  honeydew (Unknown)  latex (Unknown)  penicillins (Unknown)  pickles (Unknown)  shellfish (Unknown)  sulfa drugs (Hives)  Sulfacetamide Sodium-Sulfur (Rash)      ASSESSMENT  81y F admitted with HYPERGLYCEMIA; HYPERNATREMIA; DIABETES TYPE 2, UNCONTROLLED   ...      DM (diabetes mellitus)    Breast CA    BP (high blood pressure)    Depression    High cholesterol    Bilateral hearing loss, unspecified hearing loss type    CVA (cerebral vascular accident)        IMPRESSION  #  #Severe Sepsis on admission T<96.8F, T>101F, Pulse>90, Resp Rate>20, WBC>12, wbc<4, Bands>10%, lactic acidosis, metabolic encephalopathy, metabolic acidosis, metabolic alkalosis, JABARI due to suspected Gram negative pneumonia, aspiration pneumonia, pyelonephritis, cystitis, cellulitis, bacteremia  Pt has an acute illness which poses threat to bodily function   #Lactic acidosis  #Hyponatremia   #Obesity BMI (kg/m2): 72.2, 37.8  #DM   #Abx allergy: Sulfacetamide Sodium-Sulfur (Rash)        RECOMMENDATIONS  - F/u blood cultures, urine culture, wound culture  - Continue  - Continue  - Please check vanc trough 30 min prior to the 4th dose     Spectra [id number]     JAYASHREE FAGAN  81y, Female  Allergy: honeydew (Unknown)  latex (Unknown)  penicillins (Unknown)  pickles (Unknown)  shellfish (Unknown)  sulfa drugs (Hives)  Sulfacetamide Sodium-Sulfur (Rash)      CHIEF COMPLAINT: hyperglycemia (2022 11:38)      LOS      HPI:   82 yo female hx of HTN/DM/HLD/CVA/recent admission of COVID last week BIBA from home 2/2 change of mental status and high sugar level. as per son over the phone, patient was taking dexamethasone until 22 at home. Her sugar has been "high" over the past week. They noted patient appeared to be more confused tonight (trying to talk to her mother) so they called EMS and brought patient to ED for evaluation.  Patient denies complaints in ED. denies HA/chest pain/abd pain/n/v/d.   (2022 06:14)      INFECTIOUS DISEASE HISTORY:  History as above.   Recent admission for COVID.   Found to have elevated .   CXR with interstitial infiltrates  He is currently on room air.     PAST MEDICAL & SURGICAL HISTORY:  DM (diabetes mellitus)  24 yr    Breast CA   s/p rt    BP (high blood pressure)  20 yr    Depression    High cholesterol    Bilateral hearing loss, unspecified hearing loss type    CVA (cerebral vascular accident)   lft weakness    H/O total knee replacement, right    History of lumpectomy of right breast    History of cholecystectomy          FAMILY HISTORY  No pertinent family history in first degree relatives    No pertinent family history in first degree relatives    FHx: diabetes mellitus (Mother)        SOCIAL HISTORY  Social History:  no smoking   lives with family   no etoh (2022 06:14)        ROS  General: Denies rigors, nightsweats  HEENT: Denies headache, rhinorrhea, sore throat, eye pain  CV: Denies CP, palpitations  PULM: Denies wheezing, hemoptysis  GI: Denies hematemesis, hematochezia, melena  : Denies discharge, hematuria  MSK: Denies arthralgias, myalgias  SKIN: Denies rash, lesions  NEURO: Denies paresthesias, weakness  PSYCH: Denies depression, anxiety    VITALS:  T(F): 97.9, Max: 97.9 (22 @ 03:04)  HR: 88  BP: 133/65  RR: 18Vital Signs Last 24 Hrs  T(C): 36.6 (2022 03:04), Max: 36.6 (2022 03:04)  T(F): 97.9 (2022 03:04), Max: 97.9 (2022 03:04)  HR: 88 (2022 13:56) (64 - 118)  BP: 133/65 (2022 13:56) (101/57 - 168/80)  BP(mean): --  RR: 18 (2022 13:56) (17 - 19)  SpO2: 96% (2022 13:56) (96% - 99%)    PHYSICAL EXAM:  Gen: NAD, resting in bed  HEENT: Normocephalic, atraumatic  Neck: supple, no lymphadenopathy  CV: Regular rate & regular rhythm  Lungs: decreased BS at bases, no fremitus  Abdomen: Soft, BS present  Ext: Warm, well perfused  Neuro: non focal, awake  Skin: no rash, no erythema  Lines: no phlebitis    TESTS & MEASUREMENTS:                        12.3   15.13 )-----------( 303      ( 2022 10:00 )             36.1         139  |  95<L>  |  26<H>  ----------------------------<  98  3.1<L>   |  32  |  1.3    Ca    9.7      2022 10:00  Phos  1.5       Mg     1.7         TPro  7.7  /  Alb  4.4  /  TBili  0.8  /  DBili  x   /  AST  16  /  ALT  26  /  AlkPhos  179<H>      eGFR if : 44 mL/min/1.73M2 (22 @ 10:00)  eGFR if Non African American: 38 mL/min/1.73M2 (22 @ 10:00)  eGFR if Non African American: 35 mL/min/1.73M2 (22 @ 19:33)  eGFR if African American: 40 mL/min/1.73M2 (22 @ 19:33)    LIVER FUNCTIONS - ( 2022 19:33 )  Alb: 4.4 g/dL / Pro: 7.7 g/dL / ALK PHOS: 179 U/L / ALT: 26 U/L / AST: 16 U/L / GGT: x           Urinalysis Basic - ( 2022 23:50 )    Color: Yellow / Appearance: Clear / S.020 / pH: x  Gluc: x / Ketone: Trace  / Bili: Negative / Urobili: 0.2 mg/dL   Blood: x / Protein: 100 mg/dL / Nitrite: Negative   Leuk Esterase: Negative / RBC: 1-2 /HPF / WBC 3-5 /HPF   Sq Epi: x / Non Sq Epi: Occasional /HPF / Bacteria: Occasional /HPF        Culture - Urine (collected 21 @ 16:57)  Source: Catheterized Catheterized  Final Report (21 @ 19:04):    <10,000 CFU/mL Normal Urogenital Yumiko    Culture - Urine (collected 10-04-21 @ 16:00)  Source: Clean Catch Clean Catch (Midstream)  Final Report (10-05-21 @ 18:32):    No growth    Culture - Urine (collected 21 @ 03:50)  Source: .Urine Clean Catch (Midstream)  Final Report (21 @ 16:34):    No growth        Blood Gas Venous - Lactate: 2.00 mmol/L (22 @ 19:33)      INFECTIOUS DISEASES TESTING  COVID-19 PCR: Detected (22 @ 21:12)  COVID-19 PCR: NotDetec (22 @ 13:20)  Procalcitonin, Serum: 0.21 ng/mL (21 @ 15:56)  Procalcitonin, Serum: 0.14 ng/mL (21 @ 23:50)  COVID-19 PCR: Detected (21 @ 22:00)  COVID-19 PCR: NotDetec (11-10-21 @ 17:17)  COVID-19 PCR: NotDetec (21 @ 17:40)  COVID-19 PCR: NotDetec (10-04-21 @ 13:30)  COVID-19 PCR: NotDetec (21 @ 14:17)      RADIOLOGY & ADDITIONAL TESTS:  I have personally reviewed the last Chest xray  CXR  Xray Chest 1 View- PORTABLE-Urgent:   ACC: [id number] EXAM:  XR CHEST PORTABLE URGENT 1V                          PROCEDURE DATE:  2022          INTERPRETATION:  Clinical History / Reason for exam: Shortness of breath.    Comparison : Chest radiograph 2021.    Technique/Positioning: Single AP chest radiograph.    Findings:    Support devices: None.    Cardiac/mediastinum/hilum: Stable left chest wall dual chamber pacemaker.   Stable cardiomediastinal silhouette.    Lung parenchyma/Pleura: Low lung volumes with right basilar opacity.    Skeleton/soft tissues: Right axillary surgical clips. Degenerative   changes of the osseous structures.    Impression:    Low lung volumes with right basilar opacity.    --- End of Report ---            EWELINA FLORES MD; Attending Radiologist  This document has been electronically signed. 2022  8:50AM (22 @ 22:17)      CT      CARDIOLOGY TESTING      MEDICATIONS  amLODIPine   Tablet 10 Oral daily  atorvastatin 80 Oral at bedtime  chlorhexidine 4% Liquid 1 Topical two times a day  clopidogrel Tablet 75 Oral daily  dextrose 40% Gel 15 Oral once  dextrose 5% + sodium chloride 0.9%. 1000 IV Continuous <Continuous>  dextrose 5%. 1000 IV Continuous <Continuous>  dextrose 5%. 1000 IV Continuous <Continuous>  dextrose 50% Injectable 25 IV Push once  dextrose 50% Injectable 12.5 IV Push once  dextrose 50% Injectable 25 IV Push once  gabapentin 300 Oral two times a day  glucagon  Injectable 1 IntraMuscular once  heparin   Injectable 5000 SubCutaneous every 12 hours  insulin lispro (ADMELOG) corrective regimen sliding scale  SubCutaneous three times a day before meals  insulin lispro Injectable (ADMELOG) 5 SubCutaneous before dinner  levothyroxine 25 Oral daily  nortriptyline 25 Oral at bedtime  pantoprazole    Tablet 40 Oral before breakfast  sertraline 100 Oral daily      Weight  Weight (kg): 167.8 (22 @ 19:33)    ANTIBIOTICS:      ALLERGIES:  honeydew (Unknown)  latex (Unknown)  penicillins (Unknown)  pickles (Unknown)  shellfish (Unknown)  sulfa drugs (Hives)  Sulfacetamide Sodium-Sulfur (Rash)

## 2022-01-14 NOTE — ED ADULT NURSE NOTE - NSIMPLEMENTINTERV_GEN_ALL_ED
Implemented All Fall Risk Interventions:  [place] to call system. Call bell, personal items and telephone within reach. Instruct patient to call for assistance. Room bathroom lighting operational. Non-slip footwear when patient is off stretcher. Physically safe environment: no spills, clutter or unnecessary equipment. Stretcher in lowest position, wheels locked, appropriate side rails in place. Provide visual cue, wrist band, yellow gown, etc. Monitor gait and stability. Monitor for mental status changes and reorient to person, place, and time. Review medications for side effects contributing to fall risk. Reinforce activity limits and safety measures with patient and family.

## 2022-01-14 NOTE — H&P ADULT - NSHPREVIEWOFSYSTEMS_GEN_ALL_CORE
REVIEW OF SYSTEMS  General:	none  Skin/Breast: no rashes   Ophthalmologic: no blurry vision   ENMT:	no thrush   Respiratory and Thorax: no sob   Cardiovascular:	no chest pain   Gastrointestinal:	no diarrhea   Genitourinary:	no dysuria   Musculoskeletal:	 no weakness   Neurological:	no headache

## 2022-01-15 LAB
ALBUMIN SERPL ELPH-MCNC: 3.4 G/DL — LOW (ref 3.5–5.2)
ALP SERPL-CCNC: 102 U/L — SIGNIFICANT CHANGE UP (ref 30–115)
ALT FLD-CCNC: 22 U/L — SIGNIFICANT CHANGE UP (ref 0–41)
ANION GAP SERPL CALC-SCNC: 12 MMOL/L — SIGNIFICANT CHANGE UP (ref 7–14)
AST SERPL-CCNC: 24 U/L — SIGNIFICANT CHANGE UP (ref 0–41)
BASOPHILS # BLD AUTO: 0.05 K/UL — SIGNIFICANT CHANGE UP (ref 0–0.2)
BASOPHILS NFR BLD AUTO: 0.5 % — SIGNIFICANT CHANGE UP (ref 0–1)
BILIRUB SERPL-MCNC: 0.4 MG/DL — SIGNIFICANT CHANGE UP (ref 0.2–1.2)
BUN SERPL-MCNC: 22 MG/DL — HIGH (ref 10–20)
CALCIUM SERPL-MCNC: 9 MG/DL — SIGNIFICANT CHANGE UP (ref 8.5–10.1)
CHLORIDE SERPL-SCNC: 101 MMOL/L — SIGNIFICANT CHANGE UP (ref 98–110)
CO2 SERPL-SCNC: 30 MMOL/L — SIGNIFICANT CHANGE UP (ref 17–32)
CREAT SERPL-MCNC: 1.5 MG/DL — SIGNIFICANT CHANGE UP (ref 0.7–1.5)
EOSINOPHIL # BLD AUTO: 0.3 K/UL — SIGNIFICANT CHANGE UP (ref 0–0.7)
EOSINOPHIL NFR BLD AUTO: 2.8 % — SIGNIFICANT CHANGE UP (ref 0–8)
GLUCOSE BLDC GLUCOMTR-MCNC: 109 MG/DL — HIGH (ref 70–99)
GLUCOSE BLDC GLUCOMTR-MCNC: 143 MG/DL — HIGH (ref 70–99)
GLUCOSE BLDC GLUCOMTR-MCNC: 170 MG/DL — HIGH (ref 70–99)
GLUCOSE BLDC GLUCOMTR-MCNC: 248 MG/DL — HIGH (ref 70–99)
GLUCOSE BLDC GLUCOMTR-MCNC: 263 MG/DL — HIGH (ref 70–99)
GLUCOSE BLDC GLUCOMTR-MCNC: 275 MG/DL — HIGH (ref 70–99)
GLUCOSE BLDC GLUCOMTR-MCNC: 97 MG/DL — SIGNIFICANT CHANGE UP (ref 70–99)
GLUCOSE SERPL-MCNC: 93 MG/DL — SIGNIFICANT CHANGE UP (ref 70–99)
HCT VFR BLD CALC: 39.1 % — SIGNIFICANT CHANGE UP (ref 37–47)
HGB BLD-MCNC: 13 G/DL — SIGNIFICANT CHANGE UP (ref 12–16)
IMM GRANULOCYTES NFR BLD AUTO: 0.7 % — HIGH (ref 0.1–0.3)
LYMPHOCYTES # BLD AUTO: 2.47 K/UL — SIGNIFICANT CHANGE UP (ref 1.2–3.4)
LYMPHOCYTES # BLD AUTO: 22.7 % — SIGNIFICANT CHANGE UP (ref 20.5–51.1)
MCHC RBC-ENTMCNC: 30.3 PG — SIGNIFICANT CHANGE UP (ref 27–31)
MCHC RBC-ENTMCNC: 33.2 G/DL — SIGNIFICANT CHANGE UP (ref 32–37)
MCV RBC AUTO: 91.1 FL — SIGNIFICANT CHANGE UP (ref 81–99)
MONOCYTES # BLD AUTO: 1.16 K/UL — HIGH (ref 0.1–0.6)
MONOCYTES NFR BLD AUTO: 10.7 % — HIGH (ref 1.7–9.3)
NEUTROPHILS # BLD AUTO: 6.82 K/UL — HIGH (ref 1.4–6.5)
NEUTROPHILS NFR BLD AUTO: 62.6 % — SIGNIFICANT CHANGE UP (ref 42.2–75.2)
NRBC # BLD: 0 /100 WBCS — SIGNIFICANT CHANGE UP (ref 0–0)
PLATELET # BLD AUTO: 285 K/UL — SIGNIFICANT CHANGE UP (ref 130–400)
POTASSIUM SERPL-MCNC: 4.4 MMOL/L — SIGNIFICANT CHANGE UP (ref 3.5–5)
POTASSIUM SERPL-SCNC: 4.4 MMOL/L — SIGNIFICANT CHANGE UP (ref 3.5–5)
PROCALCITONIN SERPL-MCNC: 0.35 NG/ML — HIGH (ref 0.02–0.1)
PROT SERPL-MCNC: 5.9 G/DL — LOW (ref 6–8)
RBC # BLD: 4.29 M/UL — SIGNIFICANT CHANGE UP (ref 4.2–5.4)
RBC # FLD: 13.4 % — SIGNIFICANT CHANGE UP (ref 11.5–14.5)
SODIUM SERPL-SCNC: 143 MMOL/L — SIGNIFICANT CHANGE UP (ref 135–146)
WBC # BLD: 10.88 K/UL — HIGH (ref 4.8–10.8)
WBC # FLD AUTO: 10.88 K/UL — HIGH (ref 4.8–10.8)

## 2022-01-15 PROCEDURE — 99232 SBSQ HOSP IP/OBS MODERATE 35: CPT

## 2022-01-15 RX ORDER — INSULIN LISPRO 100/ML
4 VIAL (ML) SUBCUTANEOUS ONCE
Refills: 0 | Status: COMPLETED | OUTPATIENT
Start: 2022-01-15 | End: 2022-01-15

## 2022-01-15 RX ADMIN — Medication 25 MICROGRAM(S): at 05:17

## 2022-01-15 RX ADMIN — CLOPIDOGREL BISULFATE 75 MILLIGRAM(S): 75 TABLET, FILM COATED ORAL at 11:44

## 2022-01-15 RX ADMIN — HEPARIN SODIUM 5000 UNIT(S): 5000 INJECTION INTRAVENOUS; SUBCUTANEOUS at 05:18

## 2022-01-15 RX ADMIN — GABAPENTIN 300 MILLIGRAM(S): 400 CAPSULE ORAL at 17:22

## 2022-01-15 RX ADMIN — SERTRALINE 100 MILLIGRAM(S): 25 TABLET, FILM COATED ORAL at 11:44

## 2022-01-15 RX ADMIN — PANTOPRAZOLE SODIUM 40 MILLIGRAM(S): 20 TABLET, DELAYED RELEASE ORAL at 06:33

## 2022-01-15 RX ADMIN — Medication 5 UNIT(S): at 07:59

## 2022-01-15 RX ADMIN — INSULIN GLARGINE 20 UNIT(S): 100 INJECTION, SOLUTION SUBCUTANEOUS at 22:12

## 2022-01-15 RX ADMIN — HEPARIN SODIUM 5000 UNIT(S): 5000 INJECTION INTRAVENOUS; SUBCUTANEOUS at 17:22

## 2022-01-15 RX ADMIN — GABAPENTIN 300 MILLIGRAM(S): 400 CAPSULE ORAL at 05:18

## 2022-01-15 RX ADMIN — ATORVASTATIN CALCIUM 80 MILLIGRAM(S): 80 TABLET, FILM COATED ORAL at 22:13

## 2022-01-15 RX ADMIN — AMLODIPINE BESYLATE 10 MILLIGRAM(S): 2.5 TABLET ORAL at 05:18

## 2022-01-15 RX ADMIN — Medication 4 UNIT(S): at 01:54

## 2022-01-15 RX ADMIN — NORTRIPTYLINE HYDROCHLORIDE 25 MILLIGRAM(S): 10 CAPSULE ORAL at 22:13

## 2022-01-15 NOTE — PHYSICAL THERAPY INITIAL EVALUATION ADULT - SPECIFY REASON(S)
Attempted to see patient for bedside PT, difficult to arouse pt, when awake, she was very confused and not cooperative, NEFTALI Abarca notified.   Will follow up when appropriate.

## 2022-01-16 LAB
GLUCOSE BLDC GLUCOMTR-MCNC: 117 MG/DL — HIGH (ref 70–99)
GLUCOSE BLDC GLUCOMTR-MCNC: 151 MG/DL — HIGH (ref 70–99)
GLUCOSE BLDC GLUCOMTR-MCNC: 233 MG/DL — HIGH (ref 70–99)
GLUCOSE BLDC GLUCOMTR-MCNC: 338 MG/DL — HIGH (ref 70–99)

## 2022-01-16 PROCEDURE — 99233 SBSQ HOSP IP/OBS HIGH 50: CPT

## 2022-01-16 RX ORDER — GUAIFENESIN/DEXTROMETHORPHAN 600MG-30MG
10 TABLET, EXTENDED RELEASE 12 HR ORAL EVERY 4 HOURS
Refills: 0 | Status: DISCONTINUED | OUTPATIENT
Start: 2022-01-16 | End: 2022-01-18

## 2022-01-16 RX ORDER — ACETAMINOPHEN 500 MG
650 TABLET ORAL EVERY 6 HOURS
Refills: 0 | Status: DISCONTINUED | OUTPATIENT
Start: 2022-01-16 | End: 2022-01-18

## 2022-01-16 RX ORDER — CEFTRIAXONE 500 MG/1
1000 INJECTION, POWDER, FOR SOLUTION INTRAMUSCULAR; INTRAVENOUS EVERY 24 HOURS
Refills: 0 | Status: COMPLETED | OUTPATIENT
Start: 2022-01-16 | End: 2022-01-18

## 2022-01-16 RX ADMIN — NORTRIPTYLINE HYDROCHLORIDE 25 MILLIGRAM(S): 10 CAPSULE ORAL at 22:53

## 2022-01-16 RX ADMIN — INSULIN GLARGINE 20 UNIT(S): 100 INJECTION, SOLUTION SUBCUTANEOUS at 22:53

## 2022-01-16 RX ADMIN — HEPARIN SODIUM 5000 UNIT(S): 5000 INJECTION INTRAVENOUS; SUBCUTANEOUS at 06:25

## 2022-01-16 RX ADMIN — Medication 5 UNIT(S): at 12:03

## 2022-01-16 RX ADMIN — AMLODIPINE BESYLATE 10 MILLIGRAM(S): 2.5 TABLET ORAL at 06:26

## 2022-01-16 RX ADMIN — CHLORHEXIDINE GLUCONATE 1 APPLICATION(S): 213 SOLUTION TOPICAL at 06:26

## 2022-01-16 RX ADMIN — GABAPENTIN 300 MILLIGRAM(S): 400 CAPSULE ORAL at 17:24

## 2022-01-16 RX ADMIN — Medication 10 MILLILITER(S): at 12:02

## 2022-01-16 RX ADMIN — CEFTRIAXONE 100 MILLIGRAM(S): 500 INJECTION, POWDER, FOR SOLUTION INTRAMUSCULAR; INTRAVENOUS at 14:31

## 2022-01-16 RX ADMIN — Medication 8: at 12:03

## 2022-01-16 RX ADMIN — PANTOPRAZOLE SODIUM 40 MILLIGRAM(S): 20 TABLET, DELAYED RELEASE ORAL at 06:25

## 2022-01-16 RX ADMIN — Medication 10 MILLILITER(S): at 17:24

## 2022-01-16 RX ADMIN — Medication 5 UNIT(S): at 07:49

## 2022-01-16 RX ADMIN — SERTRALINE 100 MILLIGRAM(S): 25 TABLET, FILM COATED ORAL at 12:06

## 2022-01-16 RX ADMIN — GABAPENTIN 300 MILLIGRAM(S): 400 CAPSULE ORAL at 06:25

## 2022-01-16 RX ADMIN — CLOPIDOGREL BISULFATE 75 MILLIGRAM(S): 75 TABLET, FILM COATED ORAL at 12:02

## 2022-01-16 RX ADMIN — Medication 4: at 07:50

## 2022-01-16 RX ADMIN — HEPARIN SODIUM 5000 UNIT(S): 5000 INJECTION INTRAVENOUS; SUBCUTANEOUS at 17:24

## 2022-01-16 RX ADMIN — Medication 10 MILLILITER(S): at 22:54

## 2022-01-16 RX ADMIN — Medication 25 MICROGRAM(S): at 06:25

## 2022-01-16 RX ADMIN — ATORVASTATIN CALCIUM 80 MILLIGRAM(S): 80 TABLET, FILM COATED ORAL at 22:52

## 2022-01-17 LAB
-  AMIKACIN: SIGNIFICANT CHANGE UP
-  AMOXICILLIN/CLAVULANIC ACID: SIGNIFICANT CHANGE UP
-  AMPICILLIN/SULBACTAM: SIGNIFICANT CHANGE UP
-  AMPICILLIN: SIGNIFICANT CHANGE UP
-  AZTREONAM: SIGNIFICANT CHANGE UP
-  CEFAZOLIN: SIGNIFICANT CHANGE UP
-  CEFEPIME: SIGNIFICANT CHANGE UP
-  CEFOXITIN: SIGNIFICANT CHANGE UP
-  CEFTRIAXONE: SIGNIFICANT CHANGE UP
-  CIPROFLOXACIN: SIGNIFICANT CHANGE UP
-  ERTAPENEM: SIGNIFICANT CHANGE UP
-  GENTAMICIN: SIGNIFICANT CHANGE UP
-  IMIPENEM: SIGNIFICANT CHANGE UP
-  LEVOFLOXACIN: SIGNIFICANT CHANGE UP
-  MEROPENEM: SIGNIFICANT CHANGE UP
-  NITROFURANTOIN: SIGNIFICANT CHANGE UP
-  PIPERACILLIN/TAZOBACTAM: SIGNIFICANT CHANGE UP
-  TIGECYCLINE: SIGNIFICANT CHANGE UP
-  TOBRAMYCIN: SIGNIFICANT CHANGE UP
-  TRIMETHOPRIM/SULFAMETHOXAZOLE: SIGNIFICANT CHANGE UP
ALBUMIN SERPL ELPH-MCNC: 3.4 G/DL — LOW (ref 3.5–5.2)
ALP SERPL-CCNC: 124 U/L — HIGH (ref 30–115)
ALT FLD-CCNC: 15 U/L — SIGNIFICANT CHANGE UP (ref 0–41)
ANION GAP SERPL CALC-SCNC: 15 MMOL/L — HIGH (ref 7–14)
AST SERPL-CCNC: 16 U/L — SIGNIFICANT CHANGE UP (ref 0–41)
BASOPHILS # BLD AUTO: 0.02 K/UL — SIGNIFICANT CHANGE UP (ref 0–0.2)
BASOPHILS NFR BLD AUTO: 0.2 % — SIGNIFICANT CHANGE UP (ref 0–1)
BILIRUB SERPL-MCNC: 0.4 MG/DL — SIGNIFICANT CHANGE UP (ref 0.2–1.2)
BUN SERPL-MCNC: 25 MG/DL — HIGH (ref 10–20)
CALCIUM SERPL-MCNC: 8.3 MG/DL — LOW (ref 8.5–10.1)
CHLORIDE SERPL-SCNC: 102 MMOL/L — SIGNIFICANT CHANGE UP (ref 98–110)
CO2 SERPL-SCNC: 27 MMOL/L — SIGNIFICANT CHANGE UP (ref 17–32)
CREAT SERPL-MCNC: 1.8 MG/DL — HIGH (ref 0.7–1.5)
CULTURE RESULTS: SIGNIFICANT CHANGE UP
D DIMER BLD IA.RAPID-MCNC: 184 NG/ML DDU — SIGNIFICANT CHANGE UP (ref 0–230)
EOSINOPHIL # BLD AUTO: 0.23 K/UL — SIGNIFICANT CHANGE UP (ref 0–0.7)
EOSINOPHIL NFR BLD AUTO: 2.8 % — SIGNIFICANT CHANGE UP (ref 0–8)
FERRITIN SERPL-MCNC: 542 NG/ML — HIGH (ref 15–150)
GLUCOSE BLDC GLUCOMTR-MCNC: 147 MG/DL — HIGH (ref 70–99)
GLUCOSE BLDC GLUCOMTR-MCNC: 200 MG/DL — HIGH (ref 70–99)
GLUCOSE BLDC GLUCOMTR-MCNC: 203 MG/DL — HIGH (ref 70–99)
GLUCOSE BLDC GLUCOMTR-MCNC: 232 MG/DL — HIGH (ref 70–99)
GLUCOSE SERPL-MCNC: 236 MG/DL — HIGH (ref 70–99)
HCT VFR BLD CALC: 38 % — SIGNIFICANT CHANGE UP (ref 37–47)
HGB BLD-MCNC: 12 G/DL — SIGNIFICANT CHANGE UP (ref 12–16)
IMM GRANULOCYTES NFR BLD AUTO: 0.2 % — SIGNIFICANT CHANGE UP (ref 0.1–0.3)
LDH SERPL L TO P-CCNC: 235 — SIGNIFICANT CHANGE UP (ref 50–242)
LYMPHOCYTES # BLD AUTO: 1.49 K/UL — SIGNIFICANT CHANGE UP (ref 1.2–3.4)
LYMPHOCYTES # BLD AUTO: 18.3 % — LOW (ref 20.5–51.1)
MCHC RBC-ENTMCNC: 29.9 PG — SIGNIFICANT CHANGE UP (ref 27–31)
MCHC RBC-ENTMCNC: 31.6 G/DL — LOW (ref 32–37)
MCV RBC AUTO: 94.8 FL — SIGNIFICANT CHANGE UP (ref 81–99)
METHOD TYPE: SIGNIFICANT CHANGE UP
MONOCYTES # BLD AUTO: 0.62 K/UL — HIGH (ref 0.1–0.6)
MONOCYTES NFR BLD AUTO: 7.6 % — SIGNIFICANT CHANGE UP (ref 1.7–9.3)
NEUTROPHILS # BLD AUTO: 5.75 K/UL — SIGNIFICANT CHANGE UP (ref 1.4–6.5)
NEUTROPHILS NFR BLD AUTO: 70.9 % — SIGNIFICANT CHANGE UP (ref 42.2–75.2)
NRBC # BLD: 0 /100 WBCS — SIGNIFICANT CHANGE UP (ref 0–0)
ORGANISM # SPEC MICROSCOPIC CNT: SIGNIFICANT CHANGE UP
ORGANISM # SPEC MICROSCOPIC CNT: SIGNIFICANT CHANGE UP
PLATELET # BLD AUTO: 197 K/UL — SIGNIFICANT CHANGE UP (ref 130–400)
POTASSIUM SERPL-MCNC: 3.9 MMOL/L — SIGNIFICANT CHANGE UP (ref 3.5–5)
POTASSIUM SERPL-SCNC: 3.9 MMOL/L — SIGNIFICANT CHANGE UP (ref 3.5–5)
PROT SERPL-MCNC: 5.7 G/DL — LOW (ref 6–8)
RBC # BLD: 4.01 M/UL — LOW (ref 4.2–5.4)
RBC # FLD: 13.5 % — SIGNIFICANT CHANGE UP (ref 11.5–14.5)
SODIUM SERPL-SCNC: 144 MMOL/L — SIGNIFICANT CHANGE UP (ref 135–146)
SPECIMEN SOURCE: SIGNIFICANT CHANGE UP
WBC # BLD: 8.13 K/UL — SIGNIFICANT CHANGE UP (ref 4.8–10.8)
WBC # FLD AUTO: 8.13 K/UL — SIGNIFICANT CHANGE UP (ref 4.8–10.8)

## 2022-01-17 PROCEDURE — 99233 SBSQ HOSP IP/OBS HIGH 50: CPT

## 2022-01-17 RX ORDER — SODIUM CHLORIDE 9 MG/ML
1000 INJECTION INTRAMUSCULAR; INTRAVENOUS; SUBCUTANEOUS
Refills: 0 | Status: DISCONTINUED | OUTPATIENT
Start: 2022-01-17 | End: 2022-01-18

## 2022-01-17 RX ADMIN — CLOPIDOGREL BISULFATE 75 MILLIGRAM(S): 75 TABLET, FILM COATED ORAL at 12:37

## 2022-01-17 RX ADMIN — NORTRIPTYLINE HYDROCHLORIDE 25 MILLIGRAM(S): 10 CAPSULE ORAL at 21:55

## 2022-01-17 RX ADMIN — GABAPENTIN 300 MILLIGRAM(S): 400 CAPSULE ORAL at 21:55

## 2022-01-17 RX ADMIN — ATORVASTATIN CALCIUM 80 MILLIGRAM(S): 80 TABLET, FILM COATED ORAL at 21:55

## 2022-01-17 RX ADMIN — Medication 10 MILLILITER(S): at 02:12

## 2022-01-17 RX ADMIN — Medication 25 MICROGRAM(S): at 06:47

## 2022-01-17 RX ADMIN — PANTOPRAZOLE SODIUM 40 MILLIGRAM(S): 20 TABLET, DELAYED RELEASE ORAL at 06:47

## 2022-01-17 RX ADMIN — AMLODIPINE BESYLATE 10 MILLIGRAM(S): 2.5 TABLET ORAL at 06:47

## 2022-01-17 RX ADMIN — Medication 5 UNIT(S): at 12:38

## 2022-01-17 RX ADMIN — CEFTRIAXONE 100 MILLIGRAM(S): 500 INJECTION, POWDER, FOR SOLUTION INTRAMUSCULAR; INTRAVENOUS at 13:57

## 2022-01-17 RX ADMIN — SERTRALINE 100 MILLIGRAM(S): 25 TABLET, FILM COATED ORAL at 12:37

## 2022-01-17 RX ADMIN — Medication 4: at 12:38

## 2022-01-17 RX ADMIN — Medication 10 MILLILITER(S): at 21:56

## 2022-01-17 RX ADMIN — Medication 10 MILLILITER(S): at 06:47

## 2022-01-17 RX ADMIN — GABAPENTIN 300 MILLIGRAM(S): 400 CAPSULE ORAL at 06:47

## 2022-01-17 RX ADMIN — Medication 10 MILLILITER(S): at 13:27

## 2022-01-17 RX ADMIN — HEPARIN SODIUM 5000 UNIT(S): 5000 INJECTION INTRAVENOUS; SUBCUTANEOUS at 21:54

## 2022-01-17 RX ADMIN — INSULIN GLARGINE 20 UNIT(S): 100 INJECTION, SOLUTION SUBCUTANEOUS at 21:54

## 2022-01-17 RX ADMIN — HEPARIN SODIUM 5000 UNIT(S): 5000 INJECTION INTRAVENOUS; SUBCUTANEOUS at 07:17

## 2022-01-17 NOTE — PROGRESS NOTE ADULT - SUBJECTIVE AND OBJECTIVE BOX
82 yo F pmh of HTN, HLD, DM, CVA presents with fever and hypoxia. Patient reports that she has covid, unclear if she was tested. Found to be hypoxic so sent in for evaluation. Found to be hypoxic to the 70s on room air, improved with nasal canula. Reports cough, congestion, sneezing and runny nose since yesterday. no n/v/d. no chest pain, no shortness of breath, no palpitations. no sick contacts. patient is vaccinated. pmd is Dr. Saldivar.    Today:  Seen at bedside, no new complaints.          REVIEW OF SYSTEMS:  No new complaints        MEDICATIONS  (STANDING):  amLODIPine   Tablet 10 milliGRAM(s) Oral daily  atorvastatin 80 milliGRAM(s) Oral at bedtime  chlorhexidine 4% Liquid 1 Application(s) Topical two times a day  clopidogrel Tablet 75 milliGRAM(s) Oral daily  dextrose 40% Gel 15 Gram(s) Oral once  dextrose 5%. 1000 milliLiter(s) (50 mL/Hr) IV Continuous <Continuous>  dextrose 5%. 1000 milliLiter(s) (100 mL/Hr) IV Continuous <Continuous>  dextrose 50% Injectable 25 Gram(s) IV Push once  dextrose 50% Injectable 12.5 Gram(s) IV Push once  dextrose 50% Injectable 25 Gram(s) IV Push once  gabapentin 300 milliGRAM(s) Oral two times a day  glucagon  Injectable 1 milliGRAM(s) IntraMuscular once  heparin   Injectable 5000 Unit(s) SubCutaneous every 12 hours  insulin glargine Injectable (LANTUS) 20 Unit(s) SubCutaneous at bedtime  insulin lispro (ADMELOG) corrective regimen sliding scale   SubCutaneous three times a day before meals  insulin lispro Injectable (ADMELOG) 5 Unit(s) SubCutaneous before breakfast  insulin lispro Injectable (ADMELOG) 5 Unit(s) SubCutaneous before lunch  insulin lispro Injectable (ADMELOG) 5 Unit(s) SubCutaneous before dinner  levothyroxine 25 MICROGram(s) Oral daily  nortriptyline 25 milliGRAM(s) Oral at bedtime  pantoprazole    Tablet 40 milliGRAM(s) Oral before breakfast  sertraline 100 milliGRAM(s) Oral daily        Allergies  honeydew (Unknown)  latex (Unknown)  penicillins (Unknown)  pickles (Unknown)  shellfish (Unknown)  sulfa drugs (Hives)  Sulfacetamide Sodium-Sulfur (Rash)      FAMILY HISTORY:  FHx: diabetes mellitus (Mother)        Vital Signs Last 24 Hrs  T(C): 36.1 (15 [place] 2022 14:20), Max: 36.4 (15 [place] 2022 04:38)  T(F): 96.9 (15 [place] 2022 14:20), Max: 97.5 (15 [place] 2022 04:38)  HR: 116 (15 [place] 2022 14:20) (82 - 116)  BP: 112/67 (15 [place] 2022 14:20) (112/67 - 126/82)  RR: 16 (15 [place] 2022 14:20) (16 - 18)  SpO2: 95% (15 [place] 2022 09:54) (95% - 95%)    PHYSICAL EXAM:  GENERAL:  82y/o Female NAD, resting comfortably.  HEAD:  Atraumatic, Normocephalic  EYES: EOMI, PERRLA, conjunctiva and sclera clear  NECK: Supple, No JVD, no cervical lymphadenopathy, non-tender  CHEST/LUNG: shallow breathing; No wheeze, rhonchi, or rales  HEART: Regular rate and rhythm; S1&S2  ABDOMEN: Soft, Nontender, Nondistended x 4 quadrants; Bowel sounds present  EXTREMITIES:   Peripheral Pulses Present, No clubbing, no cyanosis, or no edema, no calf tenderness  PSYCH: AAOx3, cooperative, appropriate      LABS:                        13.0   10.88 )-----------( 285      ( 15 [place] 2022 12:40 )             39.1     -15    143  |  101  |  22<H>  ----------------------------<  93  4.4   |  30  |  1.5    Ca    9.0      15 [place] 2022 12:40  Phos  1.5     -  Mg     1.7     -    TPro  5.9<L>  /  Alb  3.4<L>  /  TBili  0.4  /  DBili  x   /  AST  24  /  ALT  22  /  AlkPhos  102  -15      Urinalysis Basic - ( 2022 23:50 )    Color: Yellow / Appearance: Clear / S.020 / pH: x  Gluc: x / Ketone: Trace  / Bili: Negative / Urobili: 0.2 mg/dL   Blood: x / Protein: 100 mg/dL / Nitrite: Negative   Leuk Esterase: Negative / RBC: 1-2 /HPF / WBC 3-5 /HPF   Sq Epi: x / Non Sq Epi: Occasional /HPF / Bacteria: Occasional /HPF    
80 yo F pmh of HTN, HLD, DM, CVA presents with fever and hypoxia. Patient reports that she has covid, unclear if she was tested. Found to be hypoxic so sent in for evaluation. Found to be hypoxic to the 70s on room air, improved with nasal canula. Reports cough, congestion, sneezing and runny nose since yesterday. no n/v/d. no chest pain, no shortness of breath, no palpitations. no sick contacts. patient is vaccinated. pmd is Dr. Saldivar    Today:  Seen at bedside, no new complaints.          REVIEW OF SYSTEMS:  No new complaints    MEDICATIONS  (STANDING):  amLODIPine   Tablet 10 milliGRAM(s) Oral daily  atorvastatin 80 milliGRAM(s) Oral at bedtime  cefTRIAXone   IVPB 1000 milliGRAM(s) IV Intermittent every 24 hours  chlorhexidine 4% Liquid 1 Application(s) Topical two times a day  clopidogrel Tablet 75 milliGRAM(s) Oral daily  dextrose 40% Gel 15 Gram(s) Oral once  dextrose 5%. 1000 milliLiter(s) (50 mL/Hr) IV Continuous <Continuous>  dextrose 5%. 1000 milliLiter(s) (100 mL/Hr) IV Continuous <Continuous>  dextrose 50% Injectable 25 Gram(s) IV Push once  dextrose 50% Injectable 12.5 Gram(s) IV Push once  dextrose 50% Injectable 25 Gram(s) IV Push once  gabapentin 300 milliGRAM(s) Oral two times a day  glucagon  Injectable 1 milliGRAM(s) IntraMuscular once  guaifenesin/dextromethorphan Oral Liquid 10 milliLiter(s) Oral every 4 hours  heparin   Injectable 5000 Unit(s) SubCutaneous every 12 hours  insulin glargine Injectable (LANTUS) 20 Unit(s) SubCutaneous at bedtime  insulin lispro (ADMELOG) corrective regimen sliding scale   SubCutaneous three times a day before meals  insulin lispro Injectable (ADMELOG) 5 Unit(s) SubCutaneous before breakfast  insulin lispro Injectable (ADMELOG) 5 Unit(s) SubCutaneous before lunch  insulin lispro Injectable (ADMELOG) 5 Unit(s) SubCutaneous before dinner  levothyroxine 25 MICROGram(s) Oral daily  nortriptyline 25 milliGRAM(s) Oral at bedtime  pantoprazole    Tablet 40 milliGRAM(s) Oral before breakfast  sertraline 100 milliGRAM(s) Oral daily  sodium chloride 0.9%. 1000 milliLiter(s) (80 mL/Hr) IV Continuous <Continuous>    MEDICATIONS  (PRN):  acetaminophen     Tablet .. 650 milliGRAM(s) Oral every 6 hours PRN Temp greater or equal to 38C (100.4F)      Allergies  honeydew (Unknown)  latex (Unknown)  penicillins (Unknown)  pickles (Unknown)  shellfish (Unknown)  sulfa drugs (Hives)  Sulfacetamide Sodium-Sulfur (Rash)    FAMILY HISTORY:  FHx: diabetes mellitus (Mother)        Vital Signs Last 24 Hrs  T(C): 36.8 (17 Jan 2022 14:15), Max: 37.4 (16 Jan 2022 21:15)  T(F): 98.3 (17 Jan 2022 14:15), Max: 99.4 (16 Jan 2022 21:15)  HR: 93 (17 Jan 2022 14:15) (93 - 107)  BP: 126/77 (17 Jan 2022 14:15) (120/59 - 126/77)  RR: 16 (17 Jan 2022 14:15) (16 - 16)  SpO2: 95% (17 Jan 2022 08:17) (95% - 97%)    PHYSICAL EXAM:  GENERAL:  82y/o Female NAD, resting comfortably.  HEAD:  Atraumatic, Normocephalic  EYES: EOMI, PERRLA, conjunctiva and sclera clear  NECK: Supple, No JVD, no cervical lymphadenopathy, non-tender  CHEST/LUNG: shallow breathing; No wheeze, rhonchi, or rales  HEART: Regular rate and rhythm; S1&S2  ABDOMEN: Soft, Nontender, Nondistended x 4 quadrants; Bowel sounds present  EXTREMITIES:   Peripheral Pulses Present, No clubbing, no cyanosis, or no edema, no calf tenderness  PSYCH: AAOx3, cooperative, appropriate      LABS:                        12.0   8.13  )-----------( 197      ( 17 Jan 2022 09:39 )             38.0     01-17    144  |  102  |  25<H>  ----------------------------<  236<H>  3.9   |  27  |  1.8<H>    Ca    8.3<L>      17 Jan 2022 09:39    TPro  5.7<L>  /  Alb  3.4<L>  /  TBili  0.4  /  DBili  x   /  AST  16  /  ALT  15  /  AlkPhos  124<H>  01-17          
80 yo F pmh of HTN, HLD, DM, CVA presents with fever and hypoxia. Patient reports that she has covid, unclear if she was tested. Found to be hypoxic so sent in for evaluation. Found to be hypoxic to the 70s on room air, improved with nasal canula. Reports cough, congestion, sneezing and runny nose since yesterday. no n/v/d. no chest pain, no shortness of breath, no palpitations. no sick contacts. patient is vaccinated. pmd is Dr. Saldivar.    Today:  Seen at bedside, no new complaints.          REVIEW OF SYSTEMS:  No new complaints.      MEDICATIONS  (STANDING):  amLODIPine   Tablet 10 milliGRAM(s) Oral daily  atorvastatin 80 milliGRAM(s) Oral at bedtime  cefTRIAXone   IVPB 1000 milliGRAM(s) IV Intermittent every 24 hours  chlorhexidine 4% Liquid 1 Application(s) Topical two times a day  clopidogrel Tablet 75 milliGRAM(s) Oral daily  dextrose 40% Gel 15 Gram(s) Oral once  dextrose 5%. 1000 milliLiter(s) (50 mL/Hr) IV Continuous <Continuous>  dextrose 5%. 1000 milliLiter(s) (100 mL/Hr) IV Continuous <Continuous>  dextrose 50% Injectable 25 Gram(s) IV Push once  dextrose 50% Injectable 12.5 Gram(s) IV Push once  dextrose 50% Injectable 25 Gram(s) IV Push once  gabapentin 300 milliGRAM(s) Oral two times a day  glucagon  Injectable 1 milliGRAM(s) IntraMuscular once  guaifenesin/dextromethorphan Oral Liquid 10 milliLiter(s) Oral every 4 hours  heparin   Injectable 5000 Unit(s) SubCutaneous every 12 hours  insulin glargine Injectable (LANTUS) 20 Unit(s) SubCutaneous at bedtime  insulin lispro (ADMELOG) corrective regimen sliding scale   SubCutaneous three times a day before meals  insulin lispro Injectable (ADMELOG) 5 Unit(s) SubCutaneous before breakfast  insulin lispro Injectable (ADMELOG) 5 Unit(s) SubCutaneous before lunch  insulin lispro Injectable (ADMELOG) 5 Unit(s) SubCutaneous before dinner  levothyroxine 25 MICROGram(s) Oral daily  nortriptyline 25 milliGRAM(s) Oral at bedtime  pantoprazole    Tablet 40 milliGRAM(s) Oral before breakfast  sertraline 100 milliGRAM(s) Oral daily    MEDICATIONS  (PRN):  acetaminophen     Tablet .. 650 milliGRAM(s) Oral every 6 hours PRN Temp greater or equal to 38C (100.4F)      Allergies  honeydew (Unknown)  latex (Unknown)  penicillins (Unknown)  pickles (Unknown)  shellfish (Unknown)  sulfa drugs (Hives)  Sulfacetamide Sodium-Sulfur (Rash)        FAMILY HISTORY:  FHx: diabetes mellitus (Mother)        Vital Signs Last 24 Hrs  T(C): 36.8 (16 Jan 2022 14:07), Max: 37.9 (16 Jan 2022 05:11)  T(F): 98.3 (16 Jan 2022 14:07), Max: 100.3 (16 Jan 2022 05:11)  HR: 92 (16 Jan 2022 14:07) (87 - 114)  BP: 92/55 (16 Jan 2022 14:07) (92/55 - 136/77)  RR: 16 (16 Jan 2022 14:07) (16 - 16)  SpO2: 97% (15 [place] 2022 22:05) (97% - 97%)    PHYSICAL EXAM:  GENERAL:  82y/o Female NAD, resting comfortably.  HEAD:  Atraumatic, Normocephalic  EYES: EOMI, PERRLA, conjunctiva and sclera clear  NECK: Supple, No JVD, no cervical lymphadenopathy, non-tender  CHEST/LUNG: shallow breathing; No wheeze, rhonchi, or rales  HEART: Regular rate and rhythm; S1&S2  ABDOMEN: Soft, Nontender, Nondistended x 4 quadrants; Bowel sounds present  EXTREMITIES:   Peripheral Pulses Present, No clubbing, no cyanosis, or no edema, no calf tenderness  PSYCH: AAOx3, cooperative, appropriate      LABS:                        13.0   10.88 )-----------( 285      ( 15 [place] 2022 12:40 )             39.1     01-15    143  |  101  |  22<H>  ----------------------------<  93  4.4   |  30  |  1.5    Ca    9.0      15 [place] 2022 12:40    TPro  5.9<L>  /  Alb  3.4<L>  /  TBili  0.4  /  DBili  x   /  AST  24  /  ALT  22  /  AlkPhos  102  01-15

## 2022-01-17 NOTE — PROGRESS NOTE ADULT - ASSESSMENT
80 yo F pmh of HTN, HLD, DM, CVA presents with fever and hypoxia. Patient reports that she has covid, unclear if she was tested. Found to be hypoxic so sent in for evaluation. Found to be hypoxic to the 70s on room air, improved with nasal canula. Reports cough, congestion, sneezing and runny nose since yesterday. no n/v/d. no chest pain, no shortness of breath, no palpitations. no sick contacts. patient is vaccinated. pmd is Dr. Saldivar    Hyperglycemia:  -Downgraded from ICU, was likely secondary to dexa being taken at home for COVID 19  -Now euglycemic, continue home regimen    Acute Cystitis without Hematuria:  -UCx- GNR  -Blood Cx NGTD  -Started Rocephin    COVID 19 PNA:  -Finished Remdesivir, Dexa last admission  -ID consult appreciated  -Off O2    HTN:  Continue norvasc  -DASH diet    DM2 :   Continue current regimen  CHO diet    Chronic systolic CHF:  -Continue Lasix    Hypothyroid:   Continue synthroid    Neuropathy:   Continue neurontin    Depression:   Continue sertraline
80 yo F pmh of HTN, HLD, DM, CVA presents with fever and hypoxia. Patient reports that she has covid, unclear if she was tested. Found to be hypoxic so sent in for evaluation. Found to be hypoxic to the 70s on room air, improved with nasal canula. Reports cough, congestion, sneezing and runny nose since yesterday. no n/v/d. no chest pain, no shortness of breath, no palpitations. no sick contacts. patient is vaccinated. pmd is Dr. Saldivar    Hyperglycemia:  -Downgraded from ICU, was likely secondary to dexa being taken at home for COVID 19  -Now euglycemic, continue home regimen    COVID 19 PNA:  -Finished Remdesivir, Dexa last admission  -ID consult appreciated  -Off O2    HTN:  Continue norvasc  -DASH diet    DM2 :   Continue current regimen  CHO diet    Chronic systolic CHF:  -Continue Lasix    Hypothyroid:   Continue synthroid    Neuropathy:   Continue neurontin    Depression:   Continue sertraline
82 yo F pmh of HTN, HLD, DM, CVA presents with fever and hypoxia. Patient reports that she has covid, unclear if she was tested. Found to be hypoxic so sent in for evaluation. Found to be hypoxic to the 70s on room air, improved with nasal canula. Reports cough, congestion, sneezing and runny nose since yesterday. no n/v/d. no chest pain, no shortness of breath, no palpitations. no sick contacts. patient is vaccinated. pmd is Dr. Saldivar    Hyperglycemia:  -Downgraded from ICU, was likely secondary to dexa being taken at home for COVID 19  -Now euglycemic, continue home regimen    Acute Cystitis without Hematuria:  -UCx- Kleb, follow sens  -Blood Cx NGTD  -Started Rocephin    COVID 19 PNA:  -Finished Remdesivir, Dexa last admission  -ID consult appreciated  -Off O2    HTN:  Continue norvasc  -DASH diet    DM2 :   Continue current regimen  CHO diet    Chronic systolic CHF:  -Continue Lasix    Hypothyroid:   Continue synthroid    Neuropathy:   Continue neurontin    Depression:   Continue sertraline

## 2022-01-18 ENCOUNTER — TRANSCRIPTION ENCOUNTER (OUTPATIENT)
Age: 82
End: 2022-01-18

## 2022-01-18 VITALS
HEART RATE: 82 BPM | SYSTOLIC BLOOD PRESSURE: 134 MMHG | RESPIRATION RATE: 18 BRPM | TEMPERATURE: 97 F | DIASTOLIC BLOOD PRESSURE: 66 MMHG

## 2022-01-18 LAB
ALBUMIN SERPL ELPH-MCNC: 3.4 G/DL — LOW (ref 3.5–5.2)
ALP SERPL-CCNC: 119 U/L — HIGH (ref 30–115)
ALT FLD-CCNC: 16 U/L — SIGNIFICANT CHANGE UP (ref 0–41)
ANION GAP SERPL CALC-SCNC: 13 MMOL/L — SIGNIFICANT CHANGE UP (ref 7–14)
AST SERPL-CCNC: 15 U/L — SIGNIFICANT CHANGE UP (ref 0–41)
BASOPHILS # BLD AUTO: 0.04 K/UL — SIGNIFICANT CHANGE UP (ref 0–0.2)
BASOPHILS NFR BLD AUTO: 0.6 % — SIGNIFICANT CHANGE UP (ref 0–1)
BILIRUB SERPL-MCNC: 0.4 MG/DL — SIGNIFICANT CHANGE UP (ref 0.2–1.2)
BUN SERPL-MCNC: 19 MG/DL — SIGNIFICANT CHANGE UP (ref 10–20)
CALCIUM SERPL-MCNC: 8.1 MG/DL — LOW (ref 8.5–10.1)
CHLORIDE SERPL-SCNC: 105 MMOL/L — SIGNIFICANT CHANGE UP (ref 98–110)
CO2 SERPL-SCNC: 25 MMOL/L — SIGNIFICANT CHANGE UP (ref 17–32)
CREAT SERPL-MCNC: 1.3 MG/DL — SIGNIFICANT CHANGE UP (ref 0.7–1.5)
EOSINOPHIL # BLD AUTO: 0.22 K/UL — SIGNIFICANT CHANGE UP (ref 0–0.7)
EOSINOPHIL NFR BLD AUTO: 3.4 % — SIGNIFICANT CHANGE UP (ref 0–8)
GLUCOSE BLDC GLUCOMTR-MCNC: 134 MG/DL — HIGH (ref 70–99)
GLUCOSE BLDC GLUCOMTR-MCNC: 136 MG/DL — HIGH (ref 70–99)
GLUCOSE SERPL-MCNC: 132 MG/DL — HIGH (ref 70–99)
HCT VFR BLD CALC: 36.8 % — LOW (ref 37–47)
HGB BLD-MCNC: 11.7 G/DL — LOW (ref 12–16)
IMM GRANULOCYTES NFR BLD AUTO: 0.5 % — HIGH (ref 0.1–0.3)
LYMPHOCYTES # BLD AUTO: 1.36 K/UL — SIGNIFICANT CHANGE UP (ref 1.2–3.4)
LYMPHOCYTES # BLD AUTO: 20.8 % — SIGNIFICANT CHANGE UP (ref 20.5–51.1)
MCHC RBC-ENTMCNC: 30.4 PG — SIGNIFICANT CHANGE UP (ref 27–31)
MCHC RBC-ENTMCNC: 31.8 G/DL — LOW (ref 32–37)
MCV RBC AUTO: 95.6 FL — SIGNIFICANT CHANGE UP (ref 81–99)
MONOCYTES # BLD AUTO: 0.54 K/UL — SIGNIFICANT CHANGE UP (ref 0.1–0.6)
MONOCYTES NFR BLD AUTO: 8.3 % — SIGNIFICANT CHANGE UP (ref 1.7–9.3)
NEUTROPHILS # BLD AUTO: 4.35 K/UL — SIGNIFICANT CHANGE UP (ref 1.4–6.5)
NEUTROPHILS NFR BLD AUTO: 66.4 % — SIGNIFICANT CHANGE UP (ref 42.2–75.2)
NRBC # BLD: 0 /100 WBCS — SIGNIFICANT CHANGE UP (ref 0–0)
PLATELET # BLD AUTO: 191 K/UL — SIGNIFICANT CHANGE UP (ref 130–400)
POTASSIUM SERPL-MCNC: 4.2 MMOL/L — SIGNIFICANT CHANGE UP (ref 3.5–5)
POTASSIUM SERPL-SCNC: 4.2 MMOL/L — SIGNIFICANT CHANGE UP (ref 3.5–5)
PROT SERPL-MCNC: 5.9 G/DL — LOW (ref 6–8)
RBC # BLD: 3.85 M/UL — LOW (ref 4.2–5.4)
RBC # FLD: 13.4 % — SIGNIFICANT CHANGE UP (ref 11.5–14.5)
SODIUM SERPL-SCNC: 143 MMOL/L — SIGNIFICANT CHANGE UP (ref 135–146)
WBC # BLD: 6.54 K/UL — SIGNIFICANT CHANGE UP (ref 4.8–10.8)
WBC # FLD AUTO: 6.54 K/UL — SIGNIFICANT CHANGE UP (ref 4.8–10.8)

## 2022-01-18 PROCEDURE — 99239 HOSP IP/OBS DSCHRG MGMT >30: CPT

## 2022-01-18 RX ORDER — CIPROFLOXACIN LACTATE 400MG/40ML
1 VIAL (ML) INTRAVENOUS
Qty: 2 | Refills: 0
Start: 2022-01-18 | End: 2022-01-19

## 2022-01-18 RX ADMIN — Medication 25 MICROGRAM(S): at 06:18

## 2022-01-18 RX ADMIN — CEFTRIAXONE 100 MILLIGRAM(S): 500 INJECTION, POWDER, FOR SOLUTION INTRAMUSCULAR; INTRAVENOUS at 13:16

## 2022-01-18 RX ADMIN — CHLORHEXIDINE GLUCONATE 1 APPLICATION(S): 213 SOLUTION TOPICAL at 06:19

## 2022-01-18 RX ADMIN — Medication 5 UNIT(S): at 08:04

## 2022-01-18 RX ADMIN — SODIUM CHLORIDE 80 MILLILITER(S): 9 INJECTION INTRAMUSCULAR; INTRAVENOUS; SUBCUTANEOUS at 01:22

## 2022-01-18 RX ADMIN — PANTOPRAZOLE SODIUM 40 MILLIGRAM(S): 20 TABLET, DELAYED RELEASE ORAL at 06:18

## 2022-01-18 RX ADMIN — GABAPENTIN 300 MILLIGRAM(S): 400 CAPSULE ORAL at 06:18

## 2022-01-18 RX ADMIN — Medication 5 UNIT(S): at 11:37

## 2022-01-18 RX ADMIN — Medication 10 MILLILITER(S): at 13:16

## 2022-01-18 RX ADMIN — Medication 10 MILLILITER(S): at 06:19

## 2022-01-18 RX ADMIN — HEPARIN SODIUM 5000 UNIT(S): 5000 INJECTION INTRAVENOUS; SUBCUTANEOUS at 06:18

## 2022-01-18 RX ADMIN — SERTRALINE 100 MILLIGRAM(S): 25 TABLET, FILM COATED ORAL at 11:36

## 2022-01-18 RX ADMIN — AMLODIPINE BESYLATE 10 MILLIGRAM(S): 2.5 TABLET ORAL at 06:17

## 2022-01-18 RX ADMIN — Medication 10 MILLILITER(S): at 11:36

## 2022-01-18 RX ADMIN — CLOPIDOGREL BISULFATE 75 MILLIGRAM(S): 75 TABLET, FILM COATED ORAL at 11:36

## 2022-01-18 NOTE — PHYSICAL THERAPY INITIAL EVALUATION ADULT - TRANSFER SAFETY CONCERNS NOTED: SIT/STAND, REHAB EVAL
decreased safety awareness/decreased proprioception/decreased sequencing ability/decreased step length

## 2022-01-18 NOTE — DISCHARGE NOTE NURSING/CASE MANAGEMENT/SOCIAL WORK - NSDCPEFALRISK_GEN_ALL_CORE
For information on Fall & Injury Prevention, visit: [email or web address] OR  [email or web address] OR  https://www.cdc.gov/steadi/patient.html

## 2022-01-18 NOTE — DISCHARGE NOTE NURSING/CASE MANAGEMENT/SOCIAL WORK - NSDCVIVACCINE_GEN_ALL_CORE_FT
influenza, high-dose, quadrivalent; 12-Nov-2021 18:58; Chris Martines (RN); [employer]; [id number] (Exp. Date: 30-Jun-2022); IntraMuscular; Deltoid Left.; 0.7 milliLiter(s); VIS (VIS Published: 06-Aug-2021, VIS Presented: 12-Nov-2021);

## 2022-01-18 NOTE — PHYSICAL THERAPY INITIAL EVALUATION ADULT - GENERAL OBSERVATIONS, REHAB EVAL
9:10-9:35 Chart reviewed. Patient available to be seen for physical therapy, denies pain, confirmed with RN.  Pt rec'd in bed +Primafit, +IV in NAD.

## 2022-01-18 NOTE — DISCHARGE NOTE PROVIDER - HOSPITAL COURSE
80 yo F pmh of HTN, HLD, DM, CVA presents with fever and hypoxia. Patient reports that she has covid, unclear if she was tested. Found to be hypoxic so sent in for evaluation. Found to be hypoxic to the 70s on room air, improved with nasal canula. Reports cough, congestion, sneezing and runny nose since yesterday. no n/v/d. no chest pain, no shortness of breath, no palpitations. no sick contacts. patient is vaccinated. pmd is Dr. Saldivar    Hyperglycemia:  -Downgraded from ICU, was likely secondary to dexa being taken at home for COVID 19  -Now euglycemic, continue home regimen    Acute Cystitis without Hematuria:  -UCx- Kleb, sens to Cipro, will DC on PO Cipro  -Blood Cx NGTD    COVID 19 PNA:  -Finished Remdesivir, Dexa last admission  -ID consult appreciated  -Off O2

## 2022-01-18 NOTE — DISCHARGE NOTE NURSING/CASE MANAGEMENT/SOCIAL WORK - PATIENT PORTAL LINK FT
You can access the FollowMyHealth Patient Portal offered by [facility] by registering at the following website: [email or web address]. By joining [employer]’s FollowMyHealth portal, you will also be able to view your health information using other applications (apps) compatible with our system.

## 2022-01-18 NOTE — DISCHARGE NOTE PROVIDER - NSDCCPCAREPLAN_GEN_ALL_CORE_FT
PRINCIPAL DISCHARGE DIAGNOSIS  Diagnosis: Hyperglycemia  Assessment and Plan of Treatment: This was likely due to the steroids you were on for COVID.  Please resume your home treatment for Diabetes.      SECONDARY DISCHARGE DIAGNOSES  Diagnosis: Acute UTI  Assessment and Plan of Treatment: Please finish antibiotic (ciprofloxicin) as prescribed and follow with your PMD.

## 2022-01-18 NOTE — DISCHARGE NOTE PROVIDER - NSDCMRMEDTOKEN_GEN_ALL_CORE_FT
amLODIPine 10 mg oral tablet: 1 tab(s) orally once a day  atorvastatin 80 mg oral tablet: 1 tab(s) orally once a day (at bedtime)  Cipro 500 mg oral tablet: 1 tab(s) orally once a day   clopidogrel 75 mg oral tablet: 1 tab(s) orally once a day  Ecotrin Adult Low Strength 81 mg oral delayed release tablet: 2 tab(s) orally once a day  exemestane 25 mg oral tablet: 1 tab(s) orally once a day  ferrous sulfate 324 mg (65 mg elemental iron) oral tablet: 1  orally 2 times a day  gabapentin 300 mg oral capsule: orally 2 times a day  HumaLOG 100 units/mL subcutaneous solution: Per insulin sliding scale at home  Lasix 20 mg oral tablet: 1 tab(s) orally once a day  Levemir: 30  subcutaneous once a day (at bedtime)  levothyroxine 25 mcg (0.025 mg) oral tablet: 1 tab(s) orally once a day  nortriptyline 25 mg oral capsule: 1 cap(s) orally once a day (at bedtime)  pantoprazole 40 mg oral delayed release tablet: 1 tab(s) orally once a day (before a meal)  senna oral tablet: 2 tab(s) orally once a day (at bedtime)- PRN for constipation- OTC is okay  sertraline 100 mg oral tablet: 1 tab(s) orally once a day  temazepam 30 mg oral capsule: 1 cap(s) orally once a day (at bedtime)

## 2022-01-18 NOTE — DISCHARGE NOTE PROVIDER - CARE PROVIDER_API CALL
Ariel Macias)  Internal Medicine  [address] [place] [address]  Phone: [phone]  Fax: [phone]  Follow Up Time:

## 2022-01-19 ENCOUNTER — TRANSCRIPTION ENCOUNTER (OUTPATIENT)
Age: 82
End: 2022-01-19

## 2022-01-19 LAB
CULTURE RESULTS: SIGNIFICANT CHANGE UP
SPECIMEN SOURCE: SIGNIFICANT CHANGE UP

## 2022-02-01 DIAGNOSIS — Z86.73 PERSONAL HISTORY OF TRANSIENT ISCHEMIC ATTACK (TIA), AND CEREBRAL INFARCTION WITHOUT RESIDUAL DEFICITS: ICD-10-CM

## 2022-02-01 DIAGNOSIS — Z79.02 LONG TERM (CURRENT) USE OF ANTITHROMBOTICS/ANTIPLATELETS: ICD-10-CM

## 2022-02-01 DIAGNOSIS — Z91.013 ALLERGY TO SEAFOOD: ICD-10-CM

## 2022-02-01 DIAGNOSIS — F32.A DEPRESSION, UNSPECIFIED: ICD-10-CM

## 2022-02-01 DIAGNOSIS — B96.1 KLEBSIELLA PNEUMONIAE [K. PNEUMONIAE] AS THE CAUSE OF DISEASES CLASSIFIED ELSEWHERE: ICD-10-CM

## 2022-02-01 DIAGNOSIS — E11.65 TYPE 2 DIABETES MELLITUS WITH HYPERGLYCEMIA: ICD-10-CM

## 2022-02-01 DIAGNOSIS — I11.0 HYPERTENSIVE HEART DISEASE WITH HEART FAILURE: ICD-10-CM

## 2022-02-01 DIAGNOSIS — Z79.890 HORMONE REPLACEMENT THERAPY: ICD-10-CM

## 2022-02-01 DIAGNOSIS — E87.0 HYPEROSMOLALITY AND HYPERNATREMIA: ICD-10-CM

## 2022-02-01 DIAGNOSIS — J12.82 PNEUMONIA DUE TO CORONAVIRUS DISEASE 2019: ICD-10-CM

## 2022-02-01 DIAGNOSIS — E78.00 PURE HYPERCHOLESTEROLEMIA, UNSPECIFIED: ICD-10-CM

## 2022-02-01 DIAGNOSIS — Z88.2 ALLERGY STATUS TO SULFONAMIDES: ICD-10-CM

## 2022-02-01 DIAGNOSIS — Z88.0 ALLERGY STATUS TO PENICILLIN: ICD-10-CM

## 2022-02-01 DIAGNOSIS — Z85.3 PERSONAL HISTORY OF MALIGNANT NEOPLASM OF BREAST: ICD-10-CM

## 2022-02-01 DIAGNOSIS — E11.40 TYPE 2 DIABETES MELLITUS WITH DIABETIC NEUROPATHY, UNSPECIFIED: ICD-10-CM

## 2022-02-01 DIAGNOSIS — Z91.018 ALLERGY TO OTHER FOODS: ICD-10-CM

## 2022-02-01 DIAGNOSIS — Z90.49 ACQUIRED ABSENCE OF OTHER SPECIFIED PARTS OF DIGESTIVE TRACT: ICD-10-CM

## 2022-02-01 DIAGNOSIS — Z79.82 LONG TERM (CURRENT) USE OF ASPIRIN: ICD-10-CM

## 2022-02-01 DIAGNOSIS — T38.0X5A ADVERSE EFFECT OF GLUCOCORTICOIDS AND SYNTHETIC ANALOGUES, INITIAL ENCOUNTER: ICD-10-CM

## 2022-02-01 DIAGNOSIS — H91.93 UNSPECIFIED HEARING LOSS, BILATERAL: ICD-10-CM

## 2022-02-01 DIAGNOSIS — Z91.040 LATEX ALLERGY STATUS: ICD-10-CM

## 2022-02-01 DIAGNOSIS — G93.41 METABOLIC ENCEPHALOPATHY: ICD-10-CM

## 2022-02-01 DIAGNOSIS — I50.22 CHRONIC SYSTOLIC (CONGESTIVE) HEART FAILURE: ICD-10-CM

## 2022-02-01 DIAGNOSIS — N30.00 ACUTE CYSTITIS WITHOUT HEMATURIA: ICD-10-CM

## 2022-02-01 DIAGNOSIS — U07.1 COVID-19: ICD-10-CM

## 2022-02-01 DIAGNOSIS — E03.9 HYPOTHYROIDISM, UNSPECIFIED: ICD-10-CM

## 2022-02-01 DIAGNOSIS — Z79.4 LONG TERM (CURRENT) USE OF INSULIN: ICD-10-CM

## 2022-02-01 DIAGNOSIS — Z96.651 PRESENCE OF RIGHT ARTIFICIAL KNEE JOINT: ICD-10-CM

## 2022-02-23 NOTE — PHYSICAL THERAPY INITIAL EVALUATION ADULT - ASR EQUIP NEEDS DISCH PT EVAL
Patient's first and last name, , procedure, and correct site confirmed prior to the start of procedure.
Patient's first and last name, , procedure, and correct site confirmed prior to the start of procedure.
has W/C

## 2022-03-09 NOTE — H&P ADULT - NSHPPOADEEPVENOUSTHROMB_GEN_A_CORE
no Minocycline Pregnancy And Lactation Text: This medication is Pregnancy Category D and not consider safe during pregnancy. It is also excreted in breast milk.

## 2022-04-07 ENCOUNTER — APPOINTMENT (OUTPATIENT)
Dept: CARDIOLOGY | Facility: CLINIC | Age: 82
End: 2022-04-07

## 2022-04-07 ENCOUNTER — EMERGENCY (EMERGENCY)
Facility: HOSPITAL | Age: 82
LOS: 0 days | Discharge: HOME | End: 2022-04-07
Attending: EMERGENCY MEDICINE | Admitting: EMERGENCY MEDICINE
Payer: MEDICARE

## 2022-04-07 ENCOUNTER — FORM ENCOUNTER (OUTPATIENT)
Age: 82
End: 2022-04-07

## 2022-04-07 VITALS
RESPIRATION RATE: 18 BRPM | HEIGHT: 60 IN | TEMPERATURE: 98 F | SYSTOLIC BLOOD PRESSURE: 127 MMHG | HEART RATE: 92 BPM | WEIGHT: 179.9 LBS | DIASTOLIC BLOOD PRESSURE: 77 MMHG | OXYGEN SATURATION: 99 %

## 2022-04-07 VITALS
HEART RATE: 90 BPM | DIASTOLIC BLOOD PRESSURE: 76 MMHG | OXYGEN SATURATION: 99 % | SYSTOLIC BLOOD PRESSURE: 130 MMHG | RESPIRATION RATE: 18 BRPM

## 2022-04-07 DIAGNOSIS — Z96.651 PRESENCE OF RIGHT ARTIFICIAL KNEE JOINT: ICD-10-CM

## 2022-04-07 DIAGNOSIS — Z88.0 ALLERGY STATUS TO PENICILLIN: ICD-10-CM

## 2022-04-07 DIAGNOSIS — Z90.49 ACQUIRED ABSENCE OF OTHER SPECIFIED PARTS OF DIGESTIVE TRACT: Chronic | ICD-10-CM

## 2022-04-07 DIAGNOSIS — Z85.3 PERSONAL HISTORY OF MALIGNANT NEOPLASM OF BREAST: ICD-10-CM

## 2022-04-07 DIAGNOSIS — Z98.890 OTHER SPECIFIED POSTPROCEDURAL STATES: Chronic | ICD-10-CM

## 2022-04-07 DIAGNOSIS — Z79.84 LONG TERM (CURRENT) USE OF ORAL HYPOGLYCEMIC DRUGS: ICD-10-CM

## 2022-04-07 DIAGNOSIS — F32.A DEPRESSION, UNSPECIFIED: ICD-10-CM

## 2022-04-07 DIAGNOSIS — E78.00 PURE HYPERCHOLESTEROLEMIA, UNSPECIFIED: ICD-10-CM

## 2022-04-07 DIAGNOSIS — Z00.00 ENCOUNTER FOR GENERAL ADULT MEDICAL EXAMINATION WITHOUT ABNORMAL FINDINGS: ICD-10-CM

## 2022-04-07 DIAGNOSIS — Z96.651 PRESENCE OF RIGHT ARTIFICIAL KNEE JOINT: Chronic | ICD-10-CM

## 2022-04-07 DIAGNOSIS — E11.9 TYPE 2 DIABETES MELLITUS WITHOUT COMPLICATIONS: ICD-10-CM

## 2022-04-07 DIAGNOSIS — Z91.018 ALLERGY TO OTHER FOODS: ICD-10-CM

## 2022-04-07 DIAGNOSIS — Z91.040 LATEX ALLERGY STATUS: ICD-10-CM

## 2022-04-07 DIAGNOSIS — Z88.2 ALLERGY STATUS TO SULFONAMIDES: ICD-10-CM

## 2022-04-07 DIAGNOSIS — Z86.73 PERSONAL HISTORY OF TRANSIENT ISCHEMIC ATTACK (TIA), AND CEREBRAL INFARCTION WITHOUT RESIDUAL DEFICITS: ICD-10-CM

## 2022-04-07 DIAGNOSIS — Z90.11 ACQUIRED ABSENCE OF RIGHT BREAST AND NIPPLE: ICD-10-CM

## 2022-04-07 DIAGNOSIS — Z90.49 ACQUIRED ABSENCE OF OTHER SPECIFIED PARTS OF DIGESTIVE TRACT: ICD-10-CM

## 2022-04-07 PROCEDURE — 99283 EMERGENCY DEPT VISIT LOW MDM: CPT | Mod: FS

## 2022-04-07 NOTE — ED ADULT TRIAGE NOTE - PATIENT ON (OXYGEN DELIVERY METHOD)
It was great meeting you today, Autumn!    - Continue to feed every 2-3 hours. Even at night.  - You received your 4 month vaccinations today, you can take tylenol for pain (2.3mL)  - Please make an appointment in 2 months for your 6 month visit.     If you have any questions or concerns, please do not hesitate to call or schedule another appointment.    room air

## 2022-04-07 NOTE — ED ADULT NURSE NOTE - NSIMPLEMENTINTERV_GEN_ALL_ED
Implemented All Universal Safety Interventions:  Heaters to call system. Call bell, personal items and telephone within reach. Instruct patient to call for assistance. Room bathroom lighting operational. Non-slip footwear when patient is off stretcher. Physically safe environment: no spills, clutter or unnecessary equipment. Stretcher in lowest position, wheels locked, appropriate side rails in place.

## 2022-04-07 NOTE — ED PROVIDER NOTE - CLINICAL SUMMARY MEDICAL DECISION MAKING FREE TEXT BOX
82-year-old female presents to ED after calling EMS.  Patient is denying any complaints.  Spoke with patient's aide at home who states that patient is constantly calling 911 when she gets a hold of the phone.  Daughter gave her the phone today patient called 911 saying she needs come to the emergency room patient is denying any complaints . physical exam unremarkable for any acute findings.  Patient DC back home

## 2022-04-07 NOTE — ED PROVIDER NOTE - OBJECTIVE STATEMENT
81 y/o F BIBA for complaints of "aide is not taking care of me and is unable to pull me up in the bed." Currently in the ED, Pt denies any medical complaints. No fevers, chills, n/v/d, cough, chest pain or SOB.

## 2022-04-07 NOTE — ED ADULT TRIAGE NOTE - CHIEF COMPLAINT QUOTE
BIBA patient states she called 911 because her aide was not taking care of her and not able to pull her up in the bed,

## 2022-04-07 NOTE — ED ADULT NURSE REASSESSMENT NOTE - NS ED NURSE REASSESS COMMENT FT1
Pt endorsed Beth LINDSAY.
Spoke with home health aid, Claudia ([phone]) and is home and will open door for patient.
Pt repositioned, tolerated well. Waiting for transport to return home.

## 2022-04-07 NOTE — ED PROVIDER NOTE - PATIENT PORTAL LINK FT
You can access the FollowMyHealth Patient Portal offered by [facility] by registering at the following website: [email or web address]. By joining [employer]’s FollowMyHealth portal, you will also be able to view your health information using other applications (apps) compatible with our system.

## 2022-04-07 NOTE — ED PROVIDER NOTE - NSFOLLOWUPINSTRUCTIONS_ED_ALL_ED_FT
Return to emergency room for chest pain, shortness of breath, fever or any other concern.     follow up with your primary care doctor in 1-2 days

## 2022-04-07 NOTE — ED PROVIDER NOTE - NS ED ATTENDING STATEMENT MOD
This was a shared visit with the HILLARY. I reviewed and verified the documentation and independently performed the documented:

## 2022-05-06 ENCOUNTER — APPOINTMENT (OUTPATIENT)
Dept: CARDIOLOGY | Facility: CLINIC | Age: 82
End: 2022-05-06
Payer: MEDICARE

## 2022-05-06 ENCOUNTER — NON-APPOINTMENT (OUTPATIENT)
Age: 82
End: 2022-05-06

## 2022-05-06 PROCEDURE — 93296 REM INTERROG EVL PM/IDS: CPT

## 2022-05-06 PROCEDURE — 93294 REM INTERROG EVL PM/LDLS PM: CPT

## 2022-05-10 ENCOUNTER — EMERGENCY (EMERGENCY)
Facility: HOSPITAL | Age: 82
LOS: 0 days | Discharge: HOME | End: 2022-05-10
Attending: EMERGENCY MEDICINE | Admitting: EMERGENCY MEDICINE
Payer: MEDICARE

## 2022-05-10 VITALS
RESPIRATION RATE: 18 BRPM | OXYGEN SATURATION: 98 % | DIASTOLIC BLOOD PRESSURE: 66 MMHG | WEIGHT: 250 LBS | TEMPERATURE: 97 F | HEIGHT: 60 IN | SYSTOLIC BLOOD PRESSURE: 130 MMHG | HEART RATE: 80 BPM

## 2022-05-10 DIAGNOSIS — Z88.0 ALLERGY STATUS TO PENICILLIN: ICD-10-CM

## 2022-05-10 DIAGNOSIS — Z79.4 LONG TERM (CURRENT) USE OF INSULIN: ICD-10-CM

## 2022-05-10 DIAGNOSIS — Z91.018 ALLERGY TO OTHER FOODS: ICD-10-CM

## 2022-05-10 DIAGNOSIS — F03.90 UNSPECIFIED DEMENTIA WITHOUT BEHAVIORAL DISTURBANCE: ICD-10-CM

## 2022-05-10 DIAGNOSIS — Z98.890 OTHER SPECIFIED POSTPROCEDURAL STATES: Chronic | ICD-10-CM

## 2022-05-10 DIAGNOSIS — M54.9 DORSALGIA, UNSPECIFIED: ICD-10-CM

## 2022-05-10 DIAGNOSIS — E11.9 TYPE 2 DIABETES MELLITUS WITHOUT COMPLICATIONS: ICD-10-CM

## 2022-05-10 DIAGNOSIS — I69.934 MONOPLEGIA OF UPPER LIMB FOLLOWING UNSPECIFIED CEREBROVASCULAR DISEASE AFFECTING LEFT NON-DOMINANT SIDE: ICD-10-CM

## 2022-05-10 DIAGNOSIS — F41.9 ANXIETY DISORDER, UNSPECIFIED: ICD-10-CM

## 2022-05-10 DIAGNOSIS — E78.5 HYPERLIPIDEMIA, UNSPECIFIED: ICD-10-CM

## 2022-05-10 DIAGNOSIS — D50.9 IRON DEFICIENCY ANEMIA, UNSPECIFIED: ICD-10-CM

## 2022-05-10 DIAGNOSIS — F32.A DEPRESSION, UNSPECIFIED: ICD-10-CM

## 2022-05-10 DIAGNOSIS — G89.29 OTHER CHRONIC PAIN: ICD-10-CM

## 2022-05-10 DIAGNOSIS — Z90.49 ACQUIRED ABSENCE OF OTHER SPECIFIED PARTS OF DIGESTIVE TRACT: Chronic | ICD-10-CM

## 2022-05-10 DIAGNOSIS — Z88.2 ALLERGY STATUS TO SULFONAMIDES: ICD-10-CM

## 2022-05-10 DIAGNOSIS — R45.1 RESTLESSNESS AND AGITATION: ICD-10-CM

## 2022-05-10 DIAGNOSIS — Z91.040 LATEX ALLERGY STATUS: ICD-10-CM

## 2022-05-10 DIAGNOSIS — Z91.013 ALLERGY TO SEAFOOD: ICD-10-CM

## 2022-05-10 DIAGNOSIS — Z96.651 PRESENCE OF RIGHT ARTIFICIAL KNEE JOINT: Chronic | ICD-10-CM

## 2022-05-10 DIAGNOSIS — I10 ESSENTIAL (PRIMARY) HYPERTENSION: ICD-10-CM

## 2022-05-10 PROCEDURE — 99284 EMERGENCY DEPT VISIT MOD MDM: CPT

## 2022-05-10 RX ORDER — KETOROLAC TROMETHAMINE 30 MG/ML
15 SYRINGE (ML) INJECTION ONCE
Refills: 0 | Status: DISCONTINUED | OUTPATIENT
Start: 2022-05-10 | End: 2022-05-10

## 2022-05-10 RX ORDER — ACETAMINOPHEN 500 MG
650 TABLET ORAL ONCE
Refills: 0 | Status: COMPLETED | OUTPATIENT
Start: 2022-05-10 | End: 2022-05-10

## 2022-05-10 RX ADMIN — Medication 650 MILLIGRAM(S): at 20:36

## 2022-05-10 RX ADMIN — Medication 15 MILLIGRAM(S): at 20:38

## 2022-05-10 NOTE — ED PROVIDER NOTE - NSFOLLOWUPCLINICS_GEN_ALL_ED_FT
[facility]  Neurology  [address] [place] [address], [facility] [address]  [place] [address]  Phone: [phone]  Fax:   Follow Up Time: 4-6 Days

## 2022-05-10 NOTE — ED ADULT NURSE NOTE - SUICIDE SCREENING QUESTION 2
- Follow-up with your pediatrician within 48 hours of discharge.   Routine Home Care Instructions:  - Please call us for help if you feel sad, blue or overwhelmed for more than a few days after discharge  - Umbilical cord care:        - Please keep your baby's cord clean and dry (do not apply alcohol)        - Please keep your baby's diaper below the umbilical cord until it has fallen off (~10-14 days)        - Please do not submerge your baby in a bath until the cord has fallen off (sponge bath instead)  - Continue feeding your child on demand at all times. Your child should have 8-12 proper feedings each day.  - Breastfeeding babies generally regain their birth-weight within 2 weeks. Thus, it is important for you to follow-up with your pediatrician within 48 hours of discharge and then again at 2 weeks of birth in order to make sure your baby has passed his/her birth-weight.  Please contact your pediatrician and return to the hospital if you notice any of the following:   - Fever  (T > 100.4)  - Reduced amount of wet diapers (< 5-6 per day) or no wet diaper in 12 hours  - Increased fussiness, irritability, or crying inconsolably  - Lethargy (excessively sleepy, difficult to arouse)  - Breathing difficulties (noisy breathing, breathing fast, using belly and neck muscles to breath)  - Changes in the baby’s color (yellow, blue, pale, gray)  - Seizure or loss of consciousness Because the patient is SGA, the Accucheck protocol was followed. Blood glucose levels have remained stable throughout admission. No For SGA status, baby had serial glucose monitoring, which was normal. This baby was treated for hyperbilirubinemia secondary to Suly positive status. The baby received phototherapy and was monitored closely while in the  nursery. The baby was discharged with a bilirubin level that is > 3 mg/dl below phototherapy threshold. Parents were provided with anticipatory guidance and instructed to follow up with baby's outpatient pediatrician within 1-2 days for a repeat bilirubin check. - Follow-up with your pediatrician within 48 hours of discharge.   Routine Home Care Instructions:  - Please call us for help if you feel sad, blue or overwhelmed after discharge  - Umbilical cord care:        - Please keep your baby's cord clean and dry (do not apply alcohol)        - Please keep your baby's diaper below the umbilical cord until it has fallen off (~10-14 days)        - Please do not submerge your baby in a bath until the cord has fallen off (sponge bath instead)  - Continue feeding your child on demand at all times. Your child should have 8-12 proper feedings each day.  - Breastfeeding babies generally regain their birth-weight within 2 weeks. Thus, it is important for you to follow-up with your pediatrician within 48 hours of discharge and then again at 2 weeks of birth in order to make sure your baby has passed his/her birth-weight.  Please contact your pediatrician and return to the hospital if you notice any of the following:   - Fever  (T > 100.4)  - Reduced amount of wet diapers (< 5-6 per day) or no wet diaper in 12 hours  - Increased fussiness, irritability, or crying inconsolably  - Lethargy (excessively sleepy, difficult to arouse)  - Breathing difficulties (noisy breathing, breathing fast, using belly and neck muscles to breath)  - Changes in the baby’s color (yellow, blue, pale, gray)  - Seizure or loss of consciousness

## 2022-05-10 NOTE — ED PROVIDER NOTE - PATIENT PORTAL LINK FT
You can access the FollowMyHealth Patient Portal offered by [facility] by registering at the following website: [email or web address]. By joining [facility]’s FollowMyHealth portal, you will also be able to view your health information using other applications (apps) compatible with our system.

## 2022-05-10 NOTE — ED PROVIDER NOTE - CLINICAL SUMMARY MEDICAL DECISION MAKING FREE TEXT BOX
Chronic back pain without signs of acute injury to spine. Pain improved with analgesia in ED. Advised on continued use of tylenol in ED, follow up with neuro, pain management, return precautions.    Son and I discussed possibility of NH placement in the future, he agrees this may be where they are headed but they are not looking for that at this time.

## 2022-05-10 NOTE — ED PROVIDER NOTE - CARE PROVIDER_API CALL
Izaiah Martinez (MD)  Anesthesiology; Pain Medicine  [address] [place] [address]  Phone: [phone]  Fax: [phone]  Follow Up Time: 4-6 Days

## 2022-05-16 NOTE — ED ADULT TRIAGE NOTE - PRO INTERPRETER NEED 2
[facility]    PROGRESS NOTE    Patient:  Paulina Yan    :  1960    Date of Service: 2022    The encounter diagnosis was Moderate episode of recurrent major depressive disorder (CMS/HCC).    Data: Pt discussed that she has chosen one home health agency. Pt discussed that her case workers name is Barbie. This writer encouraged the patient to use her as a . Pt reports a relief since choosing an agency.       Intervention: solutions focused brief therapy     Patient continues to be involved in service planning:  YES    Describe above interventions:   This writer provided a safe and welcoming space for processing of events. This writer normalized the patients current sxs. This writer explored patients thoughts/feelings related to the above events. This writer praised the patient for her efforts    Patient's response to interventions: PT presents as engaged in the session and motivated for tx.    Continue to support patient's efforts and progress towards established treatment plan goals in the following ways:  assist with goal formation     Off-site:  No     This visit is being performed virtually via Telephonic Visit. Consent to treat includes permission to submit charges to the patient's insurance. It was shared that without being seen and evaluated in person, there is a risk that the information and/or assessment may be incomplete or inaccurate. This telephonic visit may be discontinued by patient or clinician, if it is felt that the telephonic connections are not adequate for her situation.   Clinical Location: [facility]  Paulina Varner's location Home and is physically present in   the [facility] at the time of this visit.       30 minutes were spent on this encounter   
English

## 2022-08-09 ENCOUNTER — APPOINTMENT (OUTPATIENT)
Dept: CARDIOLOGY | Facility: CLINIC | Age: 82
End: 2022-08-09

## 2022-08-09 VITALS
TEMPERATURE: 98 F | DIASTOLIC BLOOD PRESSURE: 55 MMHG | RESPIRATION RATE: 16 BRPM | HEART RATE: 80 BPM | SYSTOLIC BLOOD PRESSURE: 91 MMHG | WEIGHT: 190 LBS | BODY MASS INDEX: 25.73 KG/M2 | HEIGHT: 72 IN

## 2022-08-09 DIAGNOSIS — Z95.0 PRESENCE OF CARDIAC PACEMAKER: ICD-10-CM

## 2022-08-09 PROCEDURE — 93000 ELECTROCARDIOGRAM COMPLETE: CPT | Mod: 59

## 2022-08-09 PROCEDURE — 99213 OFFICE O/P EST LOW 20 MIN: CPT

## 2022-08-09 PROCEDURE — 93280 PM DEVICE PROGR EVAL DUAL: CPT

## 2022-08-09 RX ORDER — TEMAZEPAM 15 MG/1
15 CAPSULE ORAL
Refills: 0 | Status: COMPLETED | COMMUNITY
End: 2022-08-09

## 2022-08-09 RX ORDER — PEN NEEDLE, DIABETIC 32 GX 1/4"
32G X 6 MM NEEDLE, DISPOSABLE MISCELLANEOUS
Qty: 100 | Refills: 0 | Status: COMPLETED | COMMUNITY
Start: 2020-08-19 | End: 2022-08-09

## 2022-08-09 RX ORDER — MIRTAZAPINE 15 MG/1
15 TABLET, FILM COATED ORAL
Qty: 30 | Refills: 0 | Status: ACTIVE | COMMUNITY
Start: 2022-06-07

## 2022-08-09 RX ORDER — PEN NEEDLE, DIABETIC 29 G X1/2"
32G X 4 MM NEEDLE, DISPOSABLE MISCELLANEOUS
Qty: 300 | Refills: 0 | Status: COMPLETED | COMMUNITY
Start: 2020-10-08 | End: 2022-08-09

## 2022-08-09 NOTE — ASSESSMENT
[FreeTextEntry1] : 81 yo F with history of Mobtiz 2 s/p DC PPM, CVA, HTN here for follow up visit. \par \par # Mobitz 2 s/p DC PPM\par - Interrogation as above. No arrhythmias. Normal functioning PPM. \par - Remote monitor is set up and patient is transmitting. \par \par # HTN\par - BP well controlled\par - Cont Amlodipine\par - 2g Na diet enforced\par \par Follow up in 12 mo with NP for in office interrogation \par \par I have also advised the patient to go to the nearest emergency room if she experiences any chest pain, dyspnea, syncope, or has any other compelling symptoms.\par

## 2022-08-09 NOTE — HISTORY OF PRESENT ILLNESS
[de-identified] : \par Cardiologist: Dr. Norwood\par \par 81 yo F with history of HTN, HL, DM, CVA (left sided weakness), Mobitz 2 s/p DC PPM. Patient had COVID PNA earlier in the year and was hospitalized from 1/14-1/18/22. She received Remdesivir. Struggling with sleeping at night and started Xanax. She is asleep for the office visit today. \par

## 2022-08-09 NOTE — PROCEDURE
[No] : not [NSR] : normal sinus rhythm [Pacemaker] : pacemaker [DDD] : DDD [Normal] : The battery status is normal. [Threshold Testing Performed] : Threshold testing was performed [Lead Imp:  ___ohms] : lead impedance was [unfilled] ohms [Sensing Amplitude ___mv] : sensing amplitude was [unfilled] mv [___V @] : [unfilled] V [___ ms] : [unfilled] ms [See Device Printout] : See device printout [Programmed for Longevity] : output reprogrammed for improved battery longevity [de-identified] : [facility] [de-identified] : L311 [de-identified] : [id number] [de-identified] : 07/29/2019 [de-identified] : 59 [de-identified] : Battery Life 6.5 Years [de-identified] : A Paced 1%\par V Paced 12%\par No Episodes or Recordings to Note since last reset.

## 2022-08-09 NOTE — PHYSICAL EXAM
[General Appearance - Well Developed] : well developed [Normal Appearance] : normal appearance [Well Groomed] : well groomed [General Appearance - Well Nourished] : well nourished [No Deformities] : no deformities [General Appearance - In No Acute Distress] : no acute distress [Heart Rate And Rhythm] : heart rate and rhythm were normal [Heart Sounds] : normal S1 and S2 [Murmurs] : no murmurs present [Edema] : no peripheral edema present [] : no respiratory distress [Respiration, Rhythm And Depth] : normal respiratory rhythm and effort [Exaggerated Use Of Accessory Muscles For Inspiration] : no accessory muscle use [Auscultation Breath Sounds / Voice Sounds] : lungs were clear to auscultation bilaterally [Well-Healed] : well-healed [Abdomen Soft] : soft [Nail Clubbing] : no clubbing of the fingernails [FreeTextEntry1] : sitting in wheelchair

## 2022-08-09 NOTE — CARDIOLOGY SUMMARY
[de-identified] : 8/9/2022 NSR (HR 77 bpm), LAFB, QTc 444 msec [de-identified] : 8/16/2021 EF 55% Grade 1 DD. Mild MAC. Mild MR. Mild TR. Mild AS.

## 2022-08-11 ENCOUNTER — NON-APPOINTMENT (OUTPATIENT)
Age: 82
End: 2022-08-11

## 2022-08-11 ENCOUNTER — APPOINTMENT (OUTPATIENT)
Dept: CARDIOLOGY | Facility: CLINIC | Age: 82
End: 2022-08-11

## 2022-08-11 PROCEDURE — 93294 REM INTERROG EVL PM/LDLS PM: CPT

## 2022-08-11 PROCEDURE — 93296 REM INTERROG EVL PM/IDS: CPT

## 2022-08-15 NOTE — ED PROVIDER NOTE - DISCHARGE DATE
Discharge Planning Assessment  SHERRY Veloz     Patient Name: Fanta Hawkins  MRN: [id number]  Today's Date: 8/15/2022    Admit Date: 8/11/2022     Discharge Plan     Row Name 08/15/22 0947       Plan    Plan LONNY contacted [facility] to see if pt could be set up to receive Ensure. Ryanserena stated that they would need an order from MD with supporting documentation. LONNY notified pt and son to contact PCP for order, v/u. Potential discharge later today. Pt still interested in shower chair. Farhan notified.    Patient/Family in Agreement with Plan yes              Expected Discharge Date and Time     Expected Discharge Date Expected Discharge Time    Aug 15, 2022         ASHLYN Lynn     06-Jun-2019

## 2022-10-17 NOTE — DISCHARGE NOTE ADULT - DISCHARGE DATE
22-Jul-2018 Topical Ketoconazole Pregnancy And Lactation Text: This medication is Pregnancy Category B and is considered safe during pregnancy. It is unknown if it is excreted in breast milk.

## 2022-10-19 ENCOUNTER — INPATIENT (INPATIENT)
Facility: HOSPITAL | Age: 82
LOS: 2 days | Discharge: HOME | End: 2022-10-22
Attending: INTERNAL MEDICINE | Admitting: INTERNAL MEDICINE

## 2022-10-19 VITALS
SYSTOLIC BLOOD PRESSURE: 124 MMHG | OXYGEN SATURATION: 98 % | WEIGHT: 250 LBS | HEIGHT: 60 IN | DIASTOLIC BLOOD PRESSURE: 65 MMHG | RESPIRATION RATE: 20 BRPM | HEART RATE: 92 BPM | TEMPERATURE: 98 F

## 2022-10-19 DIAGNOSIS — Z96.651 PRESENCE OF RIGHT ARTIFICIAL KNEE JOINT: Chronic | ICD-10-CM

## 2022-10-19 DIAGNOSIS — Z90.49 ACQUIRED ABSENCE OF OTHER SPECIFIED PARTS OF DIGESTIVE TRACT: Chronic | ICD-10-CM

## 2022-10-19 DIAGNOSIS — Z98.890 OTHER SPECIFIED POSTPROCEDURAL STATES: Chronic | ICD-10-CM

## 2022-10-19 LAB
ALBUMIN SERPL ELPH-MCNC: 3.8 G/DL — SIGNIFICANT CHANGE UP (ref 3.5–5.2)
ALP SERPL-CCNC: 138 U/L — HIGH (ref 30–115)
ALT FLD-CCNC: 15 U/L — SIGNIFICANT CHANGE UP (ref 0–41)
ANION GAP SERPL CALC-SCNC: 11 MMOL/L — SIGNIFICANT CHANGE UP (ref 7–14)
APPEARANCE UR: CLEAR — SIGNIFICANT CHANGE UP
APTT BLD: 32.7 SEC — SIGNIFICANT CHANGE UP (ref 27–39.2)
AST SERPL-CCNC: 19 U/L — SIGNIFICANT CHANGE UP (ref 0–41)
BACTERIA # UR AUTO: ABNORMAL
BASE EXCESS BLDV CALC-SCNC: 11.9 MMOL/L — HIGH (ref -2–3)
BASOPHILS # BLD AUTO: 0.08 K/UL — SIGNIFICANT CHANGE UP (ref 0–0.2)
BASOPHILS NFR BLD AUTO: 1 % — SIGNIFICANT CHANGE UP (ref 0–1)
BILIRUB SERPL-MCNC: 0.3 MG/DL — SIGNIFICANT CHANGE UP (ref 0.2–1.2)
BILIRUB UR-MCNC: NEGATIVE — SIGNIFICANT CHANGE UP
BUN SERPL-MCNC: 37 MG/DL — HIGH (ref 10–20)
CA-I SERPL-SCNC: 1.22 MMOL/L — SIGNIFICANT CHANGE UP (ref 1.15–1.33)
CALCIUM SERPL-MCNC: 9.8 MG/DL — SIGNIFICANT CHANGE UP (ref 8.4–10.5)
CHLORIDE SERPL-SCNC: 99 MMOL/L — SIGNIFICANT CHANGE UP (ref 98–110)
CO2 SERPL-SCNC: 34 MMOL/L — HIGH (ref 17–32)
COLOR SPEC: YELLOW — SIGNIFICANT CHANGE UP
CREAT SERPL-MCNC: 2 MG/DL — HIGH (ref 0.7–1.5)
DIFF PNL FLD: NEGATIVE — SIGNIFICANT CHANGE UP
EGFR: 24 ML/MIN/1.73M2 — LOW
EOSINOPHIL # BLD AUTO: 0.34 K/UL — SIGNIFICANT CHANGE UP (ref 0–0.7)
EOSINOPHIL NFR BLD AUTO: 4.2 % — SIGNIFICANT CHANGE UP (ref 0–8)
EPI CELLS # UR: ABNORMAL /HPF
GAS PNL BLDV: 139 MMOL/L — SIGNIFICANT CHANGE UP (ref 136–145)
GAS PNL BLDV: SIGNIFICANT CHANGE UP
GLUCOSE BLDC GLUCOMTR-MCNC: 208 MG/DL — HIGH (ref 70–99)
GLUCOSE BLDC GLUCOMTR-MCNC: 88 MG/DL — SIGNIFICANT CHANGE UP (ref 70–99)
GLUCOSE SERPL-MCNC: 124 MG/DL — HIGH (ref 70–99)
GLUCOSE UR QL: NEGATIVE MG/DL — SIGNIFICANT CHANGE UP
HCO3 BLDV-SCNC: 41 MMOL/L — HIGH (ref 22–29)
HCT VFR BLD CALC: 41.1 % — SIGNIFICANT CHANGE UP (ref 37–47)
HCT VFR BLDA CALC: 41 % — SIGNIFICANT CHANGE UP (ref 39–51)
HGB BLD CALC-MCNC: 13.8 G/DL — SIGNIFICANT CHANGE UP (ref 12.6–17.4)
HGB BLD-MCNC: 13.2 G/DL — SIGNIFICANT CHANGE UP (ref 12–16)
IMM GRANULOCYTES NFR BLD AUTO: 0.2 % — SIGNIFICANT CHANGE UP (ref 0.1–0.3)
INR BLD: 1.08 RATIO — SIGNIFICANT CHANGE UP (ref 0.65–1.3)
KETONES UR-MCNC: ABNORMAL
LACTATE BLDV-MCNC: 0.7 MMOL/L — SIGNIFICANT CHANGE UP (ref 0.5–2)
LEUKOCYTE ESTERASE UR-ACNC: ABNORMAL
LYMPHOCYTES # BLD AUTO: 1.62 K/UL — SIGNIFICANT CHANGE UP (ref 1.2–3.4)
LYMPHOCYTES # BLD AUTO: 20.1 % — LOW (ref 20.5–51.1)
MAGNESIUM SERPL-MCNC: 2.7 MG/DL — HIGH (ref 1.8–2.4)
MCHC RBC-ENTMCNC: 30.6 PG — SIGNIFICANT CHANGE UP (ref 27–31)
MCHC RBC-ENTMCNC: 32.1 G/DL — SIGNIFICANT CHANGE UP (ref 32–37)
MCV RBC AUTO: 95.1 FL — SIGNIFICANT CHANGE UP (ref 81–99)
MONOCYTES # BLD AUTO: 0.73 K/UL — HIGH (ref 0.1–0.6)
MONOCYTES NFR BLD AUTO: 9.1 % — SIGNIFICANT CHANGE UP (ref 1.7–9.3)
NEUTROPHILS # BLD AUTO: 5.25 K/UL — SIGNIFICANT CHANGE UP (ref 1.4–6.5)
NEUTROPHILS NFR BLD AUTO: 65.4 % — SIGNIFICANT CHANGE UP (ref 42.2–75.2)
NITRITE UR-MCNC: NEGATIVE — SIGNIFICANT CHANGE UP
NRBC # BLD: 0 /100 WBCS — SIGNIFICANT CHANGE UP (ref 0–0)
PCO2 BLDV: 72 MMHG — HIGH (ref 39–42)
PH BLDV: 7.36 — SIGNIFICANT CHANGE UP (ref 7.32–7.43)
PH UR: 5.5 — SIGNIFICANT CHANGE UP (ref 5–8)
PLATELET # BLD AUTO: 220 K/UL — SIGNIFICANT CHANGE UP (ref 130–400)
PO2 BLDV: 24 MMHG — SIGNIFICANT CHANGE UP
POTASSIUM BLDV-SCNC: 4 MMOL/L — SIGNIFICANT CHANGE UP (ref 3.5–5.1)
POTASSIUM SERPL-MCNC: 4.1 MMOL/L — SIGNIFICANT CHANGE UP (ref 3.5–5)
POTASSIUM SERPL-SCNC: 4.1 MMOL/L — SIGNIFICANT CHANGE UP (ref 3.5–5)
PROT SERPL-MCNC: 7 G/DL — SIGNIFICANT CHANGE UP (ref 6–8)
PROT UR-MCNC: 30 MG/DL
PROTHROM AB SERPL-ACNC: 12.4 SEC — SIGNIFICANT CHANGE UP (ref 9.95–12.87)
RBC # BLD: 4.32 M/UL — SIGNIFICANT CHANGE UP (ref 4.2–5.4)
RBC # FLD: 12.7 % — SIGNIFICANT CHANGE UP (ref 11.5–14.5)
RBC CASTS # UR COMP ASSIST: SIGNIFICANT CHANGE UP /HPF
SAO2 % BLDV: 35.7 % — SIGNIFICANT CHANGE UP
SARS-COV-2 RNA SPEC QL NAA+PROBE: SIGNIFICANT CHANGE UP
SODIUM SERPL-SCNC: 144 MMOL/L — SIGNIFICANT CHANGE UP (ref 135–146)
SP GR SPEC: 1.02 — SIGNIFICANT CHANGE UP (ref 1.01–1.03)
TROPONIN T SERPL-MCNC: 0.02 NG/ML — HIGH
TROPONIN T SERPL-MCNC: 0.03 NG/ML — CRITICAL HIGH
UROBILINOGEN FLD QL: 0.2 MG/DL — SIGNIFICANT CHANGE UP
WBC # BLD: 8.04 K/UL — SIGNIFICANT CHANGE UP (ref 4.8–10.8)
WBC # FLD AUTO: 8.04 K/UL — SIGNIFICANT CHANGE UP (ref 4.8–10.8)
WBC UR QL: >50 /HPF

## 2022-10-19 PROCEDURE — 71045 X-RAY EXAM CHEST 1 VIEW: CPT | Mod: 26

## 2022-10-19 PROCEDURE — 74176 CT ABD & PELVIS W/O CONTRAST: CPT | Mod: 26,QQ

## 2022-10-19 PROCEDURE — 70450 CT HEAD/BRAIN W/O DYE: CPT | Mod: 26,MA

## 2022-10-19 PROCEDURE — 93010 ELECTROCARDIOGRAM REPORT: CPT

## 2022-10-19 PROCEDURE — 99222 1ST HOSP IP/OBS MODERATE 55: CPT | Mod: AI

## 2022-10-19 PROCEDURE — 99285 EMERGENCY DEPT VISIT HI MDM: CPT | Mod: FS

## 2022-10-19 RX ORDER — SERTRALINE 25 MG/1
100 TABLET, FILM COATED ORAL DAILY
Refills: 0 | Status: DISCONTINUED | OUTPATIENT
Start: 2022-10-19 | End: 2022-10-22

## 2022-10-19 RX ORDER — DEXTROSE 50 % IN WATER 50 %
25 SYRINGE (ML) INTRAVENOUS ONCE
Refills: 0 | Status: DISCONTINUED | OUTPATIENT
Start: 2022-10-19 | End: 2022-10-22

## 2022-10-19 RX ORDER — NORTRIPTYLINE HYDROCHLORIDE 10 MG/1
25 CAPSULE ORAL AT BEDTIME
Refills: 0 | Status: DISCONTINUED | OUTPATIENT
Start: 2022-10-19 | End: 2022-10-22

## 2022-10-19 RX ORDER — ATORVASTATIN CALCIUM 80 MG/1
80 TABLET, FILM COATED ORAL AT BEDTIME
Refills: 0 | Status: DISCONTINUED | OUTPATIENT
Start: 2022-10-19 | End: 2022-10-22

## 2022-10-19 RX ORDER — ALPRAZOLAM 0.25 MG
0.25 TABLET ORAL
Refills: 0 | Status: DISCONTINUED | OUTPATIENT
Start: 2022-10-19 | End: 2022-10-20

## 2022-10-19 RX ORDER — FUROSEMIDE 40 MG
20 TABLET ORAL DAILY
Refills: 0 | Status: DISCONTINUED | OUTPATIENT
Start: 2022-10-19 | End: 2022-10-22

## 2022-10-19 RX ORDER — INSULIN LISPRO 100/ML
20 VIAL (ML) SUBCUTANEOUS
Refills: 0 | Status: DISCONTINUED | OUTPATIENT
Start: 2022-10-19 | End: 2022-10-22

## 2022-10-19 RX ORDER — MIRTAZAPINE 45 MG/1
15 TABLET, ORALLY DISINTEGRATING ORAL AT BEDTIME
Refills: 0 | Status: DISCONTINUED | OUTPATIENT
Start: 2022-10-19 | End: 2022-10-22

## 2022-10-19 RX ORDER — SODIUM CHLORIDE 9 MG/ML
1000 INJECTION, SOLUTION INTRAVENOUS
Refills: 0 | Status: DISCONTINUED | OUTPATIENT
Start: 2022-10-19 | End: 2022-10-22

## 2022-10-19 RX ORDER — DEXTROSE 50 % IN WATER 50 %
15 SYRINGE (ML) INTRAVENOUS ONCE
Refills: 0 | Status: DISCONTINUED | OUTPATIENT
Start: 2022-10-19 | End: 2022-10-22

## 2022-10-19 RX ORDER — CEFTRIAXONE 500 MG/1
1000 INJECTION, POWDER, FOR SOLUTION INTRAMUSCULAR; INTRAVENOUS ONCE
Refills: 0 | Status: COMPLETED | OUTPATIENT
Start: 2022-10-19 | End: 2022-10-19

## 2022-10-19 RX ORDER — PANTOPRAZOLE SODIUM 20 MG/1
40 TABLET, DELAYED RELEASE ORAL
Refills: 0 | Status: DISCONTINUED | OUTPATIENT
Start: 2022-10-19 | End: 2022-10-22

## 2022-10-19 RX ORDER — LEVOTHYROXINE SODIUM 125 MCG
25 TABLET ORAL DAILY
Refills: 0 | Status: DISCONTINUED | OUTPATIENT
Start: 2022-10-19 | End: 2022-10-22

## 2022-10-19 RX ORDER — SODIUM CHLORIDE 9 MG/ML
1000 INJECTION INTRAMUSCULAR; INTRAVENOUS; SUBCUTANEOUS
Refills: 0 | Status: DISCONTINUED | OUTPATIENT
Start: 2022-10-19 | End: 2022-10-22

## 2022-10-19 RX ORDER — INSULIN DETEMIR 100/ML (3)
30 INSULIN PEN (ML) SUBCUTANEOUS AT BEDTIME
Refills: 0 | Status: DISCONTINUED | OUTPATIENT
Start: 2022-10-19 | End: 2022-10-19

## 2022-10-19 RX ORDER — GLUCAGON INJECTION, SOLUTION 0.5 MG/.1ML
1 INJECTION, SOLUTION SUBCUTANEOUS ONCE
Refills: 0 | Status: DISCONTINUED | OUTPATIENT
Start: 2022-10-19 | End: 2022-10-22

## 2022-10-19 RX ORDER — SODIUM CHLORIDE 9 MG/ML
500 INJECTION INTRAMUSCULAR; INTRAVENOUS; SUBCUTANEOUS ONCE
Refills: 0 | Status: COMPLETED | OUTPATIENT
Start: 2022-10-19 | End: 2022-10-19

## 2022-10-19 RX ORDER — HEPARIN SODIUM 5000 [USP'U]/ML
5000 INJECTION INTRAVENOUS; SUBCUTANEOUS EVERY 12 HOURS
Refills: 0 | Status: DISCONTINUED | OUTPATIENT
Start: 2022-10-19 | End: 2022-10-22

## 2022-10-19 RX ORDER — DEXTROSE 50 % IN WATER 50 %
12.5 SYRINGE (ML) INTRAVENOUS ONCE
Refills: 0 | Status: DISCONTINUED | OUTPATIENT
Start: 2022-10-19 | End: 2022-10-22

## 2022-10-19 RX ORDER — AMLODIPINE BESYLATE 2.5 MG/1
10 TABLET ORAL DAILY
Refills: 0 | Status: DISCONTINUED | OUTPATIENT
Start: 2022-10-19 | End: 2022-10-21

## 2022-10-19 RX ORDER — CLOPIDOGREL BISULFATE 75 MG/1
75 TABLET, FILM COATED ORAL DAILY
Refills: 0 | Status: DISCONTINUED | OUTPATIENT
Start: 2022-10-19 | End: 2022-10-22

## 2022-10-19 RX ORDER — CEFTRIAXONE 500 MG/1
1000 INJECTION, POWDER, FOR SOLUTION INTRAMUSCULAR; INTRAVENOUS EVERY 24 HOURS
Refills: 0 | Status: COMPLETED | OUTPATIENT
Start: 2022-10-19 | End: 2022-10-21

## 2022-10-19 RX ORDER — INSULIN GLARGINE 100 [IU]/ML
30 INJECTION, SOLUTION SUBCUTANEOUS AT BEDTIME
Refills: 0 | Status: DISCONTINUED | OUTPATIENT
Start: 2022-10-19 | End: 2022-10-22

## 2022-10-19 RX ORDER — ASPIRIN/CALCIUM CARB/MAGNESIUM 324 MG
81 TABLET ORAL DAILY
Refills: 0 | Status: DISCONTINUED | OUTPATIENT
Start: 2022-10-19 | End: 2022-10-22

## 2022-10-19 RX ORDER — INSULIN LISPRO 100/ML
VIAL (ML) SUBCUTANEOUS
Refills: 0 | Status: DISCONTINUED | OUTPATIENT
Start: 2022-10-19 | End: 2022-10-22

## 2022-10-19 RX ORDER — GABAPENTIN 400 MG/1
300 CAPSULE ORAL THREE TIMES A DAY
Refills: 0 | Status: DISCONTINUED | OUTPATIENT
Start: 2022-10-19 | End: 2022-10-22

## 2022-10-19 RX ADMIN — NORTRIPTYLINE HYDROCHLORIDE 25 MILLIGRAM(S): 10 CAPSULE ORAL at 23:00

## 2022-10-19 RX ADMIN — CEFTRIAXONE 100 MILLIGRAM(S): 500 INJECTION, POWDER, FOR SOLUTION INTRAMUSCULAR; INTRAVENOUS at 19:11

## 2022-10-19 RX ADMIN — SODIUM CHLORIDE 50 MILLILITER(S): 9 INJECTION INTRAMUSCULAR; INTRAVENOUS; SUBCUTANEOUS at 22:58

## 2022-10-19 RX ADMIN — GABAPENTIN 300 MILLIGRAM(S): 400 CAPSULE ORAL at 23:00

## 2022-10-19 RX ADMIN — SODIUM CHLORIDE 1000 MILLILITER(S): 9 INJECTION INTRAMUSCULAR; INTRAVENOUS; SUBCUTANEOUS at 19:00

## 2022-10-19 RX ADMIN — ATORVASTATIN CALCIUM 80 MILLIGRAM(S): 80 TABLET, FILM COATED ORAL at 23:00

## 2022-10-19 RX ADMIN — MIRTAZAPINE 15 MILLIGRAM(S): 45 TABLET, ORALLY DISINTEGRATING ORAL at 23:00

## 2022-10-19 RX ADMIN — INSULIN GLARGINE 30 UNIT(S): 100 INJECTION, SOLUTION SUBCUTANEOUS at 23:00

## 2022-10-19 NOTE — H&P ADULT - ASSESSMENT
83 yo F pmhx of HTN, HLD, DM, CVA with L sided deficits, heart block s/p PPM, dementia, depression p/w UTI, JABARI and elevated troponin     #elevated troponin  - no chest pain   - admit to telemetry  - repeat cardiac enzymes    - ekg  - echo   - cardiology consult     # acute UTI   - c/w abx   - f/u UA/cultures     # JABARI - Cr 2.0   - IV hydration   - monitor cr, urine studies     # h/o HTN  - c/w norvasc  -DASH diet    #h/o HLD   - c/w statin     #h/o CVA with L sided deficits  - c/w asa, plavix    # h/o DM2   - FS AC/QhS   -hold oral antihyperglycemics, ISS   -CHO diet    # h/o Chronic systolic CHF   - c/w Lasix    # h/o Hypothyroid:   c/w synthroid    # Breast CA in remission  - on exemestane 25mg daily which is non formulary    # h/o Neuropathy:   - Continue neurontin    # h/o Depression  #h/o dementia   c/w sertralaline, nortriptyline, mirtazapine   c/w donepezil     #diet- DASH/CHO   # dvt ppx - HSQ   # full code     83 yo F pmhx of HTN, HLD, DM, CVA with L sided deficits, heart block s/p PPM, breast ca, dementia, depression p/w UTI, JABARI and elevated troponin     #elevated troponin  - no chest pain   - admit to telemetry  - repeat cardiac enzymes    - ekg  - echo   - cardiology consult     # acute UTI   - c/w abx   - f/u UA/cultures     # JABARI - Cr 2.0   - IV hydration   - monitor cr, urine studies     # h/o HTN  - c/w norvasc  -DASH diet    #h/o HLD   - c/w statin     #h/o CVA with L sided deficits  - c/w asa, plavix    # h/o DM2   - FS AC/QhS   -hold oral antihyperglycemics, ISS   -CHO diet    # h/o Chronic systolic CHF   - c/w Lasix    # h/o Hypothyroid:   c/w synthroid    # Breast CA in remission  - on exemestane 25mg daily which is non formulary    # h/o Neuropathy:   - Continue neurontin    # h/o Depression  #h/o dementia   c/w sertralaline, nortriptyline, mirtazapine   c/w donepezil     #diet- DASH/CHO   # dvt ppx - HSQ   # full code     83 yo F pmhx of HTN, HLD, DM, CVA with L sided deficits, heart block s/p PPM, breast ca, dementia, depression p/w UTI, JABARI and elevated troponin     #elevated troponin- no chest pain - r/o ACS  - EKG shows NSR@ 86BPM, no acute changes.  - admit to telemetry  - repeat cardiac enzymes    - ekg  - echo   - cardiology consult     # acute UTI   - c/w abx   - f/u UA/cultures     # JABARI - Cr 2.0   - IV hydration   - monitor cr, f/u urine studies     # h/o HTN  - c/w norvasc  -DASH diet    #h/o HLD   - c/w statin     #h/o CVA with L sided deficits  - c/w asa, plavix    # h/o DM2   - FS AC/QhS   -hold oral antihyperglycemics, ISS   -CHO diet    # h/o Chronic systolic CHF   - c/w Lasix    # h/o Hypothyroid:   c/w synthroid    # Breast CA in remission  - on exemestane 25mg daily which is non formulary    # h/o Neuropathy:   - Continue neurontin    # h/o Depression  #h/o dementia   c/w sertralaline, nortriptyline, mirtazapine   c/w donepezil     #diet- DASH/CHO   # dvt ppx - HSQ   # full code

## 2022-10-19 NOTE — ED PROVIDER NOTE - PHYSICAL EXAMINATION
Vital Signs: I have reviewed the initial vital signs.  Constitutional: well-nourished, no acute distress, normocephalic  Eyes: PERRLA, EOMI, , clear conjunctiva  ENT: MMM,  Cardiovascular: regular rate, regular rhythm, no murmur appreciated  Respiratory: unlabored respiratory effort, clear to auscultation bilaterally  Gastrointestinal: soft, +tenderness Left upper quadrant, non-distended  abdomen, no pulsatile mass  Musculoskeletal: supple neck, no lower extremity edema, no bony tenderness  Integumentary: warm, dry, no rash  Neurologic: awake, alert, cranial nerves II-XII grossly intact, extremities’ motor and sensory functions grossly intact, no focal deficits

## 2022-10-19 NOTE — H&P ADULT - NSHPPHYSICALEXAM_GEN_ALL_CORE
ICU Vital Signs Last 24 Hrs  T(C): 36.6 (19 Oct 2022 20:30), Max: 36.9 (19 Oct 2022 14:30)  T(F): 97.8 (19 Oct 2022 20:30), Max: 98.5 (19 Oct 2022 14:30)  HR: 69 (19 Oct 2022 20:30) (69 - 92)  BP: 161/89 (19 Oct 2022 20:30) (117/55 - 161/89)  BP(mean): --  ABP: --  ABP(mean): --  RR: 20 (19 Oct 2022 20:30) (18 - 20)  SpO2: 95% (19 Oct 2022 20:30) (95% - 98%)    O2 Parameters below as of 19 Oct 2022 20:30  Patient On (Oxygen Delivery Method): nasal cannula  O2 Flow (L/min): 2      Constitutional: elderly female, NAD   HEENT: Airway patent, moist MM, no erythema/swelling/deformity of oral structures. EOMI, PERRLA.  CV: regular rate, regular rhythm, well-perfused extremities, 2+ b/l DP and radial pulses equal.  Lungs: BCTA, no increased WOB.  ABD: NTND, no guarding or rebound, no pulsatile mass, no hernias.   MSK: Neck supple, nontender, nl ROM, no stepoff. Chest nontender. Back nontender in TLS spine or to b/l bony structures or flanks. Ext nontender, nl rom, no deformity.   INTEG: Skin warm, dry, no rash.  NEURO: A&Ox2   PSYCH: Denies SI/HI/hallucinations ICU Vital Signs Last 24 Hrs  T(C): 36.6 (19 Oct 2022 20:30), Max: 36.9 (19 Oct 2022 14:30)  T(F): 97.8 (19 Oct 2022 20:30), Max: 98.5 (19 Oct 2022 14:30)  HR: 69 (19 Oct 2022 20:30) (69 - 92)  BP: 161/89 (19 Oct 2022 20:30) (117/55 - 161/89)  BP(mean): --  ABP: --  ABP(mean): --  RR: 20 (19 Oct 2022 20:30) (18 - 20)  SpO2: 95% (19 Oct 2022 20:30) (95% - 98%)    O2 Parameters below as of 19 Oct 2022 20:30  Patient On (Oxygen Delivery Method): nasal cannula  O2 Flow (L/min): 2      Constitutional: elderly female, NAD   HEENT: Airway patent, moist MM, no erythema/swelling/deformity of oral structures. EOMI, PERRLA.  CV: regular rate, regular rhythm, well-perfused extremities, 2+ b/l DP and radial pulses equal.  Lungs: BCTA, no increased WOB.  ABD: NTND, no guarding or rebound, no pulsatile mass, no hernias.   MSK: Neck supple, nontender, nl ROM, no stepoff. Chest nontender. Back nontender in TLS spine or to b/l bony structures or flanks. Ext nontender, nl rom, no deformity.   INTEG: Skin warm, dry, no rash.  NEURO: A&Ox1 to person, not to time and place.   PSYCH: Denies SI/HI/hallucinations

## 2022-10-19 NOTE — ED PROVIDER NOTE - CARE PLAN
1 Principal Discharge DX:	Acute UTI  Secondary Diagnosis:	JABARI (acute kidney injury)  Secondary Diagnosis:	Elevated troponin

## 2022-10-19 NOTE — ED PROVIDER NOTE - ATTENDING APP SHARED VISIT CONTRIBUTION OF CARE
82-year-old female past medical history noted including CVA with residual left-sided weakness, diabetes, depression presents from home for evaluation of abdominal pain today.  I spoke with patient's daughter Sindi and [phone] states the patient's physical therapist called today because patient's blood pressure was high and she was complaining of pain.  Patient currently receives home PT and has 24-hour home health aide.  Son also states that patient with increasing confusion over the last few months.  On exam patient in NAD, hard of hearing, AAOx3, OP clear, lungs CTA B/L, abdomen is soft, nondistended, positive tenderness left abdomen, no rash, no edema

## 2022-10-19 NOTE — H&P ADULT - HISTORY OF PRESENT ILLNESS
83 yo F pmhx of HTN, HLD, DM, CVA with L sided deficits, heart block s/p PPM, dementia, depression presents to ER c/o left lower abdominal pain and dysuria. Pt c/o burning during urination, no fevers, no hematuria or pyuria. Denies cp, cough. sob, n/v/d, melena.     on lab work found to have elevated trop 0.03, analy and UTI on UA  81 yo F pmhx of HTN, HLD, DM, CVA with L sided deficits, heart block s/p PPM, breast CA. dementia, depression presents to ER c/o left lower abdominal pain and dysuria. Pt c/o burning during urination, no fevers, no hematuria or pyuria. Denies cp, cough. sob, n/v/d, melena.     on lab work found to have elevated trop 0.03, analy and UTI on UA  Pt is a poor historian, Hx obtained from chart.   83 yo F pmhx of HTN, HLD, DM, CVA with residual L sided deficits, heart block s/p PPM, breast CA. dementia, depression presents to ER c/o left lower abdominal pain and dysuria. Pt c/o burning during urination, no fevers, no hematuria or pyuria. Denies cp, cough. sob, n/v/d, melena. In the ED, on lab work found to have elevated trop 0.03, analy and UTI on UA. Pt was seen and examined at bedside, pt does not know why she is here and is A&Ox1.

## 2022-10-19 NOTE — H&P ADULT - NSHPLABSRESULTS_GEN_ALL_CORE
13.2   8.04  )-----------( 220      ( 19 Oct 2022 16:40 )             41.1   Urinalysis Basic - ( 19 Oct 2022 18:20 )    Color: Yellow / Appearance: Clear / S.020 / pH: x  Gluc: x / Ketone: Trace  / Bili: Negative / Urobili: 0.2 mg/dL   Blood: x / Protein: 30 mg/dL / Nitrite: Negative   Leuk Esterase: Large / RBC: 1-2 /HPF / WBC >50 /HPF   Sq Epi: x / Non Sq Epi: Occasional /HPF / Bacteria: Many    10-19    144  |  99  |  37<H>  ----------------------------<  124<H>  4.1   |  34<H>  |  2.0<H>    Ca    9.8      19 Oct 2022 16:40  Mg     2.7     10-19    TPro  7.0  /  Alb  3.8  /  TBili  0.3  /  DBili  x   /  AST  19  /  ALT  15  /  AlkPhos  138<H>  10-19 13.2   8.04  )-----------( 220      ( 19 Oct 2022 16:40 )             41.1   Urinalysis Basic - ( 19 Oct 2022 18:20 )    Color: Yellow / Appearance: Clear / S.020 / pH: x  Gluc: x / Ketone: Trace  / Bili: Negative / Urobili: 0.2 mg/dL   Blood: x / Protein: 30 mg/dL / Nitrite: Negative   Leuk Esterase: Large / RBC: 1-2 /HPF / WBC >50 /HPF   Sq Epi: x / Non Sq Epi: Occasional /HPF / Bacteria: Many    10-19    144  |  99  |  37<H>  ----------------------------<  124<H>  4.1   |  34<H>  |  2.0<H>    Ca    9.8      19 Oct 2022 16:40  Mg     2.7     10-19    TPro  7.0  /  Alb  3.8  /  TBili  0.3  /  DBili  x   /  AST  19  /  ALT  15  /  AlkPhos  138<H>  10-19    IMPRESSION:  No evidence of acute pathology within abdomen or pelvis.    There is air within the anterior urinary bladder. Correlate with recent instrumentation

## 2022-10-19 NOTE — PATIENT PROFILE ADULT - NSPROHMDIABETBLDGLCTST_GEN_A_NUR
Taltz Counseling: I discussed with the patient the risks of ixekizumab including but not limited to immunosuppression, serious infections, worsening of inflammatory bowel disease and drug reactions.  The patient understands that monitoring is required including a PPD at baseline and must alert us or the primary physician if symptoms of infection or other concerning signs are noted. before meals and at bedtime

## 2022-10-19 NOTE — ED PROVIDER NOTE - CLINICAL SUMMARY MEDICAL DECISION MAKING FREE TEXT BOX
Labs and imaging obtained.  Patient noted with elevated BUN/creatinine.  Previous labs reviewed patient's BUN and creatinine from January 2020 20-1 19/1.3.  IV fluids given.  Patient also found to have bacteria in urine greater than 50 WBCs.  Antibiotics started

## 2022-10-19 NOTE — H&P ADULT - NSHPREVIEWOFSYSTEMS_GEN_ALL_CORE
Constitutional: (-) fever (-) chills   Eyes: (-) visual changes  ENMT: (-) nasal or chest congestion (-) runny nose (-) sore throat (-) neck pain (-) neck stiffness  Cardiac: (-) chest pain (-) syncope  Respiratory: (-) cough (-) SOB  GI: (-) nausea (-) vomiting (-) diarrhea  : (+) incontinence  MS:(-) back pain   Neuro: (-) head injury (-) headache (-) dizziness (-) numbness/tingling to extremities B/L (-) LOC   Skin: (-) abrasion (-) rash (-) laceration  Except as documented in the HPI, all other systems are negative.

## 2022-10-19 NOTE — PATIENT PROFILE ADULT - FALL HARM RISK - HARM RISK INTERVENTIONS
Assistance with ambulation/Assistance OOB with selected safe patient handling equipment/Communicate Risk of Fall with Harm to all staff/Discuss with provider need for PT consult/Monitor gait and stability/Reinforce activity limits and safety measures with patient and family/Tailored Fall Risk Interventions/Visual Cue: Yellow wristband and red socks/Bed in lowest position, wheels locked, appropriate side rails in place/Call bell, personal items and telephone in reach/Instruct patient to call for assistance before getting out of bed or chair/Non-slip footwear when patient is out of bed/[place] to call system/Physically safe environment - no spills, clutter or unnecessary equipment/Purposeful Proactive Rounding/Room/bathroom lighting operational, light cord in reach

## 2022-10-19 NOTE — PATIENT PROFILE ADULT - FUNCTIONAL ASSESSMENT - BASIC MOBILITY 6.
2-calculated by average/Not able to assess (calculate score using [facility] averaging method)

## 2022-10-20 LAB
A1C WITH ESTIMATED AVERAGE GLUCOSE RESULT: 10.4 % — HIGH (ref 4–5.6)
ANION GAP SERPL CALC-SCNC: 7 MMOL/L — SIGNIFICANT CHANGE UP (ref 7–14)
BUN SERPL-MCNC: 36 MG/DL — HIGH (ref 10–20)
CALCIUM SERPL-MCNC: 9.4 MG/DL — SIGNIFICANT CHANGE UP (ref 8.4–10.5)
CHLORIDE SERPL-SCNC: 103 MMOL/L — SIGNIFICANT CHANGE UP (ref 98–110)
CO2 SERPL-SCNC: 36 MMOL/L — HIGH (ref 17–32)
CREAT ?TM UR-MCNC: 70 MG/DL — SIGNIFICANT CHANGE UP
CREAT SERPL-MCNC: 1.7 MG/DL — HIGH (ref 0.7–1.5)
EGFR: 30 ML/MIN/1.73M2 — LOW
ESTIMATED AVERAGE GLUCOSE: 252 MG/DL — HIGH (ref 68–114)
GLUCOSE BLDC GLUCOMTR-MCNC: 142 MG/DL — HIGH (ref 70–99)
GLUCOSE BLDC GLUCOMTR-MCNC: 185 MG/DL — HIGH (ref 70–99)
GLUCOSE BLDC GLUCOMTR-MCNC: 188 MG/DL — HIGH (ref 70–99)
GLUCOSE BLDC GLUCOMTR-MCNC: 236 MG/DL — HIGH (ref 70–99)
GLUCOSE SERPL-MCNC: 185 MG/DL — HIGH (ref 70–99)
MAGNESIUM SERPL-MCNC: 2.5 MG/DL — HIGH (ref 1.8–2.4)
OSMOLALITY UR: 413 MOS/KG — SIGNIFICANT CHANGE UP (ref 50–1200)
PHOSPHATE SERPL-MCNC: 5.1 MG/DL — HIGH (ref 2.1–4.9)
POTASSIUM SERPL-MCNC: 4.1 MMOL/L — SIGNIFICANT CHANGE UP (ref 3.5–5)
POTASSIUM SERPL-SCNC: 4.1 MMOL/L — SIGNIFICANT CHANGE UP (ref 3.5–5)
POTASSIUM UR-SCNC: 41 MMOL/L — SIGNIFICANT CHANGE UP
PROT ?TM UR-MCNC: 46 MG/DLG/24H — SIGNIFICANT CHANGE UP
PROT/CREAT UR-RTO: 0.7 RATIO — HIGH (ref 0–0.2)
SODIUM SERPL-SCNC: 146 MMOL/L — SIGNIFICANT CHANGE UP (ref 135–146)
SODIUM UR-SCNC: 51 MMOL/L — SIGNIFICANT CHANGE UP
TROPONIN T SERPL-MCNC: 0.02 NG/ML — HIGH
UUN UR-MCNC: 582 MG/DL — SIGNIFICANT CHANGE UP

## 2022-10-20 PROCEDURE — 99232 SBSQ HOSP IP/OBS MODERATE 35: CPT

## 2022-10-20 PROCEDURE — 99222 1ST HOSP IP/OBS MODERATE 55: CPT

## 2022-10-20 PROCEDURE — 76937 US GUIDE VASCULAR ACCESS: CPT | Mod: 26,59

## 2022-10-20 PROCEDURE — 93010 ELECTROCARDIOGRAM REPORT: CPT

## 2022-10-20 PROCEDURE — 36569 INSJ PICC 5 YR+ W/O IMAGING: CPT

## 2022-10-20 RX ORDER — SODIUM CHLORIDE 9 MG/ML
1000 INJECTION INTRAMUSCULAR; INTRAVENOUS; SUBCUTANEOUS
Refills: 0 | Status: DISCONTINUED | OUTPATIENT
Start: 2022-10-20 | End: 2022-10-22

## 2022-10-20 RX ADMIN — ATORVASTATIN CALCIUM 80 MILLIGRAM(S): 80 TABLET, FILM COATED ORAL at 21:39

## 2022-10-20 RX ADMIN — Medication 25 MICROGRAM(S): at 05:55

## 2022-10-20 RX ADMIN — HEPARIN SODIUM 5000 UNIT(S): 5000 INJECTION INTRAVENOUS; SUBCUTANEOUS at 06:00

## 2022-10-20 RX ADMIN — HEPARIN SODIUM 5000 UNIT(S): 5000 INJECTION INTRAVENOUS; SUBCUTANEOUS at 19:21

## 2022-10-20 RX ADMIN — PANTOPRAZOLE SODIUM 40 MILLIGRAM(S): 20 TABLET, DELAYED RELEASE ORAL at 06:01

## 2022-10-20 RX ADMIN — SERTRALINE 100 MILLIGRAM(S): 25 TABLET, FILM COATED ORAL at 15:33

## 2022-10-20 RX ADMIN — GABAPENTIN 300 MILLIGRAM(S): 400 CAPSULE ORAL at 21:39

## 2022-10-20 RX ADMIN — INSULIN GLARGINE 30 UNIT(S): 100 INJECTION, SOLUTION SUBCUTANEOUS at 21:39

## 2022-10-20 RX ADMIN — MIRTAZAPINE 15 MILLIGRAM(S): 45 TABLET, ORALLY DISINTEGRATING ORAL at 21:39

## 2022-10-20 RX ADMIN — NORTRIPTYLINE HYDROCHLORIDE 25 MILLIGRAM(S): 10 CAPSULE ORAL at 21:40

## 2022-10-20 RX ADMIN — CLOPIDOGREL BISULFATE 75 MILLIGRAM(S): 75 TABLET, FILM COATED ORAL at 12:00

## 2022-10-20 RX ADMIN — Medication 81 MILLIGRAM(S): at 12:00

## 2022-10-20 RX ADMIN — GABAPENTIN 300 MILLIGRAM(S): 400 CAPSULE ORAL at 05:55

## 2022-10-20 RX ADMIN — CEFTRIAXONE 100 MILLIGRAM(S): 500 INJECTION, POWDER, FOR SOLUTION INTRAMUSCULAR; INTRAVENOUS at 19:19

## 2022-10-20 RX ADMIN — AMLODIPINE BESYLATE 10 MILLIGRAM(S): 2.5 TABLET ORAL at 06:00

## 2022-10-20 RX ADMIN — GABAPENTIN 300 MILLIGRAM(S): 400 CAPSULE ORAL at 15:32

## 2022-10-20 RX ADMIN — SODIUM CHLORIDE 100 MILLILITER(S): 9 INJECTION INTRAMUSCULAR; INTRAVENOUS; SUBCUTANEOUS at 19:21

## 2022-10-20 RX ADMIN — Medication 20 MILLIGRAM(S): at 05:56

## 2022-10-20 NOTE — PROGRESS NOTE ADULT - SUBJECTIVE AND OBJECTIVE BOX
Progress note        INTERVAL HPI/OVERNIGHT EVENTS:    REVIEW OF SYSTEMS:  CONSTITUTIONAL: No fever, weight loss, or fatigue  EYES: No eye pain, visual disturbances, or discharge  ENMT:  No difficulty hearing, tinnitus, vertigo; No sinus or throat pain  NECK: No pain or stiffness  BREASTS: No pain, masses, or nipple discharge  RESPIRATORY: No cough, wheezing, chills or hemoptysis; No shortness of breath  CARDIOVASCULAR: No chest pain, palpitations, dizziness, or leg swelling  GASTROINTESTINAL: No abdominal or epigastric pain. No nausea, vomiting, or hematemesis; No diarrhea or constipation. No melena or hematochezia.  GENITOURINARY: No dysuria, frequency, hematuria, or incontinence  NEUROLOGICAL: No headaches, memory loss, loss of strength, numbness, or tremors  SKIN: No itching, burning, rashes, or lesions   LYMPH NODES: No enlarged glands  ENDOCRINE: No heat or cold intolerance; No hair loss  MUSCULOSKELETAL: No joint pain or swelling; No muscle, back, or extremity pain  PSYCHIATRIC: No depression, anxiety, mood swings, or difficulty sleeping  HEME/LYMPH: No easy bruising, or bleeding gums  ALLERY AND IMMUNOLOGIC: No hives or eczema  FAMILY HISTORY:  FHx: diabetes mellitus (Mother)      T(C): 35.6 (10-20-22 @ 14:15), Max: 36.7 (10-19-22 @ 22:16)  HR: 76 (10-20-22 @ 14:15) (63 - 76)  BP: 127/60 (10-20-22 @ 14:15) (117/55 - 161/89)  RR: 18 (10-20-22 @ 05:47) (18 - 20)  SpO2: 100% (10-20-22 @ 11:15) (95% - 100%)  Wt(kg): --Vital Signs Last 24 Hrs  T(C): 35.6 (20 Oct 2022 14:15), Max: 36.7 (19 Oct 2022 22:16)  T(F): 96 (20 Oct 2022 14:15), Max: 98.1 (19 Oct 2022 22:16)  HR: 76 (20 Oct 2022 14:15) (63 - 76)  BP: 127/60 (20 Oct 2022 14:15) (117/55 - 161/89)  BP(mean): --  RR: 18 (20 Oct 2022 05:47) (18 - 20)  SpO2: 100% (20 Oct 2022 11:15) (95% - 100%)    Parameters below as of 20 Oct 2022 11:15  Patient On (Oxygen Delivery Method): nasal cannula  O2 Flow (L/min): 2    honeydew (Unknown)  latex (Unknown)  penicillins (Unknown)  pickles (Unknown)  shellfish (Unknown)  sulfa drugs (Hives)  Sulfacetamide Sodium-Sulfur (Rash)      PHYSICAL EXAM:  GENERAL: NAD, well-groomed, well-developed  HEAD:  Atraumatic, Normocephalic  EYES: EOMI, PERRLA, conjunctiva and sclera clear  ENMT: No tonsillar erythema, exudates, or enlargement; Moist mucous membranes, Good dentition, No lesions  NECK: Supple, No JVD, Normal thyroid  NERVOUS SYSTEM:  Alert & Oriented X3, Good concentration; Motor Strength 5/5 B/L upper and lower extremities; DTRs 2+ intact and symmetric  CHEST/LUNG: Clear to percussion bilaterally; No rales, rhonchi, wheezing, or rubs  HEART: Regular rate and rhythm; No murmurs, rubs, or gallops  ABDOMEN: Soft, Nontender, Nondistended; Bowel sounds present  EXTREMITIES:  2+ Peripheral Pulses, No clubbing, cyanosis, or edema  LYMPH: No lymphadenopathy noted  SKIN: No rashes or lesions    Consultant(s) Notes Reviewed:  [x ] YES  [ ] NO  Care Discussed with Consultants/Other Providers [ x] YES  [ ] NO    LABS:      RADIOLOGY & ADDITIONAL TESTS:    Imaging Personally Reviewed:  [ ] YES  [ ] NO  ALPRAZolam 0.25 milliGRAM(s) Oral two times a day PRN  amLODIPine   Tablet 10 milliGRAM(s) Oral daily  aspirin enteric coated 81 milliGRAM(s) Oral daily  atorvastatin 80 milliGRAM(s) Oral at bedtime  cefTRIAXone   IVPB 1000 milliGRAM(s) IV Intermittent every 24 hours  clopidogrel Tablet 75 milliGRAM(s) Oral daily  dextrose 5%. 1000 milliLiter(s) IV Continuous <Continuous>  dextrose 5%. 1000 milliLiter(s) IV Continuous <Continuous>  dextrose 50% Injectable 25 Gram(s) IV Push once  dextrose 50% Injectable 12.5 Gram(s) IV Push once  dextrose 50% Injectable 25 Gram(s) IV Push once  dextrose Oral Gel 15 Gram(s) Oral once PRN  furosemide    Tablet 20 milliGRAM(s) Oral daily  gabapentin 300 milliGRAM(s) Oral three times a day  glucagon  Injectable 1 milliGRAM(s) IntraMuscular once  heparin   Injectable 5000 Unit(s) SubCutaneous every 12 hours  insulin glargine Injectable (LANTUS) 30 Unit(s) SubCutaneous at bedtime  insulin lispro (ADMELOG) corrective regimen sliding scale   SubCutaneous three times a day before meals  insulin lispro Injectable (ADMELOG) 20 Unit(s) SubCutaneous three times a day before meals  levothyroxine 25 MICROGram(s) Oral daily  mirtazapine 15 milliGRAM(s) Oral at bedtime  nortriptyline 25 milliGRAM(s) Oral at bedtime  pantoprazole    Tablet 40 milliGRAM(s) Oral before breakfast  sertraline 100 milliGRAM(s) Oral daily  sodium chloride 0.9%. 1000 milliLiter(s) IV Continuous <Continuous>      HEALTH ISSUES - PROBLEM Dx:           Progress note    Patient seen and examined at bedside. She is arousable to physical stimuli only and minimally converses but goes back to being unarousable.    INTERVAL HPI/OVERNIGHT EVENTS:    REVIEW OF SYSTEMS:  CONSTITUTIONAL: No fever, weight loss, or fatigue  EYES: No eye pain, visual disturbances, or discharge  ENMT:  No difficulty hearing, tinnitus, vertigo; No sinus or throat pain  NECK: No pain or stiffness  BREASTS: No pain, masses, or nipple discharge  RESPIRATORY: No cough, wheezing, chills or hemoptysis; No shortness of breath  CARDIOVASCULAR: No chest pain, palpitations, dizziness, or leg swelling  GASTROINTESTINAL: No abdominal or epigastric pain. No nausea, vomiting, or hematemesis; No diarrhea or constipation. No melena or hematochezia.  GENITOURINARY: No dysuria, frequency, hematuria, or incontinence  NEUROLOGICAL: No headaches, memory loss, loss of strength, numbness, or tremors  SKIN: No itching, burning, rashes, or lesions   LYMPH NODES: No enlarged glands  ENDOCRINE: No heat or cold intolerance; No hair loss  MUSCULOSKELETAL: No joint pain or swelling; No muscle, back, or extremity pain  PSYCHIATRIC: No depression, anxiety, mood swings, or difficulty sleeping  HEME/LYMPH: No easy bruising, or bleeding gums  ALLERY AND IMMUNOLOGIC: No hives or eczema  FAMILY HISTORY:  FHx: diabetes mellitus (Mother)      T(C): 35.6 (10-20-22 @ 14:15), Max: 36.7 (10-19-22 @ 22:16)  HR: 76 (10-20-22 @ 14:15) (63 - 76)  BP: 127/60 (10-20-22 @ 14:15) (117/55 - 161/89)  RR: 18 (10-20-22 @ 05:47) (18 - 20)  SpO2: 100% (10-20-22 @ 11:15) (95% - 100%)  Wt(kg): --Vital Signs Last 24 Hrs  T(C): 35.6 (20 Oct 2022 14:15), Max: 36.7 (19 Oct 2022 22:16)  T(F): 96 (20 Oct 2022 14:15), Max: 98.1 (19 Oct 2022 22:16)  HR: 76 (20 Oct 2022 14:15) (63 - 76)  BP: 127/60 (20 Oct 2022 14:15) (117/55 - 161/89)  BP(mean): --  RR: 18 (20 Oct 2022 05:47) (18 - 20)  SpO2: 100% (20 Oct 2022 11:15) (95% - 100%)    Parameters below as of 20 Oct 2022 11:15  Patient On (Oxygen Delivery Method): nasal cannula  O2 Flow (L/min): 2    honeydew (Unknown)  latex (Unknown)  penicillins (Unknown)  pickles (Unknown)  shellfish (Unknown)  sulfa drugs (Hives)  Sulfacetamide Sodium-Sulfur (Rash)      PHYSICAL EXAM:  GENERAL: NAD, well-groomed, well-developed  HEAD:  Atraumatic, Normocephalic  EYES: EOMI, PERRLA, conjunctiva and sclera clear  ENMT: No tonsillar erythema, exudates, or enlargement; Moist mucous membranes, Good dentition, No lesions  NECK: Supple, No JVD, Normal thyroid  NERVOUS SYSTEM:  Alert & Oriented X3, Good concentration; Motor Strength 5/5 B/L upper and lower extremities; DTRs 2+ intact and symmetric  CHEST/LUNG: Clear to percussion bilaterally; No rales, rhonchi, wheezing, or rubs  HEART: Regular rate and rhythm; No murmurs, rubs, or gallops  ABDOMEN: Soft, Nontender, Nondistended; Bowel sounds present  EXTREMITIES:  2+ Peripheral Pulses, No clubbing, cyanosis, or edema  LYMPH: No lymphadenopathy noted  SKIN: No rashes or lesions    Consultant(s) Notes Reviewed:  [x ] YES  [ ] NO  Care Discussed with Consultants/Other Providers [ x] YES  [ ] NO    LABS:      RADIOLOGY & ADDITIONAL TESTS:    Imaging Personally Reviewed:  [ ] YES  [ ] NO  ALPRAZolam 0.25 milliGRAM(s) Oral two times a day PRN  amLODIPine   Tablet 10 milliGRAM(s) Oral daily  aspirin enteric coated 81 milliGRAM(s) Oral daily  atorvastatin 80 milliGRAM(s) Oral at bedtime  cefTRIAXone   IVPB 1000 milliGRAM(s) IV Intermittent every 24 hours  clopidogrel Tablet 75 milliGRAM(s) Oral daily  dextrose 5%. 1000 milliLiter(s) IV Continuous <Continuous>  dextrose 5%. 1000 milliLiter(s) IV Continuous <Continuous>  dextrose 50% Injectable 25 Gram(s) IV Push once  dextrose 50% Injectable 12.5 Gram(s) IV Push once  dextrose 50% Injectable 25 Gram(s) IV Push once  dextrose Oral Gel 15 Gram(s) Oral once PRN  furosemide    Tablet 20 milliGRAM(s) Oral daily  gabapentin 300 milliGRAM(s) Oral three times a day  glucagon  Injectable 1 milliGRAM(s) IntraMuscular once  heparin   Injectable 5000 Unit(s) SubCutaneous every 12 hours  insulin glargine Injectable (LANTUS) 30 Unit(s) SubCutaneous at bedtime  insulin lispro (ADMELOG) corrective regimen sliding scale   SubCutaneous three times a day before meals  insulin lispro Injectable (ADMELOG) 20 Unit(s) SubCutaneous three times a day before meals  levothyroxine 25 MICROGram(s) Oral daily  mirtazapine 15 milliGRAM(s) Oral at bedtime  nortriptyline 25 milliGRAM(s) Oral at bedtime  pantoprazole    Tablet 40 milliGRAM(s) Oral before breakfast  sertraline 100 milliGRAM(s) Oral daily  sodium chloride 0.9%. 1000 milliLiter(s) IV Continuous <Continuous>      HEALTH ISSUES - PROBLEM Dx:    81 yo F pmhx of HTN, HLD, DM, CVA with L sided deficits, heart block s/p PPM, breast ca, dementia, depression p/w UTI, JABARI and elevated troponin     Acute metabolic encephalopathy, POA  -CT head negative for acute pathology, chronic ischemic changes  -Vitamin b12, folate, RPR, TSH pending  - Medications: hold xanax for now  -No other sources for Infxn, other than Urine    #elevated troponin- no chest pain - r/o ACS  - EKG shows NSR@ 86 BPM, no acute changes.  - admit to telemetry  - cardiology consult - no cardiac w/u needed at this time  - Echo pending    # acute UTI   - c/w ceftriaxone  - f/u UA/cultures     # JABARI, improving  - Cr 2.0 --> 1.7  - IV hydration    # h/o HTN  - c/w norvasc  -DASH diet    #h/o HLD   - c/w statin     #h/o CVA with L sided deficits  - c/w asa, plavix    # h/o DM2   - FS AC/QhS   -hold oral antihyperglycemics, ISS   -CHO diet  - HBA1c 10.4    # h/o Chronic systolic CHF   - c/w Lasix    # h/o Hypothyroid:   c/w synthroid    # Breast CA in remission  - on exemestane 25mg daily which is non formulary    # h/o Neuropathy:   - Continue neurontin    # h/o Depression  #h/o dementia   c/w sertralaline, nortriptyline, mirtazapine   c/w donepezil     #diet- DASH/CHO   # dvt ppx - HSQ   # full code      Dispo: acute - still encephalopathic

## 2022-10-20 NOTE — CONSULT NOTE ADULT - ASSESSMENT
83 yo F pmhx of HTN, HLD, DM, CVA with residual L sided deficits, heart block s/p PPM, breast CA. dementia, depression presents to ER c/o left lower abdominal pain and dysuria. Ptient is poor historian. She denies chest pain sob. Troponin mildy elevated ,. It was elevated in past. No evidence MI. Continue meds. Check EKG. No cardiac W/U needed now. Prognosis guarded

## 2022-10-20 NOTE — CONSULT NOTE ADULT - SUBJECTIVE AND OBJECTIVE BOX
CARDIOLOGY CONSULT NOTE     CHIEF COMPLAINT/REASON FOR CONSULT:    HPI:  Pt is a poor historian, Hx obtained from chart.   81 yo F pmhx of HTN, HLD, DM, CVA with residual L sided deficits, heart block s/p PPM, breast CA. dementia, depression presents to ER c/o left lower abdominal pain and dysuria. Pt c/o burning during urination, no fevers, no hematuria or pyuria. Denies cp, cough. sob, n/v/d, melena. In the ED, on lab work found to have elevated trop 0.03, analy and UTI on UA. Pt was seen and examined at bedside, pt does not know why she is here and is A&Ox1.  (19 Oct 2022 20:59)      PAST MEDICAL & SURGICAL HISTORY:  DM (diabetes mellitus)  24 yr      Breast CA  2014 s/p rt      BP (high blood pressure)  20 yr      Depression      High cholesterol      Bilateral hearing loss, unspecified hearing loss type      CVA (cerebral vascular accident)  6/18 lft weakness      H/O total knee replacement, right      History of lumpectomy of right breast      History of cholecystectomy  1992          Cardiac Risks:   [x ]HTN, [x ] DM, [ ] Smoking, [ ] FH,  [x ] Lipids        MEDICATIONS:  MEDICATIONS  (STANDING):  amLODIPine   Tablet 10 milliGRAM(s) Oral daily  aspirin enteric coated 81 milliGRAM(s) Oral daily  atorvastatin 80 milliGRAM(s) Oral at bedtime  cefTRIAXone   IVPB 1000 milliGRAM(s) IV Intermittent every 24 hours  clopidogrel Tablet 75 milliGRAM(s) Oral daily  dextrose 5%. 1000 milliLiter(s) (100 mL/Hr) IV Continuous <Continuous>  dextrose 5%. 1000 milliLiter(s) (50 mL/Hr) IV Continuous <Continuous>  dextrose 50% Injectable 25 Gram(s) IV Push once  dextrose 50% Injectable 12.5 Gram(s) IV Push once  dextrose 50% Injectable 25 Gram(s) IV Push once  furosemide    Tablet 20 milliGRAM(s) Oral daily  gabapentin 300 milliGRAM(s) Oral three times a day  glucagon  Injectable 1 milliGRAM(s) IntraMuscular once  heparin   Injectable 5000 Unit(s) SubCutaneous every 12 hours  insulin glargine Injectable (LANTUS) 30 Unit(s) SubCutaneous at bedtime  insulin lispro (ADMELOG) corrective regimen sliding scale   SubCutaneous three times a day before meals  insulin lispro Injectable (ADMELOG) 20 Unit(s) SubCutaneous three times a day before meals  levothyroxine 25 MICROGram(s) Oral daily  mirtazapine 15 milliGRAM(s) Oral at bedtime  nortriptyline 25 milliGRAM(s) Oral at bedtime  pantoprazole    Tablet 40 milliGRAM(s) Oral before breakfast  sertraline 100 milliGRAM(s) Oral daily  sodium chloride 0.9%. 1000 milliLiter(s) (50 mL/Hr) IV Continuous <Continuous>      FAMILY HISTORY:  FHx: diabetes mellitus (Mother)        SOCIAL HISTORY:      Allergies    honeydew (Unknown)  latex (Unknown)  penicillins (Unknown)  pickles (Unknown)  shellfish (Unknown)  sulfa drugs (Hives)  Sulfacetamide Sodium-Sulfur (Rash)        	    REVIEW OF SYSTEMS:  CONSTITUTIONAL: No fever, weight loss, or fatigue  EYES: No eye pain, visual disturbances, or discharge  ENMT:  No difficulty hearing, tinnitus, vertigo; No sinus or throat pain  NECK: No pain or stiffness  RESPIRATORY: No cough, wheezing, chills or hemoptysis; No Shortness of Breath  CARDIOVASCULAR: No chest pain, palpitations, passing out, dizziness, or leg swelling  GASTROINTESTINAL: No abdominal or epigastric pain. No nausea, vomiting, or hematemesis; No diarrhea or constipation. No melena or hematochezia.  GENITOURINARY: No dysuria, frequency, hematuria, or incontinence  NEUROLOGICAL: No headaches, memory loss, loss of strength, numbness, or tremors  SKIN: No itching, burning, rashes, or lesions   	    PHYSICAL EXAM:  T(C): 35.8 (10-20-22 @ 05:47), Max: 36.9 (10-19-22 @ 14:30)  HR: 76 (10-20-22 @ 05:47) (69 - 92)  BP: 120/77 (10-20-22 @ 05:47) (117/55 - 161/89)  RR: 18 (10-20-22 @ 05:47) (18 - 20)  SpO2: 99% (10-19-22 @ 22:30) (95% - 99%)  Wt(kg): --  I&O's Summary    19 Oct 2022 07:01  -  20 Oct 2022 07:00  --------------------------------------------------------  IN: 0 mL / OUT: 150 mL / NET: -150 mL        Appearance: Normal	  Psychiatry: A & O x 3, Mood & affect appropriate  HEENT:   Normal oral mucosa, PERRL, EOMI	  Lymphatic: No lymphadenopathy  Cardiovascular: Normal S1 S2,RRR, No JVD, No murmurs  Respiratory: Lungs clear to auscultation	  Gastrointestinal:  Soft, Non-tender, + BS	  Skin: No rashes, No ecchymoses, No cyanosis	  Neurologic: Non-focal  Extremities: Normal range of motion, No clubbing, cyanosis or edema  Vascular: Peripheral pulses palpable 2+ bilaterally      ECG:  	not available    	  LABS:	 	    CARDIAC MARKERS:                                    13.2   8.04  )-----------( 220      ( 19 Oct 2022 16:40 )             41.1     10-20    146  |  103  |  36<H>  ----------------------------<  185<H>  4.1   |  36<H>  |  1.7<H>    Ca    9.4      20 Oct 2022 06:22  Phos  5.1     10-20  Mg     2.5     10-20    TPro  7.0  /  Alb  3.8  /  TBili  0.3  /  DBili  x   /  AST  19  /  ALT  15  /  AlkPhos  138<H>  10-19    PT/INR - ( 19 Oct 2022 16:40 )   PT: 12.40 sec;   INR: 1.08 ratio

## 2022-10-20 NOTE — PHYSICAL THERAPY INITIAL EVALUATION ADULT - MANUAL MUSCLE TESTING RESULTS, REHAB EVAL
Spiritual Plan of Care    Responded to  Trigger for brief visit with pt to introduce chaplaincy services and offer emotional/spiritual support to pt. Discussed pt's family in [place], pt's son in NICU here. Pt is in physical pain (got RN per pt's request to help with pain) and also seems overwhelmed and tearful. Offered to have a colleague f/u later when pt's pain is better controlled which she appreciated.    Pt Name: Lino Hernandez  Pt : 1972  Date: January 10, 2021    Visit type: In person    Referral source: Interdisciplinary team    Reason for visit: Trigger    Visited with: Patient    Taxonomy:    · Intended Effects: Lessen someone's feelings of isolation, Demonstrate caring and concern  · Methods: Offer support, Offer spiritual/Mandaen support, Offer emotional support, Collaborate with care team member  · Interventions: Silent prayer, Identify supportive relationship(s), Explain  role, Acknowledge current situation    Patient Assessment: Overwhelmed, Hopeful    Patient  Intervention: Clarify Feelings, Emotional Support, Empathic Listening    Spiritual Plan of Care: Referral    Patient Reported Outcome:  Coping techniques strengthened    Rev. Sanrda Castro MDiv  Pastoral Care Associate  [email or web address]  [facility]  Pager   Chaplains available by page      B LE grossly assessed 2+/5

## 2022-10-20 NOTE — PHYSICAL THERAPY INITIAL EVALUATION ADULT - GENERAL OBSERVATIONS, REHAB EVAL
14;50-15;12 pt was seen for PT IE at bed side, pt is agreeable, chart thoroughly reviewed, RN Matilde is aware. pt was received semi chau in bed, in no apparent distress, +O2 via NC @ 2l, +hep lock, +call bell within reach, bed side table at reach. Pt is obese

## 2022-10-20 NOTE — PHYSICAL THERAPY INITIAL EVALUATION ADULT - PREDICTED DURATION OF THERAPY (DAYS/WKS), PT EVAL
pt is not a candidate for acute bed side PT due to dependent status at base line and will be pt will be d/c from PT

## 2022-10-20 NOTE — PHYSICAL THERAPY INITIAL EVALUATION ADULT - PERTINENT HX OF CURRENT PROBLEM, REHAB EVAL
Pt is a poor historian, Hx obtained from chart.   83 yo F pmhx of HTN, HLD, DM, CVA with residual L sided deficits, heart block s/p PPM, breast CA. dementia, depression presents to ER c/o left lower abdominal pain and dysuria. Pt c/o burning during urination, no fevers, no hematuria or pyuria. Denies cp, cough. sob, n/v/d, melena. In the ED, on lab work found to have elevated trop 0.03, analy and UTI on UA. Pt was seen and examined at bedside, pt does not know why she is here and is A&Ox1.

## 2022-10-20 NOTE — PHYSICAL THERAPY INITIAL EVALUATION ADULT - ADDITIONAL COMMENTS
pt lives alone in a private house with a private hire 24 hour aid, pt is using WC for amb around the house

## 2022-10-20 NOTE — PHYSICAL THERAPY INITIAL EVALUATION ADULT - FOLLOWS COMMANDS/ANSWERS QUESTIONS, REHAB EVAL
yes yes/for secretions and if enteral feeds are initiated. for secretions and if enteral feeds are initiated./yes 100% of the time/able to follow multistep instructions

## 2022-10-21 LAB
AMPHET UR-MCNC: NEGATIVE — SIGNIFICANT CHANGE UP
ANION GAP SERPL CALC-SCNC: 7 MMOL/L — SIGNIFICANT CHANGE UP (ref 7–14)
BARBITURATES UR SCN-MCNC: NEGATIVE — SIGNIFICANT CHANGE UP
BENZODIAZ UR-MCNC: NEGATIVE — SIGNIFICANT CHANGE UP
BUN SERPL-MCNC: 33 MG/DL — HIGH (ref 10–20)
CALCIUM SERPL-MCNC: 8.7 MG/DL — SIGNIFICANT CHANGE UP (ref 8.4–10.5)
CHLORIDE SERPL-SCNC: 103 MMOL/L — SIGNIFICANT CHANGE UP (ref 98–110)
CO2 SERPL-SCNC: 33 MMOL/L — HIGH (ref 17–32)
COCAINE METAB.OTHER UR-MCNC: NEGATIVE — SIGNIFICANT CHANGE UP
CREAT SERPL-MCNC: 1.5 MG/DL — SIGNIFICANT CHANGE UP (ref 0.7–1.5)
DRUG SCREEN 1, URINE RESULT: SIGNIFICANT CHANGE UP
EGFR: 34 ML/MIN/1.73M2 — LOW
FENTANYL UR QL: NEGATIVE — SIGNIFICANT CHANGE UP
FOLATE SERPL-MCNC: 6.5 NG/ML — SIGNIFICANT CHANGE UP
GLUCOSE BLDC GLUCOMTR-MCNC: 153 MG/DL — HIGH (ref 70–99)
GLUCOSE BLDC GLUCOMTR-MCNC: 190 MG/DL — HIGH (ref 70–99)
GLUCOSE BLDC GLUCOMTR-MCNC: 214 MG/DL — HIGH (ref 70–99)
GLUCOSE BLDC GLUCOMTR-MCNC: 53 MG/DL — CRITICAL LOW (ref 70–99)
GLUCOSE BLDC GLUCOMTR-MCNC: 81 MG/DL — SIGNIFICANT CHANGE UP (ref 70–99)
GLUCOSE SERPL-MCNC: 150 MG/DL — HIGH (ref 70–99)
HCT VFR BLD CALC: 35.6 % — LOW (ref 37–47)
HGB BLD-MCNC: 11.4 G/DL — LOW (ref 12–16)
MAGNESIUM SERPL-MCNC: 2.1 MG/DL — SIGNIFICANT CHANGE UP (ref 1.8–2.4)
MCHC RBC-ENTMCNC: 30.9 PG — SIGNIFICANT CHANGE UP (ref 27–31)
MCHC RBC-ENTMCNC: 32 G/DL — SIGNIFICANT CHANGE UP (ref 32–37)
MCV RBC AUTO: 96.5 FL — SIGNIFICANT CHANGE UP (ref 81–99)
METHADONE UR-MCNC: NEGATIVE — SIGNIFICANT CHANGE UP
NRBC # BLD: 0 /100 WBCS — SIGNIFICANT CHANGE UP (ref 0–0)
OPIATES UR-MCNC: NEGATIVE — SIGNIFICANT CHANGE UP
OXYCODONE UR-MCNC: NEGATIVE — SIGNIFICANT CHANGE UP
PCP UR-MCNC: NEGATIVE — SIGNIFICANT CHANGE UP
PHOSPHATE SERPL-MCNC: 4.2 MG/DL — SIGNIFICANT CHANGE UP (ref 2.1–4.9)
PLATELET # BLD AUTO: 199 K/UL — SIGNIFICANT CHANGE UP (ref 130–400)
POTASSIUM SERPL-MCNC: 4 MMOL/L — SIGNIFICANT CHANGE UP (ref 3.5–5)
POTASSIUM SERPL-SCNC: 4 MMOL/L — SIGNIFICANT CHANGE UP (ref 3.5–5)
PROPOXYPHENE QUALITATIVE URINE RESULT: NEGATIVE — SIGNIFICANT CHANGE UP
RBC # BLD: 3.69 M/UL — LOW (ref 4.2–5.4)
RBC # FLD: 12.7 % — SIGNIFICANT CHANGE UP (ref 11.5–14.5)
SODIUM SERPL-SCNC: 143 MMOL/L — SIGNIFICANT CHANGE UP (ref 135–146)
T PALLIDUM AB TITR SER: NEGATIVE — SIGNIFICANT CHANGE UP
THC UR QL: NEGATIVE — SIGNIFICANT CHANGE UP
TSH SERPL-MCNC: 7.39 UIU/ML — HIGH (ref 0.27–4.2)
VIT B12 SERPL-MCNC: 469 PG/ML — SIGNIFICANT CHANGE UP (ref 232–1245)
WBC # BLD: 7.28 K/UL — SIGNIFICANT CHANGE UP (ref 4.8–10.8)
WBC # FLD AUTO: 7.28 K/UL — SIGNIFICANT CHANGE UP (ref 4.8–10.8)

## 2022-10-21 PROCEDURE — 99232 SBSQ HOSP IP/OBS MODERATE 35: CPT

## 2022-10-21 RX ORDER — LANOLIN ALCOHOL/MO/W.PET/CERES
5 CREAM (GRAM) TOPICAL AT BEDTIME
Refills: 0 | Status: DISCONTINUED | OUTPATIENT
Start: 2022-10-21 | End: 2022-10-22

## 2022-10-21 RX ORDER — ONDANSETRON 8 MG/1
4 TABLET, FILM COATED ORAL EVERY 8 HOURS
Refills: 0 | Status: DISCONTINUED | OUTPATIENT
Start: 2022-10-21 | End: 2022-10-22

## 2022-10-21 RX ORDER — NYSTATIN CREAM 100000 [USP'U]/G
1 CREAM TOPICAL
Refills: 0 | Status: DISCONTINUED | OUTPATIENT
Start: 2022-10-21 | End: 2022-10-22

## 2022-10-21 RX ORDER — AMLODIPINE BESYLATE 2.5 MG/1
2.5 TABLET ORAL DAILY
Refills: 0 | Status: DISCONTINUED | OUTPATIENT
Start: 2022-10-22 | End: 2022-10-22

## 2022-10-21 RX ORDER — ACETAMINOPHEN 500 MG
650 TABLET ORAL EVERY 6 HOURS
Refills: 0 | Status: DISCONTINUED | OUTPATIENT
Start: 2022-10-21 | End: 2022-10-22

## 2022-10-21 RX ADMIN — PANTOPRAZOLE SODIUM 40 MILLIGRAM(S): 20 TABLET, DELAYED RELEASE ORAL at 06:05

## 2022-10-21 RX ADMIN — GABAPENTIN 300 MILLIGRAM(S): 400 CAPSULE ORAL at 21:48

## 2022-10-21 RX ADMIN — HEPARIN SODIUM 5000 UNIT(S): 5000 INJECTION INTRAVENOUS; SUBCUTANEOUS at 06:05

## 2022-10-21 RX ADMIN — SERTRALINE 100 MILLIGRAM(S): 25 TABLET, FILM COATED ORAL at 12:23

## 2022-10-21 RX ADMIN — INSULIN GLARGINE 30 UNIT(S): 100 INJECTION, SOLUTION SUBCUTANEOUS at 21:49

## 2022-10-21 RX ADMIN — AMLODIPINE BESYLATE 10 MILLIGRAM(S): 2.5 TABLET ORAL at 06:04

## 2022-10-21 RX ADMIN — Medication 1: at 08:29

## 2022-10-21 RX ADMIN — CEFTRIAXONE 100 MILLIGRAM(S): 500 INJECTION, POWDER, FOR SOLUTION INTRAMUSCULAR; INTRAVENOUS at 18:12

## 2022-10-21 RX ADMIN — NYSTATIN CREAM 1 APPLICATION(S): 100000 CREAM TOPICAL at 18:13

## 2022-10-21 RX ADMIN — Medication 25 MICROGRAM(S): at 06:04

## 2022-10-21 RX ADMIN — Medication 20 UNIT(S): at 12:22

## 2022-10-21 RX ADMIN — GABAPENTIN 300 MILLIGRAM(S): 400 CAPSULE ORAL at 06:04

## 2022-10-21 RX ADMIN — Medication 81 MILLIGRAM(S): at 12:23

## 2022-10-21 RX ADMIN — Medication 20 UNIT(S): at 08:28

## 2022-10-21 RX ADMIN — CLOPIDOGREL BISULFATE 75 MILLIGRAM(S): 75 TABLET, FILM COATED ORAL at 12:23

## 2022-10-21 RX ADMIN — ATORVASTATIN CALCIUM 80 MILLIGRAM(S): 80 TABLET, FILM COATED ORAL at 21:48

## 2022-10-21 RX ADMIN — Medication 5 MILLIGRAM(S): at 21:48

## 2022-10-21 RX ADMIN — HEPARIN SODIUM 5000 UNIT(S): 5000 INJECTION INTRAVENOUS; SUBCUTANEOUS at 18:13

## 2022-10-21 RX ADMIN — NORTRIPTYLINE HYDROCHLORIDE 25 MILLIGRAM(S): 10 CAPSULE ORAL at 21:48

## 2022-10-21 RX ADMIN — Medication 1: at 12:23

## 2022-10-21 RX ADMIN — GABAPENTIN 300 MILLIGRAM(S): 400 CAPSULE ORAL at 13:25

## 2022-10-21 RX ADMIN — MIRTAZAPINE 15 MILLIGRAM(S): 45 TABLET, ORALLY DISINTEGRATING ORAL at 21:48

## 2022-10-21 RX ADMIN — Medication 20 MILLIGRAM(S): at 06:04

## 2022-10-21 NOTE — DIETITIAN INITIAL EVALUATION ADULT - PERTINENT MEDS FT
MEDICATIONS  (STANDING):  amLODIPine   Tablet 10 milliGRAM(s) Oral daily  aspirin enteric coated 81 milliGRAM(s) Oral daily  atorvastatin 80 milliGRAM(s) Oral at bedtime  cefTRIAXone   IVPB 1000 milliGRAM(s) IV Intermittent every 24 hours  clopidogrel Tablet 75 milliGRAM(s) Oral daily  dextrose 5%. 1000 milliLiter(s) (100 mL/Hr) IV Continuous <Continuous>  dextrose 5%. 1000 milliLiter(s) (50 mL/Hr) IV Continuous <Continuous>  dextrose 50% Injectable 25 Gram(s) IV Push once  dextrose 50% Injectable 12.5 Gram(s) IV Push once  dextrose 50% Injectable 25 Gram(s) IV Push once  furosemide    Tablet 20 milliGRAM(s) Oral daily  gabapentin 300 milliGRAM(s) Oral three times a day  glucagon  Injectable 1 milliGRAM(s) IntraMuscular once  heparin   Injectable 5000 Unit(s) SubCutaneous every 12 hours  insulin glargine Injectable (LANTUS) 30 Unit(s) SubCutaneous at bedtime  insulin lispro (ADMELOG) corrective regimen sliding scale   SubCutaneous three times a day before meals  insulin lispro Injectable (ADMELOG) 20 Unit(s) SubCutaneous three times a day before meals  levothyroxine 25 MICROGram(s) Oral daily  mirtazapine 15 milliGRAM(s) Oral at bedtime  nortriptyline 25 milliGRAM(s) Oral at bedtime  pantoprazole    Tablet 40 milliGRAM(s) Oral before breakfast  sertraline 100 milliGRAM(s) Oral daily  sodium chloride 0.9%. 1000 milliLiter(s) (50 mL/Hr) IV Continuous <Continuous>  sodium chloride 0.9%. 1000 milliLiter(s) (100 mL/Hr) IV Continuous <Continuous>    MEDICATIONS  (PRN):  dextrose Oral Gel 15 Gram(s) Oral once PRN Blood Glucose LESS THAN 70 milliGRAM(s)/deciliter

## 2022-10-21 NOTE — DIETITIAN INITIAL EVALUATION ADULT - ADD RECOMMEND
RD to monitor tolerance to po diet, labs/meds, NFPF and f/u as needed within 4-6 days  moderate risk

## 2022-10-21 NOTE — DIETITIAN INITIAL EVALUATION ADULT - OTHER INFO
pt is 82 year old female with hx of HTN, DM, CVA with L residual deficits, PPM, breast CA, dementia, depression presents with LLQ abd pain and dysuria, admitted with UTI, JABARI, elevated trops r/o ACS

## 2022-10-21 NOTE — DIETITIAN INITIAL EVALUATION ADULT - NS FNS DIET ORDER
Diet, DASH/TLC:   Sodium & Cholesterol Restricted  Consistent Carbohydrate {No Snacks} (10-19-22 @ 21:40) [Active]

## 2022-10-21 NOTE — DIETITIAN INITIAL EVALUATION ADULT - PERTINENT LABORATORY DATA
10-21    143  |  103  |  33<H>  ----------------------------<  150<H>  4.0   |  33<H>  |  1.5    Ca    8.7      21 Oct 2022 06:30  Phos  4.2     10-21  Mg     2.1     10-21    TPro  7.0  /  Alb  3.8  /  TBili  0.3  /  DBili  x   /  AST  19  /  ALT  15  /  AlkPhos  138<H>  10-19  POCT Blood Glucose.: 190 mg/dL (10-21-22 @ 11:26)  A1C with Estimated Average Glucose Result: 10.4 % (10-20-22 @ 06:22)  A1C with Estimated Average Glucose Result: 9.2 % (01-14-22 @ 10:00)  A1C with Estimated Average Glucose Result: 8.2 % (12-31-21 @ 15:56)

## 2022-10-21 NOTE — PROGRESS NOTE ADULT - SUBJECTIVE AND OBJECTIVE BOX
Progress note    Patient seen and examined at bedside. She appears back to baseline. she is AAOx3, not on any oxygen    INTERVAL HPI/OVERNIGHT EVENTS:    REVIEW OF SYSTEMS:  CONSTITUTIONAL: No fever, weight loss, or fatigue  EYES: No eye pain, visual disturbances, or discharge  ENMT:  No difficulty hearing, tinnitus, vertigo; No sinus or throat pain  NECK: No pain or stiffness  BREASTS: No pain, masses, or nipple discharge  RESPIRATORY: No cough, wheezing, chills or hemoptysis; No shortness of breath  CARDIOVASCULAR: No chest pain, palpitations, dizziness, or leg swelling  GASTROINTESTINAL: No abdominal or epigastric pain. No nausea, vomiting, or hematemesis; No diarrhea or constipation. No melena or hematochezia.  GENITOURINARY: No dysuria, frequency, hematuria, or incontinence  NEUROLOGICAL: No headaches, memory loss, loss of strength, numbness, or tremors  SKIN: No itching, burning, rashes, or lesions   LYMPH NODES: No enlarged glands  ENDOCRINE: No heat or cold intolerance; No hair loss  MUSCULOSKELETAL: No joint pain or swelling; No muscle, back, or extremity pain  PSYCHIATRIC: No depression, anxiety, mood swings, or difficulty sleeping  HEME/LYMPH: No easy bruising, or bleeding gums  ALLERY AND IMMUNOLOGIC: No hives or eczema  FAMILY HISTORY:  FHx: diabetes mellitus (Mother)      T(C): 36.1 (10-21-22 @ 14:08), Max: 36.7 (10-21-22 @ 05:21)  HR: 92 (10-21-22 @ 14:08) (78 - 92)  BP: 109/58 (10-21-22 @ 14:08) (99/52 - 147/67)  RR: 16 (10-21-22 @ 14:08) (16 - 18)  SpO2: 94% (10-21-22 @ 08:28) (92% - 95%)  Wt(kg): --Vital Signs Last 24 Hrs  T(C): 36.1 (21 Oct 2022 14:08), Max: 36.7 (21 Oct 2022 05:21)  T(F): 97 (21 Oct 2022 14:08), Max: 98 (21 Oct 2022 05:21)  HR: 92 (21 Oct 2022 14:08) (78 - 92)  BP: 109/58 (21 Oct 2022 14:08) (99/52 - 147/67)  BP(mean): --  RR: 16 (21 Oct 2022 14:08) (16 - 18)  SpO2: 94% (21 Oct 2022 08:28) (92% - 95%)    Parameters below as of 21 Oct 2022 08:28  Patient On (Oxygen Delivery Method): room air      honeydew (Unknown)  latex (Unknown)  penicillins (Unknown)  pickles (Unknown)  shellfish (Unknown)  sulfa drugs (Hives)  Sulfacetamide Sodium-Sulfur (Rash)      PHYSICAL EXAM:  GENERAL: NAD, well-groomed, well-developed  HEAD:  Atraumatic, Normocephalic  EYES: EOMI, PERRLA, conjunctiva and sclera clear  ENMT: No tonsillar erythema, exudates, or enlargement; Moist mucous membranes, Good dentition, No lesions  NECK: Supple, No JVD, Normal thyroid  NERVOUS SYSTEM:  Alert & Oriented X3, Good concentration; Motor Strength 5/5 B/L upper and lower extremities; DTRs 2+ intact and symmetric  CHEST/LUNG: Clear to percussion bilaterally; No rales, rhonchi, wheezing, or rubs  HEART: Regular rate and rhythm; No murmurs, rubs, or gallops  ABDOMEN: Soft, Nontender, Nondistended; Bowel sounds present  EXTREMITIES:  2+ Peripheral Pulses, No clubbing, cyanosis, or edema  LYMPH: No lymphadenopathy noted  SKIN: No rashes or lesions    Consultant(s) Notes Reviewed:  [x ] YES  [ ] NO  Care Discussed with Consultants/Other Providers [ x] YES  [ ] NO    LABS:      RADIOLOGY & ADDITIONAL TESTS:    Imaging Personally Reviewed:  [ ] YES  [ ] NO  aspirin enteric coated 81 milliGRAM(s) Oral daily  atorvastatin 80 milliGRAM(s) Oral at bedtime  cefTRIAXone   IVPB 1000 milliGRAM(s) IV Intermittent every 24 hours  clopidogrel Tablet 75 milliGRAM(s) Oral daily  dextrose 5%. 1000 milliLiter(s) IV Continuous <Continuous>  dextrose 5%. 1000 milliLiter(s) IV Continuous <Continuous>  dextrose 50% Injectable 25 Gram(s) IV Push once  dextrose 50% Injectable 12.5 Gram(s) IV Push once  dextrose 50% Injectable 25 Gram(s) IV Push once  dextrose Oral Gel 15 Gram(s) Oral once PRN  furosemide    Tablet 20 milliGRAM(s) Oral daily  gabapentin 300 milliGRAM(s) Oral three times a day  glucagon  Injectable 1 milliGRAM(s) IntraMuscular once  heparin   Injectable 5000 Unit(s) SubCutaneous every 12 hours  insulin glargine Injectable (LANTUS) 30 Unit(s) SubCutaneous at bedtime  insulin lispro (ADMELOG) corrective regimen sliding scale   SubCutaneous three times a day before meals  insulin lispro Injectable (ADMELOG) 20 Unit(s) SubCutaneous three times a day before meals  levothyroxine 25 MICROGram(s) Oral daily  mirtazapine 15 milliGRAM(s) Oral at bedtime  nortriptyline 25 milliGRAM(s) Oral at bedtime  nystatin Powder 1 Application(s) Topical two times a day  pantoprazole    Tablet 40 milliGRAM(s) Oral before breakfast  sertraline 100 milliGRAM(s) Oral daily  sodium chloride 0.9%. 1000 milliLiter(s) IV Continuous <Continuous>  sodium chloride 0.9%. 1000 milliLiter(s) IV Continuous <Continuous>      HEALTH ISSUES - PROBLEM Dx:    83 yo F pmhx of HTN, HLD, DM, CVA with L sided deficits, heart block s/p PPM, breast ca, dementia, depression p/w UTI, JABARI and elevated troponin     Acute metabolic encephalopathy, POA  -CT head negative for acute pathology, chronic ischemic changes  -Vitamin b12, folate, RPR, TSH pending  - Medications: hold xanax for now  -No other sources for Infxn, other than Urine    #elevated troponin- no chest pain - r/o ACS  - EKG shows NSR@ 86 BPM, no acute changes.  - admit to telemetry  - cardiology consult - no cardiac w/u needed at this time  - Echo pending    # acute UTI   - c/w ceftriaxone  - f/u UA/cultures     # JABARI, improving  - Cr 2.0 --> 1.5  - IV hydration    # h/o HTN  - Decrease amlodipine 10mg --> 1.5  -DASH diet    #h/o HLD   - c/w statin     #h/o CVA with L sided deficits  - c/w asa, plavix    # h/o DM2   - FS AC/QhS   -hold oral antihyperglycemics, ISS   -CHO diet  - HBA1c 10.4    # h/o Chronic systolic CHF   - c/w Lasix    # h/o Hypothyroid:   c/w synthroid    # Breast CA in remission  - on exemestane 25mg daily which is non formulary    # h/o Neuropathy:   - Continue neurontin    # h/o Depression  #h/o dementia   c/w sertralaline, nortriptyline, mirtazapine   c/w donepezil     #diet- DASH/CHO   # dvt ppx - HSQ   # full code      Dispo: acute - Appears back to baseline. awaiting to reinstate home aid for discharge, anticipate in am

## 2022-10-21 NOTE — DIETITIAN INITIAL EVALUATION ADULT - ORAL INTAKE PTA/DIET HISTORY
as per pt good po intake PTA. denies any recent weight changes     presently tolerating po diet well, observed at lunch consumed >75% of meals as per pt good po intake PTA. denies any recent weight changes. as per EMR ~6% loss since January (non-significant) BMI remains >30    presently tolerating po diet well, observed at lunch consumed >75% of meals

## 2022-10-22 ENCOUNTER — TRANSCRIPTION ENCOUNTER (OUTPATIENT)
Age: 82
End: 2022-10-22

## 2022-10-22 VITALS
DIASTOLIC BLOOD PRESSURE: 63 MMHG | TEMPERATURE: 97 F | SYSTOLIC BLOOD PRESSURE: 140 MMHG | HEART RATE: 83 BPM | OXYGEN SATURATION: 95 % | RESPIRATION RATE: 18 BRPM

## 2022-10-22 LAB
-  AMIKACIN: SIGNIFICANT CHANGE UP
-  AMOXICILLIN/CLAVULANIC ACID: SIGNIFICANT CHANGE UP
-  AMPICILLIN/SULBACTAM: SIGNIFICANT CHANGE UP
-  AMPICILLIN: SIGNIFICANT CHANGE UP
-  AZTREONAM: SIGNIFICANT CHANGE UP
-  CEFAZOLIN: SIGNIFICANT CHANGE UP
-  CEFEPIME: SIGNIFICANT CHANGE UP
-  CEFOXITIN: SIGNIFICANT CHANGE UP
-  CEFTRIAXONE: SIGNIFICANT CHANGE UP
-  CIPROFLOXACIN: SIGNIFICANT CHANGE UP
-  ERTAPENEM: SIGNIFICANT CHANGE UP
-  GENTAMICIN: SIGNIFICANT CHANGE UP
-  IMIPENEM: SIGNIFICANT CHANGE UP
-  LEVOFLOXACIN: SIGNIFICANT CHANGE UP
-  MEROPENEM: SIGNIFICANT CHANGE UP
-  NITROFURANTOIN: SIGNIFICANT CHANGE UP
-  PIPERACILLIN/TAZOBACTAM: SIGNIFICANT CHANGE UP
-  TIGECYCLINE: SIGNIFICANT CHANGE UP
-  TOBRAMYCIN: SIGNIFICANT CHANGE UP
-  TRIMETHOPRIM/SULFAMETHOXAZOLE: SIGNIFICANT CHANGE UP
ANION GAP SERPL CALC-SCNC: 6 MMOL/L — LOW (ref 7–14)
BUN SERPL-MCNC: 27 MG/DL — HIGH (ref 10–20)
CALCIUM SERPL-MCNC: 8.4 MG/DL — SIGNIFICANT CHANGE UP (ref 8.4–10.5)
CHLORIDE SERPL-SCNC: 106 MMOL/L — SIGNIFICANT CHANGE UP (ref 98–110)
CO2 SERPL-SCNC: 31 MMOL/L — SIGNIFICANT CHANGE UP (ref 17–32)
CREAT SERPL-MCNC: 1.3 MG/DL — SIGNIFICANT CHANGE UP (ref 0.7–1.5)
CULTURE RESULTS: SIGNIFICANT CHANGE UP
EGFR: 41 ML/MIN/1.73M2 — LOW
GLUCOSE BLDC GLUCOMTR-MCNC: 152 MG/DL — HIGH (ref 70–99)
GLUCOSE BLDC GLUCOMTR-MCNC: 287 MG/DL — HIGH (ref 70–99)
GLUCOSE SERPL-MCNC: 143 MG/DL — HIGH (ref 70–99)
HCT VFR BLD CALC: 32.8 % — LOW (ref 37–47)
HGB BLD-MCNC: 10.5 G/DL — LOW (ref 12–16)
MAGNESIUM SERPL-MCNC: 2 MG/DL — SIGNIFICANT CHANGE UP (ref 1.8–2.4)
MCHC RBC-ENTMCNC: 30.5 PG — SIGNIFICANT CHANGE UP (ref 27–31)
MCHC RBC-ENTMCNC: 32 G/DL — SIGNIFICANT CHANGE UP (ref 32–37)
MCV RBC AUTO: 95.3 FL — SIGNIFICANT CHANGE UP (ref 81–99)
METHOD TYPE: SIGNIFICANT CHANGE UP
NRBC # BLD: 0 /100 WBCS — SIGNIFICANT CHANGE UP (ref 0–0)
ORGANISM # SPEC MICROSCOPIC CNT: SIGNIFICANT CHANGE UP
ORGANISM # SPEC MICROSCOPIC CNT: SIGNIFICANT CHANGE UP
PLATELET # BLD AUTO: 194 K/UL — SIGNIFICANT CHANGE UP (ref 130–400)
POTASSIUM SERPL-MCNC: 3.8 MMOL/L — SIGNIFICANT CHANGE UP (ref 3.5–5)
POTASSIUM SERPL-SCNC: 3.8 MMOL/L — SIGNIFICANT CHANGE UP (ref 3.5–5)
RBC # BLD: 3.44 M/UL — LOW (ref 4.2–5.4)
RBC # FLD: 13 % — SIGNIFICANT CHANGE UP (ref 11.5–14.5)
SODIUM SERPL-SCNC: 143 MMOL/L — SIGNIFICANT CHANGE UP (ref 135–146)
SPECIMEN SOURCE: SIGNIFICANT CHANGE UP
WBC # BLD: 6.99 K/UL — SIGNIFICANT CHANGE UP (ref 4.8–10.8)
WBC # FLD AUTO: 6.99 K/UL — SIGNIFICANT CHANGE UP (ref 4.8–10.8)

## 2022-10-22 PROCEDURE — 99239 HOSP IP/OBS DSCHRG MGMT >30: CPT

## 2022-10-22 PROCEDURE — 71045 X-RAY EXAM CHEST 1 VIEW: CPT | Mod: 26

## 2022-10-22 RX ORDER — NITROFURANTOIN MACROCRYSTAL 50 MG
1 CAPSULE ORAL
Qty: 6 | Refills: 0
Start: 2022-10-22 | End: 2022-10-24

## 2022-10-22 RX ORDER — CIPROFLOXACIN LACTATE 400MG/40ML
1 VIAL (ML) INTRAVENOUS
Qty: 6 | Refills: 0
Start: 2022-10-22 | End: 2022-10-24

## 2022-10-22 RX ADMIN — SERTRALINE 100 MILLIGRAM(S): 25 TABLET, FILM COATED ORAL at 14:15

## 2022-10-22 RX ADMIN — Medication 3: at 17:04

## 2022-10-22 RX ADMIN — Medication 25 MICROGRAM(S): at 06:00

## 2022-10-22 RX ADMIN — NYSTATIN CREAM 1 APPLICATION(S): 100000 CREAM TOPICAL at 06:00

## 2022-10-22 RX ADMIN — HEPARIN SODIUM 5000 UNIT(S): 5000 INJECTION INTRAVENOUS; SUBCUTANEOUS at 06:02

## 2022-10-22 RX ADMIN — AMLODIPINE BESYLATE 2.5 MILLIGRAM(S): 2.5 TABLET ORAL at 06:01

## 2022-10-22 RX ADMIN — Medication 81 MILLIGRAM(S): at 14:14

## 2022-10-22 RX ADMIN — GABAPENTIN 300 MILLIGRAM(S): 400 CAPSULE ORAL at 06:00

## 2022-10-22 RX ADMIN — PANTOPRAZOLE SODIUM 40 MILLIGRAM(S): 20 TABLET, DELAYED RELEASE ORAL at 06:02

## 2022-10-22 RX ADMIN — CLOPIDOGREL BISULFATE 75 MILLIGRAM(S): 75 TABLET, FILM COATED ORAL at 14:14

## 2022-10-22 RX ADMIN — NYSTATIN CREAM 1 APPLICATION(S): 100000 CREAM TOPICAL at 18:52

## 2022-10-22 RX ADMIN — Medication 20 MILLIGRAM(S): at 06:00

## 2022-10-22 RX ADMIN — GABAPENTIN 300 MILLIGRAM(S): 400 CAPSULE ORAL at 14:14

## 2022-10-22 NOTE — DISCHARGE NOTE NURSING/CASE MANAGEMENT/SOCIAL WORK - NSDCVIVACCINE_GEN_ALL_CORE_FT
influenza, high-dose, quadrivalent; 12-Nov-2021 18:58; Chris Martines (RN); [employer]; [id number] (Exp. Date: 30-Jun-2022); IntraMuscular; Deltoid Left.; 0.7 milliLiter(s); VIS (VIS Published: 06-Aug-2021, VIS Presented: 12-Nov-2021);

## 2022-10-22 NOTE — DISCHARGE NOTE NURSING/CASE MANAGEMENT/SOCIAL WORK - PATIENT PORTAL LINK FT
You can access the FollowMyHealth Patient Portal offered by [facility] by registering at the following website: [email or web address]. By joining [facility]’s FollowMyHealth portal, you will also be able to view your health information using other applications (apps) compatible with our system.

## 2022-10-22 NOTE — DISCHARGE NOTE PROVIDER - ATTENDING DISCHARGE PHYSICAL EXAMINATION:
Patient seen and examined at bedside. She denies any acute distress. She is AAOx3    VITALS:   T(C): 35.8 (10-22-22 @ 14:00), Max: 36.1 (10-21-22 @ 20:31)  HR: 86 (10-22-22 @ 14:00) (84 - 87)  BP: 134/60 (10-22-22 @ 14:00) (122/64 - 136/65)  RR: 17 (10-22-22 @ 14:00) (16 - 17)  SpO2: 99% (10-21-22 @ 22:00) (99% - 99%)    GENERAL: NAD, lying in bed comfortably  HEAD:  Atraumatic, normocephalic  EYES: EOMI, PERRLA, conjunctiva and sclera clear  ENT: Moist mucous membranes  NECK: Supple, no JVD  HEART: Regular rate and rhythm, no murmurs, rubs, or gallops  LUNGS: Unlabored respirations.  Clear to auscultation bilaterally, no crackles, wheezing, or rhonchi  ABDOMEN: Soft, nontender, nondistended, +BS  EXTREMITIES: 2+ peripheral pulses bilaterally. No clubbing, cyanosis, or edema  NERVOUS SYSTEM:  A&Ox3, no focal deficits   SKIN: No rashes or lesions

## 2022-10-22 NOTE — DISCHARGE NOTE PROVIDER - NSDCFUSCHEDAPPT_GEN_ALL_CORE_FT
Robert Norwood  [facility]  CARDIOLOGY [address]  Scheduled Appointment: 11/08/2022    [facility]  CARDIOLOGY [address]  Scheduled Appointment: 11/10/2022

## 2022-10-22 NOTE — DISCHARGE NOTE PROVIDER - DISCHARGING ATTENDING PHYSICIAN:
DISPLAY PLAN FREE TEXT DISPLAY PLAN FREE TEXT DISPLAY PLAN FREE TEXT DISPLAY PLAN FREE TEXT DISPLAY PLAN FREE TEXT DISPLAY PLAN FREE TEXT DISPLAY PLAN FREE TEXT DISPLAY PLAN FREE TEXT DISPLAY PLAN FREE TEXT DISPLAY PLAN FREE TEXT DISPLAY PLAN FREE TEXT DISPLAY PLAN FREE TEXT DISPLAY PLAN FREE TEXT DISPLAY PLAN FREE TEXT DISPLAY PLAN FREE TEXT DISPLAY PLAN FREE TEXT DISPLAY PLAN FREE TEXT DISPLAY PLAN FREE TEXT DISPLAY PLAN FREE TEXT DISPLAY PLAN FREE TEXT DISPLAY PLAN FREE TEXT DISPLAY PLAN FREE TEXT DISPLAY PLAN FREE TEXT DISPLAY PLAN FREE TEXT DISPLAY PLAN FREE TEXT DISPLAY PLAN FREE TEXT DISPLAY PLAN FREE TEXT DISPLAY PLAN FREE TEXT DISPLAY PLAN FREE TEXT DISPLAY PLAN FREE TEXT DISPLAY PLAN FREE TEXT DISPLAY PLAN FREE TEXT DISPLAY PLAN FREE TEXT DISPLAY PLAN FREE TEXT DISPLAY PLAN FREE TEXT DISPLAY PLAN FREE TEXT DISPLAY PLAN FREE TEXT DISPLAY PLAN FREE TEXT DISPLAY PLAN FREE TEXT DISPLAY PLAN FREE TEXT Trinh Krishnamurthy

## 2022-10-22 NOTE — DISCHARGE NOTE PROVIDER - CARE PROVIDER_API CALL
Ariel Macias)  Internal Medicine  [address] [place] [address]  Phone: [phone]  Fax: [phone]  Follow Up Time: 1 week

## 2022-10-22 NOTE — DISCHARGE NOTE PROVIDER - NSDCMRMEDTOKEN_GEN_ALL_CORE_FT
ALPRAZOLAM  0.25 MG TABS:   amLODIPine 10 mg oral tablet: 1 tab(s) orally once a day  atorvastatin 80 mg oral tablet: 1 tab(s) orally once a day (at bedtime)  clopidogrel 75 mg oral tablet: 1 tab(s) orally once a day  Ecotrin Adult Low Strength 81 mg oral delayed release tablet: 2 tab(s) orally once a day  exemestane 25 mg oral tablet: 1 tab(s) orally once a day  ferrous sulfate 324 mg (65 mg elemental iron) oral tablet: 1  orally 2 times a day  gabapentin 300 mg oral capsule: orally 2 times a day  HumaLOG 100 units/mL subcutaneous solution: Per insulin sliding scale at home  Lasix 20 mg oral tablet: 1 tab(s) orally once a day  Levemir: 30  subcutaneous once a day (at bedtime)  levothyroxine 25 mcg (0.025 mg) oral tablet: 1 tab(s) orally once a day  mirtazapine 15 mg oral tablet: 1 tab(s) orally once a day (at bedtime)  nortriptyline 25 mg oral capsule: 1 cap(s) orally once a day (at bedtime)  pantoprazole 40 mg oral delayed release tablet: 1 tab(s) orally once a day (before a meal)  senna oral tablet: 2 tab(s) orally once a day (at bedtime)- PRN for constipation- OTC is okay  sertraline 100 mg oral tablet: 1 tab(s) orally once a day  temazepam 30 mg oral capsule: 1 cap(s) orally once a day (at bedtime)   amLODIPine 10 mg oral tablet: 1 tab(s) orally once a day  atorvastatin 80 mg oral tablet: 1 tab(s) orally once a day (at bedtime)  clopidogrel 75 mg oral tablet: 1 tab(s) orally once a day  Ecotrin Adult Low Strength 81 mg oral delayed release tablet: 2 tab(s) orally once a day  exemestane 25 mg oral tablet: 1 tab(s) orally once a day  ferrous sulfate 324 mg (65 mg elemental iron) oral tablet: 1  orally 2 times a day  gabapentin 300 mg oral capsule: orally 2 times a day  HumaLOG 100 units/mL subcutaneous solution: Per insulin sliding scale at home  Lasix 20 mg oral tablet: 1 tab(s) orally once a day  Levemir: 30  subcutaneous once a day (at bedtime)  levothyroxine 25 mcg (0.025 mg) oral tablet: 1 tab(s) orally once a day  Macrobid 100 mg oral capsule: 1 cap(s) orally 2 times a day   mirtazapine 15 mg oral tablet: 1 tab(s) orally once a day (at bedtime)  nortriptyline 25 mg oral capsule: 1 cap(s) orally once a day (at bedtime)  pantoprazole 40 mg oral delayed release tablet: 1 tab(s) orally once a day (before a meal)  senna oral tablet: 2 tab(s) orally once a day (at bedtime)- PRN for constipation- OTC is okay  sertraline 100 mg oral tablet: 1 tab(s) orally once a day  temazepam 30 mg oral capsule: 1 cap(s) orally once a day (at bedtime)   amLODIPine 10 mg oral tablet: 1 tab(s) orally once a day  atorvastatin 80 mg oral tablet: 1 tab(s) orally once a day (at bedtime)  Cipro 250 mg oral tablet: 1 tab(s) orally 2 times a day   clopidogrel 75 mg oral tablet: 1 tab(s) orally once a day  Ecotrin Adult Low Strength 81 mg oral delayed release tablet: 2 tab(s) orally once a day  exemestane 25 mg oral tablet: 1 tab(s) orally once a day  ferrous sulfate 324 mg (65 mg elemental iron) oral tablet: 1  orally 2 times a day  gabapentin 300 mg oral capsule: orally 2 times a day  HumaLOG 100 units/mL subcutaneous solution: Per insulin sliding scale at home  Lasix 20 mg oral tablet: 1 tab(s) orally once a day  Levemir: 30  subcutaneous once a day (at bedtime)  levothyroxine 25 mcg (0.025 mg) oral tablet: 1 tab(s) orally once a day  mirtazapine 15 mg oral tablet: 1 tab(s) orally once a day (at bedtime)  nortriptyline 25 mg oral capsule: 1 cap(s) orally once a day (at bedtime)  pantoprazole 40 mg oral delayed release tablet: 1 tab(s) orally once a day (before a meal)  senna oral tablet: 2 tab(s) orally once a day (at bedtime)- PRN for constipation- OTC is okay  sertraline 100 mg oral tablet: 1 tab(s) orally once a day  temazepam 30 mg oral capsule: 1 cap(s) orally once a day (at bedtime)

## 2022-10-22 NOTE — DISCHARGE NOTE PROVIDER - HOSPITAL COURSE
Pt is an 82F w/ PMH HTN, HLD, DM, CVA with L sided deficits, heart block s/p PPM, breast ca, dementia, depression admitted for UTI, JABARI and elevated troponins. On admission, pt was started on IV abx, IV fluids and troponins were trended. Troponins were flat and patient was cleared by cardiology. Urine cultures grew ecoli. JABARI resolved with hydration. Pt stable and ready for discharge. Pt is an 82F w/ PMH HTN, HLD, DM, CVA with L sided deficits, heart block s/p PPM, breast ca, dementia, depression admitted for UTI, JABARI and elevated troponins. On admission, pt was started on IV abx, IV fluids and troponins were trended. Troponins were flat and patient was cleared by cardiology. Urine cultures grew ecoli. JABARI resolved with hydration. Pt will be discharged on oral abx to complete course. Pt stable and ready for discharge.

## 2022-10-22 NOTE — DISCHARGE NOTE PROVIDER - NSDCCPCAREPLAN_GEN_ALL_CORE_FT
PRINCIPAL DISCHARGE DIAGNOSIS  Diagnosis: Acute UTI  Assessment and Plan of Treatment: You were treated with IV antibiotics.   You should follow up with your PCP upon discharge.      SECONDARY DISCHARGE DIAGNOSES  Diagnosis: JABARI (acute kidney injury)  Assessment and Plan of Treatment: You were treated with IV fluids and your kidney injury resolved. This was likely secondary to your UTI.     PRINCIPAL DISCHARGE DIAGNOSIS  Diagnosis: Acute UTI  Assessment and Plan of Treatment: You were treated with IV antibiotics.   You should follow up with your PCP upon discharge.  A prescription was sent to your pharmacy for 3 more days of oral antibiotics.      SECONDARY DISCHARGE DIAGNOSES  Diagnosis: JABARI (acute kidney injury)  Assessment and Plan of Treatment: You were treated with IV fluids and your kidney injury resolved. This was likely secondary to your UTI.

## 2022-11-02 DIAGNOSIS — B96.20 UNSPECIFIED ESCHERICHIA COLI [E. COLI] AS THE CAUSE OF DISEASES CLASSIFIED ELSEWHERE: ICD-10-CM

## 2022-11-02 DIAGNOSIS — F03.90 UNSPECIFIED DEMENTIA WITHOUT BEHAVIORAL DISTURBANCE: ICD-10-CM

## 2022-11-02 DIAGNOSIS — Z95.0 PRESENCE OF CARDIAC PACEMAKER: ICD-10-CM

## 2022-11-02 DIAGNOSIS — R77.8 OTHER SPECIFIED ABNORMALITIES OF PLASMA PROTEINS: ICD-10-CM

## 2022-11-02 DIAGNOSIS — Z88.2 ALLERGY STATUS TO SULFONAMIDES: ICD-10-CM

## 2022-11-02 DIAGNOSIS — G93.41 METABOLIC ENCEPHALOPATHY: ICD-10-CM

## 2022-11-02 DIAGNOSIS — Z88.0 ALLERGY STATUS TO PENICILLIN: ICD-10-CM

## 2022-11-02 DIAGNOSIS — I11.0 HYPERTENSIVE HEART DISEASE WITH HEART FAILURE: ICD-10-CM

## 2022-11-02 DIAGNOSIS — Z91.013 ALLERGY TO SEAFOOD: ICD-10-CM

## 2022-11-02 DIAGNOSIS — E78.5 HYPERLIPIDEMIA, UNSPECIFIED: ICD-10-CM

## 2022-11-02 DIAGNOSIS — N39.0 URINARY TRACT INFECTION, SITE NOT SPECIFIED: ICD-10-CM

## 2022-11-02 DIAGNOSIS — Z20.822 CONTACT WITH AND (SUSPECTED) EXPOSURE TO COVID-19: ICD-10-CM

## 2022-11-02 DIAGNOSIS — F32.A DEPRESSION, UNSPECIFIED: ICD-10-CM

## 2022-11-02 DIAGNOSIS — Z91.040 LATEX ALLERGY STATUS: ICD-10-CM

## 2022-11-02 DIAGNOSIS — H91.93 UNSPECIFIED HEARING LOSS, BILATERAL: ICD-10-CM

## 2022-11-02 DIAGNOSIS — Z90.49 ACQUIRED ABSENCE OF OTHER SPECIFIED PARTS OF DIGESTIVE TRACT: ICD-10-CM

## 2022-11-02 DIAGNOSIS — Z96.651 PRESENCE OF RIGHT ARTIFICIAL KNEE JOINT: ICD-10-CM

## 2022-11-02 DIAGNOSIS — N17.9 ACUTE KIDNEY FAILURE, UNSPECIFIED: ICD-10-CM

## 2022-11-02 DIAGNOSIS — Z79.899 OTHER LONG TERM (CURRENT) DRUG THERAPY: ICD-10-CM

## 2022-11-02 DIAGNOSIS — E11.40 TYPE 2 DIABETES MELLITUS WITH DIABETIC NEUROPATHY, UNSPECIFIED: ICD-10-CM

## 2022-11-02 DIAGNOSIS — I69.954 HEMIPLEGIA AND HEMIPARESIS FOLLOWING UNSPECIFIED CEREBROVASCULAR DISEASE AFFECTING LEFT NON-DOMINANT SIDE: ICD-10-CM

## 2022-11-02 DIAGNOSIS — C50.919 MALIGNANT NEOPLASM OF UNSPECIFIED SITE OF UNSPECIFIED FEMALE BREAST: ICD-10-CM

## 2022-11-02 DIAGNOSIS — I50.22 CHRONIC SYSTOLIC (CONGESTIVE) HEART FAILURE: ICD-10-CM

## 2022-11-02 DIAGNOSIS — Z79.890 HORMONE REPLACEMENT THERAPY: ICD-10-CM

## 2022-11-02 DIAGNOSIS — Z79.4 LONG TERM (CURRENT) USE OF INSULIN: ICD-10-CM

## 2022-11-02 DIAGNOSIS — Z91.018 ALLERGY TO OTHER FOODS: ICD-10-CM

## 2022-11-02 DIAGNOSIS — E03.9 HYPOTHYROIDISM, UNSPECIFIED: ICD-10-CM

## 2022-11-08 ENCOUNTER — APPOINTMENT (OUTPATIENT)
Dept: CARDIOLOGY | Facility: CLINIC | Age: 82
End: 2022-11-08

## 2022-11-08 VITALS — DIASTOLIC BLOOD PRESSURE: 70 MMHG | RESPIRATION RATE: 18 BRPM | SYSTOLIC BLOOD PRESSURE: 120 MMHG

## 2022-11-08 VITALS — HEART RATE: 73 BPM

## 2022-11-08 PROCEDURE — 93000 ELECTROCARDIOGRAM COMPLETE: CPT

## 2022-11-08 PROCEDURE — 99214 OFFICE O/P EST MOD 30 MIN: CPT

## 2022-11-10 ENCOUNTER — NON-APPOINTMENT (OUTPATIENT)
Age: 82
End: 2022-11-10

## 2022-11-10 ENCOUNTER — APPOINTMENT (OUTPATIENT)
Dept: CARDIOLOGY | Facility: CLINIC | Age: 82
End: 2022-11-10

## 2022-11-10 PROCEDURE — 93296 REM INTERROG EVL PM/IDS: CPT

## 2022-11-10 PROCEDURE — 93294 REM INTERROG EVL PM/LDLS PM: CPT

## 2022-11-14 NOTE — ED PROVIDER NOTE - NS_BEDUNITTYPES_ED_ALL_ED
MED/SURG

## 2022-11-25 ENCOUNTER — EMERGENCY (EMERGENCY)
Facility: HOSPITAL | Age: 82
LOS: 0 days | Discharge: HOME | End: 2022-11-26
Attending: EMERGENCY MEDICINE | Admitting: EMERGENCY MEDICINE

## 2022-11-25 VITALS
RESPIRATION RATE: 18 BRPM | DIASTOLIC BLOOD PRESSURE: 83 MMHG | HEART RATE: 78 BPM | OXYGEN SATURATION: 99 % | TEMPERATURE: 97 F | HEIGHT: 59 IN | SYSTOLIC BLOOD PRESSURE: 180 MMHG | WEIGHT: 220.02 LBS

## 2022-11-25 DIAGNOSIS — Z90.49 ACQUIRED ABSENCE OF OTHER SPECIFIED PARTS OF DIGESTIVE TRACT: Chronic | ICD-10-CM

## 2022-11-25 DIAGNOSIS — R10.9 UNSPECIFIED ABDOMINAL PAIN: ICD-10-CM

## 2022-11-25 DIAGNOSIS — Z96.651 PRESENCE OF RIGHT ARTIFICIAL KNEE JOINT: Chronic | ICD-10-CM

## 2022-11-25 DIAGNOSIS — Z90.11 ACQUIRED ABSENCE OF RIGHT BREAST AND NIPPLE: ICD-10-CM

## 2022-11-25 DIAGNOSIS — Z96.651 PRESENCE OF RIGHT ARTIFICIAL KNEE JOINT: ICD-10-CM

## 2022-11-25 DIAGNOSIS — Z90.49 ACQUIRED ABSENCE OF OTHER SPECIFIED PARTS OF DIGESTIVE TRACT: ICD-10-CM

## 2022-11-25 DIAGNOSIS — Z86.73 PERSONAL HISTORY OF TRANSIENT ISCHEMIC ATTACK (TIA), AND CEREBRAL INFARCTION WITHOUT RESIDUAL DEFICITS: ICD-10-CM

## 2022-11-25 DIAGNOSIS — R14.0 ABDOMINAL DISTENSION (GASEOUS): ICD-10-CM

## 2022-11-25 DIAGNOSIS — Z88.0 ALLERGY STATUS TO PENICILLIN: ICD-10-CM

## 2022-11-25 DIAGNOSIS — R26.2 DIFFICULTY IN WALKING, NOT ELSEWHERE CLASSIFIED: ICD-10-CM

## 2022-11-25 DIAGNOSIS — Z98.890 OTHER SPECIFIED POSTPROCEDURAL STATES: Chronic | ICD-10-CM

## 2022-11-25 DIAGNOSIS — Z88.2 ALLERGY STATUS TO SULFONAMIDES: ICD-10-CM

## 2022-11-25 DIAGNOSIS — F32.A DEPRESSION, UNSPECIFIED: ICD-10-CM

## 2022-11-25 DIAGNOSIS — E78.00 PURE HYPERCHOLESTEROLEMIA, UNSPECIFIED: ICD-10-CM

## 2022-11-25 DIAGNOSIS — Z79.4 LONG TERM (CURRENT) USE OF INSULIN: ICD-10-CM

## 2022-11-25 DIAGNOSIS — R11.0 NAUSEA: ICD-10-CM

## 2022-11-25 DIAGNOSIS — Z79.02 LONG TERM (CURRENT) USE OF ANTITHROMBOTICS/ANTIPLATELETS: ICD-10-CM

## 2022-11-25 DIAGNOSIS — Z91.040 LATEX ALLERGY STATUS: ICD-10-CM

## 2022-11-25 DIAGNOSIS — Z91.013 ALLERGY TO SEAFOOD: ICD-10-CM

## 2022-11-25 DIAGNOSIS — Z79.01 LONG TERM (CURRENT) USE OF ANTICOAGULANTS: ICD-10-CM

## 2022-11-25 DIAGNOSIS — R10.30 LOWER ABDOMINAL PAIN, UNSPECIFIED: ICD-10-CM

## 2022-11-25 DIAGNOSIS — E11.9 TYPE 2 DIABETES MELLITUS WITHOUT COMPLICATIONS: ICD-10-CM

## 2022-11-25 DIAGNOSIS — Z87.891 PERSONAL HISTORY OF NICOTINE DEPENDENCE: ICD-10-CM

## 2022-11-25 DIAGNOSIS — Z85.3 PERSONAL HISTORY OF MALIGNANT NEOPLASM OF BREAST: ICD-10-CM

## 2022-11-25 DIAGNOSIS — Z91.018 ALLERGY TO OTHER FOODS: ICD-10-CM

## 2022-11-25 LAB
BASOPHILS # BLD AUTO: 0.07 K/UL — SIGNIFICANT CHANGE UP (ref 0–0.2)
BASOPHILS NFR BLD AUTO: 0.6 % — SIGNIFICANT CHANGE UP (ref 0–1)
EOSINOPHIL # BLD AUTO: 0.11 K/UL — SIGNIFICANT CHANGE UP (ref 0–0.7)
EOSINOPHIL NFR BLD AUTO: 0.9 % — SIGNIFICANT CHANGE UP (ref 0–8)
FLUAV AG NPH QL: SIGNIFICANT CHANGE UP
FLUBV AG NPH QL: SIGNIFICANT CHANGE UP
HCT VFR BLD CALC: 41.8 % — SIGNIFICANT CHANGE UP (ref 37–47)
HGB BLD-MCNC: 13.9 G/DL — SIGNIFICANT CHANGE UP (ref 12–16)
IMM GRANULOCYTES NFR BLD AUTO: 0.5 % — HIGH (ref 0.1–0.3)
LYMPHOCYTES # BLD AUTO: 0.91 K/UL — LOW (ref 1.2–3.4)
LYMPHOCYTES # BLD AUTO: 7.2 % — LOW (ref 20.5–51.1)
MCHC RBC-ENTMCNC: 32 PG — HIGH (ref 27–31)
MCHC RBC-ENTMCNC: 33.3 G/DL — SIGNIFICANT CHANGE UP (ref 32–37)
MCV RBC AUTO: 96.3 FL — SIGNIFICANT CHANGE UP (ref 81–99)
MONOCYTES # BLD AUTO: 0.62 K/UL — HIGH (ref 0.1–0.6)
MONOCYTES NFR BLD AUTO: 4.9 % — SIGNIFICANT CHANGE UP (ref 1.7–9.3)
NEUTROPHILS # BLD AUTO: 10.87 K/UL — HIGH (ref 1.4–6.5)
NEUTROPHILS NFR BLD AUTO: 85.9 % — HIGH (ref 42.2–75.2)
NRBC # BLD: 0 /100 WBCS — SIGNIFICANT CHANGE UP (ref 0–0)
PLATELET # BLD AUTO: 272 K/UL — SIGNIFICANT CHANGE UP (ref 130–400)
RBC # BLD: 4.34 M/UL — SIGNIFICANT CHANGE UP (ref 4.2–5.4)
RBC # FLD: 14.1 % — SIGNIFICANT CHANGE UP (ref 11.5–14.5)
RSV RNA NPH QL NAA+NON-PROBE: SIGNIFICANT CHANGE UP
SARS-COV-2 RNA SPEC QL NAA+PROBE: SIGNIFICANT CHANGE UP
WBC # BLD: 12.64 K/UL — HIGH (ref 4.8–10.8)
WBC # FLD AUTO: 12.64 K/UL — HIGH (ref 4.8–10.8)

## 2022-11-25 PROCEDURE — 93010 ELECTROCARDIOGRAM REPORT: CPT

## 2022-11-25 PROCEDURE — 99284 EMERGENCY DEPT VISIT MOD MDM: CPT | Mod: FS

## 2022-11-25 RX ORDER — ONDANSETRON 8 MG/1
4 TABLET, FILM COATED ORAL ONCE
Refills: 0 | Status: COMPLETED | OUTPATIENT
Start: 2022-11-25 | End: 2022-11-25

## 2022-11-25 RX ADMIN — ONDANSETRON 4 MILLIGRAM(S): 8 TABLET, FILM COATED ORAL at 22:44

## 2022-11-25 NOTE — ED PROVIDER NOTE - NSICDXPASTSURGICALHX_GEN_ALL_CORE_FT
normal mood with appropriate affect
PAST SURGICAL HISTORY:  H/O total knee replacement, right     History of cholecystectomy 1992    History of lumpectomy of right breast

## 2022-11-25 NOTE — ED PROVIDER NOTE - PATIENT PORTAL LINK FT
You can access the FollowMyHealth Patient Portal offered by [facility] by registering at the following website: [email or web address]. By joining [facility]’s FollowMyHealth portal, you will also be able to view your health information using other applications (apps) compatible with our system.

## 2022-11-25 NOTE — ED PROVIDER NOTE - CARE PROVIDER_API CALL
Ariel Macias)  Internal Medicine  [address] [place] [address]  Phone: [phone]  Fax: [phone]  Follow Up Time: 1-3 Days

## 2022-11-25 NOTE — ED PROVIDER NOTE - ATTENDING APP SHARED VISIT CONTRIBUTION OF CARE
I was present for and supervised the key and critical aspects of the procedures performed during the care of the patient. Patient presents for evaluation of cramping lower abdominal pain intermittent and moderate in nature no vomiting no diarrhea onset over the past 24 hours  denies any fevers or chills   on exam she is nc/at perrla eomi oropharynx clear cta b/l, rrr s1s2 noted abd-soft  no guarding no rebound ext from with no focal deficits   A/P- patient improved here in the ED based on her complaint and age we obtaiend labs as well as ct which is overall negative she is tolerating po and is afebrile I will discharge at this time with follow up to her pmd

## 2022-11-25 NOTE — ED PROVIDER NOTE - CLINICAL SUMMARY MEDICAL DECISION MAKING FREE TEXT BOX
patient improved here in the ED based on her complaint and age we obtaiend labs as well as ct which is overall negative she is tolerating po and is afebrile I will discharge at this time with follow up to her pmd

## 2022-11-26 VITALS
RESPIRATION RATE: 18 BRPM | SYSTOLIC BLOOD PRESSURE: 135 MMHG | OXYGEN SATURATION: 99 % | DIASTOLIC BLOOD PRESSURE: 65 MMHG | HEART RATE: 82 BPM

## 2022-11-26 LAB
ALBUMIN SERPL ELPH-MCNC: 4.7 G/DL — SIGNIFICANT CHANGE UP (ref 3.5–5.2)
ALP SERPL-CCNC: 172 U/L — HIGH (ref 30–115)
ALT FLD-CCNC: 29 U/L — SIGNIFICANT CHANGE UP (ref 0–41)
ANION GAP SERPL CALC-SCNC: 13 MMOL/L — SIGNIFICANT CHANGE UP (ref 7–14)
APPEARANCE UR: CLEAR — SIGNIFICANT CHANGE UP
APTT BLD: 33.2 SEC — SIGNIFICANT CHANGE UP (ref 27–39.2)
AST SERPL-CCNC: 31 U/L — SIGNIFICANT CHANGE UP (ref 0–41)
BACTERIA # UR AUTO: NEGATIVE — SIGNIFICANT CHANGE UP
BILIRUB SERPL-MCNC: 0.4 MG/DL — SIGNIFICANT CHANGE UP (ref 0.2–1.2)
BILIRUB UR-MCNC: NEGATIVE — SIGNIFICANT CHANGE UP
BUN SERPL-MCNC: 26 MG/DL — HIGH (ref 10–20)
CALCIUM SERPL-MCNC: 9.8 MG/DL — SIGNIFICANT CHANGE UP (ref 8.4–10.5)
CHLORIDE SERPL-SCNC: 96 MMOL/L — LOW (ref 98–110)
CO2 SERPL-SCNC: 32 MMOL/L — SIGNIFICANT CHANGE UP (ref 17–32)
COLOR SPEC: YELLOW — SIGNIFICANT CHANGE UP
CREAT SERPL-MCNC: 1.5 MG/DL — SIGNIFICANT CHANGE UP (ref 0.7–1.5)
DIFF PNL FLD: NEGATIVE — SIGNIFICANT CHANGE UP
EGFR: 34 ML/MIN/1.73M2 — LOW
EPI CELLS # UR: ABNORMAL /HPF
GLUCOSE SERPL-MCNC: 243 MG/DL — HIGH (ref 70–99)
GLUCOSE UR QL: NEGATIVE MG/DL — SIGNIFICANT CHANGE UP
INR BLD: 1.11 RATIO — SIGNIFICANT CHANGE UP (ref 0.65–1.3)
KETONES UR-MCNC: NEGATIVE — SIGNIFICANT CHANGE UP
LACTATE SERPL-SCNC: 1.3 MMOL/L — SIGNIFICANT CHANGE UP (ref 0.7–2)
LEUKOCYTE ESTERASE UR-ACNC: NEGATIVE — SIGNIFICANT CHANGE UP
LIDOCAIN IGE QN: 14 U/L — SIGNIFICANT CHANGE UP (ref 7–60)
MAGNESIUM SERPL-MCNC: 2.4 MG/DL — SIGNIFICANT CHANGE UP (ref 1.8–2.4)
NITRITE UR-MCNC: NEGATIVE — SIGNIFICANT CHANGE UP
PH UR: 7.5 — SIGNIFICANT CHANGE UP (ref 5–8)
POTASSIUM SERPL-MCNC: 4.5 MMOL/L — SIGNIFICANT CHANGE UP (ref 3.5–5)
POTASSIUM SERPL-SCNC: 4.5 MMOL/L — SIGNIFICANT CHANGE UP (ref 3.5–5)
PROT SERPL-MCNC: 8.1 G/DL — HIGH (ref 6–8)
PROT UR-MCNC: 100 MG/DL
PROTHROM AB SERPL-ACNC: 12.7 SEC — SIGNIFICANT CHANGE UP (ref 9.95–12.87)
RBC CASTS # UR COMP ASSIST: SIGNIFICANT CHANGE UP /HPF
SODIUM SERPL-SCNC: 141 MMOL/L — SIGNIFICANT CHANGE UP (ref 135–146)
SP GR SPEC: 1.02 — SIGNIFICANT CHANGE UP (ref 1.01–1.03)
TROPONIN T SERPL-MCNC: 0.03 NG/ML — CRITICAL HIGH
UROBILINOGEN FLD QL: 0.2 MG/DL — SIGNIFICANT CHANGE UP
WBC UR QL: SIGNIFICANT CHANGE UP /HPF

## 2022-11-26 PROCEDURE — 74176 CT ABD & PELVIS W/O CONTRAST: CPT | Mod: 26,MA

## 2022-11-26 NOTE — ED ADULT NURSE REASSESSMENT NOTE - NS ED NURSE REASSESS COMMENT FT1
pt discharged. contact made with pts son and daughter, son states he will be at patients home when ems arrives

## 2022-11-27 LAB
CULTURE RESULTS: NO GROWTH — SIGNIFICANT CHANGE UP
SPECIMEN SOURCE: SIGNIFICANT CHANGE UP

## 2023-01-01 ENCOUNTER — APPOINTMENT (OUTPATIENT)
Dept: CARDIOLOGY | Facility: CLINIC | Age: 83
End: 2023-01-01
Payer: MEDICARE

## 2023-01-01 ENCOUNTER — NON-APPOINTMENT (OUTPATIENT)
Age: 83
End: 2023-01-01

## 2023-01-01 ENCOUNTER — APPOINTMENT (OUTPATIENT)
Dept: CARDIOLOGY | Facility: CLINIC | Age: 83
End: 2023-01-01

## 2023-01-01 ENCOUNTER — EMERGENCY (EMERGENCY)
Facility: HOSPITAL | Age: 83
LOS: 0 days | Discharge: HOME | End: 2023-05-07
Attending: EMERGENCY MEDICINE
Payer: MEDICARE

## 2023-01-01 ENCOUNTER — APPOINTMENT (OUTPATIENT)
Dept: ELECTROPHYSIOLOGY | Facility: CLINIC | Age: 83
End: 2023-01-01
Payer: MEDICARE

## 2023-01-01 VITALS
BODY MASS INDEX: 37.3 KG/M2 | HEART RATE: 80 BPM | HEIGHT: 60 IN | DIASTOLIC BLOOD PRESSURE: 68 MMHG | SYSTOLIC BLOOD PRESSURE: 100 MMHG | WEIGHT: 190 LBS | TEMPERATURE: 98 F

## 2023-01-01 VITALS
DIASTOLIC BLOOD PRESSURE: 69 MMHG | HEART RATE: 73 BPM | TEMPERATURE: 98 F | WEIGHT: 179.9 LBS | SYSTOLIC BLOOD PRESSURE: 153 MMHG | RESPIRATION RATE: 20 BRPM | OXYGEN SATURATION: 95 %

## 2023-01-01 DIAGNOSIS — I35.0 NONRHEUMATIC AORTIC (VALVE) STENOSIS: ICD-10-CM

## 2023-01-01 DIAGNOSIS — Z91.040 LATEX ALLERGY STATUS: ICD-10-CM

## 2023-01-01 DIAGNOSIS — Z88.0 ALLERGY STATUS TO PENICILLIN: ICD-10-CM

## 2023-01-01 DIAGNOSIS — Z79.4 LONG TERM (CURRENT) USE OF INSULIN: ICD-10-CM

## 2023-01-01 DIAGNOSIS — R04.0 EPISTAXIS: ICD-10-CM

## 2023-01-01 DIAGNOSIS — M25.512 PAIN IN LEFT SHOULDER: ICD-10-CM

## 2023-01-01 DIAGNOSIS — Z96.651 PRESENCE OF RIGHT ARTIFICIAL KNEE JOINT: Chronic | ICD-10-CM

## 2023-01-01 DIAGNOSIS — Z91.030 BEE ALLERGY STATUS: ICD-10-CM

## 2023-01-01 DIAGNOSIS — E11.9 TYPE 2 DIABETES MELLITUS WITHOUT COMPLICATIONS: ICD-10-CM

## 2023-01-01 DIAGNOSIS — Z91.013 ALLERGY TO SEAFOOD: ICD-10-CM

## 2023-01-01 DIAGNOSIS — Z98.890 OTHER SPECIFIED POSTPROCEDURAL STATES: Chronic | ICD-10-CM

## 2023-01-01 DIAGNOSIS — Z79.02 LONG TERM (CURRENT) USE OF ANTITHROMBOTICS/ANTIPLATELETS: ICD-10-CM

## 2023-01-01 DIAGNOSIS — Z91.018 ALLERGY TO OTHER FOODS: ICD-10-CM

## 2023-01-01 DIAGNOSIS — Z88.2 ALLERGY STATUS TO SULFONAMIDES: ICD-10-CM

## 2023-01-01 DIAGNOSIS — R20.0 ANESTHESIA OF SKIN: ICD-10-CM

## 2023-01-01 DIAGNOSIS — Z45.018 ENCOUNTER FOR ADJUSTMENT AND MANAGEMENT OF OTHER PART OF CARDIAC PACEMAKER: ICD-10-CM

## 2023-01-01 DIAGNOSIS — Z90.49 ACQUIRED ABSENCE OF OTHER SPECIFIED PARTS OF DIGESTIVE TRACT: Chronic | ICD-10-CM

## 2023-01-01 LAB — GLUCOSE BLDC GLUCOMTR-MCNC: 124 MG/DL — HIGH (ref 70–99)

## 2023-01-01 PROCEDURE — 93296 REM INTERROG EVL PM/IDS: CPT

## 2023-01-01 PROCEDURE — 93306 TTE W/DOPPLER COMPLETE: CPT

## 2023-01-01 PROCEDURE — 99283 EMERGENCY DEPT VISIT LOW MDM: CPT | Mod: FS

## 2023-01-01 PROCEDURE — 93280 PM DEVICE PROGR EVAL DUAL: CPT

## 2023-01-01 PROCEDURE — 82962 GLUCOSE BLOOD TEST: CPT

## 2023-01-01 PROCEDURE — 93294 REM INTERROG EVL PM/LDLS PM: CPT

## 2023-01-01 PROCEDURE — 99282 EMERGENCY DEPT VISIT SF MDM: CPT

## 2023-01-01 PROCEDURE — 99213 OFFICE O/P EST LOW 20 MIN: CPT

## 2023-01-01 RX ORDER — DONEPEZIL HYDROCHLORIDE 10 MG/1
10 TABLET ORAL DAILY
Refills: 0 | Status: ACTIVE | COMMUNITY

## 2023-01-01 RX ORDER — ASPIRIN 81 MG
81 TABLET, DELAYED RELEASE (ENTERIC COATED) ORAL EVERY OTHER DAY
Refills: 0 | Status: ACTIVE | COMMUNITY

## 2023-01-01 RX ORDER — AMLODIPINE BESYLATE 10 MG/1
10 TABLET ORAL DAILY
Refills: 0 | Status: ACTIVE | COMMUNITY

## 2023-01-01 RX ORDER — GABAPENTIN 300 MG/1
300 CAPSULE ORAL TWICE DAILY
Refills: 0 | Status: ACTIVE | COMMUNITY
Start: 2020-10-28

## 2023-01-01 RX ORDER — INSULIN LISPRO 100 [IU]/ML
100 INJECTION, SOLUTION INTRAVENOUS; SUBCUTANEOUS
Qty: 15 | Refills: 0 | Status: ACTIVE | COMMUNITY
Start: 2019-08-08

## 2023-01-01 RX ORDER — PANTOPRAZOLE 40 MG/1
40 TABLET, DELAYED RELEASE ORAL DAILY
Refills: 0 | Status: ACTIVE | COMMUNITY

## 2023-01-01 RX ORDER — LEVOTHYROXINE SODIUM 0.03 MG/1
25 TABLET ORAL DAILY
Refills: 0 | Status: ACTIVE | COMMUNITY

## 2023-01-01 RX ORDER — EXEMESTANE 25 MG/1
25 TABLET, FILM COATED ORAL
Refills: 0 | Status: ACTIVE | COMMUNITY

## 2023-01-01 RX ORDER — SERTRALINE HYDROCHLORIDE 100 MG/1
100 TABLET, FILM COATED ORAL DAILY
Refills: 0 | Status: ACTIVE | COMMUNITY

## 2023-01-01 RX ORDER — ASPIRIN 81 MG
81 TABLET, DELAYED RELEASE (ENTERIC COATED) ORAL DAILY
Refills: 0 | Status: COMPLETED | COMMUNITY
End: 2023-01-01

## 2023-01-01 RX ORDER — FUROSEMIDE 20 MG/1
20 TABLET ORAL DAILY
Qty: 30 | Refills: 1 | Status: ACTIVE | COMMUNITY

## 2023-01-01 RX ORDER — INSULIN DETEMIR 100 [IU]/ML
100 INJECTION, SOLUTION SUBCUTANEOUS
Qty: 15 | Refills: 0 | Status: ACTIVE | COMMUNITY
Start: 2020-10-08

## 2023-01-01 RX ORDER — CLOPIDOGREL BISULFATE 75 MG/1
75 TABLET, FILM COATED ORAL DAILY
Refills: 0 | Status: ACTIVE | COMMUNITY

## 2023-01-01 RX ORDER — TRAMADOL HYDROCHLORIDE 100 MG/1
100 TABLET, EXTENDED RELEASE ORAL DAILY
Refills: 0 | Status: ACTIVE | COMMUNITY

## 2023-01-01 RX ORDER — NORTRIPTYLINE HYDROCHLORIDE 25 MG/1
25 CAPSULE ORAL 3 TIMES DAILY
Refills: 0 | Status: ACTIVE | COMMUNITY

## 2023-01-01 RX ORDER — SERTRALINE HYDROCHLORIDE 50 MG/1
50 TABLET, FILM COATED ORAL DAILY
Refills: 0 | Status: ACTIVE | COMMUNITY

## 2023-01-01 RX ORDER — CHLORHEXIDINE GLUCONATE 4 %
325 (65 FE) LIQUID (ML) TOPICAL
Qty: 90 | Refills: 1 | Status: ACTIVE | COMMUNITY

## 2023-01-01 RX ORDER — ATORVASTATIN CALCIUM 80 MG/1
80 TABLET, FILM COATED ORAL DAILY
Refills: 0 | Status: ACTIVE | COMMUNITY

## 2023-01-12 NOTE — ED ADULT NURSE NOTE - NS ED NURSE DISCH DISPOSITION
Admitted Dutasteride Pregnancy And Lactation Text: This medication is absolutely contraindicated in women, especially during pregnancy and breast feeding. Feminization of male fetuses is possible if taking while pregnant.

## 2023-02-08 NOTE — PATIENT PROFILE ADULT. - FUNCTIONAL SCREEN CURRENT LEVEL: SWALLOWING (IF SCORE 2 OR MORE FOR ANY ITEM, CONSULT REHAB SERVICES), MLM)
SAMPLES    2.5 Eliquis   3 Boxes - 14 Tablets Each       LOT: [id number]  EXP: Oct. 2023  
(2) difficulty swallowing liquids

## 2023-02-10 ENCOUNTER — APPOINTMENT (OUTPATIENT)
Dept: CARDIOLOGY | Facility: CLINIC | Age: 83
End: 2023-02-10
Payer: MEDICARE

## 2023-02-10 ENCOUNTER — NON-APPOINTMENT (OUTPATIENT)
Age: 83
End: 2023-02-10

## 2023-02-10 PROCEDURE — 93296 REM INTERROG EVL PM/IDS: CPT

## 2023-02-10 PROCEDURE — 93294 REM INTERROG EVL PM/LDLS PM: CPT

## 2023-02-14 NOTE — ED ADULT NURSE NOTE - NSFALLRSKASSESSDT_ED_ALL_ED
09-Apr-2021 13:38 Cantharidin Pregnancy And Lactation Text: The use of this medication during pregnancy or lactation is not recommended as there is insufficient data.

## 2023-03-03 NOTE — PATIENT PROFILE ADULT - NSPRESCRALCFREQ_GEN_A_NUR
Discharge instructions reviewed with patient and his mother, verbalized understanding. They are currently trying to reach their ride by phone and waiting for response. IV and telemetry will be removed when his ride gets here. Never

## 2023-03-09 NOTE — PATIENT PROFILE ADULT. - PRO EXPECT LENGTH STAY
[facility]    Patient: Bethany Lara Date of Service: 3/9/2023   : 2007 MRN: [id number]     SUBJECTIVE:     Bethany Lara is a 15 year old female who presents today for   Chief Complaint   Patient presents with   • Vomiting     1 episode last night   • Sore Throat     Since last night   • Cough     X 2 days       Vomit x 1 last noc, st since last noc, cough x 2, current no nausea and improving st only when coughs, ate normally today, no HA/ear pain/diarrhea, no meds today, no abx >3mo, brother also has sore throat- on abx      History reviewed. No pertinent past medical history.    No current outpatient medications on file.     No current facility-administered medications for this visit.       ALLERGIES:  No Known Allergies    History reviewed. No pertinent surgical history.    History reviewed. No pertinent family history.    Social History     Tobacco Use   • Smoking status: Never   • Smokeless tobacco: Never   Vaping Use   • Vaping Use: never used   Substance Use Topics   • Alcohol use: Never       Patient's medications, allergies, past medical, surgical, social and family histories were reviewed and updated as appropriate.    Review of Systems:  Review of Systems   Constitutional: Negative for fatigue.   HENT: Positive for sore throat. Negative for rhinorrhea.    Respiratory: Positive for cough. Negative for shortness of breath.    Cardiovascular: Negative for chest pain.   Gastrointestinal: Positive for vomiting. Negative for abdominal pain and nausea.   Genitourinary: Negative for difficulty urinating.   Musculoskeletal: Negative for arthralgias.   Skin: Negative for rash.   Neurological: Negative for headaches.   Psychiatric/Behavioral: The patient is not nervous/anxious.      All other ROS reviewed negative except as mentioned in HPI     OBJECTIVE:     Vitals:    23 1312   BP: 125/69   BP Location: LUE - Left upper extremity   Patient Position: Sitting   Cuff Size: Large  Adult   Pulse: 93   Resp: 18   Temp: 99 °F (37.2 °C)   TempSrc: Oral   SpO2: 97%   Weight: (!) 126 kg (277 lb 12.5 oz)   PainSc: 2    LMP: 02/22/2023         Physical Exam  Vitals and nursing note reviewed.   Constitutional:       Appearance: Normal appearance.   HENT:      Head: Normocephalic.      Right Ear: Tympanic membrane normal.      Left Ear: Tympanic membrane normal.      Nose: Nose normal.      Mouth/Throat:      Mouth: Mucous membranes are moist.      Pharynx: No posterior oropharyngeal erythema.   Eyes:      Extraocular Movements: Extraocular movements intact.   Cardiovascular:      Rate and Rhythm: Normal rate and regular rhythm.      Heart sounds: Normal heart sounds.   Pulmonary:      Effort: Pulmonary effort is normal.      Breath sounds: Normal breath sounds.   Abdominal:      General: There is no distension.      Palpations: Abdomen is soft.      Tenderness: There is no abdominal tenderness. There is no guarding.   Musculoskeletal:         General: Normal range of motion.   Lymphadenopathy:      Cervical: No cervical adenopathy.   Skin:     General: Skin is warm.      Capillary Refill: Capillary refill takes less than 2 seconds.   Neurological:      General: No focal deficit present.      Mental Status: She is alert and oriented to person, place, and time.   Psychiatric:         Mood and Affect: Mood normal.         Behavior: Behavior normal.           DIAGNOSTIC STUDIES:     Lab results:  Results for orders placed or performed in visit on 03/09/23   POCT RAPID STREP A   Result Value    GRP A STREP Negative    Internal Procedural Controls Acceptable Yes        Imaging: none today    ASSESSMENT AND PLAN:   Procedures     1. Sore throat    Well-appearing 15-year-old female presents with her dad for vomiting x1 last night sore throat, cough for 2 days.  She currently no nausea and ate normally today.  She has no headache ear pain or diarrhea.  She is taking no medicine today.  She is had no  antibiotics last 3 months.  She states her brother has sore throat and is on antibiotics but is unsure of the diagnosis.  Her vital signs are stable.  On exam there is no respiratory distress abnormal breath or heart sounds.  There is no cervical adenopathy there is no posterior pharynx erythema there is no sinus tenderness the ear exam is normal.  There is no abdominal tenderness to palpation.  Her rapid strep is negative.  Strep culture is pending.  Patient will salt water gargle 3 times a day use ibuprofen or Tylenol if needed use popsicles if needed to soothe the throat.  She will follow-up in the emergency room for sore throat that impairs her ability stay hydrated otherwise she may return to school as usual tomorrow and father expressed understanding of the plan.    Problem List Items Addressed This Visit    None  Visit Diagnoses     Sore throat    -  Primary    Relevant Orders    POCT RAPID STREP A (Completed)    STREPTOCOCCUS GROUP A (STREPTOCOCCUS PYOGENES), BACTERIAL CULTURE           Summary of your Discharge Medications      as of March 9, 2023  7:02 PM     You have not been prescribed any medications.          Instructions provided as documented in the AVS.    The patient indicated understanding of the diagnosis and agreed with the plan of care.    Follow-up Information    None            MENDEZ Montaño  03/09/23 7:02 PM   unsure

## 2023-03-13 NOTE — ED ADULT TRIAGE NOTE - AS TEMP SITE
Caller: Lam Gillis    Relationship to patient: Self    Best call back number: [phone]    Patient is needing: PATIENT FAXED HIS PHYSICAL FORM INTO THE OFFICE 2 WEEKS AGO. HE WOULD LIKE A STATUS UPDATE  
Called M letting pt know that I have paperwork and will have Lam sign it today and have it faxed today. I advised pt in the VM that is he has any questions or concerns to give the office a call.   
Called spoke to pt in detail letting pt know I will have paperwork filled and waiting for pt at the   
PATIENT CALLED BACK AND WANTS TO KNOW IF HE CAN  FORM TOMORROW INSTEAD OF IT BEING FAXED     CALLBACK#[phone]  
oral

## 2023-05-02 NOTE — ED ADULT NURSE NOTE - DRUG PRE-SCREENING (DAST -1)
"   PHYSICAL THERAPY EVALUATION  NAME:  Roderick Temple  DATE: 05/02/23    AGE:   75 y o  Mrn:   [id number]  ADMIT DX:  UTI (urinary tract infection) [N39 0]  Gait instability [R26 81]  Tremors of nervous system [R25 1]    Past Medical History:   Diagnosis Date    [facility]     [facility] )     Stroke ([facility] ) 2022    left sided deficit     Length Of Stay: 0  Performed at least 2 patient identifiers during session: Name and Birthday  PHYSICAL THERAPY EVALUATION :    05/02/23 1021   PT Last Visit   PT Visit Date 05/02/23   Note Type   Note type Evaluation   Pain Assessment   Pain Assessment Tool 0-10   Pain Score No Pain   Restrictions/Precautions   Other Precautions Chair Alarm; Bed Alarm; Fall Risk;Contact/isolation  (strict hand hygiene)   Home Living   Type of Home Mobile home  (2 ZACK B HRs)   Home Layout One level;Performs ADLs on one level; Able to live on main level with bedroom/bathroom   Bathroom Shower/Tub Walk-in shower   Bathroom Toilet Raised   Bathroom Equipment Shower chair;Grab bars in shower;Grab bars around toilet   Home Equipment Walker;Cane  (depending on the day uses RW or cane)   Additional Comments Reports living in a 1 story mobile home with 2 ZACK B HRs and using RW or cane depending on the day  Prior Function   Level of [place] Independent with ADLs; Independent with functional mobility; Independent with IADLS   Lives With Spouse   Receives Help From Family   IADLs Independent with driving; Independent with meal prep; Independent with medication management   Falls in the last 6 months 5 to 10   Vocational Retired   Comments Reports being independent with mobility, ADLs and IADLs     Cognition   Orientation Level Oriented X4   Following Commands Follows one step commands without difficulty   Subjective   Subjective \"I was really shakey\"   RLE Assessment   RLE Assessment WFL  (3+/5)   LLE Assessment   LLE Assessment WFL  (3-/5)   Coordination   Movements " "are Fluid and Coordinated 0   Coordination and Movement Description ataxia with left LE swing through during ambulation   Rapid Alternating Movements Impaired  (slowed left LE)   Light Touch   RLE Light Touch Grossly intact   LLE Light Touch Impaired   LLE Light Touch Comments pt unable to report light touch with eyes closed, but then reports \"I feel it a little\"   Bed Mobility   Supine to Sit 5  Supervision   Additional items Increased time required;Verbal cues   Additional Comments HOB flat without bedrails  inc time and effort to complete  Transfers   Sit to Stand 4  Minimal assistance   Additional items Assist x 1; Increased time required;Verbal cues   Stand to Sit   (steadying assistance)   Additional items Increased time required;Verbal cues   Stand pivot   (steadying assistance)   Additional items Increased time required;Verbal cues   Additional Comments use of RW  sit to stand from EOB with miNAx1 with manual cues to achieve standing wiht inc time and effort  spt with RW with steadying assistance with inc time to complete with verbal cues for turnign completely prior to sitting and turning with use of RW throughout   stand to sit with steadying assistance  Ambulation/Elevation   Gait pattern Improper Weight shift;Decreased foot clearance; Short stride; Step to;Excessively slow; Ataxia   Gait Assistance   (minimal-->steadying assistance)   Additional items Verbal cues   Assistive Device Rolling walker   Distance ambulated 14'x1 with minimal-->steadying assistance with NBOS, decreased step length and foot clearance, cues to stay within CATRACHO of RW  pt completing with step to pattern leading with R LE  min manual cues for balance and RW management initially     Balance   Static Sitting Good   Dynamic Sitting Fair +   Static Standing Fair   Dynamic Standing Fair -   Ambulatory Poor +   Activity Tolerance   Activity Tolerance Patient limited by fatigue;Patient limited by pain   Medical Staff Made Aware Jose De Jesus JI " "  Nurse Made Aware Gwendolyn MARIA   Assessment   Prognosis Good   Problem List Decreased strength;Decreased endurance; Impaired balance;Decreased mobility; Decreased coordination;Decreased safety awareness   Barriers to Discharge Decreased caregiver support; Inaccessible home environment   Barriers to Discharge Comments requires assistance to complete mobility, decreased activity tolerance   Goals   Patient Goals \"Go home\"   STG Expiration Date 05/16/23   PT Treatment Day 1   Plan   Treatment/Interventions ADL retraining;Functional transfer training;LE strengthening/ROM; Elevations; Therapeutic exercise; Endurance training;Patient/family training;Equipment eval/education; Bed mobility;Gait training; Compensatory technique education;Spoke to nursing;Spoke to case management;OT   PT Frequency 3-5x/wk   Recommendation   PT Discharge Recommendation Post acute rehabilitation services   Equipment Recommended   (TBD by rehab)   Additional Comments should patient decline post acute rehab, would require assistance with all mobility   AM-PAC Basic Mobility Inpatient   Turning in Flat Bed Without Bedrails 3   Lying on Back to Sitting on Edge of Flat Bed Without Bedrails 3   Moving Bed to Chair 3   Standing Up From Chair Using Arms 3   Walk in Room 3   Climb 3-5 Stairs With Railing 1   Basic Mobility Inpatient Raw Score 16   Basic Mobility Standardized Score 38 32   Highest Level Of Mobility   -HLM Goal 5: Stand one or more mins   JH-HLM Achieved 7: Walk 25 feet or more   Additional Treatment Session   Start Time 1036   End Time 1045   Treatment Assessment Pt tolerated sesison fairly well  She was able to complete sit to stand transfer with decreased assistance and ambulate increased distance with decreased assistance, but does require increased verbal cues for saftey with RW and improved gait pattern  She requires increased time to complete mobility  She is limited by decreased strength, balance, endurance   She will continue to benefit " from PT services to maximize LOF  Equipment Use use of RW  sit <> stand with steadying assistance with verbal cues for hand placement and safety with RW  inc time to achieve standing  spt with RW with steadying assistance  ambulated 73'x1 with RW with steadying assistance wiht min verbal cues for navigation of environment to avoid obstacles  slow laura with step to pattern leading wiht R LE  instructed to attempt to lead with L LE however with with difficulty sequencing and increased left LE ataxia with swing through with difficulty advancing L LE  instructed to complete leading with R LE  cues to stay wihtin CATRACHO of RW and inc foot clearance and step length  discussed recommendation for post acute rehab upon discharge  Pt reports spouse is home with her, but was having difficulty helping her  End of Consult   Patient Position at End of Consult Bedside chair;Bed/Chair alarm activated; All needs within reach       (Please find full objective findings from PT assessment regarding body systems outlined above)  Assessment: Pt is a 75 y o  female seen for PT evaluation s/p admission to [address] [place] [address] on 5/1/2023 with Acute cystitis without hematuria  Order placed for PT services    Upon evaluation: Pt is presenting with impaired functional mobility due to decreased strength, decreased endurance, impaired balance, impaired coordination, gait deviations, altered sensation, decreased safety awareness, impaired judgment, and fall risk requiring  supervision assistance for bed mobility, minimal to steadying assistance for transfers, and minimal to steadying assistance for ambulation with RW   Pt's clinical presentation is currently unpredictable given the functional mobility deficits above, especially weakness, decreased endurance, impaired coordination, gait deviations, decreased activity tolerance, decreased functional mobility tolerance, altered sensation, decreased safety awareness, and impaired judgement, coupled with fall risks as indicated by AM-PAC 6-Clicks: 37/35 as well as hx of falls, impaired balance, polypharmacy, impaired coordination, impaired judgement, decreased safety awareness, and limited sensation/neuropathy and combined with medical complications of fear/retreat, need for input for mobility technique/safety and acute cystitis, transaminitis, essential tremor  Pt's PMHx and comorbidities that may affect physical performance and progress include: CAD s/p CABG, essential tremor, chronic pain syndrome with lumbar radiculopathy s/p laminectomy with spinal cord stimulator in place, Afib, neuropathy, h/o MI, h/o CVA with left residual deficits  Personal factors affecting pt at time of IE include: inaccessible home environment, step(s) to enter environment, limited home support, inability to perform IADLs, inability to perform ADLs, inability to navigate level surfaces without external assistance, inability to ambulate household distances, inability to navigate community distances and recent fall(s)/fall history  Pt will benefit from continued skilled PT services to address deficits as defined above and to maximize level of functional mobility to facilitate return toward PLOF and improved QOL  From PT/mobility standpoint, recommendation at time of d/c would be post acute rehab ([facility]) pending progress in order to reduce fall risk and maximize pt's functional independence and consistency with mobility in order to facilitate return to PLOF  Recommend trial with walker next 1-2 sessions and ther ex next 1-2 sessions  The patient's AM-PAC Basic Mobility Inpatient Short Form Raw Score is 16  A Raw score of less than or equal to 16 suggests the patient may benefit from discharge to post-acute rehabilitation services  Please also refer to the recommendation of the Physical Therapist for safe discharge planning         Goals: Pt will: Perform bed mobility tasks with modified Independent to reposition in bed and prepare for transfers  Pt will perform transfers with modified Independent to decrease burden of care, decrease risk for falls and improve activity tolerance and prepare for ambulation  Pt will ambulate with RW or LRAD for >/= 200' with  modified Independent  to decrease burden of care, decrease risk for falls, improve activity tolerance and improve gait quality and to access home environment  Pt will complete >/= 4 steps with bilateral handrails with supervision to decrease burden of care, return to home with ZACK, decrease risk for falls and improve activity tolerance  Pt will participate in objective balance assessment to determine baseline fall risk  Pt will participate in SSWS assessment to determine level of mobility  Pt will increase B LE strength >/= 1/2 MMT grade to facilitate functional mobility        Elle Willams, PT,DPT Statement Selected

## 2023-05-07 NOTE — ED PROVIDER NOTE - PATIENT PORTAL LINK FT
You can access the FollowMyHealth Patient Portal offered by [facility] by registering at the following website: [email or web address]. By joining [employer]’s FollowMyHealth portal, you will also be able to view your health information using other applications (apps) compatible with our system.

## 2023-05-07 NOTE — ED PROVIDER NOTE - PHYSICAL EXAMINATION
CONST: Well appearing in NAD  EYES: PERRL, EOMI, Sclera and conjunctiva clear.  ENT: small blood to left nare. no septal hematoma. Oropharynx normal appearing, no erythema or exudates. No abscess or swelling. Uvula midline. No temporal artery or mastoid tenderness.  NECK: Non-tender, no meningeal signs. normal ROM. supple   CARD: Normal S1 S2; Normal rate and rhythm  RESP: Equal BS B/L, No wheezes, rhonchi or rales. No distress  SKIN: Warm, dry, no acute rashes. Good turgor

## 2023-05-07 NOTE — ED ADULT NURSE NOTE - NS ED NURSE AMBULANCES2
[facility]. Xolair Pregnancy And Lactation Text: This medication is Pregnancy Category B and is considered safe during pregnancy. This medication is excreted in breast milk.

## 2023-05-07 NOTE — ED PROVIDER NOTE - ATTENDING APP SHARED VISIT CONTRIBUTION OF CARE
83 female not any blood thinners here for evaluation of continuous bleeding left nare prior to arrival.  Patient was oozing from left nare for about 1 hour presented here no distress no posterior oropharynx bleeding with oozing from left nare noted.  Pressure was held with tongue depressors and on re-eval there is no bleeding present.  Impression  Patient with bleeding from left nare resolved after compression patient stable for discharge

## 2023-05-07 NOTE — ED PROVIDER NOTE - OBJECTIVE STATEMENT
83 year old female with pmhx of dm presents to ed with nosebleed x 1 hour. oozing from left nare. h/o nosebleeds. no trauma, ha, dizziness, chest pain or sob.

## 2023-05-08 NOTE — ED PROVIDER NOTE - OBJECTIVE STATEMENT
78 y.o female with hx of HTN, HLD, depression, DM presents to the ED for evaluation of mechanical trip and fall.  Pt was trying to get out of bed, tripped and hit head on corner of end table.  NO LOC, nausea or vomiting. Fall was witnessed by aid who states pt did not hit head.  Pt acting at baseline.  Adds no other complaints at this time. Denies chest pain, abdominal pain, vomiting, extremity pain. Spironolactone Pregnancy And Lactation Text: This medication can cause feminization of the male fetus and should be avoided during pregnancy. The active metabolite is also found in breast milk.

## 2023-06-05 NOTE — PATIENT PROFILE ADULT - NSPROMEDSADMININFO_GEN_A_NUR
Tried calling pt and it went straight to VM. Left VM letting pt know yes she does need to call and schedule HFU. no concerns

## 2023-08-02 NOTE — H&P ADULT - NSICDXPASTSURGICALHX_GEN_ALL_CORE_FT
Yes PAST SURGICAL HISTORY:  H/O total knee replacement, right     History of cholecystectomy 1992    History of lumpectomy of right breast

## 2023-08-09 PROBLEM — Z45.018 ENCOUNTER FOR INTERROGATION OF CARDIAC PACEMAKER: Status: ACTIVE | Noted: 2022-08-09

## 2023-08-09 PROBLEM — R20.0 NUMBNESS IN BOTH LEGS: Status: ACTIVE | Noted: 2023-01-01

## 2023-08-09 PROBLEM — M25.512 LEFT SHOULDER PAIN: Status: ACTIVE | Noted: 2023-01-01

## 2023-08-09 NOTE — CARDIOLOGY SUMMARY
[de-identified] : 8/9/2022 NSR (HR 77 bpm), LAFB, QTc 444 msec [de-identified] : 8/16/2021 EF 55% Grade 1 DD. Mild MAC. Mild MR. Mild TR. Mild AS.

## 2023-08-09 NOTE — PROCEDURE
[No] : not [NSR] : normal sinus rhythm [See Device Printout] : See device printout [Pacemaker] : pacemaker [DDD] : DDD [Normal] : The battery status is normal. [Threshold Testing Performed] : Threshold testing was performed [Lead Imp:  ___ohms] : lead impedance was [unfilled] ohms [Sensing Amplitude ___mv] : sensing amplitude was [unfilled] mv [___V @] : [unfilled] V [___ ms] : [unfilled] ms [Programmed for Longevity] : output reprogrammed for improved battery longevity [de-identified] : [facility] [de-identified] : L311 [de-identified] : [id number] [de-identified] : 07/29/2019 [de-identified] : 59 [de-identified] : 4.5 Years [de-identified] : A Paced 45% V Paced <1% No episodes

## 2023-08-09 NOTE — PHYSICAL EXAM
[General Appearance - Well Developed] : well developed [Normal Appearance] : normal appearance [Well Groomed] : well groomed [General Appearance - Well Nourished] : well nourished [No Deformities] : no deformities [General Appearance - In No Acute Distress] : no acute distress [Heart Rate And Rhythm] : heart rate and rhythm were normal [Heart Sounds] : normal S1 and S2 [] : no respiratory distress [Respiration, Rhythm And Depth] : normal respiratory rhythm and effort [Well-Healed] : well-healed [Abdomen Soft] : soft [Nail Clubbing] : no clubbing of the fingernails [FreeTextEntry1] : sitting in wheelchair

## 2023-08-09 NOTE — ASSESSMENT
[FreeTextEntry1] : 82 yo F with history of Mobtiz 2 s/p DC PPM, CVA, HTN here for follow up visit.   # Mobitz 2 s/p DC PPM - Interrogation as above. No arrhythmias. Normal functioning PPM.  - Remote monitor is set up and patient is transmitting.   # HTN - BP well controlled - Cont Amlodipine 10mg daily - 2g Na diet enforced  Follow up in 12 mo with NP for in office interrogation   I have also advised the patient to go to the nearest emergency room if she experiences any chest pain, dyspnea, syncope, or has any other compelling symptoms.

## 2023-08-22 NOTE — ED PROVIDER NOTE - ST/T WAVE
QTc 513 What Type Of Note Output Would You Prefer (Optional)?: Standard Output Hpi Title: Evaluation of Skin Lesions How Severe Are Your Spot(S)?: moderate Have Your Spot(S) Been Treated In The Past?: has not been treated

## 2023-08-24 NOTE — ED ADULT NURSE NOTE - DISCHARGE DATE/TIME
Airway    Performed by: Gage Moran MD  Authorized by: Osman Christensen MD    Final Airway Type:  Supraglottic airway  Final Supraglottic Airway:  Air-Q  SGA Size*:  4.5  Attempts*:  1  Ventilation Between Attempts:  Bag valve  Number of Other Approaches Attempted:  0   Patient Identified, Procedure confirmed, Emergency equipment available and Safety protocols followed  Location:  OR  Urgency:  Elective  Difficult Airway: No    Indications for Airway Management:  Anesthesia  Spontaneous Ventilation: absent    Sedation Level:  Anesthetized  Mask Difficulty Assessment:  1 - vent by mask  Start Time: 8/24/2023 2:35 PM      
Peripheral IV    Performed by: Gage Moran MD  Authorized by: Osman Christensen MD    Size:  18 G  Laterality:  Left  Location:  Hand  Local Anesthetic:  None  Site Prep:  Alcohol  Technique:  Anatomical landmarks  Attempts:  1  Securement: Transparent dressing      
24-Nov-2018 04:13

## 2023-09-12 NOTE — DISCHARGE NOTE PROVIDER - HOSPITAL COURSE
80 yo F pmh of HTN, HLD, DM, CVA presents with fever and hypoxia. Patient reports that she has covid, unclear if she was tested. Found to be hypoxic so sent in for evaluation. Found to be hypoxic to the 70s on room air, improved with nasal canula. Reports cough, congestion, sneezing and runny nose since yesterday. no n/v/d. no chest pain, no shortness of breath, no palpitations. no sick contacts. patient is vaccinated. pmd is Dr. Yariel LABOY 19 PNA:  -Finished Remdesivir, to complete PO dexamethasone as outpatient.  -ID consult appreciated    HTN:  Continue norvasc  -DASH diet    DM2 :   Sliding scale with coverage  Lantus QHS and premeal coverage  CHO diet    Chronic systolic CHF:  -Continue Lasix    Hypothyroid:   Continue synthroid    Neuropathy:   Continue neurontin    Depression:   Continue sertraline    DC home with homecare. 82 yo F pmh of HTN, HLD, DM, CVA presents with fever and hypoxia. Patient reports that she has covid, unclear if she was tested. Found to be hypoxic so sent in for evaluation. Found to be hypoxic to the 70s on room air, improved with nasal canula. Reports cough, congestion, sneezing and runny nose since yesterday. no n/v/d. no chest pain, no shortness of breath, no palpitations. no sick contacts. patient is vaccinated. pmd is Dr. Yariel LABOY 19 PNA:  -Finished Remdesivir, to complete PO dexamethasone as outpatient.  -ID consult appreciated    HTN:  Continue norvasc  -DASH diet    DM2 :   Sliding scale with coverage  Lantus QHS and premeal coverage  CHO diet    Chronic systolic CHF:  -Continue Lasix    Hypothyroid:   Continue synthroid    Neuropathy:   Continue neurontin    Depression:   Continue sertraline    DC home with homecare--> now COVID 19 neg. 80 yo F pmh of HTN, HLD, DM, CVA presents with fever and hypoxia. Patient reports that she has covid, unclear if she was tested. Found to be hypoxic so sent in for evaluation. Found to be hypoxic to the 70s on room air, improved with nasal canula. Reports cough, congestion, sneezing and runny nose since yesterday. no n/v/d. no chest pain, no shortness of breath, no palpitations. no sick contacts. patient is vaccinated. pmd is Dr. Yariel REISID 19 PNA:  -Finished Remdesivir, to complete PO dexamethasone as outpatient.  -ID consult appreciated    HTN:  Continue norvasc  -DASH diet    DM2 :   Sliding scale with coverage  Lantus QHS and premeal coverage  CHO diet    Chronic systolic CHF:  -Continue Lasix    Hypothyroid:   Continue synthroid    Neuropathy:   Continue neurontin    Depression:   Continue sertraline    DC home with homecare--> now COVID 19 neg.    Vital Signs Last 24 Hrs  T(C): 36.8 (06 Jan 2022 05:41), Max: 36.8 (06 Jan 2022 05:41)  T(F): 98.3 (06 Jan 2022 05:41), Max: 98.3 (06 Jan 2022 05:41)  HR: 74 (06 Jan 2022 05:41) (74 - 88)  BP: 142/71 (06 Jan 2022 05:41) (128/72 - 142/71)  RR: 18 (06 Jan 2022 05:41) (18 - 18)  SpO2: 98% RA    CV: NL S1/2  Resp: CTA B/L  GI: +BS, Soft, NT  Ext: No e/c/c   GEN: NAD / makes needs known     Rituxan Pregnancy And Lactation Text: This medication is Pregnancy Category C and it isn't know if it is safe during pregnancy. It is unknown if this medication is excreted in breast milk but similar antibodies are known to be excreted.

## 2023-09-29 NOTE — PHYSICAL THERAPY INITIAL EVALUATION ADULT - PHYSICAL ASSIST/NONPHYSICAL ASSIST: SUPINE/SIT, REHAB EVAL
Case Management Assessment  Initial Evaluation    Date/Time of Evaluation: 9/29/2023 5:17 PM  Assessment Completed by: Hannah Cosme RN    If patient is discharged prior to next notation, then this note serves as note for discharge by case management. Patient Name: Lona Pressley                   YOB: 1979  Diagnosis: CVA (cerebrovascular accident due to intracerebral hemorrhage) [facility]) [I61.9]  Cerebrovascular accident (CVA), unspecified mechanism ([address]) [I63.9]                   Date / Time: 9/28/2023  6:08 PM    Patient Admission Status: Inpatient   Readmission Risk (Low < 19, Mod (19-27), High > 27): Readmission Risk Score: 10.6    Current PCP: Lorri Knox MD  PCP verified by CM? (P) Yes    Chart Reviewed: Yes      History Provided by: (P) Patient  Patient Orientation: (P) Alert and Oriented    Patient Cognition: (P) Alert    Hospitalization in the last 30 days (Readmission):  No    If yes, Readmission Assessment in CM Navigator will be completed. Advance Directives:      Code Status: Full Code   Patient's Primary Decision Maker is: (P) Legal Next of Kin      Discharge Planning:    Patient lives with: (P) Family Members Type of Home: (P) House  Primary Care Giver: (P) Self  Patient Support Systems include: (P) Family Members   Current Financial resources: (P) Other (Comment) (Financial Assistance thru [employer])  Current community resources: (P) None  Current services prior to admission: (P) None            Current DME:              Type of Home Care services:  (P) None    ADLS  Prior functional level: (P) Independent in ADLs/IADLs  Current functional level: (P) Independent in ADLs/IADLs    PT AM-PAC: 20 /24  OT AM-PAC: 19 /24    Family can provide assistance at DC: (P) Yes  Would you like Case Management to discuss the discharge plan with any other family members/significant others, and if so, who?     Plans to Return to Present Housing: (P) Yes  Other Identified Issues/Barriers to
hand placement and technique for safety/verbal cues/1 person assist

## 2023-10-12 NOTE — ED PROVIDER NOTE - OBJECTIVE STATEMENT
Spoke with patient.  She is coming at 200 today 83 y/o female with hx of NIDDM, breast cancer, cva with left sebas, depression presents to the ED for evaluation for abdominal pain. no vomiting or diarrhea. patient with decreased po intake. no fevers. no rash. no chest pain , sob, productive cough. no headaches. patient c/o gnawing pain . no dysuria or gross hematuria.

## 2023-10-25 PROBLEM — I35.0 AORTIC VALVE STENOSIS, ETIOLOGY OF CARDIAC VALVE DISEASE UNSPECIFIED: Status: ACTIVE | Noted: 2023-01-01

## 2023-10-26 NOTE — PHYSICAL THERAPY INITIAL EVALUATION ADULT - ASR WT BEARING STATUS EVAL
Was previously prescribed midodrine 2.5 mg 3 times daily however only takes this as needed  Monitor perioperatively:  bp low normal:  may require restarting midodrine depending on bp during PT no weight-bearing restrictions

## 2024-01-01 ENCOUNTER — TRANSCRIPTION ENCOUNTER (OUTPATIENT)
Age: 84
End: 2024-01-01

## 2024-01-01 ENCOUNTER — NON-APPOINTMENT (OUTPATIENT)
Age: 84
End: 2024-01-01

## 2024-01-01 ENCOUNTER — INPATIENT (INPATIENT)
Facility: HOSPITAL | Age: 84
LOS: 12 days | Discharge: HOME HEALTH | DRG: 392 | End: 2024-04-18
Attending: INTERNAL MEDICINE | Admitting: INTERNAL MEDICINE
Payer: MEDICARE

## 2024-01-01 ENCOUNTER — INPATIENT (INPATIENT)
Facility: HOSPITAL | Age: 84
LOS: 3 days | Discharge: HOSPICE | DRG: 951 | End: 2024-04-22
Attending: INTERNAL MEDICINE | Admitting: FAMILY MEDICINE
Payer: MEDICARE

## 2024-01-01 ENCOUNTER — RESULT REVIEW (OUTPATIENT)
Age: 84
End: 2024-01-01

## 2024-01-01 ENCOUNTER — APPOINTMENT (OUTPATIENT)
Dept: CARDIOLOGY | Facility: CLINIC | Age: 84
End: 2024-01-01
Payer: MEDICARE

## 2024-01-01 VITALS
RESPIRATION RATE: 18 BRPM | DIASTOLIC BLOOD PRESSURE: 56 MMHG | HEART RATE: 74 BPM | SYSTOLIC BLOOD PRESSURE: 121 MMHG | TEMPERATURE: 99 F | OXYGEN SATURATION: 98 %

## 2024-01-01 VITALS
TEMPERATURE: 98 F | HEART RATE: 76 BPM | WEIGHT: 199.96 LBS | SYSTOLIC BLOOD PRESSURE: 154 MMHG | DIASTOLIC BLOOD PRESSURE: 80 MMHG | RESPIRATION RATE: 16 BRPM | OXYGEN SATURATION: 95 %

## 2024-01-01 VITALS
TEMPERATURE: 97 F | DIASTOLIC BLOOD PRESSURE: 69 MMHG | WEIGHT: 180.34 LBS | RESPIRATION RATE: 18 BRPM | SYSTOLIC BLOOD PRESSURE: 145 MMHG | HEIGHT: 57 IN | OXYGEN SATURATION: 100 % | HEART RATE: 62 BPM

## 2024-01-01 VITALS — HEART RATE: 72 BPM | HEIGHT: 60 IN

## 2024-01-01 VITALS
DIASTOLIC BLOOD PRESSURE: 75 MMHG | RESPIRATION RATE: 18 BRPM | SYSTOLIC BLOOD PRESSURE: 139 MMHG | HEART RATE: 113 BPM | TEMPERATURE: 98 F

## 2024-01-01 VITALS — RESPIRATION RATE: 18 BRPM | HEART RATE: 72 BPM | SYSTOLIC BLOOD PRESSURE: 128 MMHG | DIASTOLIC BLOOD PRESSURE: 70 MMHG

## 2024-01-01 DIAGNOSIS — Z98.890 OTHER SPECIFIED POSTPROCEDURAL STATES: Chronic | ICD-10-CM

## 2024-01-01 DIAGNOSIS — Z79.890 HORMONE REPLACEMENT THERAPY: ICD-10-CM

## 2024-01-01 DIAGNOSIS — J96.22 ACUTE AND CHRONIC RESPIRATORY FAILURE WITH HYPERCAPNIA: ICD-10-CM

## 2024-01-01 DIAGNOSIS — G89.29 OTHER CHRONIC PAIN: ICD-10-CM

## 2024-01-01 DIAGNOSIS — I24.89 OTHER FORMS OF ACUTE ISCHEMIC HEART DISEASE: ICD-10-CM

## 2024-01-01 DIAGNOSIS — I47.29 OTHER VENTRICULAR TACHYCARDIA: ICD-10-CM

## 2024-01-01 DIAGNOSIS — N28.1 CYST OF KIDNEY, ACQUIRED: ICD-10-CM

## 2024-01-01 DIAGNOSIS — N39.0 URINARY TRACT INFECTION, SITE NOT SPECIFIED: ICD-10-CM

## 2024-01-01 DIAGNOSIS — I10 ESSENTIAL (PRIMARY) HYPERTENSION: ICD-10-CM

## 2024-01-01 DIAGNOSIS — Z88.0 ALLERGY STATUS TO PENICILLIN: ICD-10-CM

## 2024-01-01 DIAGNOSIS — Z51.5 ENCOUNTER FOR PALLIATIVE CARE: ICD-10-CM

## 2024-01-01 DIAGNOSIS — N18.30 CHRONIC KIDNEY DISEASE, STAGE 3 UNSPECIFIED: ICD-10-CM

## 2024-01-01 DIAGNOSIS — Z96.651 PRESENCE OF RIGHT ARTIFICIAL KNEE JOINT: Chronic | ICD-10-CM

## 2024-01-01 DIAGNOSIS — D73.89 OTHER DISEASES OF SPLEEN: ICD-10-CM

## 2024-01-01 DIAGNOSIS — E78.00 PURE HYPERCHOLESTEROLEMIA, UNSPECIFIED: ICD-10-CM

## 2024-01-01 DIAGNOSIS — Z75.1 PERSON AWAITING ADMISSION TO ADEQUATE FACILITY ELSEWHERE: ICD-10-CM

## 2024-01-01 DIAGNOSIS — Z65.8 OTHER SPECIFIED PROBLEMS RELATED TO PSYCHOSOCIAL CIRCUMSTANCES: ICD-10-CM

## 2024-01-01 DIAGNOSIS — Z63.8 OTHER SPECIFIED PROBLEMS RELATED TO PRIMARY SUPPORT GROUP: ICD-10-CM

## 2024-01-01 DIAGNOSIS — Z85.3 PERSONAL HISTORY OF MALIGNANT NEOPLASM OF BREAST: ICD-10-CM

## 2024-01-01 DIAGNOSIS — G47.33 OBSTRUCTIVE SLEEP APNEA (ADULT) (PEDIATRIC): ICD-10-CM

## 2024-01-01 DIAGNOSIS — F03.90 UNSPECIFIED DEMENTIA WITHOUT BEHAVIORAL DISTURBANCE: ICD-10-CM

## 2024-01-01 DIAGNOSIS — Z95.0 PRESENCE OF CARDIAC PACEMAKER: ICD-10-CM

## 2024-01-01 DIAGNOSIS — I63.9 CEREBRAL INFARCTION, UNSPECIFIED: ICD-10-CM

## 2024-01-01 DIAGNOSIS — J96.21 ACUTE AND CHRONIC RESPIRATORY FAILURE WITH HYPOXIA: ICD-10-CM

## 2024-01-01 DIAGNOSIS — J44.9 CHRONIC OBSTRUCTIVE PULMONARY DISEASE, UNSPECIFIED: ICD-10-CM

## 2024-01-01 DIAGNOSIS — Z71.89 OTHER SPECIFIED COUNSELING: ICD-10-CM

## 2024-01-01 DIAGNOSIS — E66.2 MORBID (SEVERE) OBESITY WITH ALVEOLAR HYPOVENTILATION: ICD-10-CM

## 2024-01-01 DIAGNOSIS — Z79.4 LONG TERM (CURRENT) USE OF INSULIN: ICD-10-CM

## 2024-01-01 DIAGNOSIS — Z79.02 LONG TERM (CURRENT) USE OF ANTITHROMBOTICS/ANTIPLATELETS: ICD-10-CM

## 2024-01-01 DIAGNOSIS — Z79.810 LONG TERM (CURRENT) USE OF SELECTIVE ESTROGEN RECEPTOR MODULATORS (SERMS): ICD-10-CM

## 2024-01-01 DIAGNOSIS — E11.649 TYPE 2 DIABETES MELLITUS WITH HYPOGLYCEMIA WITHOUT COMA: ICD-10-CM

## 2024-01-01 DIAGNOSIS — E87.29 OTHER ACIDOSIS: ICD-10-CM

## 2024-01-01 DIAGNOSIS — Z91.018 ALLERGY TO OTHER FOODS: ICD-10-CM

## 2024-01-01 DIAGNOSIS — Z86.79 PERSONAL HISTORY OF OTHER DISEASES OF THE CIRCULATORY SYSTEM: ICD-10-CM

## 2024-01-01 DIAGNOSIS — Z96.651 PRESENCE OF RIGHT ARTIFICIAL KNEE JOINT: ICD-10-CM

## 2024-01-01 DIAGNOSIS — R10.9 UNSPECIFIED ABDOMINAL PAIN: ICD-10-CM

## 2024-01-01 DIAGNOSIS — G93.41 METABOLIC ENCEPHALOPATHY: ICD-10-CM

## 2024-01-01 DIAGNOSIS — I25.10 ATHEROSCLEROTIC HEART DISEASE OF NATIVE CORONARY ARTERY WITHOUT ANGINA PECTORIS: ICD-10-CM

## 2024-01-01 DIAGNOSIS — E11.22 TYPE 2 DIABETES MELLITUS WITH DIABETIC CHRONIC KIDNEY DISEASE: ICD-10-CM

## 2024-01-01 DIAGNOSIS — Z79.82 LONG TERM (CURRENT) USE OF ASPIRIN: ICD-10-CM

## 2024-01-01 DIAGNOSIS — Z90.49 ACQUIRED ABSENCE OF OTHER SPECIFIED PARTS OF DIGESTIVE TRACT: Chronic | ICD-10-CM

## 2024-01-01 DIAGNOSIS — Z74.1 NEED FOR ASSISTANCE WITH PERSONAL CARE: ICD-10-CM

## 2024-01-01 DIAGNOSIS — T50.8X5A ADVERSE EFFECT OF DIAGNOSTIC AGENTS, INITIAL ENCOUNTER: ICD-10-CM

## 2024-01-01 DIAGNOSIS — F32.A DEPRESSION, UNSPECIFIED: ICD-10-CM

## 2024-01-01 DIAGNOSIS — I12.9 HYPERTENSIVE CHRONIC KIDNEY DISEASE WITH STAGE 1 THROUGH STAGE 4 CHRONIC KIDNEY DISEASE, OR UNSPECIFIED CHRONIC KIDNEY DISEASE: ICD-10-CM

## 2024-01-01 DIAGNOSIS — I13.0 HYPERTENSIVE HEART AND CHRONIC KIDNEY DISEASE WITH HEART FAILURE AND STAGE 1 THROUGH STAGE 4 CHRONIC KIDNEY DISEASE, OR UNSPECIFIED CHRONIC KIDNEY DISEASE: ICD-10-CM

## 2024-01-01 DIAGNOSIS — I69.354 HEMIPLEGIA AND HEMIPARESIS FOLLOWING CEREBRAL INFARCTION AFFECTING LEFT NON-DOMINANT SIDE: ICD-10-CM

## 2024-01-01 DIAGNOSIS — Z91.013 ALLERGY TO SEAFOOD: ICD-10-CM

## 2024-01-01 DIAGNOSIS — H91.93 UNSPECIFIED HEARING LOSS, BILATERAL: ICD-10-CM

## 2024-01-01 DIAGNOSIS — N14.11 CONTRAST-INDUCED NEPHROPATHY: ICD-10-CM

## 2024-01-01 DIAGNOSIS — R77.8 OTHER SPECIFIED ABNORMALITIES OF PLASMA PROTEINS: ICD-10-CM

## 2024-01-01 DIAGNOSIS — I50.21 ACUTE SYSTOLIC (CONGESTIVE) HEART FAILURE: ICD-10-CM

## 2024-01-01 DIAGNOSIS — Z95.0 PRESENCE OF CARDIAC PACEMAKER: Chronic | ICD-10-CM

## 2024-01-01 DIAGNOSIS — K59.00 CONSTIPATION, UNSPECIFIED: ICD-10-CM

## 2024-01-01 DIAGNOSIS — Z79.899 OTHER LONG TERM (CURRENT) DRUG THERAPY: ICD-10-CM

## 2024-01-01 DIAGNOSIS — E11.9 TYPE 2 DIABETES MELLITUS W/OUT COMPLICATIONS: ICD-10-CM

## 2024-01-01 DIAGNOSIS — Z77.22 CONTACT WITH AND (SUSPECTED) EXPOSURE TO ENVIRONMENTAL TOBACCO SMOKE (ACUTE) (CHRONIC): ICD-10-CM

## 2024-01-01 DIAGNOSIS — Z91.040 LATEX ALLERGY STATUS: ICD-10-CM

## 2024-01-01 DIAGNOSIS — N17.9 ACUTE KIDNEY FAILURE, UNSPECIFIED: ICD-10-CM

## 2024-01-01 DIAGNOSIS — J96.01 ACUTE RESPIRATORY FAILURE WITH HYPOXIA: ICD-10-CM

## 2024-01-01 DIAGNOSIS — F32.9 MAJOR DEPRESSIVE DISORDER, SINGLE EPISODE, UNSPECIFIED: ICD-10-CM

## 2024-01-01 DIAGNOSIS — Z92.3 PERSONAL HISTORY OF IRRADIATION: ICD-10-CM

## 2024-01-01 DIAGNOSIS — E03.9 HYPOTHYROIDISM, UNSPECIFIED: ICD-10-CM

## 2024-01-01 DIAGNOSIS — Z88.2 ALLERGY STATUS TO SULFONAMIDES: ICD-10-CM

## 2024-01-01 DIAGNOSIS — I44.1 ATRIOVENTRICULAR BLOCK, SECOND DEGREE: ICD-10-CM

## 2024-01-01 DIAGNOSIS — Z99.3 DEPENDENCE ON WHEELCHAIR: ICD-10-CM

## 2024-01-01 DIAGNOSIS — Z90.49 ACQUIRED ABSENCE OF OTHER SPECIFIED PARTS OF DIGESTIVE TRACT: ICD-10-CM

## 2024-01-01 DIAGNOSIS — R74.01 ELEVATION OF LEVELS OF LIVER TRANSAMINASE LEVELS: ICD-10-CM

## 2024-01-01 DIAGNOSIS — R19.7 DIARRHEA, UNSPECIFIED: ICD-10-CM

## 2024-01-01 DIAGNOSIS — K56.41 FECAL IMPACTION: ICD-10-CM

## 2024-01-01 DIAGNOSIS — Z99.81 DEPENDENCE ON SUPPLEMENTAL OXYGEN: ICD-10-CM

## 2024-01-01 DIAGNOSIS — Z74.01 BED CONFINEMENT STATUS: ICD-10-CM

## 2024-01-01 DIAGNOSIS — B96.89 OTHER SPECIFIED BACTERIAL AGENTS AS THE CAUSE OF DISEASES CLASSIFIED ELSEWHERE: ICD-10-CM

## 2024-01-01 LAB
% ALBUMIN: 53.1 % — SIGNIFICANT CHANGE UP
% ALPHA 1: 5.6 % — SIGNIFICANT CHANGE UP
% ALPHA 2: 14 % — SIGNIFICANT CHANGE UP
% BETA: 8.7 % — SIGNIFICANT CHANGE UP
% GAMMA: 18.6 % — SIGNIFICANT CHANGE UP
% M SPIKE: SIGNIFICANT CHANGE UP
A1C WITH ESTIMATED AVERAGE GLUCOSE RESULT: 7.5 % — HIGH (ref 4–5.6)
ALBUMIN SERPL ELPH-MCNC: 3.2 G/DL — LOW (ref 3.6–5.5)
ALBUMIN SERPL ELPH-MCNC: 3.3 G/DL — LOW (ref 3.5–5.2)
ALBUMIN SERPL ELPH-MCNC: 3.3 G/DL — LOW (ref 3.5–5.2)
ALBUMIN SERPL ELPH-MCNC: 3.5 G/DL — SIGNIFICANT CHANGE UP (ref 3.5–5.2)
ALBUMIN SERPL ELPH-MCNC: 3.5 G/DL — SIGNIFICANT CHANGE UP (ref 3.5–5.2)
ALBUMIN SERPL ELPH-MCNC: 3.7 G/DL — SIGNIFICANT CHANGE UP (ref 3.5–5.2)
ALBUMIN SERPL ELPH-MCNC: 3.8 G/DL — SIGNIFICANT CHANGE UP (ref 3.5–5.2)
ALBUMIN SERPL ELPH-MCNC: 3.8 G/DL — SIGNIFICANT CHANGE UP (ref 3.5–5.2)
ALBUMIN SERPL ELPH-MCNC: 4.1 G/DL — SIGNIFICANT CHANGE UP (ref 3.5–5.2)
ALBUMIN SERPL ELPH-MCNC: 4.4 G/DL — SIGNIFICANT CHANGE UP (ref 3.5–5.2)
ALBUMIN/GLOB SERPL ELPH: 1.1 RATIO — SIGNIFICANT CHANGE UP
ALP SERPL-CCNC: 115 U/L — SIGNIFICANT CHANGE UP (ref 30–115)
ALP SERPL-CCNC: 144 U/L — HIGH (ref 30–115)
ALP SERPL-CCNC: 151 U/L — HIGH (ref 30–115)
ALP SERPL-CCNC: 164 U/L — HIGH (ref 30–115)
ALP SERPL-CCNC: 167 U/L — HIGH (ref 30–115)
ALP SERPL-CCNC: 185 U/L — HIGH (ref 30–115)
ALP SERPL-CCNC: 194 U/L — HIGH (ref 30–115)
ALP SERPL-CCNC: 217 U/L — HIGH (ref 30–115)
ALP SERPL-CCNC: 223 U/L — HIGH (ref 30–115)
ALPHA1 GLOB SERPL ELPH-MCNC: 0.3 G/DL — SIGNIFICANT CHANGE UP (ref 0.1–0.4)
ALPHA2 GLOB SERPL ELPH-MCNC: 0.8 G/DL — SIGNIFICANT CHANGE UP (ref 0.5–1)
ALT FLD-CCNC: 113 U/L — HIGH (ref 0–41)
ALT FLD-CCNC: 146 U/L — HIGH (ref 0–41)
ALT FLD-CCNC: 195 U/L — HIGH (ref 0–41)
ALT FLD-CCNC: 213 U/L — HIGH (ref 0–41)
ALT FLD-CCNC: 31 U/L — SIGNIFICANT CHANGE UP (ref 0–41)
ALT FLD-CCNC: 39 U/L — SIGNIFICANT CHANGE UP (ref 0–41)
ALT FLD-CCNC: 58 U/L — HIGH (ref 0–41)
ALT FLD-CCNC: 58 U/L — HIGH (ref 0–41)
ALT FLD-CCNC: 83 U/L — HIGH (ref 0–41)
AMMONIA BLD-MCNC: 10 UMOL/L — LOW (ref 11–55)
ANA TITR SER: NEGATIVE — SIGNIFICANT CHANGE UP
ANION GAP SERPL CALC-SCNC: 10 MMOL/L — SIGNIFICANT CHANGE UP (ref 7–14)
ANION GAP SERPL CALC-SCNC: 12 MMOL/L — SIGNIFICANT CHANGE UP (ref 7–14)
ANION GAP SERPL CALC-SCNC: 13 MMOL/L — SIGNIFICANT CHANGE UP (ref 7–14)
ANION GAP SERPL CALC-SCNC: 13 MMOL/L — SIGNIFICANT CHANGE UP (ref 7–14)
ANION GAP SERPL CALC-SCNC: 14 MMOL/L — SIGNIFICANT CHANGE UP (ref 7–14)
ANION GAP SERPL CALC-SCNC: 14 MMOL/L — SIGNIFICANT CHANGE UP (ref 7–14)
ANION GAP SERPL CALC-SCNC: 16 MMOL/L — HIGH (ref 7–14)
ANION GAP SERPL CALC-SCNC: 16 MMOL/L — HIGH (ref 7–14)
ANION GAP SERPL CALC-SCNC: 17 MMOL/L — HIGH (ref 7–14)
ANION GAP SERPL CALC-SCNC: 18 MMOL/L — HIGH (ref 7–14)
ANION GAP SERPL CALC-SCNC: 9 MMOL/L — SIGNIFICANT CHANGE UP (ref 7–14)
APPEARANCE UR: CLEAR — SIGNIFICANT CHANGE UP
APTT BLD: 31.6 SEC — SIGNIFICANT CHANGE UP (ref 27–39.2)
APTT BLD: 37.2 SEC — SIGNIFICANT CHANGE UP (ref 27–39.2)
AST SERPL-CCNC: 169 U/L — HIGH (ref 0–41)
AST SERPL-CCNC: 286 U/L — HIGH (ref 0–41)
AST SERPL-CCNC: 31 U/L — SIGNIFICANT CHANGE UP (ref 0–41)
AST SERPL-CCNC: 36 U/L — SIGNIFICANT CHANGE UP (ref 0–41)
AST SERPL-CCNC: 416 U/L — HIGH (ref 0–41)
AST SERPL-CCNC: 55 U/L — HIGH (ref 0–41)
AST SERPL-CCNC: 66 U/L — HIGH (ref 0–41)
AST SERPL-CCNC: 80 U/L — HIGH (ref 0–41)
AST SERPL-CCNC: 84 U/L — HIGH (ref 0–41)
AUTO DIFF PNL BLD: ABNORMAL
B-GLOBULIN SERPL ELPH-MCNC: 0.5 G/DL — SIGNIFICANT CHANGE UP (ref 0.5–1)
BASE EXCESS BLDA CALC-SCNC: 0.8 MMOL/L — SIGNIFICANT CHANGE UP (ref -2–3)
BASE EXCESS BLDA CALC-SCNC: 2.4 MMOL/L — SIGNIFICANT CHANGE UP (ref -2–3)
BASE EXCESS BLDA CALC-SCNC: 3 MMOL/L — SIGNIFICANT CHANGE UP (ref -2–3)
BASE EXCESS BLDA CALC-SCNC: 4.8 MMOL/L — HIGH (ref -2–3)
BASOPHILS # BLD AUTO: 0.04 K/UL — SIGNIFICANT CHANGE UP (ref 0–0.2)
BASOPHILS # BLD AUTO: 0.05 K/UL — SIGNIFICANT CHANGE UP (ref 0–0.2)
BASOPHILS # BLD AUTO: 0.06 K/UL — SIGNIFICANT CHANGE UP (ref 0–0.2)
BASOPHILS # BLD AUTO: 0.06 K/UL — SIGNIFICANT CHANGE UP (ref 0–0.2)
BASOPHILS # BLD AUTO: 0.07 K/UL — SIGNIFICANT CHANGE UP (ref 0–0.2)
BASOPHILS # BLD AUTO: 0.07 K/UL — SIGNIFICANT CHANGE UP (ref 0–0.2)
BASOPHILS # BLD AUTO: 0.08 K/UL — SIGNIFICANT CHANGE UP (ref 0–0.2)
BASOPHILS NFR BLD AUTO: 0.6 % — SIGNIFICANT CHANGE UP (ref 0–1)
BASOPHILS NFR BLD AUTO: 0.7 % — SIGNIFICANT CHANGE UP (ref 0–1)
BASOPHILS NFR BLD AUTO: 0.8 % — SIGNIFICANT CHANGE UP (ref 0–1)
BASOPHILS NFR BLD AUTO: 0.8 % — SIGNIFICANT CHANGE UP (ref 0–1)
BASOPHILS NFR BLD AUTO: 0.9 % — SIGNIFICANT CHANGE UP (ref 0–1)
BASOPHILS NFR BLD AUTO: 0.9 % — SIGNIFICANT CHANGE UP (ref 0–1)
BASOPHILS NFR BLD AUTO: 1.1 % — HIGH (ref 0–1)
BASOPHILS NFR BLD AUTO: 1.1 % — HIGH (ref 0–1)
BILIRUB DIRECT SERPL-MCNC: 0.2 MG/DL — SIGNIFICANT CHANGE UP (ref 0–0.3)
BILIRUB DIRECT SERPL-MCNC: 0.3 MG/DL — SIGNIFICANT CHANGE UP (ref 0–0.3)
BILIRUB DIRECT SERPL-MCNC: <0.2 MG/DL — SIGNIFICANT CHANGE UP (ref 0–0.3)
BILIRUB INDIRECT FLD-MCNC: 0.1 MG/DL — LOW (ref 0.2–1.2)
BILIRUB INDIRECT FLD-MCNC: 0.2 MG/DL — SIGNIFICANT CHANGE UP (ref 0.2–1.2)
BILIRUB INDIRECT FLD-MCNC: >0.2 MG/DL — SIGNIFICANT CHANGE UP (ref 0.2–1.2)
BILIRUB SERPL-MCNC: 0.4 MG/DL — SIGNIFICANT CHANGE UP (ref 0.2–1.2)
BILIRUB SERPL-MCNC: 0.5 MG/DL — SIGNIFICANT CHANGE UP (ref 0.2–1.2)
BILIRUB SERPL-MCNC: 0.5 MG/DL — SIGNIFICANT CHANGE UP (ref 0.2–1.2)
BILIRUB SERPL-MCNC: 0.9 MG/DL — SIGNIFICANT CHANGE UP (ref 0.2–1.2)
BILIRUB SERPL-MCNC: 1 MG/DL — SIGNIFICANT CHANGE UP (ref 0.2–1.2)
BILIRUB UR-MCNC: NEGATIVE — SIGNIFICANT CHANGE UP
BUN SERPL-MCNC: 19 MG/DL — SIGNIFICANT CHANGE UP (ref 10–20)
BUN SERPL-MCNC: 19 MG/DL — SIGNIFICANT CHANGE UP (ref 10–20)
BUN SERPL-MCNC: 20 MG/DL — SIGNIFICANT CHANGE UP (ref 10–20)
BUN SERPL-MCNC: 26 MG/DL — HIGH (ref 10–20)
BUN SERPL-MCNC: 27 MG/DL — HIGH (ref 10–20)
BUN SERPL-MCNC: 27 MG/DL — HIGH (ref 10–20)
BUN SERPL-MCNC: 31 MG/DL — HIGH (ref 10–20)
BUN SERPL-MCNC: 37 MG/DL — HIGH (ref 10–20)
BUN SERPL-MCNC: 42 MG/DL — HIGH (ref 10–20)
BUN SERPL-MCNC: 50 MG/DL — HIGH (ref 10–20)
BUN SERPL-MCNC: 52 MG/DL — HIGH (ref 10–20)
BUN SERPL-MCNC: 53 MG/DL — HIGH (ref 10–20)
BUN SERPL-MCNC: 54 MG/DL — HIGH (ref 10–20)
BUN SERPL-MCNC: 55 MG/DL — HIGH (ref 10–20)
C-ANCA SER-ACNC: NEGATIVE — SIGNIFICANT CHANGE UP
C3 SERPL-MCNC: 75 MG/DL — LOW (ref 81–157)
C4 SERPL-MCNC: 15 MG/DL — SIGNIFICANT CHANGE UP (ref 13–39)
CALCIUM SERPL-MCNC: 10.7 MG/DL — HIGH (ref 8.4–10.5)
CALCIUM SERPL-MCNC: 7 MG/DL — LOW (ref 8.4–10.5)
CALCIUM SERPL-MCNC: 7.1 MG/DL — LOW (ref 8.4–10.5)
CALCIUM SERPL-MCNC: 7.5 MG/DL — LOW (ref 8.4–10.5)
CALCIUM SERPL-MCNC: 7.6 MG/DL — LOW (ref 8.4–10.5)
CALCIUM SERPL-MCNC: 7.8 MG/DL — LOW (ref 8.4–10.5)
CALCIUM SERPL-MCNC: 8 MG/DL — LOW (ref 8.4–10.5)
CALCIUM SERPL-MCNC: 8.1 MG/DL — LOW (ref 8.4–10.5)
CALCIUM SERPL-MCNC: 8.2 MG/DL — LOW (ref 8.4–10.5)
CALCIUM SERPL-MCNC: 8.3 MG/DL — LOW (ref 8.4–10.5)
CALCIUM SERPL-MCNC: 8.3 MG/DL — LOW (ref 8.4–10.5)
CALCIUM SERPL-MCNC: 8.8 MG/DL — SIGNIFICANT CHANGE UP (ref 8.4–10.5)
CALCIUM SERPL-MCNC: 9.3 MG/DL — SIGNIFICANT CHANGE UP (ref 8.4–10.5)
CHLORIDE SERPL-SCNC: 100 MMOL/L — SIGNIFICANT CHANGE UP (ref 98–110)
CHLORIDE SERPL-SCNC: 100 MMOL/L — SIGNIFICANT CHANGE UP (ref 98–110)
CHLORIDE SERPL-SCNC: 101 MMOL/L — SIGNIFICANT CHANGE UP (ref 98–110)
CHLORIDE SERPL-SCNC: 102 MMOL/L — SIGNIFICANT CHANGE UP (ref 98–110)
CHLORIDE SERPL-SCNC: 104 MMOL/L — SIGNIFICANT CHANGE UP (ref 98–110)
CHLORIDE SERPL-SCNC: 104 MMOL/L — SIGNIFICANT CHANGE UP (ref 98–110)
CHLORIDE SERPL-SCNC: 105 MMOL/L — SIGNIFICANT CHANGE UP (ref 98–110)
CHLORIDE SERPL-SCNC: 106 MMOL/L — SIGNIFICANT CHANGE UP (ref 98–110)
CHLORIDE SERPL-SCNC: 107 MMOL/L — SIGNIFICANT CHANGE UP (ref 98–110)
CHLORIDE SERPL-SCNC: 108 MMOL/L — SIGNIFICANT CHANGE UP (ref 98–110)
CHLORIDE SERPL-SCNC: 99 MMOL/L — SIGNIFICANT CHANGE UP (ref 98–110)
CHLORIDE SERPL-SCNC: 99 MMOL/L — SIGNIFICANT CHANGE UP (ref 98–110)
CK SERPL-CCNC: 451 U/L — HIGH (ref 0–225)
CO2 SERPL-SCNC: 18 MMOL/L — SIGNIFICANT CHANGE UP (ref 17–32)
CO2 SERPL-SCNC: 18 MMOL/L — SIGNIFICANT CHANGE UP (ref 17–32)
CO2 SERPL-SCNC: 20 MMOL/L — SIGNIFICANT CHANGE UP (ref 17–32)
CO2 SERPL-SCNC: 21 MMOL/L — SIGNIFICANT CHANGE UP (ref 17–32)
CO2 SERPL-SCNC: 24 MMOL/L — SIGNIFICANT CHANGE UP (ref 17–32)
CO2 SERPL-SCNC: 25 MMOL/L — SIGNIFICANT CHANGE UP (ref 17–32)
CO2 SERPL-SCNC: 27 MMOL/L — SIGNIFICANT CHANGE UP (ref 17–32)
CO2 SERPL-SCNC: 29 MMOL/L — SIGNIFICANT CHANGE UP (ref 17–32)
CO2 SERPL-SCNC: 30 MMOL/L — SIGNIFICANT CHANGE UP (ref 17–32)
CO2 SERPL-SCNC: 31 MMOL/L — SIGNIFICANT CHANGE UP (ref 17–32)
CO2 SERPL-SCNC: 33 MMOL/L — HIGH (ref 17–32)
COLOR SPEC: YELLOW — SIGNIFICANT CHANGE UP
CREAT SERPL-MCNC: 1.5 MG/DL — SIGNIFICANT CHANGE UP (ref 0.7–1.5)
CREAT SERPL-MCNC: 1.6 MG/DL — HIGH (ref 0.7–1.5)
CREAT SERPL-MCNC: 1.6 MG/DL — HIGH (ref 0.7–1.5)
CREAT SERPL-MCNC: 1.7 MG/DL — HIGH (ref 0.7–1.5)
CREAT SERPL-MCNC: 2.1 MG/DL — HIGH (ref 0.7–1.5)
CREAT SERPL-MCNC: 2.3 MG/DL — HIGH (ref 0.7–1.5)
CREAT SERPL-MCNC: 2.3 MG/DL — HIGH (ref 0.7–1.5)
CREAT SERPL-MCNC: 2.5 MG/DL — HIGH (ref 0.7–1.5)
CREAT SERPL-MCNC: 2.7 MG/DL — HIGH (ref 0.7–1.5)
CREAT SERPL-MCNC: 3.4 MG/DL — HIGH (ref 0.7–1.5)
CREAT SERPL-MCNC: 3.7 MG/DL — HIGH (ref 0.7–1.5)
CREAT SERPL-MCNC: 4.4 MG/DL — CRITICAL HIGH (ref 0.7–1.5)
CREAT SERPL-MCNC: 4.7 MG/DL — CRITICAL HIGH (ref 0.7–1.5)
CREAT SERPL-MCNC: 4.9 MG/DL — CRITICAL HIGH (ref 0.7–1.5)
CREAT SERPL-MCNC: 5 MG/DL — CRITICAL HIGH (ref 0.7–1.5)
CREAT SERPL-MCNC: 5.1 MG/DL — CRITICAL HIGH (ref 0.7–1.5)
CULTURE RESULTS: ABNORMAL
CULTURE RESULTS: SIGNIFICANT CHANGE UP
CULTURE RESULTS: SIGNIFICANT CHANGE UP
D DIMER BLD IA.RAPID-MCNC: 389 NG/ML DDU — HIGH
DIFF PNL FLD: ABNORMAL
EGFR: 11 ML/MIN/1.73M2 — LOW
EGFR: 13 ML/MIN/1.73M2 — LOW
EGFR: 17 ML/MIN/1.73M2 — LOW
EGFR: 18 ML/MIN/1.73M2 — LOW
EGFR: 20 ML/MIN/1.73M2 — LOW
EGFR: 20 ML/MIN/1.73M2 — LOW
EGFR: 23 ML/MIN/1.73M2 — LOW
EGFR: 29 ML/MIN/1.73M2 — LOW
EGFR: 31 ML/MIN/1.73M2 — LOW
EGFR: 31 ML/MIN/1.73M2 — LOW
EGFR: 34 ML/MIN/1.73M2 — LOW
EGFR: 8 ML/MIN/1.73M2 — LOW
EGFR: 9 ML/MIN/1.73M2 — LOW
EGFR: 9 ML/MIN/1.73M2 — LOW
EOSINOPHIL # BLD AUTO: 0.13 K/UL — SIGNIFICANT CHANGE UP (ref 0–0.7)
EOSINOPHIL # BLD AUTO: 0.17 K/UL — SIGNIFICANT CHANGE UP (ref 0–0.7)
EOSINOPHIL # BLD AUTO: 0.22 K/UL — SIGNIFICANT CHANGE UP (ref 0–0.7)
EOSINOPHIL # BLD AUTO: 0.23 K/UL — SIGNIFICANT CHANGE UP (ref 0–0.7)
EOSINOPHIL # BLD AUTO: 0.24 K/UL — SIGNIFICANT CHANGE UP (ref 0–0.7)
EOSINOPHIL # BLD AUTO: 0.24 K/UL — SIGNIFICANT CHANGE UP (ref 0–0.7)
EOSINOPHIL # BLD AUTO: 0.27 K/UL — SIGNIFICANT CHANGE UP (ref 0–0.7)
EOSINOPHIL # BLD AUTO: 0.27 K/UL — SIGNIFICANT CHANGE UP (ref 0–0.7)
EOSINOPHIL # BLD AUTO: 0.29 K/UL — SIGNIFICANT CHANGE UP (ref 0–0.7)
EOSINOPHIL # BLD AUTO: 0.29 K/UL — SIGNIFICANT CHANGE UP (ref 0–0.7)
EOSINOPHIL # BLD AUTO: 0.3 K/UL — SIGNIFICANT CHANGE UP (ref 0–0.7)
EOSINOPHIL NFR BLD AUTO: 1.5 % — SIGNIFICANT CHANGE UP (ref 0–8)
EOSINOPHIL NFR BLD AUTO: 1.8 % — SIGNIFICANT CHANGE UP (ref 0–8)
EOSINOPHIL NFR BLD AUTO: 2.8 % — SIGNIFICANT CHANGE UP (ref 0–8)
EOSINOPHIL NFR BLD AUTO: 2.8 % — SIGNIFICANT CHANGE UP (ref 0–8)
EOSINOPHIL NFR BLD AUTO: 3.2 % — SIGNIFICANT CHANGE UP (ref 0–8)
EOSINOPHIL NFR BLD AUTO: 3.6 % — SIGNIFICANT CHANGE UP (ref 0–8)
EOSINOPHIL NFR BLD AUTO: 3.6 % — SIGNIFICANT CHANGE UP (ref 0–8)
EOSINOPHIL NFR BLD AUTO: 4.2 % — SIGNIFICANT CHANGE UP (ref 0–8)
EOSINOPHIL NFR BLD AUTO: 4.3 % — SIGNIFICANT CHANGE UP (ref 0–8)
EOSINOPHIL NFR BLD AUTO: 4.3 % — SIGNIFICANT CHANGE UP (ref 0–8)
EOSINOPHIL NFR BLD AUTO: 4.4 % — SIGNIFICANT CHANGE UP (ref 0–8)
ESTIMATED AVERAGE GLUCOSE: 169 MG/DL — HIGH (ref 68–114)
GAMMA GLOBULIN: 1.1 G/DL — SIGNIFICANT CHANGE UP (ref 0.6–1.6)
GGT SERPL-CCNC: 119 U/L — HIGH (ref 1–40)
GLUCOSE BLDC GLUCOMTR-MCNC: 101 MG/DL — HIGH (ref 70–99)
GLUCOSE BLDC GLUCOMTR-MCNC: 129 MG/DL — HIGH (ref 70–99)
GLUCOSE BLDC GLUCOMTR-MCNC: 129 MG/DL — HIGH (ref 70–99)
GLUCOSE BLDC GLUCOMTR-MCNC: 130 MG/DL — HIGH (ref 70–99)
GLUCOSE BLDC GLUCOMTR-MCNC: 131 MG/DL — HIGH (ref 70–99)
GLUCOSE BLDC GLUCOMTR-MCNC: 132 MG/DL — HIGH (ref 70–99)
GLUCOSE BLDC GLUCOMTR-MCNC: 133 MG/DL — HIGH (ref 70–99)
GLUCOSE BLDC GLUCOMTR-MCNC: 133 MG/DL — HIGH (ref 70–99)
GLUCOSE BLDC GLUCOMTR-MCNC: 134 MG/DL — HIGH (ref 70–99)
GLUCOSE BLDC GLUCOMTR-MCNC: 135 MG/DL — HIGH (ref 70–99)
GLUCOSE BLDC GLUCOMTR-MCNC: 136 MG/DL — HIGH (ref 70–99)
GLUCOSE BLDC GLUCOMTR-MCNC: 139 MG/DL — HIGH (ref 70–99)
GLUCOSE BLDC GLUCOMTR-MCNC: 142 MG/DL — HIGH (ref 70–99)
GLUCOSE BLDC GLUCOMTR-MCNC: 143 MG/DL — HIGH (ref 70–99)
GLUCOSE BLDC GLUCOMTR-MCNC: 145 MG/DL — HIGH (ref 70–99)
GLUCOSE BLDC GLUCOMTR-MCNC: 146 MG/DL — HIGH (ref 70–99)
GLUCOSE BLDC GLUCOMTR-MCNC: 146 MG/DL — HIGH (ref 70–99)
GLUCOSE BLDC GLUCOMTR-MCNC: 147 MG/DL — HIGH (ref 70–99)
GLUCOSE BLDC GLUCOMTR-MCNC: 150 MG/DL — HIGH (ref 70–99)
GLUCOSE BLDC GLUCOMTR-MCNC: 150 MG/DL — HIGH (ref 70–99)
GLUCOSE BLDC GLUCOMTR-MCNC: 151 MG/DL — HIGH (ref 70–99)
GLUCOSE BLDC GLUCOMTR-MCNC: 152 MG/DL — HIGH (ref 70–99)
GLUCOSE BLDC GLUCOMTR-MCNC: 153 MG/DL — HIGH (ref 70–99)
GLUCOSE BLDC GLUCOMTR-MCNC: 155 MG/DL — HIGH (ref 70–99)
GLUCOSE BLDC GLUCOMTR-MCNC: 156 MG/DL — HIGH (ref 70–99)
GLUCOSE BLDC GLUCOMTR-MCNC: 157 MG/DL — HIGH (ref 70–99)
GLUCOSE BLDC GLUCOMTR-MCNC: 159 MG/DL — HIGH (ref 70–99)
GLUCOSE BLDC GLUCOMTR-MCNC: 161 MG/DL — HIGH (ref 70–99)
GLUCOSE BLDC GLUCOMTR-MCNC: 165 MG/DL — HIGH (ref 70–99)
GLUCOSE BLDC GLUCOMTR-MCNC: 166 MG/DL — HIGH (ref 70–99)
GLUCOSE BLDC GLUCOMTR-MCNC: 167 MG/DL — HIGH (ref 70–99)
GLUCOSE BLDC GLUCOMTR-MCNC: 168 MG/DL — HIGH (ref 70–99)
GLUCOSE BLDC GLUCOMTR-MCNC: 171 MG/DL — HIGH (ref 70–99)
GLUCOSE BLDC GLUCOMTR-MCNC: 173 MG/DL — HIGH (ref 70–99)
GLUCOSE BLDC GLUCOMTR-MCNC: 176 MG/DL — HIGH (ref 70–99)
GLUCOSE BLDC GLUCOMTR-MCNC: 178 MG/DL — HIGH (ref 70–99)
GLUCOSE BLDC GLUCOMTR-MCNC: 180 MG/DL — HIGH (ref 70–99)
GLUCOSE BLDC GLUCOMTR-MCNC: 180 MG/DL — HIGH (ref 70–99)
GLUCOSE BLDC GLUCOMTR-MCNC: 181 MG/DL — HIGH (ref 70–99)
GLUCOSE BLDC GLUCOMTR-MCNC: 182 MG/DL — HIGH (ref 70–99)
GLUCOSE BLDC GLUCOMTR-MCNC: 182 MG/DL — HIGH (ref 70–99)
GLUCOSE BLDC GLUCOMTR-MCNC: 184 MG/DL — HIGH (ref 70–99)
GLUCOSE BLDC GLUCOMTR-MCNC: 188 MG/DL — HIGH (ref 70–99)
GLUCOSE BLDC GLUCOMTR-MCNC: 188 MG/DL — HIGH (ref 70–99)
GLUCOSE BLDC GLUCOMTR-MCNC: 190 MG/DL — HIGH (ref 70–99)
GLUCOSE BLDC GLUCOMTR-MCNC: 190 MG/DL — HIGH (ref 70–99)
GLUCOSE BLDC GLUCOMTR-MCNC: 191 MG/DL — HIGH (ref 70–99)
GLUCOSE BLDC GLUCOMTR-MCNC: 191 MG/DL — HIGH (ref 70–99)
GLUCOSE BLDC GLUCOMTR-MCNC: 195 MG/DL — HIGH (ref 70–99)
GLUCOSE BLDC GLUCOMTR-MCNC: 196 MG/DL — HIGH (ref 70–99)
GLUCOSE BLDC GLUCOMTR-MCNC: 196 MG/DL — HIGH (ref 70–99)
GLUCOSE BLDC GLUCOMTR-MCNC: 202 MG/DL — HIGH (ref 70–99)
GLUCOSE BLDC GLUCOMTR-MCNC: 208 MG/DL — HIGH (ref 70–99)
GLUCOSE BLDC GLUCOMTR-MCNC: 214 MG/DL — HIGH (ref 70–99)
GLUCOSE BLDC GLUCOMTR-MCNC: 219 MG/DL — HIGH (ref 70–99)
GLUCOSE BLDC GLUCOMTR-MCNC: 249 MG/DL — HIGH (ref 70–99)
GLUCOSE BLDC GLUCOMTR-MCNC: 251 MG/DL — HIGH (ref 70–99)
GLUCOSE BLDC GLUCOMTR-MCNC: 252 MG/DL — HIGH (ref 70–99)
GLUCOSE BLDC GLUCOMTR-MCNC: 294 MG/DL — HIGH (ref 70–99)
GLUCOSE BLDC GLUCOMTR-MCNC: 35 MG/DL — CRITICAL LOW (ref 70–99)
GLUCOSE BLDC GLUCOMTR-MCNC: 49 MG/DL — CRITICAL LOW (ref 70–99)
GLUCOSE BLDC GLUCOMTR-MCNC: 60 MG/DL — LOW (ref 70–99)
GLUCOSE BLDC GLUCOMTR-MCNC: 92 MG/DL — SIGNIFICANT CHANGE UP (ref 70–99)
GLUCOSE SERPL-MCNC: 109 MG/DL — HIGH (ref 70–99)
GLUCOSE SERPL-MCNC: 112 MG/DL — HIGH (ref 70–99)
GLUCOSE SERPL-MCNC: 115 MG/DL — HIGH (ref 70–99)
GLUCOSE SERPL-MCNC: 117 MG/DL — HIGH (ref 70–99)
GLUCOSE SERPL-MCNC: 121 MG/DL — HIGH (ref 70–99)
GLUCOSE SERPL-MCNC: 122 MG/DL — HIGH (ref 70–99)
GLUCOSE SERPL-MCNC: 128 MG/DL — HIGH (ref 70–99)
GLUCOSE SERPL-MCNC: 129 MG/DL — HIGH (ref 70–99)
GLUCOSE SERPL-MCNC: 137 MG/DL — HIGH (ref 70–99)
GLUCOSE SERPL-MCNC: 141 MG/DL — HIGH (ref 70–99)
GLUCOSE SERPL-MCNC: 146 MG/DL — HIGH (ref 70–99)
GLUCOSE SERPL-MCNC: 155 MG/DL — HIGH (ref 70–99)
GLUCOSE SERPL-MCNC: 157 MG/DL — HIGH (ref 70–99)
GLUCOSE SERPL-MCNC: 181 MG/DL — HIGH (ref 70–99)
GLUCOSE SERPL-MCNC: 308 MG/DL — HIGH (ref 70–99)
GLUCOSE SERPL-MCNC: 54 MG/DL — CRITICAL LOW (ref 70–99)
GLUCOSE UR QL: NEGATIVE MG/DL — SIGNIFICANT CHANGE UP
HAV IGM SER-ACNC: SIGNIFICANT CHANGE UP
HBV CORE IGM SER-ACNC: SIGNIFICANT CHANGE UP
HBV SURFACE AG SER-ACNC: SIGNIFICANT CHANGE UP
HCO3 BLDA-SCNC: 25 MMOL/L — SIGNIFICANT CHANGE UP (ref 21–28)
HCO3 BLDA-SCNC: 30 MMOL/L — HIGH (ref 21–28)
HCO3 BLDA-SCNC: 32 MMOL/L — HIGH (ref 21–28)
HCO3 BLDA-SCNC: 32 MMOL/L — HIGH (ref 21–28)
HCT VFR BLD CALC: 30.3 % — LOW (ref 37–47)
HCT VFR BLD CALC: 31.8 % — LOW (ref 37–47)
HCT VFR BLD CALC: 32 % — LOW (ref 37–47)
HCT VFR BLD CALC: 33.1 % — LOW (ref 37–47)
HCT VFR BLD CALC: 33.1 % — LOW (ref 37–47)
HCT VFR BLD CALC: 33.5 % — LOW (ref 37–47)
HCT VFR BLD CALC: 33.6 % — LOW (ref 37–47)
HCT VFR BLD CALC: 34.3 % — LOW (ref 37–47)
HCT VFR BLD CALC: 35.2 % — LOW (ref 37–47)
HCT VFR BLD CALC: 37.5 % — SIGNIFICANT CHANGE UP (ref 37–47)
HCT VFR BLD CALC: 37.6 % — SIGNIFICANT CHANGE UP (ref 37–47)
HCT VFR BLD CALC: 37.8 % — SIGNIFICANT CHANGE UP (ref 37–47)
HCT VFR BLD CALC: 38.1 % — SIGNIFICANT CHANGE UP (ref 37–47)
HCT VFR BLD CALC: 44.3 % — SIGNIFICANT CHANGE UP (ref 37–47)
HCV AB S/CO SERPL IA: 0.13 S/CO — SIGNIFICANT CHANGE UP (ref 0–0.99)
HCV AB SERPL-IMP: SIGNIFICANT CHANGE UP
HGB BLD-MCNC: 10.1 G/DL — LOW (ref 12–16)
HGB BLD-MCNC: 10.5 G/DL — LOW (ref 12–16)
HGB BLD-MCNC: 10.6 G/DL — LOW (ref 12–16)
HGB BLD-MCNC: 10.7 G/DL — LOW (ref 12–16)
HGB BLD-MCNC: 10.8 G/DL — LOW (ref 12–16)
HGB BLD-MCNC: 11 G/DL — LOW (ref 12–16)
HGB BLD-MCNC: 11.3 G/DL — LOW (ref 12–16)
HGB BLD-MCNC: 11.4 G/DL — LOW (ref 12–16)
HGB BLD-MCNC: 11.8 G/DL — LOW (ref 12–16)
HGB BLD-MCNC: 11.9 G/DL — LOW (ref 12–16)
HGB BLD-MCNC: 12.2 G/DL — SIGNIFICANT CHANGE UP (ref 12–16)
HGB BLD-MCNC: 12.3 G/DL — SIGNIFICANT CHANGE UP (ref 12–16)
HGB BLD-MCNC: 12.5 G/DL — SIGNIFICANT CHANGE UP (ref 12–16)
HGB BLD-MCNC: 15 G/DL — SIGNIFICANT CHANGE UP (ref 12–16)
HOROWITZ INDEX BLDA+IHG-RTO: 21 — SIGNIFICANT CHANGE UP
IMM GRANULOCYTES NFR BLD AUTO: 0.2 % — SIGNIFICANT CHANGE UP (ref 0.1–0.3)
IMM GRANULOCYTES NFR BLD AUTO: 0.7 % — HIGH (ref 0.1–0.3)
IMM GRANULOCYTES NFR BLD AUTO: 0.7 % — HIGH (ref 0.1–0.3)
IMM GRANULOCYTES NFR BLD AUTO: 1 % — HIGH (ref 0.1–0.3)
IMM GRANULOCYTES NFR BLD AUTO: 1.1 % — HIGH (ref 0.1–0.3)
IMM GRANULOCYTES NFR BLD AUTO: 1.1 % — HIGH (ref 0.1–0.3)
IMM GRANULOCYTES NFR BLD AUTO: 1.2 % — HIGH (ref 0.1–0.3)
IMM GRANULOCYTES NFR BLD AUTO: 1.3 % — HIGH (ref 0.1–0.3)
IMM GRANULOCYTES NFR BLD AUTO: 1.7 % — HIGH (ref 0.1–0.3)
INR BLD: 1.28 RATIO — SIGNIFICANT CHANGE UP (ref 0.65–1.3)
INR BLD: 1.29 RATIO — SIGNIFICANT CHANGE UP (ref 0.65–1.3)
INTERPRETATION SERPL IFE-IMP: SIGNIFICANT CHANGE UP
KETONES UR-MCNC: NEGATIVE MG/DL — SIGNIFICANT CHANGE UP
LACTATE SERPL-SCNC: 0.9 MMOL/L — SIGNIFICANT CHANGE UP (ref 0.7–2)
LACTATE SERPL-SCNC: 1.7 MMOL/L — SIGNIFICANT CHANGE UP (ref 0.7–2)
LEUKOCYTE ESTERASE UR-ACNC: NEGATIVE — SIGNIFICANT CHANGE UP
LIDOCAIN IGE QN: 10 U/L — SIGNIFICANT CHANGE UP (ref 7–60)
LYMPHOCYTES # BLD AUTO: 0.68 K/UL — LOW (ref 1.2–3.4)
LYMPHOCYTES # BLD AUTO: 0.79 K/UL — LOW (ref 1.2–3.4)
LYMPHOCYTES # BLD AUTO: 0.79 K/UL — LOW (ref 1.2–3.4)
LYMPHOCYTES # BLD AUTO: 0.85 K/UL — LOW (ref 1.2–3.4)
LYMPHOCYTES # BLD AUTO: 0.91 K/UL — LOW (ref 1.2–3.4)
LYMPHOCYTES # BLD AUTO: 0.93 K/UL — LOW (ref 1.2–3.4)
LYMPHOCYTES # BLD AUTO: 1.03 K/UL — LOW (ref 1.2–3.4)
LYMPHOCYTES # BLD AUTO: 1.04 K/UL — LOW (ref 1.2–3.4)
LYMPHOCYTES # BLD AUTO: 1.11 K/UL — LOW (ref 1.2–3.4)
LYMPHOCYTES # BLD AUTO: 1.13 K/UL — LOW (ref 1.2–3.4)
LYMPHOCYTES # BLD AUTO: 1.5 K/UL — SIGNIFICANT CHANGE UP (ref 1.2–3.4)
LYMPHOCYTES # BLD AUTO: 10.4 % — LOW (ref 20.5–51.1)
LYMPHOCYTES # BLD AUTO: 11.7 % — LOW (ref 20.5–51.1)
LYMPHOCYTES # BLD AUTO: 12.4 % — LOW (ref 20.5–51.1)
LYMPHOCYTES # BLD AUTO: 13.7 % — LOW (ref 20.5–51.1)
LYMPHOCYTES # BLD AUTO: 13.9 % — LOW (ref 20.5–51.1)
LYMPHOCYTES # BLD AUTO: 13.9 % — LOW (ref 20.5–51.1)
LYMPHOCYTES # BLD AUTO: 14 % — LOW (ref 20.5–51.1)
LYMPHOCYTES # BLD AUTO: 16.6 % — LOW (ref 20.5–51.1)
LYMPHOCYTES # BLD AUTO: 17.3 % — LOW (ref 20.5–51.1)
LYMPHOCYTES # BLD AUTO: 9.7 % — LOW (ref 20.5–51.1)
LYMPHOCYTES # BLD AUTO: 9.8 % — LOW (ref 20.5–51.1)
M-SPIKE: SIGNIFICANT CHANGE UP (ref 0–0)
MAGNESIUM SERPL-MCNC: 1.8 MG/DL — SIGNIFICANT CHANGE UP (ref 1.8–2.4)
MAGNESIUM SERPL-MCNC: 1.9 MG/DL — SIGNIFICANT CHANGE UP (ref 1.8–2.4)
MAGNESIUM SERPL-MCNC: 2 MG/DL — SIGNIFICANT CHANGE UP (ref 1.8–2.4)
MAGNESIUM SERPL-MCNC: 2.1 MG/DL — SIGNIFICANT CHANGE UP (ref 1.8–2.4)
MCHC RBC-ENTMCNC: 30.6 PG — SIGNIFICANT CHANGE UP (ref 27–31)
MCHC RBC-ENTMCNC: 30.7 PG — SIGNIFICANT CHANGE UP (ref 27–31)
MCHC RBC-ENTMCNC: 30.8 PG — SIGNIFICANT CHANGE UP (ref 27–31)
MCHC RBC-ENTMCNC: 30.9 PG — SIGNIFICANT CHANGE UP (ref 27–31)
MCHC RBC-ENTMCNC: 30.9 PG — SIGNIFICANT CHANGE UP (ref 27–31)
MCHC RBC-ENTMCNC: 31 G/DL — LOW (ref 32–37)
MCHC RBC-ENTMCNC: 31.1 PG — HIGH (ref 27–31)
MCHC RBC-ENTMCNC: 31.3 PG — HIGH (ref 27–31)
MCHC RBC-ENTMCNC: 31.4 PG — HIGH (ref 27–31)
MCHC RBC-ENTMCNC: 31.5 PG — HIGH (ref 27–31)
MCHC RBC-ENTMCNC: 31.5 PG — HIGH (ref 27–31)
MCHC RBC-ENTMCNC: 31.9 PG — HIGH (ref 27–31)
MCHC RBC-ENTMCNC: 32.3 G/DL — SIGNIFICANT CHANGE UP (ref 32–37)
MCHC RBC-ENTMCNC: 32.4 G/DL — SIGNIFICANT CHANGE UP (ref 32–37)
MCHC RBC-ENTMCNC: 32.5 G/DL — SIGNIFICANT CHANGE UP (ref 32–37)
MCHC RBC-ENTMCNC: 32.6 G/DL — SIGNIFICANT CHANGE UP (ref 32–37)
MCHC RBC-ENTMCNC: 32.7 G/DL — SIGNIFICANT CHANGE UP (ref 32–37)
MCHC RBC-ENTMCNC: 32.9 G/DL — SIGNIFICANT CHANGE UP (ref 32–37)
MCHC RBC-ENTMCNC: 33 G/DL — SIGNIFICANT CHANGE UP (ref 32–37)
MCHC RBC-ENTMCNC: 33.1 G/DL — SIGNIFICANT CHANGE UP (ref 32–37)
MCHC RBC-ENTMCNC: 33.3 G/DL — SIGNIFICANT CHANGE UP (ref 32–37)
MCHC RBC-ENTMCNC: 33.3 G/DL — SIGNIFICANT CHANGE UP (ref 32–37)
MCHC RBC-ENTMCNC: 33.8 G/DL — SIGNIFICANT CHANGE UP (ref 32–37)
MCHC RBC-ENTMCNC: 33.9 G/DL — SIGNIFICANT CHANGE UP (ref 32–37)
MCHC RBC-ENTMCNC: 34 G/DL — SIGNIFICANT CHANGE UP (ref 32–37)
MCV RBC AUTO: 91.4 FL — SIGNIFICANT CHANGE UP (ref 81–99)
MCV RBC AUTO: 92.5 FL — SIGNIFICANT CHANGE UP (ref 81–99)
MCV RBC AUTO: 92.5 FL — SIGNIFICANT CHANGE UP (ref 81–99)
MCV RBC AUTO: 93.3 FL — SIGNIFICANT CHANGE UP (ref 81–99)
MCV RBC AUTO: 94.1 FL — SIGNIFICANT CHANGE UP (ref 81–99)
MCV RBC AUTO: 94.3 FL — SIGNIFICANT CHANGE UP (ref 81–99)
MCV RBC AUTO: 94.7 FL — SIGNIFICANT CHANGE UP (ref 81–99)
MCV RBC AUTO: 94.8 FL — SIGNIFICANT CHANGE UP (ref 81–99)
MCV RBC AUTO: 95.3 FL — SIGNIFICANT CHANGE UP (ref 81–99)
MCV RBC AUTO: 95.5 FL — SIGNIFICANT CHANGE UP (ref 81–99)
MCV RBC AUTO: 95.5 FL — SIGNIFICANT CHANGE UP (ref 81–99)
MCV RBC AUTO: 95.7 FL — SIGNIFICANT CHANGE UP (ref 81–99)
MCV RBC AUTO: 96.7 FL — SIGNIFICANT CHANGE UP (ref 81–99)
MCV RBC AUTO: 99.7 FL — HIGH (ref 81–99)
MONOCYTES # BLD AUTO: 0.66 K/UL — HIGH (ref 0.1–0.6)
MONOCYTES # BLD AUTO: 0.7 K/UL — HIGH (ref 0.1–0.6)
MONOCYTES # BLD AUTO: 0.72 K/UL — HIGH (ref 0.1–0.6)
MONOCYTES # BLD AUTO: 0.81 K/UL — HIGH (ref 0.1–0.6)
MONOCYTES # BLD AUTO: 0.82 K/UL — HIGH (ref 0.1–0.6)
MONOCYTES # BLD AUTO: 0.85 K/UL — HIGH (ref 0.1–0.6)
MONOCYTES # BLD AUTO: 0.88 K/UL — HIGH (ref 0.1–0.6)
MONOCYTES # BLD AUTO: 0.89 K/UL — HIGH (ref 0.1–0.6)
MONOCYTES # BLD AUTO: 0.96 K/UL — HIGH (ref 0.1–0.6)
MONOCYTES # BLD AUTO: 0.99 K/UL — HIGH (ref 0.1–0.6)
MONOCYTES # BLD AUTO: 1.13 K/UL — HIGH (ref 0.1–0.6)
MONOCYTES NFR BLD AUTO: 10.2 % — HIGH (ref 1.7–9.3)
MONOCYTES NFR BLD AUTO: 10.4 % — HIGH (ref 1.7–9.3)
MONOCYTES NFR BLD AUTO: 10.6 % — HIGH (ref 1.7–9.3)
MONOCYTES NFR BLD AUTO: 11.4 % — HIGH (ref 1.7–9.3)
MONOCYTES NFR BLD AUTO: 11.6 % — HIGH (ref 1.7–9.3)
MONOCYTES NFR BLD AUTO: 11.9 % — HIGH (ref 1.7–9.3)
MONOCYTES NFR BLD AUTO: 12.8 % — HIGH (ref 1.7–9.3)
MONOCYTES NFR BLD AUTO: 13.2 % — HIGH (ref 1.7–9.3)
MONOCYTES NFR BLD AUTO: 13.2 % — HIGH (ref 1.7–9.3)
MONOCYTES NFR BLD AUTO: 13.3 % — HIGH (ref 1.7–9.3)
MONOCYTES NFR BLD AUTO: 7.6 % — SIGNIFICANT CHANGE UP (ref 1.7–9.3)
MPO AB + PR3 PNL SER: SIGNIFICANT CHANGE UP
NEUTROPHILS # BLD AUTO: 3.75 K/UL — SIGNIFICANT CHANGE UP (ref 1.4–6.5)
NEUTROPHILS # BLD AUTO: 4.06 K/UL — SIGNIFICANT CHANGE UP (ref 1.4–6.5)
NEUTROPHILS # BLD AUTO: 4.17 K/UL — SIGNIFICANT CHANGE UP (ref 1.4–6.5)
NEUTROPHILS # BLD AUTO: 4.37 K/UL — SIGNIFICANT CHANGE UP (ref 1.4–6.5)
NEUTROPHILS # BLD AUTO: 4.73 K/UL — SIGNIFICANT CHANGE UP (ref 1.4–6.5)
NEUTROPHILS # BLD AUTO: 5.25 K/UL — SIGNIFICANT CHANGE UP (ref 1.4–6.5)
NEUTROPHILS # BLD AUTO: 5.67 K/UL — SIGNIFICANT CHANGE UP (ref 1.4–6.5)
NEUTROPHILS # BLD AUTO: 5.88 K/UL — SIGNIFICANT CHANGE UP (ref 1.4–6.5)
NEUTROPHILS # BLD AUTO: 6.31 K/UL — SIGNIFICANT CHANGE UP (ref 1.4–6.5)
NEUTROPHILS # BLD AUTO: 7.03 K/UL — HIGH (ref 1.4–6.5)
NEUTROPHILS # BLD AUTO: 8.17 K/UL — HIGH (ref 1.4–6.5)
NEUTROPHILS NFR BLD AUTO: 65.4 % — SIGNIFICANT CHANGE UP (ref 42.2–75.2)
NEUTROPHILS NFR BLD AUTO: 66 % — SIGNIFICANT CHANGE UP (ref 42.2–75.2)
NEUTROPHILS NFR BLD AUTO: 67 % — SIGNIFICANT CHANGE UP (ref 42.2–75.2)
NEUTROPHILS NFR BLD AUTO: 68.6 % — SIGNIFICANT CHANGE UP (ref 42.2–75.2)
NEUTROPHILS NFR BLD AUTO: 69.9 % — SIGNIFICANT CHANGE UP (ref 42.2–75.2)
NEUTROPHILS NFR BLD AUTO: 70.1 % — SIGNIFICANT CHANGE UP (ref 42.2–75.2)
NEUTROPHILS NFR BLD AUTO: 71 % — SIGNIFICANT CHANGE UP (ref 42.2–75.2)
NEUTROPHILS NFR BLD AUTO: 72.8 % — SIGNIFICANT CHANGE UP (ref 42.2–75.2)
NEUTROPHILS NFR BLD AUTO: 73 % — SIGNIFICANT CHANGE UP (ref 42.2–75.2)
NEUTROPHILS NFR BLD AUTO: 75.1 % — SIGNIFICANT CHANGE UP (ref 42.2–75.2)
NEUTROPHILS NFR BLD AUTO: 75.3 % — HIGH (ref 42.2–75.2)
NITRITE UR-MCNC: NEGATIVE — SIGNIFICANT CHANGE UP
NRBC # BLD: 0 /100 WBCS — SIGNIFICANT CHANGE UP (ref 0–0)
NT-PROBNP SERPL-SCNC: 1950 PG/ML — HIGH (ref 0–300)
P-ANCA SER-ACNC: NEGATIVE — SIGNIFICANT CHANGE UP
PCO2 BLDA: 39 MMHG — SIGNIFICANT CHANGE UP (ref 32–45)
PCO2 BLDA: 45 MMHG — SIGNIFICANT CHANGE UP (ref 32–45)
PCO2 BLDA: 72 MMHG — CRITICAL HIGH (ref 32–45)
PCO2 BLDA: 75 MMHG — CRITICAL HIGH (ref 32–45)
PH BLDA: 7.24 — LOW (ref 7.35–7.45)
PH BLDA: 7.26 — LOW (ref 7.35–7.45)
PH BLDA: 7.42 — SIGNIFICANT CHANGE UP (ref 7.35–7.45)
PH BLDA: 7.43 — SIGNIFICANT CHANGE UP (ref 7.35–7.45)
PH UR: 7 — SIGNIFICANT CHANGE UP (ref 5–8)
PHOSPHATE SERPL-MCNC: 2.8 MG/DL — SIGNIFICANT CHANGE UP (ref 2.1–4.9)
PHOSPHATE SERPL-MCNC: 6.6 MG/DL — HIGH (ref 2.1–4.9)
PHOSPHATE SERPL-MCNC: 6.8 MG/DL — HIGH (ref 2.1–4.9)
PHOSPHATE SERPL-MCNC: 7.1 MG/DL — HIGH (ref 2.1–4.9)
PHOSPHATE SERPL-MCNC: 7.3 MG/DL — HIGH (ref 2.1–4.9)
PLATELET # BLD AUTO: 180 K/UL — SIGNIFICANT CHANGE UP (ref 130–400)
PLATELET # BLD AUTO: 190 K/UL — SIGNIFICANT CHANGE UP (ref 130–400)
PLATELET # BLD AUTO: 191 K/UL — SIGNIFICANT CHANGE UP (ref 130–400)
PLATELET # BLD AUTO: 194 K/UL — SIGNIFICANT CHANGE UP (ref 130–400)
PLATELET # BLD AUTO: 204 K/UL — SIGNIFICANT CHANGE UP (ref 130–400)
PLATELET # BLD AUTO: 205 K/UL — SIGNIFICANT CHANGE UP (ref 130–400)
PLATELET # BLD AUTO: 223 K/UL — SIGNIFICANT CHANGE UP (ref 130–400)
PLATELET # BLD AUTO: 229 K/UL — SIGNIFICANT CHANGE UP (ref 130–400)
PLATELET # BLD AUTO: 235 K/UL — SIGNIFICANT CHANGE UP (ref 130–400)
PLATELET # BLD AUTO: 239 K/UL — SIGNIFICANT CHANGE UP (ref 130–400)
PLATELET # BLD AUTO: 242 K/UL — SIGNIFICANT CHANGE UP (ref 130–400)
PLATELET # BLD AUTO: 247 K/UL — SIGNIFICANT CHANGE UP (ref 130–400)
PLATELET # BLD AUTO: 271 K/UL — SIGNIFICANT CHANGE UP (ref 130–400)
PLATELET # BLD AUTO: 368 K/UL — SIGNIFICANT CHANGE UP (ref 130–400)
PMV BLD: 10 FL — SIGNIFICANT CHANGE UP (ref 7.4–10.4)
PMV BLD: 10 FL — SIGNIFICANT CHANGE UP (ref 7.4–10.4)
PMV BLD: 10.1 FL — SIGNIFICANT CHANGE UP (ref 7.4–10.4)
PMV BLD: 10.1 FL — SIGNIFICANT CHANGE UP (ref 7.4–10.4)
PMV BLD: 10.2 FL — SIGNIFICANT CHANGE UP (ref 7.4–10.4)
PMV BLD: 10.2 FL — SIGNIFICANT CHANGE UP (ref 7.4–10.4)
PMV BLD: 10.4 FL — SIGNIFICANT CHANGE UP (ref 7.4–10.4)
PMV BLD: 10.6 FL — HIGH (ref 7.4–10.4)
PMV BLD: 11.5 FL — HIGH (ref 7.4–10.4)
PMV BLD: 9.3 FL — SIGNIFICANT CHANGE UP (ref 7.4–10.4)
PMV BLD: 9.3 FL — SIGNIFICANT CHANGE UP (ref 7.4–10.4)
PMV BLD: 9.4 FL — SIGNIFICANT CHANGE UP (ref 7.4–10.4)
PMV BLD: 9.6 FL — SIGNIFICANT CHANGE UP (ref 7.4–10.4)
PMV BLD: 9.8 FL — SIGNIFICANT CHANGE UP (ref 7.4–10.4)
PO2 BLDA: 40 MMHG — CRITICAL LOW (ref 83–108)
PO2 BLDA: 67 MMHG — LOW (ref 83–108)
PO2 BLDA: 76 MMHG — LOW (ref 83–108)
PO2 BLDA: 88 MMHG — SIGNIFICANT CHANGE UP (ref 83–108)
POTASSIUM SERPL-MCNC: 3.2 MMOL/L — LOW (ref 3.5–5)
POTASSIUM SERPL-MCNC: 3.3 MMOL/L — LOW (ref 3.5–5)
POTASSIUM SERPL-MCNC: 3.4 MMOL/L — LOW (ref 3.5–5)
POTASSIUM SERPL-MCNC: 3.5 MMOL/L — SIGNIFICANT CHANGE UP (ref 3.5–5)
POTASSIUM SERPL-MCNC: 3.6 MMOL/L — SIGNIFICANT CHANGE UP (ref 3.5–5)
POTASSIUM SERPL-MCNC: 3.6 MMOL/L — SIGNIFICANT CHANGE UP (ref 3.5–5)
POTASSIUM SERPL-MCNC: 3.7 MMOL/L — SIGNIFICANT CHANGE UP (ref 3.5–5)
POTASSIUM SERPL-MCNC: 3.9 MMOL/L — SIGNIFICANT CHANGE UP (ref 3.5–5)
POTASSIUM SERPL-MCNC: 4.1 MMOL/L — SIGNIFICANT CHANGE UP (ref 3.5–5)
POTASSIUM SERPL-MCNC: 4.2 MMOL/L — SIGNIFICANT CHANGE UP (ref 3.5–5)
POTASSIUM SERPL-MCNC: 4.3 MMOL/L — SIGNIFICANT CHANGE UP (ref 3.5–5)
POTASSIUM SERPL-MCNC: 4.4 MMOL/L — SIGNIFICANT CHANGE UP (ref 3.5–5)
POTASSIUM SERPL-MCNC: 4.5 MMOL/L — SIGNIFICANT CHANGE UP (ref 3.5–5)
POTASSIUM SERPL-MCNC: 4.6 MMOL/L — SIGNIFICANT CHANGE UP (ref 3.5–5)
POTASSIUM SERPL-MCNC: 4.8 MMOL/L — SIGNIFICANT CHANGE UP (ref 3.5–5)
POTASSIUM SERPL-MCNC: SIGNIFICANT CHANGE UP MMOL/L (ref 3.5–5)
POTASSIUM SERPL-SCNC: 3.2 MMOL/L — LOW (ref 3.5–5)
POTASSIUM SERPL-SCNC: 3.3 MMOL/L — LOW (ref 3.5–5)
POTASSIUM SERPL-SCNC: 3.4 MMOL/L — LOW (ref 3.5–5)
POTASSIUM SERPL-SCNC: 3.5 MMOL/L — SIGNIFICANT CHANGE UP (ref 3.5–5)
POTASSIUM SERPL-SCNC: 3.6 MMOL/L — SIGNIFICANT CHANGE UP (ref 3.5–5)
POTASSIUM SERPL-SCNC: 3.6 MMOL/L — SIGNIFICANT CHANGE UP (ref 3.5–5)
POTASSIUM SERPL-SCNC: 3.7 MMOL/L — SIGNIFICANT CHANGE UP (ref 3.5–5)
POTASSIUM SERPL-SCNC: 3.9 MMOL/L — SIGNIFICANT CHANGE UP (ref 3.5–5)
POTASSIUM SERPL-SCNC: 4.1 MMOL/L — SIGNIFICANT CHANGE UP (ref 3.5–5)
POTASSIUM SERPL-SCNC: 4.2 MMOL/L — SIGNIFICANT CHANGE UP (ref 3.5–5)
POTASSIUM SERPL-SCNC: 4.3 MMOL/L — SIGNIFICANT CHANGE UP (ref 3.5–5)
POTASSIUM SERPL-SCNC: 4.4 MMOL/L — SIGNIFICANT CHANGE UP (ref 3.5–5)
POTASSIUM SERPL-SCNC: 4.5 MMOL/L — SIGNIFICANT CHANGE UP (ref 3.5–5)
POTASSIUM SERPL-SCNC: 4.6 MMOL/L — SIGNIFICANT CHANGE UP (ref 3.5–5)
POTASSIUM SERPL-SCNC: 4.8 MMOL/L — SIGNIFICANT CHANGE UP (ref 3.5–5)
POTASSIUM SERPL-SCNC: SIGNIFICANT CHANGE UP MMOL/L (ref 3.5–5)
PROCALCITONIN SERPL-MCNC: 0.36 NG/ML — HIGH (ref 0.02–0.1)
PROT PATTERN SERPL ELPH-IMP: SIGNIFICANT CHANGE UP
PROT SERPL-MCNC: 5.1 G/DL — LOW (ref 6–8)
PROT SERPL-MCNC: 5.4 G/DL — LOW (ref 6–8)
PROT SERPL-MCNC: 5.4 G/DL — LOW (ref 6–8)
PROT SERPL-MCNC: 5.8 G/DL — LOW (ref 6–8)
PROT SERPL-MCNC: 5.9 G/DL — LOW (ref 6–8)
PROT SERPL-MCNC: 6 G/DL — SIGNIFICANT CHANGE UP (ref 6–8.3)
PROT SERPL-MCNC: 6 G/DL — SIGNIFICANT CHANGE UP (ref 6–8.3)
PROT SERPL-MCNC: 6.1 G/DL — SIGNIFICANT CHANGE UP (ref 6–8)
PROT SERPL-MCNC: 7 G/DL — SIGNIFICANT CHANGE UP (ref 6–8)
PROT SERPL-MCNC: 7.3 G/DL — SIGNIFICANT CHANGE UP (ref 6–8)
PROT SERPL-MCNC: 7.9 G/DL — SIGNIFICANT CHANGE UP (ref 6–8)
PROT UR-MCNC: 30 MG/DL
PROTHROM AB SERPL-ACNC: 14.6 SEC — HIGH (ref 9.95–12.87)
PROTHROM AB SERPL-ACNC: 14.7 SEC — HIGH (ref 9.95–12.87)
RBC # BLD: 3.22 M/UL — LOW (ref 4.2–5.4)
RBC # BLD: 3.33 M/UL — LOW (ref 4.2–5.4)
RBC # BLD: 3.38 M/UL — LOW (ref 4.2–5.4)
RBC # BLD: 3.49 M/UL — LOW (ref 4.2–5.4)
RBC # BLD: 3.51 M/UL — LOW (ref 4.2–5.4)
RBC # BLD: 3.6 M/UL — LOW (ref 4.2–5.4)
RBC # BLD: 3.6 M/UL — LOW (ref 4.2–5.4)
RBC # BLD: 3.62 M/UL — LOW (ref 4.2–5.4)
RBC # BLD: 3.82 M/UL — LOW (ref 4.2–5.4)
RBC # BLD: 3.85 M/UL — LOW (ref 4.2–5.4)
RBC # BLD: 3.89 M/UL — LOW (ref 4.2–5.4)
RBC # BLD: 3.92 M/UL — LOW (ref 4.2–5.4)
RBC # BLD: 3.96 M/UL — LOW (ref 4.2–5.4)
RBC # BLD: 4.79 M/UL — SIGNIFICANT CHANGE UP (ref 4.2–5.4)
RBC # FLD: 13.3 % — SIGNIFICANT CHANGE UP (ref 11.5–14.5)
RBC # FLD: 13.4 % — SIGNIFICANT CHANGE UP (ref 11.5–14.5)
RBC # FLD: 13.4 % — SIGNIFICANT CHANGE UP (ref 11.5–14.5)
RBC # FLD: 13.5 % — SIGNIFICANT CHANGE UP (ref 11.5–14.5)
RBC # FLD: 13.6 % — SIGNIFICANT CHANGE UP (ref 11.5–14.5)
RBC # FLD: 13.6 % — SIGNIFICANT CHANGE UP (ref 11.5–14.5)
RBC # FLD: 13.9 % — SIGNIFICANT CHANGE UP (ref 11.5–14.5)
RBC # FLD: 13.9 % — SIGNIFICANT CHANGE UP (ref 11.5–14.5)
RBC # FLD: 14 % — SIGNIFICANT CHANGE UP (ref 11.5–14.5)
RBC # FLD: 14 % — SIGNIFICANT CHANGE UP (ref 11.5–14.5)
RBC # FLD: 14.6 % — HIGH (ref 11.5–14.5)
RBC # FLD: 14.7 % — HIGH (ref 11.5–14.5)
SAO2 % BLDA: 73.4 % — LOW (ref 94–98)
SAO2 % BLDA: 95.6 % — SIGNIFICANT CHANGE UP (ref 94–98)
SAO2 % BLDA: 96.2 % — SIGNIFICANT CHANGE UP (ref 94–98)
SAO2 % BLDA: 99.7 % — HIGH (ref 94–98)
SODIUM SERPL-SCNC: 136 MMOL/L — SIGNIFICANT CHANGE UP (ref 135–146)
SODIUM SERPL-SCNC: 137 MMOL/L — SIGNIFICANT CHANGE UP (ref 135–146)
SODIUM SERPL-SCNC: 138 MMOL/L — SIGNIFICANT CHANGE UP (ref 135–146)
SODIUM SERPL-SCNC: 138 MMOL/L — SIGNIFICANT CHANGE UP (ref 135–146)
SODIUM SERPL-SCNC: 139 MMOL/L — SIGNIFICANT CHANGE UP (ref 135–146)
SODIUM SERPL-SCNC: 139 MMOL/L — SIGNIFICANT CHANGE UP (ref 135–146)
SODIUM SERPL-SCNC: 141 MMOL/L — SIGNIFICANT CHANGE UP (ref 135–146)
SODIUM SERPL-SCNC: 141 MMOL/L — SIGNIFICANT CHANGE UP (ref 135–146)
SODIUM SERPL-SCNC: 142 MMOL/L — SIGNIFICANT CHANGE UP (ref 135–146)
SODIUM SERPL-SCNC: 144 MMOL/L — SIGNIFICANT CHANGE UP (ref 135–146)
SODIUM SERPL-SCNC: 145 MMOL/L — SIGNIFICANT CHANGE UP (ref 135–146)
SODIUM SERPL-SCNC: 145 MMOL/L — SIGNIFICANT CHANGE UP (ref 135–146)
SODIUM SERPL-SCNC: 146 MMOL/L — SIGNIFICANT CHANGE UP (ref 135–146)
SODIUM SERPL-SCNC: 149 MMOL/L — HIGH (ref 135–146)
SP GR SPEC: 1.01 — SIGNIFICANT CHANGE UP (ref 1–1.03)
SPECIMEN SOURCE: SIGNIFICANT CHANGE UP
TROPONIN SAMPLING TIME: SIGNIFICANT CHANGE UP
TROPONIN SAMPLING TIME: SIGNIFICANT CHANGE UP
TROPONIN T, HIGH SENSITIVITY RESULT: 101 NG/L — CRITICAL HIGH (ref 6–13)
TROPONIN T, HIGH SENSITIVITY RESULT: 50 NG/L — HIGH (ref 6–13)
TROPONIN T, HIGH SENSITIVITY RESULT: 84 NG/L — CRITICAL HIGH (ref 6–13)
TROPONIN T, HIGH SENSITIVITY RESULT: 97 NG/L — CRITICAL HIGH (ref 6–13)
UROBILINOGEN FLD QL: 1 MG/DL — SIGNIFICANT CHANGE UP (ref 0.2–1)
WBC # BLD: 10.87 K/UL — HIGH (ref 4.8–10.8)
WBC # BLD: 5.47 K/UL — SIGNIFICANT CHANGE UP (ref 4.8–10.8)
WBC # BLD: 6.21 K/UL — SIGNIFICANT CHANGE UP (ref 4.8–10.8)
WBC # BLD: 6.22 K/UL — SIGNIFICANT CHANGE UP (ref 4.8–10.8)
WBC # BLD: 6.62 K/UL — SIGNIFICANT CHANGE UP (ref 4.8–10.8)
WBC # BLD: 6.76 K/UL — SIGNIFICANT CHANGE UP (ref 4.8–10.8)
WBC # BLD: 6.85 K/UL — SIGNIFICANT CHANGE UP (ref 4.8–10.8)
WBC # BLD: 7.48 K/UL — SIGNIFICANT CHANGE UP (ref 4.8–10.8)
WBC # BLD: 7.77 K/UL — SIGNIFICANT CHANGE UP (ref 4.8–10.8)
WBC # BLD: 7.97 K/UL — SIGNIFICANT CHANGE UP (ref 4.8–10.8)
WBC # BLD: 8.06 K/UL — SIGNIFICANT CHANGE UP (ref 4.8–10.8)
WBC # BLD: 8.67 K/UL — SIGNIFICANT CHANGE UP (ref 4.8–10.8)
WBC # BLD: 8.96 K/UL — SIGNIFICANT CHANGE UP (ref 4.8–10.8)
WBC # BLD: 9.34 K/UL — SIGNIFICANT CHANGE UP (ref 4.8–10.8)
WBC # FLD AUTO: 10.87 K/UL — HIGH (ref 4.8–10.8)
WBC # FLD AUTO: 5.47 K/UL — SIGNIFICANT CHANGE UP (ref 4.8–10.8)
WBC # FLD AUTO: 6.21 K/UL — SIGNIFICANT CHANGE UP (ref 4.8–10.8)
WBC # FLD AUTO: 6.22 K/UL — SIGNIFICANT CHANGE UP (ref 4.8–10.8)
WBC # FLD AUTO: 6.62 K/UL — SIGNIFICANT CHANGE UP (ref 4.8–10.8)
WBC # FLD AUTO: 6.76 K/UL — SIGNIFICANT CHANGE UP (ref 4.8–10.8)
WBC # FLD AUTO: 6.85 K/UL — SIGNIFICANT CHANGE UP (ref 4.8–10.8)
WBC # FLD AUTO: 7.48 K/UL — SIGNIFICANT CHANGE UP (ref 4.8–10.8)
WBC # FLD AUTO: 7.77 K/UL — SIGNIFICANT CHANGE UP (ref 4.8–10.8)
WBC # FLD AUTO: 7.97 K/UL — SIGNIFICANT CHANGE UP (ref 4.8–10.8)
WBC # FLD AUTO: 8.06 K/UL — SIGNIFICANT CHANGE UP (ref 4.8–10.8)
WBC # FLD AUTO: 8.67 K/UL — SIGNIFICANT CHANGE UP (ref 4.8–10.8)
WBC # FLD AUTO: 8.96 K/UL — SIGNIFICANT CHANGE UP (ref 4.8–10.8)
WBC # FLD AUTO: 9.34 K/UL — SIGNIFICANT CHANGE UP (ref 4.8–10.8)

## 2024-01-01 PROCEDURE — 99231 SBSQ HOSP IP/OBS SF/LOW 25: CPT

## 2024-01-01 PROCEDURE — 71045 X-RAY EXAM CHEST 1 VIEW: CPT | Mod: 26

## 2024-01-01 PROCEDURE — 99232 SBSQ HOSP IP/OBS MODERATE 35: CPT

## 2024-01-01 PROCEDURE — 83735 ASSAY OF MAGNESIUM: CPT

## 2024-01-01 PROCEDURE — 93296 REM INTERROG EVL PM/IDS: CPT

## 2024-01-01 PROCEDURE — 82550 ASSAY OF CK (CPK): CPT

## 2024-01-01 PROCEDURE — 84165 PROTEIN E-PHORESIS SERUM: CPT

## 2024-01-01 PROCEDURE — 93971 EXTREMITY STUDY: CPT | Mod: 26,LT

## 2024-01-01 PROCEDURE — 76775 US EXAM ABDO BACK WALL LIM: CPT | Mod: 26

## 2024-01-01 PROCEDURE — 99222 1ST HOSP IP/OBS MODERATE 55: CPT

## 2024-01-01 PROCEDURE — 76700 US EXAM ABDOM COMPLETE: CPT | Mod: 26

## 2024-01-01 PROCEDURE — 85027 COMPLETE CBC AUTOMATED: CPT

## 2024-01-01 PROCEDURE — 36415 COLL VENOUS BLD VENIPUNCTURE: CPT

## 2024-01-01 PROCEDURE — 93307 TTE W/O DOPPLER COMPLETE: CPT | Mod: 26

## 2024-01-01 PROCEDURE — 76775 US EXAM ABDO BACK WALL LIM: CPT

## 2024-01-01 PROCEDURE — 74018 RADEX ABDOMEN 1 VIEW: CPT

## 2024-01-01 PROCEDURE — 92526 ORAL FUNCTION THERAPY: CPT | Mod: GN

## 2024-01-01 PROCEDURE — 99291 CRITICAL CARE FIRST HOUR: CPT

## 2024-01-01 PROCEDURE — 76705 ECHO EXAM OF ABDOMEN: CPT | Mod: 26

## 2024-01-01 PROCEDURE — 86160 COMPLEMENT ANTIGEN: CPT

## 2024-01-01 PROCEDURE — 83880 ASSAY OF NATRIURETIC PEPTIDE: CPT

## 2024-01-01 PROCEDURE — 99239 HOSP IP/OBS DSCHRG MGMT >30: CPT

## 2024-01-01 PROCEDURE — 84100 ASSAY OF PHOSPHORUS: CPT

## 2024-01-01 PROCEDURE — 85730 THROMBOPLASTIN TIME PARTIAL: CPT

## 2024-01-01 PROCEDURE — 99223 1ST HOSP IP/OBS HIGH 75: CPT

## 2024-01-01 PROCEDURE — 99233 SBSQ HOSP IP/OBS HIGH 50: CPT

## 2024-01-01 PROCEDURE — 82140 ASSAY OF AMMONIA: CPT

## 2024-01-01 PROCEDURE — 76705 ECHO EXAM OF ABDOMEN: CPT

## 2024-01-01 PROCEDURE — 76937 US GUIDE VASCULAR ACCESS: CPT | Mod: 26,59

## 2024-01-01 PROCEDURE — 80076 HEPATIC FUNCTION PANEL: CPT

## 2024-01-01 PROCEDURE — 93005 ELECTROCARDIOGRAM TRACING: CPT

## 2024-01-01 PROCEDURE — 93970 EXTREMITY STUDY: CPT

## 2024-01-01 PROCEDURE — 82962 GLUCOSE BLOOD TEST: CPT

## 2024-01-01 PROCEDURE — 36000 PLACE NEEDLE IN VEIN: CPT

## 2024-01-01 PROCEDURE — 97162 PT EVAL MOD COMPLEX 30 MIN: CPT | Mod: GP

## 2024-01-01 PROCEDURE — 85379 FIBRIN DEGRADATION QUANT: CPT

## 2024-01-01 PROCEDURE — 86334 IMMUNOFIX E-PHORESIS SERUM: CPT

## 2024-01-01 PROCEDURE — 99214 OFFICE O/P EST MOD 30 MIN: CPT

## 2024-01-01 PROCEDURE — 84484 ASSAY OF TROPONIN QUANT: CPT

## 2024-01-01 PROCEDURE — 83605 ASSAY OF LACTIC ACID: CPT

## 2024-01-01 PROCEDURE — 86036 ANCA SCREEN EACH ANTIBODY: CPT

## 2024-01-01 PROCEDURE — 74177 CT ABD & PELVIS W/CONTRAST: CPT | Mod: 26,MC

## 2024-01-01 PROCEDURE — 70450 CT HEAD/BRAIN W/O DYE: CPT | Mod: MC

## 2024-01-01 PROCEDURE — 83036 HEMOGLOBIN GLYCOSYLATED A1C: CPT

## 2024-01-01 PROCEDURE — 93970 EXTREMITY STUDY: CPT | Mod: 26

## 2024-01-01 PROCEDURE — 93294 REM INTERROG EVL PM/LDLS PM: CPT

## 2024-01-01 PROCEDURE — 93000 ELECTROCARDIOGRAM COMPLETE: CPT

## 2024-01-01 PROCEDURE — 80053 COMPREHEN METABOLIC PANEL: CPT

## 2024-01-01 PROCEDURE — 84145 PROCALCITONIN (PCT): CPT

## 2024-01-01 PROCEDURE — 82803 BLOOD GASES ANY COMBINATION: CPT

## 2024-01-01 PROCEDURE — 76700 US EXAM ABDOM COMPLETE: CPT

## 2024-01-01 PROCEDURE — 84155 ASSAY OF PROTEIN SERUM: CPT

## 2024-01-01 PROCEDURE — 82977 ASSAY OF GGT: CPT

## 2024-01-01 PROCEDURE — 99285 EMERGENCY DEPT VISIT HI MDM: CPT | Mod: FS

## 2024-01-01 PROCEDURE — 76857 US EXAM PELVIC LIMITED: CPT

## 2024-01-01 PROCEDURE — 93010 ELECTROCARDIOGRAM REPORT: CPT

## 2024-01-01 PROCEDURE — 93307 TTE W/O DOPPLER COMPLETE: CPT

## 2024-01-01 PROCEDURE — 86038 ANTINUCLEAR ANTIBODIES: CPT

## 2024-01-01 PROCEDURE — 92610 EVALUATE SWALLOWING FUNCTION: CPT | Mod: GN

## 2024-01-01 PROCEDURE — 70450 CT HEAD/BRAIN W/O DYE: CPT | Mod: 26

## 2024-01-01 PROCEDURE — 71045 X-RAY EXAM CHEST 1 VIEW: CPT

## 2024-01-01 PROCEDURE — 94660 CPAP INITIATION&MGMT: CPT

## 2024-01-01 PROCEDURE — 93971 EXTREMITY STUDY: CPT | Mod: LT

## 2024-01-01 PROCEDURE — 85610 PROTHROMBIN TIME: CPT

## 2024-01-01 PROCEDURE — 85025 COMPLETE CBC W/AUTO DIFF WBC: CPT

## 2024-01-01 PROCEDURE — 80048 BASIC METABOLIC PNL TOTAL CA: CPT

## 2024-01-01 PROCEDURE — 76857 US EXAM PELVIC LIMITED: CPT | Mod: 26

## 2024-01-01 PROCEDURE — 87040 BLOOD CULTURE FOR BACTERIA: CPT

## 2024-01-01 PROCEDURE — 74018 RADEX ABDOMEN 1 VIEW: CPT | Mod: 26

## 2024-01-01 PROCEDURE — 80074 ACUTE HEPATITIS PANEL: CPT

## 2024-01-01 RX ORDER — QUETIAPINE FUMARATE 200 MG/1
0 TABLET, FILM COATED ORAL
Qty: 0 | Refills: 0 | DISCHARGE

## 2024-01-01 RX ORDER — INSULIN LISPRO 100/ML
VIAL (ML) SUBCUTANEOUS
Refills: 0 | Status: DISCONTINUED | OUTPATIENT
Start: 2024-01-01 | End: 2024-01-01

## 2024-01-01 RX ORDER — DONEPEZIL HYDROCHLORIDE 10 MG/1
10 TABLET, FILM COATED ORAL AT BEDTIME
Refills: 0 | Status: DISCONTINUED | OUTPATIENT
Start: 2024-01-01 | End: 2024-01-01

## 2024-01-01 RX ORDER — GLUCAGON INJECTION, SOLUTION 0.5 MG/.1ML
1 INJECTION, SOLUTION SUBCUTANEOUS ONCE
Refills: 0 | Status: DISCONTINUED | OUTPATIENT
Start: 2024-01-01 | End: 2024-01-01

## 2024-01-01 RX ORDER — CLOPIDOGREL BISULFATE 75 MG/1
0 TABLET, FILM COATED ORAL
Qty: 0 | Refills: 0 | DISCHARGE

## 2024-01-01 RX ORDER — CLONAZEPAM 1 MG
1 TABLET ORAL
Qty: 0 | Refills: 0 | DISCHARGE

## 2024-01-01 RX ORDER — IPRATROPIUM/ALBUTEROL SULFATE 18-103MCG
3 AEROSOL WITH ADAPTER (GRAM) INHALATION EVERY 6 HOURS
Refills: 0 | Status: DISCONTINUED | OUTPATIENT
Start: 2024-01-01 | End: 2024-01-01

## 2024-01-01 RX ORDER — SODIUM CHLORIDE 9 MG/ML
1000 INJECTION, SOLUTION INTRAVENOUS
Refills: 0 | Status: DISCONTINUED | OUTPATIENT
Start: 2024-01-01 | End: 2024-01-01

## 2024-01-01 RX ORDER — SODIUM BICARBONATE 1 MEQ/ML
650 SYRINGE (ML) INTRAVENOUS
Refills: 0 | Status: DISCONTINUED | OUTPATIENT
Start: 2024-01-01 | End: 2024-01-01

## 2024-01-01 RX ORDER — FERROUS SULFATE 325(65) MG
325 TABLET ORAL DAILY
Refills: 0 | Status: DISCONTINUED | OUTPATIENT
Start: 2024-01-01 | End: 2024-01-01

## 2024-01-01 RX ORDER — ALPRAZOLAM 0.25 MG
0 TABLET ORAL
Qty: 0 | Refills: 0 | DISCHARGE

## 2024-01-01 RX ORDER — SERTRALINE 25 MG/1
0 TABLET, FILM COATED ORAL
Qty: 0 | Refills: 0 | DISCHARGE

## 2024-01-01 RX ORDER — DEXTROSE 50 % IN WATER 50 %
25 SYRINGE (ML) INTRAVENOUS ONCE
Refills: 0 | Status: DISCONTINUED | OUTPATIENT
Start: 2024-01-01 | End: 2024-01-01

## 2024-01-01 RX ORDER — INSULIN DETEMIR 100/ML (3)
30 INSULIN PEN (ML) SUBCUTANEOUS
Refills: 0 | DISCHARGE

## 2024-01-01 RX ORDER — ONDANSETRON 8 MG/1
4 TABLET, FILM COATED ORAL EVERY 8 HOURS
Refills: 0 | Status: DISCONTINUED | OUTPATIENT
Start: 2024-01-01 | End: 2024-01-01

## 2024-01-01 RX ORDER — AMLODIPINE BESYLATE 2.5 MG/1
1 TABLET ORAL
Refills: 0 | DISCHARGE

## 2024-01-01 RX ORDER — DEXTROSE 50 % IN WATER 50 %
12.5 SYRINGE (ML) INTRAVENOUS ONCE
Refills: 0 | Status: DISCONTINUED | OUTPATIENT
Start: 2024-01-01 | End: 2024-01-01

## 2024-01-01 RX ORDER — PYRIDOXINE HCL (VITAMIN B6) 100 MG
1 TABLET ORAL
Qty: 0 | Refills: 0 | DISCHARGE

## 2024-01-01 RX ORDER — DEXTROSE 50 % IN WATER 50 %
15 SYRINGE (ML) INTRAVENOUS ONCE
Refills: 0 | Status: DISCONTINUED | OUTPATIENT
Start: 2024-01-01 | End: 2024-01-01

## 2024-01-01 RX ORDER — EXEMESTANE 25 MG/1
0 TABLET, SUGAR COATED ORAL
Qty: 0 | Refills: 0 | DISCHARGE

## 2024-01-01 RX ORDER — LACTULOSE 10 G/15ML
20 SOLUTION ORAL THREE TIMES A DAY
Refills: 0 | Status: DISCONTINUED | OUTPATIENT
Start: 2024-01-01 | End: 2024-01-01

## 2024-01-01 RX ORDER — HEPARIN SODIUM 5000 [USP'U]/ML
5000 INJECTION INTRAVENOUS; SUBCUTANEOUS EVERY 8 HOURS
Refills: 0 | Status: DISCONTINUED | OUTPATIENT
Start: 2024-01-01 | End: 2024-01-01

## 2024-01-01 RX ORDER — CIPROFLOXACIN LACTATE 400MG/40ML
400 VIAL (ML) INTRAVENOUS ONCE
Refills: 0 | Status: COMPLETED | OUTPATIENT
Start: 2024-01-01 | End: 2024-01-01

## 2024-01-01 RX ORDER — MORPHINE SULFATE 50 MG/1
4 CAPSULE, EXTENDED RELEASE ORAL ONCE
Refills: 0 | Status: DISCONTINUED | OUTPATIENT
Start: 2024-01-01 | End: 2024-01-01

## 2024-01-01 RX ORDER — LEVOFLOXACIN 5 MG/ML
500 INJECTION, SOLUTION INTRAVENOUS EVERY 24 HOURS
Refills: 0 | Status: DISCONTINUED | OUTPATIENT
Start: 2024-01-01 | End: 2024-01-01

## 2024-01-01 RX ORDER — ASPIRIN/CALCIUM CARB/MAGNESIUM 324 MG
0 TABLET ORAL
Qty: 0 | Refills: 0 | DISCHARGE

## 2024-01-01 RX ORDER — ASPIRIN/CALCIUM CARB/MAGNESIUM 324 MG
81 TABLET ORAL DAILY
Refills: 0 | Status: DISCONTINUED | OUTPATIENT
Start: 2024-01-01 | End: 2024-01-01

## 2024-01-01 RX ORDER — DONEPEZIL HYDROCHLORIDE 10 MG/1
1 TABLET, FILM COATED ORAL
Refills: 0 | DISCHARGE

## 2024-01-01 RX ORDER — DEXTROSE 50 % IN WATER 50 %
25 SYRINGE (ML) INTRAVENOUS ONCE
Refills: 0 | Status: COMPLETED | OUTPATIENT
Start: 2024-01-01 | End: 2024-01-01

## 2024-01-01 RX ORDER — METRONIDAZOLE 500 MG
500 TABLET ORAL EVERY 8 HOURS
Refills: 0 | Status: DISCONTINUED | OUTPATIENT
Start: 2024-01-01 | End: 2024-01-01

## 2024-01-01 RX ORDER — SERTRALINE 25 MG/1
1 TABLET, FILM COATED ORAL
Qty: 0 | Refills: 0 | DISCHARGE

## 2024-01-01 RX ORDER — LEVOTHYROXINE SODIUM 125 MCG
25 TABLET ORAL DAILY
Refills: 0 | Status: DISCONTINUED | OUTPATIENT
Start: 2024-01-01 | End: 2024-01-01

## 2024-01-01 RX ORDER — FUROSEMIDE 40 MG
20 TABLET ORAL DAILY
Refills: 0 | Status: DISCONTINUED | OUTPATIENT
Start: 2024-01-01 | End: 2024-01-01

## 2024-01-01 RX ORDER — ASPIRIN/CALCIUM CARB/MAGNESIUM 324 MG
81 TABLET ORAL
Qty: 0 | Refills: 0 | DISCHARGE

## 2024-01-01 RX ORDER — ASCORBIC ACID 60 MG
1 TABLET,CHEWABLE ORAL
Qty: 0 | Refills: 0 | DISCHARGE

## 2024-01-01 RX ORDER — HYDROXYZINE HCL 10 MG
25 TABLET ORAL ONCE
Refills: 0 | Status: COMPLETED | OUTPATIENT
Start: 2024-01-01 | End: 2024-01-01

## 2024-01-01 RX ORDER — METOPROLOL TARTRATE 50 MG
25 TABLET ORAL DAILY
Refills: 0 | Status: DISCONTINUED | OUTPATIENT
Start: 2024-01-01 | End: 2024-01-01

## 2024-01-01 RX ORDER — METRONIDAZOLE 500 MG
TABLET ORAL
Refills: 0 | Status: DISCONTINUED | OUTPATIENT
Start: 2024-01-01 | End: 2024-01-01

## 2024-01-01 RX ORDER — AMLODIPINE BESYLATE 2.5 MG/1
10 TABLET ORAL DAILY
Refills: 0 | Status: DISCONTINUED | OUTPATIENT
Start: 2024-01-01 | End: 2024-01-01

## 2024-01-01 RX ORDER — ATORVASTATIN CALCIUM 80 MG/1
1 TABLET, FILM COATED ORAL
Refills: 0 | DISCHARGE

## 2024-01-01 RX ORDER — CHLORHEXIDINE GLUCONATE 213 G/1000ML
1 SOLUTION TOPICAL
Refills: 0 | Status: DISCONTINUED | OUTPATIENT
Start: 2024-01-01 | End: 2024-01-01

## 2024-01-01 RX ORDER — METOPROLOL TARTRATE 50 MG
1 TABLET ORAL
Refills: 0 | DISCHARGE

## 2024-01-01 RX ORDER — PANTOPRAZOLE SODIUM 20 MG/1
40 TABLET, DELAYED RELEASE ORAL
Refills: 0 | Status: DISCONTINUED | OUTPATIENT
Start: 2024-01-01 | End: 2024-01-01

## 2024-01-01 RX ORDER — SERTRALINE 25 MG/1
25 TABLET, FILM COATED ORAL
Qty: 0 | Refills: 0 | DISCHARGE

## 2024-01-01 RX ORDER — ATORVASTATIN CALCIUM 80 MG/1
0 TABLET, FILM COATED ORAL
Qty: 0 | Refills: 0 | DISCHARGE

## 2024-01-01 RX ORDER — METFORMIN HYDROCHLORIDE 850 MG/1
1 TABLET ORAL
Qty: 0 | Refills: 0 | DISCHARGE

## 2024-01-01 RX ORDER — SODIUM CHLORIDE 9 MG/ML
1000 INJECTION INTRAMUSCULAR; INTRAVENOUS; SUBCUTANEOUS
Refills: 0 | Status: DISCONTINUED | OUTPATIENT
Start: 2024-01-01 | End: 2024-01-01

## 2024-01-01 RX ORDER — INSULIN DETEMIR 100/ML (3)
20 INSULIN PEN (ML) SUBCUTANEOUS
Qty: 0 | Refills: 0 | DISCHARGE

## 2024-01-01 RX ORDER — FERROUS SULFATE 325(65) MG
1 TABLET ORAL
Qty: 0 | Refills: 0 | DISCHARGE

## 2024-01-01 RX ORDER — CALCIUM GLUCONATE 100 MG/ML
2 VIAL (ML) INTRAVENOUS ONCE
Refills: 0 | Status: COMPLETED | OUTPATIENT
Start: 2024-01-01 | End: 2024-01-01

## 2024-01-01 RX ORDER — INSULIN LISPRO 100/ML
10 VIAL (ML) SUBCUTANEOUS
Qty: 0 | Refills: 0 | DISCHARGE

## 2024-01-01 RX ORDER — QUINAPRIL HYDROCHLORIDE 40 MG/1
0 TABLET, FILM COATED ORAL
Qty: 0 | Refills: 0 | DISCHARGE

## 2024-01-01 RX ORDER — SENNA PLUS 8.6 MG/1
2 TABLET ORAL AT BEDTIME
Refills: 0 | Status: DISCONTINUED | OUTPATIENT
Start: 2024-01-01 | End: 2024-01-01

## 2024-01-01 RX ORDER — INSULIN LISPRO 100/ML
0 VIAL (ML) SUBCUTANEOUS
Qty: 0 | Refills: 0 | DISCHARGE

## 2024-01-01 RX ORDER — POLYETHYLENE GLYCOL 3350 17 G/17G
17 POWDER, FOR SOLUTION ORAL
Qty: 1 | Refills: 0
Start: 2024-01-01 | End: 2024-05-17

## 2024-01-01 RX ORDER — POTASSIUM CHLORIDE 20 MEQ
20 PACKET (EA) ORAL ONCE
Refills: 0 | Status: COMPLETED | OUTPATIENT
Start: 2024-01-01 | End: 2024-01-01

## 2024-01-01 RX ORDER — GABAPENTIN 400 MG/1
300 CAPSULE ORAL EVERY 12 HOURS
Refills: 0 | Status: DISCONTINUED | OUTPATIENT
Start: 2024-01-01 | End: 2024-01-01

## 2024-01-01 RX ORDER — METRONIDAZOLE 500 MG
500 TABLET ORAL ONCE
Refills: 0 | Status: COMPLETED | OUTPATIENT
Start: 2024-01-01 | End: 2024-01-01

## 2024-01-01 RX ORDER — TRAMADOL HYDROCHLORIDE 50 MG/1
1 TABLET ORAL
Refills: 0 | DISCHARGE

## 2024-01-01 RX ORDER — MIRTAZAPINE 45 MG/1
0 TABLET, ORALLY DISINTEGRATING ORAL
Qty: 0 | Refills: 0 | DISCHARGE

## 2024-01-01 RX ORDER — CLOPIDOGREL BISULFATE 75 MG/1
75 TABLET, FILM COATED ORAL DAILY
Refills: 0 | Status: DISCONTINUED | OUTPATIENT
Start: 2024-01-01 | End: 2024-01-01

## 2024-01-01 RX ORDER — FUROSEMIDE 40 MG
1 TABLET ORAL
Qty: 0 | Refills: 0 | DISCHARGE

## 2024-01-01 RX ORDER — DEXTROSE 10 % IN WATER 10 %
125 INTRAVENOUS SOLUTION INTRAVENOUS ONCE
Refills: 0 | Status: DISCONTINUED | OUTPATIENT
Start: 2024-01-01 | End: 2024-01-01

## 2024-01-01 RX ORDER — INSULIN DETEMIR 100/ML (3)
30 INSULIN PEN (ML) SUBCUTANEOUS AT BEDTIME
Refills: 0 | Status: DISCONTINUED | OUTPATIENT
Start: 2024-01-01 | End: 2024-01-01

## 2024-01-01 RX ORDER — PANTOPRAZOLE SODIUM 20 MG/1
1 TABLET, DELAYED RELEASE ORAL
Refills: 0 | DISCHARGE

## 2024-01-01 RX ORDER — ACETAMINOPHEN 500 MG
650 TABLET ORAL EVERY 6 HOURS
Refills: 0 | Status: DISCONTINUED | OUTPATIENT
Start: 2024-01-01 | End: 2024-01-01

## 2024-01-01 RX ORDER — NORTRIPTYLINE HYDROCHLORIDE 10 MG/1
0 CAPSULE ORAL
Qty: 0 | Refills: 0 | DISCHARGE

## 2024-01-01 RX ORDER — POTASSIUM CHLORIDE 20 MEQ
40 PACKET (EA) ORAL EVERY 4 HOURS
Refills: 0 | Status: COMPLETED | OUTPATIENT
Start: 2024-01-01 | End: 2024-01-01

## 2024-01-01 RX ORDER — SERTRALINE 25 MG/1
100 TABLET, FILM COATED ORAL DAILY
Refills: 0 | Status: DISCONTINUED | OUTPATIENT
Start: 2024-01-01 | End: 2024-01-01

## 2024-01-01 RX ORDER — NORTRIPTYLINE HYDROCHLORIDE 10 MG/1
1 CAPSULE ORAL
Refills: 0 | DISCHARGE

## 2024-01-01 RX ORDER — ATORVASTATIN CALCIUM 80 MG/1
20 TABLET, FILM COATED ORAL
Qty: 0 | Refills: 0 | DISCHARGE

## 2024-01-01 RX ORDER — EXEMESTANE 25 MG/1
1 TABLET, SUGAR COATED ORAL
Refills: 0 | DISCHARGE

## 2024-01-01 RX ORDER — ASPIRIN/CALCIUM CARB/MAGNESIUM 324 MG
2 TABLET ORAL
Refills: 0 | DISCHARGE

## 2024-01-01 RX ORDER — ASPIRIN/CALCIUM CARB/MAGNESIUM 324 MG
2 TABLET ORAL
Qty: 0 | Refills: 0 | DISCHARGE

## 2024-01-01 RX ORDER — INSULIN DETEMIR 100/ML (3)
0 INSULIN PEN (ML) SUBCUTANEOUS
Qty: 0 | Refills: 0 | DISCHARGE

## 2024-01-01 RX ORDER — ACETAMINOPHEN 500 MG
650 TABLET ORAL ONCE
Refills: 0 | Status: COMPLETED | OUTPATIENT
Start: 2024-01-01 | End: 2024-01-01

## 2024-01-01 RX ORDER — INSULIN DETEMIR 100/ML (3)
30 INSULIN PEN (ML) SUBCUTANEOUS
Qty: 0 | Refills: 0 | DISCHARGE

## 2024-01-01 RX ORDER — FERROUS SULFATE 325(65) MG
0 TABLET ORAL
Refills: 0 | DISCHARGE

## 2024-01-01 RX ORDER — LEVOTHYROXINE SODIUM 125 MCG
1 TABLET ORAL
Refills: 0 | DISCHARGE

## 2024-01-01 RX ORDER — INSULIN GLARGINE 100 [IU]/ML
30 INJECTION, SOLUTION SUBCUTANEOUS AT BEDTIME
Refills: 0 | Status: DISCONTINUED | OUTPATIENT
Start: 2024-01-01 | End: 2024-01-01

## 2024-01-01 RX ORDER — INSULIN LISPRO 100/ML
1 VIAL (ML) SUBCUTANEOUS
Qty: 0 | Refills: 0 | DISCHARGE

## 2024-01-01 RX ORDER — ATORVASTATIN CALCIUM 80 MG/1
80 TABLET, FILM COATED ORAL AT BEDTIME
Refills: 0 | Status: DISCONTINUED | OUTPATIENT
Start: 2024-01-01 | End: 2024-01-01

## 2024-01-01 RX ORDER — SODIUM CHLORIDE 9 MG/ML
1000 INJECTION INTRAMUSCULAR; INTRAVENOUS; SUBCUTANEOUS ONCE
Refills: 0 | Status: COMPLETED | OUTPATIENT
Start: 2024-01-01 | End: 2024-01-01

## 2024-01-01 RX ORDER — QUINAPRIL HYDROCHLORIDE 40 MG/1
10 TABLET, FILM COATED ORAL
Qty: 0 | Refills: 0 | DISCHARGE

## 2024-01-01 RX ORDER — ONDANSETRON 8 MG/1
4 TABLET, FILM COATED ORAL EVERY 4 HOURS
Refills: 0 | Status: DISCONTINUED | OUTPATIENT
Start: 2024-01-01 | End: 2024-01-01

## 2024-01-01 RX ORDER — POLYETHYLENE GLYCOL 3350 17 G/17G
17 POWDER, FOR SOLUTION ORAL DAILY
Refills: 0 | Status: DISCONTINUED | OUTPATIENT
Start: 2024-01-01 | End: 2024-01-01

## 2024-01-01 RX ORDER — INSULIN LISPRO 100/ML
7 VIAL (ML) SUBCUTANEOUS
Qty: 0 | Refills: 0 | DISCHARGE

## 2024-01-01 RX ORDER — DONEPEZIL HYDROCHLORIDE 10 MG/1
1 TABLET, FILM COATED ORAL
Qty: 0 | Refills: 0 | DISCHARGE

## 2024-01-01 RX ORDER — NIFEDIPINE 30 MG
1 TABLET, EXTENDED RELEASE 24 HR ORAL
Qty: 30 | Refills: 0
Start: 2024-01-01 | End: 2024-05-17

## 2024-01-01 RX ORDER — GABAPENTIN 400 MG/1
0 CAPSULE ORAL
Qty: 0 | Refills: 0 | DISCHARGE

## 2024-01-01 RX ORDER — MIRTAZAPINE 45 MG/1
1 TABLET, ORALLY DISINTEGRATING ORAL
Qty: 0 | Refills: 0 | DISCHARGE

## 2024-01-01 RX ORDER — NORTRIPTYLINE HYDROCHLORIDE 10 MG/1
25 CAPSULE ORAL AT BEDTIME
Refills: 0 | Status: DISCONTINUED | OUTPATIENT
Start: 2024-01-01 | End: 2024-01-01

## 2024-01-01 RX ORDER — QUETIAPINE FUMARATE 200 MG/1
1 TABLET, FILM COATED ORAL
Qty: 0 | Refills: 0 | DISCHARGE

## 2024-01-01 RX ORDER — SERTRALINE 25 MG/1
1 TABLET, FILM COATED ORAL
Refills: 0 | DISCHARGE

## 2024-01-01 RX ORDER — METOPROLOL SUCCINATE 25 MG/1
25 TABLET, EXTENDED RELEASE ORAL DAILY
Qty: 30 | Refills: 1 | Status: ACTIVE | COMMUNITY
Start: 2024-01-01 | End: 1900-01-01

## 2024-01-01 RX ORDER — ONDANSETRON 8 MG/1
4 TABLET, FILM COATED ORAL ONCE
Refills: 0 | Status: COMPLETED | OUTPATIENT
Start: 2024-01-01 | End: 2024-01-01

## 2024-01-01 RX ORDER — HEPARIN SODIUM 5000 [USP'U]/ML
5000 INJECTION INTRAVENOUS; SUBCUTANEOUS EVERY 12 HOURS
Refills: 0 | Status: DISCONTINUED | OUTPATIENT
Start: 2024-01-01 | End: 2024-01-01

## 2024-01-01 RX ORDER — NIFEDIPINE 30 MG
30 TABLET, EXTENDED RELEASE 24 HR ORAL DAILY
Refills: 0 | Status: DISCONTINUED | OUTPATIENT
Start: 2024-01-01 | End: 2024-01-01

## 2024-01-01 RX ORDER — LEVOTHYROXINE SODIUM 125 MCG
1 TABLET ORAL
Qty: 0 | Refills: 0 | DISCHARGE

## 2024-01-01 RX ORDER — POTASSIUM CHLORIDE 20 MEQ
20 PACKET (EA) ORAL
Refills: 0 | Status: DISCONTINUED | OUTPATIENT
Start: 2024-01-01 | End: 2024-01-01

## 2024-01-01 RX ORDER — GABAPENTIN 400 MG/1
300 CAPSULE ORAL
Refills: 0 | Status: DISCONTINUED | OUTPATIENT
Start: 2024-01-01 | End: 2024-01-01

## 2024-01-01 RX ORDER — AMLODIPINE BESYLATE 2.5 MG/1
1 TABLET ORAL
Qty: 0 | Refills: 0 | DISCHARGE

## 2024-01-01 RX ORDER — NORTRIPTYLINE HYDROCHLORIDE 10 MG/1
25 CAPSULE ORAL
Qty: 0 | Refills: 0 | DISCHARGE

## 2024-01-01 RX ORDER — LOSARTAN POTASSIUM 100 MG/1
100 TABLET, FILM COATED ORAL DAILY
Refills: 0 | Status: DISCONTINUED | OUTPATIENT
Start: 2024-01-01 | End: 2024-01-01

## 2024-01-01 RX ORDER — HYDRALAZINE HCL 50 MG
10 TABLET ORAL DAILY
Refills: 0 | Status: DISCONTINUED | OUTPATIENT
Start: 2024-01-01 | End: 2024-01-01

## 2024-01-01 RX ORDER — TEMAZEPAM 15 MG/1
1 CAPSULE ORAL
Qty: 0 | Refills: 0 | DISCHARGE

## 2024-01-01 RX ORDER — GABAPENTIN 400 MG/1
1 CAPSULE ORAL
Refills: 0 | DISCHARGE

## 2024-01-01 RX ORDER — INSULIN DETEMIR 100/ML (3)
21 INSULIN PEN (ML) SUBCUTANEOUS
Qty: 0 | Refills: 0 | DISCHARGE

## 2024-01-01 RX ORDER — EXEMESTANE 25 MG/1
25 TABLET, SUGAR COATED ORAL
Qty: 0 | Refills: 0 | DISCHARGE

## 2024-01-01 RX ORDER — SERTRALINE 25 MG/1
50 TABLET, FILM COATED ORAL DAILY
Refills: 0 | Status: DISCONTINUED | OUTPATIENT
Start: 2024-01-01 | End: 2024-01-01

## 2024-01-01 RX ORDER — TRAMADOL HYDROCHLORIDE 50 MG/1
100 TABLET ORAL DAILY
Refills: 0 | Status: DISCONTINUED | OUTPATIENT
Start: 2024-01-01 | End: 2024-01-01

## 2024-01-01 RX ORDER — CLOPIDOGREL BISULFATE 75 MG/1
1 TABLET, FILM COATED ORAL
Refills: 0 | DISCHARGE

## 2024-01-01 RX ORDER — DOCUSATE SODIUM 100 MG
0 CAPSULE ORAL
Refills: 0 | DISCHARGE

## 2024-01-01 RX ORDER — GABAPENTIN 400 MG/1
300 CAPSULE ORAL DAILY
Refills: 0 | Status: DISCONTINUED | OUTPATIENT
Start: 2024-01-01 | End: 2024-01-01

## 2024-01-01 RX ADMIN — Medication 325 MILLIGRAM(S): at 11:14

## 2024-01-01 RX ADMIN — Medication 25 GRAM(S): at 17:21

## 2024-01-01 RX ADMIN — SERTRALINE 100 MILLIGRAM(S): 25 TABLET, FILM COATED ORAL at 11:51

## 2024-01-01 RX ADMIN — Medication 500 MILLIGRAM(S): at 18:33

## 2024-01-01 RX ADMIN — Medication 325 MILLIGRAM(S): at 11:50

## 2024-01-01 RX ADMIN — Medication 650 MILLIGRAM(S): at 06:14

## 2024-01-01 RX ADMIN — SODIUM CHLORIDE 80 MILLILITER(S): 9 INJECTION, SOLUTION INTRAVENOUS at 12:24

## 2024-01-01 RX ADMIN — LACTULOSE 20 GRAM(S): 10 SOLUTION ORAL at 06:13

## 2024-01-01 RX ADMIN — Medication 25 MICROGRAM(S): at 05:13

## 2024-01-01 RX ADMIN — GABAPENTIN 300 MILLIGRAM(S): 400 CAPSULE ORAL at 05:27

## 2024-01-01 RX ADMIN — PANTOPRAZOLE SODIUM 40 MILLIGRAM(S): 20 TABLET, DELAYED RELEASE ORAL at 05:03

## 2024-01-01 RX ADMIN — Medication 650 MILLIGRAM(S): at 17:12

## 2024-01-01 RX ADMIN — HEPARIN SODIUM 5000 UNIT(S): 5000 INJECTION INTRAVENOUS; SUBCUTANEOUS at 15:18

## 2024-01-01 RX ADMIN — CLOPIDOGREL BISULFATE 75 MILLIGRAM(S): 75 TABLET, FILM COATED ORAL at 11:41

## 2024-01-01 RX ADMIN — Medication 1: at 08:02

## 2024-01-01 RX ADMIN — GABAPENTIN 300 MILLIGRAM(S): 400 CAPSULE ORAL at 07:18

## 2024-01-01 RX ADMIN — HEPARIN SODIUM 5000 UNIT(S): 5000 INJECTION INTRAVENOUS; SUBCUTANEOUS at 13:40

## 2024-01-01 RX ADMIN — Medication 25 MILLIGRAM(S): at 06:14

## 2024-01-01 RX ADMIN — SERTRALINE 100 MILLIGRAM(S): 25 TABLET, FILM COATED ORAL at 11:40

## 2024-01-01 RX ADMIN — ATORVASTATIN CALCIUM 80 MILLIGRAM(S): 80 TABLET, FILM COATED ORAL at 21:35

## 2024-01-01 RX ADMIN — SODIUM CHLORIDE 80 MILLILITER(S): 9 INJECTION INTRAMUSCULAR; INTRAVENOUS; SUBCUTANEOUS at 19:25

## 2024-01-01 RX ADMIN — Medication 650 MILLIGRAM(S): at 05:15

## 2024-01-01 RX ADMIN — Medication 25 MICROGRAM(S): at 06:55

## 2024-01-01 RX ADMIN — Medication 25 MILLIGRAM(S): at 06:01

## 2024-01-01 RX ADMIN — Medication 325 MILLIGRAM(S): at 11:25

## 2024-01-01 RX ADMIN — HEPARIN SODIUM 5000 UNIT(S): 5000 INJECTION INTRAVENOUS; SUBCUTANEOUS at 05:27

## 2024-01-01 RX ADMIN — GABAPENTIN 300 MILLIGRAM(S): 400 CAPSULE ORAL at 17:31

## 2024-01-01 RX ADMIN — SENNA PLUS 2 TABLET(S): 8.6 TABLET ORAL at 21:21

## 2024-01-01 RX ADMIN — PANTOPRAZOLE SODIUM 40 MILLIGRAM(S): 20 TABLET, DELAYED RELEASE ORAL at 06:03

## 2024-01-01 RX ADMIN — Medication 1: at 17:23

## 2024-01-01 RX ADMIN — CLOPIDOGREL BISULFATE 75 MILLIGRAM(S): 75 TABLET, FILM COATED ORAL at 12:25

## 2024-01-01 RX ADMIN — HEPARIN SODIUM 5000 UNIT(S): 5000 INJECTION INTRAVENOUS; SUBCUTANEOUS at 17:31

## 2024-01-01 RX ADMIN — SERTRALINE 50 MILLIGRAM(S): 25 TABLET, FILM COATED ORAL at 10:03

## 2024-01-01 RX ADMIN — SERTRALINE 100 MILLIGRAM(S): 25 TABLET, FILM COATED ORAL at 18:46

## 2024-01-01 RX ADMIN — HEPARIN SODIUM 5000 UNIT(S): 5000 INJECTION INTRAVENOUS; SUBCUTANEOUS at 15:04

## 2024-01-01 RX ADMIN — Medication 81 MILLIGRAM(S): at 11:56

## 2024-01-01 RX ADMIN — Medication 650 MILLIGRAM(S): at 14:54

## 2024-01-01 RX ADMIN — PANTOPRAZOLE SODIUM 40 MILLIGRAM(S): 20 TABLET, DELAYED RELEASE ORAL at 07:21

## 2024-01-01 RX ADMIN — Medication 650 MILLIGRAM(S): at 16:56

## 2024-01-01 RX ADMIN — Medication 2: at 12:15

## 2024-01-01 RX ADMIN — Medication 25 MICROGRAM(S): at 06:12

## 2024-01-01 RX ADMIN — HEPARIN SODIUM 5000 UNIT(S): 5000 INJECTION INTRAVENOUS; SUBCUTANEOUS at 05:38

## 2024-01-01 RX ADMIN — ATORVASTATIN CALCIUM 80 MILLIGRAM(S): 80 TABLET, FILM COATED ORAL at 20:38

## 2024-01-01 RX ADMIN — DONEPEZIL HYDROCHLORIDE 10 MILLIGRAM(S): 10 TABLET, FILM COATED ORAL at 21:54

## 2024-01-01 RX ADMIN — Medication 25 MILLIGRAM(S): at 05:46

## 2024-01-01 RX ADMIN — HEPARIN SODIUM 5000 UNIT(S): 5000 INJECTION INTRAVENOUS; SUBCUTANEOUS at 17:19

## 2024-01-01 RX ADMIN — GABAPENTIN 300 MILLIGRAM(S): 400 CAPSULE ORAL at 17:35

## 2024-01-01 RX ADMIN — Medication 1: at 11:52

## 2024-01-01 RX ADMIN — Medication 1: at 16:24

## 2024-01-01 RX ADMIN — LACTULOSE 20 GRAM(S): 10 SOLUTION ORAL at 14:46

## 2024-01-01 RX ADMIN — ONDANSETRON 4 MILLIGRAM(S): 8 TABLET, FILM COATED ORAL at 05:33

## 2024-01-01 RX ADMIN — Medication 1: at 07:58

## 2024-01-01 RX ADMIN — HEPARIN SODIUM 5000 UNIT(S): 5000 INJECTION INTRAVENOUS; SUBCUTANEOUS at 17:35

## 2024-01-01 RX ADMIN — Medication 3: at 07:59

## 2024-01-01 RX ADMIN — Medication 650 MILLIGRAM(S): at 05:11

## 2024-01-01 RX ADMIN — LACTULOSE 20 GRAM(S): 10 SOLUTION ORAL at 22:24

## 2024-01-01 RX ADMIN — Medication 81 MILLIGRAM(S): at 10:03

## 2024-01-01 RX ADMIN — Medication 25 MICROGRAM(S): at 06:02

## 2024-01-01 RX ADMIN — Medication 100 MILLIGRAM(S): at 11:51

## 2024-01-01 RX ADMIN — TRAMADOL HYDROCHLORIDE 100 MILLIGRAM(S): 50 TABLET ORAL at 18:45

## 2024-01-01 RX ADMIN — CLOPIDOGREL BISULFATE 75 MILLIGRAM(S): 75 TABLET, FILM COATED ORAL at 11:28

## 2024-01-01 RX ADMIN — GABAPENTIN 300 MILLIGRAM(S): 400 CAPSULE ORAL at 19:58

## 2024-01-01 RX ADMIN — LACTULOSE 20 GRAM(S): 10 SOLUTION ORAL at 15:18

## 2024-01-01 RX ADMIN — SERTRALINE 100 MILLIGRAM(S): 25 TABLET, FILM COATED ORAL at 12:33

## 2024-01-01 RX ADMIN — ATORVASTATIN CALCIUM 80 MILLIGRAM(S): 80 TABLET, FILM COATED ORAL at 21:48

## 2024-01-01 RX ADMIN — GABAPENTIN 300 MILLIGRAM(S): 400 CAPSULE ORAL at 11:14

## 2024-01-01 RX ADMIN — Medication 30 MILLIGRAM(S): at 06:21

## 2024-01-01 RX ADMIN — PANTOPRAZOLE SODIUM 40 MILLIGRAM(S): 20 TABLET, DELAYED RELEASE ORAL at 06:14

## 2024-01-01 RX ADMIN — SERTRALINE 50 MILLIGRAM(S): 25 TABLET, FILM COATED ORAL at 11:43

## 2024-01-01 RX ADMIN — DONEPEZIL HYDROCHLORIDE 10 MILLIGRAM(S): 10 TABLET, FILM COATED ORAL at 22:21

## 2024-01-01 RX ADMIN — HEPARIN SODIUM 5000 UNIT(S): 5000 INJECTION INTRAVENOUS; SUBCUTANEOUS at 22:41

## 2024-01-01 RX ADMIN — CLOPIDOGREL BISULFATE 75 MILLIGRAM(S): 75 TABLET, FILM COATED ORAL at 10:03

## 2024-01-01 RX ADMIN — ATORVASTATIN CALCIUM 80 MILLIGRAM(S): 80 TABLET, FILM COATED ORAL at 21:42

## 2024-01-01 RX ADMIN — Medication 2: at 16:56

## 2024-01-01 RX ADMIN — DONEPEZIL HYDROCHLORIDE 10 MILLIGRAM(S): 10 TABLET, FILM COATED ORAL at 21:35

## 2024-01-01 RX ADMIN — Medication 81 MILLIGRAM(S): at 11:37

## 2024-01-01 RX ADMIN — Medication 25 MICROGRAM(S): at 06:30

## 2024-01-01 RX ADMIN — Medication 650 MILLIGRAM(S): at 11:17

## 2024-01-01 RX ADMIN — CLOPIDOGREL BISULFATE 75 MILLIGRAM(S): 75 TABLET, FILM COATED ORAL at 12:43

## 2024-01-01 RX ADMIN — Medication 25 MICROGRAM(S): at 06:14

## 2024-01-01 RX ADMIN — AMLODIPINE BESYLATE 10 MILLIGRAM(S): 2.5 TABLET ORAL at 18:45

## 2024-01-01 RX ADMIN — Medication 81 MILLIGRAM(S): at 11:16

## 2024-01-01 RX ADMIN — SERTRALINE 50 MILLIGRAM(S): 25 TABLET, FILM COATED ORAL at 11:25

## 2024-01-01 RX ADMIN — PANTOPRAZOLE SODIUM 40 MILLIGRAM(S): 20 TABLET, DELAYED RELEASE ORAL at 05:14

## 2024-01-01 RX ADMIN — LACTULOSE 20 GRAM(S): 10 SOLUTION ORAL at 06:16

## 2024-01-01 RX ADMIN — HEPARIN SODIUM 5000 UNIT(S): 5000 INJECTION INTRAVENOUS; SUBCUTANEOUS at 21:34

## 2024-01-01 RX ADMIN — Medication 325 MILLIGRAM(S): at 14:35

## 2024-01-01 RX ADMIN — CLOPIDOGREL BISULFATE 75 MILLIGRAM(S): 75 TABLET, FILM COATED ORAL at 11:16

## 2024-01-01 RX ADMIN — GABAPENTIN 300 MILLIGRAM(S): 400 CAPSULE ORAL at 17:34

## 2024-01-01 RX ADMIN — ONDANSETRON 4 MILLIGRAM(S): 8 TABLET, FILM COATED ORAL at 16:15

## 2024-01-01 RX ADMIN — DONEPEZIL HYDROCHLORIDE 10 MILLIGRAM(S): 10 TABLET, FILM COATED ORAL at 21:10

## 2024-01-01 RX ADMIN — LACTULOSE 20 GRAM(S): 10 SOLUTION ORAL at 21:26

## 2024-01-01 RX ADMIN — Medication 81 MILLIGRAM(S): at 11:49

## 2024-01-01 RX ADMIN — SERTRALINE 50 MILLIGRAM(S): 25 TABLET, FILM COATED ORAL at 14:34

## 2024-01-01 RX ADMIN — AMLODIPINE BESYLATE 10 MILLIGRAM(S): 2.5 TABLET ORAL at 05:28

## 2024-01-01 RX ADMIN — GABAPENTIN 300 MILLIGRAM(S): 400 CAPSULE ORAL at 10:02

## 2024-01-01 RX ADMIN — Medication 30 MILLIGRAM(S): at 05:15

## 2024-01-01 RX ADMIN — Medication 20 MILLIEQUIVALENT(S): at 11:38

## 2024-01-01 RX ADMIN — DONEPEZIL HYDROCHLORIDE 10 MILLIGRAM(S): 10 TABLET, FILM COATED ORAL at 22:41

## 2024-01-01 RX ADMIN — SERTRALINE 100 MILLIGRAM(S): 25 TABLET, FILM COATED ORAL at 11:14

## 2024-01-01 RX ADMIN — Medication 81 MILLIGRAM(S): at 12:25

## 2024-01-01 RX ADMIN — SERTRALINE 50 MILLIGRAM(S): 25 TABLET, FILM COATED ORAL at 11:41

## 2024-01-01 RX ADMIN — Medication 25 MICROGRAM(S): at 05:27

## 2024-01-01 RX ADMIN — GABAPENTIN 300 MILLIGRAM(S): 400 CAPSULE ORAL at 06:13

## 2024-01-01 RX ADMIN — Medication 81 MILLIGRAM(S): at 11:43

## 2024-01-01 RX ADMIN — GABAPENTIN 300 MILLIGRAM(S): 400 CAPSULE ORAL at 11:42

## 2024-01-01 RX ADMIN — ATORVASTATIN CALCIUM 80 MILLIGRAM(S): 80 TABLET, FILM COATED ORAL at 21:26

## 2024-01-01 RX ADMIN — Medication 200 MILLIGRAM(S): at 15:02

## 2024-01-01 RX ADMIN — HEPARIN SODIUM 5000 UNIT(S): 5000 INJECTION INTRAVENOUS; SUBCUTANEOUS at 17:09

## 2024-01-01 RX ADMIN — HEPARIN SODIUM 5000 UNIT(S): 5000 INJECTION INTRAVENOUS; SUBCUTANEOUS at 21:25

## 2024-01-01 RX ADMIN — Medication 100 MILLIGRAM(S): at 21:24

## 2024-01-01 RX ADMIN — Medication 325 MILLIGRAM(S): at 11:37

## 2024-01-01 RX ADMIN — Medication 2: at 12:07

## 2024-01-01 RX ADMIN — Medication 3: at 08:03

## 2024-01-01 RX ADMIN — Medication 25 MILLIGRAM(S): at 05:13

## 2024-01-01 RX ADMIN — ATORVASTATIN CALCIUM 80 MILLIGRAM(S): 80 TABLET, FILM COATED ORAL at 21:20

## 2024-01-01 RX ADMIN — MORPHINE SULFATE 4 MILLIGRAM(S): 50 CAPSULE, EXTENDED RELEASE ORAL at 13:25

## 2024-01-01 RX ADMIN — SENNA PLUS 2 TABLET(S): 8.6 TABLET ORAL at 21:55

## 2024-01-01 RX ADMIN — HEPARIN SODIUM 5000 UNIT(S): 5000 INJECTION INTRAVENOUS; SUBCUTANEOUS at 21:21

## 2024-01-01 RX ADMIN — LACTULOSE 20 GRAM(S): 10 SOLUTION ORAL at 22:21

## 2024-01-01 RX ADMIN — Medication 25 MILLIGRAM(S): at 06:15

## 2024-01-01 RX ADMIN — CLOPIDOGREL BISULFATE 75 MILLIGRAM(S): 75 TABLET, FILM COATED ORAL at 11:40

## 2024-01-01 RX ADMIN — Medication 25 MILLIGRAM(S): at 03:29

## 2024-01-01 RX ADMIN — NORTRIPTYLINE HYDROCHLORIDE 25 MILLIGRAM(S): 10 CAPSULE ORAL at 22:23

## 2024-01-01 RX ADMIN — ATORVASTATIN CALCIUM 80 MILLIGRAM(S): 80 TABLET, FILM COATED ORAL at 21:10

## 2024-01-01 RX ADMIN — Medication 20 MILLIGRAM(S): at 06:02

## 2024-01-01 RX ADMIN — ATORVASTATIN CALCIUM 80 MILLIGRAM(S): 80 TABLET, FILM COATED ORAL at 21:30

## 2024-01-01 RX ADMIN — Medication 650 MILLIGRAM(S): at 17:29

## 2024-01-01 RX ADMIN — HEPARIN SODIUM 5000 UNIT(S): 5000 INJECTION INTRAVENOUS; SUBCUTANEOUS at 05:13

## 2024-01-01 RX ADMIN — ONDANSETRON 4 MILLIGRAM(S): 8 TABLET, FILM COATED ORAL at 06:30

## 2024-01-01 RX ADMIN — SERTRALINE 100 MILLIGRAM(S): 25 TABLET, FILM COATED ORAL at 11:25

## 2024-01-01 RX ADMIN — Medication 1: at 12:20

## 2024-01-01 RX ADMIN — Medication 20 MILLIGRAM(S): at 05:27

## 2024-01-01 RX ADMIN — CLOPIDOGREL BISULFATE 75 MILLIGRAM(S): 75 TABLET, FILM COATED ORAL at 14:25

## 2024-01-01 RX ADMIN — SERTRALINE 100 MILLIGRAM(S): 25 TABLET, FILM COATED ORAL at 13:40

## 2024-01-01 RX ADMIN — Medication 20 MILLIGRAM(S): at 18:45

## 2024-01-01 RX ADMIN — INSULIN GLARGINE 30 UNIT(S): 100 INJECTION, SOLUTION SUBCUTANEOUS at 21:10

## 2024-01-01 RX ADMIN — Medication 1: at 08:20

## 2024-01-01 RX ADMIN — AMLODIPINE BESYLATE 10 MILLIGRAM(S): 2.5 TABLET ORAL at 07:19

## 2024-01-01 RX ADMIN — GABAPENTIN 300 MILLIGRAM(S): 400 CAPSULE ORAL at 17:13

## 2024-01-01 RX ADMIN — Medication 1: at 17:09

## 2024-01-01 RX ADMIN — Medication 500 MILLIGRAM(S): at 11:42

## 2024-01-01 RX ADMIN — Medication 25 MILLIGRAM(S): at 06:12

## 2024-01-01 RX ADMIN — SERTRALINE 50 MILLIGRAM(S): 25 TABLET, FILM COATED ORAL at 11:37

## 2024-01-01 RX ADMIN — SERTRALINE 100 MILLIGRAM(S): 25 TABLET, FILM COATED ORAL at 11:56

## 2024-01-01 RX ADMIN — Medication 650 MILLIGRAM(S): at 11:22

## 2024-01-01 RX ADMIN — Medication 325 MILLIGRAM(S): at 12:26

## 2024-01-01 RX ADMIN — ONDANSETRON 4 MILLIGRAM(S): 8 TABLET, FILM COATED ORAL at 16:24

## 2024-01-01 RX ADMIN — DONEPEZIL HYDROCHLORIDE 10 MILLIGRAM(S): 10 TABLET, FILM COATED ORAL at 20:38

## 2024-01-01 RX ADMIN — DONEPEZIL HYDROCHLORIDE 10 MILLIGRAM(S): 10 TABLET, FILM COATED ORAL at 21:26

## 2024-01-01 RX ADMIN — LACTULOSE 20 GRAM(S): 10 SOLUTION ORAL at 15:04

## 2024-01-01 RX ADMIN — SERTRALINE 50 MILLIGRAM(S): 25 TABLET, FILM COATED ORAL at 11:56

## 2024-01-01 RX ADMIN — DONEPEZIL HYDROCHLORIDE 10 MILLIGRAM(S): 10 TABLET, FILM COATED ORAL at 22:22

## 2024-01-01 RX ADMIN — Medication 650 MILLIGRAM(S): at 10:23

## 2024-01-01 RX ADMIN — LACTULOSE 20 GRAM(S): 10 SOLUTION ORAL at 06:04

## 2024-01-01 RX ADMIN — LACTULOSE 20 GRAM(S): 10 SOLUTION ORAL at 07:21

## 2024-01-01 RX ADMIN — Medication 25 MICROGRAM(S): at 05:15

## 2024-01-01 RX ADMIN — PANTOPRAZOLE SODIUM 40 MILLIGRAM(S): 20 TABLET, DELAYED RELEASE ORAL at 06:13

## 2024-01-01 RX ADMIN — HEPARIN SODIUM 5000 UNIT(S): 5000 INJECTION INTRAVENOUS; SUBCUTANEOUS at 06:30

## 2024-01-01 RX ADMIN — ATORVASTATIN CALCIUM 80 MILLIGRAM(S): 80 TABLET, FILM COATED ORAL at 21:39

## 2024-01-01 RX ADMIN — Medication 650 MILLIGRAM(S): at 13:22

## 2024-01-01 RX ADMIN — LACTULOSE 20 GRAM(S): 10 SOLUTION ORAL at 05:12

## 2024-01-01 RX ADMIN — SODIUM CHLORIDE 80 MILLILITER(S): 9 INJECTION, SOLUTION INTRAVENOUS at 13:50

## 2024-01-01 RX ADMIN — Medication 1: at 17:00

## 2024-01-01 RX ADMIN — SENNA PLUS 2 TABLET(S): 8.6 TABLET ORAL at 22:21

## 2024-01-01 RX ADMIN — HEPARIN SODIUM 5000 UNIT(S): 5000 INJECTION INTRAVENOUS; SUBCUTANEOUS at 21:54

## 2024-01-01 RX ADMIN — Medication 20 MILLIGRAM(S): at 06:12

## 2024-01-01 RX ADMIN — LACTULOSE 20 GRAM(S): 10 SOLUTION ORAL at 21:21

## 2024-01-01 RX ADMIN — SODIUM CHLORIDE 80 MILLILITER(S): 9 INJECTION, SOLUTION INTRAVENOUS at 22:20

## 2024-01-01 RX ADMIN — LOSARTAN POTASSIUM 100 MILLIGRAM(S): 100 TABLET, FILM COATED ORAL at 05:27

## 2024-01-01 RX ADMIN — SERTRALINE 50 MILLIGRAM(S): 25 TABLET, FILM COATED ORAL at 18:45

## 2024-01-01 RX ADMIN — Medication 81 MILLIGRAM(S): at 14:26

## 2024-01-01 RX ADMIN — CLOPIDOGREL BISULFATE 75 MILLIGRAM(S): 75 TABLET, FILM COATED ORAL at 12:35

## 2024-01-01 RX ADMIN — ONDANSETRON 4 MILLIGRAM(S): 8 TABLET, FILM COATED ORAL at 14:32

## 2024-01-01 RX ADMIN — SODIUM CHLORIDE 80 MILLILITER(S): 9 INJECTION INTRAMUSCULAR; INTRAVENOUS; SUBCUTANEOUS at 18:44

## 2024-01-01 RX ADMIN — HEPARIN SODIUM 5000 UNIT(S): 5000 INJECTION INTRAVENOUS; SUBCUTANEOUS at 06:12

## 2024-01-01 RX ADMIN — LACTULOSE 20 GRAM(S): 10 SOLUTION ORAL at 14:58

## 2024-01-01 RX ADMIN — SERTRALINE 50 MILLIGRAM(S): 25 TABLET, FILM COATED ORAL at 12:34

## 2024-01-01 RX ADMIN — CHLORHEXIDINE GLUCONATE 1 APPLICATION(S): 213 SOLUTION TOPICAL at 05:44

## 2024-01-01 RX ADMIN — Medication 25 MICROGRAM(S): at 06:08

## 2024-01-01 RX ADMIN — SERTRALINE 50 MILLIGRAM(S): 25 TABLET, FILM COATED ORAL at 11:24

## 2024-01-01 RX ADMIN — Medication 81 MILLIGRAM(S): at 12:42

## 2024-01-01 RX ADMIN — Medication 25 MICROGRAM(S): at 05:11

## 2024-01-01 RX ADMIN — SODIUM CHLORIDE 80 MILLILITER(S): 9 INJECTION, SOLUTION INTRAVENOUS at 05:05

## 2024-01-01 RX ADMIN — HEPARIN SODIUM 5000 UNIT(S): 5000 INJECTION INTRAVENOUS; SUBCUTANEOUS at 14:46

## 2024-01-01 RX ADMIN — GABAPENTIN 300 MILLIGRAM(S): 400 CAPSULE ORAL at 11:25

## 2024-01-01 RX ADMIN — DONEPEZIL HYDROCHLORIDE 10 MILLIGRAM(S): 10 TABLET, FILM COATED ORAL at 21:34

## 2024-01-01 RX ADMIN — SODIUM CHLORIDE 80 MILLILITER(S): 9 INJECTION, SOLUTION INTRAVENOUS at 19:50

## 2024-01-01 RX ADMIN — LACTULOSE 20 GRAM(S): 10 SOLUTION ORAL at 05:16

## 2024-01-01 RX ADMIN — DONEPEZIL HYDROCHLORIDE 10 MILLIGRAM(S): 10 TABLET, FILM COATED ORAL at 21:38

## 2024-01-01 RX ADMIN — LACTULOSE 20 GRAM(S): 10 SOLUTION ORAL at 06:14

## 2024-01-01 RX ADMIN — HEPARIN SODIUM 5000 UNIT(S): 5000 INJECTION INTRAVENOUS; SUBCUTANEOUS at 13:38

## 2024-01-01 RX ADMIN — SERTRALINE 100 MILLIGRAM(S): 25 TABLET, FILM COATED ORAL at 12:25

## 2024-01-01 RX ADMIN — CLOPIDOGREL BISULFATE 75 MILLIGRAM(S): 75 TABLET, FILM COATED ORAL at 12:34

## 2024-01-01 RX ADMIN — Medication 81 MILLIGRAM(S): at 12:34

## 2024-01-01 RX ADMIN — GABAPENTIN 300 MILLIGRAM(S): 400 CAPSULE ORAL at 06:31

## 2024-01-01 RX ADMIN — SERTRALINE 50 MILLIGRAM(S): 25 TABLET, FILM COATED ORAL at 12:26

## 2024-01-01 RX ADMIN — Medication 325 MILLIGRAM(S): at 12:34

## 2024-01-01 RX ADMIN — DONEPEZIL HYDROCHLORIDE 10 MILLIGRAM(S): 10 TABLET, FILM COATED ORAL at 21:21

## 2024-01-01 RX ADMIN — LACTULOSE 20 GRAM(S): 10 SOLUTION ORAL at 21:49

## 2024-01-01 RX ADMIN — Medication 81 MILLIGRAM(S): at 12:09

## 2024-01-01 RX ADMIN — ONDANSETRON 4 MILLIGRAM(S): 8 TABLET, FILM COATED ORAL at 01:19

## 2024-01-01 RX ADMIN — PANTOPRAZOLE SODIUM 40 MILLIGRAM(S): 20 TABLET, DELAYED RELEASE ORAL at 07:03

## 2024-01-01 RX ADMIN — LACTULOSE 20 GRAM(S): 10 SOLUTION ORAL at 23:04

## 2024-01-01 RX ADMIN — Medication 650 MILLIGRAM(S): at 17:00

## 2024-01-01 RX ADMIN — HEPARIN SODIUM 5000 UNIT(S): 5000 INJECTION INTRAVENOUS; SUBCUTANEOUS at 21:31

## 2024-01-01 RX ADMIN — SERTRALINE 50 MILLIGRAM(S): 25 TABLET, FILM COATED ORAL at 11:50

## 2024-01-01 RX ADMIN — Medication 25 MILLIGRAM(S): at 07:20

## 2024-01-01 RX ADMIN — PANTOPRAZOLE SODIUM 40 MILLIGRAM(S): 20 TABLET, DELAYED RELEASE ORAL at 06:15

## 2024-01-01 RX ADMIN — DONEPEZIL HYDROCHLORIDE 10 MILLIGRAM(S): 10 TABLET, FILM COATED ORAL at 21:07

## 2024-01-01 RX ADMIN — Medication 25 MILLIGRAM(S): at 06:31

## 2024-01-01 RX ADMIN — Medication 20 MILLIGRAM(S): at 05:47

## 2024-01-01 RX ADMIN — SODIUM CHLORIDE 100 MILLILITER(S): 9 INJECTION, SOLUTION INTRAVENOUS at 00:09

## 2024-01-01 RX ADMIN — Medication 1: at 16:27

## 2024-01-01 RX ADMIN — CLOPIDOGREL BISULFATE 75 MILLIGRAM(S): 75 TABLET, FILM COATED ORAL at 11:25

## 2024-01-01 RX ADMIN — LOSARTAN POTASSIUM 100 MILLIGRAM(S): 100 TABLET, FILM COATED ORAL at 06:12

## 2024-01-01 RX ADMIN — HEPARIN SODIUM 5000 UNIT(S): 5000 INJECTION INTRAVENOUS; SUBCUTANEOUS at 06:01

## 2024-01-01 RX ADMIN — LACTULOSE 20 GRAM(S): 10 SOLUTION ORAL at 05:28

## 2024-01-01 RX ADMIN — Medication 325 MILLIGRAM(S): at 11:40

## 2024-01-01 RX ADMIN — SENNA PLUS 2 TABLET(S): 8.6 TABLET ORAL at 21:27

## 2024-01-01 RX ADMIN — CLOPIDOGREL BISULFATE 75 MILLIGRAM(S): 75 TABLET, FILM COATED ORAL at 18:44

## 2024-01-01 RX ADMIN — LOSARTAN POTASSIUM 100 MILLIGRAM(S): 100 TABLET, FILM COATED ORAL at 05:48

## 2024-01-01 RX ADMIN — Medication 25 MILLIGRAM(S): at 05:11

## 2024-01-01 RX ADMIN — LACTULOSE 20 GRAM(S): 10 SOLUTION ORAL at 14:37

## 2024-01-01 RX ADMIN — CHLORHEXIDINE GLUCONATE 1 APPLICATION(S): 213 SOLUTION TOPICAL at 05:26

## 2024-01-01 RX ADMIN — HEPARIN SODIUM 5000 UNIT(S): 5000 INJECTION INTRAVENOUS; SUBCUTANEOUS at 22:21

## 2024-01-01 RX ADMIN — Medication 100 MILLIGRAM(S): at 06:29

## 2024-01-01 RX ADMIN — ATORVASTATIN CALCIUM 80 MILLIGRAM(S): 80 TABLET, FILM COATED ORAL at 21:54

## 2024-01-01 RX ADMIN — Medication 100 MILLIGRAM(S): at 14:57

## 2024-01-01 RX ADMIN — GABAPENTIN 300 MILLIGRAM(S): 400 CAPSULE ORAL at 05:38

## 2024-01-01 RX ADMIN — LACTULOSE 20 GRAM(S): 10 SOLUTION ORAL at 13:30

## 2024-01-01 RX ADMIN — Medication 25 MILLIGRAM(S): at 18:44

## 2024-01-01 RX ADMIN — GABAPENTIN 300 MILLIGRAM(S): 400 CAPSULE ORAL at 15:45

## 2024-01-01 RX ADMIN — HEPARIN SODIUM 5000 UNIT(S): 5000 INJECTION INTRAVENOUS; SUBCUTANEOUS at 06:15

## 2024-01-01 RX ADMIN — Medication 1: at 16:32

## 2024-01-01 RX ADMIN — HEPARIN SODIUM 5000 UNIT(S): 5000 INJECTION INTRAVENOUS; SUBCUTANEOUS at 15:07

## 2024-01-01 RX ADMIN — LACTULOSE 20 GRAM(S): 10 SOLUTION ORAL at 05:13

## 2024-01-01 RX ADMIN — SERTRALINE 100 MILLIGRAM(S): 25 TABLET, FILM COATED ORAL at 11:37

## 2024-01-01 RX ADMIN — GABAPENTIN 300 MILLIGRAM(S): 400 CAPSULE ORAL at 11:40

## 2024-01-01 RX ADMIN — GABAPENTIN 300 MILLIGRAM(S): 400 CAPSULE ORAL at 12:33

## 2024-01-01 RX ADMIN — PANTOPRAZOLE SODIUM 40 MILLIGRAM(S): 20 TABLET, DELAYED RELEASE ORAL at 05:27

## 2024-01-01 RX ADMIN — Medication 25 MILLIGRAM(S): at 05:39

## 2024-01-01 RX ADMIN — ONDANSETRON 4 MILLIGRAM(S): 8 TABLET, FILM COATED ORAL at 11:30

## 2024-01-01 RX ADMIN — Medication 650 MILLIGRAM(S): at 16:37

## 2024-01-01 RX ADMIN — HEPARIN SODIUM 5000 UNIT(S): 5000 INJECTION INTRAVENOUS; SUBCUTANEOUS at 05:04

## 2024-01-01 RX ADMIN — HEPARIN SODIUM 5000 UNIT(S): 5000 INJECTION INTRAVENOUS; SUBCUTANEOUS at 05:19

## 2024-01-01 RX ADMIN — Medication 25 MILLIGRAM(S): at 05:28

## 2024-01-01 RX ADMIN — HEPARIN SODIUM 5000 UNIT(S): 5000 INJECTION INTRAVENOUS; SUBCUTANEOUS at 07:21

## 2024-01-01 RX ADMIN — Medication 325 MILLIGRAM(S): at 10:03

## 2024-01-01 RX ADMIN — GABAPENTIN 300 MILLIGRAM(S): 400 CAPSULE ORAL at 11:17

## 2024-01-01 RX ADMIN — AMLODIPINE BESYLATE 10 MILLIGRAM(S): 2.5 TABLET ORAL at 05:38

## 2024-01-01 RX ADMIN — PANTOPRAZOLE SODIUM 40 MILLIGRAM(S): 20 TABLET, DELAYED RELEASE ORAL at 05:34

## 2024-01-01 RX ADMIN — TRAMADOL HYDROCHLORIDE 100 MILLIGRAM(S): 50 TABLET ORAL at 11:37

## 2024-01-01 RX ADMIN — Medication 81 MILLIGRAM(S): at 11:28

## 2024-01-01 RX ADMIN — LACTULOSE 20 GRAM(S): 10 SOLUTION ORAL at 20:38

## 2024-01-01 RX ADMIN — CHLORHEXIDINE GLUCONATE 1 APPLICATION(S): 213 SOLUTION TOPICAL at 06:00

## 2024-01-01 RX ADMIN — Medication 25 MICROGRAM(S): at 05:28

## 2024-01-01 RX ADMIN — SERTRALINE 100 MILLIGRAM(S): 25 TABLET, FILM COATED ORAL at 10:03

## 2024-01-01 RX ADMIN — Medication 81 MILLIGRAM(S): at 11:14

## 2024-01-01 RX ADMIN — Medication 325 MILLIGRAM(S): at 11:16

## 2024-01-01 RX ADMIN — GABAPENTIN 300 MILLIGRAM(S): 400 CAPSULE ORAL at 05:45

## 2024-01-01 RX ADMIN — DONEPEZIL HYDROCHLORIDE 10 MILLIGRAM(S): 10 TABLET, FILM COATED ORAL at 21:49

## 2024-01-01 RX ADMIN — ATORVASTATIN CALCIUM 80 MILLIGRAM(S): 80 TABLET, FILM COATED ORAL at 21:07

## 2024-01-01 RX ADMIN — Medication 1: at 12:42

## 2024-01-01 RX ADMIN — Medication 2: at 12:35

## 2024-01-01 RX ADMIN — HEPARIN SODIUM 5000 UNIT(S): 5000 INJECTION INTRAVENOUS; SUBCUTANEOUS at 21:48

## 2024-01-01 RX ADMIN — PANTOPRAZOLE SODIUM 40 MILLIGRAM(S): 20 TABLET, DELAYED RELEASE ORAL at 05:10

## 2024-01-01 RX ADMIN — PANTOPRAZOLE SODIUM 40 MILLIGRAM(S): 20 TABLET, DELAYED RELEASE ORAL at 05:15

## 2024-01-01 RX ADMIN — SERTRALINE 50 MILLIGRAM(S): 25 TABLET, FILM COATED ORAL at 13:40

## 2024-01-01 RX ADMIN — HEPARIN SODIUM 5000 UNIT(S): 5000 INJECTION INTRAVENOUS; SUBCUTANEOUS at 06:14

## 2024-01-01 RX ADMIN — CHLORHEXIDINE GLUCONATE 1 APPLICATION(S): 213 SOLUTION TOPICAL at 06:11

## 2024-01-01 RX ADMIN — Medication 325 MILLIGRAM(S): at 11:43

## 2024-01-01 RX ADMIN — DONEPEZIL HYDROCHLORIDE 10 MILLIGRAM(S): 10 TABLET, FILM COATED ORAL at 21:43

## 2024-01-01 RX ADMIN — Medication 25 GRAM(S): at 07:47

## 2024-01-01 RX ADMIN — Medication 40 MILLIEQUIVALENT(S): at 11:43

## 2024-01-01 RX ADMIN — Medication 25 MICROGRAM(S): at 05:04

## 2024-01-01 RX ADMIN — SERTRALINE 50 MILLIGRAM(S): 25 TABLET, FILM COATED ORAL at 11:15

## 2024-01-01 RX ADMIN — Medication 650 MILLIGRAM(S): at 06:15

## 2024-01-01 RX ADMIN — Medication 30 MILLIGRAM(S): at 05:11

## 2024-01-01 RX ADMIN — ONDANSETRON 4 MILLIGRAM(S): 8 TABLET, FILM COATED ORAL at 17:01

## 2024-01-01 RX ADMIN — Medication 650 MILLIGRAM(S): at 17:06

## 2024-01-01 RX ADMIN — Medication 25 MICROGRAM(S): at 05:47

## 2024-01-01 RX ADMIN — ONDANSETRON 4 MILLIGRAM(S): 8 TABLET, FILM COATED ORAL at 05:00

## 2024-01-01 RX ADMIN — Medication 81 MILLIGRAM(S): at 11:40

## 2024-01-01 RX ADMIN — Medication 1: at 07:45

## 2024-01-01 RX ADMIN — Medication 650 MILLIGRAM(S): at 16:47

## 2024-01-01 RX ADMIN — LACTULOSE 20 GRAM(S): 10 SOLUTION ORAL at 21:54

## 2024-01-01 RX ADMIN — HEPARIN SODIUM 5000 UNIT(S): 5000 INJECTION INTRAVENOUS; SUBCUTANEOUS at 14:37

## 2024-01-01 RX ADMIN — GABAPENTIN 300 MILLIGRAM(S): 400 CAPSULE ORAL at 18:35

## 2024-01-01 RX ADMIN — SENNA PLUS 2 TABLET(S): 8.6 TABLET ORAL at 21:48

## 2024-01-01 RX ADMIN — LACTULOSE 20 GRAM(S): 10 SOLUTION ORAL at 21:31

## 2024-01-01 RX ADMIN — HEPARIN SODIUM 5000 UNIT(S): 5000 INJECTION INTRAVENOUS; SUBCUTANEOUS at 06:05

## 2024-01-01 RX ADMIN — LACTULOSE 20 GRAM(S): 10 SOLUTION ORAL at 14:34

## 2024-01-01 RX ADMIN — SODIUM CHLORIDE 80 MILLILITER(S): 9 INJECTION INTRAMUSCULAR; INTRAVENOUS; SUBCUTANEOUS at 22:27

## 2024-01-01 RX ADMIN — HEPARIN SODIUM 5000 UNIT(S): 5000 INJECTION INTRAVENOUS; SUBCUTANEOUS at 20:38

## 2024-01-01 RX ADMIN — SENNA PLUS 2 TABLET(S): 8.6 TABLET ORAL at 21:35

## 2024-01-01 RX ADMIN — Medication 1: at 11:41

## 2024-01-01 RX ADMIN — Medication 325 MILLIGRAM(S): at 18:45

## 2024-01-01 RX ADMIN — Medication 325 MILLIGRAM(S): at 11:56

## 2024-01-01 RX ADMIN — Medication 25 MILLIGRAM(S): at 05:29

## 2024-01-01 RX ADMIN — HEPARIN SODIUM 5000 UNIT(S): 5000 INJECTION INTRAVENOUS; SUBCUTANEOUS at 22:23

## 2024-01-01 RX ADMIN — LACTULOSE 20 GRAM(S): 10 SOLUTION ORAL at 13:40

## 2024-01-01 RX ADMIN — HEPARIN SODIUM 5000 UNIT(S): 5000 INJECTION INTRAVENOUS; SUBCUTANEOUS at 05:46

## 2024-01-01 RX ADMIN — HEPARIN SODIUM 5000 UNIT(S): 5000 INJECTION INTRAVENOUS; SUBCUTANEOUS at 14:27

## 2024-01-01 RX ADMIN — SENNA PLUS 2 TABLET(S): 8.6 TABLET ORAL at 20:38

## 2024-01-01 RX ADMIN — Medication 200 GRAM(S): at 09:57

## 2024-01-01 RX ADMIN — GABAPENTIN 300 MILLIGRAM(S): 400 CAPSULE ORAL at 11:56

## 2024-01-01 RX ADMIN — SODIUM CHLORIDE 1000 MILLILITER(S): 9 INJECTION INTRAMUSCULAR; INTRAVENOUS; SUBCUTANEOUS at 05:00

## 2024-01-01 RX ADMIN — LACTULOSE 20 GRAM(S): 10 SOLUTION ORAL at 13:38

## 2024-01-01 RX ADMIN — GABAPENTIN 300 MILLIGRAM(S): 400 CAPSULE ORAL at 12:26

## 2024-01-01 RX ADMIN — AMLODIPINE BESYLATE 10 MILLIGRAM(S): 2.5 TABLET ORAL at 06:14

## 2024-01-01 RX ADMIN — ATORVASTATIN CALCIUM 80 MILLIGRAM(S): 80 TABLET, FILM COATED ORAL at 22:22

## 2024-01-01 RX ADMIN — HEPARIN SODIUM 5000 UNIT(S): 5000 INJECTION INTRAVENOUS; SUBCUTANEOUS at 13:30

## 2024-01-01 RX ADMIN — LACTULOSE 20 GRAM(S): 10 SOLUTION ORAL at 17:19

## 2024-01-01 RX ADMIN — CLOPIDOGREL BISULFATE 75 MILLIGRAM(S): 75 TABLET, FILM COATED ORAL at 11:37

## 2024-01-01 RX ADMIN — LACTULOSE 20 GRAM(S): 10 SOLUTION ORAL at 21:35

## 2024-01-01 RX ADMIN — Medication 81 MILLIGRAM(S): at 18:44

## 2024-01-01 RX ADMIN — SENNA PLUS 2 TABLET(S): 8.6 TABLET ORAL at 22:22

## 2024-01-01 RX ADMIN — HEPARIN SODIUM 5000 UNIT(S): 5000 INJECTION INTRAVENOUS; SUBCUTANEOUS at 05:16

## 2024-01-01 RX ADMIN — GABAPENTIN 300 MILLIGRAM(S): 400 CAPSULE ORAL at 17:09

## 2024-01-01 RX ADMIN — Medication 650 MILLIGRAM(S): at 05:27

## 2024-01-01 RX ADMIN — CHLORHEXIDINE GLUCONATE 1 APPLICATION(S): 213 SOLUTION TOPICAL at 06:17

## 2024-01-01 RX ADMIN — Medication 81 MILLIGRAM(S): at 11:25

## 2024-01-01 RX ADMIN — CLOPIDOGREL BISULFATE 75 MILLIGRAM(S): 75 TABLET, FILM COATED ORAL at 11:56

## 2024-01-01 RX ADMIN — SENNA PLUS 2 TABLET(S): 8.6 TABLET ORAL at 21:38

## 2024-01-01 RX ADMIN — SERTRALINE 100 MILLIGRAM(S): 25 TABLET, FILM COATED ORAL at 11:24

## 2024-01-01 RX ADMIN — HEPARIN SODIUM 5000 UNIT(S): 5000 INJECTION INTRAVENOUS; SUBCUTANEOUS at 15:35

## 2024-01-01 RX ADMIN — Medication 100 MILLIGRAM(S): at 14:26

## 2024-01-01 RX ADMIN — LACTULOSE 20 GRAM(S): 10 SOLUTION ORAL at 11:49

## 2024-01-01 RX ADMIN — HEPARIN SODIUM 5000 UNIT(S): 5000 INJECTION INTRAVENOUS; SUBCUTANEOUS at 11:49

## 2024-01-01 RX ADMIN — LACTULOSE 20 GRAM(S): 10 SOLUTION ORAL at 21:38

## 2024-01-01 RX ADMIN — HEPARIN SODIUM 5000 UNIT(S): 5000 INJECTION INTRAVENOUS; SUBCUTANEOUS at 21:38

## 2024-01-01 RX ADMIN — SENNA PLUS 2 TABLET(S): 8.6 TABLET ORAL at 22:28

## 2024-01-01 RX ADMIN — Medication 1: at 07:52

## 2024-01-01 RX ADMIN — Medication 25 MILLIGRAM(S): at 11:27

## 2024-01-01 RX ADMIN — ATORVASTATIN CALCIUM 80 MILLIGRAM(S): 80 TABLET, FILM COATED ORAL at 22:21

## 2024-01-01 RX ADMIN — Medication 100 MILLIGRAM(S): at 23:48

## 2024-01-01 RX ADMIN — Medication 20 MILLIEQUIVALENT(S): at 21:48

## 2024-01-01 RX ADMIN — Medication 40 MILLIEQUIVALENT(S): at 16:32

## 2024-01-01 RX ADMIN — CLOPIDOGREL BISULFATE 75 MILLIGRAM(S): 75 TABLET, FILM COATED ORAL at 11:50

## 2024-01-01 RX ADMIN — CHLORHEXIDINE GLUCONATE 1 APPLICATION(S): 213 SOLUTION TOPICAL at 05:20

## 2024-01-01 RX ADMIN — LACTULOSE 20 GRAM(S): 10 SOLUTION ORAL at 15:07

## 2024-01-01 RX ADMIN — Medication 20 MILLIGRAM(S): at 05:38

## 2024-01-01 RX ADMIN — Medication 25 MILLIGRAM(S): at 05:15

## 2024-01-01 RX ADMIN — Medication 325 MILLIGRAM(S): at 11:28

## 2024-01-01 RX ADMIN — Medication 100 MILLIGRAM(S): at 05:39

## 2024-01-01 RX ADMIN — PANTOPRAZOLE SODIUM 40 MILLIGRAM(S): 20 TABLET, DELAYED RELEASE ORAL at 18:45

## 2024-01-01 RX ADMIN — SODIUM CHLORIDE 80 MILLILITER(S): 9 INJECTION, SOLUTION INTRAVENOUS at 22:51

## 2024-01-01 RX ADMIN — Medication 81 MILLIGRAM(S): at 12:35

## 2024-01-01 RX ADMIN — DONEPEZIL HYDROCHLORIDE 10 MILLIGRAM(S): 10 TABLET, FILM COATED ORAL at 21:31

## 2024-01-01 RX ADMIN — Medication 25 MILLIGRAM(S): at 06:04

## 2024-01-01 RX ADMIN — CLOPIDOGREL BISULFATE 75 MILLIGRAM(S): 75 TABLET, FILM COATED ORAL at 11:43

## 2024-01-01 RX ADMIN — CLOPIDOGREL BISULFATE 75 MILLIGRAM(S): 75 TABLET, FILM COATED ORAL at 11:14

## 2024-01-01 RX ADMIN — Medication 81 MILLIGRAM(S): at 11:41

## 2024-01-01 RX ADMIN — NORTRIPTYLINE HYDROCHLORIDE 25 MILLIGRAM(S): 10 CAPSULE ORAL at 22:28

## 2024-01-01 RX ADMIN — HEPARIN SODIUM 5000 UNIT(S): 5000 INJECTION INTRAVENOUS; SUBCUTANEOUS at 21:11

## 2024-01-01 RX ADMIN — HEPARIN SODIUM 5000 UNIT(S): 5000 INJECTION INTRAVENOUS; SUBCUTANEOUS at 14:58

## 2024-01-01 RX ADMIN — PANTOPRAZOLE SODIUM 40 MILLIGRAM(S): 20 TABLET, DELAYED RELEASE ORAL at 05:47

## 2024-01-01 RX ADMIN — TRAMADOL HYDROCHLORIDE 100 MILLIGRAM(S): 50 TABLET ORAL at 12:17

## 2024-01-01 RX ADMIN — CLOPIDOGREL BISULFATE 75 MILLIGRAM(S): 75 TABLET, FILM COATED ORAL at 12:10

## 2024-01-01 RX ADMIN — SERTRALINE 100 MILLIGRAM(S): 25 TABLET, FILM COATED ORAL at 11:42

## 2024-01-01 RX ADMIN — SENNA PLUS 2 TABLET(S): 8.6 TABLET ORAL at 21:54

## 2024-01-01 RX ADMIN — HEPARIN SODIUM 5000 UNIT(S): 5000 INJECTION INTRAVENOUS; SUBCUTANEOUS at 21:35

## 2024-01-01 RX ADMIN — PANTOPRAZOLE SODIUM 40 MILLIGRAM(S): 20 TABLET, DELAYED RELEASE ORAL at 06:32

## 2024-01-01 RX ADMIN — Medication 650 MILLIGRAM(S): at 19:09

## 2024-01-01 RX ADMIN — SERTRALINE 100 MILLIGRAM(S): 25 TABLET, FILM COATED ORAL at 14:27

## 2024-01-01 RX ADMIN — GABAPENTIN 300 MILLIGRAM(S): 400 CAPSULE ORAL at 06:02

## 2024-01-01 RX ADMIN — Medication 25 MICROGRAM(S): at 05:39

## 2024-01-01 SDOH — SOCIAL STABILITY - SOCIAL INSECURITY: OTHER SPECIFIED PROBLEMS RELATED TO PRIMARY SUPPORT GROUP: Z63.8

## 2024-01-03 NOTE — PATIENT PROFILE ADULT - NSPROHMDIABETBLDGLCUSUAL_GEN_A_NUR
JONATHAN FROM [facility]  ELSIE BARRERA  CALLING TO SEE IF WE CAN SEND NEW SCRIPT/ORDER FOR PT RX  RITALIN 20 MG   TO     [facility] [place], [place] - [address] - [phone] PH - [phone]      MAIN ST IS OUT OF RX BUT THE OTHER ONE HAS , THEY JUST CAN'T TRANSFER SCRIPT.     PLEASE ADVISE    known

## 2024-02-07 PROBLEM — E11.9 DIABETES: Status: ACTIVE | Noted: 2019-01-30

## 2024-02-07 PROBLEM — Z86.79 HISTORY OF MOBITZ TYPE II BLOCK: Status: RESOLVED | Noted: 2022-08-09 | Resolved: 2024-01-01

## 2024-02-07 PROBLEM — I63.9 CVA (CEREBRAL VASCULAR ACCIDENT): Status: ACTIVE | Noted: 2019-01-30

## 2024-02-07 PROBLEM — I10 ESSENTIAL HYPERTENSION: Status: ACTIVE | Noted: 2019-11-07

## 2024-02-07 NOTE — PHYSICAL EXAM
[General Appearance - Well Developed] : well developed [Normal Appearance] : normal appearance [Well Groomed] : well groomed [General Appearance - Well Nourished] : well nourished [No Deformities] : no deformities [General Appearance - In No Acute Distress] : no acute distress [Normal Conjunctiva] : the conjunctiva exhibited no abnormalities [Eyelids - No Xanthelasma] : the eyelids demonstrated no xanthelasmas [Normal Oral Mucosa] : normal oral mucosa [No Oral Pallor] : no oral pallor [No Oral Cyanosis] : no oral cyanosis [Normal Jugular Venous A Waves Present] : normal jugular venous A waves present [Normal Jugular Venous V Waves Present] : normal jugular venous V waves present [No Jugular Venous Lim A Waves] : no jugular venous lim A waves [Heart Rate And Rhythm] : heart rate and rhythm were normal [Heart Sounds] : normal S1 and S2 [Murmurs] : no murmurs present [Respiration, Rhythm And Depth] : normal respiratory rhythm and effort [Exaggerated Use Of Accessory Muscles For Inspiration] : no accessory muscle use [Auscultation Breath Sounds / Voice Sounds] : lungs were clear to auscultation bilaterally [Bowel Sounds] : normal bowel sounds [Abdomen Soft] : soft [Abdomen Tenderness] : non-tender [Abdomen Mass (___ Cm)] : no abdominal mass palpated [Nail Clubbing] : no clubbing of the fingernails [Cyanosis, Localized] : no localized cyanosis [Petechial Hemorrhages (___cm)] : no petechial hemorrhages [Skin Color & Pigmentation] : normal skin color and pigmentation [] : no rash [No Venous Stasis] : no venous stasis [Skin Lesions] : no skin lesions [No Skin Ulcers] : no skin ulcer [No Xanthoma] : no  xanthoma was observed [Oriented To Time, Place, And Person] : oriented to person, place, and time [FreeTextEntry1] : wheelchair

## 2024-02-22 NOTE — ED ADULT TRIAGE NOTE - PRO INTERPRETER NEED 2
[facility]   [facility]  [facility]  [address] [address]  [phone]       NAME: Silvio Noel   DATE: 2/22/2024     Your Sleep Provider Today: Iker Michael DO  Your Primary Care Physician: Jose Jernigan DO   Your Referring Provider: Anthony Warren DO    DIAGNOSIS:   1. MARY on CPAP  Referral to Adult Sleep Medicine          Thank you for coming to the Sleep Medicine Clinic today! Your sleep medicine provider today was: Iker Michael DO Below is a summary of your treatment plan, other important information, and our contact numbers:      TREATMENT PLAN     Follow up in 3 months or sooner as needed.     Instructions - Common MARY Recs: - For your sleep apnea, continue to use your PAP every night and use it whenever you are sleeping.   - Avoid alcohol or sedatives several hours prior to sleeping.   - Get additional supplies for your PAP (e.g., mask, hose, filters) every 3 months or as your insurance allows from your [employer] company. Replacement cushions for your PAP mask can be requested monthly if airseals are an issue.  - Remember to clean your mask, tubings, and water chamber regularly as instructed.  - Avoid driving or operating heavy machinery when drowsy. A person driving while sleepy is five (5) times more likely to have an accident. If you feel sleepy, pull over and take a short power nap (sleep for less than 30 minutes). Otherwise, ask somebody to drive you.      IMPORTANT INFORMATION     Call 911 for medical emergencies.  Our offices are generally open from Monday-Friday, 9 am - 5 pm.  If you need to get in touch with me, you may either call me and my team(number is below) or you can use [employer]  If a referral for a test, for CPAP, or for another specialist was made, and you have not heard about scheduling this within a week, please call scheduling at [phone]).  If you are unable to make your appointment for clinic or an  overnight study, kindly call the office at least 48 hours in advance to cancel and reschedule.  If you are on CPAP, please bring your device's card or the device to each clinic appointment.   There are no supporting services by either the sleep doctors or their staff on weekends and Holidays, or after 5 PM on weekdays.   If you have been asked to come to a sleep study, make sure you bring toiletries, a comfy pillow, and any nighttime medications that you may regularly take. Also be sure to eat dinner before you arrive. We generally do not provide meals.      PRESCRIPTIONS     We require 7 days advanced notice for prescription refills. If we do not receive the request in this time, we cannot guarantee that your medication will be refilled in time.      IMPORTANT PHONE NUMBERS     Sleep Medicine Clinic Fax: [phone]  Appointments (for Pediatric Sleep Clinic): [phone] - option 1  Appointments (for Adult Sleep Clinic): [phone] - option 2  Appointments (For Sleep Studies): [phone] - option 3  Behavioral Sleep Medicine: [phone]  Sleep Surgery: [phone]  ENT (Otolaryngology): [phone]  Headache Clinic (Neurology): [phone]  Neurology: [phone]  Psychiatry: [phone]  Pulmonary Function Testing (PFT) Center: [phone]  Pulmonary Medicine: [phone]  [employer] (DME): [phone]  [employer] (DME): [phone]  [facility] ([facility]): [phone]      OUR ADULT SLEEP MEDICINE TEAM   Please do not hesitate to call the office or sleep nurse with any questions between appointments:    Adult Sleep Nurses (Dianne Julian, RN and Tracy Mckenzie RN):  For clinical questions and refilling prescriptions: [phone]  Email sleep diaries and other documents at: [email or web address]    Adult Sleep Medicine Secretaries:  Ly Montoya (For Tonya/Friend/Arina/Jade/Adore/Kulwant):   P: [phone]  F:  [phone]  Sara Delgado (For Llanos/Guggenkaroller): P: [phone]  Fax: [phone]  Natalie Hasgarcia (For Jurcorrie/Blank): P: [phone]  F: [phone]  Natalia Green (For Gladys): P: [phone]  F: [phone]  Lucia Castro (For Kathy/Roxie/Zakhary): P: [phone]  F: [phone]  Tomeka Steel (For Manuel/Lambert): P: [phone]  F: [phone]     Adult Sleep Medicine Advanced Practice Providers:  Rodney Gonzalez ([place], [place])  Sandra Faustin ([facility])  Natalia Romero CNP ([place], [place], [place])  Yue Santa CNP ([place], [place], [place])  Karissa Graham ([place], [place], [place])  Edita Verdin CNP (Critical access hospital)        OUR SLEEP TESTING LOCATIONS     Our team will contact you to schedule your sleep study, however, you can contact us as follow:  Main Phone Line (scheduling only): [phone]), option 3  Adult and Pediatric Locations  [facility] (6 years and older): [facility] - 4th floor ([address] [place] [address]) After hours line: [phone]  [facility] (Main campus: All ages): [facility], 6th floor. After hours line: [phone]   [place] (5 years and older; younger considered on case-by-case basis): [address]; [address], [address]. Scheduling  After hours line: [phone]   [place] (6 years and older): [address]; [address]; [address]   [place] (6 years and older): [address] [facility], [address]  After hours line: [phone]   [place] (13 years and older) in [place]: [address], 2nd floor  After hours line: [phone]   [place] (13 year and older): [address]  After hours line: [phone]     Adult Only Locations:   [place] (18 years and older): [address] [facility], 2nd floor   [place] (18 years and older): [address] [facility]; 4th floor  After hours line: [phone]  [facility]  "(18 years and older) at [place]: [address] [place] [address]  After hours line: [phone]          CONTACTING YOUR SLEEP MEDICINE PROVIDER     Send a message directly to your provider through \"My Chart\", which is the email service through your  Records Account: [email or web address]   Call [phone] and leave a message. One of the administrative assistants will forward the message to your sleep medicine provider through \"My Chart\" and/or email.     Your sleep medicine provider for this visit was: Iker Michael DO        " English

## 2024-03-08 PROBLEM — I47.29 NSVT (NONSUSTAINED VENTRICULAR TACHYCARDIA): Status: ACTIVE | Noted: 2024-01-01

## 2024-04-05 NOTE — H&P ADULT - NS ATTEND AMEND GEN_ALL_CORE FT
Pt presents with diarrhea and abdominal pain with evidence of constipation on imaging.  Incidental findings on CT imaging can be further characterized by non-emergent outpatient imaging.

## 2024-04-05 NOTE — ED PROVIDER NOTE - PROGRESS NOTE DETAILS
Sx PA Víctor aware Ct findings discussed with daughter bedside. Advised of need for out patient imaging. Copies of results given to daughter

## 2024-04-05 NOTE — CONSULT NOTE ADULT - SUBJECTIVE AND OBJECTIVE BOX
85 y/o F PMHx HTN, CVA, PPM BIBA for 1 day h/o abd pain, n/v. Pt's daughter at bedside reports pt c/o generalized intermittent abd pain, n/v, since last night, followed by 1 episode of diarrhea last night.  Pt's daughter states she received a call from pt's aide ~430am, relaying pt c/o persistent abd pain and vomiting, prompting ER eval today.    Unable to obtain further information as pt's aide not available at the time    No known fever at home, chills, chest pain, SOB, h/o known GI chronic conditions      PAST MEDICAL & SURGICAL HISTORY:  Hypertension  Diabetes  CVA (cerebrovascular accident)  Pacemaker        sulfa drugs (Anaphylaxis)  fish (Anaphylaxis)  penicillin (Anaphylaxis)        Vital Signs Last 24 Hrs  T(C): 36.9 (05 Apr 2024 04:26), Max: 36.9 (05 Apr 2024 04:26)  T(F): 98.5 (05 Apr 2024 04:26), Max: 98.5 (05 Apr 2024 04:26)  HR: 95 (05 Apr 2024 12:28) (76 - 95)  BP: 131/72 (05 Apr 2024 12:28) (131/72 - 154/80)  BP(mean): --  RR: 16 (05 Apr 2024 12:28) (16 - 16)  SpO2: 97% (05 Apr 2024 12:28) (95% - 97%)    Parameters below as of 05 Apr 2024 12:28  Patient On (Oxygen Delivery Method): room air        PHYSICAL EXAM:  GENERAL: sitting up in bed, appearing; NAD  HEENT: Moist mucous membranes  NECK: Supple  CHEST/LUNG: even respirations b/l; no accessory muscle use  ABDOMEN: Soft; TTP localized to L hypochrondiac/lumbar regions of abd   SKIN: warm; poor skin turgor     Labs:  CAPILLARY BLOOD GLUCOSE      POCT Blood Glucose.: 152 mg/dL (05 Apr 2024 13:28)                          15.0   8.67  )-----------( 271      ( 05 Apr 2024 05:16 )             44.3       Auto Neutrophil %: 72.8 % (04-05-24 @ 05:16)  Auto Immature Granulocyte %: 0.2 % (04-05-24 @ 05:16)    04-05    146  |  99  |  20  ----------------------------<  112<H>  3.4<L>   |  33<H>  |  1.5      Calcium: 10.7 mg/dL (04-05-24 @ 05:16)      LFTs:             7.9  | 0.5  | 84       ------------------[115     ( 05 Apr 2024 05:16 )  4.4  | x    | 31          Lipase:10     Amylase:x         Lactate, Blood: 0.9 mmol/L (04-05-24 @ 05:16)      Coags:          Urinalysis Basic - ( 05 Apr 2024 05:16 )    Color: x / Appearance: x / SG: x / pH: x  Gluc: 112 mg/dL / Ketone: x  / Bili: x / Urobili: x   Blood: x / Protein: x / Nitrite: x   Leuk Esterase: x / RBC: x / WBC x   Sq Epi: x / Non Sq Epi: x / Bacteria: x        ACC: [id number] EXAM:  CT ABDOMEN AND PELVIS IC   ORDERED BY: MIGUEL JUAREZ     PROCEDURE DATE:  04/05/2024          INTERPRETATION:  CLINICAL STATEMENT: Abdominal pain with nausea vomiting   and diarrhea    TECHNIQUE: Contiguous axial CT images were obtained from the lower chest   to the pubic symphysis .  95 cc administered of Omnipaque 350 (5 cc   discarded).  Oral contrast was not given.  Reformatted images in the   coronal and sagittal planes were acquired.    COMPARISON CT: None.    OTHER STUDIES USED FOR CORRELATION: None.      FINDINGS:    Evaluation somewhat limited due to arms are overlapping the torso   creating beam Fonseca artifact. Study also limited by obliquity.    LOWER CHEST: Pacer wires are seen within the heart. Cardiomegaly.   Scattered areas of atelectasis within the visualized lower lungs..    HEPATOBILIARY: Status post cholecystectomy. The liver is enlarged   measuring 17.3 cm in length..    SPLEEN: Numerous poorly defined enhancing lesions throughout the spleen..    PANCREAS: Atrophic pancreas. Punctate calcifications throughout the   pancreatic parenchyma suggesting sequelae of chronic pancreatitis..    ADRENAL GLANDS: Unremarkable.    KIDNEYS: Both kidneys demonstrate symmetric enhancement with no   hydronephrosis. There is a hyperdense lesion along the posterior aspect   of the superior right kidney measuring under centimeter. Another mildly   complex cystic lesion is seen laterally within the lower pole of the   right kidney..    ABDOMINOPELVIC NODES: Unremarkable.    PELVIC ORGANS: Almost completely collapsed urinary bladder. Atrophic   calcified uterus..    PERITONEUM/MESENTERY/BOWEL: There is presacral edema. Fecal retention   most severe within the rectum. No bowel obstruction..    BONES/SOFT TISSUES: Small fat-containing right inguinal hernia. Scoliosis   with multilevel degenerative changes. Compression fracture of L2 of   uncertain age.. Chronic deformity of the right pubic rami.    OTHER: Atherosclerotic changes of the aorta and itsvessels.      IMPRESSION:    Numerous ill-defined lesions throughout the spleen which could reflect   neoplastic process. Differential diagnosis includes multiple   microabscesses disease. Correlate clinically.    Complex processes involving the right kidney which may reflect complex   cysts. Solid nodules cannot be excluded. Correlate with dedicated CAT   scan of the kidneys with renal mass protocol MRI of the kidneys. This can   be performed on an outpatient basis.    Fecal retention most pronounced within the rectum.    --- End of Report ---         85 y/o F PMHx HTN, CVA, PPM BIBA for 1 day h/o abd pain, n/v. Pt's daughter at bedside reports pt c/o generalized intermittent abd pain, n/v, since last night, followed by 1 episode of diarrhea last night.  Pt's daughter states she received a call from pt's aide ~430am, relaying pt c/o persistent abd pain and vomiting, prompting ER eval today, w/ pt's daughter reporting pt didnt sleep well last night 2/2 nausea and abd pain   Last BM per pt- 2 days ago ( pt's daughter reports pt has h/o constipation)    Unable to obtain further information reg sxs as pt's aide not available at the time    No known fever at home, chills, chest pain, SOB, h/o known GI chronic conditions, unable to maintain PO intake        PAST MEDICAL & SURGICAL HISTORY:  Hypertension  Diabetes  CVA (cerebrovascular accident)  Pacemaker        sulfa drugs (Anaphylaxis)  fish (Anaphylaxis)  penicillin (Anaphylaxis)        Vital Signs Last 24 Hrs  T(C): 36.9 (05 Apr 2024 04:26), Max: 36.9 (05 Apr 2024 04:26)  T(F): 98.5 (05 Apr 2024 04:26), Max: 98.5 (05 Apr 2024 04:26)  HR: 95 (05 Apr 2024 12:28) (76 - 95)  BP: 131/72 (05 Apr 2024 12:28) (131/72 - 154/80)  BP(mean): --  RR: 16 (05 Apr 2024 12:28) (16 - 16)  SpO2: 97% (05 Apr 2024 12:28) (95% - 97%)    Parameters below as of 05 Apr 2024 12:28  Patient On (Oxygen Delivery Method): room air        PHYSICAL EXAM:  GENERAL: sitting up in bed, appearing; NAD  HEENT: Moist mucous membranes  NECK: Supple  CHEST/LUNG: even respirations b/l; no accessory muscle use  ABDOMEN: Soft; TTP localized to L hypochrondiac/lumbar regions of abd   SKIN: warm; poor skin turgor     Labs:  CAPILLARY BLOOD GLUCOSE      POCT Blood Glucose.: 152 mg/dL (05 Apr 2024 13:28)                          15.0   8.67  )-----------( 271      ( 05 Apr 2024 05:16 )             44.3       Auto Neutrophil %: 72.8 % (04-05-24 @ 05:16)  Auto Immature Granulocyte %: 0.2 % (04-05-24 @ 05:16)    04-05    146  |  99  |  20  ----------------------------<  112<H>  3.4<L>   |  33<H>  |  1.5      Calcium: 10.7 mg/dL (04-05-24 @ 05:16)      LFTs:             7.9  | 0.5  | 84       ------------------[115     ( 05 Apr 2024 05:16 )  4.4  | x    | 31          Lipase:10     Amylase:x         Lactate, Blood: 0.9 mmol/L (04-05-24 @ 05:16)      Coags:          Urinalysis Basic - ( 05 Apr 2024 05:16 )    Color: x / Appearance: x / SG: x / pH: x  Gluc: 112 mg/dL / Ketone: x  / Bili: x / Urobili: x   Blood: x / Protein: x / Nitrite: x   Leuk Esterase: x / RBC: x / WBC x   Sq Epi: x / Non Sq Epi: x / Bacteria: x        ACC: [id number] EXAM:  CT ABDOMEN AND PELVIS IC   ORDERED BY: MIGUEL JUAREZ     PROCEDURE DATE:  04/05/2024          INTERPRETATION:  CLINICAL STATEMENT: Abdominal pain with nausea vomiting   and diarrhea    TECHNIQUE: Contiguous axial CT images were obtained from the lower chest   to the pubic symphysis .  95 cc administered of Omnipaque 350 (5 cc   discarded).  Oral contrast was not given.  Reformatted images in the   coronal and sagittal planes were acquired.    COMPARISON CT: None.    OTHER STUDIES USED FOR CORRELATION: None.      FINDINGS:    Evaluation somewhat limited due to arms are overlapping the torso   creating beam Fonseca artifact. Study also limited by obliquity.    LOWER CHEST: Pacer wires are seen within the heart. Cardiomegaly.   Scattered areas of atelectasis within the visualized lower lungs..    HEPATOBILIARY: Status post cholecystectomy. The liver is enlarged   measuring 17.3 cm in length..    SPLEEN: Numerous poorly defined enhancing lesions throughout the spleen..    PANCREAS: Atrophic pancreas. Punctate calcifications throughout the   pancreatic parenchyma suggesting sequelae of chronic pancreatitis..    ADRENAL GLANDS: Unremarkable.    KIDNEYS: Both kidneys demonstrate symmetric enhancement with no   hydronephrosis. There is a hyperdense lesion along the posterior aspect   of the superior right kidney measuring under centimeter. Another mildly   complex cystic lesion is seen laterally within the lower pole of the   right kidney..    ABDOMINOPELVIC NODES: Unremarkable.    PELVIC ORGANS: Almost completely collapsed urinary bladder. Atrophic   calcified uterus..    PERITONEUM/MESENTERY/BOWEL: There is presacral edema. Fecal retention   most severe within the rectum. No bowel obstruction..    BONES/SOFT TISSUES: Small fat-containing right inguinal hernia. Scoliosis   with multilevel degenerative changes. Compression fracture of L2 of   uncertain age.. Chronic deformity of the right pubic rami.    OTHER: Atherosclerotic changes of the aorta and itsvessels.      IMPRESSION:    Numerous ill-defined lesions throughout the spleen which could reflect   neoplastic process. Differential diagnosis includes multiple   microabscesses disease. Correlate clinically.    Complex processes involving the right kidney which may reflect complex   cysts. Solid nodules cannot be excluded. Correlate with dedicated CAT   scan of the kidneys with renal mass protocol MRI of the kidneys. This can   be performed on an outpatient basis.    Fecal retention most pronounced within the rectum.    --- End of Report ---

## 2024-04-05 NOTE — ED PROVIDER NOTE - NSICDXPASTMEDICALHX_GEN_ALL_CORE_FT
PAST MEDICAL HISTORY:  Breast cancer     CAD (coronary artery disease)     CVA (cerebrovascular accident)     Dementia     Diabetes     History of pressure ulcer     Hypertension     Hypothyroidism     Immobility     Pacemaker

## 2024-04-05 NOTE — ED PROVIDER NOTE - EKG/XRAY ADDITIONAL INFORMATION
EKG showed NSR, non-specific st-t abnormalities, no STEMI    Chest xray negative for pneumothorax, pneumonia, widened mediastinum, evidence of rib fractures, enlarged cardiac silhouette or any other emergent pathologies.

## 2024-04-05 NOTE — ED PROVIDER NOTE - NSICDXPASTSURGICALHX_GEN_ALL_CORE_FT
PAST SURGICAL HISTORY:  History of cholecystectomy     Knee joint replacement status, right     Pacemaker     S/P lumpectomy of breast

## 2024-04-05 NOTE — H&P ADULT - ASSESSMENT
1- Abdominal pain  - Start patient on stool softener due to fecal retention seen on CT.  Initiate lactulose if unsucessful   - Monitor vitals, bowel movements  - Discussed case and plan with Dr. Everett     2- Splenic lesions  - Discussed case with Dr. Everett, R/O splenic abscesses vs neoplasms outpatient with GI    3- Dementia  - Continue with zoloft, nortriptyline, and donepezil   - monitor mental status     4- HTN  - Continue amlodipine, metoprolol   - DASH diet    5- Diabetes Mellitus II  - Continue with lantis 30 units at bedtime with sliding scale.   - CHO consistent diet     6- Hypothyroidism  - Continue levothyroxine as prescribed     7- Chronic Pain   - Continue gabapentin and tramadol as prescribed     DVT/VTE prophylaxis   Regular diet     Discussed case, assessment and plan with Dr. Everett         1- Abdominal pain with overflow diarrhea 2' to constipation  - Start patient on stool softener due to fecal retention seen on CT.  Initiate lactulose if unsucessful   - Monitor vitals, bowel movements    2- Splenic lesions  - Incidentally found on imaging, very unlikely the cause of her symptoms, clinically very unlikely infectious in nature  -Outpatient imaging for further characterization    3- Dementia  - Continue with zoloft, nortriptyline, and donepezil   - monitor mental status     4- HTN  - Continue amlodipine, metoprolol   - DASH diet    5- Diabetes Mellitus II  - Continue with lantis 30 units at bedtime with sliding scale.   - CHO consistent diet     6- Hypothyroidism  - Continue levothyroxine as prescribed     7- Chronic Pain   - Continue gabapentin and tramadol as prescribed     DVT/VTE prophylaxis   Regular diet

## 2024-04-05 NOTE — ED PROVIDER NOTE - CLINICAL SUMMARY MEDICAL DECISION MAKING FREE TEXT BOX
Patient presented with acute onset of fever, abdominal pain, nausea/vomiting/diarrhea x 1 day as documented.  Tender on exam but otherwise hemodynamically stable.  Obtained labs which were grossly unremarkable including no significant leukocytosis, anemia, signs of dehydration/JABARI, transaminitis or significant electrolyte abnormalities.  UA negative for infection.  CT of the abdomen and pelvis revealed possible neoplastic process which is new for patient as well as possible micro abscesses as documented.  Given patient's complaint of fever with this as possible source, consulted surgery who evaluated the patient in the ED.  No acute intervention from their standpoint, but recommending IV antibiotics and admission to medicine and they will follow.  Patient remained stable during ED course, however given the above in addition to the fact that patient unable to tolerate p.o. in the ED despite treatment, patient will require admission for further management and IV antibiotics.  Patient agreeable with plan.  Hemodynamically stable at time of admission.

## 2024-04-05 NOTE — CONSULT NOTE ADULT - NSCONSULTADDITIONALINFOA_GEN_ALL_CORE
I have seen and examined the patient.  Agree with above assessment plan.  The patient may have some waxing waning abdominal pain but is difficult to obtain an accurate history from her.  She has a 24/7 home aide.  Imaging is reviewed and demonstrates some ill-defined lesion of the spleen with likely below differential diagnosis.  I agree with heme-onc consult.  No acute surgical intervention at this time.

## 2024-04-05 NOTE — CONSULT NOTE ADULT - ASSESSMENT
83 y/o F PMHx HTN, CVA, PPM, BIBA for 1 day h/o abd pain, n/v. Pt's daughter at bedside reports pt c/o R sided abd pain, n/v, since last night, followed by 1 episode of diarrhea last night, referred to surgery for consult abnormal imaging- CTAP demonstrated findings c/w numerous ill-defined lesions throughout the spleen unable to r/o neoplastic process      Recs  - no surgical intervention warranted at the time  - if medical admission warranted, can consider heme/onc consult given c/f for possible neoplasm ous ill-defined lesion  -medical management per primary team      pt d/w Dr. García 85 y/o F PMHx HTN, CVA, PPM, BIBA for 1 day h/o abd pain, n/v. Pt's daughter at bedside reports pt c/o R sided abd pain, n/v, since last night, followed by 1 episode of diarrhea last night, referred to surgery for consult abnormal imaging- CTAP demonstrated findings c/w numerous ill-defined lesions throughout the spleen unable to r/o neoplastic process      Recs  - no surgical intervention warranted at the time  - if medical admission warranted, would recommend IV abx, bowel regimen for constipation, and can consider heme/onc consult given c/f for possible neoplasm numerous ill-defined lesion of spleen  -medical management per primary team    Surg to sign off please recall PRN  pt d/w Dr. García 83 y/o F PMHx HTN, CVA, PPM, BIBA for 1 day h/o abd pain, n/v. Pt's daughter at bedside reports pt c/o R sided abd pain, n/v, since last night, followed by 1 episode of diarrhea last night, referred to surgery for consult abnormal imaging- CTAP demonstrated findings c/w numerous ill-defined lesions throughout the spleen unable to r/o neoplastic process    In ER, pt afebrile. pt has TTP localized to L hypochrondiac/lumbar regions of abd. Labs demonstrated slight elevation of AST; WBC is 8.7    Recs  - no surgical intervention warranted at the time  - if medical admission warranted, would recommend IV abx, bowel regimen for constipation, and can consider heme/onc consult given c/f for possible neoplasm numerous ill-defined lesion of spleen  -medical management per primary team    Surg to sign off please recall PRN  pt d/w Dr. García 85 y/o F PMHx HTN, CVA, PPM BIBA for 1 day h/o abd pain, n/v. Pt's daughter at bedside reports pt c/o generalized intermittent abd pain, n/v, since last night, followed by 1 episode of diarrhea last night.  Surgery consulted for abnormal imaging- CTAP demonstrated findings c/w numerous ill-defined lesions throughout the spleen unable to r/o neoplastic process    In ER, pt afebrile. pt has TTP localized to L hypochrondiac/lumbar regions of abd. Labs demonstrated slight elevation of AST (84); WBC is 8.7    Recs  - no acute surgical intervention warranted at the time  - if medical admission warranted, would recommend IV abx, bowel regimen for constipation, and can consider heme/onc consult given c/f for possible neoplasm numerous ill-defined lesion of spleen  -medical management per primary team      pt d/w Dr. García

## 2024-04-05 NOTE — ED PROVIDER NOTE - CONSIDERATION OF ADMISSION OBSERVATION
Consideration of Admission/Observation Patient with persistent intractable abdominal pain, (+) new neoplastic process on CT with (+) microabscesses. Will require admission for further management and work up.

## 2024-04-05 NOTE — ED PROVIDER NOTE - ATTENDING APP SHARED VISIT CONTRIBUTION OF CARE
Hx per chart    83 yo F hx of CVA, HTN, HLD, DM II, PPM, dementia BIBEMS after she had multiple episodes of NBNB vomiting at home with associated generalized weakness and abdominal pain. HHA noted fever though afebrile in ED.    VS normal, afebrile. Patient is oriented to person and place, confused about circumstances and medical hx.     has clear lungs, RRR, soft, but diffusely tender abdomen, no rebound or guarding.    Will get labs, UA, imaging and reassess

## 2024-04-05 NOTE — ED PROVIDER NOTE - PHYSICAL EXAMINATION
GENERAL: Well-nourished, Well-developed. NAD.  HEAD: No visible or palpable bumps or hematomas. No ecchymosis behind ears B/L.  ENMT: MMM.   CVS: Normal S1,S2. No murmurs appreciated on auscultation   RESP: No use of accessory muscles. Chest rise symmetrical with good expansion. Lungs clear to auscultation B/L. No wheezing, rales, or rhonchi auscultated.  GI: Normal auscultation of bowel sounds in all 4 quadrants. (+)mild diffuse abd ttp. Soft, Nondistended. No guarding or rebound tenderness. No CVAT B/L.  Skin: Warm, Dry. No rashes or lesions. Good cap refill < 2 sec B/L.  EXT: Radial and pedal pulses present B/L. No calf tenderness or swelling B/L. No palpable cords. No pedal edema B/L.

## 2024-04-05 NOTE — H&P ADULT - HISTORY OF PRESENT ILLNESS
Pt is an 83yo female PMHx significant for dementia, HTN, diabetes mellitus II, 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  Daughter states that the patient's aide reported the pain, nausea, vomiting, fever, and an episode of diarrhea at 4AM, which eventually led to ER visit.  Daughter also states that the patients mental status was significantly altered from baseline, stating that she sounded "out of it" and "more confused then usual" the night before.  No reports of headaches, visual changes, dysuria.    No report of fever in the ER.      CT Scan abdomen/pelvis remarkable for numerous, ill defined lesions within the spleen which may reflect neoplastic process. Also noted to have fecal retention within the rectum.

## 2024-04-05 NOTE — H&P ADULT - NSHPSOCIALHISTORY_GEN_ALL_CORE
.  Patient lives alone at home with 24 hour nursing aides.  Patient is nonambulatory secondary to CVA

## 2024-04-05 NOTE — ED PROVIDER NOTE - OBJECTIVE STATEMENT
84-year-old female brought in by EMS from home for evaluation of nausea, vomiting, diarrhea, abdominal pain and fever today.  Patient aide at home states that patient was having persistent vomiting at that time called 911.  Patient is endorsing nausea upon arrival to ED.  Denying chest pain, shortness of breath or urinary symptoms.

## 2024-04-05 NOTE — H&P ADULT - NSHPPHYSICALEXAM_GEN_ALL_CORE
Vital Signs Last 24 Hrs  T(F): 98.5 (05 Apr 2024 04:26), Max: 98.5 (05 Apr 2024 04:26)  HR: 87 (05 Apr 2024 15:00) (76 - 95)  BP: 123/80 (05 Apr 2024 15:00) (123/80 - 154/80)  RR: 18 (05 Apr 2024 15:00) (16 - 18)  SpO2: 95% (05 Apr 2024 15:00) (95% - 97%)     Physical Examination:   GENERAL: Well-nourished, Well-developed. NAD.  HEAD: No visible or palpable bumps or hematomas. No ecchymosis behind ears B/L.  ENMT: PERRLA; dry mucosa on tongue    CVS: Normal S1,S2. No murmurs appreciated on auscultation   RESP: No use of accessory muscles. Chest rise symmetrical with good expansion. Lungs clear to auscultation B/L. No wheezing, rales, or rhonchi auscultated.  GI: Normal auscultation of bowel sounds in all 4 quadrants. (+)mild diffuse abd ttp. Soft, Nondistended. Tenderness to palpation of the RUQ and epigastric area.  Healed/green ecchymosis on central abdomen (insulin insertion site).  No guarding or rebound to lower quadrants.  ****Rectal exam performed with no significant findings. Negative for stool impaction, stool soft  Skin: Warm, Dry. No rashes or lesions. Good cap refill < 2 sec B/L.  EXT: Radial and pedal pulses present B/L. No calf tenderness or swelling B/L. No palpable cords. No pedal edema B/L.  NEURO: Alert, altered awareness and orientation.  Patient acknowledges shes in hospital but unable to say its name/location.  Patient able to state correct year.  Patient unable to state correct president (answered Barrack Obama)

## 2024-04-05 NOTE — H&P ADULT - NSHPLABSRESULTS_GEN_ALL_CORE
CBC                        15.0   8.67  )-----------( 271      ( 05 Apr 2024 05:16 )             44.3       04-05    146  |  99  |  20  ----------------------------<  112<H>  3.4<L>   |  33<H>  |  1.5    Ca    10.7<H>      05 Apr 2024 05:16  Mg     2.0     04-05    TPro  7.9  /  Alb  4.4  /  TBili  0.5  /  DBili  x   /  AST  84<H>  /  ALT  31  /  AlkPhos  115  04-05          Magnesium: 2.0 mg/dL (04-05-24 @ 15:47)      Urinalysis Basic - ( 05 Apr 2024 05:16 )    Color: x / Appearance: x / SG: x / pH: x  Gluc: 112 mg/dL / Ketone: x  / Bili: x / Urobili: x   Blood: x / Protein: x / Nitrite: x   Leuk Esterase: x / RBC: x / WBC x   Sq Epi: x / Non Sq Epi: x / Bacteria: x      Lactate Trend  04-05 @ 05:16 Lactate:0.9            CT Abdomen and Pelvis w/ IV Cont (04.05.24 @ 08:37)     IMPRESSION:    Numerous ill-defined lesions throughout the spleen which could reflect   neoplastic process. Differential diagnosis includes multiple   microabscesses disease. Correlate clinically.    Complex processes involving the right kidney which may reflect complex   cysts. Solid nodules cannot be excluded. Correlate with dedicated CAT   scan of the kidneys with renal mass protocol MRI of the kidneys. This can   be performed on an outpatient basis.    Fecal retention most pronounced within the rectum.        < Xray Chest 1 View- PORTABLE-Urgent (04.05.24 @ 04:45) >     Impression:    Retrocardiac opacity

## 2024-04-05 NOTE — ED ADULT NURSE NOTE - NSICDXPASTMEDICALHX_GEN_ALL_CORE_FT
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 Physical Exam:  Gen: NAD, calm, cooperative  Neuro: A&Ox4. Follows commands. Speech clear. Affect normal. PERRL, 3 mm bilaterally. Blinks to threat. Bilateral abducens nerve palsy (which I only noted with the left eye yesterday, but was noted earlier today by neurologist and neuro NP) left eye ptosis with increased redness and swelling in the left eye tonight. Face symmetric. Palate symmetric. Tongue midline. Petty spontaneously. Strength 5/5 in UE and LE BL. Negative drift. Bulk and tone normal. No involuntary movements. Gait deferred. Skin: Warm, dry, color appropriate for ethnicity. PLAN:   -Continue aspirin 81 mg q day  -Continue therapeutic Lovenox  -NPO after MN except for meds with sips  -NS 75 ml/hr for IV hydration  - Patient to go down in the am for diagnostic cerebral angiogram   -Patient consented earlier by Dr. Genora Heimlich and Joel Burleson NP, consent on the chart.      Beverly Moscoso NP  Neurocritical Care Nurse Practitioner  [phone] PAST MEDICAL HISTORY:  CVA (cerebrovascular accident)     Diabetes     Hypertension     Pacemaker

## 2024-04-06 NOTE — RAPID RESPONSE TEAM SUMMARY - NSSITUATIONBACKGROUNDRRT_GEN_ALL_CORE
Pt admitted with overflow diarrhea, RRT called for lethargy.  As per RN, after patient was given dextrose for BG of 60, RN found patient drowsy, lethargic, pulse ox checked was in the 40s, NRB placed.    Upon my arrival patient was responding to questions, denied any physical complaints.  NRB removed, O2 saturation remained % on RA.  Pt appeared to be at baseline mental status.  Physical exam unremarkable, however during neuro exam patient seemed to be making jokes, would not move her arms when asked then suddenly waved them up and down and said "here, you see, I'm good".

## 2024-04-06 NOTE — RAPID RESPONSE TEAM SUMMARY - NSOTHERINTERVENTIONSRRT_GEN_ALL_CORE
Will send patient for CT head, get CXR, STAT labs.  Overall impression is brief episode of Hypoglycemia which responded to dextrose.  Will follow labs, imaging.

## 2024-04-06 NOTE — CHART NOTE - NSCHARTNOTEFT_GEN_A_CORE
Hospitalist cross coverage  CC.  AMS  HPI.  Patient is still lethargic, with low POC.  received D50, and was started on D51/2 and POC improved  ABG shows respiratory acidosis  Elevated LFT with worsen renal function  Unable to obtain ROS, or HX          Vital Signs Last 24 Hrs  T(C): 36.4 (04-06-24 @ 20:27), Max: 36.4 (04-06-24 @ 20:27)  T(F): 97.6 (04-06-24 @ 20:27), Max: 97.6 (04-06-24 @ 20:27)  HR: 67 (04-06-24 @ 22:11) (65 - 79)  BP: 124/66 (04-06-24 @ 22:11) (124/65 - 139/61)  BP(mean): --  RR: 18 (04-06-24 @ 22:11) (18 - 18)  SpO2: 98% (04-06-24 @ 22:11) (95% - 98%)        PHYSICAL EXAM-  GENERAL: NAD   HEAD:  Atraumatic, Normocephalic  EYES: EOMI, PERRLA, conjunctiva and sclera clear  NECK: Supple, No JVD, Normal thyroid  NERVOUS SYSTEM:  Lethargic moving all extremities  CHEST/LUNG: + rhonchi with good air entry   HEART: Regular rate and rhythm; No murmurs, rubs, or gallops  ABDOMEN: Soft, Nontender, Nondistended; Bowel sounds present  EXTREMITIES:    No clubbing, cyanosis, or edema  SKIN: No rashes or lesions                                  12.5   7.77  )-----------( 368      ( 06 Apr 2024 18:00 )             37.5     04-06    145  |  102  |  27<H>  ----------------------------<  54<LL>  3.9   |  25  |  2.3<H>    Ca    8.8      06 Apr 2024 21:39  Phos  6.6     04-06  Mg     2.1     04-06    TPro  7.0  /  Alb  4.1  /  TBili  0.9  /  DBili  x   /  AST  286<H>  /  ALT  213<H>  /  AlkPhos  223<H>  04-06          Urinalysis Basic - ( 06 Apr 2024 21:39 )    Color: x / Appearance: x / SG: x / pH: x  Gluc: 54 mg/dL / Ketone: x  / Bili: x / Urobili: x   Blood: x / Protein: x / Nitrite: x   Leuk Esterase: x / RBC: x / WBC x   Sq Epi: x / Non Sq Epi: x / Bacteria: x      PT/INR - ( 06 Apr 2024 18:00 )   PT: 14.70 sec;   INR: 1.29 ratio         PTT - ( 06 Apr 2024 18:00 )  PTT:31.6 sec        MEDICATIONS  (STANDING):  amLODIPine   Tablet 10 milliGRAM(s) Oral daily  aspirin enteric coated 81 milliGRAM(s) Oral daily  atorvastatin 80 milliGRAM(s) Oral at bedtime  clopidogrel Tablet 75 milliGRAM(s) Oral daily  dextrose 10% Bolus 125 milliLiter(s) IV Bolus once  dextrose 5% + sodium chloride 0.45%. 1000 milliLiter(s) (100 mL/Hr) IV Continuous <Continuous>  dextrose 5%. 1000 milliLiter(s) (50 mL/Hr) IV Continuous <Continuous>  dextrose 5%. 1000 milliLiter(s) (100 mL/Hr) IV Continuous <Continuous>  dextrose 50% Injectable 12.5 Gram(s) IV Push once  dextrose 50% Injectable 25 Gram(s) IV Push once  donepezil 10 milliGRAM(s) Oral at bedtime  ferrous    sulfate 325 milliGRAM(s) Oral daily  gabapentin 300 milliGRAM(s) Oral every 12 hours  glucagon  Injectable 1 milliGRAM(s) IntraMuscular once  heparin   Injectable 5000 Unit(s) SubCutaneous every 8 hours  lactulose Syrup 20 Gram(s) Oral three times a day  levoFLOXacin IVPB 500 milliGRAM(s) IV Intermittent every 24 hours  levothyroxine 25 MICROGram(s) Oral daily  metoprolol succinate ER 25 milliGRAM(s) Oral daily  metroNIDAZOLE  IVPB      pantoprazole    Tablet 40 milliGRAM(s) Oral before breakfast  senna 2 Tablet(s) Oral at bedtime  sertraline 50 milliGRAM(s) Oral daily  sertraline 100 milliGRAM(s) Oral daily    MEDICATIONS  (PRN):  dextrose Oral Gel 15 Gram(s) Oral once PRN Blood Glucose LESS THAN 70 milliGRAM(s)/deciliter  ondansetron Injectable 4 milliGRAM(s) IV Push every 4 hours PRN Nausea and/or Vomiting      A/P Patient is 83 yo female with hx of Breast cancer, CAD (coronary artery disease), CVA (cerebrovascular accident), Dementia, Diabetes, History of pressure ulcer, Hypertension, Hypothyroidism, Immobility, Pacemaker, presenting with     AMS-metabolic encephalopathy  Hypoglycemia  PNA on CXR  Acute hypoxic and hypercarbic respiratory failure  CXR shows possible infiltrate pending official read  ABG shows respiratory acidosis  Head CT scan shows no acute process    Start on antibiotic, BIPAP therapy  Continue with neuro check  D5 1/2 NS, Monitor POC  Hold insulin  obtain ammonia level, Trop level  overall prognosis poor   Discussed case with ICU who is evaluating the patient Hospitalist cross coverage  CC.  AMS  HPI.  Patient is still lethargic, with low POC.  received D50, and was started on D51/2 and POC improved  ABG shows respiratory acidosis  Elevated LFT with worsen renal function  Unable to obtain ROS, or HX          Vital Signs Last 24 Hrs  T(C): 36.4 (04-06-24 @ 20:27), Max: 36.4 (04-06-24 @ 20:27)  T(F): 97.6 (04-06-24 @ 20:27), Max: 97.6 (04-06-24 @ 20:27)  HR: 67 (04-06-24 @ 22:11) (65 - 79)  BP: 124/66 (04-06-24 @ 22:11) (124/65 - 139/61)  BP(mean): --  RR: 18 (04-06-24 @ 22:11) (18 - 18)  SpO2: 98% (04-06-24 @ 22:11) (95% - 98%)        PHYSICAL EXAM-  GENERAL: NAD   HEAD:  Atraumatic, Normocephalic  EYES: EOMI, PERRLA, conjunctiva and sclera clear  NECK: Supple, No JVD, Normal thyroid  NERVOUS SYSTEM:  Lethargic moving all extremities  CHEST/LUNG: + rhonchi with good air entry   HEART: Regular rate and rhythm; No murmurs, rubs, or gallops  ABDOMEN: Soft, Nontender, Nondistended; Bowel sounds present  EXTREMITIES:    No clubbing, cyanosis, or edema  SKIN: No rashes or lesions                                  12.5   7.77  )-----------( 368      ( 06 Apr 2024 18:00 )             37.5     04-06    145  |  102  |  27<H>  ----------------------------<  54<LL>  3.9   |  25  |  2.3<H>    Ca    8.8      06 Apr 2024 21:39  Phos  6.6     04-06  Mg     2.1     04-06    TPro  7.0  /  Alb  4.1  /  TBili  0.9  /  DBili  x   /  AST  286<H>  /  ALT  213<H>  /  AlkPhos  223<H>  04-06          Urinalysis Basic - ( 06 Apr 2024 21:39 )    Color: x / Appearance: x / SG: x / pH: x  Gluc: 54 mg/dL / Ketone: x  / Bili: x / Urobili: x   Blood: x / Protein: x / Nitrite: x   Leuk Esterase: x / RBC: x / WBC x   Sq Epi: x / Non Sq Epi: x / Bacteria: x      PT/INR - ( 06 Apr 2024 18:00 )   PT: 14.70 sec;   INR: 1.29 ratio         PTT - ( 06 Apr 2024 18:00 )  PTT:31.6 sec        MEDICATIONS  (STANDING):  amLODIPine   Tablet 10 milliGRAM(s) Oral daily  aspirin enteric coated 81 milliGRAM(s) Oral daily  atorvastatin 80 milliGRAM(s) Oral at bedtime  clopidogrel Tablet 75 milliGRAM(s) Oral daily  dextrose 10% Bolus 125 milliLiter(s) IV Bolus once  dextrose 5% + sodium chloride 0.45%. 1000 milliLiter(s) (100 mL/Hr) IV Continuous <Continuous>  dextrose 5%. 1000 milliLiter(s) (50 mL/Hr) IV Continuous <Continuous>  dextrose 5%. 1000 milliLiter(s) (100 mL/Hr) IV Continuous <Continuous>  dextrose 50% Injectable 12.5 Gram(s) IV Push once  dextrose 50% Injectable 25 Gram(s) IV Push once  donepezil 10 milliGRAM(s) Oral at bedtime  ferrous    sulfate 325 milliGRAM(s) Oral daily  gabapentin 300 milliGRAM(s) Oral every 12 hours  glucagon  Injectable 1 milliGRAM(s) IntraMuscular once  heparin   Injectable 5000 Unit(s) SubCutaneous every 8 hours  lactulose Syrup 20 Gram(s) Oral three times a day  levoFLOXacin IVPB 500 milliGRAM(s) IV Intermittent every 24 hours  levothyroxine 25 MICROGram(s) Oral daily  metoprolol succinate ER 25 milliGRAM(s) Oral daily  metroNIDAZOLE  IVPB      pantoprazole    Tablet 40 milliGRAM(s) Oral before breakfast  senna 2 Tablet(s) Oral at bedtime  sertraline 50 milliGRAM(s) Oral daily  sertraline 100 milliGRAM(s) Oral daily    MEDICATIONS  (PRN):  dextrose Oral Gel 15 Gram(s) Oral once PRN Blood Glucose LESS THAN 70 milliGRAM(s)/deciliter  ondansetron Injectable 4 milliGRAM(s) IV Push every 4 hours PRN Nausea and/or Vomiting      A/P Patient is 85 yo female with hx of Breast cancer, CAD (coronary artery disease), CVA (cerebrovascular accident), Dementia, Diabetes, History of pressure ulcer, Hypertension, Hypothyroidism, Immobility, Pacemaker, presenting with     AMS-metabolic encephalopathy  Hypoglycemia  PNA on CXR  Acute hypoxic and hypercarbic respiratory failure  CXR shows possible infiltrate pending official read  ABG shows respiratory acidosis  ElEvated LFT.  abdm US shows no acute process  JABARI on CKD.  renal US noticed,  PVR volume 34  Head CT scan shows no acute process    Start on antibiotic, BIPAP therapy  Continue with neuro check  D5 1/2 NS, Monitor POC  Hold insulin  obtain ammonia level, Trop level  overall prognosis poor   Code status DNR/DNI as per family  declined by ICU  NPO for now Hospitalist cross coverage  CC.  AMS  HPI.  Patient is still lethargic, with low POC.  received D50, and was started on D51/2 and POC improved  ABG shows respiratory acidosis  Elevated LFT with worsen renal function  Unable to obtain ROS, or HX          Vital Signs Last 24 Hrs  T(C): 36.4 (04-06-24 @ 20:27), Max: 36.4 (04-06-24 @ 20:27)  T(F): 97.6 (04-06-24 @ 20:27), Max: 97.6 (04-06-24 @ 20:27)  HR: 67 (04-06-24 @ 22:11) (65 - 79)  BP: 124/66 (04-06-24 @ 22:11) (124/65 - 139/61)  BP(mean): --  RR: 18 (04-06-24 @ 22:11) (18 - 18)  SpO2: 98% (04-06-24 @ 22:11) (95% - 98%)        PHYSICAL EXAM-  GENERAL: NAD   HEAD:  Atraumatic, Normocephalic  EYES: EOMI, PERRLA, conjunctiva and sclera clear  NECK: Supple, No JVD, Normal thyroid  NERVOUS SYSTEM:  Lethargic moving all extremities  CHEST/LUNG: + rhonchi with good air entry   HEART: Regular rate and rhythm; No murmurs, rubs, or gallops  ABDOMEN: Soft, Nontender, Nondistended; Bowel sounds present  EXTREMITIES:    No clubbing, cyanosis, or edema  SKIN: No rashes or lesions                                  12.5   7.77  )-----------( 368      ( 06 Apr 2024 18:00 )             37.5     04-06    145  |  102  |  27<H>  ----------------------------<  54<LL>  3.9   |  25  |  2.3<H>    Ca    8.8      06 Apr 2024 21:39  Phos  6.6     04-06  Mg     2.1     04-06    TPro  7.0  /  Alb  4.1  /  TBili  0.9  /  DBili  x   /  AST  286<H>  /  ALT  213<H>  /  AlkPhos  223<H>  04-06          Urinalysis Basic - ( 06 Apr 2024 21:39 )    Color: x / Appearance: x / SG: x / pH: x  Gluc: 54 mg/dL / Ketone: x  / Bili: x / Urobili: x   Blood: x / Protein: x / Nitrite: x   Leuk Esterase: x / RBC: x / WBC x   Sq Epi: x / Non Sq Epi: x / Bacteria: x      PT/INR - ( 06 Apr 2024 18:00 )   PT: 14.70 sec;   INR: 1.29 ratio         PTT - ( 06 Apr 2024 18:00 )  PTT:31.6 sec        MEDICATIONS  (STANDING):  amLODIPine   Tablet 10 milliGRAM(s) Oral daily  aspirin enteric coated 81 milliGRAM(s) Oral daily  atorvastatin 80 milliGRAM(s) Oral at bedtime  clopidogrel Tablet 75 milliGRAM(s) Oral daily  dextrose 10% Bolus 125 milliLiter(s) IV Bolus once  dextrose 5% + sodium chloride 0.45%. 1000 milliLiter(s) (100 mL/Hr) IV Continuous <Continuous>  dextrose 5%. 1000 milliLiter(s) (50 mL/Hr) IV Continuous <Continuous>  dextrose 5%. 1000 milliLiter(s) (100 mL/Hr) IV Continuous <Continuous>  dextrose 50% Injectable 12.5 Gram(s) IV Push once  dextrose 50% Injectable 25 Gram(s) IV Push once  donepezil 10 milliGRAM(s) Oral at bedtime  ferrous    sulfate 325 milliGRAM(s) Oral daily  gabapentin 300 milliGRAM(s) Oral every 12 hours  glucagon  Injectable 1 milliGRAM(s) IntraMuscular once  heparin   Injectable 5000 Unit(s) SubCutaneous every 8 hours  lactulose Syrup 20 Gram(s) Oral three times a day  levoFLOXacin IVPB 500 milliGRAM(s) IV Intermittent every 24 hours  levothyroxine 25 MICROGram(s) Oral daily  metoprolol succinate ER 25 milliGRAM(s) Oral daily  metroNIDAZOLE  IVPB      pantoprazole    Tablet 40 milliGRAM(s) Oral before breakfast  senna 2 Tablet(s) Oral at bedtime  sertraline 50 milliGRAM(s) Oral daily  sertraline 100 milliGRAM(s) Oral daily    MEDICATIONS  (PRN):  dextrose Oral Gel 15 Gram(s) Oral once PRN Blood Glucose LESS THAN 70 milliGRAM(s)/deciliter  ondansetron Injectable 4 milliGRAM(s) IV Push every 4 hours PRN Nausea and/or Vomiting      A/P Patient is 85 yo female with hx of Breast cancer, CAD (coronary artery disease), CVA (cerebrovascular accident), Dementia, Diabetes, History of pressure ulcer, Hypertension, Hypothyroidism, Immobility, Pacemaker, presenting with     AMS-metabolic encephalopathy  Hypoglycemia  PNA on CXR  Acute hypoxic and hypercarbic respiratory failure  CXR shows possible infiltrate pending official read  ABG shows respiratory acidosis  ElEvated LFT.  abdm US shows no acute process  JABARI on CKD.  renal US noticed,  PVR volume 34  Head CT scan shows no acute process    Start on antibiotic, BIPAP therapy  Continue with neuro check  D5 1/2 NS, Monitor POC  Hold insulin  obtain ammonia level, Trop level  overall prognosis poor   Code status DNR/DNI as per family  declined by ICU  NPO for now              Repeat ABG shows respiratory acidosis.  will change setting on the BIPAP.  will repeat ABG later

## 2024-04-06 NOTE — PROGRESS NOTE ADULT - ASSESSMENT
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       1- Abdominal pain with overflow diarrhea 2' to constipation  - Start patient on stool softener due to fecal retention seen on CT.  Initiate lactulose if unsucessfull   - Monitor vitals, bowel movements    2- Splenic lesions  - Incidentally found on imaging, very unlikely the cause of her symptoms, clinically very unlikely infectious in nature  -Outpatient imaging for further characterization    3- Dementia  - Continue with zoloft, nortriptyline, and donepezil   - monitor mental status     4- HTN  - Continue amlodipine, metoprolol   - DASH diet    5- Diabetes Mellitus II  - Continue with lantis 30 units at bedtime with sliding scale.   - CHO consistent diet     6- Hypothyroidism  - Continue levothyroxine as prescribed     7- Chronic Pain   - Continue gabapentin and tramadol as prescribed     DVT/VTE prophylaxis   Regular diet

## 2024-04-06 NOTE — PROGRESS NOTE ADULT - SUBJECTIVE AND OBJECTIVE BOX
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     Today:  Seen at bedside, no new complaints, no BM today.      REVIEW OF SYSTEMS:  Mild abdominal pain      MEDICATIONS  (STANDING):  amLODIPine   Tablet 10 milliGRAM(s) Oral daily  aspirin enteric coated 81 milliGRAM(s) Oral daily  atorvastatin 80 milliGRAM(s) Oral at bedtime  clopidogrel Tablet 75 milliGRAM(s) Oral daily  dextrose 10% Bolus 125 milliLiter(s) IV Bolus once  dextrose 5%. 1000 milliLiter(s) (50 mL/Hr) IV Continuous <Continuous>  dextrose 5%. 1000 milliLiter(s) (100 mL/Hr) IV Continuous <Continuous>  dextrose 50% Injectable 12.5 Gram(s) IV Push once  dextrose 50% Injectable 25 Gram(s) IV Push once  donepezil 10 milliGRAM(s) Oral at bedtime  ferrous    sulfate 325 milliGRAM(s) Oral daily  furosemide    Tablet 20 milliGRAM(s) Oral daily  gabapentin 300 milliGRAM(s) Oral every 12 hours  glucagon  Injectable 1 milliGRAM(s) IntraMuscular once  heparin   Injectable 5000 Unit(s) SubCutaneous every 8 hours  insulin glargine Injectable (LANTUS) 30 Unit(s) SubCutaneous at bedtime  insulin lispro (ADMELOG) corrective regimen sliding scale   SubCutaneous three times a day before meals  lactated ringers. 1000 milliLiter(s) (80 mL/Hr) IV Continuous <Continuous>  levothyroxine 25 MICROGram(s) Oral daily  metoprolol succinate ER 25 milliGRAM(s) Oral daily  nortriptyline 25 milliGRAM(s) Oral at bedtime  pantoprazole    Tablet 40 milliGRAM(s) Oral before breakfast  senna 2 Tablet(s) Oral at bedtime  sertraline 50 milliGRAM(s) Oral daily  sertraline 100 milliGRAM(s) Oral daily  traMADol 100 milliGRAM(s) Oral daily    MEDICATIONS  (PRN):  dextrose Oral Gel 15 Gram(s) Oral once PRN Blood Glucose LESS THAN 70 milliGRAM(s)/deciliter  ondansetron Injectable 4 milliGRAM(s) IV Push every 4 hours PRN Nausea and/or Vomiting      Allergies  sulfa drugs (Anaphylaxis)  fish (Anaphylaxis)  penicillin (Anaphylaxis)        FAMILY HISTORY:  FHx: diabetes mellitus (Mother)        Vital Signs Last 24 Hrs  T(C): 36.2 (06 Apr 2024 04:50), Max: 36.3 (05 Apr 2024 20:19)  T(F): 97.2 (06 Apr 2024 04:50), Max: 97.4 (05 Apr 2024 20:19)  HR: 79 (06 Apr 2024 04:50) (79 - 95)  BP: 128/81 (06 Apr 2024 04:50) (120/64 - 131/72)  RR: 18 (06 Apr 2024 04:50) (16 - 18)  SpO2: 95% (06 Apr 2024 04:50) (93% - 97%)    Parameters below as of 06 Apr 2024 04:50  Patient On (Oxygen Delivery Method): room air        PHYSICAL EXAM:   GENERAL: Well-nourished, Well-developed. NAD.  HEAD: No visible or palpable bumps or hematomas. No ecchymosis behind ears B/L.  ENMT: PERRLA; dry mucosa on tongue    CVS: Normal S1,S2. No murmurs appreciated on auscultation   RESP: No use of accessory muscles. Chest rise symmetrical with good expansion. Lungs clear to auscultation B/L. No wheezing, rales, or rhonchi auscultated.  GI: Normal auscultation of bowel sounds in all 4 quadrants. (+)mild diffuse abd ttp. Soft, Nondistended. Tenderness to palpation of the RUQ and epigastric area.  Healed/green ecchymosis on central abdomen (insulin insertion site).  No guarding or rebound to lower quadrants.   Skin: Warm, Dry. No rashes or lesions. Good cap refill < 2 sec B/L.  EXT: Radial and pedal pulses present B/L. No calf tenderness or swelling B/L. No palpable cords. No pedal edema B/L.  NEURO: Alert, altered awareness and orientation.      LABS:                        12.3   8.96  )-----------( 239      ( 06 Apr 2024 09:06 )             37.8     04-06    149<H>  |  107  |  26<H>  ----------------------------<  129<H>  3.3<L>   |  29  |  1.7<H>    Ca    9.3      06 Apr 2024 09:06  Mg     2.0     04-05    TPro  7.9  /  Alb  4.4  /  TBili  0.5  /  DBili  x   /  AST  84<H>  /  ALT  31  /  AlkPhos  115  04-05      Urinalysis Basic - ( 06 Apr 2024 09:06 )    Color: x / Appearance: x / SG: x / pH: x  Gluc: 129 mg/dL / Ketone: x  / Bili: x / Urobili: x   Blood: x / Protein: x / Nitrite: x   Leuk Esterase: x / RBC: x / WBC x   Sq Epi: x / Non Sq Epi: x / Bacteria: x

## 2024-04-06 NOTE — CHART NOTE - NSCHARTNOTEFT_GEN_A_CORE
f.s - 35  pt was seen in bed, alert, asymptomatic  iv placed  dextrose pushed  rpt f.s - 176  k-3.3  replete   rpt labs   monitor vss  monitor pt  case d/w attending fConys - 35  pt was seen in bed, alert, asymptomatic  iv placed  dextrose pushed  rpt f.s - 176    LABS:             12.3   8.96  )-----------( 239      ( 06 Apr 2024 09:06 )             37.8   04-06  149<H>  |  107  |  26<H>  ----------------------------<  129<H>  3.3<L>   |  29  |  1.7<H>  Ca    9.3      06 Apr 2024 09:06  Mg     2.0     04-05  TPro  7.9  /  Alb  4.4  /  TBili  0.5  /  DBili  x   /  AST  84<H>  /  ALT  31  /  AlkPhos  115  04-05     Magnesium: 2.0 mg/dL (04-05-24 @ 15:47)    #Electrolytes abnormality  iv hydration - nls changed  replete k  rpt labs   monitor vss  monitor pt  case d/w attending

## 2024-04-07 NOTE — CHART NOTE - NSCHARTNOTEFT_GEN_A_CORE
Called by Hospitalist as pt is more lethargic, and ABG shows hypercapnic respiratory failure w respiratory acidosis.  Pt also found to be hypoglycemic which was treated w d5 1/2ns and blood sugar improved.  Pt is lethargic/obtunded and the Pt's son Jamir was called to update him on her change.  It was relayed to him that she would likely need intubation, he contacted his sister and they made her DNR/DNI and no intubation at this time.  Dr. Green witnessed this via telephone. MADYSON filled out. Dr. green to place the order.

## 2024-04-07 NOTE — CHART NOTE - NSCHARTNOTEFT_GEN_A_CORE
#Consulted for interrogation of PPM    -Pt has Scic[employer] PPM. Interrogated device at bedside, and per report, no recent events, and device is working normally and capturing

## 2024-04-07 NOTE — SWALLOW BEDSIDE ASSESSMENT ADULT - ORAL PREPARATORY PHASE
Anterior loss of bolus/Decreased mastication ability poor labial coordination/Decreased mastication ability

## 2024-04-07 NOTE — PROGRESS NOTE ADULT - SUBJECTIVE AND OBJECTIVE BOX
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     Today:  Seen at bedside, AAO x 3, no specific complaints, laughing and saying "I'm fine, get me outta here this is the undertaker".  Denies chest pain, SOB, palpitations, diaphoresis, cough, diarrhea.            REVIEW OF SYSTEMS:  No complaints      MEDICATIONS  (STANDING):  amLODIPine   Tablet 10 milliGRAM(s) Oral daily  aspirin enteric coated 81 milliGRAM(s) Oral daily  atorvastatin 80 milliGRAM(s) Oral at bedtime  clopidogrel Tablet 75 milliGRAM(s) Oral daily  dextrose 10% Bolus 125 milliLiter(s) IV Bolus once  dextrose 5%. 1000 milliLiter(s) (50 mL/Hr) IV Continuous <Continuous>  dextrose 5%. 1000 milliLiter(s) (100 mL/Hr) IV Continuous <Continuous>  dextrose 50% Injectable 12.5 Gram(s) IV Push once  dextrose 50% Injectable 25 Gram(s) IV Push once  donepezil 10 milliGRAM(s) Oral at bedtime  ferrous    sulfate 325 milliGRAM(s) Oral daily  gabapentin 300 milliGRAM(s) Oral every 12 hours  glucagon  Injectable 1 milliGRAM(s) IntraMuscular once  heparin   Injectable 5000 Unit(s) SubCutaneous every 8 hours  lactulose Syrup 20 Gram(s) Oral three times a day  levoFLOXacin IVPB 500 milliGRAM(s) IV Intermittent every 24 hours  levothyroxine 25 MICROGram(s) Oral daily  metoprolol succinate ER 25 milliGRAM(s) Oral daily  metroNIDAZOLE  IVPB      metroNIDAZOLE  IVPB 500 milliGRAM(s) IV Intermittent every 8 hours  pantoprazole    Tablet 40 milliGRAM(s) Oral before breakfast  senna 2 Tablet(s) Oral at bedtime  sertraline 50 milliGRAM(s) Oral daily  sertraline 100 milliGRAM(s) Oral daily    MEDICATIONS  (PRN):  dextrose Oral Gel 15 Gram(s) Oral once PRN Blood Glucose LESS THAN 70 milliGRAM(s)/deciliter  ondansetron Injectable 4 milliGRAM(s) IV Push every 4 hours PRN Nausea and/or Vomiting      Allergies  sulfa drugs (Anaphylaxis)  fish (Anaphylaxis)  penicillin (Anaphylaxis)        FAMILY HISTORY:  FHx: diabetes mellitus (Mother)        Vital Signs Last 24 Hrs  T(C): 36.6 (07 Apr 2024 05:48), Max: 36.6 (07 Apr 2024 05:48)  T(F): 97.9 (07 Apr 2024 05:48), Max: 97.9 (07 Apr 2024 05:48)  HR: 67 (07 Apr 2024 05:48) (65 - 67)  BP: 144/66 (07 Apr 2024 05:48) (124/65 - 144/66)  RR: 18 (07 Apr 2024 08:53) (18 - 18)  SpO2: 95% (07 Apr 2024 08:53) (95% - 98%)    Parameters below as of 07 Apr 2024 08:53  Patient On (Oxygen Delivery Method): nasal cannula  O2 Flow (L/min): 4      PHYSICAL EXAM:   GENERAL: Well-nourished, Well-developed. NAD.  HEAD: No visible or palpable bumps or hematomas. No ecchymosis behind ears B/L.  ENMT: PERRLA; dry mucosa on tongue    CVS: Normal S1,S2. No murmurs appreciated on auscultation   RESP: No use of accessory muscles. Chest rise symmetrical with good expansion. Lungs clear to auscultation B/L. No wheezing, rales, or rhonchi auscultated.  GI: Normal auscultation of bowel sounds in all 4 quadrants. (+)mild diffuse abd ttp. Soft, Nondistended. Tenderness to palpation of the RUQ and epigastric area.  Healed/green ecchymosis on central abdomen (insulin insertion site).  No guarding or rebound to lower quadrants.   Skin: Warm, Dry. No rashes or lesions. Good cap refill < 2 sec B/L.  EXT: Radial and pedal pulses present B/L. No calf tenderness or swelling B/L. No palpable cords. No pedal edema B/L.  NEURO: Alert, altered awareness and orientation.      LABS:                        12.2   10.87 )-----------( 229      ( 07 Apr 2024 09:21 )             37.6     04-07    138  |  104  |  26<H>  ----------------------------<  308<H>  4.8   |  18  |  2.5<H>    Ca    7.1<L>      07 Apr 2024 09:21  Phos  6.6     04-06  Mg     2.1     04-06    TPro  5.4<L>  /  Alb  3.3<L>  /  TBili  0.5  /  DBili  x   /  AST  169<H>  /  ALT  146<H>  /  AlkPhos  185<H>  04-07    PT/INR - ( 06 Apr 2024 18:00 )   PT: 14.70 sec;   INR: 1.29 ratio         PTT - ( 06 Apr 2024 18:00 )  PTT:31.6 sec  Urinalysis Basic - ( 07 Apr 2024 09:21 )    Color: x / Appearance: x / SG: x / pH: x  Gluc: 308 mg/dL / Ketone: x  / Bili: x / Urobili: x   Blood: x / Protein: x / Nitrite: x   Leuk Esterase: x / RBC: x / WBC x   Sq Epi: x / Non Sq Epi: x / Bacteria: x

## 2024-04-07 NOTE — SWALLOW BEDSIDE ASSESSMENT ADULT - COMMENTS
Per RN pt was a rapid response yesterday 4/6/24. CXR 4/7 Impression: Cardiomegaly. Unchanged. Mild oral dysphagia characterized by anterior loss, decreased mastication ability and anterior-posterior movement, and delayed oral transit time for soft solids. Overt s/s of cough and throat clear post oral intake.

## 2024-04-07 NOTE — PROGRESS NOTE ADULT - ASSESSMENT
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       Acute Hypoxic resp Failure-possibly 2' to undiagnosed WENDY:  - Unclear etiology at this time  -CXR clear, No acute process, shows cardiomegaly  - Pulmonary consult, Bipap as needed  - Supplemental O2 as needed  - Check procal  -Check d-dimer (duplex neg for DVT, cannot get CTA chest due to renal fxn)  -Follow infectious work up, blood Cx, UCx  -Check TTE  -Continue empiric abx for now  - Discussed with family, HHA have been reporting she "never sleeps and wakes up frequently at night"    Onesimo:  -Continue fluids, monitor BMP  -US unremarkable    Transaminitis:  -Resolving, unclear etiology, US unremarkable  -Check hepatitis, GGT, inr    Elevated troponin:  -Likely 2' to demand, PPM interrogated, no events  -Follow TTE     Abdominal pain with overflow diarrhea 2' to constipation  - Start patient on stool softener, lactulose due to fecal retention seen on CT  - Monitor vitals, bowel movements  -GI consult if no relief    Splenic lesions  - Incidentally found on imaging, very unlikely the cause of her symptoms, clinically very unlikely infectious in nature  -Outpatient imaging for further characterization    Dementia  - Continue with zoloft, nortriptyline, and donepezil   - monitor mental status     HTN  - Continue amlodipine, metoprolol   - DASH diet    Diabetes Mellitus II  - CHO consistent diet  -Hold insulin for episodes of Hypoglycemia, restart PRN     Hypothyroidism  - Continue levothyroxine as prescribed     Chronic Pain   - Continue gabapentin and tramadol as prescribed     DVT/VTE prophylaxis   Minced and moist diet per SLP, advance as per re-eval

## 2024-04-08 NOTE — PROGRESS NOTE ADULT - SUBJECTIVE AND OBJECTIVE BOX
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     Today:  Seen at bedside, no new complaints.  Seems confused at times but AAO x 3 and makes what seem to be sarcastic jokes when re-directed.        REVIEW OF SYSTEMS:  No new complaints.      MEDICATIONS  (STANDING):  amLODIPine   Tablet 10 milliGRAM(s) Oral daily  aspirin enteric coated 81 milliGRAM(s) Oral daily  atorvastatin 80 milliGRAM(s) Oral at bedtime  clopidogrel Tablet 75 milliGRAM(s) Oral daily  dextrose 10% Bolus 125 milliLiter(s) IV Bolus once  dextrose 5%. 1000 milliLiter(s) (50 mL/Hr) IV Continuous <Continuous>  dextrose 5%. 1000 milliLiter(s) (100 mL/Hr) IV Continuous <Continuous>  dextrose 50% Injectable 12.5 Gram(s) IV Push once  dextrose 50% Injectable 25 Gram(s) IV Push once  donepezil 10 milliGRAM(s) Oral at bedtime  ferrous    sulfate 325 milliGRAM(s) Oral daily  gabapentin 300 milliGRAM(s) Oral every 12 hours  glucagon  Injectable 1 milliGRAM(s) IntraMuscular once  heparin   Injectable 5000 Unit(s) SubCutaneous every 8 hours  lactulose Syrup 20 Gram(s) Oral three times a day  levoFLOXacin IVPB 500 milliGRAM(s) IV Intermittent every 24 hours  levothyroxine 25 MICROGram(s) Oral daily  metoprolol succinate ER 25 milliGRAM(s) Oral daily  metroNIDAZOLE  IVPB 500 milliGRAM(s) IV Intermittent every 8 hours  metroNIDAZOLE  IVPB      pantoprazole    Tablet 40 milliGRAM(s) Oral before breakfast  senna 2 Tablet(s) Oral at bedtime  sertraline 50 milliGRAM(s) Oral daily  sertraline 100 milliGRAM(s) Oral daily  sodium chloride 0.9%. 1000 milliLiter(s) (80 mL/Hr) IV Continuous <Continuous>    MEDICATIONS  (PRN):  albuterol/ipratropium for Nebulization 3 milliLiter(s) Nebulizer every 6 hours PRN Shortness of Breath and/or Wheezing  dextrose Oral Gel 15 Gram(s) Oral once PRN Blood Glucose LESS THAN 70 milliGRAM(s)/deciliter  ondansetron Injectable 4 milliGRAM(s) IV Push every 4 hours PRN Nausea and/or Vomiting      Allergies  sulfa drugs (Anaphylaxis)  fish (Anaphylaxis)  penicillin (Anaphylaxis)        FAMILY HISTORY:  FHx: diabetes mellitus (Mother)        Vital Signs Last 24 Hrs  T(C): 36.2 (08 Apr 2024 04:46), Max: 36.9 (07 Apr 2024 14:58)  T(F): 97.2 (08 Apr 2024 04:46), Max: 98.5 (07 Apr 2024 14:58)  HR: 77 (08 Apr 2024 04:46) (75 - 78)  BP: 104/51 (08 Apr 2024 04:46) (104/51 - 111/69)  RR: 16 (08 Apr 2024 04:46) (16 - 16)  SpO2: 100% (08 Apr 2024 04:46) (99% - 100%)    Parameters below as of 08 Apr 2024 04:46  Patient On (Oxygen Delivery Method): BiPAP/CPAP        PHYSICAL EXAM:   GENERAL: Well-nourished, Well-developed. NAD.  HEAD: No visible or palpable bumps or hematomas. No ecchymosis behind ears B/L.  ENMT: PERRLA; dry mucosa on tongue    CVS: Normal S1,S2. No murmurs appreciated on auscultation   RESP: No use of accessory muscles. Chest rise symmetrical with good expansion. Lungs clear to auscultation B/L. No wheezing, rales, or rhonchi auscultated.  GI: Normal auscultation of bowel sounds in all 4 quadrants. (+)mild diffuse abd ttp. Soft, Nondistended.  Skin: Warm, Dry. No rashes or lesions. Good cap refill < 2 sec B/L.  EXT: Radial and pedal pulses present B/L. No calf tenderness or swelling B/L. No palpable cords. No pedal edema B/L.  NEURO: AAO x 3      LABS:                        11.8   9.34  )-----------( 205      ( 08 Apr 2024 06:59 )             38.1     04-08    141  |  100  |  37<H>  ----------------------------<  117<H>  4.3   |  24  |  3.7<H>    Ca    7.8<L>      08 Apr 2024 06:59  Phos  7.3     04-08  Mg     1.8     04-08    TPro  5.9<L>  /  Alb  3.8  /  TBili  0.4  /  DBili  0.3  /  AST  80<H>  /  ALT  113<H>  /  AlkPhos  217<H>  04-08    PT/INR - ( 08 Apr 2024 06:59 )   PT: 14.60 sec;   INR: 1.28 ratio         PTT - ( 08 Apr 2024 06:59 )  PTT:37.2 sec  Urinalysis Basic - ( 08 Apr 2024 06:59 )    Color: x / Appearance: x / SG: x / pH: x  Gluc: 117 mg/dL / Ketone: x  / Bili: x / Urobili: x   Blood: x / Protein: x / Nitrite: x   Leuk Esterase: x / RBC: x / WBC x   Sq Epi: x / Non Sq Epi: x / Bacteria: x

## 2024-04-08 NOTE — CONSULT NOTE ADULT - SUBJECTIVE AND OBJECTIVE BOX
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  Daughter states that the patient's aide reported the pain, nausea, vomiting, fever, and an episode of diarrhea at 4AM, which eventually led to ER visit.  Daughter also states that the patients mental status was significantly altered from baseline, stating that she sounded "out of it" and "more confused then usual" the night before.  No reports of headaches, visual changes, dysuria.    No report of fever in the ER.      CT Scan abdomen/pelvis remarkable for numerous, ill defined lesions within the spleen which may reflect neoplastic process. Also noted to have fecal retention within the rectum. (05 Apr 2024 15:23)         I reviewed the radiology tests and hospital record including the ED chart.    I reviewed the other consultants comments that are available in the chart.    CC/ HPI Patient is a 84y old  Female who presents with a chief complaint of abdominal pain (07 Apr 2024 14:45)      Abdominal pain    DNI: Trial NIV    FHx: diabetes mellitus (Mother)      No pertinent past medical history    Hypertension    Diabetes    CVA (cerebrovascular accident)    Pacemaker    Breast cancer    History of pressure ulcer    Dementia    Hypothyroid    Hypothyroidism    CAD (coronary artery disease)    Immobility    Abdominal pain    Pacemaker    H/O lumpectomy    S/P lumpectomy of breast    Knee joint replacement status, right    History of cholecystectomy    ABDOMINAL PAIN        FAMILY HISTORY:  FHx: diabetes mellitus (Mother)        sulfa drugs (Anaphylaxis)  fish (Anaphylaxis)  penicillin (Anaphylaxis)      Soc:  see Hpi.    Home prescriptions  amLODIPine 10 mg oral tablet: 1 tab(s) orally once a day  atorvastatin 80 mg oral tablet: 1 tab(s) orally once a day (at bedtime)  clopidogrel 75 mg oral tablet: 1 tab(s) orally once a day  donepezil 10 mg oral tablet: 1 tab(s) orally once a day (at bedtime)  Ecotrin Adult Low Strength 81 mg oral delayed release tablet: 2 tab(s) orally every other day  exemestane 25 mg oral tablet: 1 tab(s) orally once a day  ferrous sulfate:   furosemide 20 mg oral tablet: 1 tab(s) orally  gabapentin 300 mg oral capsule: 1 cap(s) orally 2 times a day  humalog sliding scale: with meals  Levemir 100 units/mL subcutaneous solution: 30 unit(s) subcutaneous once a day (at bedtime)  levothyroxine 25 mcg (0.025 mg) oral tablet: 1 tab(s) orally once a day  metoprolol succinate 25 mg oral capsule, extended release: 1 cap(s) orally once a day  nortriptyline 25 mg oral capsule: 1 cap(s) orally once a day (at bedtime)  Protonix 40 mg oral delayed release tablet: 1 tab(s) orally once a day  sertraline 50 mg oral tablet: 1 tab(s) orally once a day  stool softener: at night  traMADol 100 mg oral tablet: 1 tab(s) orally once a day      MEDICATIONS  (STANDING):  amLODIPine   Tablet 10 milliGRAM(s) Oral daily  aspirin enteric coated 81 milliGRAM(s) Oral daily  atorvastatin 80 milliGRAM(s) Oral at bedtime  clopidogrel Tablet 75 milliGRAM(s) Oral daily  dextrose 10% Bolus 125 milliLiter(s) IV Bolus once  dextrose 5%. 1000 milliLiter(s) (100 mL/Hr) IV Continuous <Continuous>  dextrose 5%. 1000 milliLiter(s) (50 mL/Hr) IV Continuous <Continuous>  dextrose 50% Injectable 12.5 Gram(s) IV Push once  dextrose 50% Injectable 25 Gram(s) IV Push once  donepezil 10 milliGRAM(s) Oral at bedtime  ferrous    sulfate 325 milliGRAM(s) Oral daily  gabapentin 300 milliGRAM(s) Oral every 12 hours  glucagon  Injectable 1 milliGRAM(s) IntraMuscular once  heparin   Injectable 5000 Unit(s) SubCutaneous every 8 hours  lactulose Syrup 20 Gram(s) Oral three times a day  levoFLOXacin IVPB 500 milliGRAM(s) IV Intermittent every 24 hours  levothyroxine 25 MICROGram(s) Oral daily  metoprolol succinate ER 25 milliGRAM(s) Oral daily  metroNIDAZOLE  IVPB      metroNIDAZOLE  IVPB 500 milliGRAM(s) IV Intermittent every 8 hours  pantoprazole    Tablet 40 milliGRAM(s) Oral before breakfast  senna 2 Tablet(s) Oral at bedtime  sertraline 100 milliGRAM(s) Oral daily  sertraline 50 milliGRAM(s) Oral daily  sodium chloride 0.9%. 1000 milliLiter(s) (80 mL/Hr) IV Continuous <Continuous>    MEDICATIONS  (PRN):  dextrose Oral Gel 15 Gram(s) Oral once PRN Blood Glucose LESS THAN 70 milliGRAM(s)/deciliter  ondansetron Injectable 4 milliGRAM(s) IV Push every 4 hours PRN Nausea and/or Vomiting      sodium chloride 0.9%.: Solution, 1000 milliLiter(s) infuse at 80 mL/Hr  Provider's Contact #: [phone]  dextrose 5% + sodium chloride 0.45%.: Solution, 1000 milliLiter(s) infuse at 100 mL/Hr  Provider's Contact #: [phone]  lactated ringers.: Solution, 1000 milliLiter(s) infuse at 125 mL/Hr  Provider's Contact #: [phone]  lactated ringers.: Solution, 1000 milliLiter(s) infuse at 80 mL/Hr  Provider's Contact #: [phone]  sodium chloride 0.9%.: Solution, 1000 milliLiter(s) infuse at 80 mL/Hr  Provider's Contact #: [phone]  sodium chloride 0.9% Bolus:   1000 milliLiter(s), IV Bolus, once, infuse over 1 Hour(s), Stop After 1 Doses      T(C): 36.2 (04-08-24 @ 04:46), Max: 36.9 (04-07-24 @ 14:58)  HR: 77 (04-08-24 @ 04:46) (75 - 78)  BP: 104/51 (04-08-24 @ 04:46) (104/51 - 111/69)  RR: 16 (04-08-24 @ 04:46) (16 - 18)  SpO2: 100% (04-08-24 @ 04:46) (95% - 100%)  ICU Vital Signs Last 24 Hrs  T(C): 36.2 (08 Apr 2024 04:46), Max: 36.9 (07 Apr 2024 14:58)  T(F): 97.2 (08 Apr 2024 04:46), Max: 98.5 (07 Apr 2024 14:58)  HR: 77 (08 Apr 2024 04:46) (75 - 78)  BP: 104/51 (08 Apr 2024 04:46) (104/51 - 111/69)    RR: 16 (08 Apr 2024 04:46) (16 - 18)  SpO2: 100% (08 Apr 2024 04:46) (95% - 100%)    O2 Parameters below as of 08 Apr 2024 04:46  Patient On (Oxygen Delivery Method): BiPAP/CPAP            I&O's Summary    06 Apr 2024 07:01  -  07 Apr 2024 07:00  --------------------------------------------------------  IN: 0 mL / OUT: 50 mL / NET: -50 mL        I&O's Detail    06 Apr 2024 07:01  -  07 Apr 2024 07:00  --------------------------------------------------------  IN:  Total IN: 0 mL    OUT:    Voided (mL): 50 mL  Total OUT: 50 mL    Total NET: -50 mL          Drug Dosing Weight  Height (cm): 152.4 (07 Apr 2024 14:58)  Weight (kg): 73.4 (07 Apr 2024 14:58)  BMI (kg/m2): 31.6 (07 Apr 2024 14:58)  BSA (m2): 1.71 (07 Apr 2024 14:58)    LABS:                          12.2   10.87 )-----------( 229      ( 07 Apr 2024 09:21 )             37.6       04-07    138  |  104  |  26<H>  ----------------------------<  308<H>  4.8   |  18  |  2.5<H>    Ca    7.1<L>      07 Apr 2024 09:21  Phos  6.6     04-06  Mg     2.1     04-06    TPro  5.4<L>  /  Alb  3.3<L>  /  TBili  0.5  /  DBili  x   /  AST  169<H>  /  ALT  146<H>  /  AlkPhos  185<H>  04-07      LIVER FUNCTIONS - ( 07 Apr 2024 09:21 )  Alb: 3.3 g/dL / Pro: 5.4 g/dL / ALK PHOS: 185 U/L / ALT: 146 U/L / AST: 169 U/L / GGT: x             Urinalysis Basic - ( 07 Apr 2024 09:21 )    Color: x / Appearance: x / SG: x / pH: x  Gluc: 308 mg/dL / Ketone: x  / Bili: x / Urobili: x   Blood: x / Protein: x / Nitrite: x   Leuk Esterase: x / RBC: x / WBC x   Sq Epi: x / Non Sq Epi: x / Bacteria: x      levoFLOXacin IVPB 500 milliGRAM(s) IV Intermittent every 24 hours  metroNIDAZOLE  IVPB      metroNIDAZOLE  IVPB 500 milliGRAM(s) IV Intermittent every 8 hours      ABG - ( 07 Apr 2024 06:41 )  pH, Arterial: 7.26  pH, Blood: x     /  pCO2: 72    /  pO2: 40    / HCO3: 32    / Base Excess: 3.0   /  SaO2: 73.4          RADIOLOGY:  I have personally reviewed all chest and other pertinent radiology films.         REVIEW OF SYSTEMS:   see Hpi.    PHYSICAL EXAM:       · ENMT:   Airway patent,   Nasal mucosa clear.  Mouth with normal mucosa.   No thrush    · EYES:   Clear bilaterally,   pupils equal,   round and reactive to light.    · CARDIAC:   Normal rate,   regular rhythm.    Heart sounds S1, S2.   no thrills or bruits on palpitation  normal  cardiac impulse  No murmurs, no rubs or gallops on auscultation  no edema        CAROTID:   normal systolic impulse  no bruits    · RESPIRATORY:   no w/r/r/,   normal chest expansion  not tachypneic,  no retractions or use of accessory muscles  palpation of chest is normal with no fremitus  percussion of chest demonstrates no hyperresonance or dullness    · GASTROINTESTINAL:  Abdomen soft,   non-tender,   no guarding,   + BS  liver spleen not palpable      · MUSCULOSKELETAL:   range of motion is not limited,  no clubbing, cyanosis  no petechiae      · SKIN:   Skin normal color for race,   warm,   dry and intact.       · HEME LYMPH:   no splenomegaly.  No cervical  lymphadenopathy.  no inguinal lymphadenopathy

## 2024-04-08 NOTE — PROVIDER CONTACT NOTE (OTHER) - ASSESSMENT
Patient is alert but confused. Patient told me her  but then continues to babble.
pt's IV no longer patent, dislodged from vein. RN unable to flush and no blood return noted. New IV access attempted by 2 RNS w/ inability to achieve due to the pt being severely edematous in BL arms.

## 2024-04-08 NOTE — PROGRESS NOTE ADULT - ASSESSMENT
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       Acute Hypoxic resp Failure-possibly 2' to undiagnosed WENDY:  - Unclear etiology at this time  -CXR clear, No acute process, shows cardiomegaly  - Pulmonary consult appreciated, needs NIV   - Bipap as needed  - Supplemental O2 as needed  - Check procal  -D-dimer neg for age  -Follow infectious work up, blood Cx, UCx  -Check TTE  -Continue empiric abx for now  - Discussed with family, HHA have been reporting she "never sleeps and wakes up frequently at night".  Never had sleep study or PFT.  2' hand smoke exposure from .    Onesimo:  -Continue fluids, monitor BMP  -US unremarkable  -Worsened, will consult renal    Transaminitis:  -Resolving, unclear etiology, US unremarkable  -Check hepatitis, GGT, inr    Elevated troponin:  -Likely 2' to demand, PPM interrogated, no events  -Follow TTE     Abdominal pain with overflow diarrhea 2' to constipation  - Started patient on stool softener, lactulose due to fecal retention seen on CT  - Monitor vitals, bowel movements  -Will try enemas  -GI consult if no BM    Splenic lesions  - Incidentally found on imaging, very unlikely the cause of her symptoms, clinically very unlikely infectious in nature  -Outpatient imaging for further characterization    Dementia  - Continue with zoloft, nortriptyline, and donepezil   - monitor mental status     HTN  - Continue amlodipine, metoprolol   - DASH diet    Diabetes Mellitus II  - CHO consistent diet  -Hold insulin for episodes of Hypoglycemia, restart PRN     Hypothyroidism  - Continue levothyroxine as prescribed     Chronic Pain   - Continue gabapentin and tramadol as prescribed     DVT/VTE prophylaxis   Minced and moist diet per SLP, advance as per re-eval

## 2024-04-08 NOTE — CONSULT NOTE ADULT - ASSESSMENT
Impression:  Acute hypercapnic respiratory failure due to multiple possible reasons  including COPD, narcotic, hypothyroid, obesity hypoventilation  hypoxia  no pneumonia     Suggest:  Check O2 sat on NC, record, continue O2 as necessary to maintain sats > 90%  bronchodilator treatments PRN  complete course of antibiotics  NIV as needed and at HS. will treat presumed WENDY if set correctly though use would not change life expectancy at this point.  OOB to chair  GI and DVT prophylaxis  pulmonary toilet

## 2024-04-08 NOTE — CONSULT NOTE ADULT - ASSESSMENT
JABARI on CKD 3 / likely d/t contrast induced nephropathy    - cont iv hydration for today  - d/c amlodipine to avoid hypotension  - strict i/o -- document urine output (has prima fit)  - phos level noted, monitor  - maintain on renal diet   - dose meds for eGFR < 10 while creat is acutely rising, levaquin should be q48h  - no indication for RRT at this point  - CT noted, no hydronephrosis;  will need outpatient  monitoring for CTAP findings of complex renal cysts

## 2024-04-08 NOTE — PHARMACOTHERAPY INTERVENTION NOTE - COMMENTS
- Recommend counseling patient on fall prevention while taking DAPT (ASA + Plavix)  - agree with holding nortriptyline and Tramadol unless clinically indicated - ghulam. when given with sertraline   - Can consider reducing atorvastatin to a lesser dose which is still high intensity (40mg)   - caution with gabapentin, which can be associated with serious breathing problems when used alongside other CNS depressants   - Recommend counseling patient on fall prevention while taking DAPT (ASA + Plavix)  - agree with holding nortriptyline and Tramadol unless clinically indicated - ghulam. when given with sertraline   - Can consider reducing atorvastatin to a lesser dose which is still high intensity (40mg)   - caution with gabapentin, which can be associated with serious breathing problems when used alongside other CNS depressants; Gabapentin associated with falls   - Check TSH outpatient; avoid overtreating in geriatric population
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
## 2024-04-08 NOTE — PROVIDER CONTACT NOTE (OTHER) - ACTION/TREATMENT ORDERED:
OANH Marcus made aware. IV abx switched to oral. No further intervention at this time.
OANH Arteaga made aware. No recommendations per provider at this time. Plan of care ongoing.
no intervention at this time. Nursing staff to continue bowel regimen.

## 2024-04-08 NOTE — SWALLOW BEDSIDE ASSESSMENT ADULT - COMMENTS
Per RN pt was a rapid response 4/6/24. CXR 4/7 Impression: Cardiomegaly. Unchanged. 4/8: Question of left lower lobe opacity

## 2024-04-08 NOTE — CONSULT NOTE ADULT - SUBJECTIVE AND OBJECTIVE BOX
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  Daughter states that the patient's aide reported the pain, nausea, vomiting, fever, and an episode of diarrhea at 4AM, which eventually led to ER visit.  Daughter also states that the patients mental status was significantly altered from baseline, stating that she sounded "out of it" and "more confused then usual" the night before.  No reports of headaches, visual changes, dysuria.    No report of fever in the ER.      CT Scan abdomen/pelvis remarkable for numerous, ill defined lesions within the spleen which may reflect neoplastic process. Also noted to have fecal retention within the rectum. (05 Apr 2024 15:23)      PAST MEDICAL & SURGICAL HISTORY:  Hypertension      Diabetes      CVA (cerebrovascular accident)      Pacemaker      Breast cancer      History of pressure ulcer      Dementia      Hypothyroidism      CAD (coronary artery disease)      Immobility      Pacemaker      S/P lumpectomy of breast      Knee joint replacement status, right      History of cholecystectomy        Allergies:  sulfa drugs (Anaphylaxis)  fish (Anaphylaxis)  penicillin (Anaphylaxis)    Home Medications Reviewed  Hospital Medications:   MEDICATIONS  (STANDING):  amLODIPine   Tablet 10 milliGRAM(s) Oral daily  aspirin enteric coated 81 milliGRAM(s) Oral daily  atorvastatin 80 milliGRAM(s) Oral at bedtime  clopidogrel Tablet 75 milliGRAM(s) Oral daily  donepezil 10 milliGRAM(s) Oral at bedtime  ferrous    sulfate 325 milliGRAM(s) Oral daily  gabapentin 300 milliGRAM(s) Oral every 12 hours  glucagon  Injectable 1 milliGRAM(s) IntraMuscular once  heparin   Injectable 5000 Unit(s) SubCutaneous every 8 hours  lactulose Syrup 20 Gram(s) Oral three times a day  levoFLOXacin IVPB 500 milliGRAM(s) IV Intermittent every 24 hours  levothyroxine 25 MICROGram(s) Oral daily  metoprolol succinate ER 25 milliGRAM(s) Oral daily  metroNIDAZOLE  IVPB 500 milliGRAM(s) IV Intermittent every 8 hours  metroNIDAZOLE  IVPB      pantoprazole    Tablet 40 milliGRAM(s) Oral before breakfast  senna 2 Tablet(s) Oral at bedtime  sertraline 50 milliGRAM(s) Oral daily  sertraline 100 milliGRAM(s) Oral daily  sodium chloride 0.9%. 1000 milliLiter(s) (80 mL/Hr) IV Continuous <Continuous>    MEDICATIONS  (PRN):  albuterol/ipratropium for Nebulization 3 milliLiter(s) Nebulizer every 6 hours PRN Shortness of Breath and/or Wheezing  dextrose Oral Gel 15 Gram(s) Oral once PRN Blood Glucose LESS THAN 70 milliGRAM(s)/deciliter  ondansetron Injectable 4 milliGRAM(s) IV Push every 4 hours PRN Nausea and/or Vomiting    Home Medications:  amLODIPine 10 mg oral tablet: 1 tab(s) orally once a day (05 Apr 2024 14:47)  atorvastatin 80 mg oral tablet: 1 tab(s) orally once a day (at bedtime) (05 Apr 2024 15:25)  clopidogrel 75 mg oral tablet: 1 tab(s) orally once a day (05 Apr 2024 14:47)  donepezil 10 mg oral tablet: 1 tab(s) orally once a day (at bedtime) (05 Apr 2024 15:28)  Ecotrin Adult Low Strength 81 mg oral delayed release tablet: 2 tab(s) orally every other day (05 Apr 2024 14:47)  exemestane 25 mg oral tablet: 1 tab(s) orally once a day (05 Apr 2024 14:47)  ferrous sulfate:  (05 Apr 2024 14:47)  furosemide 20 mg oral tablet: 1 tab(s) orally (05 Apr 2024 14:47)  gabapentin 300 mg oral capsule: 1 cap(s) orally 2 times a day (05 Apr 2024 14:47)  humalog sliding scale: with meals (05 Apr 2024 14:47)  Levemir 100 units/mL subcutaneous solution: 30 unit(s) subcutaneous once a day (at bedtime) (05 Apr 2024 14:47)  levothyroxine 25 mcg (0.025 mg) oral tablet: 1 tab(s) orally once a day (05 Apr 2024 14:47)  metoprolol succinate 25 mg oral capsule, extended release: 1 cap(s) orally once a day (05 Apr 2024 14:47)  nortriptyline 25 mg oral capsule: 1 cap(s) orally once a day (at bedtime) (05 Apr 2024 15:23)  Protonix 40 mg oral delayed release tablet: 1 tab(s) orally once a day (05 Apr 2024 14:47)  sertraline 50 mg oral tablet: 1 tab(s) orally once a day (05 Apr 2024 14:47)  stool softener: at night (05 Apr 2024 14:47)  traMADol 100 mg oral tablet: 1 tab(s) orally once a day (05 Apr 2024 14:47)      SOCIAL HISTORY:  Social History:  .  Patient lives alone at home with 24 hour nursing aides.  Patient is nonambulatory secondary to CVA (05 Apr 2024 15:23)      FAMILY HISTORY:  FHx: diabetes mellitus (Mother)        REVIEW OF SYSTEMS:  All other review of systems is negative unless indicated above.    VITALS:  T(F): 97.2 (04-08-24 @ 04:46), Max: 98.5 (04-07-24 @ 14:58)  HR: 77 (04-08-24 @ 04:46)  BP: 104/51 (04-08-24 @ 04:46)  RR: 16 (04-08-24 @ 04:46)  SpO2: 100% (04-08-24 @ 04:46)    Height (cm): 152.4 (04-07 @ 14:58)  Weight (kg): 73.4 (04-07 @ 14:58)  BMI (kg/m2): 31.6 (04-07 @ 14:58)  BSA (m2): 1.71 (04-07 @ 14:58)  I&O's Detail        I&O's Summary      PHYSICAL EXAM:  Gen: NAD  resp: b/l breath sounds  card: S1/S2  abd: soft  ext: no edema    LABS:  Daily Height in cm: 152.4 (07 Apr 2024 14:58)    Daily   ABG - ( 07 Apr 2024 06:41 )  pH, Arterial: 7.26  pH, Blood: x     /  pCO2: 72    /  pO2: 40    / HCO3: 32    / Base Excess: 3.0   /  SaO2: 73.4          Blood Gas Arterial, Lactate: 0.8 mmol/L (04-07-24 @ 06:41)  Blood Gas Arterial, Lactate: 0.4 mmol/L (04-06-24 @ 23:05)  Lactate, Blood: 1.7 mmol/L (04-06-24 @ 18:00)    04-08    141  |  100  |  37<H>  ----------------------------<  117<H>  4.3   |  24  |  3.7<H>    Ca    7.8<L>      08 Apr 2024 06:59  Phos  7.3     04-08  Mg     1.8     04-08    TPro  5.9<L>  /  Alb  3.8  /  TBili  0.4  /  DBili  0.3  /  AST  80<H>  /  ALT  113<H>  /  AlkPhos  217<H>  04-08      Creatinine Trend:   Creatinine: 3.7 mg/dL (04-08-24 @ 06:59)  Creatinine: 2.5 mg/dL (04-07-24 @ 09:21)  Creatinine: 2.3 mg/dL (04-06-24 @ 21:39)  Creatinine: 2.1 mg/dL (04-06-24 @ 18:00)  Creatinine: 1.7 mg/dL (04-06-24 @ 09:06)  Creatinine: 1.5 mg/dL (04-05-24 @ 05:16)                            11.8   9.34  )-----------( 205      ( 08 Apr 2024 06:59 )             38.1     Mean Cell Volume: 99.7 fL (04-08-24 @ 06:59)    Urine Studies:  Protein, Urine: 30 mg/dL (04-05-24 @ 04:34)  White Blood Cell - Urine: 7 /HPF (04-05-24 @ 04:34)  Red Blood Cell - Urine: 3 /HPF (04-05-24 @ 04:34)              RADIOLOGY & ADDITIONAL STUDIES:        Xray Chest 1 View- PORTABLE-Urgent:   ACC: [id number] EXAM:  XR CHEST PORTABLE URGENT 1V   ORDERED BY:   LARRY PARMAR     PROCEDURE DATE:  04/08/2024          INTERPRETATION:  Clinical History / Reason for exam: Shortness of breath    Comparison : Chest radiograph 4/7/2024.    Technique/Positioning: Frontal    Findings:    Support devices: Left chest wall cardiac pacing device    Cardiac/mediastinum/hilum: Stable    Lung parenchyma/Pleura: Question of left lower lobe opacity    Skeleton/soft tissues: Stable    Impression:    Question of left lower lobe opacity        --- End of Report ---          TONE RAND MD; Resident Radiologist  This document has been electronically signed.  AURE VAZQUEZ MD; Attending Radiologist  This document has been electronically signed. Apr 82024 10:33AM (04-08-24 @ 09:28)        < from: CT Abdomen and Pelvis w/ IV Cont (04.05.24 @ 08:37) >    ACC: [id number] EXAM:  CT ABDOMEN AND PELVIS IC   ORDERED BY: MIGUEL JUAREZ     PROCEDURE DATE:  04/05/2024          INTERPRETATION:  CLINICAL STATEMENT: Abdominal pain with nausea vomiting   and diarrhea    TECHNIQUE: Contiguous axial CT images were obtained from the lower chest   to the pubic symphysis .  95 cc administered of Omnipaque 350 (5 cc   discarded).  Oral contrast was not given.  Reformatted images in the   coronal and sagittal planes were acquired.    COMPARISON CT: None.    OTHER STUDIES USED FOR CORRELATION: None.      FINDINGS:    Evaluation somewhat limited due to arms are overlapping the torso   creating beam Fonseca artifact. Study also limited by obliquity.    LOWER CHEST: Pacer wires are seen within the heart. Cardiomegaly.   Scattered areas of atelectasis within the visualized lower lungs..    HEPATOBILIARY: Status post cholecystectomy. The liver is enlarged   measuring 17.3 cm in length..    SPLEEN: Numerous poorly defined enhancing lesions throughout the spleen..    PANCREAS: Atrophic pancreas. Punctate calcifications throughout the   pancreatic parenchyma suggesting sequelae of chronic pancreatitis..    ADRENAL GLANDS: Unremarkable.    KIDNEYS: Both kidneys demonstrate symmetric enhancement with no   hydronephrosis. There is a hyperdense lesion along the posterior aspect   of the superior right kidney measuring under centimeter. Another mildly   complex cystic lesion is seen laterally within the lower pole of the   right kidney..    ABDOMINOPELVIC NODES: Unremarkable.    PELVIC ORGANS: Almost completely collapsed urinary bladder. Atrophic   calcified uterus..    PERITONEUM/MESENTERY/BOWEL: There is presacral edema. Fecal retention   most severe within the rectum. No bowel obstruction..    BONES/SOFT TISSUES: Small fat-containing right inguinal hernia. Scoliosis   with multilevel degenerative changes. Compression fracture of L2 of   uncertain age.. Chronic deformity of the right pubic rami.    OTHER: Atherosclerotic changes of the aorta and itsvessels.      IMPRESSION:    Numerous ill-defined lesions throughout the spleen which could reflect   neoplastic process. Differential diagnosis includes multiple   microabscesses disease. Correlate clinically.    Complex processes involving the right kidney which may reflect complex   cysts. Solid nodules cannot be excluded. Correlate with dedicated CAT   scan of the kidneys with renal mass protocol MRI of the kidneys. This can   be performed on an outpatient basis.    Fecal retention most pronounced within the rectum.    --- End of Report ---            ASIA CRAVEN MD; Attending Radiologist  This document has been electronically signed. 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  Daughter states that the patient's aide reported the pain, nausea, vomiting, fever, and an episode of diarrhea at 4AM, which eventually led to ER visit.  Daughter also states that the patients mental status was significantly altered from baseline, stating that she sounded "out of it" and "more confused then usual" the night before.  No reports of headaches, visual changes, dysuria.    No report of fever in the ER.      CT Scan abdomen/pelvis remarkable for numerous, ill defined lesions within the spleen which may reflect neoplastic process. Also noted to have fecal retention within the rectum. (05 Apr 2024 15:23)      PAST MEDICAL & SURGICAL HISTORY:  Hypertension      Diabetes      CVA (cerebrovascular accident)      Pacemaker      Breast cancer      History of pressure ulcer      Dementia      Hypothyroidism      CAD (coronary artery disease)      Immobility      Pacemaker      S/P lumpectomy of breast      Knee joint replacement status, right      History of cholecystectomy        Allergies:  sulfa drugs (Anaphylaxis)  fish (Anaphylaxis)  penicillin (Anaphylaxis)    Home Medications Reviewed  Hospital Medications:   MEDICATIONS  (STANDING):  amLODIPine   Tablet 10 milliGRAM(s) Oral daily  aspirin enteric coated 81 milliGRAM(s) Oral daily  atorvastatin 80 milliGRAM(s) Oral at bedtime  clopidogrel Tablet 75 milliGRAM(s) Oral daily  donepezil 10 milliGRAM(s) Oral at bedtime  ferrous    sulfate 325 milliGRAM(s) Oral daily  gabapentin 300 milliGRAM(s) Oral every 12 hours  glucagon  Injectable 1 milliGRAM(s) IntraMuscular once  heparin   Injectable 5000 Unit(s) SubCutaneous every 8 hours  lactulose Syrup 20 Gram(s) Oral three times a day  levoFLOXacin IVPB 500 milliGRAM(s) IV Intermittent every 24 hours  levothyroxine 25 MICROGram(s) Oral daily  metoprolol succinate ER 25 milliGRAM(s) Oral daily  metroNIDAZOLE  IVPB 500 milliGRAM(s) IV Intermittent every 8 hours  metroNIDAZOLE  IVPB      pantoprazole    Tablet 40 milliGRAM(s) Oral before breakfast  senna 2 Tablet(s) Oral at bedtime  sertraline 50 milliGRAM(s) Oral daily  sertraline 100 milliGRAM(s) Oral daily  sodium chloride 0.9%. 1000 milliLiter(s) (80 mL/Hr) IV Continuous <Continuous>    MEDICATIONS  (PRN):  albuterol/ipratropium for Nebulization 3 milliLiter(s) Nebulizer every 6 hours PRN Shortness of Breath and/or Wheezing  dextrose Oral Gel 15 Gram(s) Oral once PRN Blood Glucose LESS THAN 70 milliGRAM(s)/deciliter  ondansetron Injectable 4 milliGRAM(s) IV Push every 4 hours PRN Nausea and/or Vomiting    Home Medications:  amLODIPine 10 mg oral tablet: 1 tab(s) orally once a day (05 Apr 2024 14:47)  atorvastatin 80 mg oral tablet: 1 tab(s) orally once a day (at bedtime) (05 Apr 2024 15:25)  clopidogrel 75 mg oral tablet: 1 tab(s) orally once a day (05 Apr 2024 14:47)  donepezil 10 mg oral tablet: 1 tab(s) orally once a day (at bedtime) (05 Apr 2024 15:28)  Ecotrin Adult Low Strength 81 mg oral delayed release tablet: 2 tab(s) orally every other day (05 Apr 2024 14:47)  exemestane 25 mg oral tablet: 1 tab(s) orally once a day (05 Apr 2024 14:47)  ferrous sulfate:  (05 Apr 2024 14:47)  furosemide 20 mg oral tablet: 1 tab(s) orally (05 Apr 2024 14:47)  gabapentin 300 mg oral capsule: 1 cap(s) orally 2 times a day (05 Apr 2024 14:47)  humalog sliding scale: with meals (05 Apr 2024 14:47)  Levemir 100 units/mL subcutaneous solution: 30 unit(s) subcutaneous once a day (at bedtime) (05 Apr 2024 14:47)  levothyroxine 25 mcg (0.025 mg) oral tablet: 1 tab(s) orally once a day (05 Apr 2024 14:47)  metoprolol succinate 25 mg oral capsule, extended release: 1 cap(s) orally once a day (05 Apr 2024 14:47)  nortriptyline 25 mg oral capsule: 1 cap(s) orally once a day (at bedtime) (05 Apr 2024 15:23)  Protonix 40 mg oral delayed release tablet: 1 tab(s) orally once a day (05 Apr 2024 14:47)  sertraline 50 mg oral tablet: 1 tab(s) orally once a day (05 Apr 2024 14:47)  stool softener: at night (05 Apr 2024 14:47)  traMADol 100 mg oral tablet: 1 tab(s) orally once a day (05 Apr 2024 14:47)      SOCIAL HISTORY:  Social History:  .  Patient lives alone at home with 24 hour nursing aides.  Patient is nonambulatory secondary to CVA (05 Apr 2024 15:23)      FAMILY HISTORY:  FHx: diabetes mellitus (Mother)        REVIEW OF SYSTEMS:  cannot obtain.  Pt with AMS    VITALS:  T(F): 97.2 (04-08-24 @ 04:46), Max: 98.5 (04-07-24 @ 14:58)  HR: 77 (04-08-24 @ 04:46)  BP: 104/51 (04-08-24 @ 04:46)  RR: 16 (04-08-24 @ 04:46)  SpO2: 100% (04-08-24 @ 04:46)    Height (cm): 152.4 (04-07 @ 14:58)  Weight (kg): 73.4 (04-07 @ 14:58)  BMI (kg/m2): 31.6 (04-07 @ 14:58)  BSA (m2): 1.71 (04-07 @ 14:58)  I&O's Detail        I&O's Summary      PHYSICAL EXAM:  Gen: NAD, confused  resp: b/l breath sounds  abd: soft  ext: no edema    LABS:  Daily Height in cm: 152.4 (07 Apr 2024 14:58)    ABG - ( 07 Apr 2024 06:41 )  pH, Arterial: 7.26  pH, Blood: x     /  pCO2: 72    /  pO2: 40    / HCO3: 32    / Base Excess: 3.0   /  SaO2: 73.4        04-08    141  |  100  |  37<H>  ----------------------------<  117<H>  4.3   |  24  |  3.7<H>    Ca    7.8<L>      08 Apr 2024 06:59  Phos  7.3     04-08  Mg     1.8     04-08    TPro  5.9<L>  /  Alb  3.8  /  TBili  0.4  /  DBili  0.3  /  AST  80<H>  /  ALT  113<H>  /  AlkPhos  217<H>  04-08      Creatinine Trend:   Creatinine: 3.7 mg/dL (04-08-24 @ 06:59)  Creatinine: 2.5 mg/dL (04-07-24 @ 09:21)  Creatinine: 2.3 mg/dL (04-06-24 @ 21:39)  Creatinine: 2.1 mg/dL (04-06-24 @ 18:00)  Creatinine: 1.7 mg/dL (04-06-24 @ 09:06)  Creatinine: 1.5 mg/dL (04-05-24 @ 05:16)                            11.8   9.34  )-----------( 205      ( 08 Apr 2024 06:59 )             38.1     Mean Cell Volume: 99.7 fL (04-08-24 @ 06:59)    Urine Studies:  Protein, Urine: 30 mg/dL (04-05-24 @ 04:34)  White Blood Cell - Urine: 7 /HPF (04-05-24 @ 04:34)  Red Blood Cell - Urine: 3 /HPF (04-05-24 @ 04:34)              RADIOLOGY & ADDITIONAL STUDIES:        Xray Chest 1 View- PORTABLE-Urgent:   ACC: [id number] EXAM:  XR CHEST PORTABLE URGENT 1V   ORDERED BY:   LARRY PARMAR     PROCEDURE DATE:  04/08/2024          INTERPRETATION:  Clinical History / Reason for exam: Shortness of breath    Comparison : Chest radiograph 4/7/2024.    Technique/Positioning: Frontal    Findings:    Support devices: Left chest wall cardiac pacing device    Cardiac/mediastinum/hilum: Stable    Lung parenchyma/Pleura: Question of left lower lobe opacity    Skeleton/soft tissues: Stable    Impression:    Question of left lower lobe opacity        --- End of Report ---          TONE RAND MD; Resident Radiologist  This document has been electronically signed.  AURE VAZQUEZ MD; Attending Radiologist  This document has been electronically signed. Apr 82024 10:33AM (04-08-24 @ 09:28)        < from: CT Abdomen and Pelvis w/ IV Cont (04.05.24 @ 08:37) >    ACC: [id number] EXAM:  CT ABDOMEN AND PELVIS IC   ORDERED BY: MIGUEL JUAREZ     PROCEDURE DATE:  04/05/2024          INTERPRETATION:  CLINICAL STATEMENT: Abdominal pain with nausea vomiting   and diarrhea    TECHNIQUE: Contiguous axial CT images were obtained from the lower chest   to the pubic symphysis .  95 cc administered of Omnipaque 350 (5 cc   discarded).  Oral contrast was not given.  Reformatted images in the   coronal and sagittal planes were acquired.    COMPARISON CT: None.    OTHER STUDIES USED FOR CORRELATION: None.      FINDINGS:    Evaluation somewhat limited due to arms are overlapping the torso   creating beam Fonseca artifact. Study also limited by obliquity.    LOWER CHEST: Pacer wires are seen within the heart. Cardiomegaly.   Scattered areas of atelectasis within the visualized lower lungs..    HEPATOBILIARY: Status post cholecystectomy. The liver is enlarged   measuring 17.3 cm in length..    SPLEEN: Numerous poorly defined enhancing lesions throughout the spleen..    PANCREAS: Atrophic pancreas. Punctate calcifications throughout the   pancreatic parenchyma suggesting sequelae of chronic pancreatitis..    ADRENAL GLANDS: Unremarkable.    KIDNEYS: Both kidneys demonstrate symmetric enhancement with no   hydronephrosis. There is a hyperdense lesion along the posterior aspect   of the superior right kidney measuring under centimeter. Another mildly   complex cystic lesion is seen laterally within the lower pole of the   right kidney..    ABDOMINOPELVIC NODES: Unremarkable.    PELVIC ORGANS: Almost completely collapsed urinary bladder. Atrophic   calcified uterus..    PERITONEUM/MESENTERY/BOWEL: There is presacral edema. Fecal retention   most severe within the rectum. No bowel obstruction..    BONES/SOFT TISSUES: Small fat-containing right inguinal hernia. Scoliosis   with multilevel degenerative changes. Compression fracture of L2 of   uncertain age.. Chronic deformity of the right pubic rami.    OTHER: Atherosclerotic changes of the aorta and itsvessels.      IMPRESSION:    Numerous ill-defined lesions throughout the spleen which could reflect   neoplastic process. Differential diagnosis includes multiple   microabscesses disease. Correlate clinically.    Complex processes involving the right kidney which may reflect complex   cysts. Solid nodules cannot be excluded. Correlate with dedicated CAT   scan of the kidneys with renal mass protocol MRI of the kidneys. This can   be performed on an outpatient basis.    Fecal retention most pronounced within the rectum.    --- End of Report ---            ASIA CRAVEN MD; Attending Radiologist  This document has been electronically signed. Apr 5 2024  9:01AM    < end of copied text >

## 2024-04-08 NOTE — PROVIDER CONTACT NOTE (OTHER) - REASON
no BM after tap water enema + lactulose
pt w. no IV access
Patient not compliant with medication administration.

## 2024-04-09 NOTE — SWALLOW BEDSIDE ASSESSMENT ADULT - DIET PRIOR TO ADMI
Cut up regular w/ thins per daughter at b/s

## 2024-04-09 NOTE — PROGRESS NOTE ADULT - SUBJECTIVE AND OBJECTIVE BOX
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     Today:  Seen at bedside, AAO x 2, no specific complaints though reliability is questionable; patient often makes sarcastic comments.          REVIEW OF SYSTEMS:  No complaints      MEDICATIONS  (STANDING):  aspirin enteric coated 81 milliGRAM(s) Oral daily  atorvastatin 80 milliGRAM(s) Oral at bedtime  clopidogrel Tablet 75 milliGRAM(s) Oral daily  dextrose 10% Bolus 125 milliLiter(s) IV Bolus once  dextrose 5%. 1000 milliLiter(s) (100 mL/Hr) IV Continuous <Continuous>  dextrose 5%. 1000 milliLiter(s) (50 mL/Hr) IV Continuous <Continuous>  dextrose 50% Injectable 25 Gram(s) IV Push once  dextrose 50% Injectable 12.5 Gram(s) IV Push once  donepezil 10 milliGRAM(s) Oral at bedtime  ferrous    sulfate 325 milliGRAM(s) Oral daily  gabapentin 300 milliGRAM(s) Oral daily  glucagon  Injectable 1 milliGRAM(s) IntraMuscular once  heparin   Injectable 5000 Unit(s) SubCutaneous every 8 hours  lactulose Syrup 20 Gram(s) Oral three times a day  levothyroxine 25 MICROGram(s) Oral daily  metoprolol succinate ER 25 milliGRAM(s) Oral daily  pantoprazole    Tablet 40 milliGRAM(s) Oral before breakfast  senna 2 Tablet(s) Oral at bedtime  sertraline 100 milliGRAM(s) Oral daily  sertraline 50 milliGRAM(s) Oral daily  sodium chloride 0.45%. 1000 milliLiter(s) (80 mL/Hr) IV Continuous <Continuous>    MEDICATIONS  (PRN):  albuterol/ipratropium for Nebulization 3 milliLiter(s) Nebulizer every 6 hours PRN Shortness of Breath and/or Wheezing  dextrose Oral Gel 15 Gram(s) Oral once PRN Blood Glucose LESS THAN 70 milliGRAM(s)/deciliter  ondansetron Injectable 4 milliGRAM(s) IV Push every 4 hours PRN Nausea and/or Vomiting      Allergies  sulfa drugs (Hives)  shellfish (Unknown)  honeydew (Unknown)  Sulfacetamide Sodium-Sulfur (Rash)  latex (Unknown)  pickles (Unknown)  sulfa drugs (Anaphylaxis)  penicillins (Unknown)  fish (Anaphylaxis)  penicillin (Anaphylaxis)        FAMILY HISTORY:  FHx: diabetes mellitus (Mother)  FHx: diabetes mellitus (Mother)        Vital Signs Last 24 Hrs  T(C): 36.4 (09 Apr 2024 04:30), Max: 37.4 (08 Apr 2024 14:51)  T(F): 97.5 (09 Apr 2024 04:30), Max: 99.4 (08 Apr 2024 14:51)  HR: 75 (09 Apr 2024 13:34) (69 - 81)  BP: 128/72 (09 Apr 2024 13:34) (108/64 - 130/60)  RR: 18 (09 Apr 2024 13:34) (18 - 18)  SpO2: 96% (09 Apr 2024 13:34) (96% - 100%)    Parameters below as of 09 Apr 2024 13:34  Patient On (Oxygen Delivery Method): nasal cannula  O2 Flow (L/min): 3      PHYSICAL EXAM:  GENERAL: Well-nourished, Well-developed. NAD.  HEAD: No visible or palpable bumps or hematomas. No ecchymosis behind ears B/L.  ENMT: PERRLA; dry mucosa on tongue    CVS: Normal S1,S2. No murmurs appreciated on auscultation   RESP: No use of accessory muscles. Chest rise symmetrical with good expansion. Lungs clear to auscultation B/L. No wheezing, rales, or rhonchi auscultated.  GI: Normal auscultation of bowel sounds in all 4 quadrants. (+)mild diffuse abd ttp. Soft, Nondistended.  Skin: Warm, Dry. No rashes or lesions. Good cap refill < 2 sec B/L.  EXT: Radial and pedal pulses present B/L. No calf tenderness or swelling B/L. No palpable cords. No pedal edema B/L.  NEURO: AAO x 3      LABS:                        11.3   7.97  )-----------( 180      ( 09 Apr 2024 06:16 )             34.3     04-09    142  |  102  |  50<H>  ----------------------------<  121<H>  4.5   |  24  |  4.4<HH>    Ca    7.5<L>      09 Apr 2024 06:16  Phos  7.1     04-09  Mg     1.8     04-09    TPro  5.8<L>  /  Alb  3.8  /  TBili  0.4  /  DBili  0.2  /  AST  55<H>  /  ALT  83<H>  /  AlkPhos  194<H>  04-09    PT/INR - ( 08 Apr 2024 06:59 )   PT: 14.60 sec;   INR: 1.28 ratio         PTT - ( 08 Apr 2024 06:59 )  PTT:37.2 sec  Urinalysis Basic - ( 09 Apr 2024 06:16 )    Color: x / Appearance: x / SG: x / pH: x  Gluc: 121 mg/dL / Ketone: x  / Bili: x / Urobili: x   Blood: x / Protein: x / Nitrite: x   Leuk Esterase: x / RBC: x / WBC x   Sq Epi: x / Non Sq Epi: x / Bacteria: x

## 2024-04-09 NOTE — SWALLOW BEDSIDE ASSESSMENT ADULT - SWALLOW EVAL: RECOMMENDED FEEDING/EATING TECHNIQUES
allow for swallow between intakes/oral hygiene/position upright (90 degrees)

## 2024-04-09 NOTE — PROGRESS NOTE ADULT - ASSESSMENT
JABARI on CKD 3 / likely d/t contrast induced nephropathy    - anuric if documentation is accurate (has prima-fit)  - creat up-trending  - tolerating minimal po intake  - change iv fluids to 1/2NS and repeat BMP in PM to monitor Na level (to avoid hyponatremia)  - BP stable, off amlodipine  - cont strict i/o please  - phos level noted, monitor  - maintain on renal diet   - dose meds for eGFR < 10 while creat is acutely rising  - gabapentin should be max 300mg daily  - no acute indication for RRT at this point / will monitor closely, will need RRT if urine output not improving   - CT noted, no hydronephrosis;  will need outpatient  monitoring for CTAP findings of complex renal cysts JABARI on CKD 3 / likely d/t contrast induced nephropathy    - anuric if documentation is accurate (has prima-fit)  - creat up-trending  - tolerating minimal po intake  - change iv fluids to 1/2NS and repeat BMP in PM to monitor Na level (to avoid hyponatremia)  - BP stable, off amlodipine  - cont strict i/o please  - phos level noted, monitor  - maintain on renal diet   - dose meds for eGFR < 10 while creat is acutely rising  - repeat UA, urine lytes, urine pr/cr ratio  - hematuria noted on 4/5 UA, check ANCA panel w/ MPO/PR3, SERGIO, C3/C4, serum flc ratio, spep/sife, CPK  - gabapentin should be max 300mg daily  - no acute indication for RRT at this point / will monitor closely, will need RRT if urine output not improving   - CT noted, no hydronephrosis;  will need outpatient  monitoring for CTAP findings of complex renal cysts

## 2024-04-09 NOTE — CONSULT NOTE ADULT - ASSESSMENT
84yFemale pmh DM, breast cancer 2014 s/p rt, BP, HTN, CVA, pacemaker insertion brought for altered mental status. GI consulted for fecal impaction asked for disimpaction.    #Fecal retention most pronounced within the rectum.  Rec  -performed manual disimpaction at bedside, small stool burden evacuated   -lavaged colon with 200ccs   -tap water enemas q4h  -after spontaneous bms will start bowel regimen     #Numerous ill-defined lesions throughout the spleen which could reflect   neoplastic process. Differential diagnosis includes multiple   microabscesses disease. Correlate clinically.  Rec  - as per oncology team    #Complex processes involving the right kidney which may reflect complex   cysts. Solid nodules cannot be excluded. Correlate with dedicated CAT   scan of the kidneys with renal mass protocol MRI of the kidneys. This can   be performed on an outpatient basis.  Rec  - Care as per primary team

## 2024-04-09 NOTE — SWALLOW BEDSIDE ASSESSMENT ADULT - SLP GENERAL OBSERVATIONS
Pt received in bed asleep, easily woke up to tactile cues.
Pt received in bed awake w/ lunch tray on NC.
Pt received in bed asleep on NC w/ children and grandchildren at b/s. Hoarse vocal quality at baseline.

## 2024-04-09 NOTE — SWALLOW BEDSIDE ASSESSMENT ADULT - SWALLOW EVAL: FUNCTIONAL LEVEL AT TIME OF EVAL
intermittently follows commands
Intermittently follows commands, responsive in one word phrases or gesturally
Intermittently follows commands, yelling and spitting at SLP. Would only accept water

## 2024-04-09 NOTE — CONSULT NOTE ADULT - SUBJECTIVE AND OBJECTIVE BOX
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  Daughter states that the patient's aide reported the pain, nausea, vomiting, fever, and an episode of diarrhea at 4AM, which eventually led to ER visit.  Daughter also states that the patients mental status was significantly altered from baseline, stating that she sounded "out of it" and "more confused then usual" the night before.  No reports of headaches, visual changes, dysuria.    No report of fever in the ER.      CT Scan abdomen/pelvis remarkable for numerous, ill defined lesions within the spleen which may reflect neoplastic process. Also noted to have fecal retention within the rectum. (05 Apr 2024 15:23)    GI updates: 84yFemale pmh DM, breast cancer 2014 s/p rt, BP, HTN, CVA, pacemaker insertion brought for altered mental status. GI consulted for fecal impaction asked for disimpaction.  No hematemesis, melena, blood in stool reported.       PAST MEDICAL & SURGICAL HISTORY:   DM (diabetes mellitus)  24 yr      Breast CA  2014 s/p rt      BP (high blood pressure)  20 yr      Depression      High cholesterol      Bilateral hearing loss, unspecified hearing loss type      CVA (cerebral vascular accident)  6/18 lft weakness      Hypertension      Diabetes      CVA (cerebrovascular accident)      Pacemaker      Breast cancer      History of pressure ulcer      Dementia      Hypothyroidism      CAD (coronary artery disease)      Immobility      H/O total knee replacement, right      History of lumpectomy of right breast      History of cholecystectomy  1992      Pacemaker      S/P lumpectomy of breast      Knee joint replacement status, right      History of cholecystectomy            Family history:  FAMILY HISTORY:  FHx: diabetes mellitus (Mother)    FHx: diabetes mellitus (Mother)      No GI cancers in first or second degree relatives    Social History: No smoking. No alcohol. No illegal drug use.    Allergies:   sulfa drugs (Hives)  shellfish (Unknown)  honeydew (Unknown)  Sulfacetamide Sodium-Sulfur (Rash)  latex (Unknown)  pickles (Unknown)  sulfa drugs (Anaphylaxis)  penicillins (Unknown)  fish (Anaphylaxis)  penicillin (Anaphylaxis)  Intolerances      MEDICATIONS: Home Medications:  amLODIPine 10 mg oral tablet: 1 tab(s) orally once a day (09 Apr 2024 09:24)  amLODIPine 10 mg oral tablet: 1 tab(s) orally once a day (09 Apr 2024 09:25)  atorvastatin 80 mg oral tablet: 1 tab(s) orally once a day (at bedtime) (09 Apr 2024 09:23)  atorvastatin 80 mg oral tablet: 1 tab(s) orally once a day (at bedtime) (09 Apr 2024 09:25)  clopidogrel 75 mg oral tablet: 1 tab(s) orally once a day (09 Apr 2024 09:24)  clopidogrel 75 mg oral tablet: 1 tab(s) orally once a day (09 Apr 2024 09:25)  donepezil 10 mg oral tablet: 1 tab(s) orally once a day (at bedtime) (09 Apr 2024 09:25)  Ecotrin Adult Low Strength 81 mg oral delayed release tablet: 2 tab(s) orally once a day (09 Apr 2024 09:25)  Ecotrin Adult Low Strength 81 mg oral delayed release tablet: 2 tab(s) orally every other day (09 Apr 2024 09:25)  exemestane 25 mg oral tablet: 1 tab(s) orally once a day (09 Apr 2024 09:23)  exemestane 25 mg oral tablet: 1 tab(s) orally once a day (09 Apr 2024 09:25)  ferrous sulfate:  (09 Apr 2024 09:25)  ferrous sulfate 324 mg (65 mg elemental iron) oral tablet: 1  orally 2 times a day (09 Apr 2024 09:25)  furosemide 20 mg oral tablet: 1 tab(s) orally (09 Apr 2024 09:25)  gabapentin 300 mg oral capsule: 1 cap(s) orally 2 times a day (09 Apr 2024 09:25)  gabapentin 300 mg oral capsule: orally 2 times a day (09 Apr 2024 09:25)  HumaLOG 100 units/mL subcutaneous solution: Per insulin sliding scale at home (09 Apr 2024 09:25)  humalog sliding scale: with meals (09 Apr 2024 09:25)  Lasix 20 mg oral tablet: 1 tab(s) orally once a day (09 Apr 2024 09:24)  Levemir: 30  subcutaneous once a day (at bedtime) (09 Apr 2024 09:25)  Levemir 100 units/mL subcutaneous solution: 30 unit(s) subcutaneous once a day (at bedtime) (09 Apr 2024 09:25)  levothyroxine 25 mcg (0.025 mg) oral tablet: 1 tab(s) orally once a day (09 Apr 2024 09:25)  levothyroxine 25 mcg (0.025 mg) oral tablet: 1 tab(s) orally once a day (09 Apr 2024 09:24)  metoprolol succinate 25 mg oral capsule, extended release: 1 cap(s) orally once a day (09 Apr 2024 09:25)  mirtazapine 15 mg oral tablet: 1 tab(s) orally once a day (at bedtime) (09 Apr 2024 09:25)  nortriptyline 25 mg oral capsule: 1 cap(s) orally once a day (at bedtime) (09 Apr 2024 09:23)  nortriptyline 25 mg oral capsule: 1 cap(s) orally once a day (at bedtime) (09 Apr 2024 09:25)  pantoprazole 40 mg oral delayed release tablet: 1 tab(s) orally once a day (before a meal) (09 Apr 2024 09:23)  Protonix 40 mg oral delayed release tablet: 1 tab(s) orally once a day (09 Apr 2024 09:25)  senna oral tablet: 2 tab(s) orally once a day (at bedtime)- PRN for constipation- OTC is okay (09 Apr 2024 09:24)  sertraline 100 mg oral tablet: 1 tab(s) orally once a day (09 Apr 2024 09:23)  sertraline 50 mg oral tablet: 1 tab(s) orally once a day (09 Apr 2024 09:25)  stool softener: at night (09 Apr 2024 09:25)  temazepam 30 mg oral capsule: 1 cap(s) orally once a day (at bedtime) (09 Apr 2024 09:25)  traMADol 100 mg oral tablet: 1 tab(s) orally once a day (09 Apr 2024 09:25)    MEDICATIONS  (STANDING):  aspirin enteric coated 81 milliGRAM(s) Oral daily  atorvastatin 80 milliGRAM(s) Oral at bedtime  clopidogrel Tablet 75 milliGRAM(s) Oral daily  dextrose 10% Bolus 125 milliLiter(s) IV Bolus once  dextrose 5%. 1000 milliLiter(s) (100 mL/Hr) IV Continuous <Continuous>  dextrose 5%. 1000 milliLiter(s) (50 mL/Hr) IV Continuous <Continuous>  dextrose 50% Injectable 12.5 Gram(s) IV Push once  dextrose 50% Injectable 25 Gram(s) IV Push once  donepezil 10 milliGRAM(s) Oral at bedtime  ferrous    sulfate 325 milliGRAM(s) Oral daily  gabapentin 300 milliGRAM(s) Oral every 12 hours  glucagon  Injectable 1 milliGRAM(s) IntraMuscular once  heparin   Injectable 5000 Unit(s) SubCutaneous every 8 hours  lactulose Syrup 20 Gram(s) Oral three times a day  levothyroxine 25 MICROGram(s) Oral daily  metoprolol succinate ER 25 milliGRAM(s) Oral daily  metroNIDAZOLE    Tablet 500 milliGRAM(s) Oral every 8 hours  pantoprazole    Tablet 40 milliGRAM(s) Oral before breakfast  senna 2 Tablet(s) Oral at bedtime  sertraline 50 milliGRAM(s) Oral daily  sertraline 100 milliGRAM(s) Oral daily  sodium chloride 0.9%. 1000 milliLiter(s) (80 mL/Hr) IV Continuous <Continuous>    MEDICATIONS  (PRN):  albuterol/ipratropium for Nebulization 3 milliLiter(s) Nebulizer every 6 hours PRN Shortness of Breath and/or Wheezing  dextrose Oral Gel 15 Gram(s) Oral once PRN Blood Glucose LESS THAN 70 milliGRAM(s)/deciliter  ondansetron Injectable 4 milliGRAM(s) IV Push every 4 hours PRN Nausea and/or Vomiting        REVIEW OF SYSTEMS  unobtainable       VITALS:   T(F): 97.5 (04-09-24 @ 04:30), Max: 99.4 (04-08-24 @ 14:51)  HR: 69 (04-09-24 @ 04:30) (69 - 81)  BP: 117/60 (04-09-24 @ 04:30) (108/64 - 130/60)  RR: 18 (04-09-24 @ 04:30) (18 - 18)  SpO2: 100% (04-09-24 @ 04:30) (99% - 100%)    PHYSICAL EXAM:  GENERAL: unable to respond to prompts  HEAD:  Atraumatic, Normocephalic  EYES: conjunctiva and sclera clear  NECK: Supple, No thyromegaly   CHEST/LUNG: Clear to auscultation bilaterally; No wheeze, rhonchi, or rales  HEART: Regular rate and rhythm; normal S1, S2, No murmurs.  ABDOMEN: Soft, nontender, nondistended; Bowel sounds present  NEUROLOGY: No asterixis or tremor  SKIN: Intact, no jaundice  Rectal exam-small stool burden in finger's reach        LABS:  04-09    142  |  102  |  50<H>  ----------------------------<  121<H>  4.5   |  24  |  4.4<HH>    Ca    7.5<L>      09 Apr 2024 06:16  Phos  7.1     04-09  Mg     1.8     04-09    TPro  5.8<L>  /  Alb  3.8  /  TBili  0.4  /  DBili  0.2  /  AST  55<H>  /  ALT  83<H>  /  AlkPhos  194<H>  04-09                          11.3   7.97  )-----------( 180      ( 09 Apr 2024 06:16 )             34.3     LIVER FUNCTIONS - ( 09 Apr 2024 06:16 )  Alb: 3.8 g/dL / Pro: 5.8 g/dL / ALK PHOS: 194 U/L / ALT: 83 U/L / AST: 55 U/L / GGT: x           PT/INR - ( 08 Apr 2024 06:59 )   PT: 14.60 sec;   INR: 1.28 ratio         PTT - ( 08 Apr 2024 06:59 )  PTT:37.2 sec    IMAGING:    < from: CT Abdomen and Pelvis w/ IV Cont (04.05.24 @ 08:37) >    ACC: [id number] EXAM:  CT ABDOMEN AND PELVIS IC   ORDERED BY: MIGUEL JUAREZ     PROCEDURE DATE:  04/05/2024          INTERPRETATION:  CLINICAL STATEMENT: Abdominal pain with nausea vomiting   and diarrhea    TECHNIQUE: Contiguous axial CT images were obtained from the lower chest   to the pubic symphysis .  95 cc administered of Omnipaque 350 (5 cc   discarded).  Oral contrast was not given.  Reformatted images in the   coronal and sagittal planes were acquired.    COMPARISON CT: None.    OTHER STUDIES USED FOR CORRELATION: None.      FINDINGS:    Evaluation somewhat limited due to arms are overlapping the torso   creating beam Fonseca artifact. Study also limited by obliquity.    LOWER CHEST: Pacer wires are seen within the heart. Cardiomegaly.   Scattered areas of atelectasis within the visualized lower lungs..    HEPATOBILIARY: Status post cholecystectomy. The liver is enlarged   measuring 17.3 cm in length..    SPLEEN: Numerous poorly defined enhancing lesions throughout the spleen..    PANCREAS: Atrophic pancreas. Punctate calcifications throughout the   pancreatic parenchyma suggesting sequelae of chronic pancreatitis..    ADRENAL GLANDS: Unremarkable.    KIDNEYS: Both kidneys demonstrate symmetric enhancement with no   hydronephrosis. There is a hyperdense lesion along the posterior aspect   of the superior right kidney measuring under centimeter. Another mildly   complex cystic lesion is seen laterally within the lower pole of the   right kidney..    ABDOMINOPELVIC NODES: Unremarkable.    PELVIC ORGANS: Almost completely collapsed urinary bladder. Atrophic   calcified uterus..    PERITONEUM/MESENTERY/BOWEL: There is presacral edema. Fecal retention   most severe within the rectum. No bowel obstruction..    BONES/SOFT TISSUES: Small fat-containing right inguinal hernia. Scoliosis   with multilevel degenerative changes. Compression fracture of L2 of   uncertain age.. Chronic deformity of the right pubic rami.    OTHER: Atherosclerotic changes of the aorta and itsvessels.      IMPRESSION:    Numerous ill-defined lesions throughout the spleen which could reflect   neoplastic process. Differential diagnosis includes multiple   microabscesses disease. Correlate clinically.    Complex processes involving the right kidney which may reflect complex   cysts. Solid nodules cannot be excluded. Correlate with dedicated CAT   scan of the kidneys with renal mass protocol MRI of the kidneys. This can   be performed on an outpatient basis.    Fecal retention most pronounced within the rectum.    --- End of Report ---            ASIA CRAVEN MD; Attending Radiologist  This document has been electronically signed. Apr 5 2024  9:01AM    < end of copied text >

## 2024-04-09 NOTE — PROCEDURE NOTE - NSSITEPREP_SKIN_A_CORE
chlorhexidine/Adherence to aseptic technique: hand hygiene prior to donning barriers (gown, gloves), don cap and mask, sterile drape over patient
alcohol/Adherence to aseptic technique: hand hygiene prior to donning barriers (gown, gloves), don cap and mask, sterile drape over patient

## 2024-04-09 NOTE — PROGRESS NOTE ADULT - ASSESSMENT
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       Acute Hypoxic resp Failure-possibly 2' to undiagnosed WENDY:  - Unclear etiology at this time  -CXR clear, No acute process, shows cardiomegaly  - Pulmonary consult appreciated, needs NIV   - Bipap as needed  - Supplemental O2 as needed  - Procal very mildly elevated  -D-dimer neg for age  -blood Cx NGTD  -TTE  - Discussed with family, HHA have been reporting she "never sleeps and wakes up frequently at night".  Never had sleep study or PFT.  2' hand smoke exposure from .    UTI:  -UCx grew GNR, follow Cx  -Continue empiric abx    [place], possibly contrast-induced:  -Continue fluids, monitor BMP  -US unremarkable  -Renal consult appreciated-  - anuric if documentation is accurate (has prima-fit)  - creat up-trending  - tolerating minimal po intake  - change iv fluids to 1/2NS and repeat BMP in PM to monitor Na level (to avoid hyponatremia)  - BP stable, off amlodipine  - cont strict i/o   - hematuria noted on 4/5 UA, check ANCA panel w/ MPO/PR3, SERGIO, C3/C4, serum flc ratio, spep/sife, CPK  - gabapentin should be max 300mg daily  - no acute indication for RRT    Transaminitis:  -Resolving, unclear etiology, US unremarkable, possibly 2' to Hypotensive episodes, follow GGT, hepatitis panel    Elevated troponin:  -Likely 2' to demand, PPM interrogated, no events  -TTE  -Trop flat  -No further need for tele     Abdominal pain with overflow diarrhea 2' to constipation  - Started patient on stool softener, lactulose due to fecal retention seen on CT  - Monitor vitals, bowel movements  -Will try enemas  -GI consult appreciated, s/p disimpaction    Splenic lesions  - Incidentally found on imaging, very unlikely the cause of her symptoms, clinically very unlikely infectious in nature  -Outpatient imaging for further characterization    Dementia  - Continue with zoloft, nortriptyline, and donepezil   - monitor mental status     HTN  - continue metoprolol, holding Norvasc to avoid Hypotension   - DASH diet    Diabetes Mellitus II  - CHO consistent diet  -Hold insulin for episodes of Hypoglycemia, restart PRN     Hypothyroidism  - Continue levothyroxine as prescribed     Chronic Pain   - Continue gabapentin and tramadol    DVT/VTE prophylaxis   Minced and moist diet per SLP, advance as per re-eval

## 2024-04-09 NOTE — PROGRESS NOTE ADULT - SUBJECTIVE AND OBJECTIVE BOX
Nephrology Progress Note    JAYASHREE FAGAN  MRN-[id number]  84y  Female    S:  Patient is seen and examined, events over the last 24h noted.    O:  Allergies:  sulfa drugs (Hives)  shellfish (Unknown)  honeydew (Unknown)  Sulfacetamide Sodium-Sulfur (Rash)  latex (Unknown)  pickles (Unknown)  sulfa drugs (Anaphylaxis)  penicillins (Unknown)  fish (Anaphylaxis)  penicillin (Anaphylaxis)    Hospital Medications:   MEDICATIONS  (STANDING):  aspirin enteric coated 81 milliGRAM(s) Oral daily  atorvastatin 80 milliGRAM(s) Oral at bedtime  clopidogrel Tablet 75 milliGRAM(s) Oral daily  donepezil 10 milliGRAM(s) Oral at bedtime  ferrous    sulfate 325 milliGRAM(s) Oral daily  gabapentin 300 milliGRAM(s) Oral every 12 hours  heparin   Injectable 5000 Unit(s) SubCutaneous every 8 hours  lactulose Syrup 20 Gram(s) Oral three times a day  levothyroxine 25 MICROGram(s) Oral daily  metoprolol succinate ER 25 milliGRAM(s) Oral daily  metroNIDAZOLE    Tablet 500 milliGRAM(s) Oral every 8 hours  pantoprazole    Tablet 40 milliGRAM(s) Oral before breakfast  senna 2 Tablet(s) Oral at bedtime  sertraline 50 milliGRAM(s) Oral daily  sertraline 100 milliGRAM(s) Oral daily  sodium chloride 0.9%. 1000 milliLiter(s) (80 mL/Hr) IV Continuous <Continuous>    MEDICATIONS  (PRN):  albuterol/ipratropium for Nebulization 3 milliLiter(s) Nebulizer every 6 hours PRN Shortness of Breath and/or Wheezing  dextrose Oral Gel 15 Gram(s) Oral once PRN Blood Glucose LESS THAN 70 milliGRAM(s)/deciliter  ondansetron Injectable 4 milliGRAM(s) IV Push every 4 hours PRN Nausea and/or Vomiting    Home Medications:  amLODIPine 10 mg oral tablet: 1 tab(s) orally once a day (09 Apr 2024 09:24)  amLODIPine 10 mg oral tablet: 1 tab(s) orally once a day (09 Apr 2024 09:25)  atorvastatin 80 mg oral tablet: 1 tab(s) orally once a day (at bedtime) (09 Apr 2024 09:23)  atorvastatin 80 mg oral tablet: 1 tab(s) orally once a day (at bedtime) (09 Apr 2024 09:25)  clopidogrel 75 mg oral tablet: 1 tab(s) orally once a day (09 Apr 2024 09:24)  clopidogrel 75 mg oral tablet: 1 tab(s) orally once a day (09 Apr 2024 09:25)  donepezil 10 mg oral tablet: 1 tab(s) orally once a day (at bedtime) (09 Apr 2024 09:25)  Ecotrin Adult Low Strength 81 mg oral delayed release tablet: 2 tab(s) orally once a day (09 Apr 2024 09:25)  Ecotrin Adult Low Strength 81 mg oral delayed release tablet: 2 tab(s) orally every other day (09 Apr 2024 09:25)  exemestane 25 mg oral tablet: 1 tab(s) orally once a day (09 Apr 2024 09:23)  exemestane 25 mg oral tablet: 1 tab(s) orally once a day (09 Apr 2024 09:25)  ferrous sulfate:  (09 Apr 2024 09:25)  ferrous sulfate 324 mg (65 mg elemental iron) oral tablet: 1  orally 2 times a day (09 Apr 2024 09:25)  furosemide 20 mg oral tablet: 1 tab(s) orally (09 Apr 2024 09:25)  gabapentin 300 mg oral capsule: 1 cap(s) orally 2 times a day (09 Apr 2024 09:25)  gabapentin 300 mg oral capsule: orally 2 times a day (09 Apr 2024 09:25)  HumaLOG 100 units/mL subcutaneous solution: Per insulin sliding scale at home (09 Apr 2024 09:25)  humalog sliding scale: with meals (09 Apr 2024 09:25)  Lasix 20 mg oral tablet: 1 tab(s) orally once a day (09 Apr 2024 09:24)  Levemir: 30  subcutaneous once a day (at bedtime) (09 Apr 2024 09:25)  Levemir 100 units/mL subcutaneous solution: 30 unit(s) subcutaneous once a day (at bedtime) (09 Apr 2024 09:25)  levothyroxine 25 mcg (0.025 mg) oral tablet: 1 tab(s) orally once a day (09 Apr 2024 09:25)  levothyroxine 25 mcg (0.025 mg) oral tablet: 1 tab(s) orally once a day (09 Apr 2024 09:24)  metoprolol succinate 25 mg oral capsule, extended release: 1 cap(s) orally once a day (09 Apr 2024 09:25)  mirtazapine 15 mg oral tablet: 1 tab(s) orally once a day (at bedtime) (09 Apr 2024 09:25)  nortriptyline 25 mg oral capsule: 1 cap(s) orally once a day (at bedtime) (09 Apr 2024 09:23)  nortriptyline 25 mg oral capsule: 1 cap(s) orally once a day (at bedtime) (09 Apr 2024 09:25)  pantoprazole 40 mg oral delayed release tablet: 1 tab(s) orally once a day (before a meal) (09 Apr 2024 09:23)  Protonix 40 mg oral delayed release tablet: 1 tab(s) orally once a day (09 Apr 2024 09:25)  senna oral tablet: 2 tab(s) orally once a day (at bedtime)- PRN for constipation- OTC is okay (09 Apr 2024 09:24)  sertraline 100 mg oral tablet: 1 tab(s) orally once a day (09 Apr 2024 09:23)  sertraline 50 mg oral tablet: 1 tab(s) orally once a day (09 Apr 2024 09:25)  stool softener: at night (09 Apr 2024 09:25)  temazepam 30 mg oral capsule: 1 cap(s) orally once a day (at bedtime) (09 Apr 2024 09:25)  traMADol 100 mg oral tablet: 1 tab(s) orally once a day (09 Apr 2024 09:25)      VITALS:  Daily     Daily   T(F): 97.5 (04-09-24 @ 04:30), Max: 99.4 (04-08-24 @ 14:51)  HR: 69 (04-09-24 @ 04:30)  BP: 117/60 (04-09-24 @ 04:30)  RR: 18 (04-09-24 @ 04:30)  SpO2: 100% (04-09-24 @ 04:30)  Wt(kg): --  I&O's Detail    08 Apr 2024 07:01  -  09 Apr 2024 07:00  --------------------------------------------------------  IN:  Total IN: 0 mL    OUT:    Voided (mL): 0 mL  Total OUT: 0 mL    Total NET: 0 mL        I&O's Summary    08 Apr 2024 07:01  -  09 Apr 2024 07:00  --------------------------------------------------------  IN: 0 mL / OUT: 0 mL / NET: 0 mL          PHYSICAL EXAM:  Gen: NAD on nc, confused  Chest: b/l breath sounds  Abd: soft  Extremities: no pitting edema      LABS:      04-09    142  |  102  |  50<H>  ----------------------------<  121<H>  4.5   |  24  |  4.4<HH>    Ca    7.5<L>      09 Apr 2024 06:16  Phos  7.1     04-09  Mg     1.8     04-09    TPro  5.8<L>  /  Alb  3.8  /  TBili  0.4  /  DBili  0.2  /  AST  55<H>  /  ALT  83<H>  /  AlkPhos  194<H>  04-09    Phosphorus: 7.1 mg/dL (04-09-24 @ 06:16)  Phosphorus: 7.3 mg/dL (04-08-24 @ 06:59)                            11.3   7.97  )-----------( 180      ( 09 Apr 2024 06:16 )             34.3     Mean Cell Volume: 95.3 fL (04-09-24 @ 06:16)      Urine Studies:  Protein, Urine: 30 mg/dL (04-05-24 @ 04:34)  White Blood Cell - Urine: 7 /HPF (04-05-24 @ 04:34)  Red Blood Cell - Urine: 3 /HPF (04-05-24 @ 04:34)    Culture Results:   No growth at 24 hours (04-07 @ 17:18)  Culture Results:   No growth at 24 hours (04-07 @ 17:10)    Creatinine trend:  Creatinine: 4.4 mg/dL (04-09-24 @ 06:16)  Creatinine: 3.7 mg/dL (04-08-24 @ 06:59)  Creatinine: 2.5 mg/dL (04-07-24 @ 09:21)  Creatinine: 2.3 mg/dL (04-06-24 @ 21:39)  Creatinine: 2.1 mg/dL (04-06-24 @ 18:00)  Creatinine: 1.7 mg/dL (04-06-24 @ 09:06)  Creatinine: 1.5 mg/dL (04-05-24 @ 05:16)  Creatinine, Serum: 1.5 mg/dL (11-25-22 @ 23:35)

## 2024-04-09 NOTE — SWALLOW BEDSIDE ASSESSMENT ADULT - SWALLOW EVAL: DIAGNOSIS
+ toleration observed without overt symptoms of penetration/aspiration for Thins. Declined all trials of solids - dementia with AMS, yelling and spitting at SLP.
+ toleration observed without overt symptoms of penetration/aspiration for minced and moist w/ thin liquids
Mild oral dysphagia. Overt s/s of penetration/aspiration for soft and bite sized solids. Pt tolerated puree, minced and moist and thin liquids w/o any overt s/s of penetration/aspiration.

## 2024-04-09 NOTE — SWALLOW BEDSIDE ASSESSMENT ADULT - NS SPL SWALLOW CLINIC TRIAL FT
Mild oral dysphagia characterized by decreased mastication ability and delayed oral transit time. No overt s/s of penetration/aspiration for puree, minced and moist w/ thins.
+ toleration observed without overt symptoms of penetration/aspiration for thins, Declined all other trials.
+ toleration observed without overt symptoms of penetration/aspiration for thins, Declined all other trials.

## 2024-04-10 NOTE — PROGRESS NOTE ADULT - ASSESSMENT
JABARI on CKD 3 / likely d/t contrast induced nephropathy    - urine output not documented  - creat up-trending  - tolerating minimal po intake  - change iv fluids to 1/2NS and repeat BMP in PM to monitor Na level (to avoid hyponatremia)  - BP stable, off amlodipine  - please document urine output  - phos level noted, monitor  - maintain on renal diet   - dose meds for eGFR < 10 while creat is acutely rising  - repeat UA, urine lytes, urine pr/cr ratio  - hematuria noted on 4/5 UA, check ANCA panel w/ MPO/PR3, SERGIO, C3/C4, serum flc ratio, spep/sife, CPK  - no acute indication for RRT at this point / will monitor closely, will need RRT if urine output not improving   - CT noted, no hydronephrosis;  will need outpatient  monitoring for CTAP findings of complex renal cysts JABARI on CKD 3 / likely d/t contrast induced nephropathy    - urine output not documented  - creat up-trending  - tolerating minimal po intake  - change iv fluids to 1/2NS and repeat BMP in PM to monitor Na level (to avoid hyponatremia)  - BP stable, off amlodipine  - please document urine output  - phos level noted, monitor  - maintain on renal diet   - dose meds for eGFR < 10 while creat is acutely rising  - repeat UA, urine lytes, urine pr/cr ratio  - hematuria noted on 4/5 UA, check ANCA panel w/ MPO/PR3, SERGIO, C3/C4, serum flc ratio, spep/sife, CPK  - no acute indication for RRT at this point / will monitor closely, will need RRT if urine output not improving   - CT noted, no hydronephrosis;  will need outpatient  monitoring for CTAP findings of complex renal cysts  - tried to call contacts in chart to give family update, did not  phone

## 2024-04-10 NOTE — PROGRESS NOTE ADULT - SUBJECTIVE AND OBJECTIVE BOX
84yFemale  Being followed for fecal impaction  Interval history:  No hematemesis, melena, blood in stool reported. no bms after disimpaction.      PAST MEDICAL & SURGICAL HISTORY:   DM (diabetes mellitus)  24 yr      Breast CA  2014 s/p rt      BP (high blood pressure)  20 yr      Depression      High cholesterol      Bilateral hearing loss, unspecified hearing loss type      CVA (cerebral vascular accident)  6/18 lft weakness      Hypertension      Diabetes      CVA (cerebrovascular accident)      Pacemaker      Breast cancer      History of pressure ulcer      Dementia      Hypothyroidism      CAD (coronary artery disease)      Immobility      H/O total knee replacement, right      History of lumpectomy of right breast      History of cholecystectomy  1992      Pacemaker      S/P lumpectomy of breast      Knee joint replacement status, right      History of cholecystectomy                Social History: No smoking. No alcohol. No illegal drug use.          MEDICATIONS  (STANDING):  aspirin enteric coated 81 milliGRAM(s) Oral daily  atorvastatin 80 milliGRAM(s) Oral at bedtime  clopidogrel Tablet 75 milliGRAM(s) Oral daily  dextrose 10% Bolus 125 milliLiter(s) IV Bolus once  dextrose 5%. 1000 milliLiter(s) (100 mL/Hr) IV Continuous <Continuous>  dextrose 5%. 1000 milliLiter(s) (50 mL/Hr) IV Continuous <Continuous>  dextrose 50% Injectable 25 Gram(s) IV Push once  dextrose 50% Injectable 12.5 Gram(s) IV Push once  donepezil 10 milliGRAM(s) Oral at bedtime  ferrous    sulfate 325 milliGRAM(s) Oral daily  gabapentin 300 milliGRAM(s) Oral daily  glucagon  Injectable 1 milliGRAM(s) IntraMuscular once  heparin   Injectable 5000 Unit(s) SubCutaneous every 8 hours  lactulose Syrup 20 Gram(s) Oral three times a day  levoFLOXacin  Tablet 500 milliGRAM(s) Oral every 48 hours  levothyroxine 25 MICROGram(s) Oral daily  metoprolol succinate ER 25 milliGRAM(s) Oral daily  pantoprazole    Tablet 40 milliGRAM(s) Oral before breakfast  senna 2 Tablet(s) Oral at bedtime  sertraline 100 milliGRAM(s) Oral daily  sertraline 50 milliGRAM(s) Oral daily  sodium chloride 0.45%. 1000 milliLiter(s) (80 mL/Hr) IV Continuous <Continuous>    MEDICATIONS  (PRN):  albuterol/ipratropium for Nebulization 3 milliLiter(s) Nebulizer every 6 hours PRN Shortness of Breath and/or Wheezing  dextrose Oral Gel 15 Gram(s) Oral once PRN Blood Glucose LESS THAN 70 milliGRAM(s)/deciliter  ondansetron Injectable 4 milliGRAM(s) IV Push every 4 hours PRN Nausea and/or Vomiting      Allergies:   sulfa drugs (Hives)  Melons (Unknown)  shellfish (Unknown)  Sulfacetamide Sodium-Sulfur (Rash)  latex (Unknown)  sulfa drugs (Anaphylaxis)  penicillins (Unknown)  fish (Anaphylaxis)  penicillin (Anaphylaxis)  Intolerances          REVIEW OF SYSTEMS:  unobtainable       VITAL SIGNS:   T(F): 97.9 (04-10-24 @ 14:20), Max: 98.2 (04-10-24 @ 05:49)  HR: 68 (04-10-24 @ 14:20) (65 - 68)  BP: 124/58 (04-10-24 @ 14:20) (114/55 - 124/58)  RR: 20 (04-10-24 @ 14:20) (18 - 20)  SpO2: 95% (04-10-24 @ 14:20) (95% - 100%)    PHYSICAL EXAM:  GENERAL: not responding to prompts appropriately   HEAD:  Atraumatic, Normocephalic  EYES: conjunctiva and sclera clear  NECK: Supple, no JVD or thyromegaly  CHEST/LUNG: Clear to auscultation bilaterally; No wheeze, rhonchi, or rales  HEART: Regular rate and rhythm; normal S1, S2, No murmurs.  ABDOMEN: Soft, nontender, nondistended; Bowel sounds present  NEUROLOGY: No asterixis or tremor.   SKIN: Intact, no jaundice            LABS:                        10.5   8.06  )-----------( 190      ( 10 Apr 2024 06:21 )             31.8     04-10    139  |  102  |  53<H>  ----------------------------<  122<H>  4.6   |  20  |  5.1<HH>    Ca    7.1<L>      10 Apr 2024 06:21  Phos  6.8     04-10  Mg     1.9     04-10    TPro  5.4<L>  /  Alb  3.5  /  TBili  0.4  /  DBili  <0.2  /  AST  36  /  ALT  58<H>  /  AlkPhos  164<H>  04-10    LIVER FUNCTIONS - ( 10 Apr 2024 06:21 )  Alb: 3.5 g/dL / Pro: 5.4 g/dL / ALK PHOS: 164 U/L / ALT: 58 U/L / AST: 36 U/L / GGT: x               IMAGING:    < from: CT Abdomen and Pelvis w/ IV Cont (04.05.24 @ 08:37) >    ACC: [id number] EXAM:  CT ABDOMEN AND PELVIS IC   ORDERED BY: MIGUEL JUAREZ     PROCEDURE DATE:  04/05/2024          INTERPRETATION:  CLINICAL STATEMENT: Abdominal pain with nausea vomiting   and diarrhea    TECHNIQUE: Contiguous axial CT images were obtained from the lower chest   to the pubic symphysis .  95 cc administered of Omnipaque 350 (5 cc   discarded).  Oral contrast was not given.  Reformatted images in the   coronal and sagittal planes were acquired.    COMPARISON CT: None.    OTHER STUDIES USED FOR CORRELATION: None.      FINDINGS:    Evaluation somewhat limited due to arms are overlapping the torso   creating beam Fonseca artifact. Study also limited by obliquity.    LOWER CHEST: Pacer wires are seen within the heart. Cardiomegaly.   Scattered areas of atelectasis within the visualized lower lungs..    HEPATOBILIARY: Status post cholecystectomy. The liver is enlarged   measuring 17.3 cm in length..    SPLEEN: Numerous poorly defined enhancing lesions throughout the spleen..    PANCREAS: Atrophic pancreas. Punctate calcifications throughout the   pancreatic parenchyma suggesting sequelae of chronic pancreatitis..    ADRENAL GLANDS: Unremarkable.    KIDNEYS: Both kidneys demonstrate symmetric enhancement with no   hydronephrosis. There is a hyperdense lesion along the posterior aspect   of the superior right kidney measuring under centimeter. Another mildly   complex cystic lesion is seen laterally within the lower pole of the   right kidney..    ABDOMINOPELVIC NODES: Unremarkable.    PELVIC ORGANS: Almost completely collapsed urinary bladder. Atrophic   calcified uterus..    PERITONEUM/MESENTERY/BOWEL: There is presacral edema. Fecal retention   most severe within the rectum. No bowel obstruction..    BONES/SOFT TISSUES: Small fat-containing right inguinal hernia. Scoliosis   with multilevel degenerative changes. Compression fracture of L2 of   uncertain age.. Chronic deformity of the right pubic rami.    OTHER: Atherosclerotic changes of the aorta and itsvessels.      IMPRESSION:    Numerous ill-defined lesions throughout the spleen which could reflect   neoplastic process. Differential diagnosis includes multiple   microabscesses disease. Correlate clinically.    Complex processes involving the right kidney which may reflect complex   cysts. Solid nodules cannot be excluded. Correlate with dedicated CAT   scan of the kidneys with renal mass protocol MRI of the kidneys. This can   be performed on an outpatient basis.    Fecal retention most pronounced within the rectum.    --- End of Report ---            ASIA CRAVEN MD; Attending Radiologist  This document has been electronically signed. Apr 5 2024  9:01AM    < end of copied text >

## 2024-04-10 NOTE — PROGRESS NOTE ADULT - SUBJECTIVE AND OBJECTIVE BOX
Nephrology Progress Note    JAYASHREE FAGAN  MRN-[id number]  84y  Female    S:  Patient is seen and examined, events over the last 24h noted.    O:  Allergies:  sulfa drugs (Hives)  Melons (Unknown)  shellfish (Unknown)  Sulfacetamide Sodium-Sulfur (Rash)  latex (Unknown)  sulfa drugs (Anaphylaxis)  penicillins (Unknown)  fish (Anaphylaxis)  penicillin (Anaphylaxis)    Hospital Medications:   MEDICATIONS  (STANDING):  aspirin enteric coated 81 milliGRAM(s) Oral daily  atorvastatin 80 milliGRAM(s) Oral at bedtime  clopidogrel Tablet 75 milliGRAM(s) Oral daily  donepezil 10 milliGRAM(s) Oral at bedtime  ferrous    sulfate 325 milliGRAM(s) Oral daily  gabapentin 300 milliGRAM(s) Oral daily  glucagon  Injectable 1 milliGRAM(s) IntraMuscular once  heparin   Injectable 5000 Unit(s) SubCutaneous every 8 hours  lactulose Syrup 20 Gram(s) Oral three times a day  levoFLOXacin  Tablet 500 milliGRAM(s) Oral every 48 hours  levothyroxine 25 MICROGram(s) Oral daily  metoprolol succinate ER 25 milliGRAM(s) Oral daily  pantoprazole    Tablet 40 milliGRAM(s) Oral before breakfast  senna 2 Tablet(s) Oral at bedtime  sertraline 50 milliGRAM(s) Oral daily  sertraline 100 milliGRAM(s) Oral daily  sodium chloride 0.45%. 1000 milliLiter(s) (80 mL/Hr) IV Continuous <Continuous>    MEDICATIONS  (PRN):  albuterol/ipratropium for Nebulization 3 milliLiter(s) Nebulizer every 6 hours PRN Shortness of Breath and/or Wheezing  dextrose Oral Gel 15 Gram(s) Oral once PRN Blood Glucose LESS THAN 70 milliGRAM(s)/deciliter  ondansetron Injectable 4 milliGRAM(s) IV Push every 4 hours PRN Nausea and/or Vomiting    Home Medications:  amLODIPine 10 mg oral tablet: 1 tab(s) orally once a day (09 Apr 2024 09:24)  amLODIPine 10 mg oral tablet: 1 tab(s) orally once a day (09 Apr 2024 09:25)  atorvastatin 80 mg oral tablet: 1 tab(s) orally once a day (at bedtime) (09 Apr 2024 09:23)  atorvastatin 80 mg oral tablet: 1 tab(s) orally once a day (at bedtime) (09 Apr 2024 09:25)  clopidogrel 75 mg oral tablet: 1 tab(s) orally once a day (09 Apr 2024 09:24)  clopidogrel 75 mg oral tablet: 1 tab(s) orally once a day (09 Apr 2024 09:25)  donepezil 10 mg oral tablet: 1 tab(s) orally once a day (at bedtime) (09 Apr 2024 09:25)  Ecotrin Adult Low Strength 81 mg oral delayed release tablet: 2 tab(s) orally once a day (09 Apr 2024 09:25)  Ecotrin Adult Low Strength 81 mg oral delayed release tablet: 2 tab(s) orally every other day (09 Apr 2024 09:25)  exemestane 25 mg oral tablet: 1 tab(s) orally once a day (09 Apr 2024 09:23)  exemestane 25 mg oral tablet: 1 tab(s) orally once a day (09 Apr 2024 09:25)  ferrous sulfate:  (09 Apr 2024 09:25)  ferrous sulfate 324 mg (65 mg elemental iron) oral tablet: 1  orally 2 times a day (09 Apr 2024 09:25)  furosemide 20 mg oral tablet: 1 tab(s) orally (09 Apr 2024 09:25)  gabapentin 300 mg oral capsule: 1 cap(s) orally 2 times a day (09 Apr 2024 09:25)  gabapentin 300 mg oral capsule: orally 2 times a day (09 Apr 2024 09:25)  HumaLOG 100 units/mL subcutaneous solution: Per insulin sliding scale at home (09 Apr 2024 09:25)  humalog sliding scale: with meals (09 Apr 2024 09:25)  Lasix 20 mg oral tablet: 1 tab(s) orally once a day (09 Apr 2024 09:24)  Levemir: 30  subcutaneous once a day (at bedtime) (09 Apr 2024 09:25)  Levemir 100 units/mL subcutaneous solution: 30 unit(s) subcutaneous once a day (at bedtime) (09 Apr 2024 09:25)  levothyroxine 25 mcg (0.025 mg) oral tablet: 1 tab(s) orally once a day (09 Apr 2024 09:25)  levothyroxine 25 mcg (0.025 mg) oral tablet: 1 tab(s) orally once a day (09 Apr 2024 09:24)  metoprolol succinate 25 mg oral capsule, extended release: 1 cap(s) orally once a day (09 Apr 2024 09:25)  mirtazapine 15 mg oral tablet: 1 tab(s) orally once a day (at bedtime) (09 Apr 2024 09:25)  nortriptyline 25 mg oral capsule: 1 cap(s) orally once a day (at bedtime) (09 Apr 2024 09:23)  nortriptyline 25 mg oral capsule: 1 cap(s) orally once a day (at bedtime) (09 Apr 2024 09:25)  pantoprazole 40 mg oral delayed release tablet: 1 tab(s) orally once a day (before a meal) (09 Apr 2024 09:23)  Protonix 40 mg oral delayed release tablet: 1 tab(s) orally once a day (09 Apr 2024 09:25)  senna oral tablet: 2 tab(s) orally once a day (at bedtime)- PRN for constipation- OTC is okay (09 Apr 2024 09:24)  sertraline 100 mg oral tablet: 1 tab(s) orally once a day (09 Apr 2024 09:23)  sertraline 50 mg oral tablet: 1 tab(s) orally once a day (09 Apr 2024 09:25)  stool softener: at night (09 Apr 2024 09:25)  temazepam 30 mg oral capsule: 1 cap(s) orally once a day (at bedtime) (09 Apr 2024 09:25)  traMADol 100 mg oral tablet: 1 tab(s) orally once a day (09 Apr 2024 09:25)      VITALS:  Daily     Daily   T(F): 98.2 (04-10-24 @ 05:49), Max: 98.3 (04-09-24 @ 14:03)  HR: 65 (04-10-24 @ 05:49)  BP: 118/58 (04-10-24 @ 05:49)  RR: 18 (04-10-24 @ 05:49)  SpO2: 100% (04-10-24 @ 05:49)  Wt(kg): --  I&O's Detail    I&O's Summary        PHYSICAL EXAM:  Gen: NAD  Chest: b/l breath sounds  Abd: soft  Extremities: no LE edema      LABS:      04-10    139  |  102  |  53<H>  ----------------------------<  122<H>  4.6   |  20  |  5.1<HH>    Ca    7.1<L>      10 Apr 2024 06:21  Phos  6.8     04-10  Mg     1.9     04-10    TPro  5.4<L>  /  Alb  3.5  /  TBili  0.4  /  DBili  <0.2  /  AST  36  /  ALT  58<H>  /  AlkPhos  164<H>  04-10    Phosphorus: 6.8 mg/dL (04-10-24 @ 06:21)  Phosphorus: 7.1 mg/dL (04-09-24 @ 06:16)                            10.5   8.06  )-----------( 190      ( 10 Apr 2024 06:21 )             31.8     Mean Cell Volume: 95.5 fL (04-10-24 @ 06:21)      Urine Studies:  Protein, Urine: 30 mg/dL (04-05-24 @ 04:34)  White Blood Cell - Urine: 7 /HPF (04-05-24 @ 04:34)  Red Blood Cell - Urine: 3 /HPF (04-05-24 @ 04:34)      Creatinine trend:  Creatinine: 5.1 mg/dL (04-10-24 @ 06:21)  Creatinine: 4.4 mg/dL (04-09-24 @ 06:16)  Creatinine: 3.7 mg/dL (04-08-24 @ 06:59)  Creatinine: 2.5 mg/dL (04-07-24 @ 09:21)  Creatinine: 2.3 mg/dL (04-06-24 @ 21:39)  Creatinine: 2.1 mg/dL (04-06-24 @ 18:00)  Creatinine: 1.7 mg/dL (04-06-24 @ 09:06)  Creatinine: 1.5 mg/dL (04-05-24 @ 05:16)  Creatinine, Serum: 1.5 mg/dL (11-25-22 @ 23:35)

## 2024-04-10 NOTE — PROGRESS NOTE ADULT - ASSESSMENT
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       Acute Hypoxic resp Failure-possibly 2' to undiagnosed WENDY:  - Unclear etiology at this time  -CXR clear, No acute process, shows cardiomegaly  - Pulmonary consult appreciated, needs NIV   - Bipap as needed  - Supplemental O2 as needed  - Procal very mildly elevated  -D-dimer neg for age  -blood Cx NGTD  -TTE  - Discussed with family, HHA have been reporting she "never sleeps and wakes up frequently at night".  Never had sleep study or PFT.  2' hand smoke exposure from .    UTI:  -UCx grew areococcus urinae  -Completed course of Levaquin-discussed with ID specialist, this will cover    Onesimo, possibly contrast-induced:  -Continue fluids, monitor BMP  -US unremarkable  -Renal consult appreciated-  - anuric if documentation is accurate (has prima-fit)  - creat up-trending  - tolerating minimal po intake  - change iv fluids to 1/2NS and repeat BMP in PM to monitor Na level (to avoid hyponatremia)  - BP stable, off amlodipine  - cont strict i/o   - hematuria noted on 4/5 UA, check ANCA panel w/ MPO/PR3, SERGIO, C3/C4, serum flc ratio, spep/sife, CPK (ordered yesterday, unclear why not drawn)  - gabapentin should be max 300mg daily  - no acute indication for RRT    Transaminitis:  -Resolving, unclear etiology, US unremarkable, possibly 2' to Hypotensive episodes, follow GGT, hepatitis panel    Elevated troponin:  -Likely 2' to demand, PPM interrogated, no events  -TTE pending  -Trop flat  -No further need for tele     Abdominal pain with overflow diarrhea 2' to constipation  - Started patient on stool softener, lactulose due to fecal retention seen on CT  - Monitor vitals, bowel movements  -Will try enemas  -GI consult appreciated, s/p disimpaction    Splenic lesions  - Incidentally found on imaging, very unlikely the cause of her symptoms, clinically very unlikely infectious in nature  -Outpatient imaging for further characterization    Dementia  - Continue with zoloft, nortriptyline, and donepezil   - monitor mental status     HTN  - continue metoprolol, holding Norvasc to avoid Hypotension   - DASH diet    Diabetes Mellitus II  - CHO consistent diet  -Hold insulin for episodes of Hypoglycemia, restart PRN     Hypothyroidism  - Continue levothyroxine as prescribed     Chronic Pain   - Continue gabapentin and tramadol    DVT/VTE prophylaxis   Minced and moist diet per SLP, advance as per re-eval    Dispo:  -Acute.  Patient initially presented with diarrhea thought to be overflow due to incontinence (s/p disimpaction).  She then became hypoxic, febrile (worked up for Sepsis), no PNA, fever likely 2' to UTI (has been treated with Levaquin), brief episodes of hypoxia likely WENDY/COPD (needs PFTs, sleep study as outpatient).  Renal function began to worsen; see plan by renal (contrast induced?).  Mental status waxes and wanes; at baseline she is AAO x 2-3 and is very sarcastic; will make jokes and tell you to leave her alone.  -Follow TTE  -Monitor renal function  -Monitor BM  -When pt moving bowels regularly and renal function improves can DC likely to rehab.

## 2024-04-10 NOTE — PHYSICAL THERAPY INITIAL EVALUATION ADULT - PERTINENT HX OF CURRENT PROBLEM, REHAB EVAL
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  Daughter states that the patient's aide reported the pain, nausea, vomiting, fever, and an episode of diarrhea at 4AM, which eventually led to ER visit.  Daughter also states that the patients mental status was significantly altered from baseline, stating that she sounded "out of it" and "more confused then usual" the night before.  No reports of headaches, visual changes, dysuria.

## 2024-04-10 NOTE — PHYSICAL THERAPY INITIAL EVALUATION ADULT - ADDITIONAL COMMENTS
pt lives alone in a private house with a ramp with 24 hour care, bed bound at base line, using Joann lift at home

## 2024-04-10 NOTE — PROGRESS NOTE ADULT - ASSESSMENT
84yFemale pmh DM, breast cancer 2014 s/p rt, BP, HTN, CVA, pacemaker insertion brought for altered mental status. GI consulted for fecal impaction asked for disimpaction.    #Fecal retention most pronounced within the rectum.  Rec  -performed manual disimpaction at bedside, small stool burden evacuated 4/9/24  -lavaged colon with 200ccs 4/9/24  -tap water enemas q4h ordered for today  -after spontaneous bms will start bowel regimen     #Numerous ill-defined lesions throughout the spleen which could reflect   neoplastic process. Differential diagnosis includes multiple   microabscesses disease. Correlate clinically.  Rec  - as per oncology team    #Complex processes involving the right kidney which may reflect complex   cysts. Solid nodules cannot be excluded. Correlate with dedicated CAT   scan of the kidneys with renal mass protocol MRI of the kidneys. This can   be performed on an outpatient basis.  Rec  - Care as per primary team

## 2024-04-10 NOTE — PROGRESS NOTE ADULT - SUBJECTIVE AND OBJECTIVE BOX
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     Today:  Seen at bedside, no overnight events.        REVIEW OF SYSTEMS:  No complaints.    MEDICATIONS  (STANDING):  aspirin enteric coated 81 milliGRAM(s) Oral daily  atorvastatin 80 milliGRAM(s) Oral at bedtime  clopidogrel Tablet 75 milliGRAM(s) Oral daily  dextrose 10% Bolus 125 milliLiter(s) IV Bolus once  dextrose 5%. 1000 milliLiter(s) (100 mL/Hr) IV Continuous <Continuous>  dextrose 5%. 1000 milliLiter(s) (50 mL/Hr) IV Continuous <Continuous>  dextrose 50% Injectable 25 Gram(s) IV Push once  dextrose 50% Injectable 12.5 Gram(s) IV Push once  donepezil 10 milliGRAM(s) Oral at bedtime  ferrous    sulfate 325 milliGRAM(s) Oral daily  gabapentin 300 milliGRAM(s) Oral daily  glucagon  Injectable 1 milliGRAM(s) IntraMuscular once  heparin   Injectable 5000 Unit(s) SubCutaneous every 8 hours  lactulose Syrup 20 Gram(s) Oral three times a day  levoFLOXacin  Tablet 500 milliGRAM(s) Oral every 48 hours  levothyroxine 25 MICROGram(s) Oral daily  metoprolol succinate ER 25 milliGRAM(s) Oral daily  pantoprazole    Tablet 40 milliGRAM(s) Oral before breakfast  senna 2 Tablet(s) Oral at bedtime  sertraline 50 milliGRAM(s) Oral daily  sertraline 100 milliGRAM(s) Oral daily  sodium chloride 0.45%. 1000 milliLiter(s) (80 mL/Hr) IV Continuous <Continuous>    MEDICATIONS  (PRN):  albuterol/ipratropium for Nebulization 3 milliLiter(s) Nebulizer every 6 hours PRN Shortness of Breath and/or Wheezing  dextrose Oral Gel 15 Gram(s) Oral once PRN Blood Glucose LESS THAN 70 milliGRAM(s)/deciliter  ondansetron Injectable 4 milliGRAM(s) IV Push every 4 hours PRN Nausea and/or Vomiting      Allergies  sulfa drugs (Hives)  Melons (Unknown)  shellfish (Unknown)  Sulfacetamide Sodium-Sulfur (Rash)  latex (Unknown)  sulfa drugs (Anaphylaxis)  penicillins (Unknown)  fish (Anaphylaxis)  penicillin (Anaphylaxis)      FAMILY HISTORY:  FHx: diabetes mellitus (Mother)  FHx: diabetes mellitus (Mother)        Vital Signs Last 24 Hrs  T(C): 36.8 (10 Apr 2024 05:49), Max: 36.8 (09 Apr 2024 14:03)  T(F): 98.2 (10 Apr 2024 05:49), Max: 98.3 (09 Apr 2024 14:03)  HR: 65 (10 Apr 2024 05:49) (65 - 68)  BP: 118/58 (10 Apr 2024 05:49) (114/55 - 125/67)  RR: 18 (10 Apr 2024 05:49) (18 - 18)  SpO2: 100% (10 Apr 2024 05:49) (98% - 100%)    Parameters below as of 09 Apr 2024 14:03  Patient On (Oxygen Delivery Method): nasal cannula  O2 Flow (L/min): 4      PHYSICAL EXAM:  GENERAL: Well-nourished, Well-developed. NAD.  HEAD: No visible or palpable bumps or hematomas. No ecchymosis behind ears B/L.  ENMT: PERRLA; dry mucosa on tongue    CVS: Normal S1,S2. No murmurs appreciated on auscultation   RESP: No use of accessory muscles. Chest rise symmetrical with good expansion. Lungs clear to auscultation B/L. No wheezing, rales, or rhonchi auscultated.  GI: Normal auscultation of bowel sounds in all 4 quadrants. (+)mild diffuse abd ttp. Soft, Nondistended.  Skin: Warm, Dry. No rashes or lesions. Good cap refill < 2 sec B/L.  EXT: Radial and pedal pulses present B/L. No calf tenderness or swelling B/L. No palpable cords. No pedal edema B/L.  NEURO: AAO x 2      LABS:                        10.5   8.06  )-----------( 190      ( 10 Apr 2024 06:21 )             31.8     04-10    139  |  102  |  53<H>  ----------------------------<  122<H>  4.6   |  20  |  5.1<HH>    Ca    7.1<L>      10 Apr 2024 06:21  Phos  6.8     04-10  Mg     1.9     04-10    TPro  5.4<L>  /  Alb  3.5  /  TBili  0.4  /  DBili  <0.2  /  AST  36  /  ALT  58<H>  /  AlkPhos  164<H>  04-10      Urinalysis Basic - ( 10 Apr 2024 06:21 )    Color: x / Appearance: x / SG: x / pH: x  Gluc: 122 mg/dL / Ketone: x  / Bili: x / Urobili: x   Blood: x / Protein: x / Nitrite: x   Leuk Esterase: x / RBC: x / WBC x   Sq Epi: x / Non Sq Epi: x / Bacteria: x

## 2024-04-10 NOTE — PHYSICAL THERAPY INITIAL EVALUATION ADULT - REHAB POTENTIAL, PT EVAL
pt is at base line and is not a candidate for skilled PT at this time/none
age 49's, post menopausal

## 2024-04-11 NOTE — PROGRESS NOTE ADULT - ASSESSMENT
JABARI on CKD 3 / likely d/t contrast induced nephropathy    - creat stabilized  - ?urine output / unclear if being documented, pt has prima fit   - change iv fluids to LR at 50cc/hr  - encourage po hydration if able  - BP stable, off amlodipine  - please document urine output  - phos level noted, monitor  - maintain on renal diet   - dose meds for eGFR < 10 while creat is acutely rising  - repeat UA, urine lytes, urine pr/cr ratio  - hematuria noted on 4/5 UA, check ANCA panel w/ MPO/PR3, SERGIO, C3/C4, serum flc ratio, spep/sife, CPK  - no acute indication for RRT at this point / will monitor closely, will need RRT if urine output not improving   - CT noted, no hydronephrosis;  will need outpatient  monitoring for CTAP findings of complex renal cysts  - tried to call contacts in chart to give family update, did not  phone JABARI on CKD 3 / likely d/t contrast induced nephropathy    - creat stabilized  - ?urine output / unclear if being documented, pt has prima fit   - change iv fluids to LR at 50cc/hr  - encourage po hydration if able  - BP stable, off amlodipine  - please document urine output  - phos level noted, monitor  - maintain on renal diet   - dose meds for eGFR 15 for now  - repeat UA, urine lytes, urine pr/cr ratio  - hematuria noted on 4/5 UA, check ANCA panel w/ MPO/PR3, SERGIO, C3/C4, serum flc ratio, spep/sife, CPK ok  - no acute indication for RRT at this point / will monitor closely  - CT noted, no hydronephrosis;  will need outpatient  monitoring for CTAP findings of complex renal cysts

## 2024-04-11 NOTE — PROGRESS NOTE ADULT - SUBJECTIVE AND OBJECTIVE BOX
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     Today:  Seen at bedside, very thirsty asking for water        REVIEW OF SYSTEMS:  No complaints.    MEDICATIONS  (STANDING):  aspirin enteric coated 81 milliGRAM(s) Oral daily  atorvastatin 80 milliGRAM(s) Oral at bedtime  clopidogrel Tablet 75 milliGRAM(s) Oral daily  dextrose 10% Bolus 125 milliLiter(s) IV Bolus once  dextrose 5%. 1000 milliLiter(s) (100 mL/Hr) IV Continuous <Continuous>  dextrose 5%. 1000 milliLiter(s) (50 mL/Hr) IV Continuous <Continuous>  dextrose 50% Injectable 25 Gram(s) IV Push once  dextrose 50% Injectable 12.5 Gram(s) IV Push once  donepezil 10 milliGRAM(s) Oral at bedtime  ferrous    sulfate 325 milliGRAM(s) Oral daily  gabapentin 300 milliGRAM(s) Oral daily  glucagon  Injectable 1 milliGRAM(s) IntraMuscular once  heparin   Injectable 5000 Unit(s) SubCutaneous every 8 hours  lactulose Syrup 20 Gram(s) Oral three times a day  levoFLOXacin  Tablet 500 milliGRAM(s) Oral every 48 hours  levothyroxine 25 MICROGram(s) Oral daily  metoprolol succinate ER 25 milliGRAM(s) Oral daily  pantoprazole    Tablet 40 milliGRAM(s) Oral before breakfast  senna 2 Tablet(s) Oral at bedtime  sertraline 50 milliGRAM(s) Oral daily  sertraline 100 milliGRAM(s) Oral daily  sodium chloride 0.45%. 1000 milliLiter(s) (80 mL/Hr) IV Continuous <Continuous>    MEDICATIONS  (PRN):  albuterol/ipratropium for Nebulization 3 milliLiter(s) Nebulizer every 6 hours PRN Shortness of Breath and/or Wheezing  dextrose Oral Gel 15 Gram(s) Oral once PRN Blood Glucose LESS THAN 70 milliGRAM(s)/deciliter  ondansetron Injectable 4 milliGRAM(s) IV Push every 4 hours PRN Nausea and/or Vomiting      Allergies  sulfa drugs (Hives)  Melons (Unknown)  shellfish (Unknown)  Sulfacetamide Sodium-Sulfur (Rash)  latex (Unknown)  sulfa drugs (Anaphylaxis)  penicillins (Unknown)  fish (Anaphylaxis)  penicillin (Anaphylaxis)      FAMILY HISTORY:  FHx: diabetes mellitus (Mother)  FHx: diabetes mellitus (Mother)        Vital Signs Last 24 Hrs  T(C): 36.8 (10 Apr 2024 05:49), Max: 36.8 (09 Apr 2024 14:03)  T(F): 98.2 (10 Apr 2024 05:49), Max: 98.3 (09 Apr 2024 14:03)  HR: 65 (10 Apr 2024 05:49) (65 - 68)  BP: 118/58 (10 Apr 2024 05:49) (114/55 - 125/67)  RR: 18 (10 Apr 2024 05:49) (18 - 18)  SpO2: 100% (10 Apr 2024 05:49) (98% - 100%)    Parameters below as of 09 Apr 2024 14:03  Patient On (Oxygen Delivery Method): nasal cannula  O2 Flow (L/min): 4      PHYSICAL EXAM:  GENERAL: Well-nourished, Well-developed. NAD.  HEAD: No visible or palpable bumps or hematomas. No ecchymosis behind ears B/L.  ENMT: PERRLA; dry mucosa on tongue    CVS: Normal S1,S2. No murmurs appreciated on auscultation   RESP: No use of accessory muscles. Chest rise symmetrical with good expansion. Lungs clear to auscultation B/L. No wheezing, rales, or rhonchi auscultated.  GI: Normal auscultation of bowel sounds in all 4 quadrants. (+)mild diffuse abd ttp. Soft, Nondistended.  Skin: Warm, Dry. No rashes or lesions. Good cap refill < 2 sec B/L.  EXT: Radial and pedal pulses present B/L. No calf tenderness or swelling B/L. No palpable cords. No pedal edema B/L.  NEURO: AAO x 2      LABS:                        10.5   8.06  )-----------( 190      ( 10 Apr 2024 06:21 )             31.8     04-10    139  |  102  |  53<H>  ----------------------------<  122<H>  4.6   |  20  |  5.1<HH>    Ca    7.1<L>      10 Apr 2024 06:21  Phos  6.8     04-10  Mg     1.9     04-10    TPro  5.4<L>  /  Alb  3.5  /  TBili  0.4  /  DBili  <0.2  /  AST  36  /  ALT  58<H>  /  AlkPhos  164<H>  04-10      Urinalysis Basic - ( 10 Apr 2024 06:21 )    Color: x / Appearance: x / SG: x / pH: x  Gluc: 122 mg/dL / Ketone: x  / Bili: x / Urobili: x   Blood: x / Protein: x / Nitrite: x   Leuk Esterase: x / RBC: x / WBC x   Sq Epi: x / Non Sq Epi: x / Bacteria: x

## 2024-04-11 NOTE — PROGRESS NOTE ADULT - ASSESSMENT
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       Acute Hypoxic resp Failure-possibly 2' to undiagnosed WENDY:  - Unclear etiology at this time  -CXR clear, No acute process, shows cardiomegaly  - Pulmonary consult appreciated, needs NIV   - Bipap as needed  - Supplemental O2 as needed  - Procal very mildly elevated  -D-dimer neg for age  -blood Cx NGTD  -TTE:  1. LV Ejection Fraction by Mcgrath's Method with a biplane EF of 40 %.   2. Mildly enlarged left atrium.   3. Mild mitral annular calcification.   4. Mild mitral valve regurgitation.   5. Mild tricuspid regurgitation.   6. Sclerotic aortic valve with normal opening.  - Discussed with family, HHA have been reporting she "never sleeps and wakes up frequently at night".  Never had sleep study or PFT.  2' hand smoke exposure from .  - cardiology consult for decreased EF as previous tTE in 2019 was 55-60%    UTI:  -UCx grew areococcus urinae  -Completed course of Levaquin-discussed with ID specialist, this will cover    Onesimo, possibly contrast-induced:  -Continue fluids, monitor BMP  -US unremarkable  -Renal consult appreciated-  - anuric if documentation is accurate (has prima-fit)  - creat up-trending  - tolerating minimal po intake  - change iv fluids to 1/2NS and repeat BMP in PM to monitor Na level (to avoid hyponatremia)  - BP stable, off amlodipine  - cont strict i/o   - hematuria noted on 4/5 UA, check ANCA panel w/ MPO/PR3, SERGIO, C3/C4, serum flc ratio, spep/sife,   - CK elevated  - gabapentin should be max 300mg daily  - no acute indication for RRT    Transaminitis:  -Resolving, unclear etiology, US unremarkable, possibly 2' to Hypotensive episodes, follow GGT, hepatitis panel    Elevated troponin:  -Likely 2' to demand, PPM interrogated, no events  -TTE pending  -Trop flat  -No further need for tele     Abdominal pain with overflow diarrhea 2' to constipation  - Started patient on stool softener, lactulose due to fecal retention seen on CT  - Monitor vitals, bowel movements  -Will try enemas  -GI consult appreciated, s/p disimpaction    Splenic lesions  - Incidentally found on imaging, very unlikely the cause of her symptoms, clinically very unlikely infectious in nature  -Outpatient imaging for further characterization    Dementia  - Continue with zoloft, nortriptyline, and donepezil   - monitor mental status     HTN  - continue metoprolol, holding Norvasc to avoid Hypotension   - DASH diet    Diabetes Mellitus II  - CHO consistent diet  -Hold insulin for episodes of Hypoglycemia, restart PRN     Hypothyroidism  - Continue levothyroxine as prescribed     Chronic Pain   - Continue gabapentin and tramadol    DVT/VTE prophylaxis   Minced and moist diet per SLP, advance as per re-eval    Dispo:   -When pt moving bowels regularly and renal function improves can DC likely to rehab.

## 2024-04-11 NOTE — PROGRESS NOTE ADULT - SUBJECTIVE AND OBJECTIVE BOX
Nephrology Progress Note    JAYASHREE FAGAN  MRN-[id number]  84y  Female    S:  Patient is seen and examined, events over the last 24h noted.    O:  Allergies:  sulfa drugs (Hives)  Melons (Unknown)  shellfish (Unknown)  Sulfacetamide Sodium-Sulfur (Rash)  latex (Unknown)  sulfa drugs (Anaphylaxis)  penicillins (Unknown)  fish (Anaphylaxis)  penicillin (Anaphylaxis)    Hospital Medications:   MEDICATIONS  (STANDING):  aspirin enteric coated 81 milliGRAM(s) Oral daily  atorvastatin 80 milliGRAM(s) Oral at bedtime  clopidogrel Tablet 75 milliGRAM(s) Oral daily  dextrose 10% Bolus 125 milliLiter(s) IV Bolus once  dextrose 5%. 1000 milliLiter(s) (100 mL/Hr) IV Continuous <Continuous>  dextrose 5%. 1000 milliLiter(s) (50 mL/Hr) IV Continuous <Continuous>  dextrose 50% Injectable 25 Gram(s) IV Push once  dextrose 50% Injectable 12.5 Gram(s) IV Push once  donepezil 10 milliGRAM(s) Oral at bedtime  ferrous    sulfate 325 milliGRAM(s) Oral daily  gabapentin 300 milliGRAM(s) Oral daily  glucagon  Injectable 1 milliGRAM(s) IntraMuscular once  heparin   Injectable 5000 Unit(s) SubCutaneous every 8 hours  lactulose Syrup 20 Gram(s) Oral three times a day  levoFLOXacin  Tablet 500 milliGRAM(s) Oral every 48 hours  levothyroxine 25 MICROGram(s) Oral daily  metoprolol succinate ER 25 milliGRAM(s) Oral daily  pantoprazole    Tablet 40 milliGRAM(s) Oral before breakfast  senna 2 Tablet(s) Oral at bedtime  sertraline 100 milliGRAM(s) Oral daily  sertraline 50 milliGRAM(s) Oral daily  sodium chloride 0.45%. 1000 milliLiter(s) (80 mL/Hr) IV Continuous <Continuous>    MEDICATIONS  (PRN):  albuterol/ipratropium for Nebulization 3 milliLiter(s) Nebulizer every 6 hours PRN Shortness of Breath and/or Wheezing  dextrose Oral Gel 15 Gram(s) Oral once PRN Blood Glucose LESS THAN 70 milliGRAM(s)/deciliter  ondansetron Injectable 4 milliGRAM(s) IV Push every 4 hours PRN Nausea and/or Vomiting    Home Medications:  amLODIPine 10 mg oral tablet: 1 tab(s) orally once a day (09 Apr 2024 09:24)  amLODIPine 10 mg oral tablet: 1 tab(s) orally once a day (09 Apr 2024 09:25)  atorvastatin 80 mg oral tablet: 1 tab(s) orally once a day (at bedtime) (09 Apr 2024 09:23)  atorvastatin 80 mg oral tablet: 1 tab(s) orally once a day (at bedtime) (09 Apr 2024 09:25)  clopidogrel 75 mg oral tablet: 1 tab(s) orally once a day (09 Apr 2024 09:24)  clopidogrel 75 mg oral tablet: 1 tab(s) orally once a day (09 Apr 2024 09:25)  donepezil 10 mg oral tablet: 1 tab(s) orally once a day (at bedtime) (09 Apr 2024 09:25)  Ecotrin Adult Low Strength 81 mg oral delayed release tablet: 2 tab(s) orally once a day (09 Apr 2024 09:25)  Ecotrin Adult Low Strength 81 mg oral delayed release tablet: 2 tab(s) orally every other day (09 Apr 2024 09:25)  exemestane 25 mg oral tablet: 1 tab(s) orally once a day (09 Apr 2024 09:23)  exemestane 25 mg oral tablet: 1 tab(s) orally once a day (09 Apr 2024 09:25)  ferrous sulfate:  (09 Apr 2024 09:25)  ferrous sulfate 324 mg (65 mg elemental iron) oral tablet: 1  orally 2 times a day (09 Apr 2024 09:25)  furosemide 20 mg oral tablet: 1 tab(s) orally (09 Apr 2024 09:25)  gabapentin 300 mg oral capsule: 1 cap(s) orally 2 times a day (09 Apr 2024 09:25)  gabapentin 300 mg oral capsule: orally 2 times a day (09 Apr 2024 09:25)  HumaLOG 100 units/mL subcutaneous solution: Per insulin sliding scale at home (09 Apr 2024 09:25)  humalog sliding scale: with meals (09 Apr 2024 09:25)  Lasix 20 mg oral tablet: 1 tab(s) orally once a day (09 Apr 2024 09:24)  Levemir: 30  subcutaneous once a day (at bedtime) (09 Apr 2024 09:25)  Levemir 100 units/mL subcutaneous solution: 30 unit(s) subcutaneous once a day (at bedtime) (09 Apr 2024 09:25)  levothyroxine 25 mcg (0.025 mg) oral tablet: 1 tab(s) orally once a day (09 Apr 2024 09:25)  levothyroxine 25 mcg (0.025 mg) oral tablet: 1 tab(s) orally once a day (09 Apr 2024 09:24)  metoprolol succinate 25 mg oral capsule, extended release: 1 cap(s) orally once a day (09 Apr 2024 09:25)  mirtazapine 15 mg oral tablet: 1 tab(s) orally once a day (at bedtime) (09 Apr 2024 09:25)  nortriptyline 25 mg oral capsule: 1 cap(s) orally once a day (at bedtime) (09 Apr 2024 09:23)  nortriptyline 25 mg oral capsule: 1 cap(s) orally once a day (at bedtime) (09 Apr 2024 09:25)  pantoprazole 40 mg oral delayed release tablet: 1 tab(s) orally once a day (before a meal) (09 Apr 2024 09:23)  Protonix 40 mg oral delayed release tablet: 1 tab(s) orally once a day (09 Apr 2024 09:25)  senna oral tablet: 2 tab(s) orally once a day (at bedtime)- PRN for constipation- OTC is okay (09 Apr 2024 09:24)  sertraline 100 mg oral tablet: 1 tab(s) orally once a day (09 Apr 2024 09:23)  sertraline 50 mg oral tablet: 1 tab(s) orally once a day (09 Apr 2024 09:25)  stool softener: at night (09 Apr 2024 09:25)  temazepam 30 mg oral capsule: 1 cap(s) orally once a day (at bedtime) (09 Apr 2024 09:25)  traMADol 100 mg oral tablet: 1 tab(s) orally once a day (09 Apr 2024 09:25)      VITALS:  Daily     Daily   T(F): 98 (04-11-24 @ 05:54), Max: 99 (04-10-24 @ 20:36)  HR: 61 (04-11-24 @ 05:54)  BP: 117/56 (04-11-24 @ 05:54)  RR: 20 (04-11-24 @ 05:54)  SpO2: 98% (04-11-24 @ 05:54)  Wt(kg): --  I&O's Detail    10 Apr 2024 07:01  -  11 Apr 2024 07:00  --------------------------------------------------------  IN:    Oral Fluid: 240 mL    sodium chloride 0.45%: 400 mL  Total IN: 640 mL    OUT:    Voided (mL): 100 mL  Total OUT: 100 mL    Total NET: 540 mL        I&O's Summary    10 Apr 2024 07:01  -  11 Apr 2024 07:00  --------------------------------------------------------  IN: 640 mL / OUT: 100 mL / NET: 540 mL          PHYSICAL EXAM:  Gen: NAD  Chest: b/l breath sounds  Abd: soft  Extremities: no edema  Vascular access:       LABS:      04-11    138  |  101  |  55<H>  ----------------------------<  128<H>  4.4   |  20  |  5.0<HH>    Ca    7.1<L>      11 Apr 2024 06:55  Phos  6.8     04-10  Mg     1.9     04-10    TPro  5.1<L>  /  Alb  3.3<L>  /  TBili  0.4  /  DBili      /  AST  31  /  ALT  39  /  AlkPhos  144<H>  04-11    eGFR: 8 mL/min/1.73m2 (04-11-24 @ 06:55)  eGFR: 8 mL/min/1.73m2 (04-10-24 @ 19:40)    Phosphorus: 6.8 mg/dL (04-10-24 @ 06:21)  Phosphorus: 7.1 mg/dL (04-09-24 @ 06:16)    Osmolality, Serum: 311 mos/kg (11-10-21 @ 17:40)  Intact PTH: 46 pg/mL (07-24-19 @ 07:19)                          10.1   6.76  )-----------( 191      ( 11 Apr 2024 06:55 )             30.3     Mean Cell Volume: 94.1 fL (04-11-24 @ 06:55)        Urine Studies:  Protein, Urine: 30 mg/dL (04-05-24 @ 04:34)  White Blood Cell - Urine: 7 /HPF (04-05-24 @ 04:34)  Red Blood Cell - Urine: 3 /HPF (04-05-24 @ 04:34)      Creatinine trend:  Creatinine: 5.0 mg/dL (04-11-24 @ 06:55)  Creatinine: 4.9 mg/dL (04-10-24 @ 19:40)  Creatinine: 5.1 mg/dL (04-10-24 @ 06:21)  Creatinine: 4.4 mg/dL (04-09-24 @ 06:16)  Creatinine: 3.7 mg/dL (04-08-24 @ 06:59)  Creatinine: 2.5 mg/dL (04-07-24 @ 09:21) Nephrology Progress Note    JAYASHREE FAGAN  MRN-[id number]  84y  Female    S:  Patient is seen and examined, events over the last 24h noted.    O:  Allergies:  sulfa drugs (Hives)  Melons (Unknown)  shellfish (Unknown)  Sulfacetamide Sodium-Sulfur (Rash)  latex (Unknown)  sulfa drugs (Anaphylaxis)  penicillins (Unknown)  fish (Anaphylaxis)  penicillin (Anaphylaxis)    Hospital Medications:   MEDICATIONS  (STANDING):  aspirin enteric coated 81 milliGRAM(s) Oral daily  atorvastatin 80 milliGRAM(s) Oral at bedtime  clopidogrel Tablet 75 milliGRAM(s) Oral daily  dextrose 10% Bolus 125 milliLiter(s) IV Bolus once  dextrose 5%. 1000 milliLiter(s) (100 mL/Hr) IV Continuous <Continuous>  dextrose 5%. 1000 milliLiter(s) (50 mL/Hr) IV Continuous <Continuous>  dextrose 50% Injectable 25 Gram(s) IV Push once  dextrose 50% Injectable 12.5 Gram(s) IV Push once  donepezil 10 milliGRAM(s) Oral at bedtime  ferrous    sulfate 325 milliGRAM(s) Oral daily  gabapentin 300 milliGRAM(s) Oral daily  glucagon  Injectable 1 milliGRAM(s) IntraMuscular once  heparin   Injectable 5000 Unit(s) SubCutaneous every 8 hours  lactulose Syrup 20 Gram(s) Oral three times a day  levoFLOXacin  Tablet 500 milliGRAM(s) Oral every 48 hours  levothyroxine 25 MICROGram(s) Oral daily  metoprolol succinate ER 25 milliGRAM(s) Oral daily  pantoprazole    Tablet 40 milliGRAM(s) Oral before breakfast  senna 2 Tablet(s) Oral at bedtime  sertraline 100 milliGRAM(s) Oral daily  sertraline 50 milliGRAM(s) Oral daily  sodium chloride 0.45%. 1000 milliLiter(s) (80 mL/Hr) IV Continuous <Continuous>    MEDICATIONS  (PRN):  albuterol/ipratropium for Nebulization 3 milliLiter(s) Nebulizer every 6 hours PRN Shortness of Breath and/or Wheezing  dextrose Oral Gel 15 Gram(s) Oral once PRN Blood Glucose LESS THAN 70 milliGRAM(s)/deciliter  ondansetron Injectable 4 milliGRAM(s) IV Push every 4 hours PRN Nausea and/or Vomiting    Home Medications:  amLODIPine 10 mg oral tablet: 1 tab(s) orally once a day (09 Apr 2024 09:24)  amLODIPine 10 mg oral tablet: 1 tab(s) orally once a day (09 Apr 2024 09:25)  atorvastatin 80 mg oral tablet: 1 tab(s) orally once a day (at bedtime) (09 Apr 2024 09:23)  atorvastatin 80 mg oral tablet: 1 tab(s) orally once a day (at bedtime) (09 Apr 2024 09:25)  clopidogrel 75 mg oral tablet: 1 tab(s) orally once a day (09 Apr 2024 09:24)  clopidogrel 75 mg oral tablet: 1 tab(s) orally once a day (09 Apr 2024 09:25)  donepezil 10 mg oral tablet: 1 tab(s) orally once a day (at bedtime) (09 Apr 2024 09:25)  Ecotrin Adult Low Strength 81 mg oral delayed release tablet: 2 tab(s) orally once a day (09 Apr 2024 09:25)  Ecotrin Adult Low Strength 81 mg oral delayed release tablet: 2 tab(s) orally every other day (09 Apr 2024 09:25)  exemestane 25 mg oral tablet: 1 tab(s) orally once a day (09 Apr 2024 09:23)  exemestane 25 mg oral tablet: 1 tab(s) orally once a day (09 Apr 2024 09:25)  ferrous sulfate:  (09 Apr 2024 09:25)  ferrous sulfate 324 mg (65 mg elemental iron) oral tablet: 1  orally 2 times a day (09 Apr 2024 09:25)  furosemide 20 mg oral tablet: 1 tab(s) orally (09 Apr 2024 09:25)  gabapentin 300 mg oral capsule: 1 cap(s) orally 2 times a day (09 Apr 2024 09:25)  gabapentin 300 mg oral capsule: orally 2 times a day (09 Apr 2024 09:25)  HumaLOG 100 units/mL subcutaneous solution: Per insulin sliding scale at home (09 Apr 2024 09:25)  humalog sliding scale: with meals (09 Apr 2024 09:25)  Lasix 20 mg oral tablet: 1 tab(s) orally once a day (09 Apr 2024 09:24)  Levemir: 30  subcutaneous once a day (at bedtime) (09 Apr 2024 09:25)  Levemir 100 units/mL subcutaneous solution: 30 unit(s) subcutaneous once a day (at bedtime) (09 Apr 2024 09:25)  levothyroxine 25 mcg (0.025 mg) oral tablet: 1 tab(s) orally once a day (09 Apr 2024 09:25)  levothyroxine 25 mcg (0.025 mg) oral tablet: 1 tab(s) orally once a day (09 Apr 2024 09:24)  metoprolol succinate 25 mg oral capsule, extended release: 1 cap(s) orally once a day (09 Apr 2024 09:25)  mirtazapine 15 mg oral tablet: 1 tab(s) orally once a day (at bedtime) (09 Apr 2024 09:25)  nortriptyline 25 mg oral capsule: 1 cap(s) orally once a day (at bedtime) (09 Apr 2024 09:23)  nortriptyline 25 mg oral capsule: 1 cap(s) orally once a day (at bedtime) (09 Apr 2024 09:25)  pantoprazole 40 mg oral delayed release tablet: 1 tab(s) orally once a day (before a meal) (09 Apr 2024 09:23)  Protonix 40 mg oral delayed release tablet: 1 tab(s) orally once a day (09 Apr 2024 09:25)  senna oral tablet: 2 tab(s) orally once a day (at bedtime)- PRN for constipation- OTC is okay (09 Apr 2024 09:24)  sertraline 100 mg oral tablet: 1 tab(s) orally once a day (09 Apr 2024 09:23)  sertraline 50 mg oral tablet: 1 tab(s) orally once a day (09 Apr 2024 09:25)  stool softener: at night (09 Apr 2024 09:25)  temazepam 30 mg oral capsule: 1 cap(s) orally once a day (at bedtime) (09 Apr 2024 09:25)  traMADol 100 mg oral tablet: 1 tab(s) orally once a day (09 Apr 2024 09:25)      VITALS:  Daily     Daily   T(F): 98 (04-11-24 @ 05:54), Max: 99 (04-10-24 @ 20:36)  HR: 61 (04-11-24 @ 05:54)  BP: 117/56 (04-11-24 @ 05:54)  RR: 20 (04-11-24 @ 05:54)  SpO2: 98% (04-11-24 @ 05:54)  Wt(kg): --  I&O's Detail    10 Apr 2024 07:01  -  11 Apr 2024 07:00  --------------------------------------------------------  IN:    Oral Fluid: 240 mL    sodium chloride 0.45%: 400 mL  Total IN: 640 mL    OUT:    Voided (mL): 100 mL  Total OUT: 100 mL    Total NET: 540 mL        I&O's Summary    10 Apr 2024 07:01  -  11 Apr 2024 07:00  --------------------------------------------------------  IN: 640 mL / OUT: 100 mL / NET: 540 mL          PHYSICAL EXAM:  Gen: NAD  Chest: b/l breath sounds  Abd: soft  Extremities: no edema      LABS:      04-11    138  |  101  |  55<H>  ----------------------------<  128<H>  4.4   |  20  |  5.0<HH>    Ca    7.1<L>      11 Apr 2024 06:55  Phos  6.8     04-10  Mg     1.9     04-10    TPro  5.1<L>  /  Alb  3.3<L>  /  TBili  0.4  /  DBili      /  AST  31  /  ALT  39  /  AlkPhos  144<H>  04-11    eGFR: 8 mL/min/1.73m2 (04-11-24 @ 06:55)  eGFR: 8 mL/min/1.73m2 (04-10-24 @ 19:40)    Phosphorus: 6.8 mg/dL (04-10-24 @ 06:21)  Phosphorus: 7.1 mg/dL (04-09-24 @ 06:16)    Osmolality, Serum: 311 mos/kg (11-10-21 @ 17:40)  Intact PTH: 46 pg/mL (07-24-19 @ 07:19)                          10.1   6.76  )-----------( 191      ( 11 Apr 2024 06:55 )             30.3     Mean Cell Volume: 94.1 fL (04-11-24 @ 06:55)        Urine Studies:  Protein, Urine: 30 mg/dL (04-05-24 @ 04:34)  White Blood Cell - Urine: 7 /HPF (04-05-24 @ 04:34)  Red Blood Cell - Urine: 3 /HPF (04-05-24 @ 04:34)      Creatinine trend:  Creatinine: 5.0 mg/dL (04-11-24 @ 06:55)  Creatinine: 4.9 mg/dL (04-10-24 @ 19:40)  Creatinine: 5.1 mg/dL (04-10-24 @ 06:21)  Creatinine: 4.4 mg/dL (04-09-24 @ 06:16)  Creatinine: 3.7 mg/dL (04-08-24 @ 06:59)  Creatinine: 2.5 mg/dL (04-07-24 @ 09:21)

## 2024-04-11 NOTE — PROGRESS NOTE ADULT - ASSESSMENT
84yFemale pmh DM, breast cancer 2014 s/p rt, BP, HTN, CVA, pacemaker insertion brought for altered mental status. GI consulted for fecal impaction asked for disimpaction.    #Fecal retention most pronounced within the rectum.  4 bms overnight  Rec  -performed manual disimpaction at bedside, small stool burden evacuated 4/9/24  -lavaged colon with 200ccs 4/9/24  -tap water enemas q4h ordered for today  -after spontaneous bms will start bowel regimen     #Numerous ill-defined lesions throughout the spleen which could reflect   neoplastic process. Differential diagnosis includes multiple   microabscesses disease. Correlate clinically.  Rec  - as per oncology team    #Complex processes involving the right kidney which may reflect complex   cysts. Solid nodules cannot be excluded. Correlate with dedicated CAT   scan of the kidneys with renal mass protocol MRI of the kidneys. This can   be performed on an outpatient basis.  Rec  - Care as per primary team   84yFemale pmh DM, breast cancer 2014 s/p rt, BP, HTN, CVA, pacemaker insertion brought for altered mental status. GI consulted for fecal impaction asked for disimpaction.    #Fecal retention most pronounced within the rectum.  4 bms overnight  Rec  -performed manual disimpaction at bedside, small stool burden evacuated 4/9/24  -lavaged colon with 200ccs 4/9/24  -tap water enemas q4h ordered for today  -after spontaneous bms will start bowel regimen   -Serial abd exam and follow up KUB    #Numerous ill-defined lesions throughout the spleen which could reflect   neoplastic process. Differential diagnosis includes multiple   microabscesses disease. Correlate clinically.  Rec  - as per oncology team    #Complex processes involving the right kidney which may reflect complex   cysts. Solid nodules cannot be excluded. Correlate with dedicated CAT   scan of the kidneys with renal mass protocol MRI of the kidneys. This can   be performed on an outpatient basis.  Rec  - Care as per primary team

## 2024-04-11 NOTE — PROGRESS NOTE ADULT - SUBJECTIVE AND OBJECTIVE BOX
84yFemale  Being followed for fecal impaction  Interval history:  No hematemesis, melena, blood in stool reported. patient with 4 bms overnight.      PAST MEDICAL & SURGICAL HISTORY:  DM (diabetes mellitus)  24 yr      Breast CA  2014 s/p rt      BP (high blood pressure)  20 yr      Depression      High cholesterol      Bilateral hearing loss, unspecified hearing loss type      CVA (cerebral vascular accident)  6/18 lft weakness      Hypertension      Diabetes      CVA (cerebrovascular accident)      Pacemaker      Breast cancer      History of pressure ulcer      Dementia      Hypothyroidism      CAD (coronary artery disease)      Immobility      H/O total knee replacement, right      History of lumpectomy of right breast      History of cholecystectomy  1992      Pacemaker      S/P lumpectomy of breast      Knee joint replacement status, right      History of cholecystectomy                Social History: No smoking. No alcohol. No illegal drug use.            MEDICATIONS  (STANDING):  aspirin enteric coated 81 milliGRAM(s) Oral daily  atorvastatin 80 milliGRAM(s) Oral at bedtime  clopidogrel Tablet 75 milliGRAM(s) Oral daily  dextrose 10% Bolus 125 milliLiter(s) IV Bolus once  dextrose 5%. 1000 milliLiter(s) (100 mL/Hr) IV Continuous <Continuous>  dextrose 5%. 1000 milliLiter(s) (50 mL/Hr) IV Continuous <Continuous>  dextrose 50% Injectable 12.5 Gram(s) IV Push once  dextrose 50% Injectable 25 Gram(s) IV Push once  donepezil 10 milliGRAM(s) Oral at bedtime  ferrous    sulfate 325 milliGRAM(s) Oral daily  gabapentin 300 milliGRAM(s) Oral daily  glucagon  Injectable 1 milliGRAM(s) IntraMuscular once  heparin   Injectable 5000 Unit(s) SubCutaneous every 8 hours  lactulose Syrup 20 Gram(s) Oral three times a day  levoFLOXacin  Tablet 500 milliGRAM(s) Oral every 48 hours  levothyroxine 25 MICROGram(s) Oral daily  metoprolol succinate ER 25 milliGRAM(s) Oral daily  pantoprazole    Tablet 40 milliGRAM(s) Oral before breakfast  senna 2 Tablet(s) Oral at bedtime  sertraline 50 milliGRAM(s) Oral daily  sertraline 100 milliGRAM(s) Oral daily  sodium chloride 0.45%. 1000 milliLiter(s) (80 mL/Hr) IV Continuous <Continuous>    MEDICATIONS  (PRN):  albuterol/ipratropium for Nebulization 3 milliLiter(s) Nebulizer every 6 hours PRN Shortness of Breath and/or Wheezing  dextrose Oral Gel 15 Gram(s) Oral once PRN Blood Glucose LESS THAN 70 milliGRAM(s)/deciliter  ondansetron Injectable 4 milliGRAM(s) IV Push every 4 hours PRN Nausea and/or Vomiting      Allergies:   sulfa drugs (Hives)  Melons (Unknown)  shellfish (Unknown)  Sulfacetamide Sodium-Sulfur (Rash)  latex (Unknown)  sulfa drugs (Anaphylaxis)  penicillins (Unknown)  fish (Anaphylaxis)  penicillin (Anaphylaxis)  Intolerances          REVIEW OF SYSTEMS:  unobtainable         VITAL SIGNS:   T(F): 98 (04-11-24 @ 05:54), Max: 99 (04-10-24 @ 20:36)  HR: 70 (04-11-24 @ 11:23) (61 - 73)  BP: 146/90 (04-11-24 @ 11:23) (117/56 - 146/90)  RR: 20 (04-11-24 @ 11:23) (20 - 20)  SpO2: 96% (04-11-24 @ 11:23) (94% - 98%)    PHYSICAL EXAM:  GENERAL: resting   HEAD:  Atraumatic, Normocephalic  EYES: conjunctiva and sclera clear  NECK: Supple, no JVD or thyromegaly  CHEST/LUNG: Clear to auscultation bilaterally; No wheeze, rhonchi, or rales  HEART: Regular rate and rhythm; normal S1, S2, No murmurs.  ABDOMEN: Soft, nontender, nondistended; Bowel sounds present  NEUROLOGY: No asterixis or tremor.   SKIN: Intact, no jaundice            LABS:                        10.1   6.76  )-----------( 191      ( 11 Apr 2024 06:55 )             30.3     04-11    138  |  101  |  55<H>  ----------------------------<  128<H>  4.4   |  20  |  5.0<HH>    Ca    7.1<L>      11 Apr 2024 06:55  Phos  6.8     04-10  Mg     1.9     04-10    TPro  5.1<L>  /  Alb  3.3<L>  /  TBili  0.4  /  DBili  x   /  AST  31  /  ALT  39  /  AlkPhos  144<H>  04-11    LIVER FUNCTIONS - ( 11 Apr 2024 06:55 )  Alb: 3.3 g/dL / Pro: 5.1 g/dL / ALK PHOS: 144 U/L / ALT: 39 U/L / AST: 31 U/L / GGT: x               IMAGING:    < from: CT Abdomen and Pelvis w/ IV Cont (04.05.24 @ 08:37) >    ACC: [id number] EXAM:  CT ABDOMEN AND PELVIS IC   ORDERED BY: MIGUEL JUAREZ     PROCEDURE DATE:  04/05/2024          INTERPRETATION:  CLINICAL STATEMENT: Abdominal pain with nausea vomiting   and diarrhea    TECHNIQUE: Contiguous axial CT images were obtained from the lower chest   to the pubic symphysis .  95 cc administered of Omnipaque 350 (5 cc   discarded).  Oral contrast was not given.  Reformatted images in the   coronal and sagittal planes were acquired.    COMPARISON CT: None.    OTHER STUDIES USED FOR CORRELATION: None.      FINDINGS:    Evaluation somewhat limited due to arms are overlapping the torso   creating beam Fonseca artifact. Study also limited by obliquity.    LOWER CHEST: Pacer wires are seen within the heart. Cardiomegaly.   Scattered areas of atelectasis within the visualized lower lungs..    HEPATOBILIARY: Status post cholecystectomy. The liver is enlarged   measuring 17.3 cm in length..    SPLEEN: Numerous poorly defined enhancing lesions throughout the spleen..    PANCREAS: Atrophic pancreas. Punctate calcifications throughout the   pancreatic parenchyma suggesting sequelae of chronic pancreatitis..    ADRENAL GLANDS: Unremarkable.    KIDNEYS: Both kidneys demonstrate symmetric enhancement with no   hydronephrosis. There is a hyperdense lesion along the posterior aspect   of the superior right kidney measuring under centimeter. Another mildly   complex cystic lesion is seen laterally within the lower pole of the   right kidney..    ABDOMINOPELVIC NODES: Unremarkable.    PELVIC ORGANS: Almost completely collapsed urinary bladder. Atrophic   calcified uterus..    PERITONEUM/MESENTERY/BOWEL: There is presacral edema. Fecal retention   most severe within the rectum. No bowel obstruction..    BONES/SOFT TISSUES: Small fat-containing right inguinal hernia. Scoliosis   with multilevel degenerative changes. Compression fracture of L2 of   uncertain age.. Chronic deformity of the right pubic rami.    OTHER: Atherosclerotic changes of the aorta and itsvessels.      IMPRESSION:    Numerous ill-defined lesions throughout the spleen which could reflect   neoplastic process. Differential diagnosis includes multiple   microabscesses disease. Correlate clinically.    Complex processes involving the right kidney which may reflect complex   cysts. Solid nodules cannot be excluded. Correlate with dedicated CAT   scan of the kidneys with renal mass protocol MRI of the kidneys. This can   be performed on an outpatient basis.    Fecal retention most pronounced within the rectum.    --- End of Report ---            ASIA CRAVEN MD; Attending Radiologist  This document has been electronically signed. Apr 5 2024  9:01AM    < end of copied text >

## 2024-04-12 NOTE — CONSULT NOTE ADULT - NS ATTEND AMEND GEN_ALL_CORE FT
Patient seen and examined. Pertinent labs, imaging and telemetry reviewed. I agree with the above:     Patient feeling well. Denies complaints of CP, SOB, palpitations. She is complaining of LUE pain and swelling.  Patient with EF 40%. Upon review of previous TTEs from 2021, 2023, there is no significant change in LVEF.   EF likely mildly reduced due to abnormal septal motion from RV pacemaker.   -Patient appears euvolemic on exam.   -2+ pitting edema of LUE.   -Would suggest LUE US to rule out thrombus.   -Continue with current meds.   -Cannot add ACE/ARB/Entresto due to renal dysfunction.     Discussed with patient and ACP.
Severe constipation due to no mobility advanced age and dementia. SP bed side digital disimpaction. Please ensure adequate laxative regimen as recommended above.

## 2024-04-12 NOTE — CONSULT NOTE ADULT - SUBJECTIVE AND OBJECTIVE BOX
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  Daughter states that the patient's aide reported the pain, nausea, vomiting, fever, and an episode of diarrhea at 4AM, which eventually led to ER visit.  Daughter also states that the patients mental status was significantly altered from baseline, stating that she sounded "out of it" and "more confused then usual" the night before.  No reports of headaches, visual changes, dysuria.    No report of fever in the ER.      CT Scan abdomen/pelvis remarkable for numerous, ill defined lesions within the spleen which may reflect neoplastic process. Also noted to have fecal retention within the rectum. (05 Apr 2024 15:23)        HPI-Cardiology   Pt with the above Hx and HPI, evaluated at bedside. Consulted for new HFmrEF. Pt Denies any CP, SOB, palpitations, dizziness/lightheadedness or nausea/vomiting. Radiology tests and hospital records, were reviewed, as well as previous notes on this patient.        PAST MEDICAL & SURGICAL HISTORY  DM (diabetes mellitus)  24 yr    Breast CA  2014 s/p rt    BP (high blood pressure)  20 yr    Depression    High cholesterol    Bilateral hearing loss, unspecified hearing loss type    CVA (cerebral vascular accident)  6/18 lft weakness    Hypertension    Diabetes    CVA (cerebrovascular accident)    Pacemaker    Breast cancer    History of pressure ulcer    Dementia    Hypothyroidism    CAD (coronary artery disease)    Immobility    H/O total knee replacement, right    History of lumpectomy of right breast    History of cholecystectomy  1992    Pacemaker    S/P lumpectomy of breast    Knee joint replacement status, right    History of cholecystectomy        FAMILY HISTORY:  FAMILY HISTORY:  FHx: diabetes mellitus (Mother)    FHx: diabetes mellitus (Mother)            ALLERGIES:  sulfa drugs (Hives)  Melons (Unknown)  shellfish (Unknown)  Sulfacetamide Sodium-Sulfur (Rash)  latex (Unknown)  sulfa drugs (Anaphylaxis)  penicillins (Unknown)  fish (Anaphylaxis)  penicillin (Anaphylaxis)      MEDICATIONS:  MEDICATIONS  (STANDING):  aspirin enteric coated 81 milliGRAM(s) Oral daily  atorvastatin 80 milliGRAM(s) Oral at bedtime  clopidogrel Tablet 75 milliGRAM(s) Oral daily  dextrose 10% Bolus 125 milliLiter(s) IV Bolus once  dextrose 5%. 1000 milliLiter(s) (100 mL/Hr) IV Continuous <Continuous>  dextrose 5%. 1000 milliLiter(s) (50 mL/Hr) IV Continuous <Continuous>  dextrose 50% Injectable 25 Gram(s) IV Push once  dextrose 50% Injectable 12.5 Gram(s) IV Push once  donepezil 10 milliGRAM(s) Oral at bedtime  ferrous    sulfate 325 milliGRAM(s) Oral daily  gabapentin 300 milliGRAM(s) Oral daily  glucagon  Injectable 1 milliGRAM(s) IntraMuscular once  heparin   Injectable 5000 Unit(s) SubCutaneous every 8 hours  insulin lispro (ADMELOG) corrective regimen sliding scale   SubCutaneous three times a day before meals  lactulose Syrup 20 Gram(s) Oral three times a day  levoFLOXacin  Tablet 500 milliGRAM(s) Oral every 48 hours  levothyroxine 25 MICROGram(s) Oral daily  metoprolol succinate ER 25 milliGRAM(s) Oral daily  pantoprazole    Tablet 40 milliGRAM(s) Oral before breakfast  senna 2 Tablet(s) Oral at bedtime  sertraline 100 milliGRAM(s) Oral daily  sertraline 50 milliGRAM(s) Oral daily  sodium chloride 0.45%. 1000 milliLiter(s) (80 mL/Hr) IV Continuous <Continuous>    MEDICATIONS  (PRN):  albuterol/ipratropium for Nebulization 3 milliLiter(s) Nebulizer every 6 hours PRN Shortness of Breath and/or Wheezing  dextrose Oral Gel 15 Gram(s) Oral once PRN Blood Glucose LESS THAN 70 milliGRAM(s)/deciliter  ondansetron Injectable 4 milliGRAM(s) IV Push every 4 hours PRN Nausea and/or Vomiting      HOME MEDICATIONS:  Home Medications:  amLODIPine 10 mg oral tablet: 1 tab(s) orally once a day (09 Apr 2024 09:24)  amLODIPine 10 mg oral tablet: 1 tab(s) orally once a day (09 Apr 2024 09:25)  atorvastatin 80 mg oral tablet: 1 tab(s) orally once a day (at bedtime) (09 Apr 2024 09:23)  atorvastatin 80 mg oral tablet: 1 tab(s) orally once a day (at bedtime) (09 Apr 2024 09:25)  clopidogrel 75 mg oral tablet: 1 tab(s) orally once a day (09 Apr 2024 09:24)  clopidogrel 75 mg oral tablet: 1 tab(s) orally once a day (09 Apr 2024 09:25)  donepezil 10 mg oral tablet: 1 tab(s) orally once a day (at bedtime) (09 Apr 2024 09:25)  Ecotrin Adult Low Strength 81 mg oral delayed release tablet: 2 tab(s) orally once a day (09 Apr 2024 09:25)  Ecotrin Adult Low Strength 81 mg oral delayed release tablet: 2 tab(s) orally every other day (09 Apr 2024 09:25)  exemestane 25 mg oral tablet: 1 tab(s) orally once a day (09 Apr 2024 09:23)  exemestane 25 mg oral tablet: 1 tab(s) orally once a day (09 Apr 2024 09:25)  ferrous sulfate:  (09 Apr 2024 09:25)  ferrous sulfate 324 mg (65 mg elemental iron) oral tablet: 1  orally 2 times a day (09 Apr 2024 09:25)  furosemide 20 mg oral tablet: 1 tab(s) orally (09 Apr 2024 09:25)  gabapentin 300 mg oral capsule: 1 cap(s) orally 2 times a day (09 Apr 2024 09:25)  gabapentin 300 mg oral capsule: orally 2 times a day (09 Apr 2024 09:25)  HumaLOG 100 units/mL subcutaneous solution: Per insulin sliding scale at home (09 Apr 2024 09:25)  humalog sliding scale: with meals (09 Apr 2024 09:25)  Lasix 20 mg oral tablet: 1 tab(s) orally once a day (09 Apr 2024 09:24)  Levemir: 30  subcutaneous once a day (at bedtime) (09 Apr 2024 09:25)  Levemir 100 units/mL subcutaneous solution: 30 unit(s) subcutaneous once a day (at bedtime) (09 Apr 2024 09:25)  levothyroxine 25 mcg (0.025 mg) oral tablet: 1 tab(s) orally once a day (09 Apr 2024 09:25)  levothyroxine 25 mcg (0.025 mg) oral tablet: 1 tab(s) orally once a day (09 Apr 2024 09:24)  metoprolol succinate 25 mg oral capsule, extended release: 1 cap(s) orally once a day (09 Apr 2024 09:25)  mirtazapine 15 mg oral tablet: 1 tab(s) orally once a day (at bedtime) (09 Apr 2024 09:25)  nortriptyline 25 mg oral capsule: 1 cap(s) orally once a day (at bedtime) (09 Apr 2024 09:23)  nortriptyline 25 mg oral capsule: 1 cap(s) orally once a day (at bedtime) (09 Apr 2024 09:25)  pantoprazole 40 mg oral delayed release tablet: 1 tab(s) orally once a day (before a meal) (09 Apr 2024 09:23)  Protonix 40 mg oral delayed release tablet: 1 tab(s) orally once a day (09 Apr 2024 09:25)  senna oral tablet: 2 tab(s) orally once a day (at bedtime)- PRN for constipation- OTC is okay (09 Apr 2024 09:24)  sertraline 100 mg oral tablet: 1 tab(s) orally once a day (09 Apr 2024 09:23)  sertraline 50 mg oral tablet: 1 tab(s) orally once a day (09 Apr 2024 09:25)  stool softener: at night (09 Apr 2024 09:25)  temazepam 30 mg oral capsule: 1 cap(s) orally once a day (at bedtime) (09 Apr 2024 09:25)  traMADol 100 mg oral tablet: 1 tab(s) orally once a day (09 Apr 2024 09:25)      VITALS:   T(F): 96.7 (04-12 @ 05:35), Max: 99 (04-10 @ 20:36)  HR: 61 (04-12 @ 05:35) (61 - 108)  BP: 134/63 (04-12 @ 05:35) (114/55 - 175/90)  BP(mean): --  RR: 18 (04-12 @ 05:35) (16 - 20)  SpO2: 100% (04-12 @ 05:35) (94% - 100%)    I&O's Summary    11 Apr 2024 07:01  -  12 Apr 2024 07:00  --------------------------------------------------------  IN: 0 mL / OUT: 200 mL / NET: -200 mL        REVIEW OF SYSTEMS:  CONSTITUTIONAL: No weakness, fevers or chills  EYES: No visual changes  ENT: No vertigo or throat pain   NECK: No pain or stiffness  RESPIRATORY: No cough, wheezing, hemoptysis; No shortness of breath  CARDIOVASCULAR: No chest pain or palpitations  GASTROINTESTINAL: No abdominal or epigastric pain. No nausea, vomiting, or hematemesis; No diarrhea or constipation. No melena or hematochezia.  GENITOURINARY: No dysuria, frequency or hematuria  NEUROLOGICAL: No numbness or weakness  SKIN: No itching, no rashes  MSK: no    PHYSICAL EXAM:  NEURO: patient is awake , alert and oriented  GEN: Not in acute distress  NECK: no thyroid enlargement, no JVD  LUNGS: Clear to auscultation bilaterally   CARDIOVASCULAR: S1/S2 present, RRR , no murmurs or rubs, no carotid bruits,  + PP bilaterally  ABD: Soft, non-tender, non-distended, +BS  EXT: No NIKKIE  SKIN: Intact    LABS:                        10.8   6.62  )-----------( 204      ( 12 Apr 2024 05:55 )             33.1     04-12    137  |  102  |  54<H>  ----------------------------<  109<H>  4.1   |  18  |  4.7<HH>    Ca    7.1<L>      12 Apr 2024 05:55    TPro  5.1<L>  /  Alb  3.3<L>  /  TBili  0.4  /  DBili  x   /  AST  31  /  ALT  39  /  AlkPhos  144<H>  04-11        CARDIAC MARKERS ( 10 Apr 2024 19:40 )  x     / x     / 451 U/L / x     / x                RADIOLOGY:  -CXR:  < from: Xray Chest 1 View- PORTABLE-Urgent (Xray Chest 1 View- PORTABLE-Urgent .) (04.08.24 @ 09:28) >  Impression:    Question of left lower lobe opacity        --- End of Report ---      < end of copied text >            < from: TTE Echo Complete w/ Contrast w/o Doppler (04.10.24 @ 14:08) >    Summary:   1. LV Ejection Fraction by Mcgrath's Method with a biplane EF of 40 %.   2. Mildly enlarged left atrium.   3. Mild mitral annular calcification.   4. Mild mitral valve regurgitation.   5. Mild tricuspid regurgitation.   6. Sclerotic aortic valve with normal opening.    < end of copied text >      ECG:  < from: 12 Lead ECG (04.06.24 @ 17:34) >  AV dual-paced rhythm  Abnormal ECG    Confirmed by TERESA MCGINNIS MD ([id number]) on 4/7/2024 6:28:21 AM    < end of copied text >

## 2024-04-12 NOTE — PROGRESS NOTE ADULT - ASSESSMENT
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       Acute Hypoxic resp Failure-possibly 2' to undiagnosed WENDY:  - Unclear etiology at this time  -CXR clear, No acute process, shows cardiomegaly  - Pulmonary consult appreciated, needs NIV   - Bipap as needed  - Supplemental O2 as needed  - Procal very mildly elevated  -D-dimer neg for age  -blood Cx NGTD  -TTE:  1. LV Ejection Fraction by Mcgrath's Method with a biplane EF of 40 %.   2. Mildly enlarged left atrium.   3. Mild mitral annular calcification.   4. Mild mitral valve regurgitation.   5. Mild tricuspid regurgitation.   6. Sclerotic aortic valve with normal opening.  - Discussed with family, HHA have been reporting she "never sleeps and wakes up frequently at night".  Never had sleep study or PFT.  2' hand smoke exposure from .  - cardiology consult for decreased EF as previous tTE in 2019 was 55-60%  - cardio and nephro recommended resuming amlodipine  - avoid ACE/ARBs given renal function  - LUE duplex  - dispo planning; will need resumption of 24/7 HHA    #constipation  - manually disimpacted by GI  - enemas q 4hrs  bowel regimen    UTI:  -UCx grew areococcus urinae  -Completed course of Levaquin-discussed with ID specialist, this will cover    Onesimo, possibly contrast-induced:  -Continue fluids, monitor BMP  -US unremarkable  -Renal consult appreciated-  - anuric if documentation is accurate (has prima-fit)  - creat up-trending  - tolerating minimal po intake  - change iv fluids to 1/2NS and repeat BMP in PM to monitor Na level (to avoid hyponatremia)  - BP stable, off amlodipine  - cont strict i/o   - hematuria noted on 4/5 UA, check ANCA panel w/ MPO/PR3, SERGIO, C3/C4, serum flc ratio, spep/sife,   - CK elevated  - gabapentin should be max 300mg daily  - no acute indication for RRT    Transaminitis:  -Resolving, unclear etiology, US unremarkable, possibly 2' to Hypotensive episodes, follow GGT, hepatitis panel    Elevated troponin:  -Likely 2' to demand, PPM interrogated, no events  -TTE pending  -Trop flat  -No further need for tele     Abdominal pain with overflow diarrhea 2' to constipation  - Started patient on stool softener, lactulose due to fecal retention seen on CT  - Monitor vitals, bowel movements  -Will try enemas  -GI consult appreciated, s/p disimpaction    Splenic lesions  - Incidentally found on imaging, very unlikely the cause of her symptoms, clinically very unlikely infectious in nature  -Outpatient imaging for further characterization    Dementia  - Continue with zoloft, nortriptyline, and donepezil   - monitor mental status     HTN  - continue metoprolol, holding Norvasc to avoid Hypotension   - DASH diet    Diabetes Mellitus II  - CHO consistent diet  -Hold insulin for episodes of Hypoglycemia, restart PRN     Hypothyroidism  - Continue levothyroxine as prescribed     Chronic Pain   - Continue gabapentin and tramadol    DVT/VTE prophylaxis   Minced and moist diet per SLP, advance as per re-eval    Dispo:   -When pt moving bowels regularly and renal function improves can DC likely to rehab.

## 2024-04-12 NOTE — PROGRESS NOTE ADULT - ASSESSMENT
84yFemale pmh DM, breast cancer 2014 s/p rt, BP, HTN, CVA, pacemaker insertion brought for altered mental status. GI consulted for fecal impaction asked for disimpaction.    #Fecal retention most pronounced within the rectum.  4 bms overnight  Rec  -f/u KUB  -performed manual disimpaction at bedside, small stool burden evacuated 4/9/24  -lavaged colon with 200ccs 4/9/24  -tap water enemas q4h ordered for today  -Miralax BID, senna BID    #Numerous ill-defined lesions throughout the spleen which could reflect   neoplastic process. Differential diagnosis includes multiple   microabscesses disease. Correlate clinically.  Rec  - as per oncology team    #Complex processes involving the right kidney which may reflect complex   cysts. Solid nodules cannot be excluded. Correlate with dedicated CAT   scan of the kidneys with renal mass protocol MRI of the kidneys. This can   be performed on an outpatient basis.  Rec  - Care as per primary team      84yFemale pmh DM, breast cancer 2014 s/p rt, BP, HTN, CVA, pacemaker insertion brought for altered mental status. GI consulted for fecal impaction asked for disimpaction.    #Fecal retention most pronounced within the rectum.  4 bms overnight  Rec  -f/u KUB  -performed manual disimpaction at bedside, small stool burden evacuated 4/9/24  -lavaged colon with 200ccs 4/9/24  -tap water enemas q4h ordered for today  -Miralax BID, senna BID  -Serial abd exams, follow up KUB    #Numerous ill-defined lesions throughout the spleen which could reflect   neoplastic process. Differential diagnosis includes multiple   microabscesses disease. Correlate clinically.  Rec  - as per oncology team    #Complex processes involving the right kidney which may reflect complex   cysts. Solid nodules cannot be excluded. Correlate with dedicated CAT   scan of the kidneys with renal mass protocol MRI of the kidneys. This can   be performed on an outpatient basis.  Rec  - Care as per primary team

## 2024-04-12 NOTE — PROGRESS NOTE ADULT - SUBJECTIVE AND OBJECTIVE BOX
84yFemale  Being followed for fecal impaction  Interval history:  No hematemesis, melena, blood in stool reported. patient tolerating diet, having bms.      PAST MEDICAL & SURGICAL HISTORY:   DM (diabetes mellitus)  24 yr      Breast CA  2014 s/p rt      BP (high blood pressure)  20 yr      Depression      High cholesterol      Bilateral hearing loss, unspecified hearing loss type      CVA (cerebral vascular accident)  6/18 lft weakness      Hypertension      Diabetes      CVA (cerebrovascular accident)      Pacemaker      Breast cancer      History of pressure ulcer      Dementia      Hypothyroidism      CAD (coronary artery disease)      Immobility      H/O total knee replacement, right      History of lumpectomy of right breast      History of cholecystectomy  1992      Pacemaker      S/P lumpectomy of breast      Knee joint replacement status, right      History of cholecystectomy                Social History: No smoking. No alcohol. No illegal drug use.            MEDICATIONS  (STANDING):  aspirin enteric coated 81 milliGRAM(s) Oral daily  atorvastatin 80 milliGRAM(s) Oral at bedtime  clopidogrel Tablet 75 milliGRAM(s) Oral daily  dextrose 10% Bolus 125 milliLiter(s) IV Bolus once  dextrose 5%. 1000 milliLiter(s) (100 mL/Hr) IV Continuous <Continuous>  dextrose 5%. 1000 milliLiter(s) (50 mL/Hr) IV Continuous <Continuous>  dextrose 50% Injectable 25 Gram(s) IV Push once  dextrose 50% Injectable 12.5 Gram(s) IV Push once  donepezil 10 milliGRAM(s) Oral at bedtime  ferrous    sulfate 325 milliGRAM(s) Oral daily  gabapentin 300 milliGRAM(s) Oral daily  glucagon  Injectable 1 milliGRAM(s) IntraMuscular once  heparin   Injectable 5000 Unit(s) SubCutaneous every 8 hours  insulin lispro (ADMELOG) corrective regimen sliding scale   SubCutaneous three times a day before meals  lactulose Syrup 20 Gram(s) Oral three times a day  levoFLOXacin  Tablet 500 milliGRAM(s) Oral every 48 hours  levothyroxine 25 MICROGram(s) Oral daily  metoprolol succinate ER 25 milliGRAM(s) Oral daily  pantoprazole    Tablet 40 milliGRAM(s) Oral before breakfast  senna 2 Tablet(s) Oral at bedtime  sertraline 50 milliGRAM(s) Oral daily  sertraline 100 milliGRAM(s) Oral daily  sodium chloride 0.45%. 1000 milliLiter(s) (80 mL/Hr) IV Continuous <Continuous>    MEDICATIONS  (PRN):  albuterol/ipratropium for Nebulization 3 milliLiter(s) Nebulizer every 6 hours PRN Shortness of Breath and/or Wheezing  dextrose Oral Gel 15 Gram(s) Oral once PRN Blood Glucose LESS THAN 70 milliGRAM(s)/deciliter  ondansetron Injectable 4 milliGRAM(s) IV Push every 4 hours PRN Nausea and/or Vomiting      Allergies:   sulfa drugs (Hives)  Melons (Unknown)  shellfish (Unknown)  Sulfacetamide Sodium-Sulfur (Rash)  latex (Unknown)  sulfa drugs (Anaphylaxis)  penicillins (Unknown)  fish (Anaphylaxis)  penicillin (Anaphylaxis)  Intolerances          REVIEW OF SYSTEMS:  unobtainable       VITAL SIGNS:   T(F): 97.4 (04-12-24 @ 14:02), Max: 97.4 (04-12-24 @ 14:02)  HR: 68 (04-12-24 @ 14:02) (61 - 68)  BP: 170/74 (04-12-24 @ 14:02) (134/63 - 170/74)  RR: 18 (04-12-24 @ 14:02) (18 - 18)  SpO2: 93% (04-12-24 @ 14:02) (93% - 100%)    PHYSICAL EXAM:  GENERAL: resting   HEAD:  Atraumatic, Normocephalic  EYES: conjunctiva and sclera clear  NECK: Supple, no JVD or thyromegaly  CHEST/LUNG: Clear to auscultation bilaterally; No wheeze, rhonchi, or rales  HEART: Regular rate and rhythm; normal S1, S2, No murmurs.  ABDOMEN: Soft, nontender, nondistended; Bowel sounds present  NEUROLOGY: No asterixis or tremor.   SKIN: Intact, no jaundice            LABS:                        10.8   6.62  )-----------( 204      ( 12 Apr 2024 05:55 )             33.1     04-12    137  |  102  |  54<H>  ----------------------------<  109<H>  4.1   |  18  |  4.7<HH>    Ca    7.1<L>      12 Apr 2024 05:55    TPro  5.1<L>  /  Alb  3.3<L>  /  TBili  0.4  /  DBili  x   /  AST  31  /  ALT  39  /  AlkPhos  144<H>  04-11    LIVER FUNCTIONS - ( 11 Apr 2024 06:55 )  Alb: 3.3 g/dL / Pro: 5.1 g/dL / ALK PHOS: 144 U/L / ALT: 39 U/L / AST: 31 U/L / GGT: x               IMAGING:    < from: CT Abdomen and Pelvis w/ IV Cont (04.05.24 @ 08:37) >    ACC: [id number] EXAM:  CT ABDOMEN AND PELVIS IC   ORDERED BY: MIGUEL JUAREZ     PROCEDURE DATE:  04/05/2024          INTERPRETATION:  CLINICAL STATEMENT: Abdominal pain with nausea vomiting   and diarrhea    TECHNIQUE: Contiguous axial CT images were obtained from the lower chest   to the pubic symphysis .  95 cc administered of Omnipaque 350 (5 cc   discarded).  Oral contrast was not given.  Reformatted images in the   coronal and sagittal planes were acquired.    COMPARISON CT: None.    OTHER STUDIES USED FOR CORRELATION: None.      FINDINGS:    Evaluation somewhat limited due to arms are overlapping the torso   creating beam Fonseca artifact. Study also limited by obliquity.    LOWER CHEST: Pacer wires are seen within the heart. Cardiomegaly.   Scattered areas of atelectasis within the visualized lower lungs..    HEPATOBILIARY: Status post cholecystectomy. The liver is enlarged   measuring 17.3 cm in length..    SPLEEN: Numerous poorly defined enhancing lesions throughout the spleen..    PANCREAS: Atrophic pancreas. Punctate calcifications throughout the   pancreatic parenchyma suggesting sequelae of chronic pancreatitis..    ADRENAL GLANDS: Unremarkable.    KIDNEYS: Both kidneys demonstrate symmetric enhancement with no   hydronephrosis. There is a hyperdense lesion along the posterior aspect   of the superior right kidney measuring under centimeter. Another mildly   complex cystic lesion is seen laterally within the lower pole of the   right kidney..    ABDOMINOPELVIC NODES: Unremarkable.    PELVIC ORGANS: Almost completely collapsed urinary bladder. Atrophic   calcified uterus..    PERITONEUM/MESENTERY/BOWEL: There is presacral edema. Fecal retention   most severe within the rectum. No bowel obstruction..    BONES/SOFT TISSUES: Small fat-containing right inguinal hernia. Scoliosis   with multilevel degenerative changes. Compression fracture of L2 of   uncertain age.. Chronic deformity of the right pubic rami.    OTHER: Atherosclerotic changes of the aorta and itsvessels.      IMPRESSION:    Numerous ill-defined lesions throughout the spleen which could reflect   neoplastic process. Differential diagnosis includes multiple   microabscesses disease. Correlate clinically.    Complex processes involving the right kidney which may reflect complex   cysts. Solid nodules cannot be excluded. Correlate with dedicated CAT   scan of the kidneys with renal mass protocol MRI of the kidneys. This can   be performed on an outpatient basis.    Fecal retention most pronounced within the rectum.    --- End of Report ---            ASIA CRAVEN MD; Attending Radiologist  This document has been electronically signed. Apr 5 2024  9:01AM    < end of copied text >

## 2024-04-12 NOTE — PROGRESS NOTE ADULT - SUBJECTIVE AND OBJECTIVE BOX
Nephrology Progress Note    JAYASHREE FAGAN  MRN-[id number]  84y  Female    S:  Patient is seen and examined, events over the last 24h noted.    O:  Allergies:  sulfa drugs (Hives)  Melons (Unknown)  shellfish (Unknown)  Sulfacetamide Sodium-Sulfur (Rash)  latex (Unknown)  sulfa drugs (Anaphylaxis)  penicillins (Unknown)  fish (Anaphylaxis)  penicillin (Anaphylaxis)    Hospital Medications:   MEDICATIONS  (STANDING):  aspirin enteric coated 81 milliGRAM(s) Oral daily  atorvastatin 80 milliGRAM(s) Oral at bedtime  clopidogrel Tablet 75 milliGRAM(s) Oral daily  donepezil 10 milliGRAM(s) Oral at bedtime  ferrous    sulfate 325 milliGRAM(s) Oral daily  gabapentin 300 milliGRAM(s) Oral daily  glucagon  Injectable 1 milliGRAM(s) IntraMuscular once  heparin   Injectable 5000 Unit(s) SubCutaneous every 8 hours  insulin lispro (ADMELOG) corrective regimen sliding scale   SubCutaneous three times a day before meals  lactulose Syrup 20 Gram(s) Oral three times a day  levoFLOXacin  Tablet 500 milliGRAM(s) Oral every 48 hours  levothyroxine 25 MICROGram(s) Oral daily  metoprolol succinate ER 25 milliGRAM(s) Oral daily  pantoprazole    Tablet 40 milliGRAM(s) Oral before breakfast  senna 2 Tablet(s) Oral at bedtime  sertraline 50 milliGRAM(s) Oral daily  sertraline 100 milliGRAM(s) Oral daily  sodium chloride 0.45%. 1000 milliLiter(s) (80 mL/Hr) IV Continuous <Continuous>    MEDICATIONS  (PRN):  albuterol/ipratropium for Nebulization 3 milliLiter(s) Nebulizer every 6 hours PRN Shortness of Breath and/or Wheezing  dextrose Oral Gel 15 Gram(s) Oral once PRN Blood Glucose LESS THAN 70 milliGRAM(s)/deciliter  ondansetron Injectable 4 milliGRAM(s) IV Push every 4 hours PRN Nausea and/or Vomiting    Home Medications:  amLODIPine 10 mg oral tablet: 1 tab(s) orally once a day (09 Apr 2024 09:24)  amLODIPine 10 mg oral tablet: 1 tab(s) orally once a day (09 Apr 2024 09:25)  atorvastatin 80 mg oral tablet: 1 tab(s) orally once a day (at bedtime) (09 Apr 2024 09:23)  atorvastatin 80 mg oral tablet: 1 tab(s) orally once a day (at bedtime) (09 Apr 2024 09:25)  clopidogrel 75 mg oral tablet: 1 tab(s) orally once a day (09 Apr 2024 09:24)  clopidogrel 75 mg oral tablet: 1 tab(s) orally once a day (09 Apr 2024 09:25)  donepezil 10 mg oral tablet: 1 tab(s) orally once a day (at bedtime) (09 Apr 2024 09:25)  Ecotrin Adult Low Strength 81 mg oral delayed release tablet: 2 tab(s) orally once a day (09 Apr 2024 09:25)  Ecotrin Adult Low Strength 81 mg oral delayed release tablet: 2 tab(s) orally every other day (09 Apr 2024 09:25)  exemestane 25 mg oral tablet: 1 tab(s) orally once a day (09 Apr 2024 09:23)  exemestane 25 mg oral tablet: 1 tab(s) orally once a day (09 Apr 2024 09:25)  ferrous sulfate:  (09 Apr 2024 09:25)  ferrous sulfate 324 mg (65 mg elemental iron) oral tablet: 1  orally 2 times a day (09 Apr 2024 09:25)  furosemide 20 mg oral tablet: 1 tab(s) orally (09 Apr 2024 09:25)  gabapentin 300 mg oral capsule: 1 cap(s) orally 2 times a day (09 Apr 2024 09:25)  gabapentin 300 mg oral capsule: orally 2 times a day (09 Apr 2024 09:25)  HumaLOG 100 units/mL subcutaneous solution: Per insulin sliding scale at home (09 Apr 2024 09:25)  humalog sliding scale: with meals (09 Apr 2024 09:25)  Lasix 20 mg oral tablet: 1 tab(s) orally once a day (09 Apr 2024 09:24)  Levemir: 30  subcutaneous once a day (at bedtime) (09 Apr 2024 09:25)  Levemir 100 units/mL subcutaneous solution: 30 unit(s) subcutaneous once a day (at bedtime) (09 Apr 2024 09:25)  levothyroxine 25 mcg (0.025 mg) oral tablet: 1 tab(s) orally once a day (09 Apr 2024 09:25)  levothyroxine 25 mcg (0.025 mg) oral tablet: 1 tab(s) orally once a day (09 Apr 2024 09:24)  metoprolol succinate 25 mg oral capsule, extended release: 1 cap(s) orally once a day (09 Apr 2024 09:25)  mirtazapine 15 mg oral tablet: 1 tab(s) orally once a day (at bedtime) (09 Apr 2024 09:25)  nortriptyline 25 mg oral capsule: 1 cap(s) orally once a day (at bedtime) (09 Apr 2024 09:23)  nortriptyline 25 mg oral capsule: 1 cap(s) orally once a day (at bedtime) (09 Apr 2024 09:25)  pantoprazole 40 mg oral delayed release tablet: 1 tab(s) orally once a day (before a meal) (09 Apr 2024 09:23)  Protonix 40 mg oral delayed release tablet: 1 tab(s) orally once a day (09 Apr 2024 09:25)  senna oral tablet: 2 tab(s) orally once a day (at bedtime)- PRN for constipation- OTC is okay (09 Apr 2024 09:24)  sertraline 100 mg oral tablet: 1 tab(s) orally once a day (09 Apr 2024 09:23)  sertraline 50 mg oral tablet: 1 tab(s) orally once a day (09 Apr 2024 09:25)  stool softener: at night (09 Apr 2024 09:25)  temazepam 30 mg oral capsule: 1 cap(s) orally once a day (at bedtime) (09 Apr 2024 09:25)  traMADol 100 mg oral tablet: 1 tab(s) orally once a day (09 Apr 2024 09:25)      VITALS:  Daily     Daily   T(F): 97.4 (04-12-24 @ 14:02), Max: 97.7 (04-11-24 @ 14:46)  HR: 68 (04-12-24 @ 14:02)  BP: 170/74 (04-12-24 @ 14:02)  RR: 18 (04-12-24 @ 14:02)  SpO2: 93% (04-12-24 @ 14:02)  Wt(kg): --  I&O's Detail    11 Apr 2024 07:01  -  12 Apr 2024 07:00  --------------------------------------------------------  IN:  Total IN: 0 mL    OUT:    Voided (mL): 200 mL  Total OUT: 200 mL    Total NET: -200 mL      12 Apr 2024 07:01  -  12 Apr 2024 14:10  --------------------------------------------------------  IN:  Total IN: 0 mL    OUT:    Voided (mL): 1200 mL  Total OUT: 1200 mL    Total NET: -1200 mL        I&O's Summary    11 Apr 2024 07:01  -  12 Apr 2024 07:00  --------------------------------------------------------  IN: 0 mL / OUT: 200 mL / NET: -200 mL    12 Apr 2024 07:01  -  12 Apr 2024 14:10  --------------------------------------------------------  IN: 0 mL / OUT: 1200 mL / NET: -1200 mL          PHYSICAL EXAM:  Gen: NAD  Chest: b/l breath sounds  Abd: soft  Extremities: no edema      LABS:      04-12    137  |  102  |  54<H>  ----------------------------<  109<H>  4.1   |  18  |  4.7<HH>    Ca    7.1<L>      12 Apr 2024 05:55    TPro  5.1<L>  /  Alb  3.3<L>  /  TBili  0.4  /  DBili      /  AST  31  /  ALT  39  /  AlkPhos  144<H>  04-11    Phosphorus: 6.8 mg/dL (04-10-24 @ 06:21)  Phosphorus: 7.1 mg/dL (04-09-24 @ 06:16)                            10.8   6.62  )-----------( 204      ( 12 Apr 2024 05:55 )             33.1     Mean Cell Volume: 94.3 fL (04-12-24 @ 05:55)      Creatinine trend:  Creatinine: 4.7 mg/dL (04-12-24 @ 05:55)  Creatinine: 5.0 mg/dL (04-11-24 @ 06:55)  Creatinine: 4.9 mg/dL (04-10-24 @ 19:40)  Creatinine: 5.1 mg/dL (04-10-24 @ 06:21)  Creatinine: 4.4 mg/dL (04-09-24 @ 06:16)  Creatinine: 3.7 mg/dL (04-08-24 @ 06:59)  Creatinine: 2.5 mg/dL (04-07-24 @ 09:21)  Creatinine: 2.3 mg/dL (04-06-24 @ 21:39)  Creatinine: 2.1 mg/dL (04-06-24 @ 18:00)  Creatinine: 1.7 mg/dL (04-06-24 @ 09:06)  Creatinine: 1.5 mg/dL (04-05-24 @ 05:16)

## 2024-04-12 NOTE — CONSULT NOTE ADULT - ASSESSMENT
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

## 2024-04-12 NOTE — PROGRESS NOTE ADULT - SUBJECTIVE AND OBJECTIVE BOX
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     Today:  Examined this am; Pt mostly asks for things with one word; dozing off, O2 via NC        REVIEW OF SYSTEMS:  No complaints.    MEDICATIONS  (STANDING):  aspirin enteric coated 81 milliGRAM(s) Oral daily  atorvastatin 80 milliGRAM(s) Oral at bedtime  clopidogrel Tablet 75 milliGRAM(s) Oral daily  dextrose 10% Bolus 125 milliLiter(s) IV Bolus once  dextrose 5%. 1000 milliLiter(s) (100 mL/Hr) IV Continuous <Continuous>  dextrose 5%. 1000 milliLiter(s) (50 mL/Hr) IV Continuous <Continuous>  dextrose 50% Injectable 25 Gram(s) IV Push once  dextrose 50% Injectable 12.5 Gram(s) IV Push once  donepezil 10 milliGRAM(s) Oral at bedtime  ferrous    sulfate 325 milliGRAM(s) Oral daily  gabapentin 300 milliGRAM(s) Oral daily  glucagon  Injectable 1 milliGRAM(s) IntraMuscular once  heparin   Injectable 5000 Unit(s) SubCutaneous every 8 hours  lactulose Syrup 20 Gram(s) Oral three times a day  levoFLOXacin  Tablet 500 milliGRAM(s) Oral every 48 hours  levothyroxine 25 MICROGram(s) Oral daily  metoprolol succinate ER 25 milliGRAM(s) Oral daily  pantoprazole    Tablet 40 milliGRAM(s) Oral before breakfast  senna 2 Tablet(s) Oral at bedtime  sertraline 50 milliGRAM(s) Oral daily  sertraline 100 milliGRAM(s) Oral daily  sodium chloride 0.45%. 1000 milliLiter(s) (80 mL/Hr) IV Continuous <Continuous>    MEDICATIONS  (PRN):  albuterol/ipratropium for Nebulization 3 milliLiter(s) Nebulizer every 6 hours PRN Shortness of Breath and/or Wheezing  dextrose Oral Gel 15 Gram(s) Oral once PRN Blood Glucose LESS THAN 70 milliGRAM(s)/deciliter  ondansetron Injectable 4 milliGRAM(s) IV Push every 4 hours PRN Nausea and/or Vomiting      Allergies  sulfa drugs (Hives)  Melons (Unknown)  shellfish (Unknown)  Sulfacetamide Sodium-Sulfur (Rash)  latex (Unknown)  sulfa drugs (Anaphylaxis)  penicillins (Unknown)  fish (Anaphylaxis)  penicillin (Anaphylaxis)      FAMILY HISTORY:  FHx: diabetes mellitus (Mother)  FHx: diabetes mellitus (Mother)        Vital Signs Last 24 Hrs  T(C): 36.8 (10 Apr 2024 05:49), Max: 36.8 (09 Apr 2024 14:03)  T(F): 98.2 (10 Apr 2024 05:49), Max: 98.3 (09 Apr 2024 14:03)  HR: 65 (10 Apr 2024 05:49) (65 - 68)  BP: 118/58 (10 Apr 2024 05:49) (114/55 - 125/67)  RR: 18 (10 Apr 2024 05:49) (18 - 18)  SpO2: 100% (10 Apr 2024 05:49) (98% - 100%)    Parameters below as of 09 Apr 2024 14:03  Patient On (Oxygen Delivery Method): nasal cannula  O2 Flow (L/min): 4      PHYSICAL EXAM:  GENERAL: Well-nourished, Well-developed. NAD.  HEAD: No visible or palpable bumps or hematomas. No ecchymosis behind ears B/L.  ENMT: PERRLA; dry mucosa on tongue    CVS: Normal S1,S2. No murmurs appreciated on auscultation   RESP: No use of accessory muscles. Chest rise symmetrical with good expansion. Lungs clear to auscultation B/L. No wheezing, rales, or rhonchi auscultated.  GI: Normal auscultation of bowel sounds in all 4 quadrants. (+)mild diffuse abd ttp. Soft, Nondistended.  Skin: Warm, Dry. No rashes or lesions. Good cap refill < 2 sec B/L.  EXT: Radial and pedal pulses present B/L. No calf tenderness or swelling B/L. No palpable cords. No pedal edema B/L.  NEURO: AAO x 2      LABS:                        10.5   8.06  )-----------( 190      ( 10 Apr 2024 06:21 )             31.8     04-10    139  |  102  |  53<H>  ----------------------------<  122<H>  4.6   |  20  |  5.1<HH>    Ca    7.1<L>      10 Apr 2024 06:21  Phos  6.8     04-10  Mg     1.9     04-10    TPro  5.4<L>  /  Alb  3.5  /  TBili  0.4  /  DBili  <0.2  /  AST  36  /  ALT  58<H>  /  AlkPhos  164<H>  04-10      Urinalysis Basic - ( 10 Apr 2024 06:21 )    Color: x / Appearance: x / SG: x / pH: x  Gluc: 122 mg/dL / Ketone: x  / Bili: x / Urobili: x   Blood: x / Protein: x / Nitrite: x   Leuk Esterase: x / RBC: x / WBC x   Sq Epi: x / Non Sq Epi: x / Bacteria: x

## 2024-04-12 NOTE — PROGRESS NOTE ADULT - ASSESSMENT
JABARI on CKD 3 / likely d/t contrast induced nephropathy    - creat down-trending  - good urine output  - d/c iv fluids if tolerating good po intake with hydration, otherwise change 1/2NS to 40cc/hr / avoid hypervolemia  - resume amlodipine if BP remains elevated  - cont to document urine output / check daily chemistry   - hematuria noted on 4/5 UA, for completion, f/u ANCA panel w/ MPO/PR3, SERGIO, C3/C4- C3 mild low, ?D/t infection- will need to repeat level in a few wks, serum flc ratio, spep/sife, CPK ok  - no need for RRT   - CT noted, no hydronephrosis;  will need outpatient  monitoring for CTAP findings of complex renal cysts

## 2024-04-13 NOTE — PROGRESS NOTE ADULT - ASSESSMENT
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       Acute Hypoxic resp Failure-possibly 2' to undiagnosed WENDY:  - Unclear etiology at this time  -CXR clear, No acute process, shows cardiomegaly  - Pulmonary consult appreciated, needs NIV   - Bipap as needed  - Supplemental O2 as needed  - Procal very mildly elevated  -D-dimer neg for age  -blood Cx NGTD  -TTE:  1. LV Ejection Fraction by Mcgrath's Method with a biplane EF of 40 %.   2. Mildly enlarged left atrium.   3. Mild mitral annular calcification.   4. Mild mitral valve regurgitation.   5. Mild tricuspid regurgitation.   6. Sclerotic aortic valve with normal opening.  - Discussed with family, HHA have been reporting she "never sleeps and wakes up frequently at night".  Never had sleep study or PFT.  2' hand smoke exposure from .  - cardiology consult for decreased EF as previous tTE in 2019 was 55-60%  - cardio and nephro recommended resuming amlodipine  - avoid ACE/ARBs given renal function  - LUE duplex  - dispo planning; will need resumption of 24/7 HHA    #constipation  - manually disimpacted by GI  - enemas q 4hrs  bowel regimen    UTI:  -UCx grew areococcus urinae  -Completed course of Levaquin-discussed with ID specialist, this will cover    Onesimo, possibly contrast-induced:  -Continue fluids, monitor BMP  -US unremarkable  -Renal consult appreciated-  - anuric if documentation is accurate (has prima-fit)  - creat up-trending  - tolerating minimal po intake  - change iv fluids to 1/2NS and repeat BMP in PM to monitor Na level (to avoid hyponatremia)  - BP stable, off amlodipine  - cont strict i/o   - hematuria noted on 4/5 UA, check ANCA panel w/ MPO/PR3, SERGIO, C3/C4, serum flc ratio, spep/sife,   - CK elevated  - gabapentin should be max 300mg daily  - no acute indication for RRT  - started on sodium bicarb  - repeat US renal  - monitor BP; switch to nifedipine if not controlled    Transaminitis:  -Resolving, unclear etiology, US unremarkable, possibly 2' to Hypotensive episodes, follow GGT, hepatitis panel    Elevated troponin:  -Likely 2' to demand, PPM interrogated, no events  -TTE pending  -Trop flat  -No further need for tele     Abdominal pain with overflow diarrhea 2' to constipation  - Started patient on stool softener, lactulose due to fecal retention seen on CT  - Monitor vitals, bowel movements  -Will try enemas  -GI consult appreciated, s/p disimpaction    Splenic lesions  - Incidentally found on imaging, very unlikely the cause of her symptoms, clinically very unlikely infectious in nature  -Outpatient imaging for further characterization    Dementia  - Continue with zoloft, nortriptyline, and donepezil   - monitor mental status     HTN  - continue metoprolol, holding Norvasc to avoid Hypotension   - DASH diet    Diabetes Mellitus II  - CHO consistent diet  -Hold insulin for episodes of Hypoglycemia, restart PRN     Hypothyroidism  - Continue levothyroxine as prescribed     Chronic Pain   - Continue gabapentin and tramadol    DVT/VTE prophylaxis   Minced and moist diet per SLP, advance as per re-eval    Dispo:   -When pt moving bowels regularly and renal function improves can DC likely to rehab.

## 2024-04-13 NOTE — PROGRESS NOTE ADULT - ASSESSMENT
JABARI on CKD 3 / likely d/t contrast induced nephropathy    - creat down-trending  - good urine output  Kidney sono (4/6/24) no hydro, smaller Lt kidney (10.3/8.6) , needs repeat Bladder Sono  - on IVF 80 cc/hr - may cont po intake not great  - repeat CXR to assess volume (4/8/24) Lt opacity - on LEvaquin  - change amlodipine to Nifedipine  if BP remains elevated  - fortunato acidosis  - start Na bicarb 650 bid  - cont to document urine output / check daily chemistry   - hematuria noted on 4/5 UA, for completion, f/u ANCA panel w/ MPO/PR3, SERGIO, C3/C4- C3 mild low, ?D/t infection- will need to repeat level in a few wks, serum flc ratio, spep/sife, CPK ok  - no need for RRT   - CT noted, no hydronephrosis;  will need outpatient  monitoring for CTAP findings of complex renal cysts

## 2024-04-13 NOTE — PROGRESS NOTE ADULT - SUBJECTIVE AND OBJECTIVE BOX
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     Today:  Examined this am; Pt mental status mostly unchanged. Dispo planning, has 24/7 HHA that can be reinstated on Monday        REVIEW OF SYSTEMS:  No complaints.    MEDICATIONS  (STANDING):  aspirin enteric coated 81 milliGRAM(s) Oral daily  atorvastatin 80 milliGRAM(s) Oral at bedtime  clopidogrel Tablet 75 milliGRAM(s) Oral daily  dextrose 10% Bolus 125 milliLiter(s) IV Bolus once  dextrose 5%. 1000 milliLiter(s) (100 mL/Hr) IV Continuous <Continuous>  dextrose 5%. 1000 milliLiter(s) (50 mL/Hr) IV Continuous <Continuous>  dextrose 50% Injectable 25 Gram(s) IV Push once  dextrose 50% Injectable 12.5 Gram(s) IV Push once  donepezil 10 milliGRAM(s) Oral at bedtime  ferrous    sulfate 325 milliGRAM(s) Oral daily  gabapentin 300 milliGRAM(s) Oral daily  glucagon  Injectable 1 milliGRAM(s) IntraMuscular once  heparin   Injectable 5000 Unit(s) SubCutaneous every 8 hours  lactulose Syrup 20 Gram(s) Oral three times a day  levoFLOXacin  Tablet 500 milliGRAM(s) Oral every 48 hours  levothyroxine 25 MICROGram(s) Oral daily  metoprolol succinate ER 25 milliGRAM(s) Oral daily  pantoprazole    Tablet 40 milliGRAM(s) Oral before breakfast  senna 2 Tablet(s) Oral at bedtime  sertraline 50 milliGRAM(s) Oral daily  sertraline 100 milliGRAM(s) Oral daily  sodium chloride 0.45%. 1000 milliLiter(s) (80 mL/Hr) IV Continuous <Continuous>    MEDICATIONS  (PRN):  albuterol/ipratropium for Nebulization 3 milliLiter(s) Nebulizer every 6 hours PRN Shortness of Breath and/or Wheezing  dextrose Oral Gel 15 Gram(s) Oral once PRN Blood Glucose LESS THAN 70 milliGRAM(s)/deciliter  ondansetron Injectable 4 milliGRAM(s) IV Push every 4 hours PRN Nausea and/or Vomiting      Allergies  sulfa drugs (Hives)  Melons (Unknown)  shellfish (Unknown)  Sulfacetamide Sodium-Sulfur (Rash)  latex (Unknown)  sulfa drugs (Anaphylaxis)  penicillins (Unknown)  fish (Anaphylaxis)  penicillin (Anaphylaxis)      FAMILY HISTORY:  FHx: diabetes mellitus (Mother)  FHx: diabetes mellitus (Mother)        Vital Signs Last 24 Hrs  T(C): 36.8 (10 Apr 2024 05:49), Max: 36.8 (09 Apr 2024 14:03)  T(F): 98.2 (10 Apr 2024 05:49), Max: 98.3 (09 Apr 2024 14:03)  HR: 65 (10 Apr 2024 05:49) (65 - 68)  BP: 118/58 (10 Apr 2024 05:49) (114/55 - 125/67)  RR: 18 (10 Apr 2024 05:49) (18 - 18)  SpO2: 100% (10 Apr 2024 05:49) (98% - 100%)    Parameters below as of 09 Apr 2024 14:03  Patient On (Oxygen Delivery Method): nasal cannula  O2 Flow (L/min): 4      PHYSICAL EXAM:  GENERAL: Well-nourished, disheveled, NAD.  HEAD: No visible or palpable bumps or hematomas. No ecchymosis behind ears B/L.  ENMT: PERRLA; dry mucosa on tongue    CVS: Normal S1,S2. No murmurs appreciated on auscultation   RESP: No use of accessory muscles. Chest rise symmetrical with good expansion. Lungs clear to auscultation B/L. No wheezing, rales, or rhonchi auscultated.  GI: Normal auscultation of bowel sounds in all 4 quadrants. (+)mild diffuse abd ttp. Soft, Nondistended.  Skin: Warm, Dry. No rashes or lesions. Good cap refill < 2 sec B/L.  EXT: Radial and pedal pulses present B/L. No calf tenderness or swelling B/L. No palpable cords. No pedal edema B/L.  NEURO: AAO x 2      LABS:                        10.5   8.06  )-----------( 190      ( 10 Apr 2024 06:21 )             31.8     04-10    139  |  102  |  53<H>  ----------------------------<  122<H>  4.6   |  20  |  5.1<HH>    Ca    7.1<L>      10 Apr 2024 06:21  Phos  6.8     04-10  Mg     1.9     04-10    TPro  5.4<L>  /  Alb  3.5  /  TBili  0.4  /  DBili  <0.2  /  AST  36  /  ALT  58<H>  /  AlkPhos  164<H>  04-10      Urinalysis Basic - ( 10 Apr 2024 06:21 )    Color: x / Appearance: x / SG: x / pH: x  Gluc: 122 mg/dL / Ketone: x  / Bili: x / Urobili: x   Blood: x / Protein: x / Nitrite: x   Leuk Esterase: x / RBC: x / WBC x   Sq Epi: x / Non Sq Epi: x / Bacteria: x

## 2024-04-13 NOTE — PROGRESS NOTE ADULT - SUBJECTIVE AND OBJECTIVE BOX
Nephrology progress note    Patient is seen and examined, events over the last 24 h noted .    Allergies:  sulfa drugs (Hives)  Melons (Unknown)  shellfish (Unknown)  Sulfacetamide Sodium-Sulfur (Rash)  latex (Unknown)  sulfa drugs (Anaphylaxis)  penicillins (Unknown)  fish (Anaphylaxis)  penicillin (Anaphylaxis)    Hospital Medications:   MEDICATIONS  (STANDING):  amLODIPine   Tablet 10 milliGRAM(s) Oral daily  aspirin enteric coated 81 milliGRAM(s) Oral daily  atorvastatin 80 milliGRAM(s) Oral at bedtime  clopidogrel Tablet 75 milliGRAM(s) Oral daily  dextrose 10% Bolus 125 milliLiter(s) IV Bolus once  dextrose 5%. 1000 milliLiter(s) (100 mL/Hr) IV Continuous <Continuous>  dextrose 5%. 1000 milliLiter(s) (50 mL/Hr) IV Continuous <Continuous>  dextrose 50% Injectable 25 Gram(s) IV Push once  dextrose 50% Injectable 12.5 Gram(s) IV Push once  donepezil 10 milliGRAM(s) Oral at bedtime  ferrous    sulfate 325 milliGRAM(s) Oral daily  gabapentin 300 milliGRAM(s) Oral daily  glucagon  Injectable 1 milliGRAM(s) IntraMuscular once  heparin   Injectable 5000 Unit(s) SubCutaneous every 8 hours  insulin lispro (ADMELOG) corrective regimen sliding scale   SubCutaneous three times a day before meals  lactulose Syrup 20 Gram(s) Oral three times a day  levoFLOXacin  Tablet 500 milliGRAM(s) Oral every 48 hours  levothyroxine 25 MICROGram(s) Oral daily  metoprolol succinate ER 25 milliGRAM(s) Oral daily  pantoprazole    Tablet 40 milliGRAM(s) Oral before breakfast  senna 2 Tablet(s) Oral at bedtime  sertraline 100 milliGRAM(s) Oral daily  sertraline 50 milliGRAM(s) Oral daily  sodium chloride 0.45%. 1000 milliLiter(s) (80 mL/Hr) IV Continuous <Continuous>        VITALS:  T(F): 96.8 (04-13-24 @ 04:47), Max: 97.4 (04-12-24 @ 14:02)  HR: 61 (04-13-24 @ 04:47)  BP: 163/77 (04-13-24 @ 04:47)  RR: 18 (04-13-24 @ 04:47)  SpO2: 97% (04-13-24 @ 04:47)  Wt(kg): --    04-10 @ 07:01  -  04-11 @ 07:00  --------------------------------------------------------  IN: 640 mL / OUT: 100 mL / NET: 540 mL    04-11 @ 07:01 - 04-12 @ 07:00  --------------------------------------------------------  IN: 0 mL / OUT: 200 mL / NET: -200 mL    04-12 @ 07:01  -  04-13 @ 06:52  --------------------------------------------------------  IN: 0 mL / OUT: 1200 mL / NET: -1200 mL          PHYSICAL EXAM:  Constitutional: NAD  HEENT: anicteric sclera,   Neck: No JVD  Respiratory: CTAB, no wheezes, rales or rhonchi  Cardiovascular: S1, S2, RRR  Gastrointestinal: BS+, soft, NT/ND  Extremities: No cyanosis or clubbing. No peripheral edema  Neurological: A/O x 3  : No CVA tenderness. No brannon. Primafit+  Skin: No rashes  Vascular Access:    LABS:  04-12    137  |  102  |  54<H>  ----------------------------<  109<H>  4.1   |  18  |  4.7<HH>  Creatinine Trend: 4.7<--, 5.0<--, 4.9<--, 5.1<--, 4.4<--, 3.7<--  Ca    7.1<L>      12 Apr 2024 05:55    TPro  5.1<L>  /  Alb  3.3<L>  /  TBili  0.4  /  DBili      /  AST  31  /  ALT  39  /  AlkPhos  144<H>  04-11                          10.8   6.62  )-----------( 204      ( 12 Apr 2024 05:55 )             33.1       Urine Studies:  Urinalysis Basic - ( 12 Apr 2024 05:55 )    Color:  / Appearance:  / SG:  / pH:   Gluc: 109 mg/dL / Ketone:   / Bili:  / Urobili:    Blood:  / Protein:  / Nitrite:    Leuk Esterase:  / RBC:  / WBC    Sq Epi:  / Non Sq Epi:  / Bacteria:         RADIOLOGY & ADDITIONAL STUDIES:  < from: Xray Chest 1 View- PORTABLE-Urgent (Xray Chest 1 View- PORTABLE-Urgent .) (04.08.24 @ 09:28) >    Question of left lower lobe opacity    < end of copied text >  < from: US Abdomen Complete (US Abdomen Complete .) (04.06.24 @ 21:41) >  Right kidney: 10.3 cm. No hydronephrosis. The complex processes involving   the right kidney noted on recent CT 4/5/2024 are not well visualized.  Left kidney: 8.6 cm. No hydronephrosis.  Ascites: None.  Aorta and IVC: Visualized portions are within normal limits.    Urinary bladder: Limited examination urinary bladder (please see   technologist notes). The bilateral ureter jets are not visualized, a   nonspecific finding. The urinary bladder wall measures approximately 6   mm. Urinary bladder wall volume 98 cc. No definite sonographic evidence   of urinary bladder debris or calculus      < end of copied text >  < from: Xray Chest 1 View- PORTABLE-Urgent (Xray Chest 1 View- PORTABLE-Urgent .) (04.08.24 @ 09:28) >    Impression:    Question of left lower lobe opacity    < end of copied text >

## 2024-04-14 NOTE — PROGRESS NOTE ADULT - SUBJECTIVE AND OBJECTIVE BOX
Nephrology progress note    Patient was seen and examined, events over the last 24 h noted .    Allergies:  sulfa drugs (Hives)  Melons (Unknown)  shellfish (Unknown)  Sulfacetamide Sodium-Sulfur (Rash)  latex (Unknown)  sulfa drugs (Anaphylaxis)  penicillins (Unknown)  fish (Anaphylaxis)  penicillin (Anaphylaxis)    Hospital Medications:   MEDICATIONS  (STANDING):  amLODIPine   Tablet 10 milliGRAM(s) Oral daily  aspirin enteric coated 81 milliGRAM(s) Oral daily  atorvastatin 80 milliGRAM(s) Oral at bedtime  clopidogrel Tablet 75 milliGRAM(s) Oral daily  dextrose 10% Bolus 125 milliLiter(s) IV Bolus once  dextrose 5%. 1000 milliLiter(s) (100 mL/Hr) IV Continuous <Continuous>  dextrose 5%. 1000 milliLiter(s) (50 mL/Hr) IV Continuous <Continuous>  dextrose 50% Injectable 12.5 Gram(s) IV Push once  dextrose 50% Injectable 25 Gram(s) IV Push once  donepezil 10 milliGRAM(s) Oral at bedtime  ferrous    sulfate 325 milliGRAM(s) Oral daily  gabapentin 300 milliGRAM(s) Oral daily  glucagon  Injectable 1 milliGRAM(s) IntraMuscular once  heparin   Injectable 5000 Unit(s) SubCutaneous every 8 hours  insulin lispro (ADMELOG) corrective regimen sliding scale   SubCutaneous three times a day before meals  lactulose Syrup 20 Gram(s) Oral three times a day  levoFLOXacin  Tablet 500 milliGRAM(s) Oral every 48 hours  levothyroxine 25 MICROGram(s) Oral daily  metoprolol succinate ER 25 milliGRAM(s) Oral daily  pantoprazole    Tablet 40 milliGRAM(s) Oral before breakfast  senna 2 Tablet(s) Oral at bedtime  sertraline 100 milliGRAM(s) Oral daily  sertraline 50 milliGRAM(s) Oral daily  sodium bicarbonate 650 milliGRAM(s) Oral two times a day  sodium chloride 0.45%. 1000 milliLiter(s) (80 mL/Hr) IV Continuous <Continuous>        VITALS:  T(F): 97.3 (04-14-24 @ 05:02), Max: 97.4 (04-13-24 @ 14:20)  HR: 73 (04-14-24 @ 05:02)  BP: 141/59 (04-14-24 @ 05:02)  RR: 16 (04-14-24 @ 05:02)  SpO2: 97% (04-14-24 @ 05:02)  Wt(kg): --    04-12 @ 07:01  -  04-13 @ 07:00  --------------------------------------------------------  IN: 0 mL / OUT: 1200 mL / NET: -1200 mL          PHYSICAL EXAM:  Constitutional: NAD  HEENT: anicteric sclera, oropharynx clear, MMM  Neck: No JVD  Respiratory: CTAB, no wheezes, rales or rhonchi  Cardiovascular: S1, S2, RRR  Gastrointestinal: BS+, soft, NT/ND  Extremities: No cyanosis or clubbing. No peripheral edema  :  No brannon.   Skin: No rashes    LABS:  04-13    142  |  105  |  42<H>  ----------------------------<  137<H>  3.7   |  20  |  3.4<H>    Ca    7.6<L>      13 Apr 2024 09:23                            11.4   5.47  )-----------( 194      ( 13 Apr 2024 09:23 )             33.5       Urine Studies:  Urinalysis Basic - ( 13 Apr 2024 09:23 )    Color:  / Appearance:  / SG:  / pH:   Gluc: 137 mg/dL / Ketone:   / Bili:  / Urobili:    Blood:  / Protein:  / Nitrite:    Leuk Esterase:  / RBC:  / WBC    Sq Epi:  / Non Sq Epi:  / Bacteria:         RADIOLOGY & ADDITIONAL STUDIES:

## 2024-04-14 NOTE — PROGRESS NOTE ADULT - ASSESSMENT
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       Acute Hypoxic resp Failure-possibly 2' to undiagnosed WENDY:  - Unclear etiology at this time  -CXR clear, No acute process, shows cardiomegaly  - Pulmonary consult appreciated, needs NIV   - Bipap as needed  - Supplemental O2 as needed  - Procal very mildly elevated  -D-dimer neg for age  -blood Cx NGTD  -TTE:  LV Ejection Fraction by Mcgrath's Method with a biplane EF of 40 %.  - cardiology consult for decreased EF as previous tTE in 2019 was 55-60%  - Discussed with family, HHA have been reporting she "never sleeps and wakes up frequently at night".  Never had sleep study or PFT.  2' hand smoke exposure from .  - cardio and nephro recommended resuming amlodipine  - avoid ACE/ARBs given renal function  - LUE duplex: neg for clot; elevate arm  - dispo planning; will need resumption of 24/7 HHA, needs a bipap and home O2    UTI:  -UCx grew areococcus urinae  -Completed course of Levaquin-discussed with ID specialist, this will cover    Britton, possibly contrast-induced:  -US renal unremarkable  -Renal consult appreciated-  - monitor urine output  - Cr improving  - cont strict i/o   - hematuria noted on 4/5 UA, check ANCA panel w/ MPO/PR3, SERGIO, C3/C4, serum flc ratio, spep/sife,   - CK elevated minimally  - gabapentin should be max 300mg daily based on CrCL  - no acute indication for RRT  - started on sodium bicarb  - repeat US renal as per nephro  - monitor BP; switch to nifedipine if not controlled  - stopped IVf on 4/14 due to 2+ LE pitting edema    Transaminitis:  -Resolving, unclear etiology, US unremarkable, possibly 2' to Hypotensive episodes, follow GGT, hepatitis panel    Elevated troponin:  -Likely 2' to demand, PPM interrogated, no events  -TTE pending  -Trop flat  -No further need for tele     Abdominal pain with overflow diarrhea 2' to constipation, resolved  - aggressive bowel regimen  - enemas q 6  -GI consult appreciated, s/p disimpaction    Splenic lesions  - Incidentally found on imaging, very unlikely the cause of her symptoms, clinically very unlikely infectious in nature  -Outpatient imaging for further characterization    Dementia  - Continue with zoloft, nortriptyline, and donepezil   - monitor mental status     HTN  - continue metoprolol, holding Norvasc to avoid Hypotension   - DASH diet    Diabetes Mellitus II  - CHO consistent diet  -Hold insulin for episodes of Hypoglycemia, restart PRN     Hypothyroidism  - Continue levothyroxine as prescribed     Chronic Pain   - Continue gabapentin and tramadol    DVT/VTE prophylaxis   Minced and moist diet per SLP, advance as per re-eval    Dispo: Plan is home with resumption of her 24/7 HHA; spoke to son on 4/14 and he states she does not have a bipap machine or home O2; she is bedbound with onel lift so CM will have to see if insurance will cover it; Pulm saw pt and bipap settings in their note and RT note

## 2024-04-14 NOTE — PROGRESS NOTE ADULT - ASSESSMENT
JABARI on CKD 3 / likely d/t contrast induced nephropathy    - creat down-trending  - good urine output  Kidney sono (4/6/24) no hydro, smaller Lt kidney (10.3/8.6) , needs repeat Bladder Sono   on IVF 80 cc/hr - may cont po intake not great  - met acidosis  - cont Na bicarb 650 bid  - cont to document urine output / check daily chemistry   - hematuria noted on 4/5 UA, for completion, f/u ANCA panel w/ MPO/PR3, SERGIO, C3/C4- C3 mild low, ?D/t infection- will need to repeat level in a few wks, serum flc ratio, spep/sife, CPK ok  - no need for RRT   - CT noted, no hydronephrosis;  will need outpatient  monitoring for CTAP findings of complex renal cysts

## 2024-04-14 NOTE — PROGRESS NOTE ADULT - SUBJECTIVE AND OBJECTIVE BOX
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     Today:  Pt doing well. She is on room air and more alert today. Friend came to visit and pt was upbeat. Spoke to her son who stated she does not have a bipap machine or O2 at home.         REVIEW OF SYSTEMS:  No complaints.    MEDICATIONS  (STANDING):  aspirin enteric coated 81 milliGRAM(s) Oral daily  atorvastatin 80 milliGRAM(s) Oral at bedtime  clopidogrel Tablet 75 milliGRAM(s) Oral daily  dextrose 10% Bolus 125 milliLiter(s) IV Bolus once  dextrose 5%. 1000 milliLiter(s) (100 mL/Hr) IV Continuous <Continuous>  dextrose 5%. 1000 milliLiter(s) (50 mL/Hr) IV Continuous <Continuous>  dextrose 50% Injectable 25 Gram(s) IV Push once  dextrose 50% Injectable 12.5 Gram(s) IV Push once  donepezil 10 milliGRAM(s) Oral at bedtime  ferrous    sulfate 325 milliGRAM(s) Oral daily  gabapentin 300 milliGRAM(s) Oral daily  glucagon  Injectable 1 milliGRAM(s) IntraMuscular once  heparin   Injectable 5000 Unit(s) SubCutaneous every 8 hours  lactulose Syrup 20 Gram(s) Oral three times a day  levoFLOXacin  Tablet 500 milliGRAM(s) Oral every 48 hours  levothyroxine 25 MICROGram(s) Oral daily  metoprolol succinate ER 25 milliGRAM(s) Oral daily  pantoprazole    Tablet 40 milliGRAM(s) Oral before breakfast  senna 2 Tablet(s) Oral at bedtime  sertraline 50 milliGRAM(s) Oral daily  sertraline 100 milliGRAM(s) Oral daily  sodium chloride 0.45%. 1000 milliLiter(s) (80 mL/Hr) IV Continuous <Continuous>    MEDICATIONS  (PRN):  albuterol/ipratropium for Nebulization 3 milliLiter(s) Nebulizer every 6 hours PRN Shortness of Breath and/or Wheezing  dextrose Oral Gel 15 Gram(s) Oral once PRN Blood Glucose LESS THAN 70 milliGRAM(s)/deciliter  ondansetron Injectable 4 milliGRAM(s) IV Push every 4 hours PRN Nausea and/or Vomiting      Allergies  sulfa drugs (Hives)  Melons (Unknown)  shellfish (Unknown)  Sulfacetamide Sodium-Sulfur (Rash)  latex (Unknown)  sulfa drugs (Anaphylaxis)  penicillins (Unknown)  fish (Anaphylaxis)  penicillin (Anaphylaxis)      FAMILY HISTORY:  FHx: diabetes mellitus (Mother)  FHx: diabetes mellitus (Mother)        Vital Signs Last 24 Hrs  T(C): 36.8 (10 Apr 2024 05:49), Max: 36.8 (09 Apr 2024 14:03)  T(F): 98.2 (10 Apr 2024 05:49), Max: 98.3 (09 Apr 2024 14:03)  HR: 65 (10 Apr 2024 05:49) (65 - 68)  BP: 118/58 (10 Apr 2024 05:49) (114/55 - 125/67)  RR: 18 (10 Apr 2024 05:49) (18 - 18)  SpO2: 100% (10 Apr 2024 05:49) (98% - 100%)    Parameters below as of 09 Apr 2024 14:03  Patient On (Oxygen Delivery Method): nasal cannula  O2 Flow (L/min): 4      PHYSICAL EXAM:  GENERAL: Well-nourished, disheveled, NAD.  HEAD: No visible or palpable bumps or hematomas. No ecchymosis behind ears B/L.  ENMT: PERRLA    CVS: Normal S1,S2. No murmurs appreciated on auscultation   RESP: No use of accessory muscles. Chest rise symmetrical with good expansion. Lungs clear to auscultation B/L. No wheezing, rales, or rhonchi auscultated.  GI: Normal auscultation of bowel sounds in all 4 quadrants. (+)mild diffuse abd ttp. Soft, Nondistended.  Skin: Warm, Dry. No rashes or lesions. Good cap refill < 2 sec B/L.  EXT: Radial and pedal pulses present B/L. No palpable cords.  has 2+ pitting edema b/l  NEURO: AAO x 2      LABS:                        10.5   8.06  )-----------( 190      ( 10 Apr 2024 06:21 )             31.8     04-10    139  |  102  |  53<H>  ----------------------------<  122<H>  4.6   |  20  |  5.1<HH>    Ca    7.1<L>      10 Apr 2024 06:21  Phos  6.8     04-10  Mg     1.9     04-10    TPro  5.4<L>  /  Alb  3.5  /  TBili  0.4  /  DBili  <0.2  /  AST  36  /  ALT  58<H>  /  AlkPhos  164<H>  04-10      Urinalysis Basic - ( 10 Apr 2024 06:21 )    Color: x / Appearance: x / SG: x / pH: x  Gluc: 122 mg/dL / Ketone: x  / Bili: x / Urobili: x   Blood: x / Protein: x / Nitrite: x   Leuk Esterase: x / RBC: x / WBC x   Sq Epi: x / Non Sq Epi: x / Bacteria: x

## 2024-04-15 NOTE — PROGRESS NOTE ADULT - ASSESSMENT
84yFemale pmh DM, breast cancer 2014 s/p rt, BP, HTN, CVA, pacemaker insertion brought for altered mental status. GI consulted for fecal impaction asked for disimpaction.    #Fecal retention most pronounced within the rectum now moderate burden on KUB  patient having bms  Rec  -f/u KUB appreciated   -performed manual disimpaction at bedside, small stool burden evacuated 4/9/24  -lavaged colon with 200ccs 4/9/24  -tap water enemas q4h   -Miralax BID, senna BID  -Serial abd exams  - Follow up with our [facility] located at [address] [place] [address]. Phone Number: [phone]     #Numerous ill-defined lesions throughout the spleen which could reflect   neoplastic process. Differential diagnosis includes multiple   microabscesses disease. Correlate clinically.  Rec  - as per oncology team    #Complex processes involving the right kidney which may reflect complex   cysts. Solid nodules cannot be excluded. Correlate with dedicated CAT   scan of the kidneys with renal mass protocol MRI of the kidneys. This can   be performed on an outpatient basis.  Rec  - Care as per primary team       Recall GI if needed

## 2024-04-15 NOTE — PROGRESS NOTE ADULT - SUBJECTIVE AND OBJECTIVE BOX
84yFemale  Being followed for fecal impaction  Interval history:  No hematemesis, melena, blood in stool reported. patient having bms.      PAST MEDICAL & SURGICAL HISTORY:   DM (diabetes mellitus)  24 yr      Breast CA  2014 s/p rt      BP (high blood pressure)  20 yr      Depression      High cholesterol      Bilateral hearing loss, unspecified hearing loss type      CVA (cerebral vascular accident)  6/18 lft weakness      Hypertension      Diabetes      CVA (cerebrovascular accident)      Pacemaker      Breast cancer      History of pressure ulcer      Dementia      Hypothyroidism      CAD (coronary artery disease)      Immobility      H/O total knee replacement, right      History of lumpectomy of right breast      History of cholecystectomy  1992      Pacemaker      S/P lumpectomy of breast      Knee joint replacement status, right      History of cholecystectomy                Social History: No smoking. No alcohol. No illegal drug use.            MEDICATIONS  (STANDING):  amLODIPine   Tablet 10 milliGRAM(s) Oral daily  aspirin enteric coated 81 milliGRAM(s) Oral daily  atorvastatin 80 milliGRAM(s) Oral at bedtime  chlorhexidine 2% Cloths 1 Application(s) Topical <User Schedule>  clopidogrel Tablet 75 milliGRAM(s) Oral daily  dextrose 10% Bolus 125 milliLiter(s) IV Bolus once  dextrose 5%. 1000 milliLiter(s) (50 mL/Hr) IV Continuous <Continuous>  dextrose 5%. 1000 milliLiter(s) (100 mL/Hr) IV Continuous <Continuous>  dextrose 50% Injectable 25 Gram(s) IV Push once  dextrose 50% Injectable 12.5 Gram(s) IV Push once  donepezil 10 milliGRAM(s) Oral at bedtime  ferrous    sulfate 325 milliGRAM(s) Oral daily  gabapentin 300 milliGRAM(s) Oral daily  glucagon  Injectable 1 milliGRAM(s) IntraMuscular once  heparin   Injectable 5000 Unit(s) SubCutaneous every 8 hours  insulin lispro (ADMELOG) corrective regimen sliding scale   SubCutaneous three times a day before meals  lactulose Syrup 20 Gram(s) Oral three times a day  levothyroxine 25 MICROGram(s) Oral daily  metoprolol succinate ER 25 milliGRAM(s) Oral daily  pantoprazole    Tablet 40 milliGRAM(s) Oral before breakfast  potassium chloride    Tablet ER 20 milliEquivalent(s) Oral every 2 hours  potassium chloride    Tablet ER 40 milliEquivalent(s) Oral every 4 hours  senna 2 Tablet(s) Oral at bedtime  sertraline 100 milliGRAM(s) Oral daily  sertraline 50 milliGRAM(s) Oral daily  sodium bicarbonate 650 milliGRAM(s) Oral two times a day    MEDICATIONS  (PRN):  acetaminophen     Tablet .. 650 milliGRAM(s) Oral every 6 hours PRN Temp greater or equal to 38C (100.4F), Mild Pain (1 - 3)  albuterol/ipratropium for Nebulization 3 milliLiter(s) Nebulizer every 6 hours PRN Shortness of Breath and/or Wheezing  dextrose Oral Gel 15 Gram(s) Oral once PRN Blood Glucose LESS THAN 70 milliGRAM(s)/deciliter  ondansetron Injectable 4 milliGRAM(s) IV Push every 4 hours PRN Nausea and/or Vomiting      Allergies:  sulfa drugs (Hives)  Melons (Unknown)  shellfish (Unknown)  Sulfacetamide Sodium-Sulfur (Rash)  latex (Unknown)  sulfa drugs (Anaphylaxis)  penicillins (Unknown)  fish (Anaphylaxis)  penicillin (Anaphylaxis)  Intolerances           REVIEW OF SYSTEMS:  unobtainable       VITAL SIGNS:   T(F): 98.2 (04-15-24 @ 04:49), Max: 98.6 (04-14-24 @ 13:34)  HR: 80 (04-15-24 @ 04:49) (69 - 80)  BP: 143/72 (04-15-24 @ 04:49) (135/65 - 143/72)  RR: 18 (04-15-24 @ 04:49) (18 - 18)  SpO2: 95% (04-15-24 @ 04:49) (95% - 95%)    PHYSICAL EXAM:  GENERAL: not responding to prompts  HEAD:  Atraumatic, Normocephalic  EYES: conjunctiva and sclera clear  NECK: Supple, no JVD or thyromegaly  CHEST/LUNG: Clear to auscultation bilaterally; No wheeze, rhonchi, or rales  HEART: Regular rate and rhythm; normal S1, S2, No murmurs.  ABDOMEN: Soft, nontender, nondistended; Bowel sounds present  NEUROLOGY: No asterixis or tremor.   SKIN: Intact, no jaundice            LABS:                        11.9   6.21  )-----------( 247      ( 15 Apr 2024 06:24 )             35.2     04-15    145  |  108  |  27<H>  ----------------------------<  155<H>  3.5   |  24  |  2.3<H>    Ca    8.3<L>      15 Apr 2024 06:24            IMAGING:    < from: Xray Kidney Ureter Bladder (04.15.24 @ 11:32) >    ACC: [id number] EXAM:  XR KUB 1 VIEW   ORDERED BY: LUPE SANON     PROCEDURE DATE:  04/15/2024          INTERPRETATION:  Clinical History / Reason for exam: Constipation    Supine view the abdomen was obtained.    Findings/  impression:    Study is limited by obliquity.    Partially imaged pacer wires noted.. Nonobstructive bowel gas pattern   with moderate fecal retention.    Scoliosis with degenerative changes. Calcifications of the aorta.    --- End of Report ---            ASIA FRANKEL; Attending Radiologist  This document has been electronically signed. Apr 15 2024 12:04PM    < end of copied text >

## 2024-04-15 NOTE — CHART NOTE - NSCHARTNOTEFT_GEN_A_CORE
Pt. states she's in pain. Dr. Sims requested something for pain. I was a supportive presence. I will follow up for support.

## 2024-04-15 NOTE — CONSULT NOTE ADULT - ASSESSMENT
84F with PMH of dementia, HTN, DM2, CVA, CAD, hypothyoidism, breast cancer, and PPM insertion, here with abdominal pain, and found to have numerous splenic lesions, concerning for possibly malignancy. Respiratory failure attributed to possible undiagnosed WENDY, on NIV. Had 24/7 HHA at home. Course c/b JABARI, possibly contrast-induced. Also with abdominal pain, from overflow diarrhea possibly, now improving. Patient is DNR/I. Palliative consulted for GOC. 84F with PMH of dementia, HTN, DM2, CVA, CAD, hypothyroidism breast cancer, and PPM insertion, here with abdominal pain, and found to have numerous splenic lesions, concerning for possibly malignancy. Respiratory failure attributed to possible undiagnosed WENDY, on NIV. Had 24/7 HHA at home. Course c/b JABARI, possibly contrast-induced. Also with abdominal pain, from overflow diarrhea possibly, now improving. Patient is DNR/I. Palliative consulted for GOC.

## 2024-04-15 NOTE — PROGRESS NOTE ADULT - SUBJECTIVE AND OBJECTIVE BOX
Progress note    INTERVAL HPI/OVERNIGHT EVENTS:    Patient seen and examined at bedside. No acute distress.      REVIEW OF SYSTEMS:  All other 13 Review of systems were reviewed and are negative    FAMILY HISTORY:  FHx: diabetes mellitus (Mother)    FHx: diabetes mellitus (Mother)      T(C): 36.1 (04-15-24 @ 14:22), Max: 36.8 (04-15-24 @ 04:49)  HR: 72 (04-15-24 @ 14:22) (69 - 80)  BP: 176/73 (04-15-24 @ 14:22) (140/71 - 176/73)  RR: 18 (04-15-24 @ 14:22) (18 - 18)  SpO2: 100% (04-15-24 @ 14:22) (95% - 100%)  Wt(kg): --Vital Signs Last 24 Hrs  T(C): 36.1 (15 Apr 2024 14:22), Max: 36.8 (15 Apr 2024 04:49)  T(F): 97 (15 Apr 2024 14:22), Max: 98.2 (15 Apr 2024 04:49)  HR: 72 (15 Apr 2024 14:22) (69 - 80)  BP: 176/73 (15 Apr 2024 14:22) (140/71 - 176/73)  BP(mean): --  RR: 18 (15 Apr 2024 14:22) (18 - 18)  SpO2: 100% (15 Apr 2024 14:22) (95% - 100%)    Parameters below as of 15 Apr 2024 14:22  Patient On (Oxygen Delivery Method): room air      sulfa drugs (Hives)  Melons (Unknown)  shellfish (Unknown)  Sulfacetamide Sodium-Sulfur (Rash)  latex (Unknown)  sulfa drugs (Anaphylaxis)  penicillins (Unknown)  fish (Anaphylaxis)  penicillin (Anaphylaxis)      PHYSICAL EXAM:    GENERAL: Well-nourished, disheveled, NAD.  HEAD: No visible or palpable bumps or hematomas. No ecchymosis behind ears B/L.  ENMT: PERRLA    CVS: Normal S1,S2. No murmurs appreciated on auscultation   RESP: No use of accessory muscles. Chest rise symmetrical with good expansion. Lungs clear to auscultation B/L. No wheezing, rales, or rhonchi auscultated.  GI: Normal auscultation of bowel sounds in all 4 quadrants. (+)mild diffuse abd ttp. Soft, Nondistended.  Skin: Warm, Dry. No rashes or lesions. Good cap refill < 2 sec B/L.  EXT: Radial and pedal pulses present B/L. No palpable cords.  has 2+ pitting edema b/l  NEURO: AAO x 2    Consultant(s) Notes Reviewed:  [x ] YES  [ ] NO  Care Discussed with Consultants/Other Providers [ x] YES  [ ] NO    LABS:      RADIOLOGY & ADDITIONAL TESTS:    Imaging Personally Reviewed:  [ ] YES  [ ] NO  acetaminophen     Tablet .. 650 milliGRAM(s) Oral every 6 hours PRN  albuterol/ipratropium for Nebulization 3 milliLiter(s) Nebulizer every 6 hours PRN  amLODIPine   Tablet 10 milliGRAM(s) Oral daily  aspirin enteric coated 81 milliGRAM(s) Oral daily  atorvastatin 80 milliGRAM(s) Oral at bedtime  chlorhexidine 2% Cloths 1 Application(s) Topical <User Schedule>  clopidogrel Tablet 75 milliGRAM(s) Oral daily  dextrose 10% Bolus 125 milliLiter(s) IV Bolus once  dextrose 5%. 1000 milliLiter(s) IV Continuous <Continuous>  dextrose 5%. 1000 milliLiter(s) IV Continuous <Continuous>  dextrose 50% Injectable 12.5 Gram(s) IV Push once  dextrose 50% Injectable 25 Gram(s) IV Push once  dextrose Oral Gel 15 Gram(s) Oral once PRN  donepezil 10 milliGRAM(s) Oral at bedtime  ferrous    sulfate 325 milliGRAM(s) Oral daily  gabapentin 300 milliGRAM(s) Oral daily  glucagon  Injectable 1 milliGRAM(s) IntraMuscular once  heparin   Injectable 5000 Unit(s) SubCutaneous every 8 hours  insulin lispro (ADMELOG) corrective regimen sliding scale   SubCutaneous three times a day before meals  lactulose Syrup 20 Gram(s) Oral three times a day  levothyroxine 25 MICROGram(s) Oral daily  metoprolol succinate ER 25 milliGRAM(s) Oral daily  ondansetron Injectable 4 milliGRAM(s) IV Push every 4 hours PRN  pantoprazole    Tablet 40 milliGRAM(s) Oral before breakfast  potassium chloride    Tablet ER 20 milliEquivalent(s) Oral every 2 hours  potassium chloride    Tablet ER 40 milliEquivalent(s) Oral every 4 hours  senna 2 Tablet(s) Oral at bedtime  sertraline 100 milliGRAM(s) Oral daily  sertraline 50 milliGRAM(s) Oral daily  sodium bicarbonate 650 milliGRAM(s) Oral two times a day      HEALTH ISSUES - PROBLEM Dx:    85yo female PMHx significant for dementia, HTN, diabetes mellitus II, 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       Acute on chronic hypoxic and hypercapnic respiratory failure 2/2 possibly WENDY given Hx  - Unclear etiology at this time  -CXR clear, No acute process, shows cardiomegaly  - Pulmonary consult appreciated, needs NIV   - Bipap as needed  - Supplemental O2 as needed  - Procal very mildly elevated  -D-dimer neg for age  -blood Cx NGTD  -TTE:  LVEF 40 % (per cardiology, likely 2/2 pacemaker placement explaining drop in LVEF)  - Discussed with family, HHA have been reporting she "never sleeps and wakes up frequently at night".  Never had sleep study or PFT.  2' hand smoke exposure from .  - avoid ACE/ARBs given renal function  - LUE duplex: neg for clot; elevate arm  - dispo planning; will need resumption of 24/7 HHA, needs a bipap and home O2    Britton, possibly contrast-induced:  -Baseline appears to be 1.5  -US renal unremarkable  -Renal consult appreciated  - hematuria noted on 4/5 UA, check ANCA panel w/ MPO/PR3, SERGIO, C3/C4, serum flc ratio, spep/sife,   - CK elevated minimally  - gabapentin should be max 300mg daily based on CrCL  - started on sodium bicarb  - repeat US renal as per nephro  - stopped IVf on 4/14 due to 2+ LE pitting edema    UTI:  -UCx grew areococcus urinae  -S/p course of Levaquin    Transaminitis:  -Resolving, unclear etiology, US unremarkable, possibly 2' to Hypotensive episodes, , hepatitis panel (-)    Elevated troponin:  -Likely 2' to demand, PPM interrogated, no events  -TTE LVEF of 40 %.  -Trop flat     Abdominal pain with overflow diarrhea 2' to constipation, resolved  - aggressive bowel regimen  - enemas q 6  -GI consult appreciated, s/p disimpaction    Splenic lesions  - Incidentally found on imaging, very unlikely the cause of her symptoms, clinically very unlikely infectious in nature  -Outpatient imaging for further characterization    Dementia  - Continue with zoloft, nortriptyline, and donepezil     HTN  - continue metoprolol, Dc norvasc, give nifedipine 30mg in am, Hydralazine PRN    Diabetes Mellitus II  - CHO consistent diet  -Hold insulin for episodes of Hypoglycemia, restart PRN     Hypothyroidism  - Continue levothyroxine as prescribed     Chronic Pain   - Continue gabapentin and tramadol    DVT/VTE prophylaxis   Minced and moist diet per SLP, advance as per re-eval    Dispo: Dc when NIV equipment obtain for hypercapneia, will need outpatient  monitoring for CTAP findings of complex renal cysts

## 2024-04-15 NOTE — CONSULT NOTE ADULT - PROBLEM SELECTOR RECOMMENDATION 5
- will follow  ______________  Beau Sims MD  Palliative Medicine  [facility]   [facility]  [phone]

## 2024-04-15 NOTE — CHART NOTE - NSCHARTNOTEFT_GEN_A_CORE
This 84 year old female admitted with Acute on Chronic Hypercapnic Respiratory failure secondary to COPD will require home o2.  On admission patient was retaining dangerous high CO2 levels of 75 mmHG.  Due to high CO2 levels patient will require NIV for discharge for home use.  BIPAP was ruled out due to the high CO2 levels.  The patient needs home NIV given persistent Hypercapnia.    The NIV will provide adequate continuous ventilation to keep the patient’s Hypercapnia under control.    This NIV will provide a longer expiratory time to reduce the effects of flow limitation and air trapping.    This will decrease work of breathing, decrease the amount of CO2 and increase oxygenation.    Without this NIV the patient will clinically decline and will cause readmissions.    This patient requires augmented volume noninvasive ventilation not found on a standard BIPAP.    This is the only machine that has mouth piece ventilation.  The patient will need this NIV for a safe discharge.       Please expediate authorization.

## 2024-04-15 NOTE — PROGRESS NOTE ADULT - SUBJECTIVE AND OBJECTIVE BOX
Nephrology Progress Note    JAYASHREE FAGAN  MRN-[id number]  84y  Female    S:  Patient is seen and examined, events over the last 24h noted.    O:  Allergies:  sulfa drugs (Hives)  Melons (Unknown)  shellfish (Unknown)  Sulfacetamide Sodium-Sulfur (Rash)  latex (Unknown)  sulfa drugs (Anaphylaxis)  penicillins (Unknown)  fish (Anaphylaxis)  penicillin (Anaphylaxis)    Hospital Medications:   MEDICATIONS  (STANDING):  amLODIPine   Tablet 10 milliGRAM(s) Oral daily  aspirin enteric coated 81 milliGRAM(s) Oral daily  atorvastatin 80 milliGRAM(s) Oral at bedtime  chlorhexidine 2% Cloths 1 Application(s) Topical <User Schedule>  clopidogrel Tablet 75 milliGRAM(s) Oral daily  dextrose 10% Bolus 125 milliLiter(s) IV Bolus once  dextrose 5%. 1000 milliLiter(s) (100 mL/Hr) IV Continuous <Continuous>  dextrose 5%. 1000 milliLiter(s) (50 mL/Hr) IV Continuous <Continuous>  dextrose 50% Injectable 25 Gram(s) IV Push once  dextrose 50% Injectable 12.5 Gram(s) IV Push once  donepezil 10 milliGRAM(s) Oral at bedtime  ferrous    sulfate 325 milliGRAM(s) Oral daily  gabapentin 300 milliGRAM(s) Oral daily  glucagon  Injectable 1 milliGRAM(s) IntraMuscular once  heparin   Injectable 5000 Unit(s) SubCutaneous every 8 hours  insulin lispro (ADMELOG) corrective regimen sliding scale   SubCutaneous three times a day before meals  lactulose Syrup 20 Gram(s) Oral three times a day  levothyroxine 25 MICROGram(s) Oral daily  metoprolol succinate ER 25 milliGRAM(s) Oral daily  pantoprazole    Tablet 40 milliGRAM(s) Oral before breakfast  potassium chloride    Tablet ER 20 milliEquivalent(s) Oral every 2 hours  potassium chloride    Tablet ER 40 milliEquivalent(s) Oral every 4 hours  senna 2 Tablet(s) Oral at bedtime  sertraline 50 milliGRAM(s) Oral daily  sertraline 100 milliGRAM(s) Oral daily  sodium bicarbonate 650 milliGRAM(s) Oral two times a day    MEDICATIONS  (PRN):  acetaminophen     Tablet .. 650 milliGRAM(s) Oral every 6 hours PRN Temp greater or equal to 38C (100.4F), Mild Pain (1 - 3)  albuterol/ipratropium for Nebulization 3 milliLiter(s) Nebulizer every 6 hours PRN Shortness of Breath and/or Wheezing  dextrose Oral Gel 15 Gram(s) Oral once PRN Blood Glucose LESS THAN 70 milliGRAM(s)/deciliter  ondansetron Injectable 4 milliGRAM(s) IV Push every 4 hours PRN Nausea and/or Vomiting    Home Medications:  amLODIPine 10 mg oral tablet: 1 tab(s) orally once a day (09 Apr 2024 09:24)  amLODIPine 10 mg oral tablet: 1 tab(s) orally once a day (09 Apr 2024 09:25)  atorvastatin 80 mg oral tablet: 1 tab(s) orally once a day (at bedtime) (09 Apr 2024 09:23)  atorvastatin 80 mg oral tablet: 1 tab(s) orally once a day (at bedtime) (09 Apr 2024 09:25)  clopidogrel 75 mg oral tablet: 1 tab(s) orally once a day (09 Apr 2024 09:24)  clopidogrel 75 mg oral tablet: 1 tab(s) orally once a day (09 Apr 2024 09:25)  donepezil 10 mg oral tablet: 1 tab(s) orally once a day (at bedtime) (09 Apr 2024 09:25)  Ecotrin Adult Low Strength 81 mg oral delayed release tablet: 2 tab(s) orally once a day (09 Apr 2024 09:25)  Ecotrin Adult Low Strength 81 mg oral delayed release tablet: 2 tab(s) orally every other day (09 Apr 2024 09:25)  exemestane 25 mg oral tablet: 1 tab(s) orally once a day (09 Apr 2024 09:23)  exemestane 25 mg oral tablet: 1 tab(s) orally once a day (09 Apr 2024 09:25)  ferrous sulfate:  (09 Apr 2024 09:25)  ferrous sulfate 324 mg (65 mg elemental iron) oral tablet: 1  orally 2 times a day (09 Apr 2024 09:25)  furosemide 20 mg oral tablet: 1 tab(s) orally (09 Apr 2024 09:25)  gabapentin 300 mg oral capsule: 1 cap(s) orally 2 times a day (09 Apr 2024 09:25)  gabapentin 300 mg oral capsule: orally 2 times a day (09 Apr 2024 09:25)  HumaLOG 100 units/mL subcutaneous solution: Per insulin sliding scale at home (09 Apr 2024 09:25)  humalog sliding scale: with meals (09 Apr 2024 09:25)  Lasix 20 mg oral tablet: 1 tab(s) orally once a day (09 Apr 2024 09:24)  Levemir: 30  subcutaneous once a day (at bedtime) (09 Apr 2024 09:25)  Levemir 100 units/mL subcutaneous solution: 30 unit(s) subcutaneous once a day (at bedtime) (09 Apr 2024 09:25)  levothyroxine 25 mcg (0.025 mg) oral tablet: 1 tab(s) orally once a day (09 Apr 2024 09:25)  levothyroxine 25 mcg (0.025 mg) oral tablet: 1 tab(s) orally once a day (09 Apr 2024 09:24)  metoprolol succinate 25 mg oral capsule, extended release: 1 cap(s) orally once a day (09 Apr 2024 09:25)  mirtazapine 15 mg oral tablet: 1 tab(s) orally once a day (at bedtime) (09 Apr 2024 09:25)  nortriptyline 25 mg oral capsule: 1 cap(s) orally once a day (at bedtime) (09 Apr 2024 09:23)  nortriptyline 25 mg oral capsule: 1 cap(s) orally once a day (at bedtime) (09 Apr 2024 09:25)  pantoprazole 40 mg oral delayed release tablet: 1 tab(s) orally once a day (before a meal) (09 Apr 2024 09:23)  Protonix 40 mg oral delayed release tablet: 1 tab(s) orally once a day (09 Apr 2024 09:25)  senna oral tablet: 2 tab(s) orally once a day (at bedtime)- PRN for constipation- OTC is okay (09 Apr 2024 09:24)  sertraline 100 mg oral tablet: 1 tab(s) orally once a day (09 Apr 2024 09:23)  sertraline 50 mg oral tablet: 1 tab(s) orally once a day (09 Apr 2024 09:25)  stool softener: at night (09 Apr 2024 09:25)  temazepam 30 mg oral capsule: 1 cap(s) orally once a day (at bedtime) (09 Apr 2024 09:25)  traMADol 100 mg oral tablet: 1 tab(s) orally once a day (09 Apr 2024 09:25)      VITALS:  Daily     Daily   T(F): 98.2 (04-15-24 @ 04:49), Max: 98.2 (04-15-24 @ 04:49)  HR: 80 (04-15-24 @ 04:49)  BP: 143/72 (04-15-24 @ 04:49)  RR: 18 (04-15-24 @ 04:49)  SpO2: 95% (04-15-24 @ 04:49)  Wt(kg): --  I&O's Detail    14 Apr 2024 07:01  -  15 Apr 2024 07:00  --------------------------------------------------------  IN:  Total IN: 0 mL    OUT:    Voided (mL): 1000 mL  Total OUT: 1000 mL    Total NET: -1000 mL        I&O's Summary    14 Apr 2024 07:01  -  15 Apr 2024 07:00  --------------------------------------------------------  IN: 0 mL / OUT: 1000 mL / NET: -1000 mL          PHYSICAL EXAM:  Gen: NAD  Chest: b/l breath sounds  Abd: soft  Extremities: no edema  Vascular access:       LABS:  ABG - ( 15 Apr 2024 12:42 )  pH, Arterial: 7.42  pH, Blood: x     /  pCO2: 39    /  pO2: 67    / HCO3: 25    / Base Excess: 0.8   /  SaO2: 95.6                04-15    145  |  108  |  27<H>  ----------------------------<  155<H>  3.5   |  24  |  2.3<H>    Ca    8.3<L>      15 Apr 2024 06:24      eGFR: 20 mL/min/1.73m2 (04-15-24 @ 06:24)  eGFR: 17 mL/min/1.73m2 (04-14-24 @ 10:30)    Phosphorus: 6.8 mg/dL (04-10-24 @ 06:21)  Phosphorus: 7.1 mg/dL (04-09-24 @ 06:16)    Osmolality, Serum: 311 mos/kg (11-10-21 @ 17:40)  Intact PTH: 46 pg/mL (07-24-19 @ 07:19)                          11.9   6.21  )-----------( 247      ( 15 Apr 2024 06:24 )             35.2     Mean Cell Volume: 91.4 fL (04-15-24 @ 06:24)    Ferritin, Serum: 542 ng/mL (01-17-22 @ 09:39)  Ferritin, Serum: 593 ng/mL (12-31-21 @ 15:56)        Urine Studies:  Protein, Urine: 30 mg/dL (04-05-24 @ 04:34)  White Blood Cell - Urine: 7 /HPF (04-05-24 @ 04:34)  Red Blood Cell - Urine: 3 /HPF (04-05-24 @ 04:34)  Protein, Urine: 100 mg/dL (11-26-22 @ 03:05)  White Blood Cell - Urine: 1-2 /HPF (11-26-22 @ 03:05)  Red Blood Cell - Urine: 1-2 /HPF (11-26-22 @ 03:05)  Protein, Urine: 30 mg/dL (10-19-22 @ 18:20)  White Blood Cell - Urine: >50 /HPF (10-19-22 @ 18:20)  Red Blood Cell - Urine: 1-2 /HPF (10-19-22 @ 18:20)  Protein, Urine: 100 mg/dL (01-13-22 @ 23:50)  White Blood Cell - Urine: 3-5 /HPF (01-13-22 @ 23:50)  Red Blood Cell - Urine: 1-2 /HPF (01-13-22 @ 23:50)  Protein, Urine: >=300 mg/dL (11-08-21 @ 15:40)  Red Blood Cell - Urine: 3-5 /HPF (11-08-21 @ 15:40)  Protein, Urine: 100 mg/dL (10-04-21 @ 16:00)  White Blood Cell - Urine: 1-2 /HPF (10-04-21 @ 16:00)  Red Blood Cell - Urine: 1-2 /HPF (10-04-21 @ 16:00)  Protein, Urine: >=300 (08-13-21 @ 13:20)  Red Blood Cell - Urine: 3-5 /HPF (08-13-21 @ 13:20)  White Blood Cell - Urine: >50 /HPF (08-13-21 @ 13:20)  Protein, Urine: >=300 mg/dL (06-06-21 @ 03:50)  White Blood Cell - Urine: 3-5 /HPF (06-06-21 @ 03:50)  Red Blood Cell - Urine: 6-10 /HPF (06-06-21 @ 03:50)  Granular Cast: Negative (06-06-21 @ 03:50)  Protein, Urine: Trace (02-21-20 @ 08:28)  Red Blood Cell - Urine: 1 /HPF (02-21-20 @ 08:28)  White Blood Cell - Urine: 104 /HPF (02-21-20 @ 08:28)  Protein, Urine: Trace mg/dL (12-17-19 @ 08:46)  White Blood Cell - Urine: 3-5 /HPF (12-17-19 @ 08:46)  Protein, Urine: 30 mg/dL (08-25-19 @ 21:30)  White Blood Cell - Urine: 570 /HPF (08-25-19 @ 21:30)  Red Blood Cell - Urine: 28 /HPF (08-25-19 @ 21:30)  Protein, Urine: 30 mg/dL (07-28-19 @ 20:10)  White Blood Cell - Urine: >50 /HPF (07-28-19 @ 20:10)  Red Blood Cell - Urine: 6-10 /HPF (07-28-19 @ 20:10)  Protein, Urine: 100 mg/dL (07-23-19 @ 16:00)  White Blood Cell - Urine: 6-10 /HPF (07-23-19 @ 16:00)  Red Blood Cell - Urine: 6-10 /HPF (07-23-19 @ 16:00)  Protein, Urine: 100 mg/dL (07-23-19 @ 00:45)  White Blood Cell - Urine: 9 /HPF (07-23-19 @ 00:45)  Red Blood Cell - Urine: 2 /HPF (07-23-19 @ 00:45)  Protein, Urine: 30 mg/dL (07-15-19 @ 17:16)  White Blood Cell - Urine: 1-2 /HPF (07-15-19 @ 17:16)  Red Blood Cell - Urine: 1-2 /HPF (07-15-19 @ 17:16)  Protein, Urine: 30 mg/dL (04-03-19 @ 00:40)  Red Blood Cell - Urine: 3-5 /HPF (04-03-19 @ 00:40)  White Blood Cell - Urine: >50 /HPF (04-03-19 @ 00:40)  Protein, Urine: Negative mg/dL (11-24-18 @ 02:45)  White Blood Cell - Urine: 10-25 /HPF (11-24-18 @ 02:45)  Red Blood Cell - Urine: 1-2 /HPF (11-24-18 @ 02:45)  Granular Cast: Negative (11-24-18 @ 02:45)  Protein, Urine: Negative mg/dL (06-30-18 @ 20:50)  Red Blood Cell - Urine: 1-2 /HPF (06-30-18 @ 20:50)  Protein, Urine: 30 (06-26-18 @ 15:47)  Red Blood Cell - Urine: 2-5 /HPF (06-26-18 @ 15:47)  Granular Cast: Negative (06-26-18 @ 15:47)  White Blood Cell - Urine: Negative (06-26-18 @ 15:47)      Urea Nitrogen,  Random Urine: 582 mg/dL (10-20-22 @ 08:00)    Culture Results:   No growth at 5 days (04-07 @ 17:18)  Culture Results:   No growth at 5 days (04-07 @ 17:10)    Creatinine trend:  Creatinine: 2.3 mg/dL (04-15-24 @ 06:24)  Creatinine: 2.7 mg/dL (04-14-24 @ 10:30)  Creatinine: 3.4 mg/dL (04-13-24 @ 09:23)  Creatinine: 4.7 mg/dL (04-12-24 @ 05:55)  Creatinine: 5.0 mg/dL (04-11-24 @ 06:55)  Creatinine: 4.9 mg/dL (04-10-24 @ 19:40)  Creatinine: 5.1 mg/dL (04-10-24 @ 06:21)  Creatinine: 4.4 mg/dL (04-09-24 @ 06:16)  Creatinine: 3.7 mg/dL (04-08-24 @ 06:59)  Creatinine: 2.5 mg/dL (04-07-24 @ 09:21)  Creatinine: 2.3 mg/dL (04-06-24 @ 21:39)  Creatinine: 2.1 mg/dL (04-06-24 @ 18:00)  Creatinine: 1.7 mg/dL (04-06-24 @ 09:06)  Creatinine: 1.5 mg/dL (04-05-24 @ 05:16)  Creatinine, Serum: 1.5 mg/dL (11-25-22 @ 23:35)  Creatinine, Serum: 1.3 mg/dL (10-22-22 @ 07:08)  Creatinine, Serum: 1.5 mg/dL (10-21-22 @ 06:30)  Creatinine, Serum: 1.7 mg/dL (10-20-22 @ 06:22)  Creatinine, Serum: 2.0 mg/dL (10-19-22 @ 16:40)  Creatinine, Serum: 1.3 mg/dL (01-18-22 @ 06:51)    Cytoplasmic (c-ANCA) Antibody: Negative (04-10-24 @ 19:40)  Perinuclear (p-ANCA) Antibody: Negative (04-10-24 @ 19:40)  Atypical ANCA: Indeterminate Method interference due to SERGIO Fluorescence (04-10-24 @ 19:40)    US Renal:   ACC: [id number] EXAM:  US KIDNEY(S)   ORDERED BY: TEDDY ASHTON     PROCEDURE DATE:  04/14/2024          INTERPRETATION:  CLINICAL INFORMATION: Hydronephrosis    COMPARISON: 4/6/2024    TECHNIQUE: Sonography of the kidneys. As per technologist notes, limiting   the exam due to patient mobility    FINDINGS:    Right kidney: . No hydronephrosis    Left kidney: . No hydronephrosis          IMPRESSION:    No hydronephrosis        --- End of Report ---            BRANDON CISNEROS MD; Attending Radiologist  This document has been electronically signed. Apr 15 2024  1:06AM (04-14-24 @ 08:52)       Nephrology Progress Note    JAYASHREE FAGAN  MRN-[id number]  84y  Female    S:  Patient is seen and examined, events over the last 24h noted.    O:  Allergies:  sulfa drugs (Hives)  Melons (Unknown)  shellfish (Unknown)  Sulfacetamide Sodium-Sulfur (Rash)  latex (Unknown)  sulfa drugs (Anaphylaxis)  penicillins (Unknown)  fish (Anaphylaxis)  penicillin (Anaphylaxis)    Hospital Medications:   MEDICATIONS  (STANDING):  amLODIPine   Tablet 10 milliGRAM(s) Oral daily  aspirin enteric coated 81 milliGRAM(s) Oral daily  atorvastatin 80 milliGRAM(s) Oral at bedtime  chlorhexidine 2% Cloths 1 Application(s) Topical <User Schedule>  clopidogrel Tablet 75 milliGRAM(s) Oral daily  donepezil 10 milliGRAM(s) Oral at bedtime  ferrous    sulfate 325 milliGRAM(s) Oral daily  gabapentin 300 milliGRAM(s) Oral daily  glucagon  Injectable 1 milliGRAM(s) IntraMuscular once  heparin   Injectable 5000 Unit(s) SubCutaneous every 8 hours  insulin lispro (ADMELOG) corrective regimen sliding scale   SubCutaneous three times a day before meals  lactulose Syrup 20 Gram(s) Oral three times a day  levothyroxine 25 MICROGram(s) Oral daily  metoprolol succinate ER 25 milliGRAM(s) Oral daily  pantoprazole    Tablet 40 milliGRAM(s) Oral before breakfast  potassium chloride    Tablet ER 20 milliEquivalent(s) Oral every 2 hours  potassium chloride    Tablet ER 40 milliEquivalent(s) Oral every 4 hours  senna 2 Tablet(s) Oral at bedtime  sertraline 50 milliGRAM(s) Oral daily  sertraline 100 milliGRAM(s) Oral daily  sodium bicarbonate 650 milliGRAM(s) Oral two times a day    MEDICATIONS  (PRN):  acetaminophen     Tablet .. 650 milliGRAM(s) Oral every 6 hours PRN Temp greater or equal to 38C (100.4F), Mild Pain (1 - 3)  albuterol/ipratropium for Nebulization 3 milliLiter(s) Nebulizer every 6 hours PRN Shortness of Breath and/or Wheezing  dextrose Oral Gel 15 Gram(s) Oral once PRN Blood Glucose LESS THAN 70 milliGRAM(s)/deciliter  ondansetron Injectable 4 milliGRAM(s) IV Push every 4 hours PRN Nausea and/or Vomiting    Home Medications:  amLODIPine 10 mg oral tablet: 1 tab(s) orally once a day (09 Apr 2024 09:24)  amLODIPine 10 mg oral tablet: 1 tab(s) orally once a day (09 Apr 2024 09:25)  atorvastatin 80 mg oral tablet: 1 tab(s) orally once a day (at bedtime) (09 Apr 2024 09:23)  atorvastatin 80 mg oral tablet: 1 tab(s) orally once a day (at bedtime) (09 Apr 2024 09:25)  clopidogrel 75 mg oral tablet: 1 tab(s) orally once a day (09 Apr 2024 09:24)  clopidogrel 75 mg oral tablet: 1 tab(s) orally once a day (09 Apr 2024 09:25)  donepezil 10 mg oral tablet: 1 tab(s) orally once a day (at bedtime) (09 Apr 2024 09:25)  Ecotrin Adult Low Strength 81 mg oral delayed release tablet: 2 tab(s) orally once a day (09 Apr 2024 09:25)  Ecotrin Adult Low Strength 81 mg oral delayed release tablet: 2 tab(s) orally every other day (09 Apr 2024 09:25)  exemestane 25 mg oral tablet: 1 tab(s) orally once a day (09 Apr 2024 09:23)  exemestane 25 mg oral tablet: 1 tab(s) orally once a day (09 Apr 2024 09:25)  ferrous sulfate:  (09 Apr 2024 09:25)  ferrous sulfate 324 mg (65 mg elemental iron) oral tablet: 1  orally 2 times a day (09 Apr 2024 09:25)  furosemide 20 mg oral tablet: 1 tab(s) orally (09 Apr 2024 09:25)  gabapentin 300 mg oral capsule: 1 cap(s) orally 2 times a day (09 Apr 2024 09:25)  gabapentin 300 mg oral capsule: orally 2 times a day (09 Apr 2024 09:25)  HumaLOG 100 units/mL subcutaneous solution: Per insulin sliding scale at home (09 Apr 2024 09:25)  humalog sliding scale: with meals (09 Apr 2024 09:25)  Lasix 20 mg oral tablet: 1 tab(s) orally once a day (09 Apr 2024 09:24)  Levemir: 30  subcutaneous once a day (at bedtime) (09 Apr 2024 09:25)  Levemir 100 units/mL subcutaneous solution: 30 unit(s) subcutaneous once a day (at bedtime) (09 Apr 2024 09:25)  levothyroxine 25 mcg (0.025 mg) oral tablet: 1 tab(s) orally once a day (09 Apr 2024 09:25)  levothyroxine 25 mcg (0.025 mg) oral tablet: 1 tab(s) orally once a day (09 Apr 2024 09:24)  metoprolol succinate 25 mg oral capsule, extended release: 1 cap(s) orally once a day (09 Apr 2024 09:25)  mirtazapine 15 mg oral tablet: 1 tab(s) orally once a day (at bedtime) (09 Apr 2024 09:25)  nortriptyline 25 mg oral capsule: 1 cap(s) orally once a day (at bedtime) (09 Apr 2024 09:23)  nortriptyline 25 mg oral capsule: 1 cap(s) orally once a day (at bedtime) (09 Apr 2024 09:25)  pantoprazole 40 mg oral delayed release tablet: 1 tab(s) orally once a day (before a meal) (09 Apr 2024 09:23)  Protonix 40 mg oral delayed release tablet: 1 tab(s) orally once a day (09 Apr 2024 09:25)  senna oral tablet: 2 tab(s) orally once a day (at bedtime)- PRN for constipation- OTC is okay (09 Apr 2024 09:24)  sertraline 100 mg oral tablet: 1 tab(s) orally once a day (09 Apr 2024 09:23)  sertraline 50 mg oral tablet: 1 tab(s) orally once a day (09 Apr 2024 09:25)  stool softener: at night (09 Apr 2024 09:25)  temazepam 30 mg oral capsule: 1 cap(s) orally once a day (at bedtime) (09 Apr 2024 09:25)  traMADol 100 mg oral tablet: 1 tab(s) orally once a day (09 Apr 2024 09:25)      VITALS:  T(F): 98.2 (04-15-24 @ 04:49), Max: 98.2 (04-15-24 @ 04:49)  HR: 80 (04-15-24 @ 04:49)  BP: 143/72 (04-15-24 @ 04:49)  RR: 18 (04-15-24 @ 04:49)  SpO2: 95% (04-15-24 @ 04:49)  Wt(kg): --  I&O's Detail    14 Apr 2024 07:01  -  15 Apr 2024 07:00  --------------------------------------------------------  IN:  Total IN: 0 mL    OUT:    Voided (mL): 1000 mL  Total OUT: 1000 mL    Total NET: -1000 mL        I&O's Summary    14 Apr 2024 07:01  -  15 Apr 2024 07:00  --------------------------------------------------------  IN: 0 mL / OUT: 1000 mL / NET: -1000 mL        PHYSICAL EXAM:  Gen: NAD  Chest: b/l breath sounds  Abd: soft  Extremities: no edema      LABS:  ABG - ( 15 Apr 2024 12:42 )  pH, Arterial: 7.42  pH, Blood: x     /  pCO2: 39    /  pO2: 67    / HCO3: 25    / Base Excess: 0.8   /  SaO2: 95.6      04-15    145  |  108  |  27<H>  ----------------------------<  155<H>  3.5   |  24  |  2.3<H>    Ca    8.3<L>      15 Apr 2024 06:24    Phosphorus: 6.8 mg/dL (04-10-24 @ 06:21)  Phosphorus: 7.1 mg/dL (04-09-24 @ 06:16)                          11.9   6.21  )-----------( 247      ( 15 Apr 2024 06:24 )             35.2     Mean Cell Volume: 91.4 fL (04-15-24 @ 06:24)    Urine Studies:  Protein, Urine: 30 mg/dL (04-05-24 @ 04:34)  White Blood Cell - Urine: 7 /HPF (04-05-24 @ 04:34)  Red Blood Cell - Urine: 3 /HPF (04-05-24 @ 04:34)    Creatinine trend:  Creatinine: 2.3 mg/dL (04-15-24 @ 06:24)  Creatinine: 2.7 mg/dL (04-14-24 @ 10:30)  Creatinine: 3.4 mg/dL (04-13-24 @ 09:23)  Creatinine: 4.7 mg/dL (04-12-24 @ 05:55)  Creatinine: 5.0 mg/dL (04-11-24 @ 06:55)  Creatinine: 4.9 mg/dL (04-10-24 @ 19:40)  Creatinine: 5.1 mg/dL (04-10-24 @ 06:21)      US Renal:   ACC: [id number] EXAM:  US KIDNEY(S)   ORDERED BY: TEDDY ASHTON     PROCEDURE DATE:  04/14/2024          INTERPRETATION:  CLINICAL INFORMATION: Hydronephrosis    COMPARISON: 4/6/2024    TECHNIQUE: Sonography of the kidneys. As per technologist notes, limiting   the exam due to patient mobility    FINDINGS:    Right kidney: . No hydronephrosis    Left kidney: . No hydronephrosis          IMPRESSION:    No hydronephrosis        --- End of Report ---            BRANDON CISNEROS MD; Attending Radiologist  This document has been electronically signed. Apr 15 2024  1:06AM (04-14-24 @ 08:52)

## 2024-04-15 NOTE — CONSULT NOTE ADULT - TELEHEALTH VISIT TYPE
What Is The Reason For Today's Visit?: History of Melanoma
What Stage Is The Melanoma?: Stage IA
Audio/Video

## 2024-04-15 NOTE — CONSULT NOTE ADULT - SUBJECTIVE AND OBJECTIVE BOX
HPI: 84F with PMH of dementia, HTN, DM2, CVA, CAD, hypothyoidism, breast cancer, and PPM insertion, here with abdominal pain, and found to have numerous splenic lesions, concerning for possibly malignancy. Respiratory failure attributed to possible undiagnosed WENDY, on NIV. Had 24/7 HHA at home. Course c/b JABARI, possibly contrast-induced. Also with abdominal pain, from overflow diarrhea possibly, now improving. Patient is DNR/I. Palliative consulted for GOC.      PERTINENT PM/SXH:   DM (diabetes mellitus)    Breast CA    BP (high blood pressure)    Depression    High cholesterol    Bilateral hearing loss, unspecified hearing loss type    CVA (cerebral vascular accident)    Obese    No pertinent past medical history    Hypertension    Diabetes    CVA (cerebrovascular accident)    Pacemaker    Breast cancer    History of pressure ulcer    Dementia    Hypothyroid    Hypothyroidism    CAD (coronary artery disease)    Immobility      H/O total knee replacement, right    History of lumpectomy of right breast    History of cholecystectomy    Pacemaker    H/O lumpectomy    S/P lumpectomy of breast    Knee joint replacement status, right    History of cholecystectomy      FAMILY HISTORY:  FHx: diabetes mellitus (Mother)    FHx: diabetes mellitus (Mother)    no pertinent family history      SOCIAL HISTORY:   Significant other/partner[ ]  Children[ ]  Christian/Spirituality:  Substance hx:  [ ]   Tobacco hx:  [ ]   Alcohol hx: [ ]   Living Situation: [ ]Home  [ ]Long term care  [ ]Rehab [ ]Other  Home Services: [ ] HHA [ ] Visting RN [ ] Hospice  Occupation:  Home Opioid hx:  [ ] Y [ ] N [ ] I-Stop Reference No:    ADVANCE DIRECTIVES:    [ ] Full Code [ ] DNR  MOLST  [ ]  Living Will  [ ]   DECISION MAKER(s):  [ ] Health Care Proxy(s)  [ ] Surrogate(s)  [ ] Guardian           Name(s): Phone Number(s):    BASELINE (I)ADL(s) (prior to admission):  [place]: [ ]Total  [ ] Moderate [ ]Dependent  Palliative Performance Status Version 2:         %    http://npcrc.org/files/news/palliative_performance_scale_ppsv2.pdf    Allergies    sulfa drugs (Hives)  Melons (Unknown)  shellfish (Unknown)  Sulfacetamide Sodium-Sulfur (Rash)  latex (Unknown)  sulfa drugs (Anaphylaxis)  penicillins (Unknown)  fish (Anaphylaxis)  penicillin (Anaphylaxis)    Intolerances    MEDICATIONS  (STANDING):  amLODIPine   Tablet 10 milliGRAM(s) Oral daily  aspirin enteric coated 81 milliGRAM(s) Oral daily  atorvastatin 80 milliGRAM(s) Oral at bedtime  chlorhexidine 2% Cloths 1 Application(s) Topical <User Schedule>  clopidogrel Tablet 75 milliGRAM(s) Oral daily  dextrose 10% Bolus 125 milliLiter(s) IV Bolus once  dextrose 5%. 1000 milliLiter(s) (50 mL/Hr) IV Continuous <Continuous>  dextrose 5%. 1000 milliLiter(s) (100 mL/Hr) IV Continuous <Continuous>  dextrose 50% Injectable 12.5 Gram(s) IV Push once  dextrose 50% Injectable 25 Gram(s) IV Push once  donepezil 10 milliGRAM(s) Oral at bedtime  ferrous    sulfate 325 milliGRAM(s) Oral daily  gabapentin 300 milliGRAM(s) Oral daily  glucagon  Injectable 1 milliGRAM(s) IntraMuscular once  heparin   Injectable 5000 Unit(s) SubCutaneous every 8 hours  insulin lispro (ADMELOG) corrective regimen sliding scale   SubCutaneous three times a day before meals  lactulose Syrup 20 Gram(s) Oral three times a day  levothyroxine 25 MICROGram(s) Oral daily  metoprolol succinate ER 25 milliGRAM(s) Oral daily  pantoprazole    Tablet 40 milliGRAM(s) Oral before breakfast  potassium chloride    Tablet ER 20 milliEquivalent(s) Oral every 2 hours  potassium chloride    Tablet ER 40 milliEquivalent(s) Oral every 4 hours  senna 2 Tablet(s) Oral at bedtime  sertraline 100 milliGRAM(s) Oral daily  sertraline 50 milliGRAM(s) Oral daily  sodium bicarbonate 650 milliGRAM(s) Oral two times a day    MEDICATIONS  (PRN):  acetaminophen     Tablet .. 650 milliGRAM(s) Oral every 6 hours PRN Temp greater or equal to 38C (100.4F), Mild Pain (1 - 3)  albuterol/ipratropium for Nebulization 3 milliLiter(s) Nebulizer every 6 hours PRN Shortness of Breath and/or Wheezing  dextrose Oral Gel 15 Gram(s) Oral once PRN Blood Glucose LESS THAN 70 milliGRAM(s)/deciliter  ondansetron Injectable 4 milliGRAM(s) IV Push every 4 hours PRN Nausea and/or Vomiting      PRESENT SYMPTOMS: [ ]Unable to obtain due to poor mentation   Source if other than patient:  [ ]Family   [ ]Team   [ X]All review of systems negative including pain and dyspnea unless noted below    All components of pain assessment below addressed. Blank spaces indicate that the patient did/could not complete the assessment.  Pain: [ ]yes [ ]no  QOL impact -   Location -                    Aggravating factors -  Quality -  Radiation -  Timing-  Severity (0-10 scale):  Minimal acceptable level (0-10 scale):     CPOT:    https://www.[facility].org/getattachment/[email or web address]/Critical-Care-Pain-Observation-Tool-(CPOT)    PAIN AD Score:   http://geriatrictoolkit.[facility]/cog/painad.pdf (press ctrl +  left click to view)    Dyspnea:                           [ ]None[ ]Mild [ ]Moderate [ ]Severe     Respiratory Distress Observation Scale (RDOS):   A score of 0 to 2 signifies little or no respiratory distress, 3 signifies mild distress, scores 4 to 6 indicate moderate distress, and scores greater than 7 signify severe distress  [email or web address]://[email or web address].ca/sites/default/files/PDFS/068281-sfqpbrnyzgj-gvbbiurw-fkydgyzuxon-vaqzj.pdf    Anxiety:                             [ ]None[ ]Mild [ ]Moderate [ ]Severe   Fatigue:                             [ ]None[ ]Mild [ ]Moderate [ ]Severe   Nausea:                             [ ]None[ ]Mild [ ]Moderate [ ]Severe   Loss of appetite:              [ ]None[ ]Mild [ ]Moderate [ ]Severe   Constipation:                    [ ]None[ ]Mild [ ]Moderate [ ]Severe    Other Symptoms:      Palliative Performance Status Version 2:         %    http://npcrc.org/files/news/palliative_performance_scale_ppsv2.pdf  PHYSICAL EXAM:  Vital Signs Last 24 Hrs  T(C): 36.8 (15 Apr 2024 04:49), Max: 37 (14 Apr 2024 13:34)  T(F): 98.2 (15 Apr 2024 04:49), Max: 98.6 (14 Apr 2024 13:34)  HR: 80 (15 Apr 2024 04:49) (69 - 80)  BP: 143/72 (15 Apr 2024 04:49) (135/65 - 143/72)  BP(mean): --  RR: 18 (15 Apr 2024 04:49) (18 - 18)  SpO2: 95% (15 Apr 2024 04:49) (95% - 95%)    Parameters below as of 15 Apr 2024 04:49  Patient On (Oxygen Delivery Method): room air     I&O's Summary    14 Apr 2024 07:01  -  15 Apr 2024 07:00  --------------------------------------------------------  IN: 0 mL / OUT: 1000 mL / NET: -1000 mL        GENERAL:  [X ] No acute distress [ ]Lethargic  [ ]Unarousable  [ ]Verbal  [ ]Non-Verbal [ ]Cachexia    BEHAVIORAL/PSYCH:  [ ]Alert and Oriented x  [ ] Anxiety [ ] Delirium [ ] Agitation [X ] Calm   EYES: [ ] No scleral icterus [ ] Scleral icterus [ ] Closed  ENMT:  [ ]Dry mouth  [ ]No external oral lesions [ X] No external ear or nose lesions  CARDIOVASCULAR:  [ ]Regular [ ]Irregular [ ]Tachy [ ]Not Tachy  [ ]Milo [ ] Edema [ ] No edema  PULMONARY:  [ ]Tachypnea  [ ]Audible excessive secretions [X ] No labored breathing [ ] labored breathing  GASTROINTESTINAL: [ ]Soft  [ ]Distended  [ X]Not distended [ ]Non tender [ ]Tender  MUSCULOSKELETAL: [ ]No clubbing [ ] clubbing  [ X] No cyanosis [ ] cyanosis  NEUROLOGIC: [ ]No focal deficits  [ ]Follows commands  [ ]Does not follow commands  [ ]Cognitive impairment  [ ]Dysphagia  [ ]Dysarthria  [ ]Paresis   SKIN: [ ] Jaundiced [X ] Non-jaundiced [ ]Rash [ ]No Rash [ ] Warm [ ] Dry  MISC/LINES: [ ] ET tube [ ] Trach [ ]NGT/OGT [ ]PEG [ ]Pierce    CRITICAL CARE:  [ ] Shock Present  [ ]Septic [ ]Cardiogenic [ ]Neurologic [ ]Hypovolemic  [ ]  Vasopressors [ ]  Inotropes   [ ]Respiratory failure present [ ]Mechanical ventilation [ ]Non-invasive ventilatory support [ ]High flow  [ ]Acute  [ ]Chronic [ ]Hypoxic  [ ]Hypercarbic [ ]Other  [ ]Other organ failure     LABS: reviewed by me                        11.9   6.21  )-----------( 247      ( 15 Apr 2024 06:24 )             35.2   04-15    145  |  108  |  27<H>  ----------------------------<  155<H>  3.5   |  24  |  2.3<H>    Ca    8.3<L>      15 Apr 2024 06:24        Urinalysis Basic - ( 15 Apr 2024 06:24 )    Color: x / Appearance: x / SG: x / pH: x  Gluc: 155 mg/dL / Ketone: x  / Bili: x / Urobili: x   Blood: x / Protein: x / Nitrite: x   Leuk Esterase: x / RBC: x / WBC x   Sq Epi: x / Non Sq Epi: x / Bacteria: x      RADIOLOGY & ADDITIONAL STUDIES: reviewed by me    PROTEIN CALORIE MALNUTRITION PRESENT: [ ]mild [ ]moderate [ ]severe [ ]underweight [ ]morbid obesity  https://www.andeal.org/vault/2440/web/files/ONC/Table_Clinical%20Characteristics%20to%20Document%20Malnutrition-White%20JV%20et%20al%178417.pdf    Height (cm): 152.4 (04-07-24 @ 14:58)  Weight (kg): 73.4 (04-07-24 @ 14:58), 81.6 (05-07-23 @ 09:41)  BMI (kg/m2): 31.6 (04-07-24 @ 14:58)    [ ]PPSV2 < or = to 30% [ ]significant weight loss  [ ]poor nutritional intake  [ ]anasarca      [ ]Artificial Nutrition          Palliative Care Spiritual/Emotional Screening Tool Question  Severity (0-4):                    OR                    [X ] Unable to determine/NA  Score of 2 or greater indicates recommendation of Chaplaincy referral  Chaplaincy Referral: [ ] Yes [ ] Refused [ ] Following     Caregiver [place]:  [ ] Yes [ ] No    OR    [x ] Unable to determine. Will assess at later time if appropriate.  Social Work Referral [ ]  Patient and Family Centered Care Referral [ ]    Anticipatory Grief Present: [ ] Yes [ ] No    OR     [ x] Unable to determine. Will assess at later time if appropriate.  Social Work Referral [ ]  Patient and Family Centered Care Referral [ ]    REFERRALS:   [ ]Chaplaincy  [ ]Hospice  [ ]Child Life  [ ]Social Work  [ ]Case management [ ]Holistic Therapy     Palliative care education provided to patient and/or family    Goals of Care Document:     ______________  Beau Sims MD  Palliative Medicine  [facility]   of Geriatric and Palliative Medicine  [phone]   HPI: 84F with PMH of dementia, HTN, DM2, CVA, CAD, hypothyoidism, breast cancer, and PPM insertion, here with abdominal pain, and found to have numerous splenic lesions, concerning for possibly malignancy. Respiratory failure attributed to possible undiagnosed WENDY, on NIV. Had 24/7 HHA at home. Course c/b JABARI, possibly contrast-induced. Also with abdominal pain, from overflow diarrhea possibly, now improving. Patient is DNR/I. Palliative consulted for GOC.      PERTINENT PM/SXH:   DM (diabetes mellitus)    Breast CA    BP (high blood pressure)    Depression    High cholesterol    Bilateral hearing loss, unspecified hearing loss type    CVA (cerebral vascular accident)    Obese    No pertinent past medical history    Hypertension    Diabetes    CVA (cerebrovascular accident)    Pacemaker    Breast cancer    History of pressure ulcer    Dementia    Hypothyroid    Hypothyroidism    CAD (coronary artery disease)    Immobility      H/O total knee replacement, right    History of lumpectomy of right breast    History of cholecystectomy    Pacemaker    H/O lumpectomy    S/P lumpectomy of breast    Knee joint replacement status, right    History of cholecystectomy      FAMILY HISTORY:  FHx: diabetes mellitus (Mother)    FHx: diabetes mellitus (Mother)    no pertinent family history      SOCIAL HISTORY:   Significant other/partner[ ]  Children[ ]  Orthodoxy/Spirituality:  Substance hx:  [ ]   Tobacco hx:  [ ]   Alcohol hx: [ ]   Living Situation: [ ]Home  [ ]Long term care  [ ]Rehab [ ]Other  Home Services: [ ] HHA [ ] Visting RN [ ] Hospice  Occupation:  Home Opioid hx:  [ ] Y [ ] N [ ] I-Stop Reference No:    ADVANCE DIRECTIVES:    [ ] Full Code [ ] DNR  MOLST  [ ]  Living Will  [ ]   DECISION MAKER(s):  [ ] Health Care Proxy(s)  [ ] Surrogate(s)  [ ] Guardian           Name(s): Phone Number(s):    BASELINE (I)ADL(s) (prior to admission):  [place]: [ ]Total  [ ] Moderate [ ]Dependent  Palliative Performance Status Version 2:         %    http://npcrc.org/files/news/palliative_performance_scale_ppsv2.pdf    Allergies    sulfa drugs (Hives)  Melons (Unknown)  shellfish (Unknown)  Sulfacetamide Sodium-Sulfur (Rash)  latex (Unknown)  sulfa drugs (Anaphylaxis)  penicillins (Unknown)  fish (Anaphylaxis)  penicillin (Anaphylaxis)    Intolerances    MEDICATIONS  (STANDING):  amLODIPine   Tablet 10 milliGRAM(s) Oral daily  aspirin enteric coated 81 milliGRAM(s) Oral daily  atorvastatin 80 milliGRAM(s) Oral at bedtime  chlorhexidine 2% Cloths 1 Application(s) Topical <User Schedule>  clopidogrel Tablet 75 milliGRAM(s) Oral daily  dextrose 10% Bolus 125 milliLiter(s) IV Bolus once  dextrose 5%. 1000 milliLiter(s) (50 mL/Hr) IV Continuous <Continuous>  dextrose 5%. 1000 milliLiter(s) (100 mL/Hr) IV Continuous <Continuous>  dextrose 50% Injectable 12.5 Gram(s) IV Push once  dextrose 50% Injectable 25 Gram(s) IV Push once  donepezil 10 milliGRAM(s) Oral at bedtime  ferrous    sulfate 325 milliGRAM(s) Oral daily  gabapentin 300 milliGRAM(s) Oral daily  glucagon  Injectable 1 milliGRAM(s) IntraMuscular once  heparin   Injectable 5000 Unit(s) SubCutaneous every 8 hours  insulin lispro (ADMELOG) corrective regimen sliding scale   SubCutaneous three times a day before meals  lactulose Syrup 20 Gram(s) Oral three times a day  levothyroxine 25 MICROGram(s) Oral daily  metoprolol succinate ER 25 milliGRAM(s) Oral daily  pantoprazole    Tablet 40 milliGRAM(s) Oral before breakfast  potassium chloride    Tablet ER 20 milliEquivalent(s) Oral every 2 hours  potassium chloride    Tablet ER 40 milliEquivalent(s) Oral every 4 hours  senna 2 Tablet(s) Oral at bedtime  sertraline 100 milliGRAM(s) Oral daily  sertraline 50 milliGRAM(s) Oral daily  sodium bicarbonate 650 milliGRAM(s) Oral two times a day    MEDICATIONS  (PRN):  acetaminophen     Tablet .. 650 milliGRAM(s) Oral every 6 hours PRN Temp greater or equal to 38C (100.4F), Mild Pain (1 - 3)  albuterol/ipratropium for Nebulization 3 milliLiter(s) Nebulizer every 6 hours PRN Shortness of Breath and/or Wheezing  dextrose Oral Gel 15 Gram(s) Oral once PRN Blood Glucose LESS THAN 70 milliGRAM(s)/deciliter  ondansetron Injectable 4 milliGRAM(s) IV Push every 4 hours PRN Nausea and/or Vomiting      PRESENT SYMPTOMS: [ ]Unable to obtain due to poor mentation   Source if other than patient:  [ ]Family   [ ]Team   [ X]All review of systems negative including pain and dyspnea unless noted below    All components of pain assessment below addressed. Blank spaces indicate that the patient did/could not complete the assessment.  Pain: [ ]yes [ ]no  QOL impact -   Location -                    Aggravating factors -  Quality -  Radiation -  Timing-  Severity (0-10 scale):  Minimal acceptable level (0-10 scale):     CPOT:    https://www.[facility].org/getattachment/[email or web address]/Critical-Care-Pain-Observation-Tool-(CPOT)    PAIN AD Score:   http://geriatrictoolkit.[facility]/cog/painad.pdf (press ctrl +  left click to view)    Dyspnea:                           [ ]None[ ]Mild [ ]Moderate [ ]Severe     Respiratory Distress Observation Scale (RDOS):   A score of 0 to 2 signifies little or no respiratory distress, 3 signifies mild distress, scores 4 to 6 indicate moderate distress, and scores greater than 7 signify severe distress  https://www.[facility].ca/sites/default/files/PDFS/380704-ntpxyjkadbu-ajjsxdxi-ugmnmespmpj-htnxx.pdf    Anxiety:                             [ ]None[ ]Mild [ ]Moderate [ ]Severe   Fatigue:                             [ ]None[ ]Mild [ ]Moderate [ ]Severe   Nausea:                             [ ]None[ ]Mild [ ]Moderate [ ]Severe   Loss of appetite:              [ ]None[ ]Mild [ ]Moderate [ ]Severe   Constipation:                    [ ]None[ ]Mild [ ]Moderate [ ]Severe    Other Symptoms:      Palliative Performance Status Version 2:         %    http://npcrc.org/files/news/palliative_performance_scale_ppsv2.pdf  PHYSICAL EXAM:  Vital Signs Last 24 Hrs  T(C): 36.8 (15 Apr 2024 04:49), Max: 37 (14 Apr 2024 13:34)  T(F): 98.2 (15 Apr 2024 04:49), Max: 98.6 (14 Apr 2024 13:34)  HR: 80 (15 Apr 2024 04:49) (69 - 80)  BP: 143/72 (15 Apr 2024 04:49) (135/65 - 143/72)  BP(mean): --  RR: 18 (15 Apr 2024 04:49) (18 - 18)  SpO2: 95% (15 Apr 2024 04:49) (95% - 95%)    Parameters below as of 15 Apr 2024 04:49  Patient On (Oxygen Delivery Method): room air     I&O's Summary    14 Apr 2024 07:01  -  15 Apr 2024 07:00  --------------------------------------------------------  IN: 0 mL / OUT: 1000 mL / NET: -1000 mL        GENERAL:  [X ] No acute distress [ ]Lethargic  [ ]Unarousable  [ ]Verbal  [ ]Non-Verbal [ ]Cachexia    BEHAVIORAL/PSYCH:  [ ]Alert and Oriented x  [ ] Anxiety [ ] Delirium [ ] Agitation [X ] Calm   EYES: [ ] No scleral icterus [ ] Scleral icterus [ ] Closed  ENMT:  [ ]Dry mouth  [ ]No external oral lesions [ X] No external ear or nose lesions  CARDIOVASCULAR:  [ ]Regular [ ]Irregular [ ]Tachy [ ]Not Tachy  [ ]Milo [ ] Edema [ ] No edema  PULMONARY:  [ ]Tachypnea  [ ]Audible excessive secretions [X ] No labored breathing [ ] labored breathing  GASTROINTESTINAL: [ ]Soft  [ ]Distended  [ X]Not distended [ ]Non tender [ ]Tender  MUSCULOSKELETAL: [ ]No clubbing [ ] clubbing  [ X] No cyanosis [ ] cyanosis  NEUROLOGIC: [ ]No focal deficits  [ ]Follows commands  [ ]Does not follow commands  [ ]Cognitive impairment  [ ]Dysphagia  [ ]Dysarthria  [ ]Paresis   SKIN: [ ] Jaundiced [X ] Non-jaundiced [ ]Rash [ ]No Rash [ ] Warm [ ] Dry  MISC/LINES: [ ] ET tube [ ] Trach [ ]NGT/OGT [ ]PEG [ ]Pierce    CRITICAL CARE:  [ ] Shock Present  [ ]Septic [ ]Cardiogenic [ ]Neurologic [ ]Hypovolemic  [ ]  Vasopressors [ ]  Inotropes   [ ]Respiratory failure present [ ]Mechanical ventilation [ ]Non-invasive ventilatory support [ ]High flow  [ ]Acute  [ ]Chronic [ ]Hypoxic  [ ]Hypercarbic [ ]Other  [ ]Other organ failure     LABS: reviewed by me, elevated SCr, UA WNL                        11.9   6.21  )-----------( 247      ( 15 Apr 2024 06:24 )             35.2   04-15    145  |  108  |  27<H>  ----------------------------<  155<H>  3.5   |  24  |  2.3<H>    Ca    8.3<L>      15 Apr 2024 06:24        Urinalysis Basic - ( 15 Apr 2024 06:24 )    Color: x / Appearance: x / SG: x / pH: x  Gluc: 155 mg/dL / Ketone: x  / Bili: x / Urobili: x   Blood: x / Protein: x / Nitrite: x   Leuk Esterase: x / RBC: x / WBC x   Sq Epi: x / Non Sq Epi: x / Bacteria: x      RADIOLOGY & ADDITIONAL STUDIES: reviewed by me  CXR with bibasilar opacities    PROTEIN CALORIE MALNUTRITION PRESENT: [ ]mild [ ]moderate [ ]severe [ ]underweight [ ]morbid obesity  https://www.andeal.org/vault/2440/web/files/ONC/Table_Clinical%20Characteristics%20to%20Document%20Malnutrition-White%20JV%20et%20al%133321.pdf    Height (cm): 152.4 (04-07-24 @ 14:58)  Weight (kg): 73.4 (04-07-24 @ 14:58), 81.6 (05-07-23 @ 09:41)  BMI (kg/m2): 31.6 (04-07-24 @ 14:58)    [ ]PPSV2 < or = to 30% [ ]significant weight loss  [ ]poor nutritional intake  [ ]anasarca      [ ]Artificial Nutrition          Palliative Care Spiritual/Emotional Screening Tool Question  Severity (0-4):                    OR                    [X ] Unable to determine/NA  Score of 2 or greater indicates recommendation of Chaplaincy referral  Chaplaincy Referral: [ ] Yes [ ] Refused [ ] Following     Caregiver [place]:  [ ] Yes [ ] No    OR    [x ] Unable to determine. Will assess at later time if appropriate.  Social Work Referral [ ]  Patient and Family Centered Care Referral [ ]    Anticipatory Grief Present: [ ] Yes [ ] No    OR     [ x] Unable to determine. Will assess at later time if appropriate.  Social Work Referral [ ]  Patient and Family Centered Care Referral [ ]    REFERRALS:   [ ]Chaplaincy  [ ]Hospice  [ ]Child Life  [ ]Social Work  [ ]Case management [ ]Holistic Therapy     Palliative care education provided to patient and/or family    Goals of Care Document:     ______________  Beau Sims MD  Palliative Medicine  [facility]   of Geriatric and Palliative Medicine  [phone]   HPI: 84F with PMH of dementia, HTN, DM2, CVA, CAD, hypothyoidism, breast cancer, and PPM insertion, here with abdominal pain, and found to have numerous splenic lesions, concerning for possibly malignancy. Respiratory failure attributed to possible undiagnosed WENDY, on NIV. Had 24/7 HHA at home. Course c/b JABARI, possibly contrast-induced. Also with abdominal pain, from overflow diarrhea possibly, now improving. Patient is DNR/I. Palliative consulted for GOC.    PERTINENT PM/SXH:   DM (diabetes mellitus)    Breast CA    BP (high blood pressure)    Depression    High cholesterol    Bilateral hearing loss, unspecified hearing loss type    CVA (cerebral vascular accident)    Obese    No pertinent past medical history    Hypertension    Diabetes    CVA (cerebrovascular accident)    Pacemaker    Breast cancer    History of pressure ulcer    Dementia    Hypothyroid    Hypothyroidism    CAD (coronary artery disease)    Immobility      H/O total knee replacement, right    History of lumpectomy of right breast    History of cholecystectomy    Pacemaker    H/O lumpectomy    S/P lumpectomy of breast    Knee joint replacement status, right    History of cholecystectomy      FAMILY HISTORY:  FHx: diabetes mellitus (Mother)    FHx: diabetes mellitus (Mother)    no pertinent family history      SOCIAL HISTORY:   Significant other/partner[ ]  Children[X ]  Latter-day/Spirituality:  Substance hx:  [ ]   Tobacco hx:  [ ]   Alcohol hx: [ ]   Living Situation: [ X]Home  [ ]Long term care  [ ]Rehab [ ]Other  Home Services: [ ] HHA [ ] Visting RN [ ] Hospice  Occupation:  Home Opioid hx:  [ ] Y [ ] N [ ] I-Stop Reference No:    ADVANCE DIRECTIVES:    [ ] Full Code [X ] DNR  MOLST  [ ]  Living Will  [ ]   DECISION MAKER(s):  [ ] Health Care Proxy(s)  [ ] Surrogate(s)  [ ] Guardian           Name(s): Phone Number(s): mother Sindi [phone]    BASELINE (I)ADL(s) (prior to admission):  [place]: [ ]Total  [ ] Moderate [ ]Dependent  Palliative Performance Status Version 2:         %    http://npcrc.org/files/news/palliative_performance_scale_ppsv2.pdf    Allergies    sulfa drugs (Hives)  Melons (Unknown)  shellfish (Unknown)  Sulfacetamide Sodium-Sulfur (Rash)  latex (Unknown)  sulfa drugs (Anaphylaxis)  penicillins (Unknown)  fish (Anaphylaxis)  penicillin (Anaphylaxis)    Intolerances    MEDICATIONS  (STANDING):  amLODIPine   Tablet 10 milliGRAM(s) Oral daily  aspirin enteric coated 81 milliGRAM(s) Oral daily  atorvastatin 80 milliGRAM(s) Oral at bedtime  chlorhexidine 2% Cloths 1 Application(s) Topical <User Schedule>  clopidogrel Tablet 75 milliGRAM(s) Oral daily  dextrose 10% Bolus 125 milliLiter(s) IV Bolus once  dextrose 5%. 1000 milliLiter(s) (50 mL/Hr) IV Continuous <Continuous>  dextrose 5%. 1000 milliLiter(s) (100 mL/Hr) IV Continuous <Continuous>  dextrose 50% Injectable 12.5 Gram(s) IV Push once  dextrose 50% Injectable 25 Gram(s) IV Push once  donepezil 10 milliGRAM(s) Oral at bedtime  ferrous    sulfate 325 milliGRAM(s) Oral daily  gabapentin 300 milliGRAM(s) Oral daily  glucagon  Injectable 1 milliGRAM(s) IntraMuscular once  heparin   Injectable 5000 Unit(s) SubCutaneous every 8 hours  insulin lispro (ADMELOG) corrective regimen sliding scale   SubCutaneous three times a day before meals  lactulose Syrup 20 Gram(s) Oral three times a day  levothyroxine 25 MICROGram(s) Oral daily  metoprolol succinate ER 25 milliGRAM(s) Oral daily  pantoprazole    Tablet 40 milliGRAM(s) Oral before breakfast  potassium chloride    Tablet ER 20 milliEquivalent(s) Oral every 2 hours  potassium chloride    Tablet ER 40 milliEquivalent(s) Oral every 4 hours  senna 2 Tablet(s) Oral at bedtime  sertraline 100 milliGRAM(s) Oral daily  sertraline 50 milliGRAM(s) Oral daily  sodium bicarbonate 650 milliGRAM(s) Oral two times a day    MEDICATIONS  (PRN):  acetaminophen     Tablet .. 650 milliGRAM(s) Oral every 6 hours PRN Temp greater or equal to 38C (100.4F), Mild Pain (1 - 3)  albuterol/ipratropium for Nebulization 3 milliLiter(s) Nebulizer every 6 hours PRN Shortness of Breath and/or Wheezing  dextrose Oral Gel 15 Gram(s) Oral once PRN Blood Glucose LESS THAN 70 milliGRAM(s)/deciliter  ondansetron Injectable 4 milliGRAM(s) IV Push every 4 hours PRN Nausea and/or Vomiting      PRESENT SYMPTOMS: [X ]Unable to obtain due to poor mentation   Source if other than patient:  [ ]Family   [ ]Team   [ ]All review of systems negative including pain and dyspnea unless noted below    All components of pain assessment below addressed. Blank spaces indicate that the patient did/could not complete the assessment.  Pain: [ ]yes [ ]no  QOL impact -   Location -                    Aggravating factors -  Quality -  Radiation -  Timing-  Severity (0-10 scale):  Minimal acceptable level (0-10 scale):     CPOT:    https://www.[email or web address]/getattachment/[email or web address]/Critical-Care-Pain-Observation-Tool-(CPOT)    PAIN AD Score: 0  http://geriatrictoolkit.[email or web address]/cog/painad.pdf (press ctrl +  left click to view)    Dyspnea:                           [ ]None[ ]Mild [ ]Moderate [ ]Severe     Respiratory Distress Observation Scale (RDOS): 0  A score of 0 to 2 signifies little or no respiratory distress, 3 signifies mild distress, scores 4 to 6 indicate moderate distress, and scores greater than 7 signify severe distress  [email or web address]/sites/default/files/[email or web address]-nvwwmtclqvs-wfqtjhpr-jzwuvhluupc-hihip.pdf    Anxiety:                             [ ]None[ ]Mild [ ]Moderate [ ]Severe   Fatigue:                             [ ]None[ ]Mild [ ]Moderate [ ]Severe   Nausea:                             [ ]None[ ]Mild [ ]Moderate [ ]Severe   Loss of appetite:              [ ]None[ ]Mild [ ]Moderate [ ]Severe   Constipation:                    [ ]None[ ]Mild [ ]Moderate [ ]Severe    Other Symptoms:      Palliative Performance Status Version 2:         %    http://npcrc.org/files/news/palliative_performance_scale_ppsv2.pdf  PHYSICAL EXAM:  Vital Signs Last 24 Hrs  T(C): 36.8 (15 Apr 2024 04:49), Max: 37 (14 Apr 2024 13:34)  T(F): 98.2 (15 Apr 2024 04:49), Max: 98.6 (14 Apr 2024 13:34)  HR: 80 (15 Apr 2024 04:49) (69 - 80)  BP: 143/72 (15 Apr 2024 04:49) (135/65 - 143/72)  BP(mean): --  RR: 18 (15 Apr 2024 04:49) (18 - 18)  SpO2: 95% (15 Apr 2024 04:49) (95% - 95%)    Parameters below as of 15 Apr 2024 04:49  Patient On (Oxygen Delivery Method): room air     I&O's Summary    14 Apr 2024 07:01  -  15 Apr 2024 07:00  --------------------------------------------------------  IN: 0 mL / OUT: 1000 mL / NET: -1000 mL        GENERAL:  [X ] No acute distress [ ]Lethargic  [ ]Unarousable  [ ]Verbal  [ ]Non-Verbal [ ]Cachexia    BEHAVIORAL/PSYCH:  [ ]Alert and Oriented x  [ ] Anxiety [ ] Delirium [ ] Agitation [X ] Calm   EYES: [ ] No scleral icterus [ ] Scleral icterus [ ] Closed  ENMT:  [ ]Dry mouth  [ ]No external oral lesions [ X] No external ear or nose lesions  CARDIOVASCULAR:  [ ]Regular [ ]Irregular [ ]Tachy [ ]Not Tachy  [ ]Milo [ ] Edema [ ] No edema  PULMONARY:  [ ]Tachypnea  [ ]Audible excessive secretions [X ] No labored breathing [ ] labored breathing  GASTROINTESTINAL: [ ]Soft  [ ]Distended  [ X]Not distended [ ]Non tender [ ]Tender  MUSCULOSKELETAL: [ ]No clubbing [ ] clubbing  [ X] No cyanosis [ ] cyanosis  NEUROLOGIC: [ ]No focal deficits  [ ]Follows commands  [ ]Does not follow commands  [X ]Cognitive impairment  [ ]Dysphagia  [ ]Dysarthria  [ ]Paresis   SKIN: [ ] Jaundiced [X ] Non-jaundiced [ ]Rash [ ]No Rash [ ] Warm [ ] Dry  MISC/LINES: [ ] ET tube [ ] Trach [ ]NGT/OGT [ ]PEG [ ]Pierce    CRITICAL CARE:  [ ] Shock Present  [ ]Septic [ ]Cardiogenic [ ]Neurologic [ ]Hypovolemic  [ ]  Vasopressors [ ]  Inotropes   [ ]Respiratory failure present [ ]Mechanical ventilation [ ]Non-invasive ventilatory support [ ]High flow  [ ]Acute  [ ]Chronic [ ]Hypoxic  [ ]Hypercarbic [ ]Other  [ ]Other organ failure     LABS: reviewed by me, elevated SCr but improving, UA WNL                        11.9   6.21  )-----------( 247      ( 15 Apr 2024 06:24 )             35.2   04-15    145  |  108  |  27<H>  ----------------------------<  155<H>  3.5   |  24  |  2.3<H>    Ca    8.3<L>      15 Apr 2024 06:24        Urinalysis Basic - ( 15 Apr 2024 06:24 )    Color: x / Appearance: x / SG: x / pH: x  Gluc: 155 mg/dL / Ketone: x  / Bili: x / Urobili: x   Blood: x / Protein: x / Nitrite: x   Leuk Esterase: x / RBC: x / WBC x   Sq Epi: x / Non Sq Epi: x / Bacteria: x      RADIOLOGY & ADDITIONAL STUDIES: reviewed by me  CXR with bibasilar opacities    PROTEIN CALORIE MALNUTRITION PRESENT: [ ]mild [ ]moderate [ ]severe [ ]underweight [ ]morbid obesity  https://www.andeal.org/vault/2440/web/files/ONC/Table_Clinical%20Characteristics%20to%20Document%20Malnutrition-White%20JV%20et%20al%197902.pdf    Height (cm): 152.4 (04-07-24 @ 14:58)  Weight (kg): 73.4 (04-07-24 @ 14:58), 81.6 (05-07-23 @ 09:41)  BMI (kg/m2): 31.6 (04-07-24 @ 14:58)    [ ]PPSV2 < or = to 30% [ ]significant weight loss  [ ]poor nutritional intake  [ ]anasarca      [ ]Artificial Nutrition          Palliative Care Spiritual/Emotional Screening Tool Question  Severity (0-4):                    OR                    [X ] Unable to determine/NA  Score of 2 or greater indicates recommendation of Chaplaincy referral  Chaplaincy Referral: [ ] Yes [ ] Refused [ ] Following     Caregiver Tangent:  [ ] Yes [ ] No    OR    [x ] Unable to determine. Will assess at later time if appropriate.  Social Work Referral [ ]  Patient and Family Centered Care Referral [ ]    Anticipatory Grief Present: [ ] Yes [ ] No    OR     [ x] Unable to determine. Will assess at later time if appropriate.  Social Work Referral [ ]  Patient and Family Centered Care Referral [ ]    REFERRALS:   [ ]Chaplaincy  [ ]Hospice  [ ]Child Life  [ ]Social Work  [ ]Case management [ ]Holistic Therapy     Palliative care education provided to patient and/or family    Goals of Care Document:     ______________  Beau Sims MD  Palliative Medicine  [facility]   of Geriatric and Palliative Medicine  [phone]

## 2024-04-15 NOTE — PROGRESS NOTE ADULT - TIME BILLING
direct pt care
direct pt care
Coordination of care
direct pt care
direct pt care
Coordination of care
Coordination of care

## 2024-04-15 NOTE — PROGRESS NOTE ADULT - ASSESSMENT
JABARI on CKD 3 / likely d/t contrast induced nephropathy    - good urine output  - creat trending down  - renal ultrasound reviewed, no hydronephrosis  - poor po intake- start 1/2NS at 50cc/hr until AM; encourage po hydration   - cont Na bicarb 650 bid  - cont to document urine output / check daily chemistry   - hematuria noted on 4/5 UA, for completion, f/u ANCA panel w/ MPO/PR3 neg, SERGIO neg, C3/C4- C3 mild low, ?D/t infection- will need to repeat level in a few wks, serum flc ratio, spep/sife, CPK ok  - no need for RRT   - CT noted, no hydronephrosis;  will need outpatient  monitoring for CTAP findings of complex renal cysts

## 2024-04-15 NOTE — CONSULT NOTE ADULT - CONVERSATION DETAILS
Discussed with daughter; patient is at home with 24/7 HHA with her daughter and son visiting often. Daughter states she a "half and half" quality of life, enjoying seeing family, but limited in her independence. Daughter is aware of splenic lesions on imaging,

## 2024-04-15 NOTE — PROGRESS NOTE ADULT - NS ATTEND AMEND GEN_ALL_CORE FT
Pt having bms s/p disimpaction, if spontaneous bms can start bowel regimen. Recommend serial abd exams and follow up KUB.
Patient seen and examined .  All pertinent labs and imaging reviewed by me.  Discussed plan with the GI PA at [facility].  Plan modified above where needed
Pt having bms, KUB shows improvement. Continue bowel regimen. Monitor bms and continue serial abd exams.
Note reviewed and edited as needed. Pt having bms. Continue bowel regimen. Recommend serial abd exams and follow up KUB.

## 2024-04-16 NOTE — PROGRESS NOTE ADULT - SUBJECTIVE AND OBJECTIVE BOX
Progress note    INTERVAL HPI/OVERNIGHT EVENTS:    Patient seen and examined at bedside. She denies any acute distress.      REVIEW OF SYSTEMS:  All other 13 Review of systems were reviewed and are negative    FAMILY HISTORY:  FHx: diabetes mellitus (Mother)    FHx: diabetes mellitus (Mother)      T(C): 36.7 (04-16-24 @ 14:07), Max: 36.8 (04-15-24 @ 20:13)  HR: 71 (04-16-24 @ 14:07) (71 - 77)  BP: 150/68 (04-16-24 @ 14:07) (138/83 - 171/79)  RR: 18 (04-16-24 @ 14:07) (18 - 18)  SpO2: 95% (04-16-24 @ 14:07) (95% - 98%)  Wt(kg): --Vital Signs Last 24 Hrs  T(C): 36.7 (16 Apr 2024 14:07), Max: 36.8 (15 Apr 2024 20:13)  T(F): 98.1 (16 Apr 2024 14:07), Max: 98.3 (15 Apr 2024 20:13)  HR: 71 (16 Apr 2024 14:07) (71 - 77)  BP: 150/68 (16 Apr 2024 14:07) (138/83 - 171/79)  BP(mean): --  RR: 18 (16 Apr 2024 14:07) (18 - 18)  SpO2: 95% (16 Apr 2024 14:07) (95% - 98%)    Parameters below as of 16 Apr 2024 14:07  Patient On (Oxygen Delivery Method): room air      sulfa drugs (Hives)  Melons (Unknown)  shellfish (Unknown)  Sulfacetamide Sodium-Sulfur (Rash)  latex (Unknown)  sulfa drugs (Anaphylaxis)  penicillins (Unknown)  fish (Anaphylaxis)  penicillin (Anaphylaxis)      PHYSICAL EXAM:    GENERAL: Well-nourished, disheveled, NAD.  HEAD: No visible or palpable bumps or hematomas. No ecchymosis behind ears B/L.  ENMT: PERRLA    CVS: Normal S1,S2. No murmurs appreciated on auscultation   RESP: No use of accessory muscles. Chest rise symmetrical with good expansion. Lungs clear to auscultation B/L. No wheezing, rales, or rhonchi auscultated.  GI: Normal auscultation of bowel sounds in all 4 quadrants. no TTP. Soft, Nondistended.  Skin: Warm, Dry. No rashes or lesions. Good cap refill < 2 sec B/L.  EXT: Radial and pedal pulses present B/L. No palpable cords.  has 2+ pitting edema b/l  NEURO: AAO x 2    Consultant(s) Notes Reviewed:  [x ] YES  [ ] NO  Care Discussed with Consultants/Other Providers [ x] YES  [ ] NO    LABS:      RADIOLOGY & ADDITIONAL TESTS:    Imaging Personally Reviewed:  [ ] YES  [ ] NO  acetaminophen     Tablet .. 650 milliGRAM(s) Oral every 6 hours PRN  albuterol/ipratropium for Nebulization 3 milliLiter(s) Nebulizer every 6 hours PRN  aspirin enteric coated 81 milliGRAM(s) Oral daily  atorvastatin 80 milliGRAM(s) Oral at bedtime  chlorhexidine 2% Cloths 1 Application(s) Topical <User Schedule>  clopidogrel Tablet 75 milliGRAM(s) Oral daily  dextrose 10% Bolus 125 milliLiter(s) IV Bolus once  dextrose 5%. 1000 milliLiter(s) IV Continuous <Continuous>  dextrose 5%. 1000 milliLiter(s) IV Continuous <Continuous>  dextrose 50% Injectable 12.5 Gram(s) IV Push once  dextrose 50% Injectable 25 Gram(s) IV Push once  dextrose Oral Gel 15 Gram(s) Oral once PRN  donepezil 10 milliGRAM(s) Oral at bedtime  ferrous    sulfate 325 milliGRAM(s) Oral daily  gabapentin 300 milliGRAM(s) Oral daily  glucagon  Injectable 1 milliGRAM(s) IntraMuscular once  heparin   Injectable 5000 Unit(s) SubCutaneous every 8 hours  hydrALAZINE Injectable 10 milliGRAM(s) IV Push daily PRN  insulin lispro (ADMELOG) corrective regimen sliding scale   SubCutaneous three times a day before meals  lactulose Syrup 20 Gram(s) Oral three times a day  levothyroxine 25 MICROGram(s) Oral daily  metoprolol succinate ER 25 milliGRAM(s) Oral daily  NIFEdipine XL 30 milliGRAM(s) Oral daily  ondansetron Injectable 4 milliGRAM(s) IV Push every 4 hours PRN  pantoprazole    Tablet 40 milliGRAM(s) Oral before breakfast  potassium chloride    Tablet ER 20 milliEquivalent(s) Oral every 2 hours  senna 2 Tablet(s) Oral at bedtime  sertraline 100 milliGRAM(s) Oral daily  sertraline 50 milliGRAM(s) Oral daily  sodium bicarbonate 650 milliGRAM(s) Oral two times a day      HEALTH ISSUES - PROBLEM Dx:    83yo female PMHx significant for dementia, HTN, diabetes mellitus II, 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     Acute on chronic hypoxic and hypercapnic respiratory failure 2/2 possibly WENDY given Hx  - Unclear etiology at this time  -CXR clear, No acute process, shows cardiomegaly  - Pulmonary consult appreciated, needs NIV   - Bipap as needed  - Supplemental O2 as needed  - Procal very mildly elevated  -D-dimer neg for age  -blood Cx NGTD  -TTE:  LVEF 40 % (per cardiology, likely 2/2 pacemaker placement explaining drop in LVEF)  - Discussed with family, HHA have been reporting she "never sleeps and wakes up frequently at night".  Never had sleep study or PFT.  2' hand smoke exposure from .  - avoid ACE/ARBs given renal function  - LUE duplex: neg for clot; elevate arm  - dispo planning; will need resumption of 24/7 HHA, needs a bipap and home O2    Britton, possibly contrast-induced:  -Baseline appears to be 1.5  -US renal unremarkable  -Renal consult appreciated  - hematuria noted on 4/5 UA, check ANCA panel w/ MPO/PR3, SERGIO, C3/C4, serum flc ratio, spep/sife,   - CK elevated minimally  - gabapentin should be max 300mg daily based on CrCL  - started on sodium bicarb  - repeat US renal as per nephro  - s/p IVF    UTI:  -UCx grew areococcus urinae  -S/p course of Levaquin    Transaminitis:  -Resolving, unclear etiology, US unremarkable, possibly 2' to Hypotensive episodes, , hepatitis panel (-)    Elevated troponin:  -Likely 2' to demand, PPM interrogated, no events  -TTE LVEF of 40 %.  -Trop flat     Abdominal pain with overflow diarrhea 2' to constipation, resolved  - aggressive bowel regimen  - enemas q 6  -GI consult appreciated, s/p disimpaction    Splenic lesions  - Incidentally found on imaging, very unlikely the cause of her symptoms, clinically very unlikely infectious in nature  -Outpatient imaging for further characterization    Dementia  - Continue with zoloft, nortriptyline, and donepezil     HTN  - continue metoprolol, Dc norvasc, started on nifedipine 30mg, Hydralazine PRN    Diabetes Mellitus II  - CHO consistent diet  -Hold insulin for episodes of Hypoglycemia, restart PRN     Hypothyroidism  - Continue levothyroxine as prescribed     Chronic Pain   - Continue gabapentin and tramadol    DVT/VTE prophylaxis   Minced and moist diet per SLP, advance as per re-eval    Dispo: Dc when NIV equipment obtain for hypercapneia, will need outpatient  monitoring for CTAP findings of complex renal cysts

## 2024-04-17 NOTE — PROGRESS NOTE ADULT - PROVIDER SPECIALTY LIST ADULT
Hospitalist
Gastroenterology
Hospitalist
Nephrology
Gastroenterology
Gastroenterology
Hospitalist
Hospitalist
Nephrology
Gastroenterology
Hospitalist
Nephrology
Hospitalist

## 2024-04-17 NOTE — PROGRESS NOTE ADULT - SUBJECTIVE AND OBJECTIVE BOX
Progress note    INTERVAL HPI/OVERNIGHT EVENTS:    Patient seen and examined at bedside. She denies any acute distress.      REVIEW OF SYSTEMS:  All other 13 Review of systems were reviewed and are negative    FAMILY HISTORY:  FHx: diabetes mellitus (Mother)    FHx: diabetes mellitus (Mother)      T(C): 36.2 (04-17-24 @ 04:25), Max: 37.3 (04-16-24 @ 20:16)  HR: 73 (04-17-24 @ 04:25) (69 - 73)  BP: 169/75 (04-17-24 @ 04:25) (139/65 - 169/75)  RR: 18 (04-17-24 @ 04:25) (18 - 18)  SpO2: 95% (04-17-24 @ 04:25) (93% - 95%)  Wt(kg): --Vital Signs Last 24 Hrs  T(C): 36.2 (17 Apr 2024 04:25), Max: 37.3 (16 Apr 2024 20:16)  T(F): 97.1 (17 Apr 2024 04:25), Max: 99.1 (16 Apr 2024 20:16)  HR: 73 (17 Apr 2024 04:25) (69 - 73)  BP: 169/75 (17 Apr 2024 04:25) (139/65 - 169/75)  BP(mean): --  RR: 18 (17 Apr 2024 04:25) (18 - 18)  SpO2: 95% (17 Apr 2024 04:25) (93% - 95%)    Parameters below as of 17 Apr 2024 04:25  Patient On (Oxygen Delivery Method): room air      sulfa drugs (Hives)  Melons (Unknown)  shellfish (Unknown)  Sulfacetamide Sodium-Sulfur (Rash)  latex (Unknown)  sulfa drugs (Anaphylaxis)  penicillins (Unknown)  fish (Anaphylaxis)  penicillin (Anaphylaxis)      PHYSICAL EXAM:    GENERAL: Well-nourished, NAD.  HEAD: No visible or palpable bumps or hematomas. No ecchymosis behind ears B/L.  ENMT: PERRLA    CVS: Normal S1,S2. No murmurs appreciated on auscultation   RESP: No use of accessory muscles. Chest rise symmetrical with good expansion. Lungs clear to auscultation B/L. No wheezing, rales, or rhonchi auscultated.  GI: Normal auscultation of bowel sounds in all 4 quadrants. no TTP. Soft, Nondistended.  Skin: Warm, Dry. No rashes or lesions. Good cap refill < 2 sec B/L.  EXT: Radial and pedal pulses present B/L. No palpable cords.  has mild pitting edema b/l  NEURO: AAO x 2    Consultant(s) Notes Reviewed:  [x ] YES  [ ] NO  Care Discussed with Consultants/Other Providers [ x] YES  [ ] NO    LABS:      RADIOLOGY & ADDITIONAL TESTS:    Imaging Personally Reviewed:  [ ] YES  [ ] NO  acetaminophen     Tablet .. 650 milliGRAM(s) Oral every 6 hours PRN  albuterol/ipratropium for Nebulization 3 milliLiter(s) Nebulizer every 6 hours PRN  aspirin enteric coated 81 milliGRAM(s) Oral daily  atorvastatin 80 milliGRAM(s) Oral at bedtime  chlorhexidine 2% Cloths 1 Application(s) Topical <User Schedule>  clopidogrel Tablet 75 milliGRAM(s) Oral daily  dextrose 10% Bolus 125 milliLiter(s) IV Bolus once  dextrose 5%. 1000 milliLiter(s) IV Continuous <Continuous>  dextrose 5%. 1000 milliLiter(s) IV Continuous <Continuous>  dextrose 50% Injectable 25 Gram(s) IV Push once  dextrose 50% Injectable 12.5 Gram(s) IV Push once  dextrose Oral Gel 15 Gram(s) Oral once PRN  donepezil 10 milliGRAM(s) Oral at bedtime  ferrous    sulfate 325 milliGRAM(s) Oral daily  gabapentin 300 milliGRAM(s) Oral daily  glucagon  Injectable 1 milliGRAM(s) IntraMuscular once  heparin   Injectable 5000 Unit(s) SubCutaneous every 8 hours  hydrALAZINE Injectable 10 milliGRAM(s) IV Push daily PRN  insulin lispro (ADMELOG) corrective regimen sliding scale   SubCutaneous three times a day before meals  lactulose Syrup 20 Gram(s) Oral three times a day  levothyroxine 25 MICROGram(s) Oral daily  metoprolol succinate ER 25 milliGRAM(s) Oral daily  NIFEdipine XL 30 milliGRAM(s) Oral daily  ondansetron Injectable 4 milliGRAM(s) IV Push every 4 hours PRN  pantoprazole    Tablet 40 milliGRAM(s) Oral before breakfast  potassium chloride    Tablet ER 20 milliEquivalent(s) Oral every 2 hours  senna 2 Tablet(s) Oral at bedtime  sertraline 100 milliGRAM(s) Oral daily  sertraline 50 milliGRAM(s) Oral daily  sodium bicarbonate 650 milliGRAM(s) Oral two times a day      HEALTH ISSUES - PROBLEM Dx:    83yo female PMHx significant for dementia, HTN, diabetes mellitus II, 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     Acute on chronic hypoxic and hypercapnic respiratory failure 2/2 possibly WENDY given Hx  - Unclear etiology at this time  -CXR clear, No acute process, shows cardiomegaly  - Pulmonary consult appreciated, needs NIV   - Bipap as needed  - Supplemental O2 as needed  - Procal very mildly elevated  -D-dimer neg for age  -blood Cx NGTD  -TTE:  LVEF 40 % (per cardiology, likely 2/2 pacemaker placement explaining drop in LVEF)  - Discussed with family, HHA have been reporting she "never sleeps and wakes up frequently at night".  Never had sleep study or PFT.  2' hand smoke exposure from .  - avoid ACE/ARBs given renal function  - LUE duplex: neg for clot; elevate arm  - dispo planning; will need resumption of 24/7 HHA, needs a bipap and home O2    Britton, possibly contrast-induced:  -Baseline appears to be 1.5  -US renal unremarkable  -Renal consult appreciated  - hematuria noted on 4/5 UA, check ANCA panel w/ MPO/PR3, SERGIO, C3/C4, serum flc ratio, spep/sife,   - CK elevated minimally  - gabapentin should be max 300mg daily based on CrCL  - started on sodium bicarb  - repeat US renal as per nephro  - s/p IVF    UTI:  -UCx grew areococcus urinae  -S/p course of Levaquin    Transaminitis:  -Resolving, unclear etiology, US unremarkable, possibly 2' to Hypotensive episodes, , hepatitis panel (-)    Elevated troponin:  -Likely 2' to demand, PPM interrogated, no events  -TTE LVEF of 40 %.  -Trop flat     Abdominal pain with overflow diarrhea 2' to constipation, resolved  - aggressive bowel regimen  - enemas q 6  -GI consult appreciated, s/p disimpaction    Splenic lesions  - Incidentally found on imaging, very unlikely the cause of her symptoms, clinically very unlikely infectious in nature  -Outpatient imaging for further characterization    Dementia  - Continue with zoloft, nortriptyline, and donepezil     HTN  - continue metoprolol, Dc norvasc, started on nifedipine 30mg, Hydralazine PRN    Diabetes Mellitus II  - CHO consistent diet  -Hold insulin for episodes of Hypoglycemia, restart PRN     Hypothyroidism  - Continue levothyroxine as prescribed     Chronic Pain   - Continue gabapentin and tramadol    DVT/VTE prophylaxis   Minced and moist diet per SLP, advance as per re-eval    Dispo: Plan to do pulse ox monitoring tonight, if Po2 drops, patient will need O2, bleeded into NIV. Will check ABG in am    Healthcare maintanence on discharge., will need outpatient  monitoring for CTAP findings of complex renal cysts           Progress note    INTERVAL HPI/OVERNIGHT EVENTS:    Patient seen and examined at bedside. She denies any acute distress.      REVIEW OF SYSTEMS:  All other 13 Review of systems were reviewed and are negative    FAMILY HISTORY:  FHx: diabetes mellitus (Mother)    FHx: diabetes mellitus (Mother)      T(C): 36.2 (04-17-24 @ 04:25), Max: 37.3 (04-16-24 @ 20:16)  HR: 73 (04-17-24 @ 04:25) (69 - 73)  BP: 169/75 (04-17-24 @ 04:25) (139/65 - 169/75)  RR: 18 (04-17-24 @ 04:25) (18 - 18)  SpO2: 95% (04-17-24 @ 04:25) (93% - 95%)  Wt(kg): --Vital Signs Last 24 Hrs  T(C): 36.2 (17 Apr 2024 04:25), Max: 37.3 (16 Apr 2024 20:16)  T(F): 97.1 (17 Apr 2024 04:25), Max: 99.1 (16 Apr 2024 20:16)  HR: 73 (17 Apr 2024 04:25) (69 - 73)  BP: 169/75 (17 Apr 2024 04:25) (139/65 - 169/75)  BP(mean): --  RR: 18 (17 Apr 2024 04:25) (18 - 18)  SpO2: 95% (17 Apr 2024 04:25) (93% - 95%)    Parameters below as of 17 Apr 2024 04:25  Patient On (Oxygen Delivery Method): room air      sulfa drugs (Hives)  Melons (Unknown)  shellfish (Unknown)  Sulfacetamide Sodium-Sulfur (Rash)  latex (Unknown)  sulfa drugs (Anaphylaxis)  penicillins (Unknown)  fish (Anaphylaxis)  penicillin (Anaphylaxis)      PHYSICAL EXAM:    GENERAL: Well-nourished, NAD.  HEAD: No visible or palpable bumps or hematomas. No ecchymosis behind ears B/L.  ENMT: PERRLA    CVS: Normal S1,S2. No murmurs appreciated on auscultation   RESP: No use of accessory muscles. Chest rise symmetrical with good expansion. Lungs clear to auscultation B/L. No wheezing, rales, or rhonchi auscultated.  GI: Normal auscultation of bowel sounds in all 4 quadrants. no TTP. Soft, Nondistended.  Skin: Warm, Dry. No rashes or lesions. Good cap refill < 2 sec B/L.  EXT: Radial and pedal pulses present B/L. No palpable cords.  has mild pitting edema b/l  NEURO: AAO x 2    Consultant(s) Notes Reviewed:  [x ] YES  [ ] NO  Care Discussed with Consultants/Other Providers [ x] YES  [ ] NO    LABS:      RADIOLOGY & ADDITIONAL TESTS:    Imaging Personally Reviewed:  [ ] YES  [ ] NO  acetaminophen     Tablet .. 650 milliGRAM(s) Oral every 6 hours PRN  albuterol/ipratropium for Nebulization 3 milliLiter(s) Nebulizer every 6 hours PRN  aspirin enteric coated 81 milliGRAM(s) Oral daily  atorvastatin 80 milliGRAM(s) Oral at bedtime  chlorhexidine 2% Cloths 1 Application(s) Topical <User Schedule>  clopidogrel Tablet 75 milliGRAM(s) Oral daily  dextrose 10% Bolus 125 milliLiter(s) IV Bolus once  dextrose 5%. 1000 milliLiter(s) IV Continuous <Continuous>  dextrose 5%. 1000 milliLiter(s) IV Continuous <Continuous>  dextrose 50% Injectable 25 Gram(s) IV Push once  dextrose 50% Injectable 12.5 Gram(s) IV Push once  dextrose Oral Gel 15 Gram(s) Oral once PRN  donepezil 10 milliGRAM(s) Oral at bedtime  ferrous    sulfate 325 milliGRAM(s) Oral daily  gabapentin 300 milliGRAM(s) Oral daily  glucagon  Injectable 1 milliGRAM(s) IntraMuscular once  heparin   Injectable 5000 Unit(s) SubCutaneous every 8 hours  hydrALAZINE Injectable 10 milliGRAM(s) IV Push daily PRN  insulin lispro (ADMELOG) corrective regimen sliding scale   SubCutaneous three times a day before meals  lactulose Syrup 20 Gram(s) Oral three times a day  levothyroxine 25 MICROGram(s) Oral daily  metoprolol succinate ER 25 milliGRAM(s) Oral daily  NIFEdipine XL 30 milliGRAM(s) Oral daily  ondansetron Injectable 4 milliGRAM(s) IV Push every 4 hours PRN  pantoprazole    Tablet 40 milliGRAM(s) Oral before breakfast  potassium chloride    Tablet ER 20 milliEquivalent(s) Oral every 2 hours  senna 2 Tablet(s) Oral at bedtime  sertraline 100 milliGRAM(s) Oral daily  sertraline 50 milliGRAM(s) Oral daily  sodium bicarbonate 650 milliGRAM(s) Oral two times a day      HEALTH ISSUES - PROBLEM Dx:    83yo female PMHx significant for dementia, HTN, diabetes mellitus II, 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     Acute on chronic hypoxic and hypercapnic respiratory failure 2/2 possibly WENDY given Hx  - Unclear etiology at this time  -CXR clear, No acute process, shows cardiomegaly  - Pulmonary consult appreciated, needs NIV   - Bipap as needed  - Supplemental O2 as needed  - Procal very mildly elevated  -D-dimer neg for age  -blood Cx NGTD  -TTE:  LVEF 40 % (per cardiology, likely 2/2 pacemaker placement explaining drop in LVEF)  - Discussed with family, HHA have been reporting she "never sleeps and wakes up frequently at night".  Never had sleep study or PFT.  2' hand smoke exposure from .  - avoid ACE/ARBs given renal function  - LUE duplex: neg for clot; elevate arm  - dispo planning; will need resumption of 24/7 HHA, needs a bipap and home O2  - Bipap settings 12/5 on 21% concentration Plan for abg in am. Pulse O2 @ bedside this evening    Britton, possibly contrast-induced:  -Baseline appears to be 1.5  -US renal unremarkable  -Renal consult appreciated  - hematuria noted on 4/5 UA, check ANCA panel w/ MPO/PR3, SERGIO, C3/C4, serum flc ratio, spep/sife,   - CK elevated minimally  - gabapentin should be max 300mg daily based on CrCL  - started on sodium bicarb  - repeat US renal as per nephro  - s/p IVF    UTI:  -UCx grew areococcus urinae  -S/p course of Levaquin    Transaminitis:  -Resolving, unclear etiology, US unremarkable, possibly 2' to Hypotensive episodes, , hepatitis panel (-)    Elevated troponin:  -Likely 2' to demand, PPM interrogated, no events  -TTE LVEF of 40 %.  -Trop flat     Abdominal pain with overflow diarrhea 2' to constipation, resolved  - aggressive bowel regimen  - enemas q 6  -GI consult appreciated, s/p disimpaction    Splenic lesions  - Incidentally found on imaging, very unlikely the cause of her symptoms, clinically very unlikely infectious in nature  -Outpatient imaging for further characterization    Dementia  - Continue with zoloft, nortriptyline, and donepezil     HTN  - continue metoprolol, Dc norvasc, started on nifedipine 30mg, Hydralazine PRN    Diabetes Mellitus II  - CHO consistent diet  -Hold insulin for episodes of Hypoglycemia, restart PRN     Hypothyroidism  - Continue levothyroxine as prescribed     Chronic Pain   - Continue gabapentin and tramadol    DVT/VTE prophylaxis   Minced and moist diet per SLP, advance as per re-eval    Dispo: Plan to do pulse ox monitoring tonight, if Po2 drops, patient will need O2, bleeded into NIV. Will check ABG in am    Healthcare maintanence on discharge., will need outpatient  monitoring for CTAP findings of complex renal cysts

## 2024-04-18 NOTE — DISCHARGE NOTE PROVIDER - NSDCCPCAREPLAN_GEN_ALL_CORE_FT
PRINCIPAL DISCHARGE DIAGNOSIS  Diagnosis: Abdominal pain  Assessment and Plan of Treatment:      PRINCIPAL DISCHARGE DIAGNOSIS  Diagnosis: Abdominal pain  Assessment and Plan of Treatment: You developed acute hypoxic respiratory failure and was likely 2/2 obstructive sleep apnea or obesity hypoventilation syndrome. Please continue using your NIV at home with oxygen. You did also complete a course of antibiotics for possible pNA  Your CT of the abd showed Numerous ill-defined lesions throughout the spleen which could reflect neoplastic processs. Please follow up with your primary as outpatient. You did complete a course of antibiotics  CT abdomen/pelvis findings of complex renal cysts - outpt follow up  Repeat C3/c4 in a few weeks as outpatient  You had an extensive acute kidney injury work up with the following blood work:C&pANCA (-), MPO(-)/PR3, SERGIO(-), C3/C4 (75/15, respectively), spep(-). Please follow upw ith your Nephrology as outpatient. and obtain repeat C3/C4 levels     PRINCIPAL DISCHARGE DIAGNOSIS  Diagnosis: Abdominal pain  Assessment and Plan of Treatment: You developed acute hypoxic respiratory failure and was likely 2/2 obstructive sleep apnea or obesity hypoventilation syndrome. Please continue using your NIV at home with oxygen. You did also complete a course of antibiotics for possible PNA.  Your CT of the abd showed Numerous ill-defined lesions throughout the spleen which could reflect neoplastic processs. Please follow up with your primary as outpatient and outpatient oncology Dr. Lange  CT abdomen/pelvis findings of complex renal cysts - outpt follow up  You had an extensive acute kidney injury work up with the following blood work: C&pANCA (-), MPO(-)/PR3, SERGIO(-), C3/C4 (75/15, respectively), spep(-). Please follow up ith your Nephrology as outpatient. and obtain repeat C3/C4 levels  You also had constipation and were seen by GI. Please follow up with the [facility] located at [address] [place] [address]. Phone Number: [phone]   Amlodipine was switched to Nifedipine 30mg qd

## 2024-04-18 NOTE — DISCHARGE NOTE NURSING/CASE MANAGEMENT/SOCIAL WORK - NSDCPEFALRISK_GEN_ALL_CORE
For information on Fall & Injury Prevention, visit: [email or web address] OR  [email or web address] OR  https://www.cdc.gov/steadi/patient.html

## 2024-04-18 NOTE — DISCHARGE NOTE PROVIDER - CARE PROVIDERS DIRECT ADDRESSES
,DirectAddress_Unknown,[email or web address]

## 2024-04-18 NOTE — ED PROVIDER NOTE - OBJECTIVE STATEMENT
84-year-old female with past medical history dementia, COPD on 2 L nasal cannula, CVA, HTN, type 2 diabetes brought in by EMS requested by family members due to nonfunctioning home O2 machine.  Patient's family also reports they do not feel they are capable of caring for patient's and would like her to be placed in a nursing home.  Denies any acute changes.  Denies fever/chills, chest pain, shortness of breath, abdominal pain, change in bowel/bladder habits, lightheadedness, dizziness.  Report patient is wheelchair/bedbound at baseline.

## 2024-04-18 NOTE — ED PROVIDER NOTE - NS ED MD DISPO ISOLATION TYPES
Mom is wondering if you could take over patients asthma. Has had very good control for about a year and a half now. Mostly over the counter medications. Specialist is very expensive per mom. Please call mom back to advise. Thanks much  
Mom was informed of 's response  
Noted and yes we can take over his routine follow-ups for this.  
None

## 2024-04-18 NOTE — DISCHARGE NOTE PROVIDER - HOSPITAL COURSE
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  Daughter states that the patient's aide reported the pain, nausea, vomiting, fever, and an episode of diarrhea at 4AM, which eventually led to ER visit.  Daughter also states that the patients mental status was significantly altered from baseline, stating that she sounded "out of it" and "more confused then usual" the night before.  No reports of headaches, visual changes, dysuria.    Acute on chronic hypoxic and hypercapnic respiratory failure 2/2 possibly WENDY given Hx  - Unclear etiology at this time  -CXR clear, No acute process, shows cardiomegaly  - Pulmonary consult appreciated, needs NIV   - Procal very mildly elevated  -D-dimer neg for age  -blood Cx NGTD  -TTE:  LVEF 40 % (per cardiology, likely 2/2 pacemaker placement explaining drop in LVEF)  - Discussed with family, HHA have been reporting she "never sleeps and wakes up frequently at night".  Never had sleep study or PFT.  2' hand smoke exposure from .  - LUE duplex: neg for clot;  - Bipap settings 12/5 on 21% concentration Plan for abg in am. Pulse O2 @ bedside this evening    Britton, possibly contrast-induced:  -Baseline appears to be 1.5  -US renal unremarkable  -Renal consult appreciated  - hematuria noted on 4/5 UA, check ANCA panel w/ MPO/PR3, SERGIO, C3/C4, serum flc ratio, spep/sife,   - CK elevated minimally  - gabapentin should be max 300mg daily based on CrCL  - started on sodium bicarb  - repeat US renal as per nephro  - s/p IVF    UTI:  -UCx grew areococcus urinae  -S/p course of Levaquin    Transaminitis:  -Resolving, unclear etiology, US unremarkable, possibly 2' to Hypotensive episodes, , hepatitis panel (-)    Elevated troponin:  -Likely 2' to demand, PPM interrogated, no events  -TTE LVEF of 40 %.     Abdominal pain with overflow diarrhea 2' to constipation, resolved  - aggressive bowel regimen  - enemas q 6  -GI consult appreciated, s/p disimpaction    Splenic lesions  - Incidentally found on imaging, very unlikely the cause of her symptoms, clinically very unlikely infectious in nature  -Outpatient imaging for further characterization    Dementia  - Continue with zoloft, nortriptyline, and donepezil     HTN  - continue metoprolol, Dc norvasc, started on nifedipine 30mg, Hydralazine PRN    Diabetes Mellitus II  - CHO consistent diet  -Hold insulin for episodes of Hypoglycemia, restart PRN     Hypothyroidism  - Continue levothyroxine as prescribed     Chronic Pain   - Continue gabapentin and tramadol    DVT/VTE prophylaxis   Minced and moist diet per SLP, advance as per re-eval         Pt is an 85yo female PMHx significant for dementia, HTN, diabetes mellitus II, 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  Daughter states that the patient's aide reported the pain, nausea, vomiting, fever, and an episode of diarrhea at 4AM, which eventually led to ER visit.  Daughter also states that the patients mental status was significantly altered from baseline, stating that she sounded "out of it" and "more confused then usual" the night before.  No reports of headaches, visual changes, dysuria.    Acute on chronic hypoxic and hypercapnic respiratory failure 2/2 possibly WENDY given Hx  - Unclear etiology at this time  -CXR clear, No acute process, shows cardiomegaly  - Pulmonary consult appreciated, needs NIV   - Procal very mildly elevated  -D-dimer neg for age  -blood Cx NGTD  -TTE:  LVEF 40 % (per cardiology, likely 2/2 pacemaker placement explaining drop in LVEF)  - Discussed with family, HHA have been reporting she "never sleeps and wakes up frequently at night".  Never had sleep study or PFT.  2' hand smoke exposure from .  - LUE duplex: neg for clot;  - Bipap settings 12/5 on 21% concentration Plan for abg in am. Pulse O2 @ bedside this evening    Britton, possibly contrast-induced:  -Baseline appears to be 1.5  -US renal unremarkable  -Renal consult appreciated  - hematuria noted on 4/5 UA, C&pANCA (-), MPO(-)/PR3, SERGIO(-), C3/C4 (75/15, respectively), serum flc ratio, spep(-)/sife,   - CK elevated minimally  - gabapentin should be max 300mg daily based on CrCL  - started on sodium bicarb  - repeat US renal as per nephro  - s/p IVF    UTI:  -UCx grew areococcus urinae  -S/p course of Levaquin    Transaminitis:  -Resolving, unclear etiology, US unremarkable, possibly 2' to Hypotensive episodes, , hepatitis panel (-)    Elevated troponin:  -Likely 2' to demand, PPM interrogated, no events  -TTE LVEF of 40 %.     Abdominal pain with overflow diarrhea 2' to constipation, resolved  - aggressive bowel regimen  - enemas q 6  -GI consult appreciated, s/p disimpaction    Splenic lesions  - Incidentally found on imaging, very unlikely the cause of her symptoms, clinically very unlikely infectious in nature  -Outpatient imaging for further characterization    Dementia  - Continue with zoloft, nortriptyline, and donepezil     HTN  - continue metoprolol, Dc norvasc, started on nifedipine 30mg, Hydralazine PRN    Diabetes Mellitus II  - CHO consistent diet  -Hold insulin for episodes of Hypoglycemia, restart PRN     Hypothyroidism  - Continue levothyroxine as prescribed     Chronic Pain   - Continue gabapentin and tramadol    DVT/VTE prophylaxis   Minced and moist diet per SLP, advance as per re-eval

## 2024-04-18 NOTE — ED ADULT NURSE NOTE - NSICDXFAMILYHX_GEN_ALL_CORE_FT
FAMILY HISTORY:  Mother  Still living? No  FHx: diabetes mellitus, Age at diagnosis: Age Unknown  FHx: diabetes mellitus, Age at diagnosis: Age Unknown

## 2024-04-18 NOTE — DISCHARGE NOTE PROVIDER - CARE PROVIDER_API CALL
Dell Salazar  Internal Medicine  [address] [place] [address]  Phone: [phone]  Fax: [phone]  Follow Up Time:     Odalys Mtz  Nephrology  [address] [facility], [address]  [place] [address]  Phone: [phone]  Fax: [phone]  Follow Up Time:     Simeon Lange  Internal Medicine  [address] [place] [address]  Phone: [phone]  Fax: [phone]  Follow Up Time:     Robert Daugherty Carlton  Pulmonary Disease  [address] [place] [address]  Phone: [phone]  Fax: [phone]  Follow Up Time:

## 2024-04-18 NOTE — ED ADULT NURSE NOTE - NSFALLHARMRISKINTERV_ED_ALL_ED

## 2024-04-18 NOTE — DISCHARGE NOTE PROVIDER - NSDCFUSCHEDAPPT_GEN_ALL_CORE_FT
[facility] Physician [facility]  CARDIOLOGY [address] [facility]  Scheduled Appointment: 05/09/2024

## 2024-04-18 NOTE — ED ADULT NURSE NOTE - NSICDXPASTMEDICALHX_GEN_ALL_CORE_FT
PAST MEDICAL HISTORY:  Bilateral hearing loss, unspecified hearing loss type     BP (high blood pressure) 20 yr    Breast CA 2014 s/p rt    Breast cancer     CAD (coronary artery disease)     CVA (cerebral vascular accident) 6/18 lft weakness    CVA (cerebrovascular accident)     Dementia     Depression     Diabetes     DM (diabetes mellitus) 24 yr    High cholesterol     History of pressure ulcer     Hypertension     Hypothyroidism     Immobility     Pacemaker

## 2024-04-18 NOTE — ED ADULT NURSE NOTE - NSICDXPASTSURGICALHX_GEN_ALL_CORE_FT
PAST SURGICAL HISTORY:  H/O total knee replacement, right     History of cholecystectomy 1992    History of cholecystectomy     History of lumpectomy of right breast     Knee joint replacement status, right     Pacemaker     S/P lumpectomy of breast

## 2024-04-18 NOTE — DISCHARGE NOTE NURSING/CASE MANAGEMENT/SOCIAL WORK - NSTRANSFERBELONGINGSDISPO_GEN_A_NUR
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  His pretty big though.  Unfortunately can not not see it well on x-ray so we should try to follow it along with MRI.  I would get a new MRI in about 4 months, then 8 and then go from there.  We can put her on our surveillance schedule if that is all right with you?   Let me know, but nothing more to do right now.   Thanks   Derek      with patient

## 2024-04-18 NOTE — DISCHARGE NOTE NURSING/CASE MANAGEMENT/SOCIAL WORK - PATIENT PORTAL LINK FT
You can access the FollowMyHealth Patient Portal offered by [facility] by registering at the following website: [email or web address]. By joining [employer]’s FollowMyHealth portal, you will also be able to view your health information using other applications (apps) compatible with our system.

## 2024-04-18 NOTE — ED PROVIDER NOTE - ATTENDING APP SHARED VISIT CONTRIBUTION OF CARE
84-year-old female past medical history noted including dementia, hypertension, diabetes, history of CVA with left-sided paresis, history of pacemaker presents via EMS from home accompanied by her family after equipment failure at home.  Patient was recently admitted to the hospital and discharged home today on oxygen.  Family states that the machine was malfunctioning and they ran out of oxygen in the backup tank.  Family feels that patient requires higher level of care that they cannot provide in the home and requested to speak to  for possible nursing home placement.  On exam patient in NAD, alert and awake, lungs CTA B/L, abdomen soft nontender

## 2024-04-18 NOTE — DISCHARGE NOTE PROVIDER - NSDCMRMEDTOKEN_GEN_ALL_CORE_FT
amLODIPine 10 mg oral tablet: 1 tab(s) orally once a day  amLODIPine 10 mg oral tablet: 1 tab(s) orally once a day  atorvastatin 80 mg oral tablet: 1 tab(s) orally once a day (at bedtime)  atorvastatin 80 mg oral tablet: 1 tab(s) orally once a day (at bedtime)  Cipro 250 mg oral tablet: 1 tab(s) orally 2 times a day   clopidogrel 75 mg oral tablet: 1 tab(s) orally once a day  clopidogrel 75 mg oral tablet: 1 tab(s) orally once a day  donepezil 10 mg oral tablet: 1 tab(s) orally once a day (at bedtime)  Ecotrin Adult Low Strength 81 mg oral delayed release tablet: 2 tab(s) orally once a day  Ecotrin Adult Low Strength 81 mg oral delayed release tablet: 2 tab(s) orally every other day  exemestane 25 mg oral tablet: 1 tab(s) orally once a day  exemestane 25 mg oral tablet: 1 tab(s) orally once a day  ferrous sulfate:   ferrous sulfate 324 mg (65 mg elemental iron) oral tablet: 1  orally 2 times a day  furosemide 20 mg oral tablet: 1 tab(s) orally  gabapentin 300 mg oral capsule: 1 cap(s) orally 2 times a day  gabapentin 300 mg oral capsule: orally 2 times a day  HumaLOG 100 units/mL subcutaneous solution: Per insulin sliding scale at home  humalog sliding scale: with meals  Lasix 20 mg oral tablet: 1 tab(s) orally once a day  Levemir: 30  subcutaneous once a day (at bedtime)  Levemir 100 units/mL subcutaneous solution: 30 unit(s) subcutaneous once a day (at bedtime)  levothyroxine 25 mcg (0.025 mg) oral tablet: 1 tab(s) orally once a day  levothyroxine 25 mcg (0.025 mg) oral tablet: 1 tab(s) orally once a day  metoprolol succinate 25 mg oral capsule, extended release: 1 cap(s) orally once a day  mirtazapine 15 mg oral tablet: 1 tab(s) orally once a day (at bedtime)  nortriptyline 25 mg oral capsule: 1 cap(s) orally once a day (at bedtime)  nortriptyline 25 mg oral capsule: 1 cap(s) orally once a day (at bedtime)  pantoprazole 40 mg oral delayed release tablet: 1 tab(s) orally once a day (before a meal)  Protonix 40 mg oral delayed release tablet: 1 tab(s) orally once a day  senna oral tablet: 2 tab(s) orally once a day (at bedtime)- PRN for constipation- OTC is okay  sertraline 100 mg oral tablet: 1 tab(s) orally once a day  sertraline 50 mg oral tablet: 1 tab(s) orally once a day  stool softener: at night  temazepam 30 mg oral capsule: 1 cap(s) orally once a day (at bedtime)  traMADol 100 mg oral tablet: 1 tab(s) orally once a day   atorvastatin 80 mg oral tablet: 1 tab(s) orally once a day (at bedtime)  atorvastatin 80 mg oral tablet: 1 tab(s) orally once a day (at bedtime)  clopidogrel 75 mg oral tablet: 1 tab(s) orally once a day  donepezil 10 mg oral tablet: 1 tab(s) orally once a day (at bedtime)  Ecotrin Adult Low Strength 81 mg oral delayed release tablet: 2 tab(s) orally once a day  exemestane 25 mg oral tablet: 1 tab(s) orally once a day  ferrous sulfate:   ferrous sulfate 324 mg (65 mg elemental iron) oral tablet: 1  orally 2 times a day  furosemide 20 mg oral tablet: 1 tab(s) orally  gabapentin 300 mg oral capsule: orally 2 times a day  HumaLOG 100 units/mL subcutaneous solution: Per insulin sliding scale at home  humalog sliding scale: with meals  Levemir: 30  subcutaneous once a day (at bedtime)  Levemir 100 units/mL subcutaneous solution: 30 unit(s) subcutaneous once a day (at bedtime)  levothyroxine 25 mcg (0.025 mg) oral tablet: 1 tab(s) orally once a day  metoprolol succinate 25 mg oral capsule, extended release: 1 cap(s) orally once a day  mirtazapine 15 mg oral tablet: 1 tab(s) orally once a day (at bedtime)  NIFEdipine 30 mg oral tablet, extended release: 1 tab(s) orally once a day  nortriptyline 25 mg oral capsule: 1 cap(s) orally once a day (at bedtime)  nortriptyline 25 mg oral capsule: 1 cap(s) orally once a day (at bedtime)  pantoprazole 40 mg oral delayed release tablet: 1 tab(s) orally once a day (before a meal)  senna oral tablet: 2 tab(s) orally once a day (at bedtime)- PRN for constipation- OTC is okay  sertraline 100 mg oral tablet: 1 tab(s) orally once a day  sertraline 50 mg oral tablet: 1 tab(s) orally once a day  stool softener: at night  temazepam 30 mg oral capsule: 1 cap(s) orally once a day (at bedtime)  traMADol 100 mg oral tablet: 1 tab(s) orally once a day   atorvastatin 80 mg oral tablet: 1 tab(s) orally once a day (at bedtime)  clopidogrel 75 mg oral tablet: 1 tab(s) orally once a day  donepezil 10 mg oral tablet: 1 tab(s) orally once a day (at bedtime)  Ecotrin Adult Low Strength 81 mg oral delayed release tablet: 2 tab(s) orally once a day  exemestane 25 mg oral tablet: 1 tab(s) orally once a day  ferrous sulfate 324 mg (65 mg elemental iron) oral tablet: 1  orally 2 times a day  furosemide 20 mg oral tablet: 1 tab(s) orally  gabapentin 300 mg oral capsule: orally 2 times a day  HumaLOG 100 units/mL subcutaneous solution: Per insulin sliding scale at home  Levemir 100 units/mL subcutaneous solution: 30 unit(s) subcutaneous once a day (at bedtime)  levothyroxine 25 mcg (0.025 mg) oral tablet: 1 tab(s) orally once a day  metoprolol succinate 25 mg oral capsule, extended release: 1 cap(s) orally once a day  MiraLax oral powder for reconstitution: 17 gram(s) orally once a day as needed for as needed for constipation  NIFEdipine 30 mg oral tablet, extended release: 1 tab(s) orally once a day  nortriptyline 25 mg oral capsule: 1 cap(s) orally once a day (at bedtime)  pantoprazole 40 mg oral delayed release tablet: 1 tab(s) orally once a day (before a meal)  senna oral tablet: 2 tab(s) orally once a day (at bedtime)- PRN for constipation- OTC is okay  sertraline 100 mg oral tablet: 1 tab(s) orally once a day  sertraline 50 mg oral tablet: 1 tab(s) orally once a day

## 2024-04-18 NOTE — DISCHARGE NOTE NURSING/CASE MANAGEMENT/SOCIAL WORK - NSDCVIVACCINE_GEN_ALL_CORE_FT
influenza, high-dose, quadrivalent; 12-Nov-2021 18:58; Chris Martines (RN); [employer]; [id number] (Exp. Date: 30-Jun-2022); IntraMuscular; Deltoid Left.; 0.7 milliLiter(s); VIS (VIS Published: 06-Aug-2021, VIS Presented: 12-Nov-2021);

## 2024-04-18 NOTE — DISCHARGE NOTE PROVIDER - REASON FOR ADMISSION
Acute hypoxic respiratory failure possibley d/t WENDY Shortness of breath abdominal pain abdominal pain and shortness of breath

## 2024-04-18 NOTE — CHART NOTE - NSCHARTNOTEFT_GEN_A_CORE
Despite IV steroids and bronchodilators patient desaturates to:    - Room air pulse ox. at rest:  85%      Patient will require home O2 for discharge.  Patient is aware and agreeable to home O2.  Patient is in a chronic stable state of COPD.      Patient treated for pneumonia: No

## 2024-04-18 NOTE — DISCHARGE NOTE PROVIDER - INSTRUCTIONS
Minced and Moist, Carbohydrate controlled, Renal diet (No protein restriction, No concentrated K, no concentrated Phosphorous), Low sodium

## 2024-04-18 NOTE — DISCHARGE NOTE PROVIDER - PROVIDER TOKENS
PROVIDER:[TOKEN:[[id number]]],PROVIDER:[TOKEN:[[id number]]],PROVIDER:[TOKEN:[[id number]]],PROVIDER:[TOKEN:[[id number]]]

## 2024-04-19 PROBLEM — Z87.2 PERSONAL HISTORY OF DISEASES OF THE SKIN AND SUBCUTANEOUS TISSUE: Chronic | Status: ACTIVE | Noted: 2024-01-01

## 2024-04-19 PROBLEM — I10 ESSENTIAL (PRIMARY) HYPERTENSION: Chronic | Status: ACTIVE | Noted: 2024-01-01

## 2024-04-19 PROBLEM — E03.9 HYPOTHYROIDISM, UNSPECIFIED: Chronic | Status: ACTIVE | Noted: 2024-01-01

## 2024-04-19 PROBLEM — I10 ESSENTIAL (PRIMARY) HYPERTENSION: Chronic | Status: ACTIVE | Noted: 2018-06-26

## 2024-04-19 PROBLEM — I25.10 ATHEROSCLEROTIC HEART DISEASE OF NATIVE CORONARY ARTERY WITHOUT ANGINA PECTORIS: Chronic | Status: ACTIVE | Noted: 2024-01-01

## 2024-04-19 PROBLEM — E78.00 PURE HYPERCHOLESTEROLEMIA, UNSPECIFIED: Chronic | Status: ACTIVE | Noted: 2018-06-26

## 2024-04-19 PROBLEM — E11.9 TYPE 2 DIABETES MELLITUS WITHOUT COMPLICATIONS: Chronic | Status: ACTIVE | Noted: 2024-01-01

## 2024-04-19 PROBLEM — F03.90 UNSPECIFIED DEMENTIA WITHOUT BEHAVIORAL DISTURBANCE: Chronic | Status: ACTIVE | Noted: 2024-01-01

## 2024-04-19 PROBLEM — I63.9 CEREBRAL INFARCTION, UNSPECIFIED: Chronic | Status: ACTIVE | Noted: 2024-01-01

## 2024-04-19 PROBLEM — C50.919 MALIGNANT NEOPLASM OF UNSPECIFIED SITE OF UNSPECIFIED FEMALE BREAST: Chronic | Status: ACTIVE | Noted: 2024-01-01

## 2024-04-19 PROBLEM — Z74.09 OTHER REDUCED MOBILITY: Chronic | Status: ACTIVE | Noted: 2024-01-01

## 2024-04-19 NOTE — PATIENT PROFILE ADULT - FALL HARM RISK - HARM RISK INTERVENTIONS

## 2024-04-19 NOTE — H&P ADULT - HISTORY OF PRESENT ILLNESS
Patient is an 85yo female PMHx dementia, COPD on 2L/min O2 nasal canula, CVA, CAD, HTN, DMII, impaired hearing being admitted to the internal medicine unit due to nonfunctioning home O2.  Patient's family called EMS for hospital transportation.  Patient was discharged today from the hospital for "diarrhea."  She is lethargic and fatigued on exam.  She denies shortness of breath and chest pain. Patient is an 83yo female PMHx dementia, COPD on 2L/min O2 nasal canula, CVA, CAD, HTN, DMII, impaired hearing being admitted to the internal medicine unit due to nonfunctioning home O2.  Patient's family called EMS for hospital transportation.  Patient was discharged today from the hospital for "diarrhea."  History is limited as patient is a poor historian. History obtained from ER note. Patient is lethargic and fatigued on exam.  She denies shortness of breath and chest pain. 84-year-old female with past medical history dementia, COPD on 2 L nasal cannula, CVA, HTN, type 2 diabetes brought in by EMS requested by family members due to nonfunctioning home O2 machine.  Patient's family also reports they do not feel they are capable of caring for patient's and would like her to be placed in a nursing home.  Denies any acute changes.  Denies fever/chills, chest pain, shortness of breath, abdominal pain, change in bowel/bladder habits, lightheadedness, dizziness.  Report patient is wheelchair/bedbound at baseline.

## 2024-04-19 NOTE — PATIENT PROFILE ADULT - NSPROPTRIGHTCAREGIVER_GEN_A_NUR
Consultation    Date of Service: 06/01/21    Patient Name Octaviano Cope   MRN [id number]   Sex Female   Date of Birth / Age 1946 / 74 year old   Consult Requested by Dr Marquis Zheng   Attending Abiel Chahal MD   Chief Complaint/Reason for Consultation Ascending Aorta Enlargement     HISTORY OF PRESENT ILLNESS    Octaviano Cope is a 74 year old old female who presents to [facility] for surgical evaluation & management.     Patient under Chest CT on 2/3/21 that showed a 4.3cm ascending aorta. Comparison film from 10/2018 as well as from 2019 in report brought by patient from [place] where she lived until recently. No change in size.  No other complains or acute issues.     PAST MEDICAL HISTORY  No past medical history on file.    PAST SURGICAL HISTORY  No past surgical history on file.    SOCIAL HISTORY  Social History     Tobacco Use    Smoking status: Not on file   Substance Use Topics    Alcohol use: Not on file    Drug use: Not on file       FAMILY HISTORY  No family history on file.    HOME MEDICATIONS  No current outpatient medications on file.     No current facility-administered medications for this visit.        ALLERGIES  ALLERGIES:  Not on File    REVIEW OF SYSTEMS   All ROS is negative except noted in HPI    VITALS  General: female in no apparent distress.  Respiratory:  Lungs clear to auscultation bilaterally, without rales, rhonchi, or wheezes.    Cardiovascular:  Regular rate and rhythm, with no murmur.  Trace lower extremity edema bilaterally.  Carotid upstrokes normal, without bruits.    Gastrointestinal:  Soft, non-tender, and non-distended.     Musculoskeletal:  Normal gait and station.      LAB RESULTS    I have reviewed and agree with the following:   CBC   WBC (THOUSAND/mcL)   Date Value   10/20/2018 4.6     RBC (MILLION/mcL)   Date Value   10/20/2018 4.67     No results found for: HCT  HGB (gm/dL)   Date Value   10/20/2018 13.5     PLT (THOUSAND/mcL)   Date Value   10/20/2018 206         BMP   Sodium (mmol/L)   Date Value   10/20/2018 142     Potassium (mmol/L)   Date Value   10/20/2018 4.1     No results found for: CHLORIDE  Glucose (mg/dL)   Date Value   10/20/2018 109 (H)     No results found for: CALCIUM  Carbon Dioxide (mmol/L)   Date Value   10/20/2018 31     BUN (mg/dL)   Date Value   10/20/2018 25 (H)     Creatinine (mg/dL)   Date Value   10/20/2018 0.68        DIAGNOSTIC TESTS   I have reviewed and agree with the following:    CHEST CT w/o   EXAM: CT CHEST WO CONTRAST     CLINICAL INDICATION: Ascending aortic aneurysm.     TECHNIQUE:  Non-contrast helical thoracic CT was performed.  Axial MIPS  were obtained.  One or more of the following radiation dose reduction  techniques were used for this examination: Exposure control, adjustment of  the mA and/or kV according to patient size, or use of iterative  reconstruction technique.     COMPARISON: CT chest on 10/20/2018.     FINDINGS:     Neck Base: The imaged thyroid gland is unremarkable.     Mediastinum: The heart is normal in size.  There is mild to moderate  coronary arterial calcification.  There is overall unchanged nonspecific  trace pericardial fluid.  Clinical correlation is suggested.  Detailed  evaluation of the aorta is limited by motion artifact and by lack of  intravenous contrast.  The ascending aorta measures approximately 4.4 cm in  axial diameter compared to 4.3 cm on 10/20/2018.  The proximal aortic arch  measures 4.5 cm in diameter (4/35) compared to 4.4 cm previously.  The  imaged descending aorta is normal in diameter.  There is mild to moderate  aortic and branch vessel atherosclerotic calcification.  There is no  visualized intramural hematoma or displaced intimal flap.  There is no  mediastinal hemorrhage.  The main pulmonary artery is within normal limits  in diameter.  The central airways are patent.  There is a tiny hiatal  hernia     Lymph Nodes: There is no visualized lymph node enlargement.  There  are  calcified nodes suggesting old granulomatous disease.     Lungs:  There is no consolidation or mass.  There is a stable tiny right  upper lung nodule (4/35).  There is a stable tiny right middle lobe nodule.   Both are of doubtful significance.  There are stable tiny linear opacities  in both lower lobe bases.  The overall appearance is unchanged.     Pleural Spaces:  The pleural spaces are clear.     Upper Abdomen: Limited evaluation of the upper abdomen is unremarkable.     Body wall:  There is an overall stable right flank hernia since old exam     Key: (S/I) = series number / image number     IMPRESSION:  Overall unchanged ascending aortic aneurysm.     Electronically Signed by: ROYAL THOMAS MD   Signed on: 2/3/2021 3:09 PM      TRANSTHORACIC ECHOCARDIOGRAM   No results found for this or any previous visit.     CAROTID DUPLEX   No results found for this or any previous visit.     ASSESSMENT & PLAN   No change in size of ascending aorta over the past three years  Would repeat CY in 2 years and if no change consider stopping the follow up     yes

## 2024-04-19 NOTE — H&P ADULT - NSHPPHYSICALEXAM_GEN_ALL_CORE
Vital Signs Last 24 Hrs  T(C): 36.9 (19 Apr 2024 00:07), Max: 37 (18 Apr 2024 14:09)  T(F): 98.4 (19 Apr 2024 00:07), Max: 98.6 (18 Apr 2024 14:09)  HR: 60 (19 Apr 2024 00:07) (60 - 74)  BP: 136/66 (19 Apr 2024 00:07) (121/56 - 156/70)  RR: 18 (19 Apr 2024 00:07) (18 - 18)  SpO2: 100% (19 Apr 2024 00:07) (95% - 100%)    Parameters below as of 19 Apr 2024 00:07  Patient On (Oxygen Delivery Method): nasal cannula  O2 Flow (L/min): 2    VITALS:     GENERAL: NAD, lying in bed comfortably  HEAD:  Atraumatic, Normocephalic  EYES: EOMI, PERRLA, conjunctiva and sclera clear  ENT: Moist mucous membranes  NECK: Supple, No JVD  CHEST/LUNG: Clear to auscultation bilaterally; No rales, rhonchi, wheezing, or rubs. Unlabored respirations  HEART: Regular rate and rhythm; No murmurs, rubs, or gallops  ABDOMEN: Bowel sounds present; Soft, Nontender, Nondistended. No hepatomegally  EXTREMITIES:  2+ Peripheral Pulses, brisk capillary refill. No clubbing, cyanosis, or edema  NERVOUS SYSTEM:  Alert & Oriented X3, speech clear. No deficits   MSK: FROM all 4 extremities, full and equal strength  SKIN: No rashes or lesions Vital Signs Last 24 Hrs  T(C): 36.9 (19 Apr 2024 00:07), Max: 37 (18 Apr 2024 14:09)  T(F): 98.4 (19 Apr 2024 00:07), Max: 98.6 (18 Apr 2024 14:09)  HR: 60 (19 Apr 2024 00:07) (60 - 74)  BP: 136/66 (19 Apr 2024 00:07) (121/56 - 156/70)  RR: 18 (19 Apr 2024 00:07) (18 - 18)  SpO2: 100% (19 Apr 2024 00:07) (95% - 100%)    Parameters below as of 19 Apr 2024 00:07  Patient On (Oxygen Delivery Method): nasal cannula  O2 Flow (L/min): 2    VITALS:     GENERAL: Patient is laying in hospital bed.  She is fatigued and lethargic on exam  HEAD:  Atraumatic, Normocephalic  NECK: Supple, No JVD  CHEST/LUNG: Clear to auscultation bilaterally; No rales, rhonchi, wheezing, or rubs. Unlabored respirations  HEART: Regular rate and rhythm; No murmurs, rubs, or gallops  ABDOMEN: Bowel sounds present; Soft, Nontender, Nondistended. No hepatomegally  NERVOUS SYSTEM:  Awake and alert  SKIN: No edema or cyanosis Vital Signs Last 24 Hrs  T(C): 36.9 (19 Apr 2024 00:07), Max: 37 (18 Apr 2024 14:09)  T(F): 98.4 (19 Apr 2024 00:07), Max: 98.6 (18 Apr 2024 14:09)  HR: 60 (19 Apr 2024 00:07) (60 - 74)  BP: 136/66 (19 Apr 2024 00:07) (121/56 - 156/70)  RR: 18 (19 Apr 2024 00:07) (18 - 18)  SpO2: 100% (19 Apr 2024 00:07) (95% - 100%)    Parameters below as of 19 Apr 2024 00:07  Patient On (Oxygen Delivery Method): nasal cannula  O2 Flow (L/min): 2    VITALS:     GENERAL: Patient is laying in hospital bed.     HEAD:  Atraumatic, Normocephalic  NECK: Supple, No JVD  CHEST/LUNG: Clear to auscultation bilaterally; No rales, rhonchi, wheezing, or rubs. Unlabored respirations  HEART: Regular rate and rhythm; No murmurs, rubs, or gallops  ABDOMEN: Bowel sounds present; Soft, Nontender, Nondistended. No hepatomegally  NERVOUS SYSTEM:  Awake and alert  SKIN: No edema or cyanosis

## 2024-04-19 NOTE — SWALLOW BEDSIDE ASSESSMENT ADULT - SWALLOW EVAL: DIAGNOSIS
Mod oral dysphagia for soft & bite sized 2/2 to dentition. Toleration of minced & moist with thin liquids w/o overt s/s of penetration/aspiration.

## 2024-04-19 NOTE — H&P ADULT - ASSESSMENT
Assessment:        Plan:      #      #JABARI  -Continue fluids  - Monitor BMP    #Transaminitis  US done on ___ which was unremarkable    # Dementia  - Continue with zoloft, nortriptyline, and donepezil   - monitor mental status     # HTN  - Continue amlodipine, metoprolol   - DASH diet    #Diabetes Mellitus II  - Continue with lantis 30 units at bedtime with sliding scale.   - CHO consistent diet     #Hypothyroidism  - Continue levothyroxine as prescribed     #Chronic Pain   - Continue gabapentin and tramadol as prescribed     DVT/VTE prophylaxis   Regular diet  Assessment:  Patient is an 83yo female PMHx dementia, COPD on 2L/min O2 nasal canula, CVA, CAD, HTN, DMII, impaired hearing being admitted to the internal medicine unit due to nonfunctioning home O2.  Patient is not experiencing SOB or chest pain.       Plan:      #dysfunctional home O2  #inadequate social support   - case management, social work   - monitor vitals      #JABARI  -Continue fluids  - Monitor BMP    #Transaminitis  US done on ___ which was unremarkable    # Dementia  - Continue with zoloft, nortriptyline, and donepezil   - monitor mental status     # HTN  - Continue amlodipine, metoprolol   - DASH diet    #Diabetes Mellitus II  - Continue with lantis 30 units at bedtime with sliding scale.   - CHO consistent diet     #Hypothyroidism  - Continue levothyroxine as prescribed     #Chronic Pain   - Continue gabapentin and tramadol as prescribed     DVT/VTE prophylaxis   Regular diet  Assessment:  Patient is an 83yo female PMHx dementia, COPD on 2L/min O2 nasal canula, CVA, CAD, HTN, DMII, impaired hearing being admitted to the internal medicine unit due to nonfunctioning home O2.  Patient is not experiencing SOB or chest pain.       Plan:      # Dysfunctional home O2  # Inadequate social support   - case management, social work   - monitor vitals        #JABARI  -Continue  IV fluids  - Monitor BMP    #Transaminitis  - Follow up abdominal ultrasound    # Dementia  - Continue with zoloft, nortriptyline, and donepezil   - monitor mental status     # HTN  - Continue amlodipine, metoprolol   - DASH diet    #Diabetes Mellitus II  - ISS  - CHO consistent diet     #Hypothyroidism  - Continue levothyroxine as prescribed     #Chronic Pain   - Continue gabapentin and tramadol as prescribed     DVT/VTE prophylaxis   Code status: DNR/DNI  Diet: DASH when no longer NPO. Pending speech and swallow.    Above discussed with Dr. Green Assessment:  Patient is an 83yo female PMHx dementia, COPD on 2L/min O2 nasal canula, CVA, CAD, HTN, DMII, impaired hearing being admitted to the internal medicine unit due to nonfunctioning home O2.  Patient is not experiencing SOB or chest pain.       Plan:    # Dysfunctional home O2  # Inadequate social support   - Admit to inpatient level of care-med/surg  - case management, social work   - monitor vitals      #JABARI  -Continue  IV fluids  - Monitor BMP    #Transaminitis  - Follow up abdominal ultrasound    # Dementia  - Continue with zoloft, nortriptyline, and donepezil   - monitor mental status     # HTN  - Continue amlodipine, metoprolol     #Diabetes Mellitus II  - ISS  - CHO consistent diet when no longer NPO    #Hypothyroidism  - Continue levothyroxine as prescribed     #Chronic Pain   - Continue gabapentin and tramadol as prescribed     DVT/VTE prophylaxis   Code status: DNR/DNI  Diet: DASH/CHO when no longer NPO. Pending speech and swallow.          Above discussed with Dr. Green Assessment:  Patient is an 85yo female PMHx dementia, COPD on 2L/min O2 nasal canula, CVA, CAD, HTN, DMII, impaired hearing being admitted to the internal medicine unit due to nonfunctioning home O2.  Patient is not experiencing SOB or chest pain.       Plan:    # Dysfunctional home O2  # Inadequate social support   - Admit to inpatient level of care-med/surg  - case management, social work   - monitor vitals      #CKD stage 3  stable     #Transaminitis  - Follow up abdominal ultrasound    # Dementia  - Continue with zoloft, nortriptyline, and donepezil   - monitor mental status     # HTN  - Continue amlodipine, metoprolol     #Diabetes Mellitus II  - ISS  - CHO consistent diet when no longer NPO    #Hypothyroidism  - Continue levothyroxine as prescribed     #Chronic Pain   - Continue gabapentin and tramadol as prescribed     DVT/VTE prophylaxis   Code status: DNR/DNI as per previous admission  Diet: DASH/CHO when no longer NPO. Pending speech and swallow.          Above discussed with Dr. Green

## 2024-04-20 PROBLEM — Z95.0 PRESENCE OF CARDIAC PACEMAKER: Chronic | Status: ACTIVE | Noted: 2024-01-01

## 2024-04-20 PROBLEM — C50.919 MALIGNANT NEOPLASM OF UNSPECIFIED SITE OF UNSPECIFIED FEMALE BREAST: Chronic | Status: ACTIVE | Noted: 2018-06-26

## 2024-04-20 PROBLEM — I63.9 CEREBRAL INFARCTION, UNSPECIFIED: Chronic | Status: ACTIVE | Noted: 2019-03-27

## 2024-04-20 PROBLEM — F32.9 MAJOR DEPRESSIVE DISORDER, SINGLE EPISODE, UNSPECIFIED: Chronic | Status: ACTIVE | Noted: 2018-06-26

## 2024-04-20 PROBLEM — E11.9 TYPE 2 DIABETES MELLITUS WITHOUT COMPLICATIONS: Chronic | Status: ACTIVE | Noted: 2018-06-25

## 2024-04-20 PROBLEM — H91.93 UNSPECIFIED HEARING LOSS, BILATERAL: Chronic | Status: ACTIVE | Noted: 2018-06-26

## 2024-04-21 NOTE — PROGRESS NOTE ADULT - SUBJECTIVE AND OBJECTIVE BOX
Progress note    INTERVAL HPI/OVERNIGHT EVENTS:    Patient seen and examined at bedside. No acute distress.      REVIEW OF SYSTEMS:  All other 13 Review of systems were reviewed and are negative    FAMILY HISTORY:  FHx: diabetes mellitus (Mother)    FHx: diabetes mellitus (Mother)      T(C): 36.3 (04-19-24 @ 04:41), Max: 37 (04-18-24 @ 14:09)  HR: 64 (04-19-24 @ 04:41) (60 - 77)  BP: 152/69 (04-19-24 @ 04:41) (121/56 - 152/69)  RR: 18 (04-19-24 @ 04:41) (18 - 18)  SpO2: 100% (04-19-24 @ 04:41) (98% - 100%)  Wt(kg): --Vital Signs Last 24 Hrs  T(C): 36.3 (19 Apr 2024 04:41), Max: 37 (18 Apr 2024 14:09)  T(F): 97.4 (19 Apr 2024 04:41), Max: 98.6 (18 Apr 2024 14:09)  HR: 64 (19 Apr 2024 04:41) (60 - 77)  BP: 152/69 (19 Apr 2024 04:41) (121/56 - 152/69)  BP(mean): --  RR: 18 (19 Apr 2024 04:41) (18 - 18)  SpO2: 100% (19 Apr 2024 04:41) (98% - 100%)    Parameters below as of 19 Apr 2024 04:41  Patient On (Oxygen Delivery Method): nasal cannula  O2 Flow (L/min): 2    sulfa drugs (Hives)  sulfa drugs (Anaphylaxis)  Melons (Unknown)  shellfish (Unknown)  fish (Anaphylaxis)  latex (Unknown)  penicillins (Unknown)  Sulfacetamide Sodium-Sulfur (Rash)  penicillin (Anaphylaxis)      PHYSICAL EXAM:    GENERAL: Well-nourished, NAD.  HEAD: No visible or palpable bumps or hematomas. No ecchymosis behind ears B/L.  ENMT: PERRLA    CVS: Normal S1,S2. No murmurs appreciated on auscultation   RESP: No use of accessory muscles. Chest rise symmetrical with good expansion. Lungs clear to auscultation B/L. No wheezing, rales, or rhonchi auscultated.  GI: Normal auscultation of bowel sounds in all 4 quadrants. no TTP. Soft, Nondistended.  Skin: Warm, Dry. No rashes or lesions. Good cap refill < 2 sec B/L.  EXT: Radial and pedal pulses present B/L. No palpable cords.  has mild pitting edema b/l  NEURO: AAO x 1      Consultant(s) Notes Reviewed:  [x ] YES  [ ] NO  Care Discussed with Consultants/Other Providers [ x] YES  [ ] NO    LABS:      RADIOLOGY & ADDITIONAL TESTS:    Imaging Personally Reviewed:  [ ] YES  [ ] NO  aspirin enteric coated 81 milliGRAM(s) Oral daily  atorvastatin 80 milliGRAM(s) Oral at bedtime  chlorhexidine 2% Cloths 1 Application(s) Topical <User Schedule>  clopidogrel Tablet 75 milliGRAM(s) Oral daily  dextrose 10% Bolus 125 milliLiter(s) IV Bolus once  dextrose 5%. 1000 milliLiter(s) IV Continuous <Continuous>  dextrose 5%. 1000 milliLiter(s) IV Continuous <Continuous>  dextrose 50% Injectable 25 Gram(s) IV Push once  dextrose 50% Injectable 12.5 Gram(s) IV Push once  dextrose Oral Gel 15 Gram(s) Oral once PRN  donepezil 10 milliGRAM(s) Oral at bedtime  furosemide    Tablet 20 milliGRAM(s) Oral daily  gabapentin 300 milliGRAM(s) Oral two times a day  glucagon  Injectable 1 milliGRAM(s) IntraMuscular once  heparin   Injectable 5000 Unit(s) SubCutaneous every 12 hours  insulin lispro (ADMELOG) corrective regimen sliding scale   SubCutaneous three times a day before meals  levothyroxine 25 MICROGram(s) Oral daily  losartan 100 milliGRAM(s) Oral daily  metoprolol succinate ER 25 milliGRAM(s) Oral daily  pantoprazole    Tablet 40 milliGRAM(s) Oral before breakfast  polyethylene glycol 3350 17 Gram(s) Oral daily PRN      HEALTH ISSUES - PROBLEM Dx:    84-year-old female with past medical history dementia, COPD on 2 L nasal cannula, CVA, HTN, type 2 diabetes brought in by EMS requested by family members due to nonfunctioning home O2 machine. Family also do not feel comfortably taking care of her and want placement    Acute on chronic hypoxic and hypercapnic respiratory failure 2/2 possibly WENDY given Hx  - Unclear etiology at this time  -CXR clear, No acute process, shows cardiomegaly  - Pulmonary consult appreciated, needs NIV   - Procal very mildly elevated  -D-dimer neg for age  -blood Cx NGTD  -TTE:  LVEF 40 % (per cardiology, likely 2/2 pacemaker placement explaining drop in LVEF)  - Discussed with family, HHA have been reporting she "never sleeps and wakes up frequently at night".  Never had sleep study or PFT.  2' hand smoke exposure from .  - LUE duplex: neg for clot;  - Bipap settings 12/5 on 21% concentration Plan for abg in am. Pulse O2 @ bedside this evening    Britton, possibly contrast-induced:  -Baseline appears to be 1.5  -US renal unremarkable  -Renal consult appreciated  - hematuria noted on 4/5 UA, C&pANCA (-), MPO(-)/PR3, SERGIO(-), C3/C4 (75/15, respectively), serum flc ratio, spep(-)/sife,   - CK elevated minimally  - gabapentin should be max 300mg daily based on CrCL  - started on sodium bicarb  - repeat US renal as per nephro  - s/p IVF    UTI:  -UCx grew areococcus urinae  -S/p course of Levaquin    Transaminitis:  -Resolving, unclear etiology, US unremarkable, possibly 2' to Hypotensive episodes, , hepatitis panel (-)    Elevated troponin:  -Likely 2' to demand, PPM interrogated, no events  -TTE LVEF of 40 %.     Abdominal pain with overflow diarrhea 2' to constipation, resolved  - aggressive bowel regimen  - enemas q 6  -GI consult appreciated, s/p disimpaction    Splenic lesions  - Incidentally found on imaging, very unlikely the cause of her symptoms, clinically very unlikely infectious in nature  -Outpatient imaging for further characterization    Dementia  - Continue with zoloft, nortriptyline, and donepezil     HTN  - continue metoprolol, Dc norvasc, started on nifedipine 30mg, Hydralazine PRN    Diabetes Mellitus II  - CHO consistent diet  -Hold insulin for episodes of Hypoglycemia, restart PRN     Hypothyroidism  - Continue levothyroxine as prescribed     Chronic Pain   - Continue gabapentin and tramadol    DVT/VTE prophylaxis   Dispo: pending [facility]  
Progress note    INTERVAL HPI/OVERNIGHT EVENTS:    Patient seen and examined at bedside. She denies any acute distress.      REVIEW OF SYSTEMS:  All other 13 Review of systems were reviewed and are negative    FAMILY HISTORY:  FHx: diabetes mellitus (Mother)    FHx: diabetes mellitus (Mother)      T(C): 35.9 (04-21-24 @ 05:48), Max: 37.6 (04-20-24 @ 13:30)  HR: 64 (04-21-24 @ 05:48) (64 - 67)  BP: 140/66 (04-21-24 @ 05:48) (115/57 - 148/67)  RR: 18 (04-21-24 @ 05:48) (18 - 18)  SpO2: 98% (04-21-24 @ 05:48) (97% - 98%)  Wt(kg): --Vital Signs Last 24 Hrs  T(C): 35.9 (21 Apr 2024 05:48), Max: 37.6 (20 Apr 2024 13:30)  T(F): 96.6 (21 Apr 2024 05:48), Max: 99.6 (20 Apr 2024 13:30)  HR: 64 (21 Apr 2024 05:48) (64 - 67)  BP: 140/66 (21 Apr 2024 05:48) (115/57 - 148/67)  BP(mean): --  RR: 18 (21 Apr 2024 05:48) (18 - 18)  SpO2: 98% (21 Apr 2024 05:48) (97% - 98%)    Parameters below as of 21 Apr 2024 05:48  Patient On (Oxygen Delivery Method): nasal cannula      sulfa drugs (Hives)  sulfa drugs (Anaphylaxis)  Melons (Unknown)  shellfish (Unknown)  fish (Anaphylaxis)  latex (Unknown)  penicillins (Unknown)  Sulfacetamide Sodium-Sulfur (Rash)  penicillin (Anaphylaxis)      PHYSICAL EXAM:    GENERAL: Well-nourished, NAD.  HEAD: No visible or palpable bumps or hematomas. No ecchymosis behind ears B/L.  ENMT: PERRLA    CVS: Normal S1,S2. No murmurs appreciated on auscultation   RESP: No use of accessory muscles. Chest rise symmetrical with good expansion. Lungs clear to auscultation B/L. No wheezing, rales, or rhonchi auscultated.  GI: Normal auscultation of bowel sounds in all 4 quadrants. no TTP. Soft, Nondistended.  Skin: Warm, Dry. No rashes or lesions. Good cap refill < 2 sec B/L.  EXT: Radial and pedal pulses present B/L. No palpable cords.  has mild pitting edema b/l  NEURO: AAO x 1    Consultant(s) Notes Reviewed:  [x ] YES  [ ] NO  Care Discussed with Consultants/Other Providers [ x] YES  [ ] NO    LABS:      RADIOLOGY & ADDITIONAL TESTS:    Imaging Personally Reviewed:  [ ] YES  [ ] NO  aspirin enteric coated 81 milliGRAM(s) Oral daily  atorvastatin 80 milliGRAM(s) Oral at bedtime  chlorhexidine 2% Cloths 1 Application(s) Topical <User Schedule>  clopidogrel Tablet 75 milliGRAM(s) Oral daily  dextrose 10% Bolus 125 milliLiter(s) IV Bolus once  dextrose 5%. 1000 milliLiter(s) IV Continuous <Continuous>  dextrose 5%. 1000 milliLiter(s) IV Continuous <Continuous>  dextrose 50% Injectable 25 Gram(s) IV Push once  dextrose 50% Injectable 12.5 Gram(s) IV Push once  dextrose Oral Gel 15 Gram(s) Oral once PRN  donepezil 10 milliGRAM(s) Oral at bedtime  furosemide    Tablet 20 milliGRAM(s) Oral daily  gabapentin 300 milliGRAM(s) Oral two times a day  glucagon  Injectable 1 milliGRAM(s) IntraMuscular once  heparin   Injectable 5000 Unit(s) SubCutaneous every 12 hours  insulin lispro (ADMELOG) corrective regimen sliding scale   SubCutaneous three times a day before meals  levothyroxine 25 MICROGram(s) Oral daily  losartan 100 milliGRAM(s) Oral daily  metoprolol succinate ER 25 milliGRAM(s) Oral daily  ondansetron Injectable 4 milliGRAM(s) IV Push every 8 hours PRN  pantoprazole    Tablet 40 milliGRAM(s) Oral before breakfast  polyethylene glycol 3350 17 Gram(s) Oral daily PRN      HEALTH ISSUES - PROBLEM Dx:    84-year-old female with past medical history dementia, COPD on 2 L nasal cannula, CVA, HTN, type 2 diabetes brought in by EMS requested by family members due to nonfunctioning home O2 machine. Family also do not feel comfortably taking care of her and want placement    Acute on chronic hypoxic and hypercapnic respiratory failure 2/2 possibly WENDY given Hx  - Unclear etiology at this time  - CXR clear, No acute process, shows cardiomegaly  - Pulmonary consult appreciated, needs NIV but patient does not wish to have NIV at home  - Procal very mildly elevated  -D-dimer neg for age  -blood Cx NGTD  -TTE:  LVEF 40 % (per cardiology, likely 2/2 pacemaker placement explaining drop in LVEF)  - Discussed with family, HHA have been reporting she "never sleeps and wakes up frequently at night".  Never had sleep study or PFT.  2' hand smoke exposure from .  - LUE duplex: neg for clot;    Britton, possibly contrast-induced:  -Baseline appears to be 1.5  -US renal unremarkable  -Renal consult appreciated  - hematuria noted on 4/5 UA, C&pANCA (-), MPO(-)/PR3, SERGIO(-), C3/C4 (75/15, respectively), serum flc ratio, spep(-)/sife,   - CK elevated minimally  - gabapentin should be max 300mg daily based on CrCL  - repeat US renal as per nephro    UTI:  -UCx grew areococcus urinae  -S/p course of Levaquin    Transaminitis:  -Resolving, unclear etiology, US unremarkable, possibly 2' to Hypotensive episodes, , hepatitis panel (-)    Elevated troponin:  -Likely 2' to demand, PPM interrogated, no events  -TTE LVEF of 40 %.     Abdominal pain with overflow diarrhea 2' to constipation, resolved  - aggressive bowel regimen  - enemas q 6  -GI consult appreciated, s/p disimpaction    Splenic lesions  - Incidentally found on imaging, very unlikely the cause of her symptoms, clinically very unlikely infectious in nature  -Outpatient imaging for further characterization    Dementia  - Continue with zoloft, nortriptyline, and donepezil     HTN  - continue metoprolol, Dc norvasc, started on nifedipine 30mg, Hydralazine PRN    Diabetes Mellitus II  - CHO consistent diet  -Hold insulin for episodes of Hypoglycemia, restart PRN     Hypothyroidism  - Continue levothyroxine as prescribed     Chronic Pain   - Continue gabapentin and tramadol    DVT/VTE prophylaxis   Dispo: Plan to dc home Monday with hospice services. She does not want NIV      
Progress note    INTERVAL HPI/OVERNIGHT EVENTS:    Patient seen and examined at bedside. She is pleasant and denies any acute distress.      REVIEW OF SYSTEMS:  All other 13 Review of systems were reviewed and are negative    FAMILY HISTORY:  FHx: diabetes mellitus (Mother)    FHx: diabetes mellitus (Mother)      T(C): 36.6 (04-20-24 @ 05:39), Max: 37.6 (04-19-24 @ 20:57)  HR: 60 (04-20-24 @ 05:39) (60 - 60)  BP: 149/67 (04-20-24 @ 05:39) (132/60 - 155/68)  RR: 18 (04-20-24 @ 05:39) (18 - 18)  SpO2: 100% (04-20-24 @ 05:39) (98% - 100%)  Wt(kg): --Vital Signs Last 24 Hrs  T(C): 36.6 (20 Apr 2024 05:39), Max: 37.6 (19 Apr 2024 20:57)  T(F): 97.9 (20 Apr 2024 05:39), Max: 99.6 (19 Apr 2024 20:57)  HR: 60 (20 Apr 2024 05:39) (60 - 60)  BP: 149/67 (20 Apr 2024 05:39) (132/60 - 155/68)  BP(mean): --  RR: 18 (20 Apr 2024 05:39) (18 - 18)  SpO2: 100% (20 Apr 2024 05:39) (98% - 100%)    Parameters below as of 20 Apr 2024 05:39  Patient On (Oxygen Delivery Method): nasal cannula  O2 Flow (L/min): 2    sulfa drugs (Hives)  sulfa drugs (Anaphylaxis)  Melons (Unknown)  shellfish (Unknown)  fish (Anaphylaxis)  latex (Unknown)  penicillins (Unknown)  Sulfacetamide Sodium-Sulfur (Rash)  penicillin (Anaphylaxis)      PHYSICAL EXAM:    GENERAL: Well-nourished, NAD.  HEAD: No visible or palpable bumps or hematomas. No ecchymosis behind ears B/L.  ENMT: PERRLA    CVS: Normal S1,S2. No murmurs appreciated on auscultation   RESP: No use of accessory muscles. Chest rise symmetrical with good expansion. Lungs clear to auscultation B/L. No wheezing, rales, or rhonchi auscultated.  GI: Normal auscultation of bowel sounds in all 4 quadrants. no TTP. Soft, Nondistended.  Skin: Warm, Dry. No rashes or lesions. Good cap refill < 2 sec B/L.  EXT: Radial and pedal pulses present B/L. No palpable cords.  has mild pitting edema b/l  NEURO: AAO x 1    Consultant(s) Notes Reviewed:  [x ] YES  [ ] NO  Care Discussed with Consultants/Other Providers [ x] YES  [ ] NO    LABS:      RADIOLOGY & ADDITIONAL TESTS:    Imaging Personally Reviewed:  [ ] YES  [ ] NO  aspirin enteric coated 81 milliGRAM(s) Oral daily  atorvastatin 80 milliGRAM(s) Oral at bedtime  chlorhexidine 2% Cloths 1 Application(s) Topical <User Schedule>  clopidogrel Tablet 75 milliGRAM(s) Oral daily  dextrose 10% Bolus 125 milliLiter(s) IV Bolus once  dextrose 5%. 1000 milliLiter(s) IV Continuous <Continuous>  dextrose 5%. 1000 milliLiter(s) IV Continuous <Continuous>  dextrose 50% Injectable 25 Gram(s) IV Push once  dextrose 50% Injectable 12.5 Gram(s) IV Push once  dextrose Oral Gel 15 Gram(s) Oral once PRN  donepezil 10 milliGRAM(s) Oral at bedtime  furosemide    Tablet 20 milliGRAM(s) Oral daily  gabapentin 300 milliGRAM(s) Oral two times a day  glucagon  Injectable 1 milliGRAM(s) IntraMuscular once  heparin   Injectable 5000 Unit(s) SubCutaneous every 12 hours  insulin lispro (ADMELOG) corrective regimen sliding scale   SubCutaneous three times a day before meals  levothyroxine 25 MICROGram(s) Oral daily  losartan 100 milliGRAM(s) Oral daily  metoprolol succinate ER 25 milliGRAM(s) Oral daily  pantoprazole    Tablet 40 milliGRAM(s) Oral before breakfast  polyethylene glycol 3350 17 Gram(s) Oral daily PRN      HEALTH ISSUES - PROBLEM Dx:    84-year-old female with past medical history dementia, COPD on 2 L nasal cannula, CVA, HTN, type 2 diabetes brought in by EMS requested by family members due to nonfunctioning home O2 machine. Family also do not feel comfortably taking care of her and want placement    Acute on chronic hypoxic and hypercapnic respiratory failure 2/2 possibly WENDY given Hx  - Unclear etiology at this time  -CXR clear, No acute process, shows cardiomegaly  - Pulmonary consult appreciated, needs NIV but patient does not wish to have NIV at home  - Procal very mildly elevated  -D-dimer neg for age  -blood Cx NGTD  -TTE:  LVEF 40 % (per cardiology, likely 2/2 pacemaker placement explaining drop in LVEF)  - Discussed with family, HHA have been reporting she "never sleeps and wakes up frequently at night".  Never had sleep study or PFT.  2' hand smoke exposure from .  - LUE duplex: neg for clot;    Britton, possibly contrast-induced:  -Baseline appears to be 1.5  -US renal unremarkable  -Renal consult appreciated  - hematuria noted on 4/5 UA, C&pANCA (-), MPO(-)/PR3, SERGIO(-), C3/C4 (75/15, respectively), serum flc ratio, spep(-)/sife,   - CK elevated minimally  - gabapentin should be max 300mg daily based on CrCL  - repeat US renal as per nephro    UTI:  -UCx grew areococcus urinae  -S/p course of Levaquin    Transaminitis:  -Resolving, unclear etiology, US unremarkable, possibly 2' to Hypotensive episodes, , hepatitis panel (-)    Elevated troponin:  -Likely 2' to demand, PPM interrogated, no events  -TTE LVEF of 40 %.     Abdominal pain with overflow diarrhea 2' to constipation, resolved  - aggressive bowel regimen  - enemas q 6  -GI consult appreciated, s/p disimpaction    Splenic lesions  - Incidentally found on imaging, very unlikely the cause of her symptoms, clinically very unlikely infectious in nature  -Outpatient imaging for further characterization    Dementia  - Continue with zoloft, nortriptyline, and donepezil     HTN  - continue metoprolol, Dc norvasc, started on nifedipine 30mg, Hydralazine PRN    Diabetes Mellitus II  - CHO consistent diet  -Hold insulin for episodes of Hypoglycemia, restart PRN     Hypothyroidism  - Continue levothyroxine as prescribed     Chronic Pain   - Continue gabapentin and tramadol    DVT/VTE prophylaxis   Dispo: Plan to dc home Tuesday with hospice services. She does not want NIV

## 2024-04-22 NOTE — DISCHARGE NOTE PROVIDER - CARE PROVIDER_API CALL
Dell Salazar  Internal Medicine  [address] [place] [address]  Phone: [phone]  Fax: [phone]  Follow Up Time:

## 2024-04-22 NOTE — DISCHARGE NOTE PROVIDER - NSDCFUSCHEDAPPT_GEN_ALL_CORE_FT
[facility] Physician [facility]  CARDIOLOGY [address] [facility]  Scheduled Appointment: 05/09/2024

## 2024-04-22 NOTE — HOSPICE CARE NOTE - FAMILY IN AGREEMENT ADDITIONAL DETAILS
The patient was signed out to me by Roz Henning PA-C at the end of her shift for follow-up on US and UA.  The sign-out included relevant details of the history, physical, and work-up to date. The chart has been reviewed, new test results have been noted, and the patient has been re-evaluated.      Labs  Results for orders placed or performed during the hospital encounter of 08/10/20   WET MOUNT   Result Value    CLUE CELLS, WET MOUNT None Seen    TRICHOMONAS, WET MOUNT None Seen    YEAST, WET MOUNT None Seen   URINALYSIS & REFLEX MICRO WITH CULTURE IF INDICATED   Result Value    COLOR, URINALYSIS Red    APPEARANCE, URINALYSIS Cloudy    GLUCOSE, URINALYSIS Negative    BILIRUBIN, URINALYSIS Negative    KETONES, URINALYSIS Unable to interpret due to interfering substances (A)    SPECIFIC GRAVITY, URINALYSIS 1.015    OCCULT BLOOD, URINALYSIS Large (A)    PH, URINALYSIS 7.5 (H)    PROTEIN, URINALYSIS Unable to interpret due to interfering substances (A)    UROBILINOGEN, URINALYSIS Unable to interpret due to interfering substances (A)    NITRITE, URINALYSIS Unable to interpret due to interfering substances (A)    LEUKOCYTE ESTERASE, URINALYSIS Unable to interpret due to interfering substances (A)   URINALYSIS MICROSCOPIC   Result Value    SQUAMOUS EPITHELIAL, URINALYSIS 1 to 5    ERYTHROCYTES, URINALYSIS >100 (A)    LEUKOCYTES, URINALYSIS 26 to 100 (A)    BACTERIA, URINALYSIS None Seen    HYALINE CASTS, URINALYSIS 1 to 5       Radiology  US Pelvis Non OB and Duplex Complete   Final Result   IMPRESSION:  There are a few small cystic and hypoechoic opacities in both   ovaries which likely represents multiple follicles, some of which are   hemorrhagic. There is no evidence of focal ovarian mass and no evidence of   torsion      Normal-appearing uterus. Small focus of increased vascularity within the   endometrium of uncertain significance but no underlying abnormality in   echogenicity within the endometrium to indicate  an endometrial polyp,   endometritis or other abnormality      No free fluid             Medications  Medications - No data to display    Vitals  Vitals:    08/10/20 1252 08/10/20 1333 08/10/20 1335 08/10/20 1437   BP: 120/84 126/71  120/73   Pulse: (!) 104 (!) 104  100   Resp: 18 16  16   Temp: 99 °F (37.2 °C)      TempSrc: Oral      SpO2: 99% 100%  100%   Weight: 59 kg      Height:   5' (1.524 m)        ED Course  3:54 PM I attempted to recheck the pt, but she was not in her room.     4:12 PM I checked the waiting room and pt was not in the waiting room.     4:14 PM I left a message for the pt to update her on UA and US results.     4:31 PM I again attempted to recheck on the pt. She is not in her room. Pt has eloped from the ED.     Critical Care time spent on this patient outside of billable procedures:  None      Clinical impression  The primary encounter diagnosis was Ovarian cyst, left. A diagnosis of Menometrorrhagia was also pertinent to this visit.        Follow Up:  Chencho Moreno MD  [address]  [facility] [address]  [place] [id number] [phone]      As needed       The patient was provided with a recommendation to follow up with a primary care provider and obtain reassessment of his/her blood pressure within three months.    New Prescriptions    No medications on file       Pt eloped.       Alise Arroyo PA-C  Dictation # [id number]    Attending Physician: Dr. Abelino Henley  Dictation # [id number]         Alise Arroyo PA-C  08/10/20 3602     Patient will be admitted to home hospice tomorrow morning 10-11:00am.

## 2024-04-22 NOTE — DISCHARGE NOTE PROVIDER - NSDCCPCAREPLAN_GEN_ALL_CORE_FT
PRINCIPAL DISCHARGE DIAGNOSIS  Diagnosis: Inadequate social support  Assessment and Plan of Treatment: You were admitted for hospice referral. You will go home on hospice. Please continue taking all your medications as prescribed. Follow up with your outpatient providers. You did not wish to wear the NIV at home.

## 2024-04-22 NOTE — HOSPICE CARE NOTE - CONVESATION DETAILS
Please call for transport for 6pm tonight. As per CM Annalee and Sandra aid will be there when patient gets home and the 8pm aid will be there tonight.

## 2024-04-22 NOTE — DISCHARGE NOTE NURSING/CASE MANAGEMENT/SOCIAL WORK - PATIENT PORTAL LINK FT
You can access the FollowMyHealth Patient Portal offered by [facility] by registering at the following website: [email or web address]. By joining [employer]’s FollowMyHealth portal, you will also be able to view your health information using other applications (apps) compatible with our system.

## 2024-04-22 NOTE — DISCHARGE NOTE PROVIDER - HOSPITAL COURSE
84-year-old female with past medical history dementia, COPD on 2 L nasal cannula, CVA, HTN, type 2 diabetes brought in by EMS requested by family members due to nonfunctioning home O2 machine.  Patient's family also reports they do not feel they are capable of caring for patient's and would like her to be placed in a nursing home.  Denies any acute changes.  Denies fever/chills, chest pain, shortness of breath, abdominal pain, change in bowel/bladder habits, lightheadedness, dizziness.  Report patient is wheelchair/bedbound at baseline.    Acute on chronic hypoxic and hypercapnic respiratory failure 2/2 possibly WENDY given Hx  - Unclear etiology at this time  - CXR clear, No acute process, shows cardiomegaly  - Pulmonary consult appreciated, needs NIV but patient does not wish to have NIV at home  - Procal very mildly elevated  - D-dimer neg for age  - Blood Cx NGTD  -TTE:  LVEF 40 % (per cardiology, likely 2/2 pacemaker placement explaining drop in LVEF)  - Discussed with family, HHA have been reporting she "never sleeps and wakes up frequently at night".  Never had sleep study or PFT.  2' hand smoke exposure from .  - LUE duplex: neg for clot    Britton, possibly contrast-induced:  -Baseline appears to be 1.5  -US renal unremarkable  -Renal consult appreciated  - hematuria noted on 4/5 UA, C&pANCA (-), MPO(-)/PR3, SERGIO(-), C3/C4 (75/15, respectively), serum flc ratio, spep(-)/sife,   - CK elevated minimally  - gabapentin should be max 300mg daily based on CrCL  - repeat US renal as per nephro    UTI:  -UCx grew areococcus urinae  -S/p course of Levaquin    Transaminitis:  -Resolving, unclear etiology, US unremarkable, possibly 2' to Hypotensive episodes, , hepatitis panel (-)    Elevated troponin:  -Likely 2' to demand, PPM interrogated, no events  -TTE LVEF of 40 %.     Abdominal pain with overflow diarrhea 2' to constipation, resolved  - aggressive bowel regimen  - enemas q 6  - GI consult appreciated, s/p disimpaction    Splenic lesions  - Incidentally found on imaging, very unlikely the cause of her symptoms, clinically very unlikely infectious in nature  -Outpatient imaging for further characterization    Dementia  - Continue with zoloft, nortriptyline, and donepezil     HTN  - continue metoprolol, Dc norvasc, started on nifedipine 30mg, Hydralazine PRN    Diabetes Mellitus II  - CHO consistent diet  -Hold insulin for episodes of Hypoglycemia, restart PRN     Hypothyroidism  - Continue levothyroxine as prescribed     Chronic Pain   - Continue gabapentin and tramadol    DVT/VTE prophylaxis   Dc today

## 2024-04-22 NOTE — DISCHARGE NOTE PROVIDER - NSDCMRMEDTOKEN_GEN_ALL_CORE_FT
atorvastatin 80 mg oral tablet: 1 tab(s) orally once a day (at bedtime)  clopidogrel 75 mg oral tablet: 1 tab(s) orally once a day  donepezil 10 mg oral tablet: 1 tab(s) orally once a day (at bedtime)  Ecotrin Adult Low Strength 81 mg oral delayed release tablet: 2 tab(s) orally once a day  exemestane 25 mg oral tablet: 1 tab(s) orally once a day  furosemide 20 mg oral tablet: 1 tab(s) orally once a day  gabapentin 300 mg oral capsule: orally 2 times a day  HumaLOG 100 units/mL subcutaneous solution: Per insulin sliding scale at home  Levemir 100 units/mL subcutaneous solution: 30 unit(s) subcutaneous once a day (at bedtime)  levothyroxine 25 mcg (0.025 mg) oral tablet: 1 tab(s) orally once a day  metoprolol succinate 25 mg oral capsule, extended release: 1 cap(s) orally once a day  MiraLax oral powder for reconstitution: 17 gram(s) orally once a day as needed for as needed for constipation  nortriptyline 25 mg oral capsule: 1 cap(s) orally once a day (at bedtime)  pantoprazole 40 mg oral delayed release tablet: 1 tab(s) orally once a day (before a meal)  sertraline 100 mg oral tablet: 1 tab(s) orally once a day  sertraline 50 mg oral tablet: 1 tab(s) orally once a day  TraMADol (Eqv-Ryzolt) 100 mg/24 hours oral tablet, extended release: 1 tab(s) orally once a day

## 2024-04-22 NOTE — DISCHARGE NOTE NURSING/CASE MANAGEMENT/SOCIAL WORK - NSDCVIVACCINE_GEN_ALL_CORE_FT
influenza, high-dose, quadrivalent; 12-Nov-2021 18:58; Chris Martines (RN); [employer]; [id number] (Exp. Date: 30-Jun-2022); IntraMuscular; Deltoid Left.; 0.7 milliLiter(s); VIS (VIS Published: 06-Aug-2021, VIS Presented: 12-Nov-2021);

## 2024-04-22 NOTE — DISCHARGE NOTE NURSING/CASE MANAGEMENT/SOCIAL WORK - NSDCPEFALRISK_GEN_ALL_CORE
For information on Fall & Injury Prevention, visit: [email or web address] OR  [email or web address] OR  https://www.cdc.gov/steadi/patient.html

## 2024-05-09 ENCOUNTER — APPOINTMENT (OUTPATIENT)
Dept: CARDIOLOGY | Facility: CLINIC | Age: 84
End: 2024-05-09

## 2024-06-04 NOTE — ED ADULT NURSE NOTE - NSFALLRSKASSESSTYPE_ED_ALL_ED
Physical Therapy Treatment    Patient Name: Karyn Juan  MRN: [id number]  Today's Date: 6/4/2024                          Current Problem  1. Chronic right hip pain        2. Weakness of right hip        3. Trochanteric bursitis of right hip            Insurance:  Visit number: 3  Authorization info: No auth required  Insurance Type: Medicare  Precautions    R TKA > 15 years ago, R shoulder > 5 years ago   : osteoporosis   Subjective   Subjective:   Pt reports that her hip feels about the same. Her butt still hurts. She has difficulty sleeping and sitting/driving sometimes irritates it.   Pain   0/10    Objective   Treatments:  SLR 2x10  Bridges GTB 2x10  LTR 5x ea  Supine HS isometrics 2x10 5 sec holds  Sidelying hip Abduction 2x10  Supine HS isometrics x10 5 sec holds  Supine Hip ABD iso vs GTB 3 sec holds 2x10  S/l reverse clamshells 2x10  Staggered STS 1x10 each  Step ups f/L 6 in. Step 1x15 each  Squats 15x   Prone prop on elbows 2'  SUZY 10x    STM to R glutes/ITB 4'  OP EDUCATION:     Access Code: [email or web address]  URL: [email or web address]  Date: 06/05/2024  Prepared by: Leny Hyatt    Exercises  - Prone Press Up On Elbows  - 1 x daily - 7 x weekly - 3 sets - 10 reps  - Standing Lumbar Extension with Counter  - 1 x daily - 7 x weekly - 2 sets - 10 reps    Assessment:   Pt able to use good form with SLR, with some soreness/strain felt in the quad.  Pt did well with bridges. Pt fatigues with hamstring isometrics. Introduced rev clamshells. Pt had difficulty keeping leg in correct alignment to the body with hip abd. Pt did well with step ups and squats. Encouraged pt to use pillow when sitting on couch for better posture.     Plan:  Progress hip and core strengthening, postural awareness          
Initial (On Arrival)

## 2024-06-10 ENCOUNTER — APPOINTMENT (OUTPATIENT)
Dept: CARDIOLOGY | Facility: CLINIC | Age: 84
End: 2024-06-10

## 2024-06-26 NOTE — CONSULT NOTE ADULT - CONSULT REQUESTED DATE/TIME
"Anesthesia Transfer of Care Note    Patient: Darrel Lepe Jr.    Procedure(s) Performed: Procedure(s) (LRB):  Injection,steroid,epidural,transforaminal approach (Bilateral)    Patient location: OPS    Anesthesia Type: general and MAC    Transport from OR: Transported from OR on room air with adequate spontaneous ventilation    Post pain: adequate analgesia    Post assessment: no apparent anesthetic complications    Post vital signs: stable    Level of consciousness: awake    Nausea/Vomiting: no nausea/vomiting    Complications: none    Transfer of care protocol was followedComments: TIVA      Last vitals: Visit Vitals  BP (!) 152/88   Pulse 69   Temp 36.8 °C (98.3 °F) (Tympanic)   Resp 20   Ht 6' 1" (1.854 m)   Wt 87.7 kg (193 lb 5.5 oz)   SpO2 100%   BMI 25.51 kg/m²     " 16-Jul-2019 10:44

## 2024-07-19 NOTE — PATIENT PROFILE ADULT - NSPROMEDSPATCH_GEN_A_NUR
greater than 50% of time spent reviewing labs, notes, orders and radiographs, coordinating care  discussed with nursing, ICU team, consultant  critically ill patient, actively managing pressor, antiarrythmic, high risk for deterioration
medication patch(es) used

## 2024-07-19 NOTE — PHYSICAL THERAPY INITIAL EVALUATION ADULT - NAME OF CLINICIAN
[facility]   Hospitalist Progress Note  Date: 2024  Patient Name: Fermín Junior  : 1942  MRN: [id number]  Date of admission: 2024      Subjective   Subjective     Chief Complaint: Follow-up urinary tract infection    Summary:Fermín Junior is a 82 y.o. male  with a past medical history of dementia, hypertension, anxiety presented to the ED for evaluation of decreased appetite, failure to thrive, not taking his medications.  As per the reports, patient is currently staying at home, unable to perform ADLs and is requiring 2 people assistance.  As per the family, patient is needing 24-hour care and they are unable to provide at home at this time.  POA is patient's grandson and he is now taking care of the patient.  Unable to obtain any history from the patient due to dementia.  He is alert and oriented to self.  Case management has been consulted while the patient was in the ED and had discussion with the family.  Patient was found to have urinary tract infection, patient declined treatment, he refused multiple medications, he refused lab checks.  Clinically he is stable however with no obvious signs or symptoms of worsening infection.  Discussed goals of care with the patient and grandson/POA.  Both agreed they wanted to just pursue comfort measures only at this time.  Palliative care consulted.  MOST form completed.  Working on skilled nursing palliative care placement.    Interval Followup: Patient lying in bed appears to be resting comfortably.  Patient still only drinking milk per nursing.  Not requiring any PRNs.  No new issues per nursing.  Afebrile overnight.  Heart rate within normal limits.      Review of Systems  Constitutional: Positive for fatigue and negative fever.   HENT: Negative for sore throat and trouble swallowing.    Eyes: Negative for pain and discharge.   Respiratory: Negative for cough and shortness of breath.    Cardiovascular: Negative for chest pain and palpitations.    Gastrointestinal: Negative for abdominal pain, nausea and vomiting.   Endocrine: Negative for cold intolerance and heat intolerance.   Genitourinary: Negative for difficulty urinating and dysuria.   Musculoskeletal: Negative for back pain and neck stiffness.   Skin: Negative for color change and rash.   Neurological: Negative for syncope and headaches.   Hematological: Negative for adenopathy.   Psychiatric/Behavioral: Negative for confusion and hallucinations.    Objective   Objective     Vitals:   Temp:  [97.2 °F (36.2 °C)] 97.2 °F (36.2 °C)  Heart Rate:  [72] 72  Resp:  [16] 16  BP: (93)/(59) 93/59  Physical Exam   General: well-developed appearing stated age thin frail appearing muscle wasting bilateral upper and lower extremities in no acute distress  HEENT: Normocephalic atraumatic moist membranes   Cardiovascular:  no lower extremity edema appreciated  Pulmonary: symmetric chest expansion, unlabored,   Gastrointestinal: Soft nontender nondistended positive bowel sounds all 4 quadrants no rebound or guarding  Musculoskeletal: No clubbing cyanosis, warm and well-perfused, calves soft symmetric nontender bilaterally  Skin: Clean dry without rashes  Neuro: Cranial nerves II through XII intact grossly no sensorimotor deficits appreciated bilateral upper and lower extremities  Psych: Patient is calm cooperative and appropriate with exam not responding to internal stimuli  : No Page catheter no bladder distention     Result Review    Result Review:  I have personally reviewed these results and agree with these findings:  [x]  Laboratory  LAB RESULTS:                                        Brief Urine Lab Results  (Last result in the past 365 days)        Color   Clarity   Blood   Leuk Est   Nitrite   Protein   CREAT   Urine HCG        07/09/24 2133 Yellow   Cloudy   Moderate (2+)   Large (3+)   Positive   30 mg/dL (1+)                 Microbiology Results (last 10 days)       Procedure Component Value -  Date/Time    Urine Culture - Urine, Urine, Clean Catch [[id number]]  (Abnormal)  (Susceptibility) Collected: 07/09/24 2133    Lab Status: Final result Specimen: Urine, Clean Catch Updated: 07/12/24 1040     Urine Culture >100,000 CFU/mL Escherichia coli    Narrative:      Colonization of the urinary tract without infection is common. Treatment is discouraged unless the patient is symptomatic, pregnant, or undergoing an invasive urologic procedure.    Susceptibility        Escherichia coli      SAYRA      Amikacin Susceptible      Amoxicillin + Clavulanate Resistant      Ampicillin Resistant      Ampicillin + Sulbactam Resistant      Cefazolin Susceptible      Cefepime Susceptible      Ceftazidime Susceptible      Ceftriaxone Susceptible      Gentamicin Resistant      Levofloxacin Susceptible      Nitrofurantoin Susceptible      Piperacillin + Tazobactam Resistant      Tobramycin Intermediate      Trimethoprim + Sulfamethoxazole Resistant                                   [x]  Microbiology  []  Radiology  No radiology results for the last 7 days    []  EKG/Telemetry   []  Cardiology/Vascular   []  Pathology  []  Old records  [x]  Other:  Scheduled Meds:   Continuous Infusions:   PRN Meds:.  acetaminophen    atropine    senna-docusate sodium **AND** polyethylene glycol **AND** bisacodyl **AND** bisacodyl    gabapentin    haloperidol    LORazepam **OR** LORazepam **OR** LORazepam    pantoprazole    Polyvinyl Alcohol-Povidone PF      Assessment & Plan   Assessment / Plan     Assessment/Plan:  Failure to thrive  Hypertension  Urinary tract infection  Dementia  Severe protein calorie malnutrition        Patient admitted for further evaluation and treatment  After discussion with patient and family regards to goals of care decision was made to make comfort measures only  Continue diet as tolerated.  Patient prefers milkshakes  Continue Ativan for anxiety as needed  Continue Haldol for agitation as needed  Continue gabapentin  to as needed  Continue artificial tears as needed  Continue Tylenol as needed for pain  Palliative care consulted thank you for your assistance  Further patient was recommendations pending clinical course       Discussed plan with bedside RN.    Disposition: Family and discharge planning looking for skilled nursing placement with hospice services.    VTE Prophylaxis:  Mechanical VTE prophylaxis orders are present.        CODE STATUS:   Code Status (Patient has no pulse and is not breathing): No CPR (Do Not Attempt to Resuscitate)  Medical Interventions (Patient has pulse or is breathing): Comfort Measures                      Roma RN

## 2024-07-24 NOTE — DISCHARGE NOTE ADULT - FUNCTIONAL SCREEN CURRENT LEVEL: DRESSING, MLM
(4) completely dependent Plan: Patient will start washing hair 3x per week and manage itch with lidocaine Discontinue Regimen: Conditioner on the scalp Detail Level: Zone (2) assistive person

## 2024-08-26 NOTE — ED PROVIDER NOTE - PATIENT PORTAL LINK FT
"Subjective:      Patient ID: Nadia Appiah is a 18 y.o. male.    Vitals:  height is 6' 2" (1.88 m) and weight is 86.2 kg (190 lb). His oral temperature is 98.5 °F (36.9 °C). His blood pressure is 125/74 and his pulse is 88. His respiration is 16 and oxygen saturation is 97%.     Chief Complaint: Cough    Patient is an [employer] student presenting for: cough   -went to [facility] urgent care x 1 week; tested negative for flu, covid and strep; returned there yesterday for same complaints  Was prescribed medications for symptom relief but has not picked up from pharmacy yet.  States he is here today for second opinion.  - all symptoms have resolved except for the cough  -cough is productive of clear phlegm  Reports recent wheezing  -hx of childhood asthma; does not have albuterol inhaler      Cough  This is a new problem. The current episode started 1 to 4 weeks ago. The problem has been gradually worsening. The problem occurs constantly. The cough is Productive of sputum. Associated symptoms include postnasal drip, a sore throat and wheezing. Pertinent negatives include no chills, fever, myalgias, rash or shortness of breath. The symptoms are aggravated by lying down. His past medical history is significant for asthma.       Constitution: Positive for fatigue. Negative for activity change, appetite change, chills, sweating and fever.   HENT:  Positive for postnasal drip and sore throat. Negative for congestion.    Respiratory:  Positive for cough, sputum production, wheezing and asthma. Negative for shortness of breath.    Musculoskeletal:  Negative for muscle ache.   Skin:  Negative for color change, pale and rash.   Allergic/Immunologic: Positive for asthma.   Neurological:  Negative for disorientation and altered mental status.   Psychiatric/Behavioral:  Negative for altered mental status, disorientation and confusion.       Objective:     Physical Exam   Constitutional: He is oriented to person, place, and time.  " Non-toxic appearance. He does not appear ill. No distress.   HENT:   Head: Normocephalic and atraumatic.   Nose: Mucosal edema and congestion present. Right sinus exhibits no maxillary sinus tenderness and no frontal sinus tenderness. Left sinus exhibits no maxillary sinus tenderness and no frontal sinus tenderness.   Mouth/Throat: Uvula is midline and mucous membranes are normal. Mucous membranes are moist. No trismus in the jaw. No uvula swelling. Posterior oropharyngeal erythema and cobblestoning present. No oropharyngeal exudate. No tonsillar exudate.   Neck: No neck rigidity present.   Cardiovascular: Normal rate, regular rhythm, normal heart sounds and normal pulses.   Pulmonary/Chest: Effort normal and breath sounds normal. No respiratory distress. He has no wheezes. He has no rhonchi. He has no rales.         Comments: Constant dry cough during exam    Abdominal: Normal appearance.   Lymphadenopathy:     He has cervical adenopathy.        Right cervical: Superficial cervical adenopathy present.        Left cervical: Superficial cervical adenopathy present.   Neurological: He is alert and oriented to person, place, and time.   Skin: Skin is warm, dry, not diaphoretic and no rash.   Psychiatric: His behavior is normal. Mood, judgment and thought content normal.   Nursing note and vitals reviewed.      Assessment:     1. Upper respiratory infection with cough and congestion    2. Acute costochondritis    3. Bronchospasm, acute    4. Personal history of asthma    5. Symptom of wheezing        Plan:       Upper respiratory infection with cough and congestion  -     albuterol (VENTOLIN HFA) 90 mcg/actuation inhaler; Inhale 2 puffs into the lungs every 6 (six) hours as needed for Wheezing or Shortness of Breath. Rescue  Dispense: 18 g; Refill: 0    Acute costochondritis    Bronchospasm, acute  -     albuterol (VENTOLIN HFA) 90 mcg/actuation inhaler; Inhale 2 puffs into the lungs every 6 (six) hours as needed for  Wheezing or Shortness of Breath. Rescue  Dispense: 18 g; Refill: 0    Personal history of asthma  -     albuterol (VENTOLIN HFA) 90 mcg/actuation inhaler; Inhale 2 puffs into the lungs every 6 (six) hours as needed for Wheezing or Shortness of Breath. Rescue  Dispense: 18 g; Refill: 0    Symptom of wheezing  -     albuterol (VENTOLIN HFA) 90 mcg/actuation inhaler; Inhale 2 puffs into the lungs every 6 (six) hours as needed for Wheezing or Shortness of Breath. Rescue  Dispense: 18 g; Refill: 0             Patient Instructions   Please follow up with your primary care provider within 2-5 days if your signs and symptoms have not resolved or worsen.     If your condition worsens or fails to improve we recommend that you receive another evaluation at the emergency room immediately or contact your primary medical clinic to discuss your concerns.   You must understand that you have received an Urgent Care treatment only and that you may be released before all of your medical problems are known or treated. You, the patient, will arrange for follow up care as instructed.     Continue using previously prescribed medications for symptom management.  PLAIN Mucinex twice daily with lots of water!  Use albuterol inhaler as needed for shortness of breath and/or wheezing.                  You can access the FollowMyHealth Patient Portal offered by [facility] by registering at the following website: [email or web address] By joining [employer]’s FollowMyHealth portal, you will also be able to view your health information using other applications (apps) compatible with our system.

## 2024-09-23 NOTE — DISCHARGE NOTE NURSING/CASE MANAGEMENT/SOCIAL WORK - NSDCPNPNATDISSUGG_GEN_ALL_CORE
Yasmin Verde  2015 [facility]  [place] [id number]      Dear Yasmin Verde,     I work with [facility]'s Outpatient Care Management Department. We received a referral to call you to discuss your medical history. These services are free of charge and are offered to [facility] patients who have recently been discharged from any of our facilities or who have medical conditions that may require the skill of a nurse to assist with management.     I am a Registered Nurse who specializes in connecting patients with available medical and financial resources as well as addressing any educational needs that may be indicated.    I attempted to reach you by telephone, but I was unsuccessful. Please call our department so that we can go over some questions with you, regarding your health.    The Outpatient Care Management Department can be reached at [phone], from 8:00AM to 4:30 PM, on Monday thru Friday.     Additionally, [facility] also has a program where a nurse is available 24/7 to answer questions or provide medical advice, their number is [phone].      Thanks,        Marcie Duarte RN  Outpatient Care Management  Phone #: [phone]   
No

## 2024-11-01 NOTE — DISCHARGE NOTE PROVIDER - HOSPITAL COURSE
to be completed by attending History of Present Illness:    Reason for Admission: abdominal pain    History of Present Illness:     pt is an 78 yo F h/o HTN DM HLD PVD p/w abdominal pain, nausea and  vomiting. Pt states she has had 4 episodes of nonbloody nonbilious vomiting. +flatus, last BM yesterday. Denies fever, chills, CP SOB.             --------------------------------------------------------------------------------------------------------------------------------------------------------------------CT negative for acute pathology, s/p cholecystectomy and normal bilis     Admit to medicine, likely gastroenteritis     + UA - IV ceftriaxone     IVF     Advance diet    Dvt ppx     Continue home meds             Problem/Plan - 1:    ·  Problem: Abdominal pain.         final dx Enteritis     improved w IVF     -------------------------------------- Patient/Caregiver provided printed discharge information.

## 2025-01-17 NOTE — ED PROVIDER NOTE - NS ED MD TWO NIGHTS YN
Report received, care assumed. Resting in bed quietly. AAO, responsive to verbal stimuli. Denies pain/discomfort. Board updated. Plan of care discussed with patient.[facility]  Nurses Note -- 4 Eyes      1/17/2025       Skin assessed on: Q Shift      [x] No Pressure Injuries Present    []Prevention Measures Documented    [] Yes LDA  for Pressure Injury Previously documented     [] Yes New Pressure Injury Discovered   [] LDA for New Pressure Injury Added      Attending RN:  Alicia Boland RN     Second RN:  KEIKO Willis       Yes

## 2025-01-22 ENCOUNTER — APPOINTMENT (OUTPATIENT)
Dept: CARDIOLOGY | Facility: CLINIC | Age: 85
End: 2025-01-22

## 2025-02-05 ENCOUNTER — APPOINTMENT (OUTPATIENT)
Dept: CARDIOLOGY | Facility: CLINIC | Age: 85
End: 2025-02-05

## 2025-02-13 NOTE — ED ADULT TRIAGE NOTE - NS ED TRIAGE AVPU SCALE
PT ambulatory to ED with reports of being exposed to covid, PT has no symptoms.   
Alert-The patient is alert, awake and responds to voice. The patient is oriented to time, place, and person. The triage nurse is able to obtain subjective information.

## 2025-02-16 NOTE — PHYSICAL THERAPY INITIAL EVALUATION ADULT - ORIENTATION, REHAB EVAL
The patient's goals for the shift include  managing shortness of breath    The clinical goals for the shift include Maintain patient's safety and manage patient's pain throughout the shift    Over the shift, the patient did make progress toward the goals.    person/place

## 2025-02-24 NOTE — ED ADULT NURSE NOTE - NS ED NURSE RECORD ANOTHER VITAL SIGN
A first call was made in an attempt to reach this patient for a referral we received. I left a detailed voicemail to call our office to schedule an initial consult.     Yes

## 2025-02-26 NOTE — BEHAVIORAL HEALTH ASSESSMENT NOTE - WITHDRAWAL SEIZURES / DTS
Detail Level: Zone Render Risk Assessment In Note?: no Recommendation Preamble: The following recommendations were made during the visit: routine self skin examinations. None known

## 2025-03-04 NOTE — ED PROVIDER NOTE - DISPOSITION TYPE
SORE THROAT:   Integrative Considerations:   Eat whole, non-processed, plant rich foods (include garlic, onion, cooked mushrooms, herbs & spices such as - oregano, lalito, turmeric, ginger); avoid high sugar foods.   Stay well-hydrated with water, broth, herbal teas (avoid sugary drinks).   Get adequate sleep, usually 6-8 hours, but more if your body needs it.   Gentle movement with stretches and walking around the house or outside (breathe deeply).   Support your emotional health by deep breathing, praying, meditating, guided imagery or spending a few minutes in nature.   Integrative Medicine SharePoint: 4-7-8 [email or web address]   Meditations: LiveWell under Wellness [email or web address]  Guided Imagery: [email or web address]    Additional Considerations:   Saltwater gargle: add 1/2 teaspoon to 1 cup of water   Lalito tea gargle: 1 tbsp fresh lalito or 1 tsp dried herb in 1 cup hot water, cover, and let sit for a few minutes; then gargle with this every hour as needed for sore throat relief     Supplements to Help Soothe Sore Throats:   Slippery elm bark lozenges (Flip's, Planetary Herbals)   DGL lozenges/chew tabs: 300-400 mg as needed (Deglycyrrhized licorice root) (with soothing & anti-inflammatory benefits)   Throat coat tea (by [employer])    Throat spray: Echinacea, propolis, Goldenseal spray made by Julisa     Supplements for Immune & Anti-viral Benefits: Take for 5-30 days, these can safely be taken longer for prevention; (please discuss continued use with your primary care provider),   Vitamin C: 1000 mg 2-3 times a day   Vitamin D3: (Cholecalciferol) 9845-8940 IU oral a day (you may want to ask your provider to check a 25 hydroxy-Vitamin D level; The exact goal level is not known, but many studies indicate 40-60 ng/ml is good for immunity)   Zinc picolinate: 30 mg oral a day (zinc is an antiviral & decreases viral replication: it’s best taken with food. Do NOT use zinc nasal  spray.)  Quercetin: 250 mg oral twice a day for 10-14 days, then 250 mg once a day; (this helps the body use zinc & Vitamin C)   Probiotics if prescribed antibiotics:    Children: DanActive Actimel or [place] GoodStart Grow Toddler Drink, 1-2 times per day     Adults: DanActive Actimel, Good Belly Probiotic Juice Drink/Straight Shot/Super Shot, Florastor or Culturelle     Where to Get Supplements:  [employer], our online supplement store: (All patients get a 10% discount off everything in the store)  [email or web address]/[email or web address]  [facility] [email or web address]  QR code below.   Local health food/natural store, some things may be available at local pharmacies.  Online (direct from the product company)        DISCHARGE

## 2025-04-23 NOTE — PATIENT PROFILE ADULT - HAS THE PATIENT BEEN ADMITTED FROM SHORT TERM REHAB?
HOSPITALIST ADMISSION H&P    REASON FOR ADMISSION:  Right hip fracture s/p mechanical fall  ESTIMATED LENGTH OF STAY:>2 midnights, 3 days    ATTENDING/ADMITTING PHYSICIAN: AMINA James MD    HISTORY OF PRESENT ILLNESS:      The patient is a 61 y.o. female patient of Maryjo Best MD who presents from the ER with c/o right hip and leg pain after tripping on the step into an entryway and falling landing on her right hip. She received IM Fentanyl from EMS.     In ER, xrays impression: Severe osteopenia limiting the exam. Probable nondisplaced linear fracture along the intratrochanteric region of the right hip. Recommend orthopedic consultation suggest CT correlation, if   indicated. 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 CT IMPRESSION: 1. Acute nondisplaced intertrochanteric fracture of the right hip. 2. No acute fracture of the pelvis or left hip. 3. Moderate to large stool load in the pelvis suggesting constipation. Per report, Dr. Chatterjee accepted consult and plans for surgical repair tomorrow. In ER, she received toradol, dilaudid, benadryl, and zofran.     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    Patient ithv-bhhbyszafy-mgexykcc-available records reviewed, including, but not limited to ER records, imaging results, 
no

## 2025-06-22 NOTE — ED PROVIDER NOTE - NS_EDPROVIDERDISPOUSERTYPE_ED_A_ED
[facility]  History & Physical    Subjective:      Chief Complaint/Reason for Admission:     Vikram Godfrey is a 86 y.o. female.    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     Review of patient's allergies indicates:  No Known Allergies     Review of Systems   Eyes:  Positive for blurred vision.   All other systems reviewed and are negative.        Objective:      Vital Signs (Most Recent)       Vital Signs Range (Last 24H):       Physical Exam  Constitutional:       Appearance: She is well-developed.   HENT:      Head: Normocephalic.   Eyes:      Conjunctiva/sclera: Conjunctivae normal.      Pupils: Pupils are equal, round, and reactive to light.   Cardiovascular:      Rate and Rhythm: Normal rate and regular rhythm.      Heart sounds: Normal heart sounds.   Pulmonary:      Effort: Pulmonary effort is normal.      Breath sounds: Normal breath sounds.   Abdominal:      General: Bowel sounds are normal.      Palpations: Abdomen is soft.   Musculoskeletal:         General: Normal range of motion.      Cervical back: Normal range of motion and neck supple.   Skin:     General: Skin is warm.   Neurological:      Mental Status: She is alert and oriented to person, place, and time.         ASA Score: II    Mallampati Score: II    Plan for Sedation: Moderate    Patient or Family History of Anesthesia problems : No    Any changes affecting the anesthesia assessment which may have changed since the initial assessment and H and P:  No       Data Review:    ECG:     Assessment:      Cataract OD.    Plan:    CE OD.         [1]   Social History  Tobacco  Use    Smoking status: Never    Smokeless tobacco: Never   Substance Use Topics    Alcohol use: No    Drug use: No      Attending Attestation (For Attendings USE Only)...